# Patient Record
Sex: FEMALE | Race: WHITE | NOT HISPANIC OR LATINO | URBAN - METROPOLITAN AREA
[De-identification: names, ages, dates, MRNs, and addresses within clinical notes are randomized per-mention and may not be internally consistent; named-entity substitution may affect disease eponyms.]

---

## 2018-02-25 ENCOUNTER — INPATIENT (INPATIENT)
Facility: HOSPITAL | Age: 68
LOS: 2 days | Discharge: HOME | End: 2018-02-28
Attending: HOSPITALIST

## 2018-02-25 VITALS
HEART RATE: 115 BPM | OXYGEN SATURATION: 96 % | RESPIRATION RATE: 20 BRPM | DIASTOLIC BLOOD PRESSURE: 117 MMHG | SYSTOLIC BLOOD PRESSURE: 190 MMHG

## 2018-02-25 DIAGNOSIS — I21.3 ST ELEVATION (STEMI) MYOCARDIAL INFARCTION OF UNSPECIFIED SITE: ICD-10-CM

## 2018-02-25 LAB
ALBUMIN SERPL ELPH-MCNC: 4 G/DL — SIGNIFICANT CHANGE UP (ref 3–5.5)
ALP SERPL-CCNC: 89 U/L — SIGNIFICANT CHANGE UP (ref 30–115)
ALT FLD-CCNC: 28 U/L — SIGNIFICANT CHANGE UP (ref 0–41)
ANION GAP SERPL CALC-SCNC: 10 MMOL/L — SIGNIFICANT CHANGE UP (ref 7–14)
APTT BLD: 24.9 SEC — LOW (ref 27–39.2)
AST SERPL-CCNC: 25 U/L — SIGNIFICANT CHANGE UP (ref 0–41)
B-TYPE NATRIURETIC PEPTIDE BNP RESULT: 123 PG/ML — HIGH (ref 0–99)
BASE EXCESS BLDV CALC-SCNC: 2.1 MMOL/L — HIGH (ref -2–2)
BASOPHILS # BLD AUTO: 0.05 K/UL — SIGNIFICANT CHANGE UP (ref 0–0.2)
BASOPHILS NFR BLD AUTO: 0.3 % — SIGNIFICANT CHANGE UP (ref 0–1)
BILIRUB SERPL-MCNC: 0.5 MG/DL — SIGNIFICANT CHANGE UP (ref 0.2–1.2)
BUN SERPL-MCNC: 11 MG/DL — SIGNIFICANT CHANGE UP (ref 10–20)
CA-I SERPL-SCNC: 1.25 MMOL/L — SIGNIFICANT CHANGE UP (ref 1.12–1.3)
CALCIUM SERPL-MCNC: 9.8 MG/DL — SIGNIFICANT CHANGE UP (ref 8.5–10.1)
CHLORIDE SERPL-SCNC: 103 MMOL/L — SIGNIFICANT CHANGE UP (ref 98–110)
CK MB CFR SERPL CALC: 7.4 NG/ML — HIGH (ref 0.6–6.3)
CO2 SERPL-SCNC: 28 MMOL/L — SIGNIFICANT CHANGE UP (ref 17–32)
CREAT SERPL-MCNC: 0.8 MG/DL — SIGNIFICANT CHANGE UP (ref 0.7–1.5)
EOSINOPHIL # BLD AUTO: 0.08 K/UL — SIGNIFICANT CHANGE UP (ref 0–0.7)
EOSINOPHIL NFR BLD AUTO: 0.5 % — SIGNIFICANT CHANGE UP (ref 0–8)
GAS PNL BLDV: 139 MMOL/L — SIGNIFICANT CHANGE UP (ref 136–145)
GAS PNL BLDV: SIGNIFICANT CHANGE UP
GLUCOSE SERPL-MCNC: 156 MG/DL — HIGH (ref 70–110)
HCO3 BLDV-SCNC: 29 MMOL/L — SIGNIFICANT CHANGE UP (ref 22–29)
HCT VFR BLD CALC: 39.3 % — SIGNIFICANT CHANGE UP (ref 37–47)
HGB BLD CALC-MCNC: 14.8 G/DL — SIGNIFICANT CHANGE UP (ref 14–18)
HGB BLD-MCNC: 12.8 G/DL — LOW (ref 14–18)
IMM GRANULOCYTES NFR BLD AUTO: 0.6 % — HIGH (ref 0.1–0.3)
INR BLD: 0.96 RATIO — SIGNIFICANT CHANGE UP (ref 0.65–1.3)
LACTATE BLDV-MCNC: 2.7 MMOL/L — HIGH (ref 0.5–1.6)
LYMPHOCYTES # BLD AUTO: 1.42 K/UL — SIGNIFICANT CHANGE UP (ref 1.2–3.4)
LYMPHOCYTES # BLD AUTO: 8.3 % — LOW (ref 20.5–51.1)
MAGNESIUM SERPL-MCNC: 1.8 MG/DL — SIGNIFICANT CHANGE UP (ref 1.8–2.4)
MCHC RBC-ENTMCNC: 29 PG — SIGNIFICANT CHANGE UP (ref 27–31)
MCHC RBC-ENTMCNC: 32.6 G/DL — SIGNIFICANT CHANGE UP (ref 32–37)
MCV RBC AUTO: 88.9 FL — SIGNIFICANT CHANGE UP (ref 81–91)
MONOCYTES # BLD AUTO: 1.09 K/UL — HIGH (ref 0.1–0.6)
MONOCYTES NFR BLD AUTO: 6.3 % — SIGNIFICANT CHANGE UP (ref 1.7–9.3)
NEUTROPHILS # BLD AUTO: 14.43 K/UL — HIGH (ref 1.4–6.5)
NEUTROPHILS NFR BLD AUTO: 84 % — HIGH (ref 42.2–75.2)
NRBC # BLD: 0 /100 WBCS — SIGNIFICANT CHANGE UP (ref 0–0)
PCO2 BLDV: 53 MMHG — HIGH (ref 41–51)
PH BLDV: 7.35 — SIGNIFICANT CHANGE UP (ref 7.26–7.43)
PLATELET # BLD AUTO: 303 K/UL — SIGNIFICANT CHANGE UP (ref 130–400)
PO2 BLDV: 28 MMHG — SIGNIFICANT CHANGE UP (ref 20–40)
POTASSIUM BLDV-SCNC: 3.6 MMOL/L — SIGNIFICANT CHANGE UP (ref 3.3–5.6)
POTASSIUM SERPL-MCNC: 3.9 MMOL/L — SIGNIFICANT CHANGE UP (ref 3.5–5)
POTASSIUM SERPL-SCNC: 3.9 MMOL/L — SIGNIFICANT CHANGE UP (ref 3.5–5)
PROT SERPL-MCNC: 6.9 G/DL — SIGNIFICANT CHANGE UP (ref 6–8)
PROTHROM AB SERPL-ACNC: 10.4 SEC — SIGNIFICANT CHANGE UP (ref 9.95–12.87)
RBC # BLD: 4.42 M/UL — SIGNIFICANT CHANGE UP (ref 4.2–5.4)
RBC # FLD: 13.2 % — SIGNIFICANT CHANGE UP (ref 11.5–14.5)
SAO2 % BLDV: 48 % — SIGNIFICANT CHANGE UP
SODIUM SERPL-SCNC: 141 MMOL/L — SIGNIFICANT CHANGE UP (ref 135–146)
TROPONIN I SERPL-MCNC: 0.28 NG/ML — HIGH (ref 0–0.05)
WBC # BLD: 17.17 K/UL — HIGH (ref 4.8–10.8)
WBC # FLD AUTO: 17.17 K/UL — HIGH (ref 4.8–10.8)

## 2018-02-25 RX ORDER — ASPIRIN/CALCIUM CARB/MAGNESIUM 324 MG
81 TABLET ORAL DAILY
Qty: 0 | Refills: 0 | Status: DISCONTINUED | OUTPATIENT
Start: 2018-02-25 | End: 2018-02-28

## 2018-02-25 RX ORDER — ATORVASTATIN CALCIUM 80 MG/1
80 TABLET, FILM COATED ORAL AT BEDTIME
Qty: 0 | Refills: 0 | Status: DISCONTINUED | OUTPATIENT
Start: 2018-02-25 | End: 2018-02-28

## 2018-02-25 RX ORDER — TICAGRELOR 90 MG/1
180 TABLET ORAL ONCE
Qty: 0 | Refills: 0 | Status: DISCONTINUED | OUTPATIENT
Start: 2018-02-25 | End: 2018-02-26

## 2018-02-25 RX ORDER — INFLUENZA VIRUS VACCINE 15; 15; 15; 15 UG/.5ML; UG/.5ML; UG/.5ML; UG/.5ML
0.5 SUSPENSION INTRAMUSCULAR ONCE
Qty: 0 | Refills: 0 | Status: COMPLETED | OUTPATIENT
Start: 2018-02-25 | End: 2018-02-25

## 2018-02-25 RX ORDER — TICAGRELOR 90 MG/1
90 TABLET ORAL
Qty: 0 | Refills: 0 | Status: DISCONTINUED | OUTPATIENT
Start: 2018-02-25 | End: 2018-02-26

## 2018-02-25 RX ORDER — ENOXAPARIN SODIUM 100 MG/ML
40 INJECTION SUBCUTANEOUS DAILY
Qty: 0 | Refills: 0 | Status: DISCONTINUED | OUTPATIENT
Start: 2018-02-26 | End: 2018-02-28

## 2018-02-25 RX ORDER — SODIUM CHLORIDE 9 MG/ML
1000 INJECTION INTRAMUSCULAR; INTRAVENOUS; SUBCUTANEOUS
Qty: 0 | Refills: 0 | Status: DISCONTINUED | OUTPATIENT
Start: 2018-02-25 | End: 2018-02-28

## 2018-02-25 RX ADMIN — SODIUM CHLORIDE 100 MILLILITER(S): 9 INJECTION INTRAMUSCULAR; INTRAVENOUS; SUBCUTANEOUS at 21:19

## 2018-02-25 NOTE — CONSULT NOTE ADULT - ASSESSMENT
1-STEMI,       Plan:  Brillinta 180 mg x1  continue ASA,   transfer for urgent cath   CCU monitoring  Echo

## 2018-02-25 NOTE — H&P ADULT - PROBLEM SELECTOR PLAN 1
- cath lab - EKG showed ST elevation in inferior leads with reciprocal ST depression  - cardiac cath showed proximal RCA complete stenosis s/p balloon angioplasty and successful revascularisation  - will c/w aspirin, brilinta, lipitor . will hold on B blocker for now , patient at risk for bradycardia with inferior MI  -follow up with cardio in am.  - TTE , HBA1c, lipid profile

## 2018-02-25 NOTE — CONSULT NOTE ADULT - SUBJECTIVE AND OBJECTIVE BOX
CHIEF COMPLAINT: chest pain      HISTORY OF PRESENT ILLNESS:     This is a 67 years old female patient previously healthy presented for chest pain started yesterday was intermittent constant since 2:30 pm epigastric and substernal associated with diaphoresis. EKG showed ST elevation in inferior leads with  reciprocal ST depression.  In ED patient was feeling better post morphine given  mg by EMS. BP elevated HR ~ 100. normal baseline activity lying flat , no bleeding hx in the past  Patient had viral illness few weeks ago, denies fever chills.   denies known hx of HTN DM DL or CVA   PAST MEDICAL & SURGICAL HISTORY:  No pertinent past medical history  No significant past surgical history    FAMILY HISTORY:  mother had stroke    Allergies  No Known Allergies        	  Home Medications:    MEDICATIONS  (STANDING):  ticagrelor 180 milliGRAM(s) Oral once    MEDICATIONS  (PRN):        SOCIAL HISTORY:    [x ] Non-smoker    smokerd for one year in the past     REVIEW OF SYSTEMS:    PHYSICAL EXAM:  T(C): 36.6 (02-25-18 @ 17:29), Max: 36.6 (02-25-18 @ 17:29)  HR: 106 (02-25-18 @ 17:29) (106 - 115)  BP: 155/91 (02-25-18 @ 17:29) (155/91 - 190/117)  RR: 18 (02-25-18 @ 17:16) (18 - 20)  SpO2: 95% (02-25-18 @ 17:16) (95% - 96%)  Wt(kg): --  I&O's Summary    Daily Height in cm: 165.1 (25 Feb 2018 17:32)    Daily     General Appearance: Normal	  Cardiovascular: Normal S1 S2, No JVD, No murmurs, No edema  Respiratory: Lungs clear to auscultation	  Psychiatry: A & O x 3, Mood & affect appropriate  Gastrointestinal:  Soft, Non-tender  Skin: No rashes, No ecchymoses, No cyanosis	  Neurologic: Non-focal  Extremities: Normal range of motion, No clubbing, cyanosis or edema  Vascular: Peripheral pulses palpable 2+ bilaterally        LABS:	 	                        12.8   17.17 )-----------( 303      ( 25 Feb 2018 17:18 )             39.3     02-25    141  |  103  |  11  ----------------------------<  156<H>  3.9   |  28  |  0.8    Ca    9.8      25 Feb 2018 17:18  Mg     1.8     02-25    TPro  6.9  /  Alb  4.0  /  TBili  0.5  /  DBili  x   /  AST  25  /  ALT  28  /  AlkPhos  89  02-25      CARDIAC MARKERS:  Troponin I, Serum: 0.28 ng/mL (02-25 @ 17:18)            TELEMETRY EVENTS: 	    ECG:  	normal sinus rhythm ST elevation in inferior leads q wave with reciprocal ST depression

## 2018-02-25 NOTE — H&P ADULT - NSHPLABSRESULTS_GEN_ALL_CORE
Blood Gas Profile - Venous (02.25.18 @ 17:20)    pH, Venous: 7.35    pCO2, Venous: 53 mmHg    pO2, Venous: 28 mmHg    HCO3, Venous: 29 mmoL/L    Base Excess, Venous: 2.1 mmoL/L    Oxygen Saturation, Venous: 48 %    Blood Gas Venous - Hemoglobin/Hematocrit (02.25.18 @ 17:20)    Total Hemoglobin, Calculated: 14.8 g/dL  Blood Gas Calcium, Ionized - Venous (02.25.18 @ 17:20)    Blood Gas Calcium, Ionized - Venous: 1.25 mmoL/L  Blood Gas Venous - Potassium (02.25.18 @ 17:20)    Blood Gas Venous - Potassium: 3.6 mmoL/L  Blood Gas Venous - Lactate (02.25.18 @ 17:20)    Blood Gas Venous - Lactate: 2.7 mmoL/L  Blood Gas Venous - Sodium (02.25.18 @ 17:20)    Blood Gas Venous - Sodium: 139 mmoL/L Blood Gas Profile - Venous (02.25.18 @ 17:20)    pH, Venous: 7.35    pCO2, Venous: 53 mmHg    pO2, Venous: 28 mmHg    HCO3, Venous: 29 mmoL/L    Base Excess, Venous: 2.1 mmoL/L    Oxygen Saturation, Venous: 48 %    Blood Gas Venous - Hemoglobin/Hematocrit (02.25.18 @ 17:20)    Total Hemoglobin, Calculated: 14.8 g/dL  Blood Gas Calcium, Ionized - Venous (02.25.18 @ 17:20)    Blood Gas Calcium, Ionized - Venous: 1.25 mmoL/L  Blood Gas Venous - Potassium (02.25.18 @ 17:20)    Blood Gas Venous - Potassium: 3.6 mmoL/L  Blood Gas Venous - Lactate (02.25.18 @ 17:20)    Blood Gas Venous - Lactate: 2.7 mmoL/L  Blood Gas Venous - Sodium (02.25.18 @ 17:20)    Blood Gas Venous - Sodium: 139 mmoL/L                          12.8   17.17 )-----------( 303      ( 25 Feb 2018 17:18 )             39.3   02-25    141  |  103  |  11  ----------------------------<  156<H>  3.9   |  28  |  0.8    Ca    9.8      25 Feb 2018 17:18  Mg     1.8     02-25    TPro  6.9  /  Alb  4.0  /  TBili  0.5  /  DBili  x   /  AST  25  /  ALT  28  /  AlkPhos  89  02-25

## 2018-02-25 NOTE — PROGRESS NOTE ADULT - SUBJECTIVE AND OBJECTIVE BOX
POST OPERATIVE PROCEDURAL DOCUMENTATION  PRE-OP DIAGNOSIS: STEMI      PROCEDURE:   LEFT HEART CATHERIZATION    Physician:ELISE Bauman  Assistant:  Dr Garibay    ANESTHESIA TYPE:  [ ] Sedation  [ X] Local/Regional  [   ]General Anesthesia    ESTIMATED BLOOD LOSS:      less than 10 mL    CONDITION  [X ] Good  [   ] Fair  [   ] Serious  [   ] Critical      SPECIMENS REMOVED (IF APPLICABLE):   None        FINDINGS:  LEFT HEART CATHERIZATION                                    LVEF%: 50 %  LVEDP: 30    Left main: Patent     Prox  % lesion with ALEXANDER flow 0  Lcx moderate to severe disease with multiple significant lesions  LAD patent, non obstructive lesion at diagonal origin  Follow up official cath report for details      PERCUTANEOUS CORONARY INTERVENTIONS:  balloon angioplasty and YOLANDA stent insertion in proximal RCA    COMPLICATIONS:  None      POST-OP DIAGNOSIS: STEMI , prox RCA reperfuses          PLAN OF CARE  Admit to ccu  Continue ASA 81 mg daily  Brillinta 90 mg bid  Start statin  Echocardio  Will continue to follow

## 2018-02-25 NOTE — H&P ADULT - ASSESSMENT
67 yrs old female patient previsouly healthy is here for chest pain found to have STEMI in inferior leads    - DVT PPX:  - GI PPX: 67 yrs old female patient previsouly healthy is here for chest pain found to have STEMI in inferior leads    - DVT PPX  - GI PPX: not indicated

## 2018-02-25 NOTE — H&P ADULT - NSHPPHYSICALEXAM_GEN_ALL_CORE
T(C): 36.6 (02-25-18 @ 17:29), Max: 36.6 (02-25-18 @ 17:29)  HR: 106 (02-25-18 @ 17:29) (106 - 115)  BP: 155/91 (02-25-18 @ 17:29) (155/91 - 190/117)  RR: 18 (02-25-18 @ 17:16) (18 - 20)  SpO2: 95% (02-25-18 @ 17:16) (95% - 96%)    PHYSICAL EXAM:  GENERAL: NAD, well-developed  HEAD:  Atraumatic, Normocephalic  EYES: EOMI, PERRLA, conjunctiva and sclera clear  NECK: Supple, No JVD  CHEST/LUNG: Clear to auscultation bilaterally; No wheeze  HEART: tachycardic; No murmurs, rubs, or gallops  ABDOMEN: Soft, Nontender, Nondistended; Bowel sounds present  EXTREMITIES:  2+ Peripheral Pulses, No clubbing, cyanosis, or edema  PSYCH: AAOx3  NEUROLOGY: non-focal  SKIN: No rashes or lesions

## 2018-02-25 NOTE — ED ADULT NURSE NOTE - OBJECTIVE STATEMENT
Patient c/o cp since last night. Pain describ Patient c/o cp since last night. Pain described as sharp intermittent since l;ast night and became constent this morning. Sent in from Urgent care facilty to r/o STEMI

## 2018-02-25 NOTE — ED PROVIDER NOTE - OBJECTIVE STATEMENT
67 y f no pmh pw cp. CP since yesterday. Midsternal, burning cp. Intermittent until 230 today when it became constant. Went to urgent care who noticed ST elevations and sent her to ED. Denies n/v, abdominal pain, vision changes, sob, back pain.

## 2018-02-25 NOTE — H&P ADULT - ATTENDING COMMENTS
I fully agree with the resident's exam/assessment/plan as outlined post my independent exam/assessment/plan.  Pt. feels well this morning without any chest pain, palpitations, shortness of breath, or headache.    labs today reveal a sodium 131, hgb a1c of 6.4, and an ldl of 166    stemi s/p pci: continue metoprolol, statin, asa, brilinta, check echo, continue in ccu  pre-diabetes: close monitoring, weight loss/dietary modifications to assist  hyponatremia-may be lab error, on admission was 141??; repeat in am    Note: ECHO-ef 60-65%, grade 1 diastolic dysfunction

## 2018-02-25 NOTE — ED PROVIDER NOTE - NS ED ROS FT
Eyes:  No visual changes, eye pain or discharge.  ENMT:  No hearing changes, pain, discharge or infections. No neck pain or stiffness.  Cardiac: CP. No SOB or edema.   Respiratory:  No cough or respiratory distress.   GI:  No nausea, vomiting, diarrhea or abdominal pain.  MS:  No myalgia, muscle weakness, joint pain or back pain.  Neuro:  No headache or weakness.  No LOC.  Skin:  No skin rash.

## 2018-02-25 NOTE — ED PROVIDER NOTE - ATTENDING CONTRIBUTION TO CARE
67 year old female,  no pmh x, is bibems with pre arrival notification for stemi code, + inferior wall MI, CP since yesterday. Midsternal, burning cp. Intermittent until 230 today when it became constant. Went to urgent care who noticed ST elevations and sent her to ED. Denies n/v, abdominal pain, vision changes, sob, back pain.    Exam: Well appearing, no acute distress, AAO x 3, sitting up and providing history, EOMI, PERRL 3mm bilateral, no nystagmus, HEENT Unremarkable, + moist mucous membranes, no pooling of secretions, no jvd, + full passive rom in neck, negative Kernig, negative Brudzinski, s1s2, no mrg, rrr, + symmetric bilateral pulses, ctabl, no wrr, good air movement overall, no pulsatile abdominal mass, abd soft, nt nd, no rebound, no guarding, no signs of peritonitis, no cva tenderness, no rash, no leg edema, dp and pt pulses intact. No calf pain, swelling or erythema, Ambulatory. Strength intact symmetrically. CN 2-12 grossly intact, GCS 15. P: labs, imaging, tx, reassess     Cardiology fellow bedside, patient going up to the Cath lab. STEMI code activated prior to patient arrival

## 2018-02-25 NOTE — ED PROVIDER NOTE - PHYSICAL EXAMINATION
CONSTITUTIONAL: Well-developed; well-nourished; in no acute distress.   SKIN: warm, dry  HEAD: Normocephalic; atraumatic.  EYES: normal sclera and conjunctiva   ENT: No nasal discharge; airway clear.  NECK: Supple; non tender.  CARD: S1, S2 normal; no murmurs, gallops, or rubs. Regular rate and rhythm.   RESP: No wheezes, rales or rhonchi.  ABD: soft ntnd  EXT: Normal ROM.  No clubbing, cyanosis or edema.   LYMPH: No acute cervical adenopathy.  NEURO: Alert, oriented, grossly unremarkable  PSYCH: Cooperative, appropriate.

## 2018-02-25 NOTE — PATIENT PROFILE ADULT. - VISION (WITH CORRECTIVE LENSES IF THE PATIENT USUALLY WEARS THEM):
Normal vision: sees adequately in most situations; can see medication labels, newsprint/uses contacts also as well as reading glasses

## 2018-02-26 DIAGNOSIS — I21.11 ST ELEVATION (STEMI) MYOCARDIAL INFARCTION INVOLVING RIGHT CORONARY ARTERY: ICD-10-CM

## 2018-02-26 LAB
ALBUMIN SERPL ELPH-MCNC: 3.3 G/DL — SIGNIFICANT CHANGE UP (ref 3–5.5)
ALP SERPL-CCNC: 86 U/L — SIGNIFICANT CHANGE UP (ref 30–115)
ALT FLD-CCNC: 51 U/L — HIGH (ref 0–41)
ANION GAP SERPL CALC-SCNC: 8 MMOL/L — SIGNIFICANT CHANGE UP (ref 7–14)
AST SERPL-CCNC: 166 U/L — HIGH (ref 0–41)
B-TYPE NATRIURETIC PEPTIDE BNP RESULT: 191 PG/ML — HIGH (ref 0–99)
BILIRUB SERPL-MCNC: 0.8 MG/DL — SIGNIFICANT CHANGE UP (ref 0.2–1.2)
BUN SERPL-MCNC: 7 MG/DL — LOW (ref 10–20)
CALCIUM SERPL-MCNC: 8.3 MG/DL — LOW (ref 8.5–10.1)
CHLORIDE SERPL-SCNC: 99 MMOL/L — SIGNIFICANT CHANGE UP (ref 98–110)
CHOLEST SERPL-MCNC: 250 MG/DL — HIGH (ref 100–200)
CK MB BLD-MCNC: 31 % — HIGH (ref 0–4)
CK MB CFR SERPL CALC: 249.7 NG/ML — HIGH (ref 0.6–6.3)
CK SERPL-CCNC: 800 U/L — HIGH (ref 0–225)
CO2 SERPL-SCNC: 24 MMOL/L — SIGNIFICANT CHANGE UP (ref 17–32)
CREAT SERPL-MCNC: 0.7 MG/DL — SIGNIFICANT CHANGE UP (ref 0.7–1.5)
GLUCOSE SERPL-MCNC: 109 MG/DL — SIGNIFICANT CHANGE UP (ref 70–110)
HCT VFR BLD CALC: 33 % — LOW (ref 37–47)
HDLC SERPL-MCNC: 51 MG/DL — SIGNIFICANT CHANGE UP (ref 40–60)
HGB BLD-MCNC: 10.9 G/DL — LOW (ref 14–18)
LIPID PNL WITH DIRECT LDL SERPL: 166 MG/DL — HIGH (ref 50–100)
MAGNESIUM SERPL-MCNC: 1.8 MG/DL — SIGNIFICANT CHANGE UP (ref 1.8–2.4)
MCHC RBC-ENTMCNC: 29.1 PG — SIGNIFICANT CHANGE UP (ref 27–31)
MCHC RBC-ENTMCNC: 33 G/DL — SIGNIFICANT CHANGE UP (ref 32–37)
MCV RBC AUTO: 88 FL — SIGNIFICANT CHANGE UP (ref 81–91)
NRBC # BLD: 0 /100 WBCS — SIGNIFICANT CHANGE UP (ref 0–0)
PLATELET # BLD AUTO: 239 K/UL — SIGNIFICANT CHANGE UP (ref 130–400)
POTASSIUM SERPL-MCNC: 3.7 MMOL/L — SIGNIFICANT CHANGE UP (ref 3.5–5)
POTASSIUM SERPL-SCNC: 3.7 MMOL/L — SIGNIFICANT CHANGE UP (ref 3.5–5)
PROT SERPL-MCNC: 5.9 G/DL — LOW (ref 6–8)
RBC # BLD: 3.75 M/UL — LOW (ref 4.2–5.4)
RBC # FLD: 13.2 % — SIGNIFICANT CHANGE UP (ref 11.5–14.5)
SODIUM SERPL-SCNC: 131 MMOL/L — LOW (ref 135–146)
TOTAL CHOLESTEROL/HDL RATIO MEASUREMENT: 4.9 RATIO — SIGNIFICANT CHANGE UP (ref 4–5.5)
TRIGL SERPL-MCNC: 127 MG/DL — SIGNIFICANT CHANGE UP (ref 40–150)
TROPONIN I SERPL-MCNC: 41.48 NG/ML — CRITICAL HIGH (ref 0–0.05)
WBC # BLD: 14.11 K/UL — HIGH (ref 4.8–10.8)
WBC # FLD AUTO: 14.11 K/UL — HIGH (ref 4.8–10.8)

## 2018-02-26 RX ORDER — PRASUGREL 5 MG/1
10 TABLET, FILM COATED ORAL DAILY
Qty: 0 | Refills: 0 | Status: DISCONTINUED | OUTPATIENT
Start: 2018-02-27 | End: 2018-02-28

## 2018-02-26 RX ORDER — METOPROLOL TARTRATE 50 MG
25 TABLET ORAL DAILY
Qty: 0 | Refills: 0 | Status: DISCONTINUED | OUTPATIENT
Start: 2018-02-26 | End: 2018-02-26

## 2018-02-26 RX ORDER — TICAGRELOR 90 MG/1
1 TABLET ORAL
Qty: 60 | Refills: 0 | OUTPATIENT
Start: 2018-02-26 | End: 2018-03-27

## 2018-02-26 RX ORDER — POTASSIUM CHLORIDE 20 MEQ
40 PACKET (EA) ORAL ONCE
Qty: 0 | Refills: 0 | Status: COMPLETED | OUTPATIENT
Start: 2018-02-26 | End: 2018-02-26

## 2018-02-26 RX ORDER — METOPROLOL TARTRATE 50 MG
50 TABLET ORAL DAILY
Qty: 0 | Refills: 0 | Status: DISCONTINUED | OUTPATIENT
Start: 2018-02-26 | End: 2018-02-27

## 2018-02-26 RX ORDER — MAGNESIUM SULFATE 500 MG/ML
2 VIAL (ML) INJECTION ONCE
Qty: 0 | Refills: 0 | Status: COMPLETED | OUTPATIENT
Start: 2018-02-26 | End: 2018-02-26

## 2018-02-26 RX ORDER — PRASUGREL 5 MG/1
30 TABLET, FILM COATED ORAL ONCE
Qty: 0 | Refills: 0 | Status: COMPLETED | OUTPATIENT
Start: 2018-02-26 | End: 2018-02-27

## 2018-02-26 RX ORDER — METOPROLOL TARTRATE 50 MG
25 TABLET ORAL ONCE
Qty: 0 | Refills: 0 | Status: COMPLETED | OUTPATIENT
Start: 2018-02-26 | End: 2018-02-26

## 2018-02-26 RX ADMIN — Medication 40 MILLIEQUIVALENT(S): at 22:32

## 2018-02-26 RX ADMIN — Medication 25 MILLIGRAM(S): at 14:16

## 2018-02-26 RX ADMIN — ATORVASTATIN CALCIUM 80 MILLIGRAM(S): 80 TABLET, FILM COATED ORAL at 06:32

## 2018-02-26 RX ADMIN — ATORVASTATIN CALCIUM 80 MILLIGRAM(S): 80 TABLET, FILM COATED ORAL at 22:12

## 2018-02-26 RX ADMIN — Medication 81 MILLIGRAM(S): at 11:41

## 2018-02-26 RX ADMIN — Medication 25 MILLIGRAM(S): at 06:42

## 2018-02-26 RX ADMIN — TICAGRELOR 90 MILLIGRAM(S): 90 TABLET ORAL at 06:42

## 2018-02-26 RX ADMIN — Medication 50 GRAM(S): at 22:12

## 2018-02-26 RX ADMIN — ENOXAPARIN SODIUM 40 MILLIGRAM(S): 100 INJECTION SUBCUTANEOUS at 11:41

## 2018-02-26 NOTE — PROGRESS NOTE ADULT - SUBJECTIVE AND OBJECTIVE BOX
PGY I NOTE    LENGTH OF HOSPITAL STAY:  1d    CHIEF COMPLAINT:Patient is a 67y old  Female who presents with a chief complaint of C/O chest pain  STEMI CODE (25 Feb 2018 22:48)    HPI:HPI:  67 years old female patient previously healthy is here for the above chief complaint. Patient started experiencing midsternal/epigastric pain since yesterday , intermittent, burning in type. It became constant at 2: 30 pm today associated with diaphoresis. She went to urgent care , and was found to have ST elevations in inferior leads and was sent  to ED. Patient had a viral illness 2 weeks ago , and was prescribed cefuroxime for persistent cough today.In ED, Code STEMi called and  EKG showed ST elevation in inferior leads with  reciprocal ST depression. Patient felt better post morphine and  mg by EMS. Cath performed and showed proximal % stenosis s/p successful balloon angioplasty. (25 Feb 2018 17:42)    OVERNIGHT EVENTS/INTERVAL UPDATES:  no acute events overnight, awake, alert, following commands.     PMH & PSH  PAST MEDICAL & SURGICAL HISTORY:  No pertinent past medical history  No significant past surgical history    SOCIAL HISTORY: Negative    ALLERGIES: No Known Allergies    HOME MEDICATIONS  Home Medications:    PHYSICAL EXAM:  T(F): 98.1 (02-26-18 @ 08:00), Max: 98.3 (02-26-18 @ 04:00)  HR: 93 (02-26-18 @ 10:00)  BP: 118/68 (02-26-18 @ 10:00)  RR: 20 (02-26-18 @ 10:00)  SpO2: 98% (02-26-18 @ 10:00)  CAPILLARY BLOOD GLUCOSE          02-25-18 @ 07:01  -  02-26-18 @ 07:00  --------------------------------------------------------  IN:    sodium chloride 0.9%.: 400 mL  Total IN: 400 mL    OUT:    Voided: 1050 mL  Total OUT: 1050 mL    Total NET: -650 mL      02-26-18 @ 07:01  -  02-26-18 @ 13:28  --------------------------------------------------------  IN:    Oral Fluid: 350 mL  Total IN: 350 mL    OUT:    Voided: 350 mL  Total OUT: 350 mL    Total NET: 0 mL            General: NAD  HEENT: NCAT  CV: RRR  RESP: CTAB  Abdominal: Soft, NTTP  Extremity: no c/c/e  Neuro: A&O x3    MEDICATIONS  STANDING MEDICATIONS  aspirin  chewable 81 milliGRAM(s) Oral daily  atorvastatin 80 milliGRAM(s) Oral at bedtime  enoxaparin Injectable 40 milliGRAM(s) SubCutaneous daily  influenza   Vaccine 0.5 milliLiter(s) IntraMuscular once  metoprolol succinate ER 50 milliGRAM(s) Oral daily  metoprolol succinate ER 25 milliGRAM(s) Oral once  sodium chloride 0.9%. 1000 milliLiter(s) IV Continuous <Continuous>  ticagrelor 90 milliGRAM(s) Oral two times a day  ticagrelor 180 milliGRAM(s) Oral once    PRN MEDICATIONS    LABS:                        10.9   14.11 )-----------( 239      ( 26 Feb 2018 05:40 )             33.0              02-26    131<L>  |  99  |  7<L>  ----------------------------<  109  3.7   |  24  |  0.7    Ca    8.3<L>      26 Feb 2018 05:40  Mg     1.8     02-26    TPro  5.9<L>  /  Alb  3.3  /  TBili  0.8  /  DBili  x   /  AST  166<H>  /  ALT  51<H>  /  AlkPhos  86  02-26    LIVER FUNCTIONS - ( 26 Feb 2018 05:40 )  Alb: 3.3 g/dL / Pro: 5.9 g/dL / ALK PHOS: 86 U/L / ALT: 51 U/L / AST: 166 U/L / GGT: x                      PT/INR - ( 25 Feb 2018 17:18 )   PT: 10.40 sec;   INR: 0.96 ratio         PTT - ( 25 Feb 2018 17:18 )  PTT:24.9 sec  CARDIAC MARKERS ( 26 Feb 2018 05:40 )  41.48 ng/mL / x     / 800 U/L / x     / 249.7 ng/mL  CARDIAC MARKERS ( 25 Feb 2018 17:18 )  0.28 ng/mL / x     / x     / x     / 7.4 ng/mL          ASSESSMENT & PLANS: 67 years old female s/p YOLANDA placed in RCA. Awake, alert, following commands,     CNS: a/o x 3     HEENT:    PULMONARY:     CARIOVASCULAR:  YOLANDA in RAC  c/w metoprolol, ticagrelor, aspirin    GI: heart healthy diet     RENAL: monitor I/O    INFECTIOUS DISEASE:    HEMATOLOGICAL: DVT prophylaxis     ENDOCRINE:    MUSCULOSKELETAL:    Plan to downgrade to tomorrow PGY I NOTE    LENGTH OF HOSPITAL STAY:  1d    CHIEF COMPLAINT:Patient is a 67y old  Female who presents with a chief complaint of C/O chest pain  STEMI CODE (25 Feb 2018 22:48)    HPI:HPI:  67 years old female patient previously healthy is here for the above chief complaint. Patient started experiencing midsternal/epigastric pain since yesterday , intermittent, burning in type. It became constant at 2: 30 pm today associated with diaphoresis. She went to urgent care , and was found to have ST elevations in inferior leads and was sent  to ED. Patient had a viral illness 2 weeks ago , and was prescribed cefuroxime for persistent cough today.In ED, Code STEMi called and  EKG showed ST elevation in inferior leads with  reciprocal ST depression. Patient felt better post morphine and  mg by EMS. Cath performed and showed proximal % stenosis s/p successful balloon angioplasty. (25 Feb 2018 17:42)    OVERNIGHT EVENTS/INTERVAL UPDATES:  no acute events overnight, awake, alert, following commands.     PMH & PSH  PAST MEDICAL & SURGICAL HISTORY:  No pertinent past medical history  No significant past surgical history    SOCIAL HISTORY: Negative    ALLERGIES: No Known Allergies    HOME MEDICATIONS  Home Medications:    PHYSICAL EXAM:  T(F): 98.1 (02-26-18 @ 08:00), Max: 98.3 (02-26-18 @ 04:00)  HR: 93 (02-26-18 @ 10:00)  BP: 118/68 (02-26-18 @ 10:00)  RR: 20 (02-26-18 @ 10:00)  SpO2: 98% (02-26-18 @ 10:00)  CAPILLARY BLOOD GLUCOSE          02-25-18 @ 07:01  -  02-26-18 @ 07:00  --------------------------------------------------------  IN:    sodium chloride 0.9%.: 400 mL  Total IN: 400 mL    OUT:    Voided: 1050 mL  Total OUT: 1050 mL    Total NET: -650 mL      02-26-18 @ 07:01  -  02-26-18 @ 13:28  --------------------------------------------------------  IN:    Oral Fluid: 350 mL  Total IN: 350 mL    OUT:    Voided: 350 mL  Total OUT: 350 mL    Total NET: 0 mL            General: NAD  HEENT: NCAT  CV: RRR  RESP: CTAB  Abdominal: Soft, NTTP  Extremity: no c/c/e  Neuro: A&O x3    MEDICATIONS  STANDING MEDICATIONS  aspirin  chewable 81 milliGRAM(s) Oral daily  atorvastatin 80 milliGRAM(s) Oral at bedtime  enoxaparin Injectable 40 milliGRAM(s) SubCutaneous daily  influenza   Vaccine 0.5 milliLiter(s) IntraMuscular once  metoprolol succinate ER 50 milliGRAM(s) Oral daily  metoprolol succinate ER 25 milliGRAM(s) Oral once  sodium chloride 0.9%. 1000 milliLiter(s) IV Continuous <Continuous>  ticagrelor 90 milliGRAM(s) Oral two times a day  ticagrelor 180 milliGRAM(s) Oral once    PRN MEDICATIONS    LABS:                        10.9   14.11 )-----------( 239      ( 26 Feb 2018 05:40 )             33.0              02-26    131<L>  |  99  |  7<L>  ----------------------------<  109  3.7   |  24  |  0.7    Ca    8.3<L>      26 Feb 2018 05:40  Mg     1.8     02-26    TPro  5.9<L>  /  Alb  3.3  /  TBili  0.8  /  DBili  x   /  AST  166<H>  /  ALT  51<H>  /  AlkPhos  86  02-26    LIVER FUNCTIONS - ( 26 Feb 2018 05:40 )  Alb: 3.3 g/dL / Pro: 5.9 g/dL / ALK PHOS: 86 U/L / ALT: 51 U/L / AST: 166 U/L / GGT: x                      PT/INR - ( 25 Feb 2018 17:18 )   PT: 10.40 sec;   INR: 0.96 ratio         PTT - ( 25 Feb 2018 17:18 )  PTT:24.9 sec  CARDIAC MARKERS ( 26 Feb 2018 05:40 )  41.48 ng/mL / x     / 800 U/L / x     / 249.7 ng/mL  CARDIAC MARKERS ( 25 Feb 2018 17:18 )  0.28 ng/mL / x     / x     / x     / 7.4 ng/mL          ASSESSMENT & PLANS: 67 years old female s/p YOLANDA placed in RCA. Awake, alert, following commands,     CNS: a/o x 3     HEENT:    PULMONARY:     CARIOVASCULAR:  YOLANDA in RAC  c/w metoprolol, ticagrelor, aspirin  Cardiology NP will like patient to be switched form ticagrelor to effient 30 mg loading dose, 10 mg daily, will page Dr. Crowell  and discuss   GI: heart healthy diet     RENAL: monitor I/O    INFECTIOUS DISEASE:    HEMATOLOGICAL: DVT prophylaxis     ENDOCRINE:    MUSCULOSKELETAL:    Plan to downgrade to tomorrow PGY I NOTE    LENGTH OF HOSPITAL STAY:  1d    CHIEF COMPLAINT:Patient is a 67y old  Female who presents with a chief complaint of C/O chest pain  STEMI CODE (25 Feb 2018 22:48)    HPI:HPI:  67 years old female patient previously healthy is here for the above chief complaint. Patient started experiencing midsternal/epigastric pain since yesterday , intermittent, burning in type. It became constant at 2: 30 pm today associated with diaphoresis. She went to urgent care , and was found to have ST elevations in inferior leads and was sent  to ED. Patient had a viral illness 2 weeks ago , and was prescribed cefuroxime for persistent cough today.In ED, Code STEMi called and  EKG showed ST elevation in inferior leads with  reciprocal ST depression. Patient felt better post morphine and  mg by EMS. Cath performed and showed proximal % stenosis s/p successful balloon angioplasty. (25 Feb 2018 17:42)    OVERNIGHT EVENTS/INTERVAL UPDATES:  no acute events overnight, awake, alert, following commands.     PMH & PSH  PAST MEDICAL & SURGICAL HISTORY:  No pertinent past medical history  No significant past surgical history    SOCIAL HISTORY: Negative    ALLERGIES: No Known Allergies    HOME MEDICATIONS  Home Medications:    PHYSICAL EXAM:  T(F): 98.1 (02-26-18 @ 08:00), Max: 98.3 (02-26-18 @ 04:00)  HR: 93 (02-26-18 @ 10:00)  BP: 118/68 (02-26-18 @ 10:00)  RR: 20 (02-26-18 @ 10:00)  SpO2: 98% (02-26-18 @ 10:00)  CAPILLARY BLOOD GLUCOSE          02-25-18 @ 07:01  -  02-26-18 @ 07:00  --------------------------------------------------------  IN:    sodium chloride 0.9%.: 400 mL  Total IN: 400 mL    OUT:    Voided: 1050 mL  Total OUT: 1050 mL    Total NET: -650 mL      02-26-18 @ 07:01  -  02-26-18 @ 13:28  --------------------------------------------------------  IN:    Oral Fluid: 350 mL  Total IN: 350 mL    OUT:    Voided: 350 mL  Total OUT: 350 mL    Total NET: 0 mL            General: NAD  HEENT: NCAT  CV: RRR  RESP: CTAB  Abdominal: Soft, NTTP  Extremity: no c/c/e  Neuro: A&O x3    MEDICATIONS  STANDING MEDICATIONS  aspirin  chewable 81 milliGRAM(s) Oral daily  atorvastatin 80 milliGRAM(s) Oral at bedtime  enoxaparin Injectable 40 milliGRAM(s) SubCutaneous daily  influenza   Vaccine 0.5 milliLiter(s) IntraMuscular once  metoprolol succinate ER 50 milliGRAM(s) Oral daily  metoprolol succinate ER 25 milliGRAM(s) Oral once  sodium chloride 0.9%. 1000 milliLiter(s) IV Continuous <Continuous>  ticagrelor 90 milliGRAM(s) Oral two times a day  ticagrelor 180 milliGRAM(s) Oral once    PRN MEDICATIONS    LABS:                        10.9   14.11 )-----------( 239      ( 26 Feb 2018 05:40 )             33.0              02-26    131<L>  |  99  |  7<L>  ----------------------------<  109  3.7   |  24  |  0.7    Ca    8.3<L>      26 Feb 2018 05:40  Mg     1.8     02-26    TPro  5.9<L>  /  Alb  3.3  /  TBili  0.8  /  DBili  x   /  AST  166<H>  /  ALT  51<H>  /  AlkPhos  86  02-26    LIVER FUNCTIONS - ( 26 Feb 2018 05:40 )  Alb: 3.3 g/dL / Pro: 5.9 g/dL / ALK PHOS: 86 U/L / ALT: 51 U/L / AST: 166 U/L / GGT: x                      PT/INR - ( 25 Feb 2018 17:18 )   PT: 10.40 sec;   INR: 0.96 ratio         PTT - ( 25 Feb 2018 17:18 )  PTT:24.9 sec  CARDIAC MARKERS ( 26 Feb 2018 05:40 )  41.48 ng/mL / x     / 800 U/L / x     / 249.7 ng/mL  CARDIAC MARKERS ( 25 Feb 2018 17:18 )  0.28 ng/mL / x     / x     / x     / 7.4 ng/mL          ASSESSMENT & PLANS: 67 years old female s/p YOLANDA placed in RCA. Awake, alert, following commands,     CNS: a/o x 3     HEENT:    PULMONARY:     CARIOVASCULAR:  YOLANDA in RAC  c/w metoprolol, ticagrelor, aspirin  Cardiology NP will like patient to be switched form ticagrelor to effient 30 mg loading dose, 10 mg daily, discussed with lizz Nye to change     GI: heart healthy diet     RENAL: monitor I/O    INFECTIOUS DISEASE:    HEMATOLOGICAL: DVT prophylaxis     ENDOCRINE:    MUSCULOSKELETAL:    Plan to downgrade to tomorrow

## 2018-02-26 NOTE — PROGRESS NOTE ADULT - SUBJECTIVE AND OBJECTIVE BOX
Cardiology Follow up    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   67yFemale  PAST MEDICAL & SURGICAL HISTORY:  No pertinent past medical history  No significant past surgical history    Allergies    No Known Allergies    Intolerances    Patient without complaints. + cough  Denies CP, SOB, palpitations, or dizziness  No events on telemetry overnight    Vital Signs Last 24 Hrs  T(C): 36.7 (26 Feb 2018 08:00), Max: 36.8 (26 Feb 2018 04:00)  T(F): 98.1 (26 Feb 2018 08:00), Max: 98.3 (26 Feb 2018 04:00)  HR: 93 (26 Feb 2018 10:00) (74 - 115)  BP: 118/68 (26 Feb 2018 10:00) (115/61 - 190/117)  BP(mean): 88 (26 Feb 2018 10:00) (80 - 108)  RR: 20 (26 Feb 2018 10:00) (10 - 20)  SpO2: 98% (26 Feb 2018 10:00) (95% - 100%)Allergies    No Known Allergies    Intolerances        NAD, appears well  S1S2, no murmurs, no JVD  CTA B/L, no wheeze, no rales  SNT +BS  Ext:    Right   Radial : NO  bleeding, hematoma,   C/D/I   , + pulses,refill   Pulses:  +Rad/ +PTs /+DPs/ same as baseline  A&Ox 3    EKG    SR, no new ischemic changes                                                                                                           2D ECHO EF 60-65%    LABS                        10.9   14.11 )-----------( 239      ( 26 Feb 2018 05:40 )             33.0     02-26    131<L>  |  99  |  7<L>  ----------------------------<  109  3.7   |  24  |  0.7    Ca    8.3<L>      26 Feb 2018 05:40  Mg     1.8     02-26    TPro  5.9<L>  /  Alb  3.3  /  TBili  0.8  /  DBili  x   /  AST  166<H>  /  ALT  51<H>  /  AlkPhos  86  02-26    CARDIAC MARKERS ( 26 Feb 2018 05:40 )  41.48 ng/mL / x     / 800 U/L / x     / 249.7 ng/mL  CARDIAC MARKERS ( 25 Feb 2018 17:18 )  0.28 ng/mL / x     / x     / x     / 7.4 ng/mL      Magnesium, Serum: 1.8 mg/dL [1.8 - 2.4] (02-26-18 @ 05:40)  Magnesium, Serum: 1.8 mg/dL [1.8 - 2.4] (02-25-18 @ 17:18)  LIVER FUNCTIONS - ( 26 Feb 2018 05:40 )  Alb: 3.3 g/dL / Pro: 5.9 g/dL / ALK PHOS: 86 U/L / ALT: 51 U/L / AST: 166 U/L / GGT: x             A/P:  I discussed the case with Interventional Cardiologist Dr. Bauman  & recommends the following:    S/P PCI of pRCA with YOLANDA/STEMI  	         Continue DAPT,  B-Blocker, Statin Therapy                                    Pt given instructions on importance of taking antiplatelet medication or risk acute stent thrombosis/death                   Post cath instructions, access site care and activity restrictions reviewed with patient                     Discussed with patient to return to hospital if experience chest pain, shortness breath, dizziness and site bleeding                   Aggressive risk factor modication, diet counseling, smoking cessation discussed with patient                                           Follow up with Cardiology Dr. Bauman   in one week after discharge.  Instructed to call and make an appointment

## 2018-02-27 LAB
ALBUMIN SERPL ELPH-MCNC: 3.5 G/DL — SIGNIFICANT CHANGE UP (ref 3–5.5)
ALP SERPL-CCNC: 86 U/L — SIGNIFICANT CHANGE UP (ref 30–115)
ALT FLD-CCNC: 42 U/L — HIGH (ref 0–41)
ANION GAP SERPL CALC-SCNC: 7 MMOL/L — SIGNIFICANT CHANGE UP (ref 7–14)
AST SERPL-CCNC: 83 U/L — HIGH (ref 0–41)
BILIRUB SERPL-MCNC: 0.7 MG/DL — SIGNIFICANT CHANGE UP (ref 0.2–1.2)
BUN SERPL-MCNC: 7 MG/DL — LOW (ref 10–20)
CALCIUM SERPL-MCNC: 8.5 MG/DL — SIGNIFICANT CHANGE UP (ref 8.5–10.1)
CHLORIDE SERPL-SCNC: 104 MMOL/L — SIGNIFICANT CHANGE UP (ref 98–110)
CO2 SERPL-SCNC: 27 MMOL/L — SIGNIFICANT CHANGE UP (ref 17–32)
CREAT SERPL-MCNC: 0.8 MG/DL — SIGNIFICANT CHANGE UP (ref 0.7–1.5)
GLUCOSE SERPL-MCNC: 110 MG/DL — SIGNIFICANT CHANGE UP (ref 70–110)
HCT VFR BLD CALC: 34.1 % — LOW (ref 37–47)
HGB BLD-MCNC: 11 G/DL — LOW (ref 14–18)
MCHC RBC-ENTMCNC: 28.7 PG — SIGNIFICANT CHANGE UP (ref 27–31)
MCHC RBC-ENTMCNC: 32.3 G/DL — SIGNIFICANT CHANGE UP (ref 32–37)
MCV RBC AUTO: 89 FL — SIGNIFICANT CHANGE UP (ref 81–91)
NRBC # BLD: 0 /100 WBCS — SIGNIFICANT CHANGE UP (ref 0–0)
PLATELET # BLD AUTO: 237 K/UL — SIGNIFICANT CHANGE UP (ref 130–400)
POTASSIUM SERPL-MCNC: 4.3 MMOL/L — SIGNIFICANT CHANGE UP (ref 3.5–5)
POTASSIUM SERPL-SCNC: 4.3 MMOL/L — SIGNIFICANT CHANGE UP (ref 3.5–5)
PROT SERPL-MCNC: 6.1 G/DL — SIGNIFICANT CHANGE UP (ref 6–8)
RBC # BLD: 3.83 M/UL — LOW (ref 4.2–5.4)
RBC # FLD: 13.6 % — SIGNIFICANT CHANGE UP (ref 11.5–14.5)
SODIUM SERPL-SCNC: 138 MMOL/L — SIGNIFICANT CHANGE UP (ref 135–146)
TROPONIN I SERPL-MCNC: 11.89 NG/ML — CRITICAL HIGH (ref 0–0.05)
WBC # BLD: 11.45 K/UL — HIGH (ref 4.8–10.8)
WBC # FLD AUTO: 11.45 K/UL — HIGH (ref 4.8–10.8)

## 2018-02-27 RX ORDER — METOPROLOL TARTRATE 50 MG
TABLET ORAL
Qty: 0 | Refills: 0 | Status: DISCONTINUED | OUTPATIENT
Start: 2018-02-27 | End: 2018-02-27

## 2018-02-27 RX ORDER — MAGNESIUM SULFATE 500 MG/ML
2 VIAL (ML) INJECTION ONCE
Qty: 0 | Refills: 0 | Status: COMPLETED | OUTPATIENT
Start: 2018-02-27 | End: 2018-02-27

## 2018-02-27 RX ORDER — METOPROLOL TARTRATE 50 MG
25 TABLET ORAL DAILY
Qty: 0 | Refills: 0 | Status: DISCONTINUED | OUTPATIENT
Start: 2018-02-27 | End: 2018-02-27

## 2018-02-27 RX ORDER — METOPROLOL TARTRATE 50 MG
25 TABLET ORAL ONCE
Qty: 0 | Refills: 0 | Status: COMPLETED | OUTPATIENT
Start: 2018-02-27 | End: 2018-02-27

## 2018-02-27 RX ORDER — METOPROLOL TARTRATE 50 MG
12.5 TABLET ORAL ONCE
Qty: 0 | Refills: 0 | Status: DISCONTINUED | OUTPATIENT
Start: 2018-02-27 | End: 2018-02-27

## 2018-02-27 RX ORDER — METOPROLOL TARTRATE 50 MG
100 TABLET ORAL DAILY
Qty: 0 | Refills: 0 | Status: DISCONTINUED | OUTPATIENT
Start: 2018-02-27 | End: 2018-02-28

## 2018-02-27 RX ADMIN — Medication 50 MILLIGRAM(S): at 05:03

## 2018-02-27 RX ADMIN — Medication 600 MILLIGRAM(S): at 05:03

## 2018-02-27 RX ADMIN — Medication 25 MILLIGRAM(S): at 07:51

## 2018-02-27 RX ADMIN — ATORVASTATIN CALCIUM 80 MILLIGRAM(S): 80 TABLET, FILM COATED ORAL at 22:00

## 2018-02-27 RX ADMIN — PRASUGREL 30 MILLIGRAM(S): 5 TABLET, FILM COATED ORAL at 12:56

## 2018-02-27 RX ADMIN — Medication 81 MILLIGRAM(S): at 12:01

## 2018-02-27 RX ADMIN — Medication 50 GRAM(S): at 07:22

## 2018-02-27 RX ADMIN — ENOXAPARIN SODIUM 40 MILLIGRAM(S): 100 INJECTION SUBCUTANEOUS at 12:01

## 2018-02-27 NOTE — PROGRESS NOTE ADULT - ASSESSMENT
Impression: 67 years old female s/p YOLANDA placed in RCA.    Plan:   CNS: a/o x 3     HEENT:    PULMONARY: cough started on guanifen     CARIOVASCULAR:  YOLANDA in RAC  c/w metoprolol, ticagrelor, aspirin  Cardiology NP will like patient to be switched form ticagrelor to effient 30 mg loading dose, 10 mg daily, discussed with lizz Nye to change     GI: heart healthy diet     RENAL: monitor I/O    INFECTIOUS DISEASE:    HEMATOLOGICAL: DVT prophylaxis     ENDOCRINE:    MUSCULOSKELETAL:    Plan to downgrade to tomorrow Impression: 67 years old female s/p YOLANDA placed in RCA.    Plan:   CNS: a/o x 3     HEENT:    PULMONARY: cough, started on guanifen     CARIOVASCULAR:  YOLANDA in RAC  c/w metoprolol, effient, aspirin     GI: heart healthy diet     RENAL: monitor I/O    INFECTIOUS DISEASE:    HEMATOLOGICAL: DVT prophylaxis     ENDOCRINE:    MUSCULOSKELETAL:    Plan to downgrade to tomorrow Impression: 67 years old female s/p YOLANDA placed in RCA.    Plan:   CNS: a/o x 3     HEENT:    PULMONARY: cough, cannot give mucinex due to post STEMI    CARIOVASCULAR:  YOLANDA in RAC  c/w metoprolol, effient, aspirin   Metoprolol increased to 100 mg per Dr. Crowell   EP consult order, follow up      GI: heart healthy diet     RENAL: monitor I/O    INFECTIOUS DISEASE:    HEMATOLOGICAL: DVT prophylaxis     ENDOCRINE:    MUSCULOSKELETAL:    Plan to downgrade to tomorrow

## 2018-02-27 NOTE — PROGRESS NOTE ADULT - SUBJECTIVE AND OBJECTIVE BOX
PGY I NOTE    LENGTH OF HOSPITAL STAY:  2d    CHIEF COMPLAINT:Patient is a 67y old  Female who presents with a chief complaint of C/O chest pain  STEMI CODE (25 Feb 2018 22:48)    HPI:HPI:  67 years old female patient previously healthy is here for the above chief complaint. Patient started experiencing midsternal/epigastric pain since yesterday , intermittent, burning in type. It became constant at 2: 30 pm today associated with diaphoresis. She went to urgent care , and was found to have ST elevations in inferior leads and was sent  to ED. Patient had a viral illness 2 weeks ago , and was prescribed cefuroxime for persistent cough today.In ED, Code STEMi called and  EKG showed ST elevation in inferior leads with  reciprocal ST depression. Patient felt better post morphine and  mg by EMS. Cath performed and showed proximal % stenosis s/p successful balloon angioplasty. (25 Feb 2018 17:42)    OVERNIGHT EVENTS/INTERVAL UPDATES:  Patient had some PVCs, metoprolol was increased to 75mg, Mg was IV given 2x, and PO once. Otherwise patient did not feel lightheaded or chest pain during the PVCs. Patient continues to have a cough.      PMH & PSH  PAST MEDICAL & SURGICAL HISTORY:  No pertinent past medical history  No significant past surgical history    SOCIAL HISTORY: Negative    ALLERGIES: No Known Allergies    HOME MEDICATIONS  Home Medications:    PHYSICAL EXAM:  T(F): 97.8 (02-27-18 @ 08:00), Max: 98.6 (02-26-18 @ 20:40)  HR: 84 (02-27-18 @ 10:00)  BP: 132/56 (02-27-18 @ 10:00)  RR: 18 (02-27-18 @ 10:00)  SpO2: 95% (02-26-18 @ 20:40)  CAPILLARY BLOOD GLUCOSE          02-26-18 @ 07:01  -  02-27-18 @ 07:00  --------------------------------------------------------  IN:    Oral Fluid: 970 mL  Total IN: 970 mL    OUT:    Voided: 650 mL  Total OUT: 650 mL    Total NET: 320 mL      02-27-18 @ 07:01 - 02-27-18 @ 11:59  --------------------------------------------------------  IN:    Oral Fluid: 325 mL  Total IN: 325 mL    OUT:  Total OUT: 0 mL    Total NET: 325 mL            General: NAD  HEENT: NCAT  CV: RRR  RESP: CTAB  Abdominal: Soft, NTTP  Extremity: no c/c/e  Neuro: A&O x3    MEDICATIONS  STANDING MEDICATIONS  aspirin  chewable 81 milliGRAM(s) Oral daily  atorvastatin 80 milliGRAM(s) Oral at bedtime  enoxaparin Injectable 40 milliGRAM(s) SubCutaneous daily  guaiFENesin  milliGRAM(s) Oral every 12 hours  influenza   Vaccine 0.5 milliLiter(s) IntraMuscular once  metoprolol succinate ER      prasugrel 30 milliGRAM(s) Oral once  prasugrel 10 milliGRAM(s) Oral daily  sodium chloride 0.9%. 1000 milliLiter(s) IV Continuous <Continuous>    PRN MEDICATIONS    LABS:                        10.9   14.11 )-----------( 239      ( 26 Feb 2018 05:40 )             33.0              02-27    138  |  104  |  7<L>  ----------------------------<  110  4.3   |  27  |  0.8    Ca    8.5      27 Feb 2018 05:20  Mg     1.8     02-26    TPro  6.1  /  Alb  3.5  /  TBili  0.7  /  DBili  x   /  AST  83<H>  /  ALT  42<H>  /  AlkPhos  86  02-27    LIVER FUNCTIONS - ( 27 Feb 2018 05:20 )  Alb: 3.5 g/dL / Pro: 6.1 g/dL / ALK PHOS: 86 U/L / ALT: 42 U/L / AST: 83 U/L / GGT: x                      PT/INR - ( 25 Feb 2018 17:18 )   PT: 10.40 sec;   INR: 0.96 ratio         PTT - ( 25 Feb 2018 17:18 )  PTT:24.9 sec  CARDIAC MARKERS ( 26 Feb 2018 05:40 )  41.48 ng/mL / x     / 800 U/L / x     / 249.7 ng/mL  CARDIAC MARKERS ( 25 Feb 2018 17:18 )  0.28 ng/mL / x     / x     / x     / 7.4 ng/mL

## 2018-02-27 NOTE — PROGRESS NOTE ADULT - SUBJECTIVE AND OBJECTIVE BOX
Cardiology Follow up    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   67yFemale  PAST MEDICAL & SURGICAL HISTORY:  No pertinent past medical history  No significant past surgical history    Allergies    No Known Allergies    Intolerances        Patient without complaints. Pt ambulated without issues/symptoms  Denies CP, SOB, palpitations, or dizziness, = cough and congestion  PVC's on tele    Vital Signs Last 24 Hrs  T(C): 36.2 (2018 12:00), Max: 37 (2018 20:40)  T(F): 97.2 (2018 12:00), Max: 98.6 (2018 20:40)  HR: 82 (2018 12:00) (80 - 95)  BP: 115/55 (2018 12:00) (99/54 - 141/60)  BP(mean): 71 (2018 12:00) (71 - 102)  RR: 20 (2018 12:00) (13 - 22)  SpO2: 95% (2018 20:40) (95% - 98%)Allergies    REVIEW OF SYSTEMS:    CONSTITUTIONAL: No weakness, fevers or chills  EYES/ENT: No visual changes;  No vertigo or throat pain   NECK: No pain or stiffness  RESPIRATORY: No , wheezing, hemoptysis; No shortness of breath, + cough  CARDIOVASCULAR: No chest pain or palpitations  GASTROINTESTINAL: No abdominal or epigastric pain. No nausea, vomiting, or hematemesis; No diarrhea or constipation. No melena or hematochezia.  GENITOURINARY: No dysuria, frequency or hematuria  NEUROLOGICAL: No numbness or weakness  SKIN: No itching, rashes            NAD, appears well  S1S2, no murmurs, no JVD  CTA B/L, no wheeze, no rales  SNT +BS  Ext:    Right  Radial : NO   hematoma,     bleeding, dressing; C/D/I  , = pulses, mvmt, sensation and warmth    Pulses:  +Rad/ +PTs /+DPs/ same as baseline  A&Ox 3    EKG         SR,                                                                                                       2D ECHO Summary:   1. Left ventricular ejection fraction, by visual estimation, is 60 to   65%.   2. Basal and mid inferolateral wall and basal inferior segment are   abnormal as described above.   3. Spectral Doppler shows impaired relaxation pattern of left   ventricular myocardial filling (Grade I diastolic dysfunction).   4. Mild mitral annular calcification.    PHYSICIAN INTERPRETATION:  Left Ventricle: The left ventricular internal cavity size is normal. Left   ventricular wall thickness is normal. Left ventricular ejection fraction,   by visual estimation, is 60 to 65%. Spectral Doppler shows impaired   relaxation pattern of left ventricular myocardial filling (Grade I   diastolic dysfunction).       LV Wall Scoring:  The basal and mid inferolateral wall and basal inferior segment are  hypokinetic. All remaining scored segments are normal.    Right Ventricle: Normal right ventricular size and function.  Left Atrium: Normal left atrial size.  Right Atrium: Normal right atrial size.  Pericardium: There is no evidence of pericardial effusion.  Mitral Valve: The mitral valve is normal in structure. There is mild   mitral annular calcification. Trace mitral valve regurgitation is seen.  Tricuspid Valve: The tricuspid valve is normal in structure. No tricuspid   regurgitation is visualized.  Aortic Valve: The aortic valve is trileaflet. No evidence of aortic   stenosis.  Pulmonic Valve: The pulmonic valve was not well visualized.  Aorta: The aortic root is normal in size and structure.  Venous: The inferior vena cava was dilated, with respiratory size   variation greater than 50%.       2D AND M-MODE MEASUREMENTS (normal ranges within parentheses):  Left                  Normal   Aorta/Left             Normal  Ventricle:                     Atrium:  IVSd (2D):  0.90 cm  (0.7-1.1) AoV Cusp       1.98  (1.5-2.6)  LVPWd (2D): 0.70 cm  (0.7-1.1) Separation:     cm  LVIDd (2D): 4.28 cm  (3.4-5.7) Left Atrium    4.43  (1.9-4.0)  LVIDs (2D): 3.29 cm(Mmode):        cm  LV FS (2D):  23.2 %   (>25%)   LA Volume      24.8  Relative      0.33    (<0.42)  Index         ml/m²  Wall                           Right  Thickness                      Ventricle:                                 RVd (2D):3.45 cm    SPECTRAL DOPPLER ANALYSIS:  LV DIASTOLIC FUNCTION:  MV Peak E: 1.07 m/s Decel Time: 196 msec  MV Peak A: 1.43 m/s  E/A Ratio: 0.75    Aortic Valve:  AoV VMax:    1.81 m/s  AoV Area, Vmax: 3.04 cm² Vmax Indx: 1.38 cm²/m²  AoV Pk Grad: 13.2 mmHg    LVOT Vmax: 1.46 m/s  LVOT VTI:  0.26 m  LVOT Diam: 2.19 cm    Mitral Valve:  MV P1/2 Time: 56.78 msec  MV Area, PHT: 3.87 cm²    Pulmonic Valve:  PV Max Velocity: 1.47 m/s PV Max P.7 mmHg PV Mean PG:       [id number] Idania Cotter M.D. Electronically signed on 2018 at   10:03:30 AM              *** Final ***                    IDANIA COTTER M.D., ATTENDING CARDIOLOGIST  This document has been electronically signed. 2018  7:09AM                  LABS                        11.0   11.45 )-----------( 237      ( 2018 11:14 )             34.1         138  |  104  |  7<L>  ----------------------------<  110  4.3   |  27  |  0.8    Ca    8.5      2018 05:20  Mg     1.8         TPro  6.1  /  Alb  3.5  /  TBili  0.7  /  DBili  x   /  AST  83<H>  /  ALT  42<H>  /  AlkPhos  86      CARDIAC MARKERS ( 2018 05:40 )  41.48 ng/mL / x     / 800 U/L / x     / 249.7 ng/mL  CARDIAC MARKERS ( 2018 17:18 )  0.28 ng/mL / x     / x     / x     / 7.4 ng/mL      LIVER FUNCTIONS - ( 2018 05:20 )  Alb: 3.5 g/dL / Pro: 6.1 g/dL / ALK PHOS: 86 U/L / ALT: 42 U/L / AST: 83 U/L / GGT: x             A/P:  I discussed the case with Interventional Cardiologist Dr. Bauman  & recommends the following:    S/P PCI pRCA/STEMI  	         Continue DAPT,  B-Blocker, Statin Therapy                   Pt given instructions on importance of taking antiplatelet medication or risk acute stent thrombosis/death                   Post cath instructions, access site care and activity restrictions reviewed with patient                     Discussed with patient to return to hospital if experience chest pain, shortness breath, dizziness and site bleeding                   Aggressive risk factor modication, diet counseling, smoking cessation discussed with patient                       Follow up with Cardiology Dr. Bauman  in one week.  Instructed to call and make an appointment Cardiology Follow up    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   67yFemale  PAST MEDICAL & SURGICAL HISTORY:  No pertinent past medical history  No significant past surgical history    Allergies    No Known Allergies    Intolerances        Patient without complaints. Pt ambulated without issues/symptoms  Denies CP, SOB, palpitations, or dizziness, + cough and congestion  PVC's on tele    Vital Signs Last 24 Hrs  T(C): 36.2 (2018 12:00), Max: 37 (2018 20:40)  T(F): 97.2 (2018 12:00), Max: 98.6 (2018 20:40)  HR: 82 (2018 12:00) (80 - 95)  BP: 115/55 (2018 12:00) (99/54 - 141/60)  BP(mean): 71 (2018 12:00) (71 - 102)  RR: 20 (2018 12:00) (13 - 22)  SpO2: 95% (2018 20:40) (95% - 98%)Allergies    REVIEW OF SYSTEMS:    CONSTITUTIONAL: No weakness, fevers or chills  EYES/ENT: No visual changes;  No vertigo or throat pain   NECK: No pain or stiffness  RESPIRATORY: No , wheezing, hemoptysis; No shortness of breath, + cough  CARDIOVASCULAR: No chest pain or palpitations  GASTROINTESTINAL: No abdominal or epigastric pain. No nausea, vomiting, or hematemesis; No diarrhea or constipation. No melena or hematochezia.  GENITOURINARY: No dysuria, frequency or hematuria  NEUROLOGICAL: No numbness or weakness  SKIN: No itching, rashes            NAD, appears well  S1S2, no murmurs, no JVD  CTA B/L, no wheeze, no rales  SNT +BS  Ext:    Right  Radial : NO   hematoma,     bleeding, dressing; C/D/I  , + pulses, mvmt, sensation and warmth    Pulses:  +Rad/ +PTs /+DPs/ same as baseline  A&Ox 3    EKG         SR,                                                                                                       2D ECHO Summary:   1. Left ventricular ejection fraction, by visual estimation, is 60 to   65%.   2. Basal and mid inferolateral wall and basal inferior segment are   abnormal as described above.   3. Spectral Doppler shows impaired relaxation pattern of left   ventricular myocardial filling (Grade I diastolic dysfunction).   4. Mild mitral annular calcification.    PHYSICIAN INTERPRETATION:  Left Ventricle: The left ventricular internal cavity size is normal. Left   ventricular wall thickness is normal. Left ventricular ejection fraction,   by visual estimation, is 60 to 65%. Spectral Doppler shows impaired   relaxation pattern of left ventricular myocardial filling (Grade I   diastolic dysfunction).       LV Wall Scoring:  The basal and mid inferolateral wall and basal inferior segment are  hypokinetic. All remaining scored segments are normal.    Right Ventricle: Normal right ventricular size and function.  Left Atrium: Normal left atrial size.  Right Atrium: Normal right atrial size.  Pericardium: There is no evidence of pericardial effusion.  Mitral Valve: The mitral valve is normal in structure. There is mild   mitral annular calcification. Trace mitral valve regurgitation is seen.  Tricuspid Valve: The tricuspid valve is normal in structure. No tricuspid   regurgitation is visualized.  Aortic Valve: The aortic valve is trileaflet. No evidence of aortic   stenosis.  Pulmonic Valve: The pulmonic valve was not well visualized.  Aorta: The aortic root is normal in size and structure.  Venous: The inferior vena cava was dilated, with respiratory size   variation greater than 50%.       2D AND M-MODE MEASUREMENTS (normal ranges within parentheses):  Left                  Normal   Aorta/Left             Normal  Ventricle:                     Atrium:  IVSd (2D):  0.90 cm  (0.7-1.1) AoV Cusp       1.98  (1.5-2.6)  LVPWd (2D): 0.70 cm  (0.7-1.1) Separation:     cm  LVIDd (2D): 4.28 cm  (3.4-5.7) Left Atrium    4.43  (1.9-4.0)  LVIDs (2D): 3.29 cm(Mmode):        cm  LV FS (2D):  23.2 %   (>25%)   LA Volume      24.8  Relative      0.33    (<0.42)  Index         ml/m²  Wall                           Right  Thickness                      Ventricle:                                 RVd (2D):3.45 cm    SPECTRAL DOPPLER ANALYSIS:  LV DIASTOLIC FUNCTION:  MV Peak E: 1.07 m/s Decel Time: 196 msec  MV Peak A: 1.43 m/s  E/A Ratio: 0.75    Aortic Valve:  AoV VMax:    1.81 m/s  AoV Area, Vmax: 3.04 cm² Vmax Indx: 1.38 cm²/m²  AoV Pk Grad: 13.2 mmHg    LVOT Vmax: 1.46 m/s  LVOT VTI:  0.26 m  LVOT Diam: 2.19 cm    Mitral Valve:  MV P1/2 Time: 56.78 msec  MV Area, PHT: 3.87 cm²    Pulmonic Valve:  PV Max Velocity: 1.47 m/s PV Max P.7 mmHg PV Mean PG:       [id number] Idania Cotter M.D. Electronically signed on 2018 at   10:03:30 AM              *** Final ***                    IDANIA COTTER M.D., ATTENDING CARDIOLOGIST  This document has been electronically signed. 2018  7:09AM                  LABS                        11.0   11.45 )-----------( 237      ( 2018 11:14 )             34.1         138  |  104  |  7<L>  ----------------------------<  110  4.3   |  27  |  0.8    Ca    8.5      2018 05:20  Mg     1.8         TPro  6.1  /  Alb  3.5  /  TBili  0.7  /  DBili  x   /  AST  83<H>  /  ALT  42<H>  /  AlkPhos  86      CARDIAC MARKERS ( 2018 05:40 )  41.48 ng/mL / x     / 800 U/L / x     / 249.7 ng/mL  CARDIAC MARKERS ( 2018 17:18 )  0.28 ng/mL / x     / x     / x     / 7.4 ng/mL      LIVER FUNCTIONS - ( 2018 05:20 )  Alb: 3.5 g/dL / Pro: 6.1 g/dL / ALK PHOS: 86 U/L / ALT: 42 U/L / AST: 83 U/L / GGT: x             A/P:  I discussed the case with Interventional Cardiologist Dr. Bauman  & recommends the following:    S/P PCI pRCA/STEMI  	         Continue DAPT,  B-Blocker, Statin Therapy                   Pt given instructions on importance of taking antiplatelet medication or risk acute stent thrombosis/death                   Post cath instructions, access site care and activity restrictions reviewed with patient                     Discussed with patient to return to hospital if experience chest pain, shortness breath, dizziness and site bleeding                   Aggressive risk factor modication, diet counseling, smoking cessation discussed with patient                       Follow up with Cardiology Dr. Bauman  in one week after discharge.  Instructed to call and make an    appointment

## 2018-02-28 VITALS
HEART RATE: 84 BPM | SYSTOLIC BLOOD PRESSURE: 114 MMHG | OXYGEN SATURATION: 97 % | DIASTOLIC BLOOD PRESSURE: 61 MMHG | RESPIRATION RATE: 12 BRPM

## 2018-02-28 LAB
ALBUMIN SERPL ELPH-MCNC: 3.2 G/DL — SIGNIFICANT CHANGE UP (ref 3–5.5)
ALP SERPL-CCNC: 79 U/L — SIGNIFICANT CHANGE UP (ref 30–115)
ALT FLD-CCNC: 28 U/L — SIGNIFICANT CHANGE UP (ref 0–41)
ANION GAP SERPL CALC-SCNC: -4 MMOL/L — LOW (ref 7–14)
AST SERPL-CCNC: 42 U/L — HIGH (ref 0–41)
BILIRUB SERPL-MCNC: 0.6 MG/DL — SIGNIFICANT CHANGE UP (ref 0.2–1.2)
BUN SERPL-MCNC: 10 MG/DL — SIGNIFICANT CHANGE UP (ref 10–20)
CALCIUM SERPL-MCNC: 8.1 MG/DL — LOW (ref 8.5–10.1)
CHLORIDE SERPL-SCNC: 106 MMOL/L — SIGNIFICANT CHANGE UP (ref 98–110)
CO2 SERPL-SCNC: 35 MMOL/L — HIGH (ref 17–32)
CREAT SERPL-MCNC: 0.6 MG/DL — LOW (ref 0.7–1.5)
GLUCOSE SERPL-MCNC: 93 MG/DL — SIGNIFICANT CHANGE UP (ref 70–110)
HCT VFR BLD CALC: 32.4 % — LOW (ref 37–47)
HGB BLD-MCNC: 10.6 G/DL — LOW (ref 14–18)
MCHC RBC-ENTMCNC: 29 PG — SIGNIFICANT CHANGE UP (ref 27–31)
MCHC RBC-ENTMCNC: 32.7 G/DL — SIGNIFICANT CHANGE UP (ref 32–37)
MCV RBC AUTO: 88.8 FL — SIGNIFICANT CHANGE UP (ref 81–91)
NRBC # BLD: 0 /100 WBCS — SIGNIFICANT CHANGE UP (ref 0–0)
PLATELET # BLD AUTO: 220 K/UL — SIGNIFICANT CHANGE UP (ref 130–400)
POTASSIUM SERPL-MCNC: 4.2 MMOL/L — SIGNIFICANT CHANGE UP (ref 3.5–5)
POTASSIUM SERPL-SCNC: 4.2 MMOL/L — SIGNIFICANT CHANGE UP (ref 3.5–5)
PROT SERPL-MCNC: 5.4 G/DL — LOW (ref 6–8)
RBC # BLD: 3.65 M/UL — LOW (ref 4.2–5.4)
RBC # FLD: 13.7 % — SIGNIFICANT CHANGE UP (ref 11.5–14.5)
SODIUM SERPL-SCNC: 137 MMOL/L — SIGNIFICANT CHANGE UP (ref 135–146)
TROPONIN I SERPL-MCNC: 6.53 NG/ML — CRITICAL HIGH (ref 0–0.05)
WBC # BLD: 11.46 K/UL — HIGH (ref 4.8–10.8)
WBC # FLD AUTO: 11.46 K/UL — HIGH (ref 4.8–10.8)

## 2018-02-28 RX ORDER — PRASUGREL 5 MG/1
1 TABLET, FILM COATED ORAL
Qty: 0 | Refills: 0 | COMMUNITY
Start: 2018-02-28

## 2018-02-28 RX ORDER — SODIUM CHLORIDE 0.65 %
1 AEROSOL, SPRAY (ML) NASAL
Qty: 0 | Refills: 0 | Status: DISCONTINUED | OUTPATIENT
Start: 2018-02-28 | End: 2018-02-28

## 2018-02-28 RX ORDER — ATORVASTATIN CALCIUM 80 MG/1
1 TABLET, FILM COATED ORAL
Qty: 30 | Refills: 0
Start: 2018-02-28

## 2018-02-28 RX ORDER — ASPIRIN/CALCIUM CARB/MAGNESIUM 324 MG
1 TABLET ORAL
Qty: 30 | Refills: 0
Start: 2018-02-28

## 2018-02-28 RX ORDER — METOPROLOL TARTRATE 50 MG
1 TABLET ORAL
Qty: 30 | Refills: 0 | OUTPATIENT
Start: 2018-02-28

## 2018-02-28 RX ORDER — ASPIRIN/CALCIUM CARB/MAGNESIUM 324 MG
1 TABLET ORAL
Qty: 0 | Refills: 0 | COMMUNITY
Start: 2018-02-28

## 2018-02-28 RX ORDER — BENZOCAINE AND MENTHOL 5; 1 G/100ML; G/100ML
1 LIQUID ORAL
Qty: 0 | Refills: 0 | Status: DISCONTINUED | OUTPATIENT
Start: 2018-02-28 | End: 2018-02-28

## 2018-02-28 RX ADMIN — Medication 1 SPRAY(S): at 06:13

## 2018-02-28 RX ADMIN — Medication 81 MILLIGRAM(S): at 11:50

## 2018-02-28 RX ADMIN — PRASUGREL 10 MILLIGRAM(S): 5 TABLET, FILM COATED ORAL at 11:49

## 2018-02-28 RX ADMIN — ENOXAPARIN SODIUM 40 MILLIGRAM(S): 100 INJECTION SUBCUTANEOUS at 11:50

## 2018-02-28 RX ADMIN — Medication 100 MILLIGRAM(S): at 06:12

## 2018-02-28 NOTE — PROGRESS NOTE ADULT - ATTENDING COMMENTS
Patient is seen and examined independently, I agree with resident note above and plan of care -edited and corrected where applicable- with addition:    PAST MEDICAL & SURGICAL HISTORY:  No pertinent past medical history  No significant past surgical history    VITALS:  T(F): 97.2 (02-27-18 @ 12:00)  HR: 82 (02-27-18 @ 12:00)  BP: 115/55 (02-27-18 @ 12:00)  RR: 20 (02-27-18 @ 12:00)  SpO2: --    TESTS & MEASUREMENTS:                        11.0   11.45 )-----------( 237      ( 27 Feb 2018 11:14 )             34.1     PT/INR - ( 25 Feb 2018 17:18 )   PT: 10.40 sec;   INR: 0.96 ratio         PTT - ( 25 Feb 2018 17:18 )  PTT:24.9 sec  02-27    138  |  104  |  7<L>  ----------------------------<  110  4.3   |  27  |  0.8    Ca    8.5      27 Feb 2018 05:20  Mg     1.8     02-26    TPro  6.1  /  Alb  3.5  /  TBili  0.7  /  DBili  x   /  AST  83<H>  /  ALT  42<H>  /  AlkPhos  86  02-27    LIVER FUNCTIONS - ( 27 Feb 2018 05:20 )  Alb: 3.5 g/dL / Pro: 6.1 g/dL / ALK PHOS: 86 U/L / ALT: 42 U/L / AST: 83 U/L / GGT: x           CARDIAC MARKERS ( 27 Feb 2018 11:14 )  11.89 ng/mL / x     / x     / x     / x      CARDIAC MARKERS ( 26 Feb 2018 05:40 )  41.48 ng/mL / x     / 800 U/L / x     / 249.7 ng/mL  CARDIAC MARKERS ( 25 Feb 2018 17:18 )  0.28 ng/mL / x     / x     / x     / 7.4 ng/mL    MEDICATIONS:  MEDICATIONS  (STANDING):  aspirin  chewable 81 milliGRAM(s) Oral daily  atorvastatin 80 milliGRAM(s) Oral at bedtime  enoxaparin Injectable 40 milliGRAM(s) SubCutaneous daily  influenza   Vaccine 0.5 milliLiter(s) IntraMuscular once  metoprolol     tartrate 100 milliGRAM(s) Oral daily  prasugrel 10 milliGRAM(s) Oral daily  sodium chloride 0.9%. 1000 milliLiter(s) (100 mL/Hr) IV Continuous     In summary, (see detailed management above)  HEALTH ISSUES - PROBLEM Dx:  ST elevation myocardial infarction involving right coronary artery: s/p RCA stenting
Patient is seen and examined independently, I agree with resident note above and plan of care -edited and corrected where applicable- with addition:    CAD s/p RCA stenting, to be d/c today.  D/W patient and spouse at bedside regarding activity restrictions until cardio f/u.    Total time for d/c, care, coordination, resident supervision: 35 minutes.
agree
agree

## 2018-02-28 NOTE — PROGRESS NOTE ADULT - SUBJECTIVE AND OBJECTIVE BOX
PGY I NOTE    LENGTH OF HOSPITAL STAY:  1d    CHIEF COMPLAINT:Patient is a 67y old  Female who presents with a chief complaint of C/O chest pain  STEMI CODE (25 Feb 2018 22:48)    HPI:HPI:  67 years old female patient previously healthy is here for the above chief complaint. Patient started experiencing midsternal/epigastric pain since yesterday , intermittent, burning in type. It became constant at 2: 30 pm today associated with diaphoresis. She went to urgent care , and was found to have ST elevations in inferior leads and was sent  to ED. Patient had a viral illness 2 weeks ago , and was prescribed cefuroxime for persistent cough today.In ED, Code STEMi called and  EKG showed ST elevation in inferior leads with  reciprocal ST depression. Patient felt better post morphine and  mg by EMS. Cath performed and showed proximal % stenosis s/p successful balloon angioplasty. (25 Feb 2018 17:42)    OVERNIGHT EVENTS/INTERVAL UPDATES:  Patient had some PVCs, metoprolol was increased to 75mg, Mg was IV given 2x, and PO once. Otherwise patient did not feel lightheaded or chest pain during the PVCs. Patient continues to have a cough.      PMH & PSH  PAST MEDICAL & SURGICAL HISTORY:  No pertinent past medical history  No significant past surgical history    SOCIAL HISTORY: Negative    ALLERGIES: No Known Allergies    HOME MEDICATIONS  Home Medications:      PHYSICAL EXAM:  ICU Vital Signs Last 24 Hrs  T(C): 36.9 (28 Feb 2018 04:00), Max: 37.1 (27 Feb 2018 16:00)  T(F): 98.5 (28 Feb 2018 04:00), Max: 98.7 (27 Feb 2018 16:00)  HR: 84 (28 Feb 2018 06:00) (82 - 95)  BP: 132/63 (28 Feb 2018 06:00) (99/54 - 161/66)  BP(mean): 101 (27 Feb 2018 18:00) (71 - 101)  ABP: --  ABP(mean): --  RR: 20 (28 Feb 2018 06:00) (14 - 26)  SpO2: --      I&O's Summary    26 Feb 2018 07:01  -  27 Feb 2018 07:00  --------------------------------------------------------  IN: 970 mL / OUT: 650 mL / NET: 320 mL    27 Feb 2018 07:01  -  28 Feb 2018 06:24  --------------------------------------------------------  IN: 1685 mL / OUT: 2225 mL / NET: -540 mL            General: NAD  HEENT: NCAT  CV: RRR  RESP: CTAB  Abdominal: Soft, NTTP  Extremity: no c/c/e  Neuro: A&O x3    MEDICATIONS  MEDICATIONS  (STANDING):  aspirin  chewable 81 milliGRAM(s) Oral daily  atorvastatin 80 milliGRAM(s) Oral at bedtime  enoxaparin Injectable 40 milliGRAM(s) SubCutaneous daily  influenza   Vaccine 0.5 milliLiter(s) IntraMuscular once  metoprolol     tartrate 100 milliGRAM(s) Oral daily  prasugrel 10 milliGRAM(s) Oral daily  sodium chloride 0.9%. 1000 milliLiter(s) (100 mL/Hr) IV Continuous <Continuous>    MEDICATIONS  (PRN):  benzocaine 15 mG/menthol 3.6 mG Lozenge 1 Lozenge Oral every 2 hours PRN Sore Throat  sodium chloride 0.65% Nasal 1 Spray(s) Both Nostrils every 3 hours PRN Nasal Congestion    LABS:                                          11.0   11.45 )-----------( 237      ( 27 Feb 2018 11:14 )             34.1   02-28    137  |  106  |  10  ----------------------------<  93  4.2   |  35<H>  |  0.6<L>    Ca    8.1<L>      28 Feb 2018 04:14    TPro  5.4<L>  /  Alb  3.2  /  TBili  0.6  /  DBili  x   /  AST  42<H>  /  ALT  28  /  AlkPhos  79  02-28  LIVER FUNCTIONS - ( 28 Feb 2018 04:14 )  Alb: 3.2 g/dL / Pro: 5.4 g/dL / ALK PHOS: 79 U/L / ALT: 28 U/L / AST: 42 U/L / GGT: x           CARDIAC MARKERS ( 26 Feb 2018 05:40 )  41.48 ng/mL / x     / 800 U/L / x     / 249.7 ng/mL  CARDIAC MARKERS ( 25 Feb 2018 17:18 )  0.28 ng/mL / x     / x     / x     / 7.4 ng/mL  CARDIAC MARKERS ( 27 Feb 2018 11:14 )  11.89 ng/mL / x     / x     / x     / x                     < from: 12 Lead ECG (02.26.18 @ 07:30) >    Diagnosis Line Normal sinus rhythm  Inferior infarct , age undetermined  Poor R wave progression  Abnormal ECG    < end of copied text > PGY I NOTE    LENGTH OF HOSPITAL STAY:  2 d    CHIEF COMPLAINT:Patient is a 67y old  Female who presents with a chief complaint of C/O chest pain  STEMI CODE (25 Feb 2018 22:48)    HPI:HPI:  67 years old female patient previously healthy is here for the above chief complaint. Patient started experiencing midsternal/epigastric pain since yesterday , intermittent, burning in type. It became constant at 2: 30 pm today associated with diaphoresis. She went to urgent care , and was found to have ST elevations in inferior leads and was sent  to ED. Patient had a viral illness 2 weeks ago , and was prescribed cefuroxime for persistent cough today.In ED, Code STEMi called and  EKG showed ST elevation in inferior leads with  reciprocal ST depression. Patient felt better post morphine and  mg by EMS. Cath performed and showed proximal % stenosis s/p successful balloon angioplasty. (25 Feb 2018 17:42)    OVERNIGHT EVENTS/INTERVAL UPDATES:  no events over night. wants to go home      PMH & PSH  PAST MEDICAL & SURGICAL HISTORY:  No pertinent past medical history  No significant past surgical history    SOCIAL HISTORY: Negative    ALLERGIES: No Known Allergies    HOME MEDICATIONS  Home Medications:      PHYSICAL EXAM:  ICU Vital Signs Last 24 Hrs  T(C): 36.9 (28 Feb 2018 04:00), Max: 37.1 (27 Feb 2018 16:00)  T(F): 98.5 (28 Feb 2018 04:00), Max: 98.7 (27 Feb 2018 16:00)  HR: 84 (28 Feb 2018 06:00) (82 - 95)  BP: 132/63 (28 Feb 2018 06:00) (99/54 - 161/66)  BP(mean): 101 (27 Feb 2018 18:00) (71 - 101)  ABP: --  ABP(mean): --  RR: 20 (28 Feb 2018 06:00) (14 - 26)  SpO2: --      I&O's Summary    26 Feb 2018 07:01  -  27 Feb 2018 07:00  --------------------------------------------------------  IN: 970 mL / OUT: 650 mL / NET: 320 mL    27 Feb 2018 07:01  -  28 Feb 2018 06:24  --------------------------------------------------------  IN: 1685 mL / OUT: 2225 mL / NET: -540 mL            General: NAD  HEENT: NCAT  CV: RRR  RESP: CTAB  Abdominal: Soft, NTTP  Extremity: no c/c/e  Neuro: A&O x3    MEDICATIONS  MEDICATIONS  (STANDING):  aspirin  chewable 81 milliGRAM(s) Oral daily  atorvastatin 80 milliGRAM(s) Oral at bedtime  enoxaparin Injectable 40 milliGRAM(s) SubCutaneous daily  influenza   Vaccine 0.5 milliLiter(s) IntraMuscular once  metoprolol     tartrate 100 milliGRAM(s) Oral daily  prasugrel 10 milliGRAM(s) Oral daily    MEDICATIONS  (PRN):  benzocaine 15 mG/menthol 3.6 mG Lozenge 1 Lozenge Oral every 2 hours PRN Sore Throat  sodium chloride 0.65% Nasal 1 Spray(s) Both Nostrils every 3 hours PRN Nasal Congestion    LABS:                                         10.6   11.46 )-----------( 220      ( 28 Feb 2018 04:14 )             32.4     137  |  106  |  10  ----------------------------<  93  4.2   |  35<H>  |  0.6<L>    Ca    8.1<L>      28 Feb 2018 04:14    TPro  5.4<L>  /  Alb  3.2  /  TBili  0.6  /  DBili  x   /  AST  42<H>  /  ALT  28  /  AlkPhos  79  02-28  LIVER FUNCTIONS - ( 28 Feb 2018 04:14 )  Alb: 3.2 g/dL / Pro: 5.4 g/dL / ALK PHOS: 79 U/L / ALT: 28 U/L / AST: 42 U/L / GGT: x           CARDIAC MARKERS ( 26 Feb 2018 05:40 )  41.48 ng/mL / x     / 800 U/L / x     / 249.7 ng/mL  CARDIAC MARKERS ( 25 Feb 2018 17:18 )  0.28 ng/mL / x     / x     / x     / 7.4 ng/mL  CARDIAC MARKERS ( 27 Feb 2018 11:14 )  11.89 ng/mL / x     / x     / x     / x                     < from: 12 Lead ECG (02.26.18 @ 07:30) >    Diagnosis Line Normal sinus rhythm  Inferior infarct , age undetermined  Poor R wave progression  Abnormal ECG    < end of copied text >

## 2018-02-28 NOTE — PROGRESS NOTE ADULT - SUBJECTIVE AND OBJECTIVE BOX
Cardiology Follow up    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   67yFemale  PAST MEDICAL & SURGICAL HISTORY:  No pertinent past medical history  No significant past surgical history    Allergies    No Known Allergies    Intolerances        Patient without complaints. Pt ambulated without issues/symptoms  Denies CP, SOB, palpitations, or dizziness  No events on telemetry overnight    Vital Signs Last 24 Hrs  T(C): 36.8 (28 Feb 2018 08:00), Max: 37.1 (27 Feb 2018 16:00)  T(F): 98.2 (28 Feb 2018 08:00), Max: 98.7 (27 Feb 2018 16:00)  HR: 70 (28 Feb 2018 08:00) (70 - 95)  BP: 117/58 (28 Feb 2018 08:00) (115/55 - 161/66)  BP(mean): 101 (27 Feb 2018 18:00) (71 - 101)  RR: 12 (28 Feb 2018 08:00) (12 - 26)  SpO2: 97% (28 Feb 2018 08:00) (97% - 97%)Allergies      REVIEW OF SYSTEMS:    CONSTITUTIONAL: No weakness, fevers or chills  EYES/ENT: No visual changes;  No vertigo or throat pain   NECK: No pain or stiffness  RESPIRATORY: No cough, wheezing, hemoptysis; No shortness of breath  CARDIOVASCULAR: No chest pain or palpitations  GASTROINTESTINAL: No abdominal or epigastric pain. No nausea, vomiting, or hematemesis; No diarrhea or constipation. No melena or hematochezia.  GENITOURINARY: No dysuria, frequency or hematuria  NEUROLOGICAL: No numbness or weakness  SKIN: No itching, rashes      NAD, appears well  S1S2, no murmurs, no JVD  CTA B/L, no wheeze, no rales  SNT +BS  Ext:   R  Radial : NO   hematoma,     bleeding, dressing; C/D/I      Pulses:  +Rad/ +PTs /+DPs/ same as baseline  A&Ox 3    EKG           NSR , no ectopy                                                                                                   2D ECHO EF60-65%    LABS                        10.6   11.46 )-----------( 220      ( 28 Feb 2018 04:14 )             32.4     02-28    137  |  106  |  10  ----------------------------<  93  4.2   |  35<H>  |  0.6<L>    Ca    8.1<L>      28 Feb 2018 04:14    TPro  5.4<L>  /  Alb  3.2  /  TBili  0.6  /  DBili  x   /  AST  42<H>  /  ALT  28  /  AlkPhos  79  02-28    CARDIAC MARKERS ( 28 Feb 2018 04:14 )  6.53 ng/mL / x     / x     / x     / x      CARDIAC MARKERS ( 27 Feb 2018 11:14 )  11.89 ng/mL / x     / x     / x     / x          LIVER FUNCTIONS - ( 28 Feb 2018 04:14 )  Alb: 3.2 g/dL / Pro: 5.4 g/dL / ALK PHOS: 79 U/L / ALT: 28 U/L / AST: 42 U/L / GGT: x             A/P:  I discussed the case with Interventional Cardiologist Dr. Bauman   & recommends the following:    S/P PCI p RCA/STEMI  	         Continue DAPT,  B-Blocker, Statin Therapy                                    Pt given instructions on importance of taking antiplatelet medication or risk acute stent thrombosis/death                   Post cath instructions, access site care and activity restrictions reviewed with patient                     Discussed with patient to return to hospital if experience chest pain, shortness breath, dizziness and site bleeding                   Aggressive risk factor modication, diet counseling, smoking cessation discussed with patient                       Can discharge patient from cardiac standpoint                    Follow up with Cardiology Dr. Bauman  in one week.  Instructed to call and make an appointment

## 2018-02-28 NOTE — DISCHARGE NOTE ADULT - HOSPITAL COURSE
67 years old female patient previously healthy is here for the above chief complaint. Patient started experiencing midsternal/epigastric pain since yesterday , intermittent, burning in type. It became constant at 2: 30 pm today associated with diaphoresis. She went to urgent care , and was found to have ST elevations in inferior leads and was sent  to ED. Patient had a viral illness 2 weeks ago , and was prescribed cefuroxime for persistent cough today.In ED, Code STEMi called and  EKG showed ST elevation in inferior leads with  reciprocal ST depression. Patient felt better post morphine and  mg by EMS. Cath performed and showed proximal % stenosis s/p successful balloon angioplasty. (25 Feb 2018 17:42)  clinically stable to be discharged today as per cardiologist  out pt follow up in a week recommended with DR Blaine mahmood DAPT,statin nad beta blocker

## 2018-02-28 NOTE — PROGRESS NOTE ADULT - PROBLEM SELECTOR PLAN 1
continue DAPT, BB, statin( brilinta copay $377/month, will discuss with dr. Bauman)  medication, diet and lifestyle counseling provided  oob-ch, ambulate  trend CE  EF 60-65%  keep K > 4, Mg >2, pt with NSVT/PVC's pt asymptomatic  f/u with Dr. Bauman regarding plan
continue asa/effient bb,statin, no ace due to borderline BP, will address in follow-up  oob-ch, ambulate  no ectopy on monitor  f/u with EP as out pt  d/c home as per , f/u in 1 week
s/p PCI of RCA   continue DAPT, BB , statin, increase toprol to 100 mg daily  change brilinta to effient 10mg daily(brilinta copay $377)  monitor electrolytesK and Mg  trend CE  oob-ch, ambulate  EPS consult for frequent PVC's/ectopy as per Dr. Bauman

## 2018-02-28 NOTE — DISCHARGE NOTE ADULT - CARE PROVIDER_API CALL
Aniceto German), Medicine  [address] [place] [address]  Phone: [phone]  Fax: [phone]    Scooter Bauman), Cardiovascular Disease; Interventional Cardiology  [address] [place] [address]  Phone: [phone]  Fax: [phone]

## 2018-02-28 NOTE — PROGRESS NOTE ADULT - PROBLEM SELECTOR PROBLEM 1
ST elevation myocardial infarction involving right coronary artery

## 2018-02-28 NOTE — DISCHARGE NOTE ADULT - NS AS DC AMI ACE CONTRA
will be decided by cardiologist later/other reasons documented by Physician / Nurse Practitioner / Physician Assistant  or Pharmacist for not prescribing an ACEI AND not prescibing an ARB at discharge

## 2018-02-28 NOTE — DISCHARGE NOTE ADULT - CARE PLAN
Principal Discharge DX:	ST elevation myocardial infarction involving right coronary artery  Goal:	optimal cardiac function  Assessment and plan of treatment:	take medication and follow up with cardiologist in 1 week

## 2018-02-28 NOTE — DISCHARGE NOTE ADULT - MEDICATION SUMMARY - MEDICATIONS TO TAKE
I will START or STAY ON the medications listed below when I get home from the hospital:    aspirin 81 mg oral tablet, chewable  -- 1 tab(s) by mouth once a day  -- Indication: For STEMI    atorvastatin 80 mg oral tablet  -- 1 tab(s) by mouth once a day (at bedtime)  -- Indication: For STEMI    prasugrel 10 mg oral tablet  -- 1 tab(s) by mouth once a day  -- Indication: For STEMI    metoprolol tartrate 100 mg oral tablet  -- 1 tab(s) by mouth once a day  -- Indication: For STEMI

## 2018-02-28 NOTE — DISCHARGE NOTE ADULT - PATIENT PORTAL LINK FT
You can access the "[employer]"[facility] Patient Portal, offered by [facility], by registering with the following website: [email or web address]

## 2018-03-02 DIAGNOSIS — I21.3 ST ELEVATION (STEMI) MYOCARDIAL INFARCTION OF UNSPECIFIED SITE: ICD-10-CM

## 2018-03-02 DIAGNOSIS — I25.10 ATHEROSCLEROTIC HEART DISEASE OF NATIVE CORONARY ARTERY WITHOUT ANGINA PECTORIS: ICD-10-CM

## 2018-03-02 DIAGNOSIS — I49.3 VENTRICULAR PREMATURE DEPOLARIZATION: ICD-10-CM

## 2018-03-09 DIAGNOSIS — I21.11 ST ELEVATION (STEMI) MYOCARDIAL INFARCTION INVOLVING RIGHT CORONARY ARTERY: ICD-10-CM

## 2018-03-09 DIAGNOSIS — Z87.891 PERSONAL HISTORY OF NICOTINE DEPENDENCE: ICD-10-CM

## 2018-04-04 ENCOUNTER — OUTPATIENT (OUTPATIENT)
Dept: OUTPATIENT SERVICES | Facility: HOSPITAL | Age: 68
LOS: 1 days | Discharge: HOME | End: 2018-04-04

## 2018-04-04 LAB
ANION GAP SERPL CALC-SCNC: 12 MMOL/L — SIGNIFICANT CHANGE UP (ref 7–14)
BASOPHILS # BLD AUTO: 0.03 K/UL — SIGNIFICANT CHANGE UP (ref 0–0.2)
BASOPHILS NFR BLD AUTO: 0.3 % — SIGNIFICANT CHANGE UP (ref 0–1)
BUN SERPL-MCNC: 12 MG/DL — SIGNIFICANT CHANGE UP (ref 10–20)
CALCIUM SERPL-MCNC: 8.9 MG/DL — SIGNIFICANT CHANGE UP (ref 8.5–10.1)
CHLORIDE SERPL-SCNC: 100 MMOL/L — SIGNIFICANT CHANGE UP (ref 98–110)
CO2 SERPL-SCNC: 26 MMOL/L — SIGNIFICANT CHANGE UP (ref 17–32)
CREAT SERPL-MCNC: 0.8 MG/DL — SIGNIFICANT CHANGE UP (ref 0.7–1.5)
EOSINOPHIL # BLD AUTO: 0.1 K/UL — SIGNIFICANT CHANGE UP (ref 0–0.7)
EOSINOPHIL NFR BLD AUTO: 0.9 % — SIGNIFICANT CHANGE UP (ref 0–8)
GLUCOSE SERPL-MCNC: 104 MG/DL — HIGH (ref 70–99)
HCT VFR BLD CALC: 33.8 % — LOW (ref 37–47)
HCT VFR BLD CALC: 37.2 % — SIGNIFICANT CHANGE UP (ref 37–47)
HGB BLD-MCNC: 11.1 G/DL — LOW (ref 12–16)
HGB BLD-MCNC: 12.1 G/DL — SIGNIFICANT CHANGE UP (ref 12–16)
IMM GRANULOCYTES NFR BLD AUTO: 0.5 % — HIGH (ref 0.1–0.3)
INR BLD: 1 RATIO — SIGNIFICANT CHANGE UP (ref 0.65–1.3)
LYMPHOCYTES # BLD AUTO: 0.99 K/UL — LOW (ref 1.2–3.4)
LYMPHOCYTES # BLD AUTO: 9.3 % — LOW (ref 20.5–51.1)
MCHC RBC-ENTMCNC: 28.9 PG — SIGNIFICANT CHANGE UP (ref 27–31)
MCHC RBC-ENTMCNC: 29.3 PG — SIGNIFICANT CHANGE UP (ref 27–31)
MCHC RBC-ENTMCNC: 32.5 G/DL — SIGNIFICANT CHANGE UP (ref 32–37)
MCHC RBC-ENTMCNC: 32.8 G/DL — SIGNIFICANT CHANGE UP (ref 32–37)
MCV RBC AUTO: 88.8 FL — SIGNIFICANT CHANGE UP (ref 81–99)
MCV RBC AUTO: 89.2 FL — SIGNIFICANT CHANGE UP (ref 81–99)
MONOCYTES # BLD AUTO: 0.58 K/UL — SIGNIFICANT CHANGE UP (ref 0.1–0.6)
MONOCYTES NFR BLD AUTO: 5.4 % — SIGNIFICANT CHANGE UP (ref 1.7–9.3)
NEUTROPHILS # BLD AUTO: 8.91 K/UL — HIGH (ref 1.4–6.5)
NEUTROPHILS NFR BLD AUTO: 83.6 % — HIGH (ref 42.2–75.2)
NRBC # BLD: 0 /100 WBCS — SIGNIFICANT CHANGE UP (ref 0–0)
NRBC # BLD: 0 /100 WBCS — SIGNIFICANT CHANGE UP (ref 0–0)
PLATELET # BLD AUTO: 210 K/UL — SIGNIFICANT CHANGE UP (ref 130–400)
PLATELET # BLD AUTO: 240 K/UL — SIGNIFICANT CHANGE UP (ref 130–400)
POTASSIUM SERPL-MCNC: 4.3 MMOL/L — SIGNIFICANT CHANGE UP (ref 3.5–5)
POTASSIUM SERPL-SCNC: 4.3 MMOL/L — SIGNIFICANT CHANGE UP (ref 3.5–5)
PROTHROM AB SERPL-ACNC: 10.8 SEC — SIGNIFICANT CHANGE UP (ref 9.95–12.87)
RBC # BLD: 3.79 M/UL — LOW (ref 4.2–5.4)
RBC # BLD: 4.19 M/UL — LOW (ref 4.2–5.4)
RBC # FLD: 13.8 % — SIGNIFICANT CHANGE UP (ref 11.5–14.5)
RBC # FLD: 13.9 % — SIGNIFICANT CHANGE UP (ref 11.5–14.5)
SODIUM SERPL-SCNC: 138 MMOL/L — SIGNIFICANT CHANGE UP (ref 135–146)
WBC # BLD: 10.37 K/UL — SIGNIFICANT CHANGE UP (ref 4.8–10.8)
WBC # BLD: 10.66 K/UL — SIGNIFICANT CHANGE UP (ref 4.8–10.8)
WBC # FLD AUTO: 10.37 K/UL — SIGNIFICANT CHANGE UP (ref 4.8–10.8)
WBC # FLD AUTO: 10.66 K/UL — SIGNIFICANT CHANGE UP (ref 4.8–10.8)

## 2018-04-04 RX ORDER — METOPROLOL TARTRATE 50 MG
1 TABLET ORAL
Qty: 60 | Refills: 1
Start: 2018-04-04 | End: 2018-06-02

## 2018-04-04 RX ORDER — SODIUM CHLORIDE 9 MG/ML
1000 INJECTION INTRAMUSCULAR; INTRAVENOUS; SUBCUTANEOUS
Qty: 0 | Refills: 0 | Status: DISCONTINUED | OUTPATIENT
Start: 2018-04-04 | End: 2018-04-19

## 2018-04-04 RX ORDER — METOPROLOL TARTRATE 50 MG
1 TABLET ORAL
Qty: 0 | Refills: 0 | COMMUNITY

## 2018-04-04 RX ORDER — METOPROLOL TARTRATE 50 MG
1 TABLET ORAL
Qty: 60 | Refills: 1 | OUTPATIENT
Start: 2018-04-04 | End: 2018-06-02

## 2018-04-04 RX ORDER — PRASUGREL 5 MG/1
1 TABLET, FILM COATED ORAL
Qty: 30 | Refills: 1
Start: 2018-04-04

## 2018-04-04 NOTE — H&P CARDIOLOGY - HISTORY OF PRESENT ILLNESS
Patient is 67y Female presents planned staged PCI of OM1 and FFR of LAD       PMH   CAD, STEMI 2/2018  HTN. DLD     SOCIAL HISTORY   Former Smoker     SURGICAL HISTORY   PCI of RCA 2/18     FAMILY HISTORY   Mother + CVA     REVIEW OF SYSTEMS     CONSTITUTIONAL: No weakness, fevers or chills  EYES/ENT: No visual changes;  No vertigo or throat pain   NECK: No pain or stiffness  RESPIRATORY: No cough, wheezing, hemoptysis; SEE HPI  CARDIOVASCULAR: SEE HPI  GASTROINTESTINAL: No abdominal or epigastric pain. No nausea, vomiting, or hematemesis; No diarrhea or constipation. No melena or hematochezia.  GENITOURINARY: No dysuria, frequency or hematuria  NEUROLOGICAL: No numbness or weakness  SKIN: No itching, rashes       PHYSICAL EXAM     T(C): --98  HR: --82  BP: --140/74  RR: --16  SpO2: --99    GENERAL: NAD, overweight  HEAD:  Atraumatic, Normocephalic  EYES: conjunctiva and sclera clear  NECK: No JVD  CHEST/LUNG: Clear to auscultation bilaterally; No wheeze  HEART: Regular rate and rhythm; No murmurs  ABDOMEN: Soft, Nontender, Nondistended; Bowel sounds present  EXTREMITIES:  PTs/DPs 2+ ,  Rads/Ulnars 2+,  No clubbing, cyanosis, or edema  NEUROLOGY:  A&Ox3, appropriate  SKIN: No rashes or lesions     MELITON TEST   Negative

## 2018-04-04 NOTE — PROGRESS NOTE ADULT - SUBJECTIVE AND OBJECTIVE BOX
PRE-OPERATIVE DAY OF PROCEDURE EVALUATION DOCOMENTATION  I have personally seen and examined the patient.  I agree with the history and physical which I have reviewed and noted any changes below.  SIGNED ELECTRONICALLY BY SCOOTER BAUMAN MD 04-04-18 @ 11:21                                         POST OPERATIVE PROCEDURAL DOCUMENTATION  PRE-OP DIAGNOSIS:       PROCEDURE:   LEFT HEART CATHERIZATION    Physician:  Scooter Bauman MD  Assistant: yadi OCAMPO    ANESTHESIA TYPE:  [ X] Sedation  [ X] Local/Regional  [   ]General Anesthesia    ESTIMATED BLOOD LOSS:      less than 10 mL    CONDITION  [X ] Good  [   ] Fair  [   ] Serious  [   ] Critical      SPECIMENS REMOVED (IF APPLICABLE):   wire        IMPLANTS 3.5 x 238 lois to LCX  FINDINGS:  LEFT HEART CATHERIZATION                                    LVEF%:  LVEDP:    Left main P    LAD:  Mid 40%    Left Circumflex: OM1 large sequential 80%      Right Cornary Artery:  Patent stetn  distal 40%        RIGHT HEART CATHERIZATION ( Not Performed)  PA:  PCW:  CO/CI:    PERCUTANEOUS CORONARY INTERVENTIONS:      COMPLICATIONS:  None      POST-OP DIAGNOSIS CAD    [ ] Normal Coronary Arteries  [ ] Luminal Irregularities  [ ] Non-obstructive CAD        PLAN OF CARE    [ x] D/C Home today   [ ]  D/C in AM  [ ] Return to In-patient bed  [ ] Admit for observation  [ ] Return for staged procedure:  [ ] CT Surgery consult called  [x ]  Continue DAPT, B-blocker & Statin therapy  [x ]  Medical Therapy  [ X] Aggressive risk factor modification. The patient should follow a low fat and low calorie diet.

## 2018-04-11 DIAGNOSIS — Z82.49 FAMILY HISTORY OF ISCHEMIC HEART DISEASE AND OTHER DISEASES OF THE CIRCULATORY SYSTEM: ICD-10-CM

## 2018-04-11 DIAGNOSIS — Z87.891 PERSONAL HISTORY OF NICOTINE DEPENDENCE: ICD-10-CM

## 2018-04-11 DIAGNOSIS — I25.119 ATHEROSCLEROTIC HEART DISEASE OF NATIVE CORONARY ARTERY WITH UNSPECIFIED ANGINA PECTORIS: ICD-10-CM

## 2018-04-11 DIAGNOSIS — I20.9 ANGINA PECTORIS, UNSPECIFIED: ICD-10-CM

## 2018-04-11 DIAGNOSIS — Z98.61 CORONARY ANGIOPLASTY STATUS: ICD-10-CM

## 2018-04-11 DIAGNOSIS — I10 ESSENTIAL (PRIMARY) HYPERTENSION: ICD-10-CM

## 2018-04-11 DIAGNOSIS — E78.4 OTHER HYPERLIPIDEMIA: ICD-10-CM

## 2019-01-01 ENCOUNTER — OUTPATIENT (OUTPATIENT)
Dept: OUTPATIENT SERVICES | Facility: HOSPITAL | Age: 69
LOS: 1 days | Discharge: HOME | End: 2019-01-01
Payer: MEDICARE

## 2019-01-01 ENCOUNTER — OUTPATIENT (OUTPATIENT)
Dept: OUTPATIENT SERVICES | Facility: HOSPITAL | Age: 69
LOS: 1 days | Discharge: HOME | End: 2019-01-01
Payer: COMMERCIAL

## 2019-01-01 ENCOUNTER — INPATIENT (INPATIENT)
Facility: HOSPITAL | Age: 69
LOS: 36 days | End: 2019-07-21
Attending: INTERNAL MEDICINE | Admitting: INTERNAL MEDICINE
Payer: MEDICARE

## 2019-01-01 VITALS
SYSTOLIC BLOOD PRESSURE: 136 MMHG | HEART RATE: 79 BPM | DIASTOLIC BLOOD PRESSURE: 68 MMHG | RESPIRATION RATE: 18 BRPM | OXYGEN SATURATION: 97 % | TEMPERATURE: 98 F

## 2019-01-01 DIAGNOSIS — R94.5 ABNORMAL RESULTS OF LIVER FUNCTION STUDIES: ICD-10-CM

## 2019-01-01 DIAGNOSIS — R10.9 UNSPECIFIED ABDOMINAL PAIN: ICD-10-CM

## 2019-01-01 LAB
-  AMIKACIN: SIGNIFICANT CHANGE UP
-  AMPICILLIN/SULBACTAM: SIGNIFICANT CHANGE UP
-  AMPICILLIN: SIGNIFICANT CHANGE UP
-  AMPICILLIN: SIGNIFICANT CHANGE UP
-  AZTREONAM: SIGNIFICANT CHANGE UP
-  CEFAZOLIN: SIGNIFICANT CHANGE UP
-  CEFEPIME: SIGNIFICANT CHANGE UP
-  CEFOXITIN: SIGNIFICANT CHANGE UP
-  CEFTRIAXONE: SIGNIFICANT CHANGE UP
-  CIPROFLOXACIN: SIGNIFICANT CHANGE UP
-  ERTAPENEM: SIGNIFICANT CHANGE UP
-  GENTAMICIN SYNERGY: SIGNIFICANT CHANGE UP
-  GENTAMICIN: SIGNIFICANT CHANGE UP
-  IMIPENEM: SIGNIFICANT CHANGE UP
-  LEVOFLOXACIN: SIGNIFICANT CHANGE UP
-  MEROPENEM: SIGNIFICANT CHANGE UP
-  NITROFURANTOIN: SIGNIFICANT CHANGE UP
-  PIPERACILLIN/TAZOBACTAM: SIGNIFICANT CHANGE UP
-  TIGECYCLINE: SIGNIFICANT CHANGE UP
-  TOBRAMYCIN: SIGNIFICANT CHANGE UP
-  TRIMETHOPRIM/SULFAMETHOXAZOLE: SIGNIFICANT CHANGE UP
-  VANCOMYCIN: SIGNIFICANT CHANGE UP
A1AT SERPL-MCNC: 123 MG/DL — SIGNIFICANT CHANGE UP (ref 90–200)
A1AT SERPL-MCNC: 134 MG/DL — SIGNIFICANT CHANGE UP (ref 90–200)
AFP-TM SERPL-MCNC: <1.8 NG/ML — SIGNIFICANT CHANGE UP
ALBUMIN FLD-MCNC: 0.3 G/DL — SIGNIFICANT CHANGE UP
ALBUMIN SERPL ELPH-MCNC: 1.3 G/DL — LOW (ref 3.5–5.2)
ALBUMIN SERPL ELPH-MCNC: 1.5 G/DL — LOW (ref 3.5–5.2)
ALBUMIN SERPL ELPH-MCNC: 1.5 G/DL — LOW (ref 3.5–5.2)
ALBUMIN SERPL ELPH-MCNC: 1.6 G/DL — LOW (ref 3.5–5.2)
ALBUMIN SERPL ELPH-MCNC: 1.7 G/DL — LOW (ref 3.5–5.2)
ALBUMIN SERPL ELPH-MCNC: 1.9 G/DL — LOW (ref 3.5–5.2)
ALBUMIN SERPL ELPH-MCNC: 2 G/DL — LOW (ref 3.5–5.2)
ALBUMIN SERPL ELPH-MCNC: 2.1 G/DL — LOW (ref 3.5–5.2)
ALBUMIN SERPL ELPH-MCNC: 2.2 G/DL — LOW (ref 3.5–5.2)
ALBUMIN SERPL ELPH-MCNC: 2.3 G/DL — LOW (ref 3.5–5.2)
ALBUMIN SERPL ELPH-MCNC: 2.5 G/DL — LOW (ref 3.5–5.2)
ALBUMIN SERPL ELPH-MCNC: 2.6 G/DL — LOW (ref 3.5–5.2)
ALBUMIN SERPL ELPH-MCNC: 2.7 G/DL — LOW (ref 3.5–5.2)
ALBUMIN SERPL ELPH-MCNC: 2.7 G/DL — LOW (ref 3.5–5.2)
ALBUMIN SERPL ELPH-MCNC: 2.8 G/DL — LOW (ref 3.5–5.2)
ALBUMIN SERPL ELPH-MCNC: 2.8 G/DL — LOW (ref 3.5–5.2)
ALBUMIN SERPL ELPH-MCNC: 2.9 G/DL — LOW (ref 3.5–5.2)
ALBUMIN SERPL ELPH-MCNC: 2.9 G/DL — LOW (ref 3.5–5.2)
ALBUMIN SERPL ELPH-MCNC: 3 G/DL — LOW (ref 3.5–5.2)
ALBUMIN SERPL ELPH-MCNC: 3.2 G/DL — LOW (ref 3.5–5.2)
ALDOLASE SERPL-CCNC: 128 U/L — HIGH (ref 3.3–10.3)
ALDOLASE SERPL-CCNC: 51 U/L — HIGH (ref 3.3–10.3)
ALDOST SERPL-MCNC: 7.2 NG/DL — SIGNIFICANT CHANGE UP
ALP SERPL-CCNC: 1105 U/L — HIGH (ref 30–115)
ALP SERPL-CCNC: 1151 U/L — HIGH (ref 30–115)
ALP SERPL-CCNC: 1189 U/L — HIGH (ref 30–115)
ALP SERPL-CCNC: 1375 U/L — HIGH (ref 30–115)
ALP SERPL-CCNC: 1510 U/L — HIGH (ref 30–115)
ALP SERPL-CCNC: 1523 U/L — HIGH (ref 30–115)
ALP SERPL-CCNC: 1620 U/L — HIGH (ref 30–115)
ALP SERPL-CCNC: 1647 U/L — HIGH (ref 30–115)
ALP SERPL-CCNC: 1660 U/L — HIGH (ref 30–115)
ALP SERPL-CCNC: 1688 U/L — HIGH (ref 30–115)
ALP SERPL-CCNC: 1713 U/L — HIGH (ref 30–115)
ALP SERPL-CCNC: 1730 U/L — HIGH (ref 30–115)
ALP SERPL-CCNC: 1759 U/L — HIGH (ref 30–115)
ALP SERPL-CCNC: 1800 U/L — HIGH (ref 30–115)
ALP SERPL-CCNC: 1848 U/L — HIGH (ref 30–115)
ALP SERPL-CCNC: 316 U/L — HIGH (ref 30–115)
ALP SERPL-CCNC: 325 U/L — HIGH (ref 30–115)
ALP SERPL-CCNC: 365 U/L — HIGH (ref 30–115)
ALP SERPL-CCNC: 390 U/L — HIGH (ref 30–115)
ALP SERPL-CCNC: 406 U/L — HIGH (ref 30–115)
ALP SERPL-CCNC: 418 U/L — HIGH (ref 30–115)
ALP SERPL-CCNC: 429 U/L — HIGH (ref 30–115)
ALP SERPL-CCNC: 430 U/L — HIGH (ref 30–115)
ALP SERPL-CCNC: 435 U/L — HIGH (ref 30–115)
ALP SERPL-CCNC: 446 U/L — HIGH (ref 30–115)
ALP SERPL-CCNC: 454 U/L — HIGH (ref 30–115)
ALP SERPL-CCNC: 465 U/L — HIGH (ref 30–115)
ALP SERPL-CCNC: 491 U/L — HIGH (ref 30–115)
ALP SERPL-CCNC: 513 U/L — HIGH (ref 30–115)
ALP SERPL-CCNC: 553 U/L — HIGH (ref 30–115)
ALP SERPL-CCNC: 555 U/L — HIGH (ref 30–115)
ALP SERPL-CCNC: 561 U/L — HIGH (ref 30–115)
ALP SERPL-CCNC: 594 U/L — HIGH (ref 30–115)
ALP SERPL-CCNC: 648 U/L — HIGH (ref 30–115)
ALP SERPL-CCNC: >1200 U/L — HIGH (ref 30–115)
ALT FLD-CCNC: 112 U/L — HIGH (ref 0–41)
ALT FLD-CCNC: 118 U/L — HIGH (ref 0–41)
ALT FLD-CCNC: 143 U/L — HIGH (ref 0–41)
ALT FLD-CCNC: 165 U/L — HIGH (ref 0–41)
ALT FLD-CCNC: 28 U/L — SIGNIFICANT CHANGE UP (ref 0–41)
ALT FLD-CCNC: 29 U/L — SIGNIFICANT CHANGE UP (ref 0–41)
ALT FLD-CCNC: 32 U/L — SIGNIFICANT CHANGE UP (ref 0–41)
ALT FLD-CCNC: 32 U/L — SIGNIFICANT CHANGE UP (ref 0–41)
ALT FLD-CCNC: 337 U/L — HIGH (ref 0–41)
ALT FLD-CCNC: 35 U/L — SIGNIFICANT CHANGE UP (ref 0–41)
ALT FLD-CCNC: 379 U/L — HIGH (ref 0–41)
ALT FLD-CCNC: 39 U/L — SIGNIFICANT CHANGE UP (ref 0–41)
ALT FLD-CCNC: 415 U/L — HIGH (ref 0–41)
ALT FLD-CCNC: 435 U/L — HIGH (ref 0–41)
ALT FLD-CCNC: 453 U/L — HIGH (ref 0–41)
ALT FLD-CCNC: 453 U/L — HIGH (ref 0–41)
ALT FLD-CCNC: 477 U/L — HIGH (ref 0–41)
ALT FLD-CCNC: 485 U/L — HIGH (ref 0–41)
ALT FLD-CCNC: 489 U/L — HIGH (ref 0–41)
ALT FLD-CCNC: 49 U/L — HIGH (ref 0–41)
ALT FLD-CCNC: 495 U/L — HIGH (ref 0–41)
ALT FLD-CCNC: 501 U/L — HIGH (ref 0–41)
ALT FLD-CCNC: 508 U/L — HIGH (ref 0–41)
ALT FLD-CCNC: 513 U/L — HIGH (ref 0–41)
ALT FLD-CCNC: 515 U/L — HIGH (ref 0–41)
ALT FLD-CCNC: 534 U/L — HIGH (ref 0–41)
ALT FLD-CCNC: 54 U/L — HIGH (ref 0–41)
ALT FLD-CCNC: 561 U/L — HIGH (ref 0–41)
ALT FLD-CCNC: 58 U/L — HIGH (ref 0–41)
ALT FLD-CCNC: 64 U/L — HIGH (ref 0–41)
ALT FLD-CCNC: 82 U/L — HIGH (ref 0–41)
ALT FLD-CCNC: 84 U/L — HIGH (ref 0–41)
ALT FLD-CCNC: 86 U/L — HIGH (ref 0–41)
ALT FLD-CCNC: 89 U/L — HIGH (ref 0–41)
ALT FLD-CCNC: 92 U/L — HIGH (ref 0–41)
AMYLASE P1 CFR SERPL: 107 U/L — SIGNIFICANT CHANGE UP (ref 25–115)
ANA PAT FLD IF-IMP: ABNORMAL
ANA PAT FLD IF-IMP: SIGNIFICANT CHANGE UP
ANA TITR SER: ABNORMAL
ANION GAP SERPL CALC-SCNC: 10 MMOL/L — SIGNIFICANT CHANGE UP (ref 7–14)
ANION GAP SERPL CALC-SCNC: 10 MMOL/L — SIGNIFICANT CHANGE UP (ref 7–14)
ANION GAP SERPL CALC-SCNC: 11 MMOL/L — SIGNIFICANT CHANGE UP (ref 7–14)
ANION GAP SERPL CALC-SCNC: 12 MMOL/L — SIGNIFICANT CHANGE UP (ref 7–14)
ANION GAP SERPL CALC-SCNC: 13 MMOL/L — SIGNIFICANT CHANGE UP (ref 7–14)
ANION GAP SERPL CALC-SCNC: 14 MMOL/L — SIGNIFICANT CHANGE UP (ref 7–14)
ANION GAP SERPL CALC-SCNC: 14 MMOL/L — SIGNIFICANT CHANGE UP (ref 7–14)
ANION GAP SERPL CALC-SCNC: 15 MMOL/L — HIGH (ref 7–14)
ANION GAP SERPL CALC-SCNC: 16 MMOL/L — HIGH (ref 7–14)
ANION GAP SERPL CALC-SCNC: 16 MMOL/L — HIGH (ref 7–14)
ANION GAP SERPL CALC-SCNC: 17 MMOL/L — HIGH (ref 7–14)
ANION GAP SERPL CALC-SCNC: 17 MMOL/L — HIGH (ref 7–14)
ANION GAP SERPL CALC-SCNC: 18 MMOL/L — HIGH (ref 7–14)
ANION GAP SERPL CALC-SCNC: 18 MMOL/L — HIGH (ref 7–14)
ANION GAP SERPL CALC-SCNC: 19 MMOL/L — HIGH (ref 7–14)
ANION GAP SERPL CALC-SCNC: 20 MMOL/L — HIGH (ref 7–14)
ANION GAP SERPL CALC-SCNC: 20 MMOL/L — HIGH (ref 7–14)
ANION GAP SERPL CALC-SCNC: 21 MMOL/L — HIGH (ref 7–14)
ANION GAP SERPL CALC-SCNC: 21 MMOL/L — HIGH (ref 7–14)
ANION GAP SERPL CALC-SCNC: 22 MMOL/L — HIGH (ref 7–14)
ANION GAP SERPL CALC-SCNC: 23 MMOL/L — HIGH (ref 7–14)
ANION GAP SERPL CALC-SCNC: 26 MMOL/L — HIGH (ref 7–14)
ANION GAP SERPL CALC-SCNC: 9 MMOL/L — SIGNIFICANT CHANGE UP (ref 7–14)
ANISOCYTOSIS BLD QL: SIGNIFICANT CHANGE UP
ANISOCYTOSIS BLD QL: SIGNIFICANT CHANGE UP
APAP SERPL-MCNC: <5 UG/ML — LOW (ref 10–30)
APPEARANCE UR: ABNORMAL
APTT BLD: 142.5 SEC — CRITICAL HIGH (ref 27–39.2)
APTT BLD: 27.6 SEC — SIGNIFICANT CHANGE UP (ref 27–39.2)
APTT BLD: 32.9 SEC — SIGNIFICANT CHANGE UP (ref 27–39.2)
APTT BLD: 33.3 SEC — SIGNIFICANT CHANGE UP (ref 27–39.2)
APTT BLD: 35.3 SEC — SIGNIFICANT CHANGE UP (ref 27–39.2)
APTT BLD: 37.1 SEC — SIGNIFICANT CHANGE UP (ref 27–39.2)
APTT BLD: 37.1 SEC — SIGNIFICANT CHANGE UP (ref 27–39.2)
APTT BLD: 37.2 SEC — SIGNIFICANT CHANGE UP (ref 27–39.2)
APTT BLD: 37.6 SEC — SIGNIFICANT CHANGE UP (ref 27–39.2)
APTT BLD: 39 SEC — SIGNIFICANT CHANGE UP (ref 27–39.2)
APTT BLD: 39.3 SEC — HIGH (ref 27–39.2)
APTT BLD: 41.1 SEC — HIGH (ref 27–39.2)
APTT BLD: 41.3 SEC — HIGH (ref 27–39.2)
APTT BLD: 45.6 SEC — HIGH (ref 27–39.2)
APTT BLD: 47.2 SEC — HIGH (ref 27–39.2)
APTT BLD: 52.2 SEC — HIGH (ref 27–39.2)
APTT BLD: 57.4 SEC — HIGH (ref 27–39.2)
APTT BLD: 61.4 SEC — HIGH (ref 27–39.2)
APTT BLD: 65 SEC — HIGH (ref 27–39.2)
AST SERPL-CCNC: 1003 U/L — HIGH (ref 0–41)
AST SERPL-CCNC: 1032 U/L — HIGH (ref 0–41)
AST SERPL-CCNC: 1033 U/L — HIGH (ref 0–41)
AST SERPL-CCNC: 1042 U/L — HIGH (ref 0–41)
AST SERPL-CCNC: 1079 U/L — HIGH (ref 0–41)
AST SERPL-CCNC: 1083 U/L — HIGH (ref 0–41)
AST SERPL-CCNC: 1159 U/L — HIGH (ref 0–41)
AST SERPL-CCNC: 1180 U/L — HIGH (ref 0–41)
AST SERPL-CCNC: 1207 U/L — HIGH (ref 0–41)
AST SERPL-CCNC: 121 U/L — HIGH (ref 0–41)
AST SERPL-CCNC: 1211 U/L — HIGH (ref 0–41)
AST SERPL-CCNC: 123 U/L — HIGH (ref 0–41)
AST SERPL-CCNC: 138 U/L — HIGH (ref 0–41)
AST SERPL-CCNC: 155 U/L — HIGH (ref 0–41)
AST SERPL-CCNC: 178 U/L — HIGH (ref 0–41)
AST SERPL-CCNC: 363 U/L — HIGH (ref 0–41)
AST SERPL-CCNC: 372 U/L — HIGH (ref 0–41)
AST SERPL-CCNC: 39 U/L — SIGNIFICANT CHANGE UP (ref 0–41)
AST SERPL-CCNC: 432 U/L — HIGH (ref 0–41)
AST SERPL-CCNC: 47 U/L — HIGH (ref 0–41)
AST SERPL-CCNC: 50 U/L — HIGH (ref 0–41)
AST SERPL-CCNC: 51 U/L — HIGH (ref 0–41)
AST SERPL-CCNC: 53 U/L — HIGH (ref 0–41)
AST SERPL-CCNC: 54 U/L — HIGH (ref 0–41)
AST SERPL-CCNC: 546 U/L — HIGH (ref 0–41)
AST SERPL-CCNC: 63 U/L — HIGH (ref 0–41)
AST SERPL-CCNC: 673 U/L — HIGH (ref 0–41)
AST SERPL-CCNC: 69 U/L — HIGH (ref 0–41)
AST SERPL-CCNC: 76 U/L — HIGH (ref 0–41)
AST SERPL-CCNC: 77 U/L — HIGH (ref 0–41)
AST SERPL-CCNC: 86 U/L — HIGH (ref 0–41)
AST SERPL-CCNC: 86 U/L — HIGH (ref 0–41)
AST SERPL-CCNC: 90 U/L — HIGH (ref 0–41)
AST SERPL-CCNC: 957 U/L — HIGH (ref 0–41)
AST SERPL-CCNC: >700 U/L — HIGH (ref 0–41)
AUTO DIFF PNL BLD: NEGATIVE — SIGNIFICANT CHANGE UP
AUTO DIFF PNL BLD: NEGATIVE — SIGNIFICANT CHANGE UP
B PERT IGG+IGM PNL SER: ABNORMAL
BACTERIA # UR AUTO: ABNORMAL
BACTERIA # UR AUTO: ABNORMAL
BACTERIA # UR AUTO: ABNORMAL /HPF
BACTERIA # UR AUTO: ABNORMAL /HPF
BASE EXCESS BLDA CALC-SCNC: -10.1 MMOL/L — LOW (ref -2–2)
BASE EXCESS BLDA CALC-SCNC: -11.8 MMOL/L — LOW (ref -2–2)
BASE EXCESS BLDA CALC-SCNC: -16.4 MMOL/L — LOW (ref -2–2)
BASE EXCESS BLDA CALC-SCNC: 7.4 MMOL/L — HIGH (ref -2–2)
BASOPHILS # BLD AUTO: 0 K/UL — SIGNIFICANT CHANGE UP (ref 0–0.2)
BASOPHILS # BLD AUTO: 0 K/UL — SIGNIFICANT CHANGE UP (ref 0–0.2)
BASOPHILS # BLD AUTO: 0.01 K/UL — SIGNIFICANT CHANGE UP (ref 0–0.2)
BASOPHILS # BLD AUTO: 0.02 K/UL — SIGNIFICANT CHANGE UP (ref 0–0.2)
BASOPHILS # BLD AUTO: 0.02 K/UL — SIGNIFICANT CHANGE UP (ref 0–0.2)
BASOPHILS # BLD AUTO: 0.04 K/UL — SIGNIFICANT CHANGE UP (ref 0–0.2)
BASOPHILS # BLD AUTO: 0.05 K/UL — SIGNIFICANT CHANGE UP (ref 0–0.2)
BASOPHILS # BLD AUTO: 0.06 K/UL — SIGNIFICANT CHANGE UP (ref 0–0.2)
BASOPHILS # BLD AUTO: 0.07 K/UL — SIGNIFICANT CHANGE UP (ref 0–0.2)
BASOPHILS # BLD AUTO: 0.08 K/UL — SIGNIFICANT CHANGE UP (ref 0–0.2)
BASOPHILS # BLD AUTO: 0.09 K/UL — SIGNIFICANT CHANGE UP (ref 0–0.2)
BASOPHILS # BLD AUTO: 0.09 K/UL — SIGNIFICANT CHANGE UP (ref 0–0.2)
BASOPHILS NFR BLD AUTO: 0 % — SIGNIFICANT CHANGE UP (ref 0–1)
BASOPHILS NFR BLD AUTO: 0 % — SIGNIFICANT CHANGE UP (ref 0–1)
BASOPHILS NFR BLD AUTO: 0.1 % — SIGNIFICANT CHANGE UP (ref 0–1)
BASOPHILS NFR BLD AUTO: 0.2 % — SIGNIFICANT CHANGE UP (ref 0–1)
BASOPHILS NFR BLD AUTO: 0.2 % — SIGNIFICANT CHANGE UP (ref 0–1)
BASOPHILS NFR BLD AUTO: 0.3 % — SIGNIFICANT CHANGE UP (ref 0–1)
BASOPHILS NFR BLD AUTO: 0.4 % — SIGNIFICANT CHANGE UP (ref 0–1)
BASOPHILS NFR BLD AUTO: 0.4 % — SIGNIFICANT CHANGE UP (ref 0–1)
BASOPHILS NFR BLD AUTO: 0.5 % — SIGNIFICANT CHANGE UP (ref 0–1)
BASOPHILS NFR BLD AUTO: 0.6 % — SIGNIFICANT CHANGE UP (ref 0–1)
BASOPHILS NFR BLD AUTO: 0.8 % — SIGNIFICANT CHANGE UP (ref 0–1)
BASOPHILS NFR BLD AUTO: 0.8 % — SIGNIFICANT CHANGE UP (ref 0–1)
BILIRUB DIRECT SERPL-MCNC: 1 MG/DL — HIGH (ref 0–0.2)
BILIRUB DIRECT SERPL-MCNC: 1.1 MG/DL — HIGH (ref 0–0.2)
BILIRUB DIRECT SERPL-MCNC: 1.2 MG/DL — HIGH (ref 0–0.2)
BILIRUB DIRECT SERPL-MCNC: 1.2 MG/DL — HIGH (ref 0–0.2)
BILIRUB DIRECT SERPL-MCNC: 1.3 MG/DL — HIGH (ref 0–0.2)
BILIRUB DIRECT SERPL-MCNC: 1.3 MG/DL — HIGH (ref 0–0.2)
BILIRUB DIRECT SERPL-MCNC: 1.4 MG/DL — HIGH (ref 0–0.2)
BILIRUB DIRECT SERPL-MCNC: 1.4 MG/DL — HIGH (ref 0–0.2)
BILIRUB DIRECT SERPL-MCNC: 1.5 MG/DL — HIGH (ref 0–0.2)
BILIRUB DIRECT SERPL-MCNC: 1.5 MG/DL — HIGH (ref 0–0.2)
BILIRUB DIRECT SERPL-MCNC: 1.9 MG/DL — HIGH (ref 0–0.2)
BILIRUB DIRECT SERPL-MCNC: 2 MG/DL — HIGH (ref 0–0.2)
BILIRUB DIRECT SERPL-MCNC: 2.3 MG/DL — HIGH (ref 0–0.2)
BILIRUB DIRECT SERPL-MCNC: 2.4 MG/DL — HIGH (ref 0–0.2)
BILIRUB DIRECT SERPL-MCNC: 2.6 MG/DL — HIGH (ref 0–0.2)
BILIRUB DIRECT SERPL-MCNC: 2.7 MG/DL — HIGH (ref 0–0.2)
BILIRUB DIRECT SERPL-MCNC: 3 MG/DL — HIGH (ref 0–0.2)
BILIRUB DIRECT SERPL-MCNC: 3.3 MG/DL — HIGH (ref 0–0.2)
BILIRUB DIRECT SERPL-MCNC: 3.5 MG/DL — HIGH (ref 0–0.2)
BILIRUB DIRECT SERPL-MCNC: 3.6 MG/DL — HIGH (ref 0–0.2)
BILIRUB DIRECT SERPL-MCNC: 3.6 MG/DL — HIGH (ref 0–0.2)
BILIRUB DIRECT SERPL-MCNC: 3.9 MG/DL — HIGH (ref 0–0.2)
BILIRUB DIRECT SERPL-MCNC: 3.9 MG/DL — HIGH (ref 0–0.2)
BILIRUB DIRECT SERPL-MCNC: 4.1 MG/DL — HIGH (ref 0–0.2)
BILIRUB DIRECT SERPL-MCNC: 4.1 MG/DL — HIGH (ref 0–0.2)
BILIRUB DIRECT SERPL-MCNC: 4.5 MG/DL — HIGH (ref 0–0.2)
BILIRUB INDIRECT FLD-MCNC: 0.8 MG/DL — SIGNIFICANT CHANGE UP (ref 0.2–1.2)
BILIRUB INDIRECT FLD-MCNC: 0.8 MG/DL — SIGNIFICANT CHANGE UP (ref 0.2–1.2)
BILIRUB INDIRECT FLD-MCNC: 0.9 MG/DL — SIGNIFICANT CHANGE UP (ref 0.2–1.2)
BILIRUB INDIRECT FLD-MCNC: 1 MG/DL — SIGNIFICANT CHANGE UP (ref 0.2–1.2)
BILIRUB INDIRECT FLD-MCNC: 1 MG/DL — SIGNIFICANT CHANGE UP (ref 0.2–1.2)
BILIRUB INDIRECT FLD-MCNC: 1.1 MG/DL — SIGNIFICANT CHANGE UP (ref 0.2–1.2)
BILIRUB INDIRECT FLD-MCNC: 1.2 MG/DL — SIGNIFICANT CHANGE UP (ref 0.2–1.2)
BILIRUB INDIRECT FLD-MCNC: 1.2 MG/DL — SIGNIFICANT CHANGE UP (ref 0.2–1.2)
BILIRUB INDIRECT FLD-MCNC: 1.3 MG/DL — HIGH (ref 0.2–1.2)
BILIRUB INDIRECT FLD-MCNC: 1.4 MG/DL — HIGH (ref 0.2–1.2)
BILIRUB INDIRECT FLD-MCNC: 1.5 MG/DL — HIGH (ref 0.2–1.2)
BILIRUB INDIRECT FLD-MCNC: 1.6 MG/DL — HIGH (ref 0.2–1.2)
BILIRUB INDIRECT FLD-MCNC: 1.6 MG/DL — HIGH (ref 0.2–1.2)
BILIRUB INDIRECT FLD-MCNC: 1.7 MG/DL — HIGH (ref 0.2–1.2)
BILIRUB INDIRECT FLD-MCNC: 1.9 MG/DL — HIGH (ref 0.2–1.2)
BILIRUB SERPL-MCNC: 2 MG/DL — HIGH (ref 0.2–1.2)
BILIRUB SERPL-MCNC: 2.2 MG/DL — HIGH (ref 0.2–1.2)
BILIRUB SERPL-MCNC: 2.3 MG/DL — HIGH (ref 0.2–1.2)
BILIRUB SERPL-MCNC: 2.5 MG/DL — HIGH (ref 0.2–1.2)
BILIRUB SERPL-MCNC: 2.6 MG/DL — HIGH (ref 0.2–1.2)
BILIRUB SERPL-MCNC: 2.7 MG/DL — HIGH (ref 0.2–1.2)
BILIRUB SERPL-MCNC: 2.7 MG/DL — HIGH (ref 0.2–1.2)
BILIRUB SERPL-MCNC: 2.8 MG/DL — HIGH (ref 0.2–1.2)
BILIRUB SERPL-MCNC: 2.9 MG/DL — HIGH (ref 0.2–1.2)
BILIRUB SERPL-MCNC: 3.2 MG/DL — HIGH (ref 0.2–1.2)
BILIRUB SERPL-MCNC: 3.4 MG/DL — HIGH (ref 0.2–1.2)
BILIRUB SERPL-MCNC: 3.6 MG/DL — HIGH (ref 0.2–1.2)
BILIRUB SERPL-MCNC: 3.7 MG/DL — HIGH (ref 0.2–1.2)
BILIRUB SERPL-MCNC: 3.8 MG/DL — HIGH (ref 0.2–1.2)
BILIRUB SERPL-MCNC: 3.9 MG/DL — HIGH (ref 0.2–1.2)
BILIRUB SERPL-MCNC: 4.3 MG/DL — HIGH (ref 0.2–1.2)
BILIRUB SERPL-MCNC: 4.3 MG/DL — HIGH (ref 0.2–1.2)
BILIRUB SERPL-MCNC: 4.9 MG/DL — HIGH (ref 0.2–1.2)
BILIRUB SERPL-MCNC: 4.9 MG/DL — HIGH (ref 0.2–1.2)
BILIRUB SERPL-MCNC: 5 MG/DL — HIGH (ref 0.2–1.2)
BILIRUB SERPL-MCNC: 5 MG/DL — HIGH (ref 0.2–1.2)
BILIRUB SERPL-MCNC: 5.3 MG/DL — HIGH (ref 0.2–1.2)
BILIRUB SERPL-MCNC: 5.4 MG/DL — HIGH (ref 0.2–1.2)
BILIRUB SERPL-MCNC: 5.6 MG/DL — HIGH (ref 0.2–1.2)
BILIRUB SERPL-MCNC: 5.7 MG/DL — HIGH (ref 0.2–1.2)
BILIRUB SERPL-MCNC: 6 MG/DL — HIGH (ref 0.2–1.2)
BILIRUB UR-MCNC: ABNORMAL
BILIRUB UR-MCNC: ABNORMAL
BILIRUB UR-MCNC: NEGATIVE — SIGNIFICANT CHANGE UP
BLD GP AB SCN SERPL QL: SIGNIFICANT CHANGE UP
BUN SERPL-MCNC: 100 MG/DL — CRITICAL HIGH (ref 10–20)
BUN SERPL-MCNC: 103 MG/DL — CRITICAL HIGH (ref 10–20)
BUN SERPL-MCNC: 104 MG/DL — CRITICAL HIGH (ref 10–20)
BUN SERPL-MCNC: 104 MG/DL — CRITICAL HIGH (ref 10–20)
BUN SERPL-MCNC: 105 MG/DL — CRITICAL HIGH (ref 10–20)
BUN SERPL-MCNC: 107 MG/DL — CRITICAL HIGH (ref 10–20)
BUN SERPL-MCNC: 107 MG/DL — CRITICAL HIGH (ref 10–20)
BUN SERPL-MCNC: 109 MG/DL — CRITICAL HIGH (ref 10–20)
BUN SERPL-MCNC: 110 MG/DL — CRITICAL HIGH (ref 10–20)
BUN SERPL-MCNC: 120 MG/DL — CRITICAL HIGH (ref 10–20)
BUN SERPL-MCNC: 122 MG/DL — CRITICAL HIGH (ref 10–20)
BUN SERPL-MCNC: 129 MG/DL — CRITICAL HIGH (ref 10–20)
BUN SERPL-MCNC: 131 MG/DL — CRITICAL HIGH (ref 10–20)
BUN SERPL-MCNC: 138 MG/DL — CRITICAL HIGH (ref 10–20)
BUN SERPL-MCNC: 139 MG/DL — CRITICAL HIGH (ref 10–20)
BUN SERPL-MCNC: 143 MG/DL — CRITICAL HIGH (ref 10–20)
BUN SERPL-MCNC: 151 MG/DL — CRITICAL HIGH (ref 10–20)
BUN SERPL-MCNC: 151 MG/DL — CRITICAL HIGH (ref 10–20)
BUN SERPL-MCNC: 25 MG/DL — HIGH (ref 10–20)
BUN SERPL-MCNC: 28 MG/DL — HIGH (ref 10–20)
BUN SERPL-MCNC: 30 MG/DL — HIGH (ref 10–20)
BUN SERPL-MCNC: 31 MG/DL — HIGH (ref 10–20)
BUN SERPL-MCNC: 33 MG/DL — HIGH (ref 10–20)
BUN SERPL-MCNC: 35 MG/DL — HIGH (ref 10–20)
BUN SERPL-MCNC: 35 MG/DL — HIGH (ref 10–20)
BUN SERPL-MCNC: 38 MG/DL — HIGH (ref 10–20)
BUN SERPL-MCNC: 39 MG/DL — HIGH (ref 10–20)
BUN SERPL-MCNC: 42 MG/DL — HIGH (ref 10–20)
BUN SERPL-MCNC: 44 MG/DL — HIGH (ref 10–20)
BUN SERPL-MCNC: 45 MG/DL — HIGH (ref 10–20)
BUN SERPL-MCNC: 51 MG/DL — HIGH (ref 10–20)
BUN SERPL-MCNC: 55 MG/DL — HIGH (ref 10–20)
BUN SERPL-MCNC: 56 MG/DL — HIGH (ref 10–20)
BUN SERPL-MCNC: 64 MG/DL — CRITICAL HIGH (ref 10–20)
BUN SERPL-MCNC: 65 MG/DL — CRITICAL HIGH (ref 10–20)
BUN SERPL-MCNC: 66 MG/DL — CRITICAL HIGH (ref 10–20)
BUN SERPL-MCNC: 75 MG/DL — CRITICAL HIGH (ref 10–20)
BUN SERPL-MCNC: 79 MG/DL — CRITICAL HIGH (ref 10–20)
BUN SERPL-MCNC: 87 MG/DL — CRITICAL HIGH (ref 10–20)
BUN SERPL-MCNC: 91 MG/DL — CRITICAL HIGH (ref 10–20)
BUN SERPL-MCNC: 95 MG/DL — CRITICAL HIGH (ref 10–20)
BUN SERPL-MCNC: 96 MG/DL — CRITICAL HIGH (ref 10–20)
C-ANCA SER-ACNC: NEGATIVE — SIGNIFICANT CHANGE UP
C-ANCA SER-ACNC: NEGATIVE — SIGNIFICANT CHANGE UP
C3 SERPL-MCNC: 91 MG/DL — SIGNIFICANT CHANGE UP (ref 81–157)
C4 SERPL-MCNC: 20 MG/DL — SIGNIFICANT CHANGE UP (ref 13–39)
CALCIUM SERPL-MCNC: 6.2 MG/DL — LOW (ref 8.5–10.1)
CALCIUM SERPL-MCNC: 6.5 MG/DL — LOW (ref 8.5–10.1)
CALCIUM SERPL-MCNC: 6.6 MG/DL — LOW (ref 8.5–10.1)
CALCIUM SERPL-MCNC: 6.8 MG/DL — LOW (ref 8.5–10.1)
CALCIUM SERPL-MCNC: 7 MG/DL — LOW (ref 8.5–10.1)
CALCIUM SERPL-MCNC: 7.2 MG/DL — LOW (ref 8.5–10.1)
CALCIUM SERPL-MCNC: 7.3 MG/DL — LOW (ref 8.5–10.1)
CALCIUM SERPL-MCNC: 7.3 MG/DL — LOW (ref 8.5–10.1)
CALCIUM SERPL-MCNC: 7.4 MG/DL — LOW (ref 8.5–10.1)
CALCIUM SERPL-MCNC: 7.4 MG/DL — LOW (ref 8.5–10.1)
CALCIUM SERPL-MCNC: 7.5 MG/DL — LOW (ref 8.5–10.1)
CALCIUM SERPL-MCNC: 7.6 MG/DL — LOW (ref 8.5–10.1)
CALCIUM SERPL-MCNC: 7.7 MG/DL — LOW (ref 8.5–10.1)
CALCIUM SERPL-MCNC: 7.7 MG/DL — LOW (ref 8.5–10.1)
CALCIUM SERPL-MCNC: 7.8 MG/DL — LOW (ref 8.5–10.1)
CALCIUM SERPL-MCNC: 7.9 MG/DL — LOW (ref 8.5–10.1)
CALCIUM SERPL-MCNC: 8 MG/DL — LOW (ref 8.5–10.1)
CALCIUM SERPL-MCNC: 8.1 MG/DL — LOW (ref 8.5–10.1)
CALCIUM SERPL-MCNC: 8.2 MG/DL — LOW (ref 8.5–10.1)
CALCIUM SERPL-MCNC: 8.2 MG/DL — LOW (ref 8.5–10.1)
CALCIUM SERPL-MCNC: 8.4 MG/DL — LOW (ref 8.5–10.1)
CALCIUM SERPL-MCNC: 8.4 MG/DL — LOW (ref 8.5–10.1)
CALCIUM SERPL-MCNC: 8.5 MG/DL — SIGNIFICANT CHANGE UP (ref 8.5–10.1)
CALCIUM SERPL-MCNC: 8.6 MG/DL — SIGNIFICANT CHANGE UP (ref 8.5–10.1)
CALCIUM SERPL-MCNC: 8.6 MG/DL — SIGNIFICANT CHANGE UP (ref 8.5–10.1)
CALCIUM SERPL-MCNC: 8.7 MG/DL — SIGNIFICANT CHANGE UP (ref 8.5–10.1)
CALCIUM SERPL-MCNC: 8.8 MG/DL — SIGNIFICANT CHANGE UP (ref 8.5–10.1)
CALCIUM SERPL-MCNC: 9 MG/DL — SIGNIFICANT CHANGE UP (ref 8.5–10.1)
CALCIUM SERPL-MCNC: 9 MG/DL — SIGNIFICANT CHANGE UP (ref 8.5–10.1)
CALCIUM SERPL-MCNC: 9.1 MG/DL — SIGNIFICANT CHANGE UP (ref 8.5–10.1)
CERULOPLASMIN SERPL-MCNC: 31 MG/DL — SIGNIFICANT CHANGE UP (ref 16–45)
CERULOPLASMIN SERPL-MCNC: 33 MG/DL — SIGNIFICANT CHANGE UP (ref 16–45)
CHLORIDE SERPL-SCNC: 100 MMOL/L — SIGNIFICANT CHANGE UP (ref 98–110)
CHLORIDE SERPL-SCNC: 101 MMOL/L — SIGNIFICANT CHANGE UP (ref 98–110)
CHLORIDE SERPL-SCNC: 101 MMOL/L — SIGNIFICANT CHANGE UP (ref 98–110)
CHLORIDE SERPL-SCNC: 102 MMOL/L — SIGNIFICANT CHANGE UP (ref 98–110)
CHLORIDE SERPL-SCNC: 103 MMOL/L — SIGNIFICANT CHANGE UP (ref 98–110)
CHLORIDE SERPL-SCNC: 103 MMOL/L — SIGNIFICANT CHANGE UP (ref 98–110)
CHLORIDE SERPL-SCNC: 104 MMOL/L — SIGNIFICANT CHANGE UP (ref 98–110)
CHLORIDE SERPL-SCNC: 105 MMOL/L — SIGNIFICANT CHANGE UP (ref 98–110)
CHLORIDE SERPL-SCNC: 91 MMOL/L — LOW (ref 98–110)
CHLORIDE SERPL-SCNC: 92 MMOL/L — LOW (ref 98–110)
CHLORIDE SERPL-SCNC: 93 MMOL/L — LOW (ref 98–110)
CHLORIDE SERPL-SCNC: 94 MMOL/L — LOW (ref 98–110)
CHLORIDE SERPL-SCNC: 95 MMOL/L — LOW (ref 98–110)
CHLORIDE SERPL-SCNC: 96 MMOL/L — LOW (ref 98–110)
CHLORIDE SERPL-SCNC: 97 MMOL/L — LOW (ref 98–110)
CHLORIDE SERPL-SCNC: 98 MMOL/L — SIGNIFICANT CHANGE UP (ref 98–110)
CHLORIDE SERPL-SCNC: 98 MMOL/L — SIGNIFICANT CHANGE UP (ref 98–110)
CHLORIDE SERPL-SCNC: 99 MMOL/L — SIGNIFICANT CHANGE UP (ref 98–110)
CHOLEST SERPL-MCNC: 240 MG/DL — HIGH (ref 100–200)
CK MB CFR SERPL CALC: 6.3 NG/ML — SIGNIFICANT CHANGE UP (ref 0.6–6.3)
CK SERPL-CCNC: 1019 U/L — HIGH (ref 0–225)
CK SERPL-CCNC: 163 U/L — SIGNIFICANT CHANGE UP (ref 0–225)
CK SERPL-CCNC: 1672 U/L — HIGH (ref 0–225)
CK SERPL-CCNC: 1794 U/L — HIGH (ref 0–225)
CK SERPL-CCNC: 183 U/L — SIGNIFICANT CHANGE UP (ref 0–225)
CK SERPL-CCNC: 1909 U/L — HIGH (ref 0–225)
CK SERPL-CCNC: 1960 U/L — HIGH (ref 0–225)
CK SERPL-CCNC: 237 U/L — HIGH (ref 0–225)
CK SERPL-CCNC: 289 U/L — HIGH (ref 0–225)
CK SERPL-CCNC: 35 U/L — SIGNIFICANT CHANGE UP (ref 0–225)
CK SERPL-CCNC: 48 U/L — SIGNIFICANT CHANGE UP (ref 0–225)
CK SERPL-CCNC: 5318 U/L — HIGH (ref 0–225)
CK SERPL-CCNC: 93 U/L — SIGNIFICANT CHANGE UP (ref 0–225)
CK SERPL-CCNC: >2000 U/L — HIGH (ref 0–225)
CK SERPL-CCNC: HIGH U/L (ref 0–225)
CK SERPL-CCNC: HIGH U/L (ref 0–225)
CMV DNA CSF QL NAA+PROBE: SIGNIFICANT CHANGE UP
CMV DNA SPEC NAA+PROBE-LOG#: SIGNIFICANT CHANGE UP LOGIU/ML
CMV IGG FLD QL: <0.2 U/ML — SIGNIFICANT CHANGE UP
CMV IGG SERPL-IMP: NEGATIVE — SIGNIFICANT CHANGE UP
CMV IGM FLD-ACNC: <8 AU/ML — SIGNIFICANT CHANGE UP
CMV IGM SERPL QL: NEGATIVE — SIGNIFICANT CHANGE UP
CO2 SERPL-SCNC: 13 MMOL/L — LOW (ref 17–32)
CO2 SERPL-SCNC: 14 MMOL/L — LOW (ref 17–32)
CO2 SERPL-SCNC: 15 MMOL/L — LOW (ref 17–32)
CO2 SERPL-SCNC: 17 MMOL/L — SIGNIFICANT CHANGE UP (ref 17–32)
CO2 SERPL-SCNC: 17 MMOL/L — SIGNIFICANT CHANGE UP (ref 17–32)
CO2 SERPL-SCNC: 18 MMOL/L — SIGNIFICANT CHANGE UP (ref 17–32)
CO2 SERPL-SCNC: 18 MMOL/L — SIGNIFICANT CHANGE UP (ref 17–32)
CO2 SERPL-SCNC: 19 MMOL/L — SIGNIFICANT CHANGE UP (ref 17–32)
CO2 SERPL-SCNC: 20 MMOL/L — SIGNIFICANT CHANGE UP (ref 17–32)
CO2 SERPL-SCNC: 21 MMOL/L — SIGNIFICANT CHANGE UP (ref 17–32)
CO2 SERPL-SCNC: 22 MMOL/L — SIGNIFICANT CHANGE UP (ref 17–32)
CO2 SERPL-SCNC: 23 MMOL/L — SIGNIFICANT CHANGE UP (ref 17–32)
CO2 SERPL-SCNC: 23 MMOL/L — SIGNIFICANT CHANGE UP (ref 17–32)
CO2 SERPL-SCNC: 26 MMOL/L — SIGNIFICANT CHANGE UP (ref 17–32)
CO2 SERPL-SCNC: 27 MMOL/L — SIGNIFICANT CHANGE UP (ref 17–32)
CO2 SERPL-SCNC: 28 MMOL/L — SIGNIFICANT CHANGE UP (ref 17–32)
CO2 SERPL-SCNC: 29 MMOL/L — SIGNIFICANT CHANGE UP (ref 17–32)
CO2 SERPL-SCNC: 31 MMOL/L — SIGNIFICANT CHANGE UP (ref 17–32)
CO2 SERPL-SCNC: 32 MMOL/L — SIGNIFICANT CHANGE UP (ref 17–32)
CO2 SERPL-SCNC: 33 MMOL/L — HIGH (ref 17–32)
COLOR FLD: YELLOW — SIGNIFICANT CHANGE UP
COLOR SPEC: ABNORMAL
COLOR SPEC: ABNORMAL
COLOR SPEC: SIGNIFICANT CHANGE UP
COLOR SPEC: YELLOW — SIGNIFICANT CHANGE UP
COLOR SPEC: YELLOW — SIGNIFICANT CHANGE UP
COMMENT - URINE: SIGNIFICANT CHANGE UP
CORTIS AM PEAK SERPL-MCNC: 14.3 UG/DL — SIGNIFICANT CHANGE UP (ref 6–18.4)
CORTIS AM PEAK SERPL-MCNC: 19.4 UG/DL — HIGH (ref 6–18.4)
CREAT ?TM UR-MCNC: 119 MG/DL — SIGNIFICANT CHANGE UP
CREAT ?TM UR-MCNC: 28 MG/DL — SIGNIFICANT CHANGE UP
CREAT SERPL-MCNC: 0.8 MG/DL — SIGNIFICANT CHANGE UP (ref 0.7–1.5)
CREAT SERPL-MCNC: 0.8 MG/DL — SIGNIFICANT CHANGE UP (ref 0.7–1.5)
CREAT SERPL-MCNC: 0.9 MG/DL — SIGNIFICANT CHANGE UP (ref 0.7–1.5)
CREAT SERPL-MCNC: 1 MG/DL — SIGNIFICANT CHANGE UP (ref 0.7–1.5)
CREAT SERPL-MCNC: 1.1 MG/DL — SIGNIFICANT CHANGE UP (ref 0.7–1.5)
CREAT SERPL-MCNC: 1.2 MG/DL — SIGNIFICANT CHANGE UP (ref 0.7–1.5)
CREAT SERPL-MCNC: 1.3 MG/DL — SIGNIFICANT CHANGE UP (ref 0.7–1.5)
CREAT SERPL-MCNC: 1.4 MG/DL — SIGNIFICANT CHANGE UP (ref 0.7–1.5)
CREAT SERPL-MCNC: 1.5 MG/DL — SIGNIFICANT CHANGE UP (ref 0.7–1.5)
CREAT SERPL-MCNC: 1.6 MG/DL — HIGH (ref 0.7–1.5)
CREAT SERPL-MCNC: 1.6 MG/DL — HIGH (ref 0.7–1.5)
CREAT SERPL-MCNC: 1.7 MG/DL — HIGH (ref 0.7–1.5)
CREAT SERPL-MCNC: 1.9 MG/DL — HIGH (ref 0.7–1.5)
CREAT SERPL-MCNC: 1.9 MG/DL — HIGH (ref 0.7–1.5)
CREAT SERPL-MCNC: 2 MG/DL — HIGH (ref 0.7–1.5)
CREAT SERPL-MCNC: 2.1 MG/DL — HIGH (ref 0.7–1.5)
CREAT SERPL-MCNC: 2.1 MG/DL — HIGH (ref 0.7–1.5)
CREAT SERPL-MCNC: 2.2 MG/DL — HIGH (ref 0.7–1.5)
CREAT SERPL-MCNC: 2.3 MG/DL — HIGH (ref 0.7–1.5)
CREAT SERPL-MCNC: 2.8 MG/DL — HIGH (ref 0.7–1.5)
CREAT SERPL-MCNC: 3 MG/DL — HIGH (ref 0.7–1.5)
CREAT SERPL-MCNC: 3.1 MG/DL — HIGH (ref 0.7–1.5)
CREAT SERPL-MCNC: 3.3 MG/DL — HIGH (ref 0.7–1.5)
CREAT SERPL-MCNC: 3.3 MG/DL — HIGH (ref 0.7–1.5)
CREAT SERPL-MCNC: 3.5 MG/DL — HIGH (ref 0.7–1.5)
CREAT SERPL-MCNC: 3.7 MG/DL — HIGH (ref 0.7–1.5)
CREAT SERPL-MCNC: 3.7 MG/DL — HIGH (ref 0.7–1.5)
CRP SERPL-MCNC: 2.3 MG/DL — HIGH (ref 0–0.4)
CRP SERPL-MCNC: 3.58 MG/DL — HIGH (ref 0–0.4)
CRP SERPL-MCNC: 3.64 MG/DL — HIGH (ref 0–0.4)
CRYOFIB BLD-MCNC: NEGATIVE — SIGNIFICANT CHANGE UP
CRYOFIB BLD-MCNC: NEGATIVE — SIGNIFICANT CHANGE UP
CRYOGLOB SERPL-MCNC: NEGATIVE — SIGNIFICANT CHANGE UP
CRYOGLOB SERPL-MCNC: NEGATIVE — SIGNIFICANT CHANGE UP
CULTURE RESULTS: NO GROWTH — SIGNIFICANT CHANGE UP
CULTURE RESULTS: NO GROWTH — SIGNIFICANT CHANGE UP
CULTURE RESULTS: SIGNIFICANT CHANGE UP
DIFF PNL FLD: ABNORMAL
DRUG SCREEN, SERUM: SIGNIFICANT CHANGE UP
DSDNA AB FLD-ACNC: <0.2 AI — SIGNIFICANT CHANGE UP
DSDNA AB SER-ACNC: 111 IU/ML — HIGH
DSDNA AB SER-ACNC: 12 IU/ML — SIGNIFICANT CHANGE UP
EBV DNA SERPL NAA+PROBE-ACNC: SIGNIFICANT CHANGE UP IU/ML
EBV EA AB SER IA-ACNC: <5 U/ML — SIGNIFICANT CHANGE UP
EBV EA AB TITR SER IF: POSITIVE
EBV EA IGG SER-ACNC: NEGATIVE — SIGNIFICANT CHANGE UP
EBV NA IGG SER IA-ACNC: 40.7 U/ML — HIGH
EBV PATRN SPEC IB-IMP: SIGNIFICANT CHANGE UP
EBV VCA IGG AVIDITY SER QL IA: NEGATIVE — SIGNIFICANT CHANGE UP
EBV VCA IGM SER IA-ACNC: <10 U/ML — SIGNIFICANT CHANGE UP
EBV VCA IGM SER IA-ACNC: <10 U/ML — SIGNIFICANT CHANGE UP
EBV VCA IGM TITR FLD: NEGATIVE — SIGNIFICANT CHANGE UP
EBVPCR LOG: SIGNIFICANT CHANGE UP LOGIU/ML
ENA SCL70 AB SER-ACNC: <0.2 AI — SIGNIFICANT CHANGE UP
ENA SS-A AB FLD IA-ACNC: 0.2 AI — SIGNIFICANT CHANGE UP
ENTEROCOC DNA BLD POS QL NAA+NON-PROBE: SIGNIFICANT CHANGE UP
EOSINOPHIL # BLD AUTO: 0 K/UL — SIGNIFICANT CHANGE UP (ref 0–0.7)
EOSINOPHIL # BLD AUTO: 0.01 K/UL — SIGNIFICANT CHANGE UP (ref 0–0.7)
EOSINOPHIL # BLD AUTO: 0.02 K/UL — SIGNIFICANT CHANGE UP (ref 0–0.7)
EOSINOPHIL # BLD AUTO: 0.04 K/UL — SIGNIFICANT CHANGE UP (ref 0–0.7)
EOSINOPHIL # BLD AUTO: 0.06 K/UL — SIGNIFICANT CHANGE UP (ref 0–0.7)
EOSINOPHIL # BLD AUTO: 0.11 K/UL — SIGNIFICANT CHANGE UP (ref 0–0.7)
EOSINOPHIL # BLD AUTO: 0.12 K/UL — SIGNIFICANT CHANGE UP (ref 0–0.7)
EOSINOPHIL # BLD AUTO: 0.13 K/UL — SIGNIFICANT CHANGE UP (ref 0–0.7)
EOSINOPHIL # BLD AUTO: 0.16 K/UL — SIGNIFICANT CHANGE UP (ref 0–0.7)
EOSINOPHIL # BLD AUTO: 0.17 K/UL — SIGNIFICANT CHANGE UP (ref 0–0.7)
EOSINOPHIL # BLD AUTO: 0.18 K/UL — SIGNIFICANT CHANGE UP (ref 0–0.7)
EOSINOPHIL # BLD AUTO: 0.18 K/UL — SIGNIFICANT CHANGE UP (ref 0–0.7)
EOSINOPHIL # BLD AUTO: 0.21 K/UL — SIGNIFICANT CHANGE UP (ref 0–0.7)
EOSINOPHIL # BLD AUTO: 0.22 K/UL — SIGNIFICANT CHANGE UP (ref 0–0.7)
EOSINOPHIL # BLD AUTO: 0.47 K/UL — SIGNIFICANT CHANGE UP (ref 0–0.7)
EOSINOPHIL NFR BLD AUTO: 0 % — SIGNIFICANT CHANGE UP (ref 0–8)
EOSINOPHIL NFR BLD AUTO: 0.1 % — SIGNIFICANT CHANGE UP (ref 0–8)
EOSINOPHIL NFR BLD AUTO: 0.2 % — SIGNIFICANT CHANGE UP (ref 0–8)
EOSINOPHIL NFR BLD AUTO: 0.3 % — SIGNIFICANT CHANGE UP (ref 0–8)
EOSINOPHIL NFR BLD AUTO: 0.4 % — SIGNIFICANT CHANGE UP (ref 0–8)
EOSINOPHIL NFR BLD AUTO: 0.6 % — SIGNIFICANT CHANGE UP (ref 0–8)
EOSINOPHIL NFR BLD AUTO: 0.8 % — SIGNIFICANT CHANGE UP (ref 0–8)
EOSINOPHIL NFR BLD AUTO: 0.8 % — SIGNIFICANT CHANGE UP (ref 0–8)
EOSINOPHIL NFR BLD AUTO: 0.9 % — SIGNIFICANT CHANGE UP (ref 0–8)
EOSINOPHIL NFR BLD AUTO: 1 % — SIGNIFICANT CHANGE UP (ref 0–8)
EOSINOPHIL NFR BLD AUTO: 1.4 % — SIGNIFICANT CHANGE UP (ref 0–8)
EOSINOPHIL NFR BLD AUTO: 1.4 % — SIGNIFICANT CHANGE UP (ref 0–8)
EOSINOPHIL NFR BLD AUTO: 1.9 % — SIGNIFICANT CHANGE UP (ref 0–8)
EOSINOPHIL NFR BLD AUTO: 2.1 % — SIGNIFICANT CHANGE UP (ref 0–8)
EOSINOPHIL NFR BLD AUTO: 3.5 % — SIGNIFICANT CHANGE UP (ref 0–8)
EPI CELLS # UR: ABNORMAL /HPF
EPI CELLS # UR: SIGNIFICANT CHANGE UP
EPI CELLS # UR: SIGNIFICANT CHANGE UP /HPF (ref 0–5)
ERYTHROCYTE [SEDIMENTATION RATE] IN BLOOD: 21 MM/HR — HIGH (ref 0–20)
ERYTHROCYTE [SEDIMENTATION RATE] IN BLOOD: 46 MM/HR — HIGH (ref 0–20)
ERYTHROCYTE [SEDIMENTATION RATE] IN BLOOD: 53 MM/HR — HIGH (ref 0–20)
FERRITIN SERPL-MCNC: 1743 NG/ML — HIGH (ref 15–150)
FERRITIN SERPL-MCNC: 226 NG/ML — HIGH (ref 15–150)
FIBRINOGEN PPP-MCNC: 173 MG/DL — LOW (ref 204.4–570.6)
FIBRINOGEN PPP-MCNC: 175 MG/DL — LOW (ref 204.4–570.6)
FIBRINOGEN PPP-MCNC: 180 MG/DL — LOW (ref 204.4–570.6)
FIBRINOGEN PPP-MCNC: 213 MG/DL — SIGNIFICANT CHANGE UP (ref 204.4–570.6)
FIBRINOGEN PPP-MCNC: 230 MG/DL — SIGNIFICANT CHANGE UP (ref 204.4–570.6)
FIBRINOGEN PPP-MCNC: 267 MG/DL — SIGNIFICANT CHANGE UP (ref 204.4–570.6)
FLUID INTAKE SUBSTANCE CLASS: SIGNIFICANT CHANGE UP
FLUID SEGMENTED GRANULOCYTES: SIGNIFICANT CHANGE UP %
FOLATE SERPL-MCNC: 8.1 NG/ML — SIGNIFICANT CHANGE UP
GAMMA INTERFERON BACKGROUND BLD IA-ACNC: 0.02 IU/ML — SIGNIFICANT CHANGE UP
GBM IGG SER-ACNC: <1 AI — SIGNIFICANT CHANGE UP
GGT SERPL-CCNC: 522 U/L — HIGH (ref 1–40)
GIANT PLATELETS BLD QL SMEAR: PRESENT — SIGNIFICANT CHANGE UP
GIANT PLATELETS BLD QL SMEAR: PRESENT — SIGNIFICANT CHANGE UP
GLUCOSE BLDC GLUCOMTR-MCNC: 102 MG/DL — HIGH (ref 70–99)
GLUCOSE BLDC GLUCOMTR-MCNC: 103 MG/DL — HIGH (ref 70–99)
GLUCOSE BLDC GLUCOMTR-MCNC: 105 MG/DL — HIGH (ref 70–99)
GLUCOSE BLDC GLUCOMTR-MCNC: 106 MG/DL — HIGH (ref 70–99)
GLUCOSE BLDC GLUCOMTR-MCNC: 107 MG/DL — HIGH (ref 70–99)
GLUCOSE BLDC GLUCOMTR-MCNC: 109 MG/DL — HIGH (ref 70–99)
GLUCOSE BLDC GLUCOMTR-MCNC: 109 MG/DL — HIGH (ref 70–99)
GLUCOSE BLDC GLUCOMTR-MCNC: 111 MG/DL — HIGH (ref 70–99)
GLUCOSE BLDC GLUCOMTR-MCNC: 112 MG/DL — HIGH (ref 70–99)
GLUCOSE BLDC GLUCOMTR-MCNC: 113 MG/DL — HIGH (ref 70–99)
GLUCOSE BLDC GLUCOMTR-MCNC: 115 MG/DL — HIGH (ref 70–99)
GLUCOSE BLDC GLUCOMTR-MCNC: 119 MG/DL — HIGH (ref 70–99)
GLUCOSE BLDC GLUCOMTR-MCNC: 124 MG/DL — HIGH (ref 70–99)
GLUCOSE BLDC GLUCOMTR-MCNC: 124 MG/DL — HIGH (ref 70–99)
GLUCOSE BLDC GLUCOMTR-MCNC: 126 MG/DL — HIGH (ref 70–99)
GLUCOSE BLDC GLUCOMTR-MCNC: 127 MG/DL — HIGH (ref 70–99)
GLUCOSE BLDC GLUCOMTR-MCNC: 129 MG/DL — HIGH (ref 70–99)
GLUCOSE BLDC GLUCOMTR-MCNC: 130 MG/DL — HIGH (ref 70–99)
GLUCOSE BLDC GLUCOMTR-MCNC: 131 MG/DL — HIGH (ref 70–99)
GLUCOSE BLDC GLUCOMTR-MCNC: 131 MG/DL — HIGH (ref 70–99)
GLUCOSE BLDC GLUCOMTR-MCNC: 132 MG/DL — HIGH (ref 70–99)
GLUCOSE BLDC GLUCOMTR-MCNC: 133 MG/DL — HIGH (ref 70–99)
GLUCOSE BLDC GLUCOMTR-MCNC: 133 MG/DL — HIGH (ref 70–99)
GLUCOSE BLDC GLUCOMTR-MCNC: 134 MG/DL — HIGH (ref 70–99)
GLUCOSE BLDC GLUCOMTR-MCNC: 135 MG/DL — HIGH (ref 70–99)
GLUCOSE BLDC GLUCOMTR-MCNC: 136 MG/DL — HIGH (ref 70–99)
GLUCOSE BLDC GLUCOMTR-MCNC: 137 MG/DL — HIGH (ref 70–99)
GLUCOSE BLDC GLUCOMTR-MCNC: 142 MG/DL — HIGH (ref 70–99)
GLUCOSE BLDC GLUCOMTR-MCNC: 145 MG/DL — HIGH (ref 70–99)
GLUCOSE BLDC GLUCOMTR-MCNC: 146 MG/DL — HIGH (ref 70–99)
GLUCOSE BLDC GLUCOMTR-MCNC: 146 MG/DL — HIGH (ref 70–99)
GLUCOSE BLDC GLUCOMTR-MCNC: 147 MG/DL — HIGH (ref 70–99)
GLUCOSE BLDC GLUCOMTR-MCNC: 149 MG/DL — HIGH (ref 70–99)
GLUCOSE BLDC GLUCOMTR-MCNC: 151 MG/DL — HIGH (ref 70–99)
GLUCOSE BLDC GLUCOMTR-MCNC: 151 MG/DL — HIGH (ref 70–99)
GLUCOSE BLDC GLUCOMTR-MCNC: 152 MG/DL — HIGH (ref 70–99)
GLUCOSE BLDC GLUCOMTR-MCNC: 154 MG/DL — HIGH (ref 70–99)
GLUCOSE BLDC GLUCOMTR-MCNC: 155 MG/DL — HIGH (ref 70–99)
GLUCOSE BLDC GLUCOMTR-MCNC: 155 MG/DL — HIGH (ref 70–99)
GLUCOSE BLDC GLUCOMTR-MCNC: 163 MG/DL — HIGH (ref 70–99)
GLUCOSE BLDC GLUCOMTR-MCNC: 167 MG/DL — HIGH (ref 70–99)
GLUCOSE BLDC GLUCOMTR-MCNC: 169 MG/DL — HIGH (ref 70–99)
GLUCOSE BLDC GLUCOMTR-MCNC: 175 MG/DL — HIGH (ref 70–99)
GLUCOSE BLDC GLUCOMTR-MCNC: 177 MG/DL — HIGH (ref 70–99)
GLUCOSE BLDC GLUCOMTR-MCNC: 180 MG/DL — HIGH (ref 70–99)
GLUCOSE BLDC GLUCOMTR-MCNC: 185 MG/DL — HIGH (ref 70–99)
GLUCOSE BLDC GLUCOMTR-MCNC: 194 MG/DL — HIGH (ref 70–99)
GLUCOSE BLDC GLUCOMTR-MCNC: 200 MG/DL — HIGH (ref 70–99)
GLUCOSE BLDC GLUCOMTR-MCNC: 211 MG/DL — HIGH (ref 70–99)
GLUCOSE BLDC GLUCOMTR-MCNC: 238 MG/DL — HIGH (ref 70–99)
GLUCOSE BLDC GLUCOMTR-MCNC: 86 MG/DL — SIGNIFICANT CHANGE UP (ref 70–99)
GLUCOSE BLDC GLUCOMTR-MCNC: 89 MG/DL — SIGNIFICANT CHANGE UP (ref 70–99)
GLUCOSE BLDC GLUCOMTR-MCNC: 89 MG/DL — SIGNIFICANT CHANGE UP (ref 70–99)
GLUCOSE BLDC GLUCOMTR-MCNC: 93 MG/DL — SIGNIFICANT CHANGE UP (ref 70–99)
GLUCOSE BLDC GLUCOMTR-MCNC: 94 MG/DL — SIGNIFICANT CHANGE UP (ref 70–99)
GLUCOSE BLDC GLUCOMTR-MCNC: 98 MG/DL — SIGNIFICANT CHANGE UP (ref 70–99)
GLUCOSE BLDC GLUCOMTR-MCNC: 98 MG/DL — SIGNIFICANT CHANGE UP (ref 70–99)
GLUCOSE FLD-MCNC: 97 MG/DL — SIGNIFICANT CHANGE UP
GLUCOSE SERPL-MCNC: 101 MG/DL — HIGH (ref 70–99)
GLUCOSE SERPL-MCNC: 102 MG/DL — HIGH (ref 70–99)
GLUCOSE SERPL-MCNC: 104 MG/DL — HIGH (ref 70–99)
GLUCOSE SERPL-MCNC: 105 MG/DL — HIGH (ref 70–99)
GLUCOSE SERPL-MCNC: 106 MG/DL — HIGH (ref 70–99)
GLUCOSE SERPL-MCNC: 106 MG/DL — HIGH (ref 70–99)
GLUCOSE SERPL-MCNC: 107 MG/DL — HIGH (ref 70–99)
GLUCOSE SERPL-MCNC: 108 MG/DL — HIGH (ref 70–99)
GLUCOSE SERPL-MCNC: 109 MG/DL — HIGH (ref 70–99)
GLUCOSE SERPL-MCNC: 109 MG/DL — HIGH (ref 70–99)
GLUCOSE SERPL-MCNC: 111 MG/DL — HIGH (ref 70–99)
GLUCOSE SERPL-MCNC: 112 MG/DL — HIGH (ref 70–99)
GLUCOSE SERPL-MCNC: 112 MG/DL — HIGH (ref 70–99)
GLUCOSE SERPL-MCNC: 121 MG/DL — HIGH (ref 70–99)
GLUCOSE SERPL-MCNC: 123 MG/DL — HIGH (ref 70–99)
GLUCOSE SERPL-MCNC: 125 MG/DL — HIGH (ref 70–99)
GLUCOSE SERPL-MCNC: 125 MG/DL — HIGH (ref 70–99)
GLUCOSE SERPL-MCNC: 128 MG/DL — HIGH (ref 70–99)
GLUCOSE SERPL-MCNC: 129 MG/DL — HIGH (ref 70–99)
GLUCOSE SERPL-MCNC: 129 MG/DL — HIGH (ref 70–99)
GLUCOSE SERPL-MCNC: 130 MG/DL — HIGH (ref 70–99)
GLUCOSE SERPL-MCNC: 132 MG/DL — HIGH (ref 70–99)
GLUCOSE SERPL-MCNC: 134 MG/DL — HIGH (ref 70–99)
GLUCOSE SERPL-MCNC: 149 MG/DL — HIGH (ref 70–99)
GLUCOSE SERPL-MCNC: 153 MG/DL — HIGH (ref 70–99)
GLUCOSE SERPL-MCNC: 155 MG/DL — HIGH (ref 70–99)
GLUCOSE SERPL-MCNC: 156 MG/DL — HIGH (ref 70–99)
GLUCOSE SERPL-MCNC: 162 MG/DL — HIGH (ref 70–99)
GLUCOSE SERPL-MCNC: 178 MG/DL — HIGH (ref 70–99)
GLUCOSE SERPL-MCNC: 179 MG/DL — HIGH (ref 70–99)
GLUCOSE SERPL-MCNC: 248 MG/DL — HIGH (ref 70–99)
GLUCOSE SERPL-MCNC: 72 MG/DL — SIGNIFICANT CHANGE UP (ref 70–99)
GLUCOSE SERPL-MCNC: 80 MG/DL — SIGNIFICANT CHANGE UP (ref 70–99)
GLUCOSE SERPL-MCNC: 82 MG/DL — SIGNIFICANT CHANGE UP (ref 70–99)
GLUCOSE SERPL-MCNC: 84 MG/DL — SIGNIFICANT CHANGE UP (ref 70–99)
GLUCOSE SERPL-MCNC: 89 MG/DL — SIGNIFICANT CHANGE UP (ref 70–99)
GLUCOSE SERPL-MCNC: 90 MG/DL — SIGNIFICANT CHANGE UP (ref 70–99)
GLUCOSE SERPL-MCNC: 92 MG/DL — SIGNIFICANT CHANGE UP (ref 70–99)
GLUCOSE SERPL-MCNC: 93 MG/DL — SIGNIFICANT CHANGE UP (ref 70–99)
GLUCOSE SERPL-MCNC: 94 MG/DL — SIGNIFICANT CHANGE UP (ref 70–99)
GLUCOSE SERPL-MCNC: 95 MG/DL — SIGNIFICANT CHANGE UP (ref 70–99)
GLUCOSE SERPL-MCNC: 97 MG/DL — SIGNIFICANT CHANGE UP (ref 70–99)
GLUCOSE UR QL: 100 MG/DL
GLUCOSE UR QL: NEGATIVE MG/DL — SIGNIFICANT CHANGE UP
GLUCOSE UR QL: NEGATIVE MG/DL — SIGNIFICANT CHANGE UP
GLUCOSE UR QL: NEGATIVE — SIGNIFICANT CHANGE UP
GLUCOSE UR QL: NEGATIVE — SIGNIFICANT CHANGE UP
GRAM STN FLD: SIGNIFICANT CHANGE UP
GRAN CASTS # UR COMP ASSIST: ABNORMAL /LPF
GRAN CASTS # UR COMP ASSIST: ABNORMAL /LPF
HAPTOGLOB SERPL-MCNC: 32 MG/DL — LOW (ref 34–200)
HAPTOGLOB SERPL-MCNC: 40 MG/DL — SIGNIFICANT CHANGE UP (ref 34–200)
HAPTOGLOB SERPL-MCNC: <20 MG/DL — LOW (ref 34–200)
HAPTOGLOB SERPL-MCNC: <20 MG/DL — LOW (ref 34–200)
HAV IGM SER-ACNC: SIGNIFICANT CHANGE UP
HBV CORE IGM SER-ACNC: SIGNIFICANT CHANGE UP
HBV SURFACE AG SER-ACNC: SIGNIFICANT CHANGE UP
HCO3 BLDA-SCNC: 11 MMOL/L — LOW (ref 23–27)
HCO3 BLDA-SCNC: 15 MMOL/L — LOW (ref 23–27)
HCO3 BLDA-SCNC: 15 MMOL/L — LOW (ref 23–27)
HCO3 BLDA-SCNC: 30 MMOL/L — HIGH (ref 23–27)
HCT VFR BLD CALC: 18.7 % — LOW (ref 37–47)
HCT VFR BLD CALC: 19.1 % — LOW (ref 37–47)
HCT VFR BLD CALC: 20.3 % — LOW (ref 37–47)
HCT VFR BLD CALC: 21.4 % — LOW (ref 37–47)
HCT VFR BLD CALC: 21.9 % — LOW (ref 37–47)
HCT VFR BLD CALC: 21.9 % — LOW (ref 37–47)
HCT VFR BLD CALC: 22.2 % — LOW (ref 37–47)
HCT VFR BLD CALC: 22.2 % — LOW (ref 37–47)
HCT VFR BLD CALC: 22.4 % — LOW (ref 37–47)
HCT VFR BLD CALC: 22.4 % — LOW (ref 37–47)
HCT VFR BLD CALC: 22.5 % — LOW (ref 37–47)
HCT VFR BLD CALC: 22.8 % — LOW (ref 37–47)
HCT VFR BLD CALC: 24 % — LOW (ref 37–47)
HCT VFR BLD CALC: 24.4 % — LOW (ref 37–47)
HCT VFR BLD CALC: 24.8 % — LOW (ref 37–47)
HCT VFR BLD CALC: 24.9 % — LOW (ref 37–47)
HCT VFR BLD CALC: 25 % — LOW (ref 37–47)
HCT VFR BLD CALC: 25.4 % — LOW (ref 37–47)
HCT VFR BLD CALC: 25.5 % — LOW (ref 37–47)
HCT VFR BLD CALC: 25.7 % — LOW (ref 37–47)
HCT VFR BLD CALC: 25.9 % — LOW (ref 37–47)
HCT VFR BLD CALC: 25.9 % — LOW (ref 37–47)
HCT VFR BLD CALC: 26.2 % — LOW (ref 37–47)
HCT VFR BLD CALC: 26.2 % — LOW (ref 37–47)
HCT VFR BLD CALC: 26.3 % — LOW (ref 37–47)
HCT VFR BLD CALC: 26.4 % — LOW (ref 37–47)
HCT VFR BLD CALC: 27.2 % — LOW (ref 37–47)
HCT VFR BLD CALC: 27.2 % — LOW (ref 37–47)
HCT VFR BLD CALC: 27.5 % — LOW (ref 37–47)
HCT VFR BLD CALC: 28.4 % — LOW (ref 37–47)
HCT VFR BLD CALC: 28.7 % — LOW (ref 37–47)
HCT VFR BLD CALC: 29.5 % — LOW (ref 37–47)
HCT VFR BLD CALC: 29.8 % — LOW (ref 37–47)
HCT VFR BLD CALC: 30.5 % — LOW (ref 37–47)
HCT VFR BLD CALC: 30.8 % — LOW (ref 37–47)
HCT VFR BLD CALC: 31.9 % — LOW (ref 37–47)
HCT VFR BLD CALC: 32 % — LOW (ref 37–47)
HCT VFR BLD CALC: 32.7 % — LOW (ref 37–47)
HCT VFR BLD CALC: 32.9 % — LOW (ref 37–47)
HCT VFR BLD CALC: 33.4 % — LOW (ref 37–47)
HCT VFR BLD CALC: 33.5 % — LOW (ref 37–47)
HCT VFR BLD CALC: 34 % — LOW (ref 37–47)
HCT VFR BLD CALC: 34.8 % — LOW (ref 37–47)
HCT VFR BLD CALC: 35.3 % — LOW (ref 37–47)
HCT VFR BLD CALC: 36 % — LOW (ref 37–47)
HCV AB S/CO SERPL IA: 0.21 S/CO — SIGNIFICANT CHANGE UP (ref 0–0.99)
HCV AB S/CO SERPL IA: 0.22 S/CO — SIGNIFICANT CHANGE UP (ref 0–0.99)
HCV AB SERPL-IMP: SIGNIFICANT CHANGE UP
HCV AB SERPL-IMP: SIGNIFICANT CHANGE UP
HDLC SERPL-MCNC: 13 MG/DL — LOW
HGB BLD-MCNC: 10.6 G/DL — LOW (ref 12–16)
HGB BLD-MCNC: 10.6 G/DL — LOW (ref 12–16)
HGB BLD-MCNC: 10.7 G/DL — LOW (ref 12–16)
HGB BLD-MCNC: 10.9 G/DL — LOW (ref 12–16)
HGB BLD-MCNC: 10.9 G/DL — LOW (ref 12–16)
HGB BLD-MCNC: 11.1 G/DL — LOW (ref 12–16)
HGB BLD-MCNC: 11.4 G/DL — LOW (ref 12–16)
HGB BLD-MCNC: 11.5 G/DL — LOW (ref 12–16)
HGB BLD-MCNC: 11.6 G/DL — LOW (ref 12–16)
HGB BLD-MCNC: 11.6 G/DL — LOW (ref 12–16)
HGB BLD-MCNC: 11.9 G/DL — LOW (ref 12–16)
HGB BLD-MCNC: 12.1 G/DL — SIGNIFICANT CHANGE UP (ref 12–16)
HGB BLD-MCNC: 12.6 G/DL — SIGNIFICANT CHANGE UP (ref 12–16)
HGB BLD-MCNC: 12.8 G/DL — SIGNIFICANT CHANGE UP (ref 12–16)
HGB BLD-MCNC: 6 G/DL — CRITICAL LOW (ref 12–16)
HGB BLD-MCNC: 6.3 G/DL — CRITICAL LOW (ref 12–16)
HGB BLD-MCNC: 6.7 G/DL — CRITICAL LOW (ref 12–16)
HGB BLD-MCNC: 7.3 G/DL — LOW (ref 12–16)
HGB BLD-MCNC: 7.3 G/DL — LOW (ref 12–16)
HGB BLD-MCNC: 7.4 G/DL — LOW (ref 12–16)
HGB BLD-MCNC: 7.5 G/DL — LOW (ref 12–16)
HGB BLD-MCNC: 7.8 G/DL — LOW (ref 12–16)
HGB BLD-MCNC: 8 G/DL — LOW (ref 12–16)
HGB BLD-MCNC: 8.2 G/DL — LOW (ref 12–16)
HGB BLD-MCNC: 8.2 G/DL — LOW (ref 12–16)
HGB BLD-MCNC: 8.4 G/DL — LOW (ref 12–16)
HGB BLD-MCNC: 8.5 G/DL — LOW (ref 12–16)
HGB BLD-MCNC: 8.7 G/DL — LOW (ref 12–16)
HGB BLD-MCNC: 8.8 G/DL — LOW (ref 12–16)
HGB BLD-MCNC: 8.9 G/DL — LOW (ref 12–16)
HGB BLD-MCNC: 8.9 G/DL — LOW (ref 12–16)
HGB BLD-MCNC: 9.2 G/DL — LOW (ref 12–16)
HGB BLD-MCNC: 9.3 G/DL — LOW (ref 12–16)
HGB BLD-MCNC: 9.9 G/DL — LOW (ref 12–16)
HISTONE AB SER-ACNC: 2.3 UNITS — HIGH (ref 0–0.9)
HIV 1+2 AB+HIV1 P24 AG SERPL QL IA: SIGNIFICANT CHANGE UP
HSV1 AB FLD QL: SIGNIFICANT CHANGE UP TITER
HSV1 IGG SER-ACNC: 0.22 INDEX — SIGNIFICANT CHANGE UP
HSV1 IGG SERPL QL IA: NEGATIVE — SIGNIFICANT CHANGE UP
HSV2 AB FLD-ACNC: SIGNIFICANT CHANGE UP TITER
HSV2 IGG FLD-ACNC: 0.1 INDEX — SIGNIFICANT CHANGE UP
HSV2 IGG SERPL QL IA: NEGATIVE — SIGNIFICANT CHANGE UP
IGG SERPL-MCNC: 1760 MG/DL — HIGH (ref 700–1600)
IGG1 SER-MCNC: 1076 MG/DL — HIGH (ref 248–810)
IGG2 SER-MCNC: 652 MG/DL — HIGH (ref 130–555)
IGG3 SER-MCNC: 166 MG/DL — HIGH (ref 15–102)
IGG4 SER-MCNC: 27 MG/DL — SIGNIFICANT CHANGE UP (ref 2–96)
IMM GRANULOCYTES NFR BLD AUTO: 0.4 % — HIGH (ref 0.1–0.3)
IMM GRANULOCYTES NFR BLD AUTO: 0.6 % — HIGH (ref 0.1–0.3)
IMM GRANULOCYTES NFR BLD AUTO: 0.7 % — HIGH (ref 0.1–0.3)
IMM GRANULOCYTES NFR BLD AUTO: 0.8 % — HIGH (ref 0.1–0.3)
IMM GRANULOCYTES NFR BLD AUTO: 1.5 % — HIGH (ref 0.1–0.3)
IMM GRANULOCYTES NFR BLD AUTO: 1.5 % — HIGH (ref 0.1–0.3)
IMM GRANULOCYTES NFR BLD AUTO: 1.6 % — HIGH (ref 0.1–0.3)
IMM GRANULOCYTES NFR BLD AUTO: 1.7 % — HIGH (ref 0.1–0.3)
IMM GRANULOCYTES NFR BLD AUTO: 2.5 % — HIGH (ref 0.1–0.3)
IMM GRANULOCYTES NFR BLD AUTO: 2.7 % — HIGH (ref 0.1–0.3)
IMM GRANULOCYTES NFR BLD AUTO: 6.7 % — HIGH (ref 0.1–0.3)
INR BLD: 1.09 RATIO — SIGNIFICANT CHANGE UP (ref 0.65–1.3)
INR BLD: 1.11 RATIO — SIGNIFICANT CHANGE UP (ref 0.65–1.3)
INR BLD: 1.12 RATIO — SIGNIFICANT CHANGE UP (ref 0.65–1.3)
INR BLD: 1.2 RATIO — SIGNIFICANT CHANGE UP (ref 0.65–1.3)
INR BLD: 1.23 RATIO — SIGNIFICANT CHANGE UP (ref 0.65–1.3)
INR BLD: 1.26 RATIO — SIGNIFICANT CHANGE UP (ref 0.65–1.3)
INR BLD: 1.3 RATIO — SIGNIFICANT CHANGE UP (ref 0.65–1.3)
INR BLD: 1.33 RATIO — HIGH (ref 0.65–1.3)
INR BLD: 1.34 RATIO — HIGH (ref 0.65–1.3)
INR BLD: 1.35 RATIO — HIGH (ref 0.65–1.3)
INR BLD: 1.4 RATIO — HIGH (ref 0.65–1.3)
INR BLD: 1.4 RATIO — HIGH (ref 0.65–1.3)
INR BLD: 1.45 RATIO — HIGH (ref 0.65–1.3)
INR BLD: 1.5 RATIO — HIGH (ref 0.65–1.3)
INR BLD: 1.53 RATIO — HIGH (ref 0.65–1.3)
INR BLD: 1.63 RATIO — HIGH (ref 0.65–1.3)
INR BLD: 1.78 RATIO — HIGH (ref 0.65–1.3)
INR BLD: 1.96 RATIO — HIGH (ref 0.65–1.3)
INR BLD: 2.01 RATIO — HIGH (ref 0.65–1.3)
INR BLD: 3.1 RATIO — HIGH (ref 0.65–1.3)
IRON SATN MFR SERPL: 22 UG/DL — LOW (ref 35–150)
IRON SATN MFR SERPL: 23 % — SIGNIFICANT CHANGE UP (ref 15–50)
IRON SATN MFR SERPL: 55 % — HIGH (ref 15–50)
IRON SATN MFR SERPL: 85 UG/DL — SIGNIFICANT CHANGE UP (ref 35–150)
KETONES UR-MCNC: ABNORMAL
KETONES UR-MCNC: ABNORMAL
KETONES UR-MCNC: NEGATIVE — SIGNIFICANT CHANGE UP
LACTATE SERPL-SCNC: 11.6 MMOL/L — CRITICAL HIGH (ref 0.5–2.2)
LACTATE SERPL-SCNC: 2.1 MMOL/L — SIGNIFICANT CHANGE UP (ref 0.5–2.2)
LACTATE SERPL-SCNC: 2.4 MMOL/L — HIGH (ref 0.5–2.2)
LACTATE SERPL-SCNC: 2.7 MMOL/L — HIGH (ref 0.5–2.2)
LACTATE SERPL-SCNC: 2.7 MMOL/L — HIGH (ref 0.5–2.2)
LACTATE SERPL-SCNC: 2.8 MMOL/L — HIGH (ref 0.5–2.2)
LACTATE SERPL-SCNC: 2.9 MMOL/L — HIGH (ref 0.5–2.2)
LDH SERPL L TO P-CCNC: 290 — HIGH (ref 50–242)
LDH SERPL L TO P-CCNC: 327 — HIGH (ref 50–242)
LDH SERPL L TO P-CCNC: 330 — HIGH (ref 50–242)
LDH SERPL L TO P-CCNC: 350 — HIGH (ref 50–242)
LDH SERPL L TO P-CCNC: 409 U/L — SIGNIFICANT CHANGE UP
LEUKOCYTE ESTERASE UR-ACNC: ABNORMAL
LEUKOCYTE ESTERASE UR-ACNC: NEGATIVE — SIGNIFICANT CHANGE UP
LIDOCAIN IGE QN: 276 U/L — HIGH (ref 7–60)
LIPID PNL WITH DIRECT LDL SERPL: 18 MG/DL — SIGNIFICANT CHANGE UP (ref 4–129)
LKM AB SER-ACNC: <20.1 UNITS — SIGNIFICANT CHANGE UP (ref 0–20)
LYMPHOCYTES # BLD AUTO: 0.29 K/UL — LOW (ref 1.2–3.4)
LYMPHOCYTES # BLD AUTO: 0.35 K/UL — LOW (ref 1.2–3.4)
LYMPHOCYTES # BLD AUTO: 0.42 K/UL — LOW (ref 1.2–3.4)
LYMPHOCYTES # BLD AUTO: 0.54 K/UL — LOW (ref 1.2–3.4)
LYMPHOCYTES # BLD AUTO: 0.56 K/UL — LOW (ref 1.2–3.4)
LYMPHOCYTES # BLD AUTO: 0.72 K/UL — LOW (ref 1.2–3.4)
LYMPHOCYTES # BLD AUTO: 0.74 K/UL — LOW (ref 1.2–3.4)
LYMPHOCYTES # BLD AUTO: 0.85 K/UL — LOW (ref 1.2–3.4)
LYMPHOCYTES # BLD AUTO: 0.88 K/UL — LOW (ref 1.2–3.4)
LYMPHOCYTES # BLD AUTO: 0.88 K/UL — LOW (ref 1.2–3.4)
LYMPHOCYTES # BLD AUTO: 1.02 K/UL — LOW (ref 1.2–3.4)
LYMPHOCYTES # BLD AUTO: 1.04 K/UL — LOW (ref 1.2–3.4)
LYMPHOCYTES # BLD AUTO: 1.07 K/UL — LOW (ref 1.2–3.4)
LYMPHOCYTES # BLD AUTO: 1.11 K/UL — LOW (ref 1.2–3.4)
LYMPHOCYTES # BLD AUTO: 1.11 K/UL — LOW (ref 1.2–3.4)
LYMPHOCYTES # BLD AUTO: 1.16 K/UL — LOW (ref 1.2–3.4)
LYMPHOCYTES # BLD AUTO: 1.19 K/UL — LOW (ref 1.2–3.4)
LYMPHOCYTES # BLD AUTO: 1.21 K/UL — SIGNIFICANT CHANGE UP (ref 1.2–3.4)
LYMPHOCYTES # BLD AUTO: 1.31 K/UL — SIGNIFICANT CHANGE UP (ref 1.2–3.4)
LYMPHOCYTES # BLD AUTO: 1.4 % — LOW (ref 20.5–51.1)
LYMPHOCYTES # BLD AUTO: 1.48 K/UL — SIGNIFICANT CHANGE UP (ref 1.2–3.4)
LYMPHOCYTES # BLD AUTO: 1.5 K/UL — SIGNIFICANT CHANGE UP (ref 1.2–3.4)
LYMPHOCYTES # BLD AUTO: 2.3 % — LOW (ref 20.5–51.1)
LYMPHOCYTES # BLD AUTO: 3.5 % — LOW (ref 20.5–51.1)
LYMPHOCYTES # BLD AUTO: 3.6 % — LOW (ref 20.5–51.1)
LYMPHOCYTES # BLD AUTO: 3.8 % — LOW (ref 20.5–51.1)
LYMPHOCYTES # BLD AUTO: 4.2 % — LOW (ref 20.5–51.1)
LYMPHOCYTES # BLD AUTO: 5.1 % — LOW (ref 20.5–51.1)
LYMPHOCYTES # BLD AUTO: 5.2 % — LOW (ref 20.5–51.1)
LYMPHOCYTES # BLD AUTO: 5.5 % — LOW (ref 20.5–51.1)
LYMPHOCYTES # BLD AUTO: 6 % — LOW (ref 20.5–51.1)
LYMPHOCYTES # BLD AUTO: 6 % — LOW (ref 20.5–51.1)
LYMPHOCYTES # BLD AUTO: 6.3 % — LOW (ref 20.5–51.1)
LYMPHOCYTES # BLD AUTO: 6.7 % — LOW (ref 20.5–51.1)
LYMPHOCYTES # BLD AUTO: 6.9 % — LOW (ref 20.5–51.1)
LYMPHOCYTES # BLD AUTO: 7.7 % — LOW (ref 20.5–51.1)
LYMPHOCYTES # BLD AUTO: 8 % — LOW (ref 20.5–51.1)
LYMPHOCYTES # BLD AUTO: 8.4 % — LOW (ref 20.5–51.1)
LYMPHOCYTES # BLD AUTO: 8.6 % — LOW (ref 20.5–51.1)
LYMPHOCYTES # BLD AUTO: 9.4 % — LOW (ref 20.5–51.1)
LYMPHOCYTES # BLD AUTO: 9.6 % — LOW (ref 20.5–51.1)
LYMPHOCYTES # BLD AUTO: 9.9 % — LOW (ref 20.5–51.1)
LYMPHOCYTES # FLD: SIGNIFICANT CHANGE UP
LYMPHOCYTES # SPEC AUTO: 0.9 % — HIGH (ref 0–0)
M TB IFN-G BLD-IMP: ABNORMAL
M TB IFN-G CD4+ BCKGRND COR BLD-ACNC: 0.02 IU/ML — SIGNIFICANT CHANGE UP
M TB IFN-G CD4+CD8+ BCKGRND COR BLD-ACNC: 0 IU/ML — SIGNIFICANT CHANGE UP
MACROCYTES BLD QL: SIGNIFICANT CHANGE UP
MACROCYTES BLD QL: SIGNIFICANT CHANGE UP
MAGNESIUM SERPL-MCNC: 1.7 MG/DL — LOW (ref 1.8–2.4)
MAGNESIUM SERPL-MCNC: 1.9 MG/DL — SIGNIFICANT CHANGE UP (ref 1.8–2.4)
MAGNESIUM SERPL-MCNC: 1.9 MG/DL — SIGNIFICANT CHANGE UP (ref 1.8–2.4)
MAGNESIUM SERPL-MCNC: 2 MG/DL — SIGNIFICANT CHANGE UP (ref 1.8–2.4)
MAGNESIUM SERPL-MCNC: 2 MG/DL — SIGNIFICANT CHANGE UP (ref 1.8–2.4)
MAGNESIUM SERPL-MCNC: 2.1 MG/DL — SIGNIFICANT CHANGE UP (ref 1.8–2.4)
MAGNESIUM SERPL-MCNC: 2.2 MG/DL — SIGNIFICANT CHANGE UP (ref 1.8–2.4)
MAGNESIUM SERPL-MCNC: 2.3 MG/DL — SIGNIFICANT CHANGE UP (ref 1.8–2.4)
MAGNESIUM SERPL-MCNC: 2.3 MG/DL — SIGNIFICANT CHANGE UP (ref 1.8–2.4)
MAGNESIUM SERPL-MCNC: 2.4 MG/DL — SIGNIFICANT CHANGE UP (ref 1.8–2.4)
MAGNESIUM SERPL-MCNC: 2.5 MG/DL — HIGH (ref 1.8–2.4)
MAGNESIUM SERPL-MCNC: 2.6 MG/DL — HIGH (ref 1.8–2.4)
MANUAL SMEAR VERIFICATION: SIGNIFICANT CHANGE UP
MANUAL SMEAR VERIFICATION: SIGNIFICANT CHANGE UP
MCHC RBC-ENTMCNC: 29.1 PG — SIGNIFICANT CHANGE UP (ref 27–31)
MCHC RBC-ENTMCNC: 29.1 PG — SIGNIFICANT CHANGE UP (ref 27–31)
MCHC RBC-ENTMCNC: 29.3 PG — SIGNIFICANT CHANGE UP (ref 27–31)
MCHC RBC-ENTMCNC: 29.4 PG — SIGNIFICANT CHANGE UP (ref 27–31)
MCHC RBC-ENTMCNC: 29.7 PG — SIGNIFICANT CHANGE UP (ref 27–31)
MCHC RBC-ENTMCNC: 29.7 PG — SIGNIFICANT CHANGE UP (ref 27–31)
MCHC RBC-ENTMCNC: 29.8 PG — SIGNIFICANT CHANGE UP (ref 27–31)
MCHC RBC-ENTMCNC: 30.4 PG — SIGNIFICANT CHANGE UP (ref 27–31)
MCHC RBC-ENTMCNC: 30.4 PG — SIGNIFICANT CHANGE UP (ref 27–31)
MCHC RBC-ENTMCNC: 31.1 PG — HIGH (ref 27–31)
MCHC RBC-ENTMCNC: 31.2 PG — HIGH (ref 27–31)
MCHC RBC-ENTMCNC: 31.3 PG — HIGH (ref 27–31)
MCHC RBC-ENTMCNC: 31.3 PG — HIGH (ref 27–31)
MCHC RBC-ENTMCNC: 31.4 PG — HIGH (ref 27–31)
MCHC RBC-ENTMCNC: 31.4 PG — HIGH (ref 27–31)
MCHC RBC-ENTMCNC: 31.6 PG — HIGH (ref 27–31)
MCHC RBC-ENTMCNC: 31.7 PG — HIGH (ref 27–31)
MCHC RBC-ENTMCNC: 31.7 PG — HIGH (ref 27–31)
MCHC RBC-ENTMCNC: 31.8 PG — HIGH (ref 27–31)
MCHC RBC-ENTMCNC: 31.8 PG — HIGH (ref 27–31)
MCHC RBC-ENTMCNC: 31.9 PG — HIGH (ref 27–31)
MCHC RBC-ENTMCNC: 32 PG — HIGH (ref 27–31)
MCHC RBC-ENTMCNC: 32.1 G/DL — SIGNIFICANT CHANGE UP (ref 32–37)
MCHC RBC-ENTMCNC: 32.1 PG — HIGH (ref 27–31)
MCHC RBC-ENTMCNC: 32.1 PG — HIGH (ref 27–31)
MCHC RBC-ENTMCNC: 32.2 G/DL — SIGNIFICANT CHANGE UP (ref 32–37)
MCHC RBC-ENTMCNC: 32.2 G/DL — SIGNIFICANT CHANGE UP (ref 32–37)
MCHC RBC-ENTMCNC: 32.2 PG — HIGH (ref 27–31)
MCHC RBC-ENTMCNC: 32.3 PG — HIGH (ref 27–31)
MCHC RBC-ENTMCNC: 32.4 G/DL — SIGNIFICANT CHANGE UP (ref 32–37)
MCHC RBC-ENTMCNC: 32.4 G/DL — SIGNIFICANT CHANGE UP (ref 32–37)
MCHC RBC-ENTMCNC: 32.5 PG — HIGH (ref 27–31)
MCHC RBC-ENTMCNC: 32.6 G/DL — SIGNIFICANT CHANGE UP (ref 32–37)
MCHC RBC-ENTMCNC: 32.6 PG — HIGH (ref 27–31)
MCHC RBC-ENTMCNC: 32.6 PG — HIGH (ref 27–31)
MCHC RBC-ENTMCNC: 32.7 G/DL — SIGNIFICANT CHANGE UP (ref 32–37)
MCHC RBC-ENTMCNC: 32.8 G/DL — SIGNIFICANT CHANGE UP (ref 32–37)
MCHC RBC-ENTMCNC: 33 G/DL — SIGNIFICANT CHANGE UP (ref 32–37)
MCHC RBC-ENTMCNC: 33.1 G/DL — SIGNIFICANT CHANGE UP (ref 32–37)
MCHC RBC-ENTMCNC: 33.2 G/DL — SIGNIFICANT CHANGE UP (ref 32–37)
MCHC RBC-ENTMCNC: 33.3 G/DL — SIGNIFICANT CHANGE UP (ref 32–37)
MCHC RBC-ENTMCNC: 33.3 G/DL — SIGNIFICANT CHANGE UP (ref 32–37)
MCHC RBC-ENTMCNC: 33.5 G/DL — SIGNIFICANT CHANGE UP (ref 32–37)
MCHC RBC-ENTMCNC: 33.6 G/DL — SIGNIFICANT CHANGE UP (ref 32–37)
MCHC RBC-ENTMCNC: 33.6 G/DL — SIGNIFICANT CHANGE UP (ref 32–37)
MCHC RBC-ENTMCNC: 33.7 G/DL — SIGNIFICANT CHANGE UP (ref 32–37)
MCHC RBC-ENTMCNC: 33.8 G/DL — SIGNIFICANT CHANGE UP (ref 32–37)
MCHC RBC-ENTMCNC: 33.9 G/DL — SIGNIFICANT CHANGE UP (ref 32–37)
MCHC RBC-ENTMCNC: 34 G/DL — SIGNIFICANT CHANGE UP (ref 32–37)
MCHC RBC-ENTMCNC: 34.1 G/DL — SIGNIFICANT CHANGE UP (ref 32–37)
MCHC RBC-ENTMCNC: 34.1 G/DL — SIGNIFICANT CHANGE UP (ref 32–37)
MCHC RBC-ENTMCNC: 34.2 G/DL — SIGNIFICANT CHANGE UP (ref 32–37)
MCHC RBC-ENTMCNC: 34.2 G/DL — SIGNIFICANT CHANGE UP (ref 32–37)
MCHC RBC-ENTMCNC: 34.5 G/DL — SIGNIFICANT CHANGE UP (ref 32–37)
MCHC RBC-ENTMCNC: 34.6 G/DL — SIGNIFICANT CHANGE UP (ref 32–37)
MCHC RBC-ENTMCNC: 34.6 G/DL — SIGNIFICANT CHANGE UP (ref 32–37)
MCHC RBC-ENTMCNC: 34.7 G/DL — SIGNIFICANT CHANGE UP (ref 32–37)
MCHC RBC-ENTMCNC: 34.7 G/DL — SIGNIFICANT CHANGE UP (ref 32–37)
MCHC RBC-ENTMCNC: 34.8 G/DL — SIGNIFICANT CHANGE UP (ref 32–37)
MCHC RBC-ENTMCNC: 34.8 G/DL — SIGNIFICANT CHANGE UP (ref 32–37)
MCHC RBC-ENTMCNC: 34.9 G/DL — SIGNIFICANT CHANGE UP (ref 32–37)
MCHC RBC-ENTMCNC: 35 G/DL — SIGNIFICANT CHANGE UP (ref 32–37)
MCHC RBC-ENTMCNC: 35.6 G/DL — SIGNIFICANT CHANGE UP (ref 32–37)
MCHC RBC-ENTMCNC: 35.6 G/DL — SIGNIFICANT CHANGE UP (ref 32–37)
MCHC RBC-ENTMCNC: 35.7 G/DL — SIGNIFICANT CHANGE UP (ref 32–37)
MCHC RBC-ENTMCNC: 35.7 G/DL — SIGNIFICANT CHANGE UP (ref 32–37)
MCHC RBC-ENTMCNC: 35.9 G/DL — SIGNIFICANT CHANGE UP (ref 32–37)
MCV RBC AUTO: 100 FL — HIGH (ref 81–99)
MCV RBC AUTO: 82.8 FL — SIGNIFICANT CHANGE UP (ref 81–99)
MCV RBC AUTO: 83.5 FL — SIGNIFICANT CHANGE UP (ref 81–99)
MCV RBC AUTO: 83.7 FL — SIGNIFICANT CHANGE UP (ref 81–99)
MCV RBC AUTO: 83.7 FL — SIGNIFICANT CHANGE UP (ref 81–99)
MCV RBC AUTO: 83.9 FL — SIGNIFICANT CHANGE UP (ref 81–99)
MCV RBC AUTO: 85 FL — SIGNIFICANT CHANGE UP (ref 81–99)
MCV RBC AUTO: 85.6 FL — SIGNIFICANT CHANGE UP (ref 81–99)
MCV RBC AUTO: 85.8 FL — SIGNIFICANT CHANGE UP (ref 81–99)
MCV RBC AUTO: 87.1 FL — SIGNIFICANT CHANGE UP (ref 81–99)
MCV RBC AUTO: 87.2 FL — SIGNIFICANT CHANGE UP (ref 81–99)
MCV RBC AUTO: 87.4 FL — SIGNIFICANT CHANGE UP (ref 81–99)
MCV RBC AUTO: 87.5 FL — SIGNIFICANT CHANGE UP (ref 81–99)
MCV RBC AUTO: 87.7 FL — SIGNIFICANT CHANGE UP (ref 81–99)
MCV RBC AUTO: 88.1 FL — SIGNIFICANT CHANGE UP (ref 81–99)
MCV RBC AUTO: 89.8 FL — SIGNIFICANT CHANGE UP (ref 81–99)
MCV RBC AUTO: 89.9 FL — SIGNIFICANT CHANGE UP (ref 81–99)
MCV RBC AUTO: 91 FL — SIGNIFICANT CHANGE UP (ref 81–99)
MCV RBC AUTO: 91.1 FL — SIGNIFICANT CHANGE UP (ref 81–99)
MCV RBC AUTO: 92.9 FL — SIGNIFICANT CHANGE UP (ref 81–99)
MCV RBC AUTO: 93.4 FL — SIGNIFICANT CHANGE UP (ref 81–99)
MCV RBC AUTO: 93.5 FL — SIGNIFICANT CHANGE UP (ref 81–99)
MCV RBC AUTO: 93.9 FL — SIGNIFICANT CHANGE UP (ref 81–99)
MCV RBC AUTO: 94 FL — SIGNIFICANT CHANGE UP (ref 81–99)
MCV RBC AUTO: 94.3 FL — SIGNIFICANT CHANGE UP (ref 81–99)
MCV RBC AUTO: 94.3 FL — SIGNIFICANT CHANGE UP (ref 81–99)
MCV RBC AUTO: 94.8 FL — SIGNIFICANT CHANGE UP (ref 81–99)
MCV RBC AUTO: 94.9 FL — SIGNIFICANT CHANGE UP (ref 81–99)
MCV RBC AUTO: 94.9 FL — SIGNIFICANT CHANGE UP (ref 81–99)
MCV RBC AUTO: 95.2 FL — SIGNIFICANT CHANGE UP (ref 81–99)
MCV RBC AUTO: 95.8 FL — SIGNIFICANT CHANGE UP (ref 81–99)
MCV RBC AUTO: 96 FL — SIGNIFICANT CHANGE UP (ref 81–99)
MCV RBC AUTO: 96 FL — SIGNIFICANT CHANGE UP (ref 81–99)
MCV RBC AUTO: 96.5 FL — SIGNIFICANT CHANGE UP (ref 81–99)
MCV RBC AUTO: 97.3 FL — SIGNIFICANT CHANGE UP (ref 81–99)
MCV RBC AUTO: 97.8 FL — SIGNIFICANT CHANGE UP (ref 81–99)
MCV RBC AUTO: 97.8 FL — SIGNIFICANT CHANGE UP (ref 81–99)
MCV RBC AUTO: 97.9 FL — SIGNIFICANT CHANGE UP (ref 81–99)
MCV RBC AUTO: 98.1 FL — SIGNIFICANT CHANGE UP (ref 81–99)
MCV RBC AUTO: 98.3 FL — SIGNIFICANT CHANGE UP (ref 81–99)
MCV RBC AUTO: 98.5 FL — SIGNIFICANT CHANGE UP (ref 81–99)
MCV RBC AUTO: 98.7 FL — SIGNIFICANT CHANGE UP (ref 81–99)
MCV RBC AUTO: 99.5 FL — HIGH (ref 81–99)
METHOD TYPE: SIGNIFICANT CHANGE UP
MISCELLANEOUS TEST NAME: SIGNIFICANT CHANGE UP
MITOCHONDRIA AB SER-ACNC: SIGNIFICANT CHANGE UP
MONOCYTES # BLD AUTO: 0.11 K/UL — SIGNIFICANT CHANGE UP (ref 0.1–0.6)
MONOCYTES # BLD AUTO: 0.29 K/UL — SIGNIFICANT CHANGE UP (ref 0.1–0.6)
MONOCYTES # BLD AUTO: 0.36 K/UL — SIGNIFICANT CHANGE UP (ref 0.1–0.6)
MONOCYTES # BLD AUTO: 0.5 K/UL — SIGNIFICANT CHANGE UP (ref 0.1–0.6)
MONOCYTES # BLD AUTO: 1.06 K/UL — HIGH (ref 0.1–0.6)
MONOCYTES # BLD AUTO: 1.16 K/UL — HIGH (ref 0.1–0.6)
MONOCYTES # BLD AUTO: 1.21 K/UL — HIGH (ref 0.1–0.6)
MONOCYTES # BLD AUTO: 1.23 K/UL — HIGH (ref 0.1–0.6)
MONOCYTES # BLD AUTO: 1.28 K/UL — HIGH (ref 0.1–0.6)
MONOCYTES # BLD AUTO: 1.33 K/UL — HIGH (ref 0.1–0.6)
MONOCYTES # BLD AUTO: 1.37 K/UL — HIGH (ref 0.1–0.6)
MONOCYTES # BLD AUTO: 1.39 K/UL — HIGH (ref 0.1–0.6)
MONOCYTES # BLD AUTO: 1.4 K/UL — HIGH (ref 0.1–0.6)
MONOCYTES # BLD AUTO: 1.62 K/UL — HIGH (ref 0.1–0.6)
MONOCYTES # BLD AUTO: 1.67 K/UL — HIGH (ref 0.1–0.6)
MONOCYTES # BLD AUTO: 1.75 K/UL — HIGH (ref 0.1–0.6)
MONOCYTES # BLD AUTO: 1.82 K/UL — HIGH (ref 0.1–0.6)
MONOCYTES # BLD AUTO: 1.85 K/UL — HIGH (ref 0.1–0.6)
MONOCYTES # BLD AUTO: 1.86 K/UL — HIGH (ref 0.1–0.6)
MONOCYTES # BLD AUTO: 1.97 K/UL — HIGH (ref 0.1–0.6)
MONOCYTES # BLD AUTO: 2.03 K/UL — HIGH (ref 0.1–0.6)
MONOCYTES NFR BLD AUTO: 0.9 % — LOW (ref 1.7–9.3)
MONOCYTES NFR BLD AUTO: 1.5 % — LOW (ref 1.7–9.3)
MONOCYTES NFR BLD AUTO: 1.7 % — SIGNIFICANT CHANGE UP (ref 1.7–9.3)
MONOCYTES NFR BLD AUTO: 10 % — HIGH (ref 1.7–9.3)
MONOCYTES NFR BLD AUTO: 10.1 % — HIGH (ref 1.7–9.3)
MONOCYTES NFR BLD AUTO: 10.4 % — HIGH (ref 1.7–9.3)
MONOCYTES NFR BLD AUTO: 11 % — HIGH (ref 1.7–9.3)
MONOCYTES NFR BLD AUTO: 11 % — HIGH (ref 1.7–9.3)
MONOCYTES NFR BLD AUTO: 11.9 % — HIGH (ref 1.7–9.3)
MONOCYTES NFR BLD AUTO: 12 % — HIGH (ref 1.7–9.3)
MONOCYTES NFR BLD AUTO: 12.6 % — HIGH (ref 1.7–9.3)
MONOCYTES NFR BLD AUTO: 12.9 % — HIGH (ref 1.7–9.3)
MONOCYTES NFR BLD AUTO: 12.9 % — HIGH (ref 1.7–9.3)
MONOCYTES NFR BLD AUTO: 13.6 % — HIGH (ref 1.7–9.3)
MONOCYTES NFR BLD AUTO: 16.3 % — HIGH (ref 1.7–9.3)
MONOCYTES NFR BLD AUTO: 2 % — SIGNIFICANT CHANGE UP (ref 1.7–9.3)
MONOCYTES NFR BLD AUTO: 5.3 % — SIGNIFICANT CHANGE UP (ref 1.7–9.3)
MONOCYTES NFR BLD AUTO: 6.2 % — SIGNIFICANT CHANGE UP (ref 1.7–9.3)
MONOCYTES NFR BLD AUTO: 7.8 % — SIGNIFICANT CHANGE UP (ref 1.7–9.3)
MONOCYTES NFR BLD AUTO: 7.9 % — SIGNIFICANT CHANGE UP (ref 1.7–9.3)
MONOCYTES NFR BLD AUTO: 9.4 % — HIGH (ref 1.7–9.3)
MONOS+MACROS # FLD: SIGNIFICANT CHANGE UP %
MYELOPEROXIDASE AB SER-ACNC: <5 UNITS — SIGNIFICANT CHANGE UP
MYELOPEROXIDASE CELLS FLD QL: NEGATIVE — SIGNIFICANT CHANGE UP
NEUTROPHILS # BLD AUTO: 10.76 K/UL — HIGH (ref 1.4–6.5)
NEUTROPHILS # BLD AUTO: 11.32 K/UL — HIGH (ref 1.4–6.5)
NEUTROPHILS # BLD AUTO: 11.72 K/UL — HIGH (ref 1.4–6.5)
NEUTROPHILS # BLD AUTO: 11.87 K/UL — HIGH (ref 1.4–6.5)
NEUTROPHILS # BLD AUTO: 12.1 K/UL — HIGH (ref 1.4–6.5)
NEUTROPHILS # BLD AUTO: 12.29 K/UL — HIGH (ref 1.4–6.5)
NEUTROPHILS # BLD AUTO: 13.1 K/UL — HIGH (ref 1.4–6.5)
NEUTROPHILS # BLD AUTO: 13.31 K/UL — HIGH (ref 1.4–6.5)
NEUTROPHILS # BLD AUTO: 14 K/UL — HIGH (ref 1.4–6.5)
NEUTROPHILS # BLD AUTO: 14.71 K/UL — HIGH (ref 1.4–6.5)
NEUTROPHILS # BLD AUTO: 15.16 K/UL — HIGH (ref 1.4–6.5)
NEUTROPHILS # BLD AUTO: 17.23 K/UL — HIGH (ref 1.4–6.5)
NEUTROPHILS # BLD AUTO: 17.55 K/UL — HIGH (ref 1.4–6.5)
NEUTROPHILS # BLD AUTO: 18.72 K/UL — HIGH (ref 1.4–6.5)
NEUTROPHILS # BLD AUTO: 20.15 K/UL — HIGH (ref 1.4–6.5)
NEUTROPHILS # BLD AUTO: 24.17 K/UL — HIGH (ref 1.4–6.5)
NEUTROPHILS # BLD AUTO: 6.53 K/UL — HIGH (ref 1.4–6.5)
NEUTROPHILS # BLD AUTO: 7.49 K/UL — HIGH (ref 1.4–6.5)
NEUTROPHILS # BLD AUTO: 8.74 K/UL — HIGH (ref 1.4–6.5)
NEUTROPHILS # BLD AUTO: 9.23 K/UL — HIGH (ref 1.4–6.5)
NEUTROPHILS # BLD AUTO: 9.42 K/UL — HIGH (ref 1.4–6.5)
NEUTROPHILS NFR BLD AUTO: 73.4 % — SIGNIFICANT CHANGE UP (ref 42.2–75.2)
NEUTROPHILS NFR BLD AUTO: 74.2 % — SIGNIFICANT CHANGE UP (ref 42.2–75.2)
NEUTROPHILS NFR BLD AUTO: 75.5 % — HIGH (ref 42.2–75.2)
NEUTROPHILS NFR BLD AUTO: 75.8 % — HIGH (ref 42.2–75.2)
NEUTROPHILS NFR BLD AUTO: 76.6 % — HIGH (ref 42.2–75.2)
NEUTROPHILS NFR BLD AUTO: 78.3 % — HIGH (ref 42.2–75.2)
NEUTROPHILS NFR BLD AUTO: 78.5 % — HIGH (ref 42.2–75.2)
NEUTROPHILS NFR BLD AUTO: 78.6 % — HIGH (ref 42.2–75.2)
NEUTROPHILS NFR BLD AUTO: 79.1 % — HIGH (ref 42.2–75.2)
NEUTROPHILS NFR BLD AUTO: 81.3 % — HIGH (ref 42.2–75.2)
NEUTROPHILS NFR BLD AUTO: 81.9 % — HIGH (ref 42.2–75.2)
NEUTROPHILS NFR BLD AUTO: 82.5 % — HIGH (ref 42.2–75.2)
NEUTROPHILS NFR BLD AUTO: 82.8 % — HIGH (ref 42.2–75.2)
NEUTROPHILS NFR BLD AUTO: 84.4 % — HIGH (ref 42.2–75.2)
NEUTROPHILS NFR BLD AUTO: 85 % — HIGH (ref 42.2–75.2)
NEUTROPHILS NFR BLD AUTO: 85.1 % — HIGH (ref 42.2–75.2)
NEUTROPHILS NFR BLD AUTO: 86.5 % — HIGH (ref 42.2–75.2)
NEUTROPHILS NFR BLD AUTO: 87.8 % — HIGH (ref 42.2–75.2)
NEUTROPHILS NFR BLD AUTO: 93.1 % — HIGH (ref 42.2–75.2)
NEUTROPHILS NFR BLD AUTO: 95.7 % — HIGH (ref 42.2–75.2)
NEUTROPHILS NFR BLD AUTO: 96.1 % — HIGH (ref 42.2–75.2)
NEUTS BAND # BLD: 2.6 % — SIGNIFICANT CHANGE UP (ref 0–6)
NEUTS BAND # BLD: 9.6 % — HIGH (ref 0–6)
NITRITE UR-MCNC: NEGATIVE — SIGNIFICANT CHANGE UP
NITRITE UR-MCNC: POSITIVE
NRBC # BLD: 0 /100 WBCS — SIGNIFICANT CHANGE UP (ref 0–0)
NRBC # BLD: 1 /100 — HIGH (ref 0–0)
NRBC # BLD: SIGNIFICANT CHANGE UP /100 WBCS (ref 0–0)
O2 CT VFR BLD CALC: ABNORMAL
O2 CT VFR BLD CALC: ABNORMAL
ORGANISM # SPEC MICROSCOPIC CNT: SIGNIFICANT CHANGE UP
OSMOLALITY SERPL: 279 MOS/KG — LOW (ref 289–308)
OSMOLALITY UR: 449 MOS/KG — SIGNIFICANT CHANGE UP (ref 50–1400)
P-ANCA SER-ACNC: POSITIVE
P-ANCA SER-ACNC: POSITIVE
PCO2 BLDA: 29 MMHG — LOW (ref 38–42)
PCO2 BLDA: 32 MMHG — LOW (ref 38–42)
PCO2 BLDA: 34 MMHG — LOW (ref 38–42)
PCO2 BLDA: 38 MMHG — SIGNIFICANT CHANGE UP (ref 38–42)
PH BLDA: 7.15 — CRITICAL LOW (ref 7.38–7.42)
PH BLDA: 7.21 — LOW (ref 7.38–7.42)
PH BLDA: 7.32 — LOW (ref 7.38–7.42)
PH BLDA: 7.56 — HIGH (ref 7.38–7.42)
PH UR: 5.5 — SIGNIFICANT CHANGE UP (ref 5–8)
PH UR: 6 — SIGNIFICANT CHANGE UP (ref 5–8)
PH UR: 6 — SIGNIFICANT CHANGE UP (ref 5–8)
PHOSPHATE SERPL-MCNC: 2.1 MG/DL — SIGNIFICANT CHANGE UP (ref 2.1–4.9)
PHOSPHATE SERPL-MCNC: 2.7 MG/DL — SIGNIFICANT CHANGE UP (ref 2.1–4.9)
PHOSPHATE SERPL-MCNC: 2.8 MG/DL — SIGNIFICANT CHANGE UP (ref 2.1–4.9)
PHOSPHATE SERPL-MCNC: 2.9 MG/DL — SIGNIFICANT CHANGE UP (ref 2.1–4.9)
PHOSPHATE SERPL-MCNC: 4.4 MG/DL — SIGNIFICANT CHANGE UP (ref 2.1–4.9)
PHOSPHATE SERPL-MCNC: 5.1 MG/DL — HIGH (ref 2.1–4.9)
PHOSPHATE SERPL-MCNC: 5.3 MG/DL — HIGH (ref 2.1–4.9)
PHOSPHATE SERPL-MCNC: 5.5 MG/DL — HIGH (ref 2.1–4.9)
PHOSPHATE SERPL-MCNC: 5.6 MG/DL — HIGH (ref 2.1–4.9)
PHOSPHATE SERPL-MCNC: 5.8 MG/DL — HIGH (ref 2.1–4.9)
PHOSPHATE SERPL-MCNC: 6.9 MG/DL — HIGH (ref 2.1–4.9)
PHOSPHATE SERPL-MCNC: 7.2 MG/DL — HIGH (ref 2.1–4.9)
PHOSPHATE SERPL-MCNC: 8.1 MG/DL — HIGH (ref 2.1–4.9)
PLAT MORPH BLD: NORMAL — SIGNIFICANT CHANGE UP
PLAT MORPH BLD: NORMAL — SIGNIFICANT CHANGE UP
PLATELET # BLD AUTO: 100 K/UL — LOW (ref 130–400)
PLATELET # BLD AUTO: 102 K/UL — LOW (ref 130–400)
PLATELET # BLD AUTO: 104 K/UL — LOW (ref 130–400)
PLATELET # BLD AUTO: 131 K/UL — SIGNIFICANT CHANGE UP (ref 130–400)
PLATELET # BLD AUTO: 135 K/UL — SIGNIFICANT CHANGE UP (ref 130–400)
PLATELET # BLD AUTO: 136 K/UL — SIGNIFICANT CHANGE UP (ref 130–400)
PLATELET # BLD AUTO: 151 K/UL — SIGNIFICANT CHANGE UP (ref 130–400)
PLATELET # BLD AUTO: 155 K/UL — SIGNIFICANT CHANGE UP (ref 130–400)
PLATELET # BLD AUTO: 159 K/UL — SIGNIFICANT CHANGE UP (ref 130–400)
PLATELET # BLD AUTO: 163 K/UL — SIGNIFICANT CHANGE UP (ref 130–400)
PLATELET # BLD AUTO: 171 K/UL — SIGNIFICANT CHANGE UP (ref 130–400)
PLATELET # BLD AUTO: 173 K/UL — SIGNIFICANT CHANGE UP (ref 130–400)
PLATELET # BLD AUTO: 173 K/UL — SIGNIFICANT CHANGE UP (ref 130–400)
PLATELET # BLD AUTO: 175 K/UL — SIGNIFICANT CHANGE UP (ref 130–400)
PLATELET # BLD AUTO: 175 K/UL — SIGNIFICANT CHANGE UP (ref 130–400)
PLATELET # BLD AUTO: 177 K/UL — SIGNIFICANT CHANGE UP (ref 130–400)
PLATELET # BLD AUTO: 178 K/UL — SIGNIFICANT CHANGE UP (ref 130–400)
PLATELET # BLD AUTO: 179 K/UL — SIGNIFICANT CHANGE UP (ref 130–400)
PLATELET # BLD AUTO: 180 K/UL — SIGNIFICANT CHANGE UP (ref 130–400)
PLATELET # BLD AUTO: 183 K/UL — SIGNIFICANT CHANGE UP (ref 130–400)
PLATELET # BLD AUTO: 190 K/UL — SIGNIFICANT CHANGE UP (ref 130–400)
PLATELET # BLD AUTO: 197 K/UL — SIGNIFICANT CHANGE UP (ref 130–400)
PLATELET # BLD AUTO: 203 K/UL — SIGNIFICANT CHANGE UP (ref 130–400)
PLATELET # BLD AUTO: 204 K/UL — SIGNIFICANT CHANGE UP (ref 130–400)
PLATELET # BLD AUTO: 238 K/UL — SIGNIFICANT CHANGE UP (ref 130–400)
PLATELET # BLD AUTO: 241 K/UL — SIGNIFICANT CHANGE UP (ref 130–400)
PLATELET # BLD AUTO: 248 K/UL — SIGNIFICANT CHANGE UP (ref 130–400)
PLATELET # BLD AUTO: 253 K/UL — SIGNIFICANT CHANGE UP (ref 130–400)
PLATELET # BLD AUTO: 271 K/UL — SIGNIFICANT CHANGE UP (ref 130–400)
PLATELET # BLD AUTO: 275 K/UL — SIGNIFICANT CHANGE UP (ref 130–400)
PLATELET # BLD AUTO: 43 K/UL — LOW (ref 130–400)
PLATELET # BLD AUTO: 48 K/UL — LOW (ref 130–400)
PLATELET # BLD AUTO: 48 K/UL — LOW (ref 130–400)
PLATELET # BLD AUTO: 51 K/UL — LOW (ref 130–400)
PLATELET # BLD AUTO: 64 K/UL — LOW (ref 130–400)
PLATELET # BLD AUTO: 73 K/UL — LOW (ref 130–400)
PLATELET # BLD AUTO: 79 K/UL — LOW (ref 130–400)
PLATELET # BLD AUTO: 83 K/UL — LOW (ref 130–400)
PLATELET # BLD AUTO: 85 K/UL — LOW (ref 130–400)
PLATELET # BLD AUTO: 93 K/UL — LOW (ref 130–400)
PLATELET # BLD AUTO: 95 K/UL — LOW (ref 130–400)
PLATELET # BLD AUTO: 98 K/UL — LOW (ref 130–400)
PLATELET # BLD AUTO: 98 K/UL — LOW (ref 130–400)
PO2 BLDA: 136 MMHG — HIGH (ref 78–95)
PO2 BLDA: 198 MMHG — HIGH (ref 78–95)
PO2 BLDA: 210 MMHG — HIGH (ref 78–95)
PO2 BLDA: 63 MMHG — LOW (ref 78–95)
POIKILOCYTOSIS BLD QL AUTO: SIGNIFICANT CHANGE UP
POIKILOCYTOSIS BLD QL AUTO: SLIGHT — SIGNIFICANT CHANGE UP
POLYCHROMASIA BLD QL SMEAR: SLIGHT — SIGNIFICANT CHANGE UP
POTASSIUM SERPL-MCNC: 3.1 MMOL/L — LOW (ref 3.5–5)
POTASSIUM SERPL-MCNC: 3.3 MMOL/L — LOW (ref 3.5–5)
POTASSIUM SERPL-MCNC: 3.3 MMOL/L — LOW (ref 3.5–5)
POTASSIUM SERPL-MCNC: 3.4 MMOL/L — LOW (ref 3.5–5)
POTASSIUM SERPL-MCNC: 3.4 MMOL/L — LOW (ref 3.5–5)
POTASSIUM SERPL-MCNC: 3.5 MMOL/L — SIGNIFICANT CHANGE UP (ref 3.5–5)
POTASSIUM SERPL-MCNC: 3.6 MMOL/L — SIGNIFICANT CHANGE UP (ref 3.5–5)
POTASSIUM SERPL-MCNC: 3.7 MMOL/L — SIGNIFICANT CHANGE UP (ref 3.5–5)
POTASSIUM SERPL-MCNC: 3.7 MMOL/L — SIGNIFICANT CHANGE UP (ref 3.5–5)
POTASSIUM SERPL-MCNC: 3.8 MMOL/L — SIGNIFICANT CHANGE UP (ref 3.5–5)
POTASSIUM SERPL-MCNC: 3.9 MMOL/L — SIGNIFICANT CHANGE UP (ref 3.5–5)
POTASSIUM SERPL-MCNC: 4 MMOL/L — SIGNIFICANT CHANGE UP (ref 3.5–5)
POTASSIUM SERPL-MCNC: 4.1 MMOL/L — SIGNIFICANT CHANGE UP (ref 3.5–5)
POTASSIUM SERPL-MCNC: 4.3 MMOL/L — SIGNIFICANT CHANGE UP (ref 3.5–5)
POTASSIUM SERPL-MCNC: 4.4 MMOL/L — SIGNIFICANT CHANGE UP (ref 3.5–5)
POTASSIUM SERPL-MCNC: 4.5 MMOL/L — SIGNIFICANT CHANGE UP (ref 3.5–5)
POTASSIUM SERPL-MCNC: 4.9 MMOL/L — SIGNIFICANT CHANGE UP (ref 3.5–5)
POTASSIUM SERPL-MCNC: 5.3 MMOL/L — HIGH (ref 3.5–5)
POTASSIUM SERPL-MCNC: 5.4 MMOL/L — HIGH (ref 3.5–5)
POTASSIUM SERPL-MCNC: 5.4 MMOL/L — HIGH (ref 3.5–5)
POTASSIUM SERPL-MCNC: 5.8 MMOL/L — HIGH (ref 3.5–5)
POTASSIUM SERPL-MCNC: 5.8 MMOL/L — HIGH (ref 3.5–5)
POTASSIUM SERPL-MCNC: 6.1 MMOL/L — CRITICAL HIGH (ref 3.5–5)
POTASSIUM SERPL-MCNC: 6.1 MMOL/L — CRITICAL HIGH (ref 3.5–5)
POTASSIUM SERPL-SCNC: 3.1 MMOL/L — LOW (ref 3.5–5)
POTASSIUM SERPL-SCNC: 3.3 MMOL/L — LOW (ref 3.5–5)
POTASSIUM SERPL-SCNC: 3.3 MMOL/L — LOW (ref 3.5–5)
POTASSIUM SERPL-SCNC: 3.4 MMOL/L — LOW (ref 3.5–5)
POTASSIUM SERPL-SCNC: 3.4 MMOL/L — LOW (ref 3.5–5)
POTASSIUM SERPL-SCNC: 3.5 MMOL/L — SIGNIFICANT CHANGE UP (ref 3.5–5)
POTASSIUM SERPL-SCNC: 3.6 MMOL/L — SIGNIFICANT CHANGE UP (ref 3.5–5)
POTASSIUM SERPL-SCNC: 3.7 MMOL/L — SIGNIFICANT CHANGE UP (ref 3.5–5)
POTASSIUM SERPL-SCNC: 3.7 MMOL/L — SIGNIFICANT CHANGE UP (ref 3.5–5)
POTASSIUM SERPL-SCNC: 3.8 MMOL/L — SIGNIFICANT CHANGE UP (ref 3.5–5)
POTASSIUM SERPL-SCNC: 3.9 MMOL/L — SIGNIFICANT CHANGE UP (ref 3.5–5)
POTASSIUM SERPL-SCNC: 4 MMOL/L — SIGNIFICANT CHANGE UP (ref 3.5–5)
POTASSIUM SERPL-SCNC: 4.1 MMOL/L — SIGNIFICANT CHANGE UP (ref 3.5–5)
POTASSIUM SERPL-SCNC: 4.3 MMOL/L — SIGNIFICANT CHANGE UP (ref 3.5–5)
POTASSIUM SERPL-SCNC: 4.4 MMOL/L — SIGNIFICANT CHANGE UP (ref 3.5–5)
POTASSIUM SERPL-SCNC: 4.5 MMOL/L — SIGNIFICANT CHANGE UP (ref 3.5–5)
POTASSIUM SERPL-SCNC: 4.9 MMOL/L — SIGNIFICANT CHANGE UP (ref 3.5–5)
POTASSIUM SERPL-SCNC: 5.3 MMOL/L — HIGH (ref 3.5–5)
POTASSIUM SERPL-SCNC: 5.4 MMOL/L — HIGH (ref 3.5–5)
POTASSIUM SERPL-SCNC: 5.4 MMOL/L — HIGH (ref 3.5–5)
POTASSIUM SERPL-SCNC: 5.8 MMOL/L — HIGH (ref 3.5–5)
POTASSIUM SERPL-SCNC: 5.8 MMOL/L — HIGH (ref 3.5–5)
POTASSIUM SERPL-SCNC: 6.1 MMOL/L — CRITICAL HIGH (ref 3.5–5)
POTASSIUM SERPL-SCNC: 6.1 MMOL/L — CRITICAL HIGH (ref 3.5–5)
PROCALCITONIN SERPL-MCNC: 2.64 NG/ML — HIGH (ref 0.02–0.1)
PROT ?TM UR-MCNC: 22 MG/DLG/24H — SIGNIFICANT CHANGE UP
PROT ?TM UR-MCNC: 27 MG/DLG/24H — SIGNIFICANT CHANGE UP
PROT FLD-MCNC: 1 G/DL — SIGNIFICANT CHANGE UP
PROT SERPL-MCNC: 3.1 G/DL — LOW (ref 6–8)
PROT SERPL-MCNC: 4.6 G/DL — LOW (ref 6–8)
PROT SERPL-MCNC: 4.8 G/DL — LOW (ref 6–8)
PROT SERPL-MCNC: 4.8 G/DL — LOW (ref 6–8)
PROT SERPL-MCNC: 4.9 G/DL — LOW (ref 6–8)
PROT SERPL-MCNC: 4.9 G/DL — LOW (ref 6–8)
PROT SERPL-MCNC: 5.1 G/DL — LOW (ref 6–8)
PROT SERPL-MCNC: 5.2 G/DL — LOW (ref 6–8)
PROT SERPL-MCNC: 5.3 G/DL — LOW (ref 6–8)
PROT SERPL-MCNC: 5.4 G/DL — LOW (ref 6–8)
PROT SERPL-MCNC: 5.4 G/DL — LOW (ref 6–8)
PROT SERPL-MCNC: 5.5 G/DL — LOW (ref 6–8)
PROT SERPL-MCNC: 5.6 G/DL — LOW (ref 6–8)
PROT SERPL-MCNC: 5.7 G/DL — LOW (ref 6–8)
PROT SERPL-MCNC: 5.8 G/DL — LOW (ref 6–8)
PROT SERPL-MCNC: 6 G/DL — SIGNIFICANT CHANGE UP (ref 6–8)
PROT SERPL-MCNC: 6.1 G/DL — SIGNIFICANT CHANGE UP (ref 6–8)
PROT SERPL-MCNC: 6.2 G/DL — SIGNIFICANT CHANGE UP (ref 6–8)
PROT SERPL-MCNC: 6.2 G/DL — SIGNIFICANT CHANGE UP (ref 6–8)
PROT SERPL-MCNC: 6.3 G/DL — SIGNIFICANT CHANGE UP (ref 6–8)
PROT SERPL-MCNC: 6.4 G/DL — SIGNIFICANT CHANGE UP (ref 6–8)
PROT UR-MCNC: 100
PROT UR-MCNC: 100 MG/DL
PROT UR-MCNC: 30
PROT UR-MCNC: ABNORMAL MG/DL
PROT UR-MCNC: ABNORMAL MG/DL
PROT/CREAT UR-RTO: 0.2 RATIO — SIGNIFICANT CHANGE UP (ref 0–0.2)
PROT/CREAT UR-RTO: 0.8 RATIO — HIGH (ref 0–0.2)
PROTEINASE3 AB FLD-ACNC: 6.8 UNITS — SIGNIFICANT CHANGE UP
PROTEINASE3 AB SER-ACNC: NEGATIVE — SIGNIFICANT CHANGE UP
PROTHROM AB SERPL-ACNC: 12.5 SEC — SIGNIFICANT CHANGE UP (ref 9.95–12.87)
PROTHROM AB SERPL-ACNC: 12.8 SEC — SIGNIFICANT CHANGE UP (ref 9.95–12.87)
PROTHROM AB SERPL-ACNC: 12.9 SEC — HIGH (ref 9.95–12.87)
PROTHROM AB SERPL-ACNC: 13.8 SEC — HIGH (ref 9.95–12.87)
PROTHROM AB SERPL-ACNC: 14.1 SEC — HIGH (ref 9.95–12.87)
PROTHROM AB SERPL-ACNC: 14.5 SEC — HIGH (ref 9.95–12.87)
PROTHROM AB SERPL-ACNC: 14.9 SEC — HIGH (ref 9.95–12.87)
PROTHROM AB SERPL-ACNC: 15.2 SEC — HIGH (ref 9.95–12.87)
PROTHROM AB SERPL-ACNC: 15.4 SEC — HIGH (ref 9.95–12.87)
PROTHROM AB SERPL-ACNC: 15.5 SEC — HIGH (ref 9.95–12.87)
PROTHROM AB SERPL-ACNC: 16 SEC — HIGH (ref 9.95–12.87)
PROTHROM AB SERPL-ACNC: 16.1 SEC — HIGH (ref 9.95–12.87)
PROTHROM AB SERPL-ACNC: 16.6 SEC — HIGH (ref 9.95–12.87)
PROTHROM AB SERPL-ACNC: 17.2 SEC — HIGH (ref 9.95–12.87)
PROTHROM AB SERPL-ACNC: 17.5 SEC — HIGH (ref 9.95–12.87)
PROTHROM AB SERPL-ACNC: 18.7 SEC — HIGH (ref 9.95–12.87)
PROTHROM AB SERPL-ACNC: 20.4 SEC — HIGH (ref 9.95–12.87)
PROTHROM AB SERPL-ACNC: 22.4 SEC — HIGH (ref 9.95–12.87)
PROTHROM AB SERPL-ACNC: 22.9 SEC — HIGH (ref 9.95–12.87)
PROTHROM AB SERPL-ACNC: 35.2 SEC — HIGH (ref 9.95–12.87)
QUANT TB PLUS MITOGEN MINUS NIL: 0.07 IU/ML — SIGNIFICANT CHANGE UP
RBC # BLD: 1.88 M/UL — LOW (ref 4.2–5.4)
RBC # BLD: 1.95 M/UL — LOW (ref 4.2–5.4)
RBC # BLD: 2.06 M/UL — LOW (ref 4.2–5.4)
RBC # BLD: 2.27 M/UL — LOW (ref 4.2–5.4)
RBC # BLD: 2.28 M/UL — LOW (ref 4.2–5.4)
RBC # BLD: 2.29 M/UL — LOW (ref 4.2–5.4)
RBC # BLD: 2.3 M/UL — LOW (ref 4.2–5.4)
RBC # BLD: 2.31 M/UL — LOW (ref 4.2–5.4)
RBC # BLD: 2.34 M/UL — LOW (ref 4.2–5.4)
RBC # BLD: 2.44 M/UL — LOW (ref 4.2–5.4)
RBC # BLD: 2.5 M/UL — LOW (ref 4.2–5.4)
RBC # BLD: 2.55 M/UL — LOW (ref 4.2–5.4)
RBC # BLD: 2.57 M/UL — LOW (ref 4.2–5.4)
RBC # BLD: 2.61 M/UL — LOW (ref 4.2–5.4)
RBC # BLD: 2.63 M/UL — LOW (ref 4.2–5.4)
RBC # BLD: 2.63 M/UL — LOW (ref 4.2–5.4)
RBC # BLD: 2.64 M/UL — LOW (ref 4.2–5.4)
RBC # BLD: 2.65 M/UL — LOW (ref 4.2–5.4)
RBC # BLD: 2.65 M/UL — LOW (ref 4.2–5.4)
RBC # BLD: 2.68 M/UL — LOW (ref 4.2–5.4)
RBC # BLD: 2.73 M/UL — LOW (ref 4.2–5.4)
RBC # BLD: 2.82 M/UL — LOW (ref 4.2–5.4)
RBC # BLD: 2.84 M/UL — LOW (ref 4.2–5.4)
RBC # BLD: 2.86 M/UL — LOW (ref 4.2–5.4)
RBC # BLD: 2.89 M/UL — LOW (ref 4.2–5.4)
RBC # BLD: 2.93 M/UL — LOW (ref 4.2–5.4)
RBC # BLD: 2.94 M/UL — LOW (ref 4.2–5.4)
RBC # BLD: 2.99 M/UL — LOW (ref 4.2–5.4)
RBC # BLD: 3.15 M/UL — LOW (ref 4.2–5.4)
RBC # BLD: 3.35 M/UL — LOW (ref 4.2–5.4)
RBC # BLD: 3.41 M/UL — LOW (ref 4.2–5.4)
RBC # BLD: 3.5 M/UL — LOW (ref 4.2–5.4)
RBC # BLD: 3.52 M/UL — LOW (ref 4.2–5.4)
RBC # BLD: 3.67 M/UL — LOW (ref 4.2–5.4)
RBC # BLD: 3.72 M/UL — LOW (ref 4.2–5.4)
RBC # BLD: 3.82 M/UL — LOW (ref 4.2–5.4)
RBC # BLD: 3.82 M/UL — LOW (ref 4.2–5.4)
RBC # BLD: 3.87 M/UL — LOW (ref 4.2–5.4)
RBC # BLD: 3.98 M/UL — LOW (ref 4.2–5.4)
RBC # BLD: 4.06 M/UL — LOW (ref 4.2–5.4)
RBC # BLD: 4.16 M/UL — LOW (ref 4.2–5.4)
RBC # BLD: 4.23 M/UL — SIGNIFICANT CHANGE UP (ref 4.2–5.4)
RBC # BLD: 4.35 M/UL — SIGNIFICANT CHANGE UP (ref 4.2–5.4)
RBC # FLD: 16.7 % — HIGH (ref 11.5–14.5)
RBC # FLD: 16.8 % — HIGH (ref 11.5–14.5)
RBC # FLD: 16.8 % — HIGH (ref 11.5–14.5)
RBC # FLD: 17 % — HIGH (ref 11.5–14.5)
RBC # FLD: 17 % — HIGH (ref 11.5–14.5)
RBC # FLD: 17.8 % — HIGH (ref 11.5–14.5)
RBC # FLD: 18.4 % — HIGH (ref 11.5–14.5)
RBC # FLD: 18.7 % — HIGH (ref 11.5–14.5)
RBC # FLD: 19.5 % — HIGH (ref 11.5–14.5)
RBC # FLD: 19.9 % — HIGH (ref 11.5–14.5)
RBC # FLD: 20.1 % — HIGH (ref 11.5–14.5)
RBC # FLD: 21.2 % — HIGH (ref 11.5–14.5)
RBC # FLD: 22 % — HIGH (ref 11.5–14.5)
RBC # FLD: 22.2 % — HIGH (ref 11.5–14.5)
RBC # FLD: 22.2 % — HIGH (ref 11.5–14.5)
RBC # FLD: 22.3 % — HIGH (ref 11.5–14.5)
RBC # FLD: 22.3 % — HIGH (ref 11.5–14.5)
RBC # FLD: 22.4 % — HIGH (ref 11.5–14.5)
RBC # FLD: 22.4 % — HIGH (ref 11.5–14.5)
RBC # FLD: 22.5 % — HIGH (ref 11.5–14.5)
RBC # FLD: 22.5 % — HIGH (ref 11.5–14.5)
RBC # FLD: 22.7 % — HIGH (ref 11.5–14.5)
RBC # FLD: 22.9 % — HIGH (ref 11.5–14.5)
RBC # FLD: 23.1 % — HIGH (ref 11.5–14.5)
RBC # FLD: 23.2 % — HIGH (ref 11.5–14.5)
RBC # FLD: 23.3 % — HIGH (ref 11.5–14.5)
RBC # FLD: 23.8 % — HIGH (ref 11.5–14.5)
RBC # FLD: 23.8 % — HIGH (ref 11.5–14.5)
RBC # FLD: 23.9 % — HIGH (ref 11.5–14.5)
RBC # FLD: 24 % — HIGH (ref 11.5–14.5)
RBC # FLD: 24.1 % — HIGH (ref 11.5–14.5)
RBC # FLD: 24.2 % — HIGH (ref 11.5–14.5)
RBC # FLD: 24.4 % — HIGH (ref 11.5–14.5)
RBC # FLD: 24.7 % — HIGH (ref 11.5–14.5)
RBC # FLD: 24.8 % — HIGH (ref 11.5–14.5)
RBC # FLD: 24.9 % — HIGH (ref 11.5–14.5)
RBC # FLD: 25 % — HIGH (ref 11.5–14.5)
RBC # FLD: 25.2 % — HIGH (ref 11.5–14.5)
RBC # FLD: 25.3 % — HIGH (ref 11.5–14.5)
RBC # FLD: SIGNIFICANT CHANGE UP % (ref 11.5–14.5)
RBC BLD AUTO: NORMAL — SIGNIFICANT CHANGE UP
RBC BLD AUTO: NORMAL — SIGNIFICANT CHANGE UP
RBC CASTS # UR COMP ASSIST: 25 /HPF — HIGH (ref 0–4)
RBC CASTS # UR COMP ASSIST: >50 /HPF
RBC CASTS # UR COMP ASSIST: >50 /HPF — HIGH (ref 0–4)
RBC CASTS # UR COMP ASSIST: ABNORMAL /HPF
RCV VOL RI: 1000 /UL — HIGH (ref 0–5)
RENIN PLAS-CCNC: 6.74 NG/ML/HR — HIGH (ref 0.17–5.38)
RHEUMATOID FACT SERPL-ACNC: <10 IU/ML — SIGNIFICANT CHANGE UP (ref 0–13)
RHEUMATOID FACT SERPL-ACNC: <10 IU/ML — SIGNIFICANT CHANGE UP (ref 0–13)
SAO2 % BLDA: 100 % — HIGH (ref 94–98)
SAO2 % BLDA: 100 % — HIGH (ref 94–98)
SAO2 % BLDA: 95 % — SIGNIFICANT CHANGE UP (ref 94–98)
SAO2 % BLDA: 99 % — HIGH (ref 94–98)
SMOOTH MUSCLE AB SER-ACNC: SIGNIFICANT CHANGE UP
SMOOTH MUSCLE AB SER-ACNC: SIGNIFICANT CHANGE UP
SMUDGE CELLS # BLD: PRESENT — SIGNIFICANT CHANGE UP
SODIUM SERPL-SCNC: 122 MMOL/L — LOW (ref 135–146)
SODIUM SERPL-SCNC: 123 MMOL/L — LOW (ref 135–146)
SODIUM SERPL-SCNC: 124 MMOL/L — LOW (ref 135–146)
SODIUM SERPL-SCNC: 125 MMOL/L — LOW (ref 135–146)
SODIUM SERPL-SCNC: 126 MMOL/L — LOW (ref 135–146)
SODIUM SERPL-SCNC: 127 MMOL/L — LOW (ref 135–146)
SODIUM SERPL-SCNC: 127 MMOL/L — LOW (ref 135–146)
SODIUM SERPL-SCNC: 129 MMOL/L — LOW (ref 135–146)
SODIUM SERPL-SCNC: 129 MMOL/L — LOW (ref 135–146)
SODIUM SERPL-SCNC: 130 MMOL/L — LOW (ref 135–146)
SODIUM SERPL-SCNC: 132 MMOL/L — LOW (ref 135–146)
SODIUM SERPL-SCNC: 133 MMOL/L — LOW (ref 135–146)
SODIUM SERPL-SCNC: 134 MMOL/L — LOW (ref 135–146)
SODIUM SERPL-SCNC: 135 MMOL/L — SIGNIFICANT CHANGE UP (ref 135–146)
SODIUM SERPL-SCNC: 136 MMOL/L — SIGNIFICANT CHANGE UP (ref 135–146)
SODIUM SERPL-SCNC: 137 MMOL/L — SIGNIFICANT CHANGE UP (ref 135–146)
SODIUM SERPL-SCNC: 138 MMOL/L — SIGNIFICANT CHANGE UP (ref 135–146)
SODIUM SERPL-SCNC: 139 MMOL/L — SIGNIFICANT CHANGE UP (ref 135–146)
SODIUM SERPL-SCNC: 140 MMOL/L — SIGNIFICANT CHANGE UP (ref 135–146)
SODIUM SERPL-SCNC: 140 MMOL/L — SIGNIFICANT CHANGE UP (ref 135–146)
SODIUM SERPL-SCNC: 142 MMOL/L — SIGNIFICANT CHANGE UP (ref 135–146)
SODIUM SERPL-SCNC: 142 MMOL/L — SIGNIFICANT CHANGE UP (ref 135–146)
SODIUM SERPL-SCNC: 143 MMOL/L — SIGNIFICANT CHANGE UP (ref 135–146)
SODIUM SERPL-SCNC: 144 MMOL/L — SIGNIFICANT CHANGE UP (ref 135–146)
SODIUM SERPL-SCNC: 145 MMOL/L — SIGNIFICANT CHANGE UP (ref 135–146)
SODIUM SERPL-SCNC: 146 MMOL/L — SIGNIFICANT CHANGE UP (ref 135–146)
SODIUM SERPL-SCNC: 146 MMOL/L — SIGNIFICANT CHANGE UP (ref 135–146)
SODIUM SERPL-SCNC: 147 MMOL/L — HIGH (ref 135–146)
SODIUM UR-SCNC: 20 MMOL/L — SIGNIFICANT CHANGE UP
SODIUM UR-SCNC: 20 MMOL/L — SIGNIFICANT CHANGE UP
SOLUBLE LIVER IGG SER IA-ACNC: < 20.1 UNITS — SIGNIFICANT CHANGE UP
SP GR SPEC: 1.01 — SIGNIFICANT CHANGE UP (ref 1.01–1.03)
SP GR SPEC: 1.02 — SIGNIFICANT CHANGE UP (ref 1.01–1.03)
SPECIMEN SOURCE: SIGNIFICANT CHANGE UP
SURGICAL PATHOLOGY STUDY: SIGNIFICANT CHANGE UP
T PALLIDUM AB TITR SER: NEGATIVE — SIGNIFICANT CHANGE UP
T3 SERPL-MCNC: 35 NG/DL — LOW (ref 80–200)
T4 AB SER-ACNC: 7.1 UG/DL — SIGNIFICANT CHANGE UP (ref 4.6–12)
T4 FREE SERPL-MCNC: 0.7 NG/DL — LOW (ref 0.9–1.8)
TIBC SERPL-MCNC: 155 UG/DL — LOW (ref 220–430)
TIBC SERPL-MCNC: 97 UG/DL — LOW (ref 220–430)
TOTAL CHOLESTEROL/HDL RATIO MEASUREMENT: 18.5 RATIO — HIGH (ref 4–5.5)
TOTAL NUCLEATED CELL COUNT, BODY FLUID: 7135 /UL — HIGH (ref 0–5)
TRANSFERRIN SERPL-MCNC: 138 MG/DL — LOW (ref 200–360)
TRIGL SERPL-MCNC: 144 MG/DL — SIGNIFICANT CHANGE UP (ref 10–149)
TROPONIN T SERPL-MCNC: 0.56 NG/ML — CRITICAL HIGH
TSH SERPL-MCNC: 4.97 UIU/ML — HIGH (ref 0.27–4.2)
TSH SERPL-MCNC: 6.8 UIU/ML — HIGH (ref 0.27–4.2)
TUBE TYPE: SIGNIFICANT CHANGE UP
UIBC SERPL-MCNC: 70 UG/DL — LOW (ref 110–370)
UIBC SERPL-MCNC: 75 UG/DL — LOW (ref 110–370)
UROBILINOGEN FLD QL: 0.2 MG/DL — SIGNIFICANT CHANGE UP (ref 0.2–0.2)
UROBILINOGEN FLD QL: 0.2 — SIGNIFICANT CHANGE UP (ref 0.2–0.2)
UROBILINOGEN FLD QL: 0.2 — SIGNIFICANT CHANGE UP (ref 0.2–0.2)
UROBILINOGEN FLD QL: 1 MG/DL (ref 0.2–0.2)
UROBILINOGEN FLD QL: 1 MG/DL (ref 0.2–0.2)
VANCOMYCIN TROUGH SERPL-MCNC: 30.3 UG/ML — HIGH (ref 5–10)
VIT B12 SERPL-MCNC: 1468 PG/ML — HIGH (ref 232–1245)
VIT D25+D1,25 OH+D1,25 PNL SERPL-MCNC: 16.6 PG/ML — LOW (ref 19.9–79.3)
VZV IGG FLD QL IA: 3560 INDEX — SIGNIFICANT CHANGE UP
VZV IGG FLD QL IA: POSITIVE — SIGNIFICANT CHANGE UP
VZV IGM SER-ACNC: <0.91 INDEX — SIGNIFICANT CHANGE UP (ref 0–0.9)
WBC # BLD: 10.52 K/UL — SIGNIFICANT CHANGE UP (ref 4.8–10.8)
WBC # BLD: 11.16 K/UL — HIGH (ref 4.8–10.8)
WBC # BLD: 11.44 K/UL — HIGH (ref 4.8–10.8)
WBC # BLD: 11.54 K/UL — HIGH (ref 4.8–10.8)
WBC # BLD: 12.05 K/UL — HIGH (ref 4.8–10.8)
WBC # BLD: 12.58 K/UL — HIGH (ref 4.8–10.8)
WBC # BLD: 13.25 K/UL — HIGH (ref 4.8–10.8)
WBC # BLD: 14.18 K/UL — HIGH (ref 4.8–10.8)
WBC # BLD: 14.41 K/UL — HIGH (ref 4.8–10.8)
WBC # BLD: 14.91 K/UL — HIGH (ref 4.8–10.8)
WBC # BLD: 14.95 K/UL — HIGH (ref 4.8–10.8)
WBC # BLD: 15.12 K/UL — HIGH (ref 4.8–10.8)
WBC # BLD: 15.53 K/UL — HIGH (ref 4.8–10.8)
WBC # BLD: 15.78 K/UL — HIGH (ref 4.8–10.8)
WBC # BLD: 15.99 K/UL — HIGH (ref 4.8–10.8)
WBC # BLD: 16.06 K/UL — HIGH (ref 4.8–10.8)
WBC # BLD: 16.89 K/UL — HIGH (ref 4.8–10.8)
WBC # BLD: 17.1 K/UL — HIGH (ref 4.8–10.8)
WBC # BLD: 17.13 K/UL — HIGH (ref 4.8–10.8)
WBC # BLD: 17.57 K/UL — HIGH (ref 4.8–10.8)
WBC # BLD: 17.82 K/UL — HIGH (ref 4.8–10.8)
WBC # BLD: 17.88 K/UL — HIGH (ref 4.8–10.8)
WBC # BLD: 18 K/UL — HIGH (ref 4.8–10.8)
WBC # BLD: 18.24 K/UL — HIGH (ref 4.8–10.8)
WBC # BLD: 18.52 K/UL — HIGH (ref 4.8–10.8)
WBC # BLD: 18.98 K/UL — HIGH (ref 4.8–10.8)
WBC # BLD: 19.15 K/UL — HIGH (ref 4.8–10.8)
WBC # BLD: 19.76 K/UL — HIGH (ref 4.8–10.8)
WBC # BLD: 19.91 K/UL — HIGH (ref 4.8–10.8)
WBC # BLD: 20.01 K/UL — HIGH (ref 4.8–10.8)
WBC # BLD: 21.25 K/UL — HIGH (ref 4.8–10.8)
WBC # BLD: 22 K/UL — HIGH (ref 4.8–10.8)
WBC # BLD: 22.26 K/UL — HIGH (ref 4.8–10.8)
WBC # BLD: 22.78 K/UL — HIGH (ref 4.8–10.8)
WBC # BLD: 25.24 K/UL — HIGH (ref 4.8–10.8)
WBC # BLD: 25.78 K/UL — HIGH (ref 4.8–10.8)
WBC # BLD: 25.95 K/UL — HIGH (ref 4.8–10.8)
WBC # BLD: 26.02 K/UL — HIGH (ref 4.8–10.8)
WBC # BLD: 26.05 K/UL — HIGH (ref 4.8–10.8)
WBC # BLD: 26.34 K/UL — HIGH (ref 4.8–10.8)
WBC # BLD: 27.29 K/UL — HIGH (ref 4.8–10.8)
WBC # BLD: 28.84 K/UL — HIGH (ref 4.8–10.8)
WBC # BLD: 30.1 K/UL — HIGH (ref 4.8–10.8)
WBC # BLD: 8.9 K/UL — SIGNIFICANT CHANGE UP (ref 4.8–10.8)
WBC # BLD: 9.92 K/UL — SIGNIFICANT CHANGE UP (ref 4.8–10.8)
WBC # FLD AUTO: 10.52 K/UL — SIGNIFICANT CHANGE UP (ref 4.8–10.8)
WBC # FLD AUTO: 11.16 K/UL — HIGH (ref 4.8–10.8)
WBC # FLD AUTO: 11.44 K/UL — HIGH (ref 4.8–10.8)
WBC # FLD AUTO: 11.54 K/UL — HIGH (ref 4.8–10.8)
WBC # FLD AUTO: 12.05 K/UL — HIGH (ref 4.8–10.8)
WBC # FLD AUTO: 12.58 K/UL — HIGH (ref 4.8–10.8)
WBC # FLD AUTO: 13.25 K/UL — HIGH (ref 4.8–10.8)
WBC # FLD AUTO: 14.18 K/UL — HIGH (ref 4.8–10.8)
WBC # FLD AUTO: 14.41 K/UL — HIGH (ref 4.8–10.8)
WBC # FLD AUTO: 14.91 K/UL — HIGH (ref 4.8–10.8)
WBC # FLD AUTO: 14.95 K/UL — HIGH (ref 4.8–10.8)
WBC # FLD AUTO: 15.12 K/UL — HIGH (ref 4.8–10.8)
WBC # FLD AUTO: 15.53 K/UL — HIGH (ref 4.8–10.8)
WBC # FLD AUTO: 15.78 K/UL — HIGH (ref 4.8–10.8)
WBC # FLD AUTO: 15.99 K/UL — HIGH (ref 4.8–10.8)
WBC # FLD AUTO: 16.06 K/UL — HIGH (ref 4.8–10.8)
WBC # FLD AUTO: 16.89 K/UL — HIGH (ref 4.8–10.8)
WBC # FLD AUTO: 17.1 K/UL — HIGH (ref 4.8–10.8)
WBC # FLD AUTO: 17.13 K/UL — HIGH (ref 4.8–10.8)
WBC # FLD AUTO: 17.57 K/UL — HIGH (ref 4.8–10.8)
WBC # FLD AUTO: 17.82 K/UL — HIGH (ref 4.8–10.8)
WBC # FLD AUTO: 17.88 K/UL — HIGH (ref 4.8–10.8)
WBC # FLD AUTO: 18 K/UL — HIGH (ref 4.8–10.8)
WBC # FLD AUTO: 18.24 K/UL — HIGH (ref 4.8–10.8)
WBC # FLD AUTO: 18.52 K/UL — HIGH (ref 4.8–10.8)
WBC # FLD AUTO: 18.98 K/UL — HIGH (ref 4.8–10.8)
WBC # FLD AUTO: 19.15 K/UL — HIGH (ref 4.8–10.8)
WBC # FLD AUTO: 19.76 K/UL — HIGH (ref 4.8–10.8)
WBC # FLD AUTO: 19.91 K/UL — HIGH (ref 4.8–10.8)
WBC # FLD AUTO: 20.01 K/UL — HIGH (ref 4.8–10.8)
WBC # FLD AUTO: 21.25 K/UL — HIGH (ref 4.8–10.8)
WBC # FLD AUTO: 22 K/UL — HIGH (ref 4.8–10.8)
WBC # FLD AUTO: 22.26 K/UL — HIGH (ref 4.8–10.8)
WBC # FLD AUTO: 22.78 K/UL — HIGH (ref 4.8–10.8)
WBC # FLD AUTO: 25.24 K/UL — HIGH (ref 4.8–10.8)
WBC # FLD AUTO: 25.78 K/UL — HIGH (ref 4.8–10.8)
WBC # FLD AUTO: 25.95 K/UL — HIGH (ref 4.8–10.8)
WBC # FLD AUTO: 26.02 K/UL — HIGH (ref 4.8–10.8)
WBC # FLD AUTO: 26.05 K/UL — HIGH (ref 4.8–10.8)
WBC # FLD AUTO: 26.34 K/UL — HIGH (ref 4.8–10.8)
WBC # FLD AUTO: 27.29 K/UL — HIGH (ref 4.8–10.8)
WBC # FLD AUTO: 28.84 K/UL — HIGH (ref 4.8–10.8)
WBC # FLD AUTO: 30.1 K/UL — HIGH (ref 4.8–10.8)
WBC # FLD AUTO: 8.9 K/UL — SIGNIFICANT CHANGE UP (ref 4.8–10.8)
WBC # FLD AUTO: 9.92 K/UL — SIGNIFICANT CHANGE UP (ref 4.8–10.8)
WBC UR QL: >50 /HPF — HIGH (ref 0–5)
WBC UR QL: >50 /HPF — HIGH (ref 0–5)
WBC UR QL: ABNORMAL /HPF
WBC UR QL: ABNORMAL /HPF

## 2019-01-01 PROCEDURE — 99221 1ST HOSP IP/OBS SF/LOW 40: CPT

## 2019-01-01 PROCEDURE — 75970 VASCULAR BIOPSY: CPT | Mod: 26

## 2019-01-01 PROCEDURE — 88312 SPECIAL STAINS GROUP 1: CPT | Mod: 26

## 2019-01-01 PROCEDURE — 99233 SBSQ HOSP IP/OBS HIGH 50: CPT

## 2019-01-01 PROCEDURE — 88346 IMFLUOR 1ST 1ANTB STAIN PX: CPT | Mod: 26

## 2019-01-01 PROCEDURE — 93306 TTE W/DOPPLER COMPLETE: CPT | Mod: 26

## 2019-01-01 PROCEDURE — 74181 MRI ABDOMEN W/O CONTRAST: CPT | Mod: 26

## 2019-01-01 PROCEDURE — 76937 US GUIDE VASCULAR ACCESS: CPT | Mod: 26

## 2019-01-01 PROCEDURE — 76705 ECHO EXAM OF ABDOMEN: CPT | Mod: 26

## 2019-01-01 PROCEDURE — 99232 SBSQ HOSP IP/OBS MODERATE 35: CPT

## 2019-01-01 PROCEDURE — 93930 UPPER EXTREMITY STUDY: CPT | Mod: 26

## 2019-01-01 PROCEDURE — 76700 US EXAM ABDOM COMPLETE: CPT | Mod: 26

## 2019-01-01 PROCEDURE — 70450 CT HEAD/BRAIN W/O DYE: CPT | Mod: 26

## 2019-01-01 PROCEDURE — 74220 X-RAY XM ESOPHAGUS 1CNTRST: CPT | Mod: 26

## 2019-01-01 PROCEDURE — 71045 X-RAY EXAM CHEST 1 VIEW: CPT | Mod: 26

## 2019-01-01 PROCEDURE — 76770 US EXAM ABDO BACK WALL COMP: CPT | Mod: 26

## 2019-01-01 PROCEDURE — 99024 POSTOP FOLLOW-UP VISIT: CPT

## 2019-01-01 PROCEDURE — 77012 CT SCAN FOR NEEDLE BIOPSY: CPT | Mod: 26

## 2019-01-01 PROCEDURE — 93010 ELECTROCARDIOGRAM REPORT: CPT

## 2019-01-01 PROCEDURE — 71045 X-RAY EXAM CHEST 1 VIEW: CPT | Mod: 26,77

## 2019-01-01 PROCEDURE — 50200 RENAL BIOPSY PERQ: CPT | Mod: RT

## 2019-01-01 PROCEDURE — 99152 MOD SED SAME PHYS/QHP 5/>YRS: CPT

## 2019-01-01 PROCEDURE — 49083 ABD PARACENTESIS W/IMAGING: CPT

## 2019-01-01 PROCEDURE — 99356: CPT

## 2019-01-01 PROCEDURE — 37200 TRANSCATHETER BIOPSY: CPT

## 2019-01-01 PROCEDURE — 93010 ELECTROCARDIOGRAM REPORT: CPT | Mod: 76

## 2019-01-01 PROCEDURE — 93970 EXTREMITY STUDY: CPT | Mod: 26

## 2019-01-01 PROCEDURE — ZZZZZ: CPT

## 2019-01-01 PROCEDURE — 99231 SBSQ HOSP IP/OBS SF/LOW 25: CPT

## 2019-01-01 PROCEDURE — 93925 LOWER EXTREMITY STUDY: CPT | Mod: 26

## 2019-01-01 PROCEDURE — 99223 1ST HOSP IP/OBS HIGH 75: CPT

## 2019-01-01 PROCEDURE — 36011 PLACE CATHETER IN VEIN: CPT | Mod: RT

## 2019-01-01 PROCEDURE — 99222 1ST HOSP IP/OBS MODERATE 55: CPT

## 2019-01-01 PROCEDURE — 76770 US EXAM ABDO BACK WALL COMP: CPT | Mod: 26,77

## 2019-01-01 PROCEDURE — 88350 IMFLUOR EA ADDL 1ANTB STN PX: CPT | Mod: 26

## 2019-01-01 PROCEDURE — 88348 ELECTRON MICROSCOPY DX: CPT | Mod: 26

## 2019-01-01 PROCEDURE — 99285 EMERGENCY DEPT VISIT HI MDM: CPT

## 2019-01-01 PROCEDURE — 64795 BIOPSY OF NERVE: CPT

## 2019-01-01 PROCEDURE — 88307 TISSUE EXAM BY PATHOLOGIST: CPT | Mod: 26

## 2019-01-01 PROCEDURE — 88313 SPECIAL STAINS GROUP 2: CPT | Mod: 26

## 2019-01-01 PROCEDURE — 88305 TISSUE EXAM BY PATHOLOGIST: CPT | Mod: 26

## 2019-01-01 RX ORDER — ALBUMIN HUMAN 25 %
50 VIAL (ML) INTRAVENOUS ONCE
Refills: 0 | Status: COMPLETED | OUTPATIENT
Start: 2019-01-01 | End: 2019-01-01

## 2019-01-01 RX ORDER — LACTULOSE 10 G/15ML
200 SOLUTION ORAL ONCE
Refills: 0 | Status: COMPLETED | OUTPATIENT
Start: 2019-01-01 | End: 2019-01-01

## 2019-01-01 RX ORDER — PIPERACILLIN AND TAZOBACTAM 4; .5 G/20ML; G/20ML
2.25 INJECTION, POWDER, LYOPHILIZED, FOR SOLUTION INTRAVENOUS EVERY 6 HOURS
Refills: 0 | Status: DISCONTINUED | OUTPATIENT
Start: 2019-01-01 | End: 2019-01-01

## 2019-01-01 RX ORDER — LEVOTHYROXINE SODIUM 125 MCG
1 TABLET ORAL
Qty: 0 | Refills: 0 | DISCHARGE

## 2019-01-01 RX ORDER — ELECTROLYTE SOLUTION,INJ
1 VIAL (ML) INTRAVENOUS
Refills: 0 | Status: DISCONTINUED | OUTPATIENT
Start: 2019-01-01 | End: 2019-01-01

## 2019-01-01 RX ORDER — INSULIN HUMAN 100 [IU]/ML
10 INJECTION, SOLUTION SUBCUTANEOUS ONCE
Refills: 0 | Status: COMPLETED | OUTPATIENT
Start: 2019-01-01 | End: 2019-01-01

## 2019-01-01 RX ORDER — HEPARIN SODIUM 5000 [USP'U]/ML
5000 INJECTION INTRAVENOUS; SUBCUTANEOUS EVERY 8 HOURS
Refills: 0 | Status: DISCONTINUED | OUTPATIENT
Start: 2019-01-01 | End: 2019-01-01

## 2019-01-01 RX ORDER — FUROSEMIDE 40 MG
80 TABLET ORAL ONCE
Refills: 0 | Status: DISCONTINUED | OUTPATIENT
Start: 2019-01-01 | End: 2019-01-01

## 2019-01-01 RX ORDER — NOREPINEPHRINE BITARTRATE/D5W 8 MG/250ML
1 PLASTIC BAG, INJECTION (ML) INTRAVENOUS
Qty: 32 | Refills: 0 | Status: DISCONTINUED | OUTPATIENT
Start: 2019-01-01 | End: 2019-01-01

## 2019-01-01 RX ORDER — VANCOMYCIN HCL 1 G
1500 VIAL (EA) INTRAVENOUS EVERY 12 HOURS
Refills: 0 | Status: DISCONTINUED | OUTPATIENT
Start: 2019-01-01 | End: 2019-01-01

## 2019-01-01 RX ORDER — FUROSEMIDE 40 MG
80 TABLET ORAL ONCE
Refills: 0 | Status: COMPLETED | OUTPATIENT
Start: 2019-01-01 | End: 2019-01-01

## 2019-01-01 RX ORDER — FUROSEMIDE 40 MG
40 TABLET ORAL ONCE
Refills: 0 | Status: COMPLETED | OUTPATIENT
Start: 2019-01-01 | End: 2019-01-01

## 2019-01-01 RX ORDER — CEFTRIAXONE 500 MG/1
1 INJECTION, POWDER, FOR SOLUTION INTRAMUSCULAR; INTRAVENOUS ONCE
Refills: 0 | Status: COMPLETED | OUTPATIENT
Start: 2019-01-01 | End: 2019-01-01

## 2019-01-01 RX ORDER — FUROSEMIDE 40 MG
60 TABLET ORAL ONCE
Refills: 0 | Status: COMPLETED | OUTPATIENT
Start: 2019-01-01 | End: 2019-01-01

## 2019-01-01 RX ORDER — POTASSIUM CHLORIDE 20 MEQ
40 PACKET (EA) ORAL ONCE
Refills: 0 | Status: COMPLETED | OUTPATIENT
Start: 2019-01-01 | End: 2019-01-01

## 2019-01-01 RX ORDER — I.V. FAT EMULSION 20 G/100ML
0.56 EMULSION INTRAVENOUS
Qty: 70.2 | Refills: 0 | Status: DISCONTINUED | OUTPATIENT
Start: 2019-01-01 | End: 2019-01-01

## 2019-01-01 RX ORDER — FUROSEMIDE 40 MG
40 TABLET ORAL
Refills: 0 | Status: DISCONTINUED | OUTPATIENT
Start: 2019-01-01 | End: 2019-01-01

## 2019-01-01 RX ORDER — CHOLECALCIFEROL (VITAMIN D3) 125 MCG
2000 CAPSULE ORAL DAILY
Refills: 0 | Status: DISCONTINUED | OUTPATIENT
Start: 2019-01-01 | End: 2019-01-01

## 2019-01-01 RX ORDER — PANTOPRAZOLE SODIUM 20 MG/1
40 TABLET, DELAYED RELEASE ORAL
Refills: 0 | Status: DISCONTINUED | OUTPATIENT
Start: 2019-01-01 | End: 2019-01-01

## 2019-01-01 RX ORDER — DEXTROSE 50 % IN WATER 50 %
50 SYRINGE (ML) INTRAVENOUS ONCE
Refills: 0 | Status: COMPLETED | OUTPATIENT
Start: 2019-01-01 | End: 2019-01-01

## 2019-01-01 RX ORDER — ASPIRIN/CALCIUM CARB/MAGNESIUM 324 MG
81 TABLET ORAL DAILY
Refills: 0 | Status: DISCONTINUED | OUTPATIENT
Start: 2019-01-01 | End: 2019-01-01

## 2019-01-01 RX ORDER — I.V. FAT EMULSION 20 G/100ML
22 EMULSION INTRAVENOUS DAILY
Refills: 0 | Status: DISCONTINUED | OUTPATIENT
Start: 2019-01-01 | End: 2019-01-01

## 2019-01-01 RX ORDER — LANOLIN ALCOHOL/MO/W.PET/CERES
5 CREAM (GRAM) TOPICAL ONCE
Refills: 0 | Status: COMPLETED | OUTPATIENT
Start: 2019-01-01 | End: 2019-01-01

## 2019-01-01 RX ORDER — DEXTROSE 50 % IN WATER 50 %
50 SYRINGE (ML) INTRAVENOUS ONCE
Refills: 0 | Status: DISCONTINUED | OUTPATIENT
Start: 2019-01-01 | End: 2019-01-01

## 2019-01-01 RX ORDER — NYSTATIN CREAM 100000 [USP'U]/G
1 CREAM TOPICAL THREE TIMES A DAY
Refills: 0 | Status: DISCONTINUED | OUTPATIENT
Start: 2019-01-01 | End: 2019-01-01

## 2019-01-01 RX ORDER — MORPHINE SULFATE 50 MG/1
4 CAPSULE, EXTENDED RELEASE ORAL ONCE
Refills: 0 | Status: DISCONTINUED | OUTPATIENT
Start: 2019-01-01 | End: 2019-01-01

## 2019-01-01 RX ORDER — LEVOTHYROXINE SODIUM 125 MCG
50 TABLET ORAL DAILY
Refills: 0 | Status: DISCONTINUED | OUTPATIENT
Start: 2019-01-01 | End: 2019-01-01

## 2019-01-01 RX ORDER — PRASUGREL 5 MG/1
10 TABLET, FILM COATED ORAL DAILY
Refills: 0 | Status: DISCONTINUED | OUTPATIENT
Start: 2019-01-01 | End: 2019-01-01

## 2019-01-01 RX ORDER — AMPICILLIN TRIHYDRATE 250 MG
2 CAPSULE ORAL EVERY 6 HOURS
Refills: 0 | Status: DISCONTINUED | OUTPATIENT
Start: 2019-01-01 | End: 2019-01-01

## 2019-01-01 RX ORDER — OCTREOTIDE ACETATE 200 UG/ML
50 INJECTION, SOLUTION INTRAVENOUS; SUBCUTANEOUS
Qty: 500 | Refills: 0 | Status: DISCONTINUED | OUTPATIENT
Start: 2019-01-01 | End: 2019-01-01

## 2019-01-01 RX ORDER — MEROPENEM 1 G/30ML
1000 INJECTION INTRAVENOUS EVERY 12 HOURS
Refills: 0 | Status: DISCONTINUED | OUTPATIENT
Start: 2019-01-01 | End: 2019-01-01

## 2019-01-01 RX ORDER — DOCUSATE SODIUM 100 MG
100 CAPSULE ORAL THREE TIMES A DAY
Refills: 0 | Status: DISCONTINUED | OUTPATIENT
Start: 2019-01-01 | End: 2019-01-01

## 2019-01-01 RX ORDER — VANCOMYCIN HCL 1 G
1500 VIAL (EA) INTRAVENOUS DAILY
Refills: 0 | Status: DISCONTINUED | OUTPATIENT
Start: 2019-01-01 | End: 2019-01-01

## 2019-01-01 RX ORDER — SODIUM CHLORIDE 9 MG/ML
1000 INJECTION, SOLUTION INTRAVENOUS
Refills: 0 | Status: DISCONTINUED | OUTPATIENT
Start: 2019-01-01 | End: 2019-01-01

## 2019-01-01 RX ORDER — POTASSIUM CHLORIDE 20 MEQ
40 PACKET (EA) ORAL EVERY 4 HOURS
Refills: 0 | Status: COMPLETED | OUTPATIENT
Start: 2019-01-01 | End: 2019-01-01

## 2019-01-01 RX ORDER — METOPROLOL TARTRATE 50 MG
50 TABLET ORAL
Refills: 0 | Status: DISCONTINUED | OUTPATIENT
Start: 2019-01-01 | End: 2019-01-01

## 2019-01-01 RX ORDER — CEFTRIAXONE 500 MG/1
1000 INJECTION, POWDER, FOR SOLUTION INTRAMUSCULAR; INTRAVENOUS EVERY 24 HOURS
Refills: 0 | Status: DISCONTINUED | OUTPATIENT
Start: 2019-01-01 | End: 2019-01-01

## 2019-01-01 RX ORDER — CEFTRIAXONE 500 MG/1
1000 INJECTION, POWDER, FOR SOLUTION INTRAMUSCULAR; INTRAVENOUS ONCE
Refills: 0 | Status: COMPLETED | OUTPATIENT
Start: 2019-01-01 | End: 2019-01-01

## 2019-01-01 RX ORDER — VANCOMYCIN HCL 1 G
1000 VIAL (EA) INTRAVENOUS ONCE
Refills: 0 | Status: COMPLETED | OUTPATIENT
Start: 2019-01-01 | End: 2019-01-01

## 2019-01-01 RX ORDER — METOPROLOL TARTRATE 50 MG
25 TABLET ORAL
Refills: 0 | Status: DISCONTINUED | OUTPATIENT
Start: 2019-01-01 | End: 2019-01-01

## 2019-01-01 RX ORDER — SENNA PLUS 8.6 MG/1
2 TABLET ORAL AT BEDTIME
Refills: 0 | Status: DISCONTINUED | OUTPATIENT
Start: 2019-01-01 | End: 2019-01-01

## 2019-01-01 RX ORDER — SODIUM CHLORIDE 9 MG/ML
1000 INJECTION INTRAMUSCULAR; INTRAVENOUS; SUBCUTANEOUS
Refills: 0 | Status: DISCONTINUED | OUTPATIENT
Start: 2019-01-01 | End: 2019-01-01

## 2019-01-01 RX ORDER — POTASSIUM CHLORIDE 20 MEQ
40 PACKET (EA) ORAL ONCE
Refills: 0 | Status: DISCONTINUED | OUTPATIENT
Start: 2019-01-01 | End: 2019-01-01

## 2019-01-01 RX ORDER — CALCIUM CARBONATE 500(1250)
1 TABLET ORAL EVERY 12 HOURS
Refills: 0 | Status: DISCONTINUED | OUTPATIENT
Start: 2019-01-01 | End: 2019-01-01

## 2019-01-01 RX ORDER — ALBUMIN HUMAN 25 %
400 VIAL (ML) INTRAVENOUS
Refills: 0 | Status: COMPLETED | OUTPATIENT
Start: 2019-01-01 | End: 2019-01-01

## 2019-01-01 RX ORDER — ONDANSETRON 8 MG/1
4 TABLET, FILM COATED ORAL ONCE
Refills: 0 | Status: COMPLETED | OUTPATIENT
Start: 2019-01-01 | End: 2019-01-01

## 2019-01-01 RX ORDER — ALBUTEROL 90 UG/1
1 AEROSOL, METERED ORAL EVERY 4 HOURS
Refills: 0 | Status: DISCONTINUED | OUTPATIENT
Start: 2019-01-01 | End: 2019-01-01

## 2019-01-01 RX ORDER — MEROPENEM 1 G/30ML
1000 INJECTION INTRAVENOUS EVERY 8 HOURS
Refills: 0 | Status: DISCONTINUED | OUTPATIENT
Start: 2019-01-01 | End: 2019-01-01

## 2019-01-01 RX ORDER — MEROPENEM 1 G/30ML
1000 INJECTION INTRAVENOUS EVERY 12 HOURS
Refills: 0 | Status: COMPLETED | OUTPATIENT
Start: 2019-01-01 | End: 2019-01-01

## 2019-01-01 RX ORDER — HYDROCORTISONE 20 MG
50 TABLET ORAL ONCE
Refills: 0 | Status: COMPLETED | OUTPATIENT
Start: 2019-01-01 | End: 2019-01-01

## 2019-01-01 RX ORDER — POTASSIUM CHLORIDE 20 MEQ
20 PACKET (EA) ORAL ONCE
Refills: 0 | Status: COMPLETED | OUTPATIENT
Start: 2019-01-01 | End: 2019-01-01

## 2019-01-01 RX ORDER — INSULIN HUMAN 100 [IU]/ML
10 INJECTION, SOLUTION SUBCUTANEOUS ONCE
Refills: 0 | Status: DISCONTINUED | OUTPATIENT
Start: 2019-01-01 | End: 2019-01-01

## 2019-01-01 RX ORDER — LINEZOLID 600 MG/300ML
600 INJECTION, SOLUTION INTRAVENOUS EVERY 12 HOURS
Refills: 0 | Status: DISCONTINUED | OUTPATIENT
Start: 2019-01-01 | End: 2019-01-01

## 2019-01-01 RX ORDER — MEROPENEM 1 G/30ML
500 INJECTION INTRAVENOUS EVERY 8 HOURS
Refills: 0 | Status: DISCONTINUED | OUTPATIENT
Start: 2019-01-01 | End: 2019-01-01

## 2019-01-01 RX ORDER — HYDROCORTISONE 20 MG
100 TABLET ORAL EVERY 8 HOURS
Refills: 0 | Status: DISCONTINUED | OUTPATIENT
Start: 2019-01-01 | End: 2019-01-01

## 2019-01-01 RX ORDER — AMPICILLIN TRIHYDRATE 250 MG
CAPSULE ORAL
Refills: 0 | Status: DISCONTINUED | OUTPATIENT
Start: 2019-01-01 | End: 2019-01-01

## 2019-01-01 RX ORDER — FOLIC ACID 0.8 MG
1 TABLET ORAL DAILY
Refills: 0 | Status: DISCONTINUED | OUTPATIENT
Start: 2019-01-01 | End: 2019-01-01

## 2019-01-01 RX ORDER — SODIUM POLYSTYRENE SULFONATE 4.1 MEQ/G
15 POWDER, FOR SUSPENSION ORAL ONCE
Refills: 0 | Status: COMPLETED | OUTPATIENT
Start: 2019-01-01 | End: 2019-01-01

## 2019-01-01 RX ORDER — VASOPRESSIN 20 [USP'U]/ML
0.04 INJECTION INTRAVENOUS
Qty: 50 | Refills: 0 | Status: DISCONTINUED | OUTPATIENT
Start: 2019-01-01 | End: 2019-01-01

## 2019-01-01 RX ORDER — CHLORHEXIDINE GLUCONATE 213 G/1000ML
15 SOLUTION TOPICAL EVERY 12 HOURS
Refills: 0 | Status: DISCONTINUED | OUTPATIENT
Start: 2019-01-01 | End: 2019-01-01

## 2019-01-01 RX ORDER — POLYETHYLENE GLYCOL 3350 17 G/17G
17 POWDER, FOR SOLUTION ORAL EVERY 12 HOURS
Refills: 0 | Status: DISCONTINUED | OUTPATIENT
Start: 2019-01-01 | End: 2019-01-01

## 2019-01-01 RX ORDER — LACTULOSE 10 G/15ML
200 SOLUTION ORAL DAILY
Refills: 0 | Status: DISCONTINUED | OUTPATIENT
Start: 2019-01-01 | End: 2019-01-01

## 2019-01-01 RX ORDER — LEVOTHYROXINE SODIUM 125 MCG
75 TABLET ORAL DAILY
Refills: 0 | Status: DISCONTINUED | OUTPATIENT
Start: 2019-01-01 | End: 2019-01-01

## 2019-01-01 RX ORDER — NIFEDIPINE 30 MG
10 TABLET, EXTENDED RELEASE 24 HR ORAL EVERY 8 HOURS
Refills: 0 | Status: DISCONTINUED | OUTPATIENT
Start: 2019-01-01 | End: 2019-01-01

## 2019-01-01 RX ORDER — ALBUTEROL 90 UG/1
2.5 AEROSOL, METERED ORAL EVERY 6 HOURS
Refills: 0 | Status: DISCONTINUED | OUTPATIENT
Start: 2019-01-01 | End: 2019-01-01

## 2019-01-01 RX ORDER — ALBUMIN HUMAN 25 %
50 VIAL (ML) INTRAVENOUS ONCE
Refills: 0 | Status: DISCONTINUED | OUTPATIENT
Start: 2019-01-01 | End: 2019-01-01

## 2019-01-01 RX ORDER — ACETAMINOPHEN WITH CODEINE 300MG-30MG
1 TABLET ORAL EVERY 8 HOURS
Refills: 0 | Status: DISCONTINUED | OUTPATIENT
Start: 2019-01-01 | End: 2019-01-01

## 2019-01-01 RX ORDER — CHLORHEXIDINE GLUCONATE 213 G/1000ML
1 SOLUTION TOPICAL
Refills: 0 | Status: DISCONTINUED | OUTPATIENT
Start: 2019-01-01 | End: 2019-01-01

## 2019-01-01 RX ORDER — ALBUMIN HUMAN 25 %
100 VIAL (ML) INTRAVENOUS EVERY 4 HOURS
Refills: 0 | Status: COMPLETED | OUTPATIENT
Start: 2019-01-01 | End: 2019-01-01

## 2019-01-01 RX ORDER — MIDODRINE HYDROCHLORIDE 2.5 MG/1
10 TABLET ORAL EVERY 8 HOURS
Refills: 0 | Status: DISCONTINUED | OUTPATIENT
Start: 2019-01-01 | End: 2019-01-01

## 2019-01-01 RX ORDER — POTASSIUM CHLORIDE 20 MEQ
40 PACKET (EA) ORAL DAILY
Refills: 0 | Status: DISCONTINUED | OUTPATIENT
Start: 2019-01-01 | End: 2019-01-01

## 2019-01-01 RX ORDER — CEFTRIAXONE 500 MG/1
2000 INJECTION, POWDER, FOR SOLUTION INTRAMUSCULAR; INTRAVENOUS EVERY 12 HOURS
Refills: 0 | Status: DISCONTINUED | OUTPATIENT
Start: 2019-01-01 | End: 2019-01-01

## 2019-01-01 RX ORDER — VANCOMYCIN HCL 1 G
VIAL (EA) INTRAVENOUS
Refills: 0 | Status: DISCONTINUED | OUTPATIENT
Start: 2019-01-01 | End: 2019-01-01

## 2019-01-01 RX ORDER — PHENYLEPHRINE HYDROCHLORIDE 10 MG/ML
0.1 INJECTION INTRAVENOUS
Qty: 160 | Refills: 0 | Status: DISCONTINUED | OUTPATIENT
Start: 2019-01-01 | End: 2019-01-01

## 2019-01-01 RX ORDER — NOREPINEPHRINE BITARTRATE/D5W 8 MG/250ML
0.1 PLASTIC BAG, INJECTION (ML) INTRAVENOUS
Qty: 8 | Refills: 0 | Status: DISCONTINUED | OUTPATIENT
Start: 2019-01-01 | End: 2019-01-01

## 2019-01-01 RX ORDER — SODIUM POLYSTYRENE SULFONATE 4.1 MEQ/G
30 POWDER, FOR SUSPENSION ORAL ONCE
Refills: 0 | Status: COMPLETED | OUTPATIENT
Start: 2019-01-01 | End: 2019-01-01

## 2019-01-01 RX ORDER — CEFTRIAXONE 500 MG/1
1 INJECTION, POWDER, FOR SOLUTION INTRAMUSCULAR; INTRAVENOUS EVERY 24 HOURS
Refills: 0 | Status: DISCONTINUED | OUTPATIENT
Start: 2019-01-01 | End: 2019-01-01

## 2019-01-01 RX ORDER — VANCOMYCIN HCL 1 G
1250 VIAL (EA) INTRAVENOUS ONCE
Refills: 0 | Status: COMPLETED | OUTPATIENT
Start: 2019-01-01 | End: 2019-01-01

## 2019-01-01 RX ORDER — AMPICILLIN SODIUM AND SULBACTAM SODIUM 250; 125 MG/ML; MG/ML
2.25 INJECTION, POWDER, FOR SUSPENSION INTRAMUSCULAR; INTRAVENOUS EVERY 6 HOURS
Refills: 0 | Status: DISCONTINUED | OUTPATIENT
Start: 2019-01-01 | End: 2019-01-01

## 2019-01-01 RX ORDER — ACETAMINOPHEN 500 MG
325 TABLET ORAL EVERY 4 HOURS
Refills: 0 | Status: DISCONTINUED | OUTPATIENT
Start: 2019-01-01 | End: 2019-01-01

## 2019-01-01 RX ORDER — PHENYLEPHRINE HYDROCHLORIDE 10 MG/ML
0.1 INJECTION INTRAVENOUS
Qty: 40 | Refills: 0 | Status: DISCONTINUED | OUTPATIENT
Start: 2019-01-01 | End: 2019-01-01

## 2019-01-01 RX ORDER — IBUPROFEN 200 MG
200 TABLET ORAL EVERY 12 HOURS
Refills: 0 | Status: DISCONTINUED | OUTPATIENT
Start: 2019-01-01 | End: 2019-01-01

## 2019-01-01 RX ORDER — CEFTRIAXONE 500 MG/1
INJECTION, POWDER, FOR SOLUTION INTRAMUSCULAR; INTRAVENOUS
Refills: 0 | Status: DISCONTINUED | OUTPATIENT
Start: 2019-01-01 | End: 2019-01-01

## 2019-01-01 RX ORDER — ALBUMIN HUMAN 25 %
100 VIAL (ML) INTRAVENOUS ONCE
Refills: 0 | Status: COMPLETED | OUTPATIENT
Start: 2019-01-01 | End: 2019-01-01

## 2019-01-01 RX ORDER — MIDODRINE HYDROCHLORIDE 2.5 MG/1
5 TABLET ORAL EVERY 8 HOURS
Refills: 0 | Status: DISCONTINUED | OUTPATIENT
Start: 2019-01-01 | End: 2019-01-01

## 2019-01-01 RX ORDER — SODIUM CHLORIDE 9 MG/ML
1000 INJECTION INTRAMUSCULAR; INTRAVENOUS; SUBCUTANEOUS ONCE
Refills: 0 | Status: COMPLETED | OUTPATIENT
Start: 2019-01-01 | End: 2019-01-01

## 2019-01-01 RX ORDER — FENTANYL CITRATE 50 UG/ML
0.5 INJECTION INTRAVENOUS
Qty: 2500 | Refills: 0 | Status: DISCONTINUED | OUTPATIENT
Start: 2019-01-01 | End: 2019-01-01

## 2019-01-01 RX ORDER — AMPICILLIN TRIHYDRATE 250 MG
2 CAPSULE ORAL ONCE
Refills: 0 | Status: COMPLETED | OUTPATIENT
Start: 2019-01-01 | End: 2019-01-01

## 2019-01-01 RX ORDER — TUBERCULIN PURIFIED PROTEIN DERIVATIVE 5 [IU]/.1ML
5 INJECTION, SOLUTION INTRADERMAL ONCE
Refills: 0 | Status: DISCONTINUED | OUTPATIENT
Start: 2019-01-01 | End: 2019-01-01

## 2019-01-01 RX ORDER — FUROSEMIDE 40 MG
20 TABLET ORAL ONCE
Refills: 0 | Status: COMPLETED | OUTPATIENT
Start: 2019-01-01 | End: 2019-01-01

## 2019-01-01 RX ORDER — MAGNESIUM SULFATE 500 MG/ML
2 VIAL (ML) INJECTION ONCE
Refills: 0 | Status: COMPLETED | OUTPATIENT
Start: 2019-01-01 | End: 2019-01-01

## 2019-01-01 RX ADMIN — Medication 30 MILLILITER(S): at 12:21

## 2019-01-01 RX ADMIN — NYSTATIN CREAM 1 APPLICATION(S): 100000 CREAM TOPICAL at 14:43

## 2019-01-01 RX ADMIN — Medication 50 MICROGRAM(S): at 05:29

## 2019-01-01 RX ADMIN — MIDODRINE HYDROCHLORIDE 10 MILLIGRAM(S): 2.5 TABLET ORAL at 21:03

## 2019-01-01 RX ADMIN — NYSTATIN CREAM 1 APPLICATION(S): 100000 CREAM TOPICAL at 05:35

## 2019-01-01 RX ADMIN — Medication 1 TABLET(S): at 11:46

## 2019-01-01 RX ADMIN — SENNA PLUS 2 TABLET(S): 8.6 TABLET ORAL at 21:28

## 2019-01-01 RX ADMIN — Medication 25 MILLIGRAM(S): at 05:54

## 2019-01-01 RX ADMIN — Medication 81 MILLIGRAM(S): at 11:29

## 2019-01-01 RX ADMIN — Medication 1 TABLET(S): at 17:25

## 2019-01-01 RX ADMIN — NYSTATIN CREAM 1 APPLICATION(S): 100000 CREAM TOPICAL at 17:20

## 2019-01-01 RX ADMIN — SODIUM POLYSTYRENE SULFONATE 15 GRAM(S): 4.1 POWDER, FOR SUSPENSION ORAL at 18:35

## 2019-01-01 RX ADMIN — MIDODRINE HYDROCHLORIDE 10 MILLIGRAM(S): 2.5 TABLET ORAL at 21:32

## 2019-01-01 RX ADMIN — CHLORHEXIDINE GLUCONATE 1 APPLICATION(S): 213 SOLUTION TOPICAL at 05:23

## 2019-01-01 RX ADMIN — MIDODRINE HYDROCHLORIDE 10 MILLIGRAM(S): 2.5 TABLET ORAL at 21:34

## 2019-01-01 RX ADMIN — Medication 50 MILLILITER(S): at 15:13

## 2019-01-01 RX ADMIN — Medication 50 MICROGRAM(S): at 05:45

## 2019-01-01 RX ADMIN — CHLORHEXIDINE GLUCONATE 15 MILLILITER(S): 213 SOLUTION TOPICAL at 21:44

## 2019-01-01 RX ADMIN — MIDODRINE HYDROCHLORIDE 10 MILLIGRAM(S): 2.5 TABLET ORAL at 05:56

## 2019-01-01 RX ADMIN — MIDODRINE HYDROCHLORIDE 10 MILLIGRAM(S): 2.5 TABLET ORAL at 05:33

## 2019-01-01 RX ADMIN — MIDODRINE HYDROCHLORIDE 10 MILLIGRAM(S): 2.5 TABLET ORAL at 06:00

## 2019-01-01 RX ADMIN — Medication 100 MILLIGRAM(S): at 05:21

## 2019-01-01 RX ADMIN — Medication 325 MILLIGRAM(S): at 13:20

## 2019-01-01 RX ADMIN — Medication 25 MILLIGRAM(S): at 17:37

## 2019-01-01 RX ADMIN — NYSTATIN CREAM 1 APPLICATION(S): 100000 CREAM TOPICAL at 05:46

## 2019-01-01 RX ADMIN — HEPARIN SODIUM 5000 UNIT(S): 5000 INJECTION INTRAVENOUS; SUBCUTANEOUS at 13:18

## 2019-01-01 RX ADMIN — PANTOPRAZOLE SODIUM 40 MILLIGRAM(S): 20 TABLET, DELAYED RELEASE ORAL at 06:18

## 2019-01-01 RX ADMIN — Medication 50 MILLILITER(S): at 12:27

## 2019-01-01 RX ADMIN — Medication 50 MICROGRAM(S): at 05:28

## 2019-01-01 RX ADMIN — Medication 50 MICROGRAM(S): at 05:35

## 2019-01-01 RX ADMIN — CHLORHEXIDINE GLUCONATE 1 APPLICATION(S): 213 SOLUTION TOPICAL at 05:18

## 2019-01-01 RX ADMIN — HEPARIN SODIUM 5000 UNIT(S): 5000 INJECTION INTRAVENOUS; SUBCUTANEOUS at 21:09

## 2019-01-01 RX ADMIN — HEPARIN SODIUM 5000 UNIT(S): 5000 INJECTION INTRAVENOUS; SUBCUTANEOUS at 21:47

## 2019-01-01 RX ADMIN — CHLORHEXIDINE GLUCONATE 1 APPLICATION(S): 213 SOLUTION TOPICAL at 05:03

## 2019-01-01 RX ADMIN — HEPARIN SODIUM 5000 UNIT(S): 5000 INJECTION INTRAVENOUS; SUBCUTANEOUS at 23:38

## 2019-01-01 RX ADMIN — MIDODRINE HYDROCHLORIDE 10 MILLIGRAM(S): 2.5 TABLET ORAL at 05:03

## 2019-01-01 RX ADMIN — Medication 100 MILLIGRAM(S): at 05:42

## 2019-01-01 RX ADMIN — Medication 50 MICROGRAM(S): at 05:57

## 2019-01-01 RX ADMIN — PANTOPRAZOLE SODIUM 40 MILLIGRAM(S): 20 TABLET, DELAYED RELEASE ORAL at 05:54

## 2019-01-01 RX ADMIN — Medication 216 GRAM(S): at 18:12

## 2019-01-01 RX ADMIN — SODIUM CHLORIDE 100 MILLILITER(S): 9 INJECTION INTRAMUSCULAR; INTRAVENOUS; SUBCUTANEOUS at 12:22

## 2019-01-01 RX ADMIN — Medication 25 MILLIGRAM(S): at 05:57

## 2019-01-01 RX ADMIN — CHLORHEXIDINE GLUCONATE 1 APPLICATION(S): 213 SOLUTION TOPICAL at 05:40

## 2019-01-01 RX ADMIN — MIDODRINE HYDROCHLORIDE 10 MILLIGRAM(S): 2.5 TABLET ORAL at 05:36

## 2019-01-01 RX ADMIN — Medication 40 MILLIEQUIVALENT(S): at 11:54

## 2019-01-01 RX ADMIN — NYSTATIN CREAM 1 APPLICATION(S): 100000 CREAM TOPICAL at 14:10

## 2019-01-01 RX ADMIN — PANTOPRAZOLE SODIUM 40 MILLIGRAM(S): 20 TABLET, DELAYED RELEASE ORAL at 05:36

## 2019-01-01 RX ADMIN — Medication 325 MILLIGRAM(S): at 11:49

## 2019-01-01 RX ADMIN — MIDODRINE HYDROCHLORIDE 5 MILLIGRAM(S): 2.5 TABLET ORAL at 05:56

## 2019-01-01 RX ADMIN — MIDODRINE HYDROCHLORIDE 10 MILLIGRAM(S): 2.5 TABLET ORAL at 05:39

## 2019-01-01 RX ADMIN — Medication 325 MILLIGRAM(S): at 12:19

## 2019-01-01 RX ADMIN — Medication 100 MILLIGRAM(S): at 22:19

## 2019-01-01 RX ADMIN — Medication 50 MILLILITER(S): at 11:18

## 2019-01-01 RX ADMIN — NYSTATIN CREAM 1 APPLICATION(S): 100000 CREAM TOPICAL at 23:08

## 2019-01-01 RX ADMIN — Medication 25 MILLIGRAM(S): at 17:03

## 2019-01-01 RX ADMIN — POLYETHYLENE GLYCOL 3350 17 GRAM(S): 17 POWDER, FOR SOLUTION ORAL at 18:12

## 2019-01-01 RX ADMIN — Medication 30 MILLILITER(S): at 12:45

## 2019-01-01 RX ADMIN — Medication 100 MILLIGRAM(S): at 13:20

## 2019-01-01 RX ADMIN — Medication 50 MILLIGRAM(S): at 05:18

## 2019-01-01 RX ADMIN — MEROPENEM 100 MILLIGRAM(S): 1 INJECTION INTRAVENOUS at 21:34

## 2019-01-01 RX ADMIN — INSULIN HUMAN 10 UNIT(S): 100 INJECTION, SOLUTION SUBCUTANEOUS at 10:37

## 2019-01-01 RX ADMIN — NYSTATIN CREAM 1 APPLICATION(S): 100000 CREAM TOPICAL at 21:45

## 2019-01-01 RX ADMIN — MIDODRINE HYDROCHLORIDE 10 MILLIGRAM(S): 2.5 TABLET ORAL at 13:10

## 2019-01-01 RX ADMIN — Medication 80 MILLIGRAM(S): at 12:47

## 2019-01-01 RX ADMIN — SODIUM CHLORIDE 100 MILLILITER(S): 9 INJECTION INTRAMUSCULAR; INTRAVENOUS; SUBCUTANEOUS at 12:09

## 2019-01-01 RX ADMIN — Medication 50 MILLILITER(S): at 11:07

## 2019-01-01 RX ADMIN — MIDODRINE HYDROCHLORIDE 10 MILLIGRAM(S): 2.5 TABLET ORAL at 22:17

## 2019-01-01 RX ADMIN — NYSTATIN CREAM 1 APPLICATION(S): 100000 CREAM TOPICAL at 21:05

## 2019-01-01 RX ADMIN — PIPERACILLIN AND TAZOBACTAM 200 GRAM(S): 4; .5 INJECTION, POWDER, LYOPHILIZED, FOR SOLUTION INTRAVENOUS at 17:10

## 2019-01-01 RX ADMIN — Medication 1 TABLET(S): at 06:05

## 2019-01-01 RX ADMIN — HEPARIN SODIUM 5000 UNIT(S): 5000 INJECTION INTRAVENOUS; SUBCUTANEOUS at 05:03

## 2019-01-01 RX ADMIN — SODIUM CHLORIDE 100 MILLILITER(S): 9 INJECTION INTRAMUSCULAR; INTRAVENOUS; SUBCUTANEOUS at 18:19

## 2019-01-01 RX ADMIN — Medication 1 TABLET(S): at 05:42

## 2019-01-01 RX ADMIN — PIPERACILLIN AND TAZOBACTAM 200 GRAM(S): 4; .5 INJECTION, POWDER, LYOPHILIZED, FOR SOLUTION INTRAVENOUS at 11:23

## 2019-01-01 RX ADMIN — Medication 30 MILLILITER(S): at 13:28

## 2019-01-01 RX ADMIN — Medication 50 MICROGRAM(S): at 06:01

## 2019-01-01 RX ADMIN — Medication 81 MILLIGRAM(S): at 12:30

## 2019-01-01 RX ADMIN — MIDODRINE HYDROCHLORIDE 10 MILLIGRAM(S): 2.5 TABLET ORAL at 13:18

## 2019-01-01 RX ADMIN — Medication 80 MILLIGRAM(S): at 20:05

## 2019-01-01 RX ADMIN — SODIUM CHLORIDE 100 MILLILITER(S): 9 INJECTION, SOLUTION INTRAVENOUS at 11:17

## 2019-01-01 RX ADMIN — Medication 10 MILLIGRAM(S): at 06:14

## 2019-01-01 RX ADMIN — SODIUM CHLORIDE 100 MILLILITER(S): 9 INJECTION, SOLUTION INTRAVENOUS at 13:49

## 2019-01-01 RX ADMIN — Medication 80 MILLIGRAM(S): at 11:24

## 2019-01-01 RX ADMIN — Medication 10 MILLIGRAM(S): at 05:03

## 2019-01-01 RX ADMIN — Medication 100 MILLIGRAM(S): at 21:47

## 2019-01-01 RX ADMIN — SODIUM CHLORIDE 250 MILLILITER(S): 9 INJECTION INTRAMUSCULAR; INTRAVENOUS; SUBCUTANEOUS at 16:00

## 2019-01-01 RX ADMIN — MEROPENEM 100 MILLIGRAM(S): 1 INJECTION INTRAVENOUS at 06:17

## 2019-01-01 RX ADMIN — Medication 1 TABLET(S): at 05:21

## 2019-01-01 RX ADMIN — Medication 50 MILLILITER(S): at 17:18

## 2019-01-01 RX ADMIN — Medication 100 MILLIGRAM(S): at 06:00

## 2019-01-01 RX ADMIN — Medication 216 GRAM(S): at 12:39

## 2019-01-01 RX ADMIN — ALBUTEROL 2.5 MILLIGRAM(S): 90 AEROSOL, METERED ORAL at 07:29

## 2019-01-01 RX ADMIN — CEFTRIAXONE 100 GRAM(S): 500 INJECTION, POWDER, FOR SOLUTION INTRAMUSCULAR; INTRAVENOUS at 14:35

## 2019-01-01 RX ADMIN — INSULIN HUMAN 10 UNIT(S): 100 INJECTION, SOLUTION SUBCUTANEOUS at 18:29

## 2019-01-01 RX ADMIN — Medication 124.88 MICROGRAM(S)/KG/MIN: at 07:00

## 2019-01-01 RX ADMIN — NYSTATIN CREAM 1 APPLICATION(S): 100000 CREAM TOPICAL at 05:57

## 2019-01-01 RX ADMIN — HEPARIN SODIUM 5000 UNIT(S): 5000 INJECTION INTRAVENOUS; SUBCUTANEOUS at 05:35

## 2019-01-01 RX ADMIN — Medication 100 MILLIGRAM(S): at 14:43

## 2019-01-01 RX ADMIN — Medication 81 MILLIGRAM(S): at 11:52

## 2019-01-01 RX ADMIN — Medication 200 MILLILITER(S): at 06:59

## 2019-01-01 RX ADMIN — Medication 1 EACH: at 20:30

## 2019-01-01 RX ADMIN — Medication 25 MILLIGRAM(S): at 18:22

## 2019-01-01 RX ADMIN — Medication 50 MICROGRAM(S): at 06:43

## 2019-01-01 RX ADMIN — NYSTATIN CREAM 1 APPLICATION(S): 100000 CREAM TOPICAL at 13:13

## 2019-01-01 RX ADMIN — Medication 100 MILLIGRAM(S): at 06:18

## 2019-01-01 RX ADMIN — NYSTATIN CREAM 1 APPLICATION(S): 100000 CREAM TOPICAL at 05:23

## 2019-01-01 RX ADMIN — HEPARIN SODIUM 5000 UNIT(S): 5000 INJECTION INTRAVENOUS; SUBCUTANEOUS at 21:05

## 2019-01-01 RX ADMIN — Medication 50 MILLIGRAM(S): at 05:42

## 2019-01-01 RX ADMIN — NYSTATIN CREAM 1 APPLICATION(S): 100000 CREAM TOPICAL at 15:29

## 2019-01-01 RX ADMIN — CHLORHEXIDINE GLUCONATE 1 APPLICATION(S): 213 SOLUTION TOPICAL at 05:28

## 2019-01-01 RX ADMIN — Medication 50 MILLIGRAM(S): at 17:23

## 2019-01-01 RX ADMIN — SODIUM CHLORIDE 100 MILLILITER(S): 9 INJECTION INTRAMUSCULAR; INTRAVENOUS; SUBCUTANEOUS at 05:18

## 2019-01-01 RX ADMIN — Medication 80 MILLIGRAM(S): at 18:42

## 2019-01-01 RX ADMIN — Medication 40 MILLIGRAM(S): at 05:21

## 2019-01-01 RX ADMIN — NYSTATIN CREAM 1 APPLICATION(S): 100000 CREAM TOPICAL at 21:18

## 2019-01-01 RX ADMIN — Medication 40 MILLIGRAM(S): at 05:57

## 2019-01-01 RX ADMIN — Medication 50 MILLIGRAM(S): at 18:33

## 2019-01-01 RX ADMIN — MIDODRINE HYDROCHLORIDE 10 MILLIGRAM(S): 2.5 TABLET ORAL at 23:37

## 2019-01-01 RX ADMIN — PANTOPRAZOLE SODIUM 40 MILLIGRAM(S): 20 TABLET, DELAYED RELEASE ORAL at 06:21

## 2019-01-01 RX ADMIN — Medication 50 MILLIGRAM(S): at 17:18

## 2019-01-01 RX ADMIN — Medication 2000 UNIT(S): at 14:43

## 2019-01-01 RX ADMIN — Medication 81 MILLIGRAM(S): at 12:08

## 2019-01-01 RX ADMIN — HEPARIN SODIUM 5000 UNIT(S): 5000 INJECTION INTRAVENOUS; SUBCUTANEOUS at 21:33

## 2019-01-01 RX ADMIN — HEPARIN SODIUM 5000 UNIT(S): 5000 INJECTION INTRAVENOUS; SUBCUTANEOUS at 05:42

## 2019-01-01 RX ADMIN — SODIUM CHLORIDE 100 MILLILITER(S): 9 INJECTION, SOLUTION INTRAVENOUS at 18:47

## 2019-01-01 RX ADMIN — Medication 100 MILLIGRAM(S): at 21:04

## 2019-01-01 RX ADMIN — Medication 100 MILLIGRAM(S): at 14:45

## 2019-01-01 RX ADMIN — CHLORHEXIDINE GLUCONATE 1 APPLICATION(S): 213 SOLUTION TOPICAL at 06:09

## 2019-01-01 RX ADMIN — Medication 50 MICROGRAM(S): at 06:18

## 2019-01-01 RX ADMIN — LACTULOSE 200 GRAM(S): 10 SOLUTION ORAL at 18:35

## 2019-01-01 RX ADMIN — Medication 80 MILLIGRAM(S): at 12:14

## 2019-01-01 RX ADMIN — NYSTATIN CREAM 1 APPLICATION(S): 100000 CREAM TOPICAL at 13:18

## 2019-01-01 RX ADMIN — Medication 166.67 MILLIGRAM(S): at 14:30

## 2019-01-01 RX ADMIN — Medication 1 TABLET(S): at 11:17

## 2019-01-01 RX ADMIN — PIPERACILLIN AND TAZOBACTAM 200 GRAM(S): 4; .5 INJECTION, POWDER, LYOPHILIZED, FOR SOLUTION INTRAVENOUS at 00:06

## 2019-01-01 RX ADMIN — Medication 216 GRAM(S): at 23:03

## 2019-01-01 RX ADMIN — CHLORHEXIDINE GLUCONATE 1 APPLICATION(S): 213 SOLUTION TOPICAL at 06:06

## 2019-01-01 RX ADMIN — NYSTATIN CREAM 1 APPLICATION(S): 100000 CREAM TOPICAL at 21:29

## 2019-01-01 RX ADMIN — MIDODRINE HYDROCHLORIDE 10 MILLIGRAM(S): 2.5 TABLET ORAL at 14:43

## 2019-01-01 RX ADMIN — Medication 80 MILLIGRAM(S): at 18:38

## 2019-01-01 RX ADMIN — NYSTATIN CREAM 1 APPLICATION(S): 100000 CREAM TOPICAL at 21:56

## 2019-01-01 RX ADMIN — Medication 50 MILLIGRAM(S): at 05:54

## 2019-01-01 RX ADMIN — Medication 216 GRAM(S): at 06:19

## 2019-01-01 RX ADMIN — Medication 50 MICROGRAM(S): at 05:42

## 2019-01-01 RX ADMIN — CEFTRIAXONE 100 GRAM(S): 500 INJECTION, POWDER, FOR SOLUTION INTRAMUSCULAR; INTRAVENOUS at 13:32

## 2019-01-01 RX ADMIN — Medication 1 EACH: at 21:07

## 2019-01-01 RX ADMIN — Medication 1 EACH: at 20:06

## 2019-01-01 RX ADMIN — Medication 40 MILLIEQUIVALENT(S): at 17:20

## 2019-01-01 RX ADMIN — MIDODRINE HYDROCHLORIDE 10 MILLIGRAM(S): 2.5 TABLET ORAL at 06:43

## 2019-01-01 RX ADMIN — SODIUM CHLORIDE 100 MILLILITER(S): 9 INJECTION INTRAMUSCULAR; INTRAVENOUS; SUBCUTANEOUS at 10:06

## 2019-01-01 RX ADMIN — CHLORHEXIDINE GLUCONATE 1 APPLICATION(S): 213 SOLUTION TOPICAL at 05:33

## 2019-01-01 RX ADMIN — HEPARIN SODIUM 5000 UNIT(S): 5000 INJECTION INTRAVENOUS; SUBCUTANEOUS at 05:31

## 2019-01-01 RX ADMIN — Medication 30 MILLILITER(S): at 13:46

## 2019-01-01 RX ADMIN — Medication 30 MILLILITER(S): at 22:55

## 2019-01-01 RX ADMIN — MIDODRINE HYDROCHLORIDE 10 MILLIGRAM(S): 2.5 TABLET ORAL at 13:15

## 2019-01-01 RX ADMIN — HEPARIN SODIUM 5000 UNIT(S): 5000 INJECTION INTRAVENOUS; SUBCUTANEOUS at 06:47

## 2019-01-01 RX ADMIN — Medication 30 MILLILITER(S): at 23:03

## 2019-01-01 RX ADMIN — Medication 50 MILLILITER(S): at 20:42

## 2019-01-01 RX ADMIN — Medication 1 TABLET(S): at 17:26

## 2019-01-01 RX ADMIN — Medication 1 EACH: at 11:39

## 2019-01-01 RX ADMIN — Medication 10 MILLIGRAM(S): at 22:28

## 2019-01-01 RX ADMIN — POLYETHYLENE GLYCOL 3350 17 GRAM(S): 17 POWDER, FOR SOLUTION ORAL at 06:15

## 2019-01-01 RX ADMIN — Medication 25 MILLIGRAM(S): at 18:41

## 2019-01-01 RX ADMIN — Medication 1 TABLET(S): at 12:39

## 2019-01-01 RX ADMIN — HEPARIN SODIUM 5000 UNIT(S): 5000 INJECTION INTRAVENOUS; SUBCUTANEOUS at 13:02

## 2019-01-01 RX ADMIN — CHLORHEXIDINE GLUCONATE 1 APPLICATION(S): 213 SOLUTION TOPICAL at 05:36

## 2019-01-01 RX ADMIN — Medication 40 MILLIEQUIVALENT(S): at 11:46

## 2019-01-01 RX ADMIN — Medication 20 MILLIGRAM(S): at 05:40

## 2019-01-01 RX ADMIN — HEPARIN SODIUM 5000 UNIT(S): 5000 INJECTION INTRAVENOUS; SUBCUTANEOUS at 06:06

## 2019-01-01 RX ADMIN — HEPARIN SODIUM 5000 UNIT(S): 5000 INJECTION INTRAVENOUS; SUBCUTANEOUS at 21:15

## 2019-01-01 RX ADMIN — Medication 50 MICROGRAM(S): at 05:56

## 2019-01-01 RX ADMIN — Medication 50 MICROGRAM(S): at 05:52

## 2019-01-01 RX ADMIN — Medication 50 MILLILITER(S): at 18:29

## 2019-01-01 RX ADMIN — PANTOPRAZOLE SODIUM 40 MILLIGRAM(S): 20 TABLET, DELAYED RELEASE ORAL at 05:29

## 2019-01-01 RX ADMIN — CEFTRIAXONE 100 MILLIGRAM(S): 500 INJECTION, POWDER, FOR SOLUTION INTRAMUSCULAR; INTRAVENOUS at 05:33

## 2019-01-01 RX ADMIN — Medication 30 MILLILITER(S): at 12:29

## 2019-01-01 RX ADMIN — Medication 1 EACH: at 11:19

## 2019-01-01 RX ADMIN — NYSTATIN CREAM 1 APPLICATION(S): 100000 CREAM TOPICAL at 22:00

## 2019-01-01 RX ADMIN — MIDODRINE HYDROCHLORIDE 10 MILLIGRAM(S): 2.5 TABLET ORAL at 06:14

## 2019-01-01 RX ADMIN — MIDODRINE HYDROCHLORIDE 10 MILLIGRAM(S): 2.5 TABLET ORAL at 11:52

## 2019-01-01 RX ADMIN — Medication 216 GRAM(S): at 17:50

## 2019-01-01 RX ADMIN — Medication 100 MILLIGRAM(S): at 21:07

## 2019-01-01 RX ADMIN — Medication 50 MILLILITER(S): at 12:37

## 2019-01-01 RX ADMIN — MIDODRINE HYDROCHLORIDE 10 MILLIGRAM(S): 2.5 TABLET ORAL at 21:38

## 2019-01-01 RX ADMIN — SODIUM CHLORIDE 75 MILLILITER(S): 9 INJECTION INTRAMUSCULAR; INTRAVENOUS; SUBCUTANEOUS at 00:05

## 2019-01-01 RX ADMIN — I.V. FAT EMULSION 21.94 GM/KG/DAY: 20 EMULSION INTRAVENOUS at 20:04

## 2019-01-01 RX ADMIN — NYSTATIN CREAM 1 APPLICATION(S): 100000 CREAM TOPICAL at 14:17

## 2019-01-01 RX ADMIN — SODIUM CHLORIDE 100 MILLILITER(S): 9 INJECTION INTRAMUSCULAR; INTRAVENOUS; SUBCUTANEOUS at 14:33

## 2019-01-01 RX ADMIN — Medication 5 MILLIGRAM(S): at 02:10

## 2019-01-01 RX ADMIN — HEPARIN SODIUM 5000 UNIT(S): 5000 INJECTION INTRAVENOUS; SUBCUTANEOUS at 05:18

## 2019-01-01 RX ADMIN — CHLORHEXIDINE GLUCONATE 1 APPLICATION(S): 213 SOLUTION TOPICAL at 05:56

## 2019-01-01 RX ADMIN — Medication 100 MILLIGRAM(S): at 10:33

## 2019-01-01 RX ADMIN — PANTOPRAZOLE SODIUM 40 MILLIGRAM(S): 20 TABLET, DELAYED RELEASE ORAL at 05:35

## 2019-01-01 RX ADMIN — Medication 25 MILLIGRAM(S): at 05:42

## 2019-01-01 RX ADMIN — Medication 1 TABLET(S): at 17:44

## 2019-01-01 RX ADMIN — Medication 100 MILLIGRAM(S): at 13:18

## 2019-01-01 RX ADMIN — MIDODRINE HYDROCHLORIDE 10 MILLIGRAM(S): 2.5 TABLET ORAL at 05:42

## 2019-01-01 RX ADMIN — HEPARIN SODIUM 5000 UNIT(S): 5000 INJECTION INTRAVENOUS; SUBCUTANEOUS at 05:56

## 2019-01-01 RX ADMIN — Medication 100 MILLIGRAM(S): at 15:21

## 2019-01-01 RX ADMIN — Medication 200 MILLILITER(S): at 06:42

## 2019-01-01 RX ADMIN — Medication 100 MILLIGRAM(S): at 21:32

## 2019-01-01 RX ADMIN — Medication 50 MILLILITER(S): at 13:00

## 2019-01-01 RX ADMIN — NYSTATIN CREAM 1 APPLICATION(S): 100000 CREAM TOPICAL at 05:30

## 2019-01-01 RX ADMIN — MIDODRINE HYDROCHLORIDE 10 MILLIGRAM(S): 2.5 TABLET ORAL at 14:16

## 2019-01-01 RX ADMIN — CEFTRIAXONE 100 MILLIGRAM(S): 500 INJECTION, POWDER, FOR SOLUTION INTRAMUSCULAR; INTRAVENOUS at 17:12

## 2019-01-01 RX ADMIN — Medication 50 MICROGRAM(S): at 05:03

## 2019-01-01 RX ADMIN — Medication 1 TABLET(S): at 11:52

## 2019-01-01 RX ADMIN — MIDODRINE HYDROCHLORIDE 10 MILLIGRAM(S): 2.5 TABLET ORAL at 13:03

## 2019-01-01 RX ADMIN — Medication 50 MILLILITER(S): at 17:01

## 2019-01-01 RX ADMIN — Medication 50 MILLILITER(S): at 20:18

## 2019-01-01 RX ADMIN — MIDODRINE HYDROCHLORIDE 10 MILLIGRAM(S): 2.5 TABLET ORAL at 21:58

## 2019-01-01 RX ADMIN — Medication 250 MILLIGRAM(S): at 12:05

## 2019-01-01 RX ADMIN — Medication 100 MILLIGRAM(S): at 13:02

## 2019-01-01 RX ADMIN — HEPARIN SODIUM 5000 UNIT(S): 5000 INJECTION INTRAVENOUS; SUBCUTANEOUS at 21:34

## 2019-01-01 RX ADMIN — HEPARIN SODIUM 5000 UNIT(S): 5000 INJECTION INTRAVENOUS; SUBCUTANEOUS at 13:42

## 2019-01-01 RX ADMIN — Medication 216 GRAM(S): at 17:12

## 2019-01-01 RX ADMIN — MORPHINE SULFATE 4 MILLIGRAM(S): 50 CAPSULE, EXTENDED RELEASE ORAL at 07:31

## 2019-01-01 RX ADMIN — MIDODRINE HYDROCHLORIDE 10 MILLIGRAM(S): 2.5 TABLET ORAL at 05:34

## 2019-01-01 RX ADMIN — MIDODRINE HYDROCHLORIDE 10 MILLIGRAM(S): 2.5 TABLET ORAL at 21:28

## 2019-01-01 RX ADMIN — HEPARIN SODIUM 5000 UNIT(S): 5000 INJECTION INTRAVENOUS; SUBCUTANEOUS at 05:43

## 2019-01-01 RX ADMIN — PANTOPRAZOLE SODIUM 40 MILLIGRAM(S): 20 TABLET, DELAYED RELEASE ORAL at 06:14

## 2019-01-01 RX ADMIN — MIDODRINE HYDROCHLORIDE 10 MILLIGRAM(S): 2.5 TABLET ORAL at 06:21

## 2019-01-01 RX ADMIN — I.V. FAT EMULSION 21.94 GM/KG/DAY: 20 EMULSION INTRAVENOUS at 22:00

## 2019-01-01 RX ADMIN — Medication 80 MILLIGRAM(S): at 13:19

## 2019-01-01 RX ADMIN — Medication 50 MILLILITER(S): at 17:59

## 2019-01-01 RX ADMIN — PIPERACILLIN AND TAZOBACTAM 200 GRAM(S): 4; .5 INJECTION, POWDER, LYOPHILIZED, FOR SOLUTION INTRAVENOUS at 06:23

## 2019-01-01 RX ADMIN — NYSTATIN CREAM 1 APPLICATION(S): 100000 CREAM TOPICAL at 22:18

## 2019-01-01 RX ADMIN — Medication 50 MILLILITER(S): at 17:29

## 2019-01-01 RX ADMIN — Medication 40 MILLIEQUIVALENT(S): at 12:37

## 2019-01-01 RX ADMIN — Medication 81 MILLIGRAM(S): at 11:17

## 2019-01-01 RX ADMIN — Medication 50 MICROGRAM(S): at 05:37

## 2019-01-01 RX ADMIN — VASOPRESSIN 2.4 UNIT(S)/MIN: 20 INJECTION INTRAVENOUS at 21:07

## 2019-01-01 RX ADMIN — HEPARIN SODIUM 5000 UNIT(S): 5000 INJECTION INTRAVENOUS; SUBCUTANEOUS at 21:30

## 2019-01-01 RX ADMIN — Medication 100 MILLIGRAM(S): at 13:14

## 2019-01-01 RX ADMIN — MIDODRINE HYDROCHLORIDE 10 MILLIGRAM(S): 2.5 TABLET ORAL at 21:47

## 2019-01-01 RX ADMIN — Medication 100 MILLIGRAM(S): at 13:57

## 2019-01-01 RX ADMIN — MIDODRINE HYDROCHLORIDE 10 MILLIGRAM(S): 2.5 TABLET ORAL at 13:12

## 2019-01-01 RX ADMIN — NYSTATIN CREAM 1 APPLICATION(S): 100000 CREAM TOPICAL at 21:08

## 2019-01-01 RX ADMIN — NYSTATIN CREAM 1 APPLICATION(S): 100000 CREAM TOPICAL at 21:47

## 2019-01-01 RX ADMIN — SENNA PLUS 2 TABLET(S): 8.6 TABLET ORAL at 21:29

## 2019-01-01 RX ADMIN — HEPARIN SODIUM 5000 UNIT(S): 5000 INJECTION INTRAVENOUS; SUBCUTANEOUS at 06:20

## 2019-01-01 RX ADMIN — SODIUM CHLORIDE 100 MILLILITER(S): 9 INJECTION INTRAMUSCULAR; INTRAVENOUS; SUBCUTANEOUS at 01:12

## 2019-01-01 RX ADMIN — Medication 40 MILLIEQUIVALENT(S): at 11:13

## 2019-01-01 RX ADMIN — NYSTATIN CREAM 1 APPLICATION(S): 100000 CREAM TOPICAL at 06:06

## 2019-01-01 RX ADMIN — Medication 1 EACH: at 20:04

## 2019-01-01 RX ADMIN — CEFTRIAXONE 100 MILLIGRAM(S): 500 INJECTION, POWDER, FOR SOLUTION INTRAMUSCULAR; INTRAVENOUS at 05:43

## 2019-01-01 RX ADMIN — Medication 216 GRAM(S): at 23:51

## 2019-01-01 RX ADMIN — Medication 50 MILLIGRAM(S): at 05:45

## 2019-01-01 RX ADMIN — Medication 80 MILLIGRAM(S): at 19:02

## 2019-01-01 RX ADMIN — ALBUTEROL 2.5 MILLIGRAM(S): 90 AEROSOL, METERED ORAL at 01:11

## 2019-01-01 RX ADMIN — MEROPENEM 100 MILLIGRAM(S): 1 INJECTION INTRAVENOUS at 05:33

## 2019-01-01 RX ADMIN — CHLORHEXIDINE GLUCONATE 1 APPLICATION(S): 213 SOLUTION TOPICAL at 05:42

## 2019-01-01 RX ADMIN — ONDANSETRON 4 MILLIGRAM(S): 8 TABLET, FILM COATED ORAL at 11:20

## 2019-01-01 RX ADMIN — HEPARIN SODIUM 5000 UNIT(S): 5000 INJECTION INTRAVENOUS; SUBCUTANEOUS at 13:19

## 2019-01-01 RX ADMIN — Medication 50 MILLILITER(S): at 10:39

## 2019-01-01 RX ADMIN — HEPARIN SODIUM 5000 UNIT(S): 5000 INJECTION INTRAVENOUS; SUBCUTANEOUS at 21:13

## 2019-01-01 RX ADMIN — MIDODRINE HYDROCHLORIDE 10 MILLIGRAM(S): 2.5 TABLET ORAL at 14:45

## 2019-01-01 RX ADMIN — Medication 25 MILLIGRAM(S): at 17:13

## 2019-01-01 RX ADMIN — MIDODRINE HYDROCHLORIDE 10 MILLIGRAM(S): 2.5 TABLET ORAL at 05:21

## 2019-01-01 RX ADMIN — Medication 1 EACH: at 20:25

## 2019-01-01 RX ADMIN — NYSTATIN CREAM 1 APPLICATION(S): 100000 CREAM TOPICAL at 13:20

## 2019-01-01 RX ADMIN — Medication 40 MILLIGRAM(S): at 14:44

## 2019-01-01 RX ADMIN — Medication 50 MILLIGRAM(S): at 05:52

## 2019-01-01 RX ADMIN — NYSTATIN CREAM 1 APPLICATION(S): 100000 CREAM TOPICAL at 13:03

## 2019-01-01 RX ADMIN — NYSTATIN CREAM 1 APPLICATION(S): 100000 CREAM TOPICAL at 15:21

## 2019-01-01 RX ADMIN — HEPARIN SODIUM 5000 UNIT(S): 5000 INJECTION INTRAVENOUS; SUBCUTANEOUS at 21:10

## 2019-01-01 RX ADMIN — NYSTATIN CREAM 1 APPLICATION(S): 100000 CREAM TOPICAL at 21:06

## 2019-01-01 RX ADMIN — HEPARIN SODIUM 5000 UNIT(S): 5000 INJECTION INTRAVENOUS; SUBCUTANEOUS at 07:30

## 2019-01-01 RX ADMIN — Medication 100 MILLIGRAM(S): at 06:05

## 2019-01-01 RX ADMIN — NYSTATIN CREAM 1 APPLICATION(S): 100000 CREAM TOPICAL at 06:43

## 2019-01-01 RX ADMIN — SODIUM CHLORIDE 100 MILLILITER(S): 9 INJECTION INTRAMUSCULAR; INTRAVENOUS; SUBCUTANEOUS at 21:10

## 2019-01-01 RX ADMIN — Medication 50 MILLILITER(S): at 17:13

## 2019-01-01 RX ADMIN — HEPARIN SODIUM 5000 UNIT(S): 5000 INJECTION INTRAVENOUS; SUBCUTANEOUS at 13:28

## 2019-01-01 RX ADMIN — Medication 1 TABLET(S): at 17:19

## 2019-01-01 RX ADMIN — Medication 40 MILLIGRAM(S): at 17:17

## 2019-01-01 RX ADMIN — Medication 40 MILLIEQUIVALENT(S): at 12:38

## 2019-01-01 RX ADMIN — Medication 10 MILLIGRAM(S): at 05:34

## 2019-01-01 RX ADMIN — Medication 30 MILLILITER(S): at 11:13

## 2019-01-01 RX ADMIN — Medication 30 MILLILITER(S): at 01:34

## 2019-01-01 RX ADMIN — MEROPENEM 100 MILLIGRAM(S): 1 INJECTION INTRAVENOUS at 21:44

## 2019-01-01 RX ADMIN — Medication 100 MILLIGRAM(S): at 21:06

## 2019-01-01 RX ADMIN — Medication 100 MILLIGRAM(S): at 05:34

## 2019-01-01 RX ADMIN — Medication 50 MICROGRAM(S): at 05:34

## 2019-01-01 RX ADMIN — Medication 80 MILLIGRAM(S): at 13:12

## 2019-01-01 RX ADMIN — Medication 81 MILLIGRAM(S): at 11:08

## 2019-01-01 RX ADMIN — Medication 2000 UNIT(S): at 11:40

## 2019-01-01 RX ADMIN — CHLORHEXIDINE GLUCONATE 1 APPLICATION(S): 213 SOLUTION TOPICAL at 05:29

## 2019-01-01 RX ADMIN — PANTOPRAZOLE SODIUM 40 MILLIGRAM(S): 20 TABLET, DELAYED RELEASE ORAL at 05:30

## 2019-01-01 RX ADMIN — NYSTATIN CREAM 1 APPLICATION(S): 100000 CREAM TOPICAL at 15:07

## 2019-01-01 RX ADMIN — Medication 50 MILLILITER(S): at 22:18

## 2019-01-01 RX ADMIN — Medication 100 MILLIGRAM(S): at 23:38

## 2019-01-01 RX ADMIN — MIDODRINE HYDROCHLORIDE 10 MILLIGRAM(S): 2.5 TABLET ORAL at 18:42

## 2019-01-01 RX ADMIN — Medication 81 MILLIGRAM(S): at 12:37

## 2019-01-01 RX ADMIN — HEPARIN SODIUM 5000 UNIT(S): 5000 INJECTION INTRAVENOUS; SUBCUTANEOUS at 05:45

## 2019-01-01 RX ADMIN — PANTOPRAZOLE SODIUM 40 MILLIGRAM(S): 20 TABLET, DELAYED RELEASE ORAL at 05:40

## 2019-01-01 RX ADMIN — MEROPENEM 100 MILLIGRAM(S): 1 INJECTION INTRAVENOUS at 14:10

## 2019-01-01 RX ADMIN — Medication 40 MILLIEQUIVALENT(S): at 15:07

## 2019-01-01 RX ADMIN — Medication 80 MILLIGRAM(S): at 19:30

## 2019-01-01 RX ADMIN — PANTOPRAZOLE SODIUM 40 MILLIGRAM(S): 20 TABLET, DELAYED RELEASE ORAL at 05:37

## 2019-01-01 RX ADMIN — Medication 50 MICROGRAM(S): at 06:20

## 2019-01-01 RX ADMIN — Medication 50 MICROGRAM(S): at 05:54

## 2019-01-01 RX ADMIN — HEPARIN SODIUM 5000 UNIT(S): 5000 INJECTION INTRAVENOUS; SUBCUTANEOUS at 14:45

## 2019-01-01 RX ADMIN — PANTOPRAZOLE SODIUM 40 MILLIGRAM(S): 20 TABLET, DELAYED RELEASE ORAL at 06:43

## 2019-01-01 RX ADMIN — HEPARIN SODIUM 5000 UNIT(S): 5000 INJECTION INTRAVENOUS; SUBCUTANEOUS at 21:04

## 2019-01-01 RX ADMIN — Medication 25 MILLIGRAM(S): at 06:43

## 2019-01-01 RX ADMIN — Medication 100 MILLIGRAM(S): at 21:20

## 2019-01-01 RX ADMIN — Medication 81 MILLIGRAM(S): at 11:50

## 2019-01-01 RX ADMIN — Medication 40 MILLIEQUIVALENT(S): at 12:15

## 2019-01-01 RX ADMIN — Medication 81 MILLIGRAM(S): at 11:18

## 2019-01-01 RX ADMIN — MIDODRINE HYDROCHLORIDE 10 MILLIGRAM(S): 2.5 TABLET ORAL at 14:10

## 2019-01-01 RX ADMIN — HEPARIN SODIUM 5000 UNIT(S): 5000 INJECTION INTRAVENOUS; SUBCUTANEOUS at 14:00

## 2019-01-01 RX ADMIN — Medication 50 MILLILITER(S): at 21:35

## 2019-01-01 RX ADMIN — SODIUM POLYSTYRENE SULFONATE 15 GRAM(S): 4.1 POWDER, FOR SUSPENSION ORAL at 11:18

## 2019-01-01 RX ADMIN — CHLORHEXIDINE GLUCONATE 1 APPLICATION(S): 213 SOLUTION TOPICAL at 05:30

## 2019-01-01 RX ADMIN — HEPARIN SODIUM 5000 UNIT(S): 5000 INJECTION INTRAVENOUS; SUBCUTANEOUS at 14:43

## 2019-01-01 RX ADMIN — HEPARIN SODIUM 5000 UNIT(S): 5000 INJECTION INTRAVENOUS; SUBCUTANEOUS at 14:15

## 2019-01-01 RX ADMIN — Medication 81 MILLIGRAM(S): at 11:21

## 2019-01-01 RX ADMIN — Medication 100 MILLIGRAM(S): at 05:36

## 2019-01-01 RX ADMIN — Medication 80 MILLIGRAM(S): at 12:25

## 2019-01-01 RX ADMIN — NYSTATIN CREAM 1 APPLICATION(S): 100000 CREAM TOPICAL at 21:03

## 2019-01-01 RX ADMIN — NYSTATIN CREAM 1 APPLICATION(S): 100000 CREAM TOPICAL at 13:19

## 2019-01-01 RX ADMIN — Medication 100 MILLIGRAM(S): at 21:28

## 2019-01-01 RX ADMIN — Medication 81 MILLIGRAM(S): at 11:20

## 2019-01-01 RX ADMIN — MIDODRINE HYDROCHLORIDE 10 MILLIGRAM(S): 2.5 TABLET ORAL at 21:17

## 2019-01-01 RX ADMIN — HEPARIN SODIUM 5000 UNIT(S): 5000 INJECTION INTRAVENOUS; SUBCUTANEOUS at 13:33

## 2019-01-01 RX ADMIN — Medication 1 TABLET(S): at 05:29

## 2019-01-01 RX ADMIN — ALBUTEROL 2.5 MILLIGRAM(S): 90 AEROSOL, METERED ORAL at 13:16

## 2019-01-01 RX ADMIN — MIDODRINE HYDROCHLORIDE 10 MILLIGRAM(S): 2.5 TABLET ORAL at 21:06

## 2019-01-01 RX ADMIN — Medication 100 MILLIGRAM(S): at 21:17

## 2019-01-01 RX ADMIN — HEPARIN SODIUM 5000 UNIT(S): 5000 INJECTION INTRAVENOUS; SUBCUTANEOUS at 17:21

## 2019-01-01 RX ADMIN — Medication 10 MILLIGRAM(S): at 05:54

## 2019-01-01 RX ADMIN — Medication 80 MILLIGRAM(S): at 13:18

## 2019-01-01 RX ADMIN — Medication 1 TABLET(S): at 17:04

## 2019-01-01 RX ADMIN — Medication 10 MILLIGRAM(S): at 21:59

## 2019-01-01 RX ADMIN — NYSTATIN CREAM 1 APPLICATION(S): 100000 CREAM TOPICAL at 13:08

## 2019-01-01 RX ADMIN — Medication 1 MILLIGRAM(S): at 12:39

## 2019-01-01 RX ADMIN — Medication 50 MICROGRAM(S): at 05:18

## 2019-01-01 RX ADMIN — HEPARIN SODIUM 5000 UNIT(S): 5000 INJECTION INTRAVENOUS; SUBCUTANEOUS at 06:42

## 2019-01-01 RX ADMIN — Medication 100 MILLIGRAM(S): at 21:35

## 2019-01-01 RX ADMIN — Medication 80 MILLIGRAM(S): at 23:55

## 2019-01-01 RX ADMIN — MIDODRINE HYDROCHLORIDE 10 MILLIGRAM(S): 2.5 TABLET ORAL at 06:18

## 2019-01-01 RX ADMIN — Medication 50 MILLILITER(S): at 02:20

## 2019-01-01 RX ADMIN — HEPARIN SODIUM 5000 UNIT(S): 5000 INJECTION INTRAVENOUS; SUBCUTANEOUS at 22:24

## 2019-01-01 RX ADMIN — Medication 50 MILLIGRAM(S): at 17:52

## 2019-01-01 RX ADMIN — SODIUM CHLORIDE 125 MILLILITER(S): 9 INJECTION, SOLUTION INTRAVENOUS at 07:00

## 2019-01-01 RX ADMIN — HEPARIN SODIUM 5000 UNIT(S): 5000 INJECTION INTRAVENOUS; SUBCUTANEOUS at 21:38

## 2019-01-01 RX ADMIN — Medication 80 MILLIGRAM(S): at 13:20

## 2019-01-01 RX ADMIN — HEPARIN SODIUM 5000 UNIT(S): 5000 INJECTION INTRAVENOUS; SUBCUTANEOUS at 06:09

## 2019-01-01 RX ADMIN — CEFTRIAXONE 100 MILLIGRAM(S): 500 INJECTION, POWDER, FOR SOLUTION INTRAMUSCULAR; INTRAVENOUS at 05:53

## 2019-01-01 RX ADMIN — SENNA PLUS 2 TABLET(S): 8.6 TABLET ORAL at 21:34

## 2019-01-01 RX ADMIN — Medication 216 GRAM(S): at 05:53

## 2019-01-01 RX ADMIN — MIDODRINE HYDROCHLORIDE 5 MILLIGRAM(S): 2.5 TABLET ORAL at 21:03

## 2019-01-01 RX ADMIN — MIDODRINE HYDROCHLORIDE 10 MILLIGRAM(S): 2.5 TABLET ORAL at 21:33

## 2019-01-01 RX ADMIN — NYSTATIN CREAM 1 APPLICATION(S): 100000 CREAM TOPICAL at 21:57

## 2019-01-01 RX ADMIN — Medication 2000 UNIT(S): at 11:52

## 2019-01-01 RX ADMIN — Medication 30 MILLILITER(S): at 12:38

## 2019-01-01 RX ADMIN — PANTOPRAZOLE SODIUM 40 MILLIGRAM(S): 20 TABLET, DELAYED RELEASE ORAL at 07:57

## 2019-01-01 RX ADMIN — Medication 50 MILLIGRAM(S): at 17:05

## 2019-01-01 RX ADMIN — Medication 2000 UNIT(S): at 12:37

## 2019-01-01 RX ADMIN — Medication 50 MICROGRAM(S): at 06:47

## 2019-01-01 RX ADMIN — Medication 75 MICROGRAM(S): at 05:54

## 2019-01-01 RX ADMIN — Medication 80 MILLIGRAM(S): at 11:54

## 2019-01-01 RX ADMIN — SODIUM CHLORIDE 100 MILLILITER(S): 9 INJECTION INTRAMUSCULAR; INTRAVENOUS; SUBCUTANEOUS at 22:24

## 2019-01-01 RX ADMIN — SODIUM CHLORIDE 50 MILLILITER(S): 9 INJECTION, SOLUTION INTRAVENOUS at 09:30

## 2019-01-01 RX ADMIN — Medication 81 MILLIGRAM(S): at 11:33

## 2019-01-01 RX ADMIN — Medication 50 MICROGRAM(S): at 05:53

## 2019-01-01 RX ADMIN — NYSTATIN CREAM 1 APPLICATION(S): 100000 CREAM TOPICAL at 13:10

## 2019-01-01 RX ADMIN — Medication 81 MILLIGRAM(S): at 13:12

## 2019-01-01 RX ADMIN — I.V. FAT EMULSION 21.94 GM/KG/DAY: 20 EMULSION INTRAVENOUS at 21:01

## 2019-01-01 RX ADMIN — MIDODRINE HYDROCHLORIDE 5 MILLIGRAM(S): 2.5 TABLET ORAL at 13:19

## 2019-01-01 RX ADMIN — Medication 80 MILLIGRAM(S): at 13:31

## 2019-01-01 RX ADMIN — HEPARIN SODIUM 5000 UNIT(S): 5000 INJECTION INTRAVENOUS; SUBCUTANEOUS at 21:59

## 2019-01-01 RX ADMIN — Medication 300 MILLIGRAM(S): at 11:19

## 2019-01-01 RX ADMIN — CHLORHEXIDINE GLUCONATE 1 APPLICATION(S): 213 SOLUTION TOPICAL at 06:19

## 2019-01-01 RX ADMIN — Medication 1 TABLET(S): at 05:53

## 2019-01-01 RX ADMIN — HEPARIN SODIUM 5000 UNIT(S): 5000 INJECTION INTRAVENOUS; SUBCUTANEOUS at 21:17

## 2019-01-01 RX ADMIN — Medication 1 TABLET(S): at 17:33

## 2019-01-01 RX ADMIN — Medication 100 MILLIGRAM(S): at 05:29

## 2019-01-01 RX ADMIN — Medication 30 MILLILITER(S): at 21:03

## 2019-01-01 RX ADMIN — Medication 1 TABLET(S): at 11:39

## 2019-01-01 RX ADMIN — Medication 50 MILLILITER(S): at 18:36

## 2019-01-01 RX ADMIN — MIDODRINE HYDROCHLORIDE 10 MILLIGRAM(S): 2.5 TABLET ORAL at 05:29

## 2019-01-01 RX ADMIN — NYSTATIN CREAM 1 APPLICATION(S): 100000 CREAM TOPICAL at 05:03

## 2019-01-01 RX ADMIN — CEFTRIAXONE 100 GRAM(S): 500 INJECTION, POWDER, FOR SOLUTION INTRAMUSCULAR; INTRAVENOUS at 13:42

## 2019-01-01 RX ADMIN — NYSTATIN CREAM 1 APPLICATION(S): 100000 CREAM TOPICAL at 21:44

## 2019-01-01 RX ADMIN — HEPARIN SODIUM 5000 UNIT(S): 5000 INJECTION INTRAVENOUS; SUBCUTANEOUS at 13:12

## 2019-01-01 RX ADMIN — NYSTATIN CREAM 1 APPLICATION(S): 100000 CREAM TOPICAL at 15:18

## 2019-01-01 RX ADMIN — Medication 50 MILLIEQUIVALENT(S): at 16:44

## 2019-01-01 RX ADMIN — Medication 100 MILLIGRAM(S): at 13:12

## 2019-01-01 RX ADMIN — Medication 1 EACH: at 21:02

## 2019-01-01 RX ADMIN — Medication 80 MILLIGRAM(S): at 13:07

## 2019-01-01 RX ADMIN — Medication 50 MILLILITER(S): at 20:27

## 2019-01-01 RX ADMIN — HEPARIN SODIUM 5000 UNIT(S): 5000 INJECTION INTRAVENOUS; SUBCUTANEOUS at 21:28

## 2019-01-01 RX ADMIN — MIDODRINE HYDROCHLORIDE 10 MILLIGRAM(S): 2.5 TABLET ORAL at 13:57

## 2019-01-01 RX ADMIN — Medication 40 MILLIGRAM(S): at 05:29

## 2019-01-01 RX ADMIN — HEPARIN SODIUM 5000 UNIT(S): 5000 INJECTION INTRAVENOUS; SUBCUTANEOUS at 21:20

## 2019-01-01 RX ADMIN — Medication 50 MILLIGRAM(S): at 17:03

## 2019-01-01 RX ADMIN — HEPARIN SODIUM 5000 UNIT(S): 5000 INJECTION INTRAVENOUS; SUBCUTANEOUS at 21:44

## 2019-01-01 RX ADMIN — PANTOPRAZOLE SODIUM 40 MILLIGRAM(S): 20 TABLET, DELAYED RELEASE ORAL at 05:18

## 2019-01-01 RX ADMIN — Medication 25 MILLIGRAM(S): at 17:26

## 2019-01-01 RX ADMIN — Medication 50 MICROGRAM(S): at 06:09

## 2019-01-01 RX ADMIN — Medication 30 MILLILITER(S): at 09:43

## 2019-01-01 RX ADMIN — MIDODRINE HYDROCHLORIDE 10 MILLIGRAM(S): 2.5 TABLET ORAL at 21:05

## 2019-01-01 RX ADMIN — NYSTATIN CREAM 1 APPLICATION(S): 100000 CREAM TOPICAL at 22:28

## 2019-01-01 RX ADMIN — Medication 1 TABLET(S): at 14:43

## 2019-01-01 RX ADMIN — Medication 50 MILLIGRAM(S): at 05:37

## 2019-01-01 RX ADMIN — HEPARIN SODIUM 5000 UNIT(S): 5000 INJECTION INTRAVENOUS; SUBCUTANEOUS at 13:11

## 2019-01-01 RX ADMIN — CEFTRIAXONE 100 MILLIGRAM(S): 500 INJECTION, POWDER, FOR SOLUTION INTRAMUSCULAR; INTRAVENOUS at 05:04

## 2019-01-01 RX ADMIN — Medication 40 MILLIGRAM(S): at 05:42

## 2019-01-01 RX ADMIN — NYSTATIN CREAM 1 APPLICATION(S): 100000 CREAM TOPICAL at 05:56

## 2019-01-01 RX ADMIN — SENNA PLUS 2 TABLET(S): 8.6 TABLET ORAL at 21:38

## 2019-01-01 RX ADMIN — Medication 40 MILLIEQUIVALENT(S): at 17:44

## 2019-01-01 RX ADMIN — Medication 50 MILLILITER(S): at 21:02

## 2019-01-01 RX ADMIN — NYSTATIN CREAM 1 APPLICATION(S): 100000 CREAM TOPICAL at 05:29

## 2019-01-01 RX ADMIN — HEPARIN SODIUM 5000 UNIT(S): 5000 INJECTION INTRAVENOUS; SUBCUTANEOUS at 13:22

## 2019-01-01 RX ADMIN — Medication 50 MILLILITER(S): at 11:56

## 2019-01-01 RX ADMIN — Medication 81 MILLIGRAM(S): at 11:46

## 2019-01-01 RX ADMIN — Medication 100 MILLIGRAM(S): at 21:33

## 2019-01-01 RX ADMIN — MIDODRINE HYDROCHLORIDE 10 MILLIGRAM(S): 2.5 TABLET ORAL at 14:15

## 2019-01-01 RX ADMIN — I.V. FAT EMULSION 21.94 GM/KG/DAY: 20 EMULSION INTRAVENOUS at 20:06

## 2019-01-01 RX ADMIN — Medication 50 MICROGRAM(S): at 06:05

## 2019-01-01 RX ADMIN — CHLORHEXIDINE GLUCONATE 1 APPLICATION(S): 213 SOLUTION TOPICAL at 05:34

## 2019-01-01 RX ADMIN — Medication 50 MICROGRAM(S): at 05:21

## 2019-01-01 RX ADMIN — Medication 50 MICROGRAM(S): at 05:31

## 2019-01-01 RX ADMIN — I.V. FAT EMULSION 21.94 GM/KG/DAY: 20 EMULSION INTRAVENOUS at 21:02

## 2019-01-01 RX ADMIN — MIDODRINE HYDROCHLORIDE 10 MILLIGRAM(S): 2.5 TABLET ORAL at 14:44

## 2019-01-01 RX ADMIN — HEPARIN SODIUM 5000 UNIT(S): 5000 INJECTION INTRAVENOUS; SUBCUTANEOUS at 13:57

## 2019-01-01 RX ADMIN — NYSTATIN CREAM 1 APPLICATION(S): 100000 CREAM TOPICAL at 06:20

## 2019-01-01 RX ADMIN — HEPARIN SODIUM 5000 UNIT(S): 5000 INJECTION INTRAVENOUS; SUBCUTANEOUS at 14:32

## 2019-01-01 RX ADMIN — SODIUM CHLORIDE 100 MILLILITER(S): 9 INJECTION, SOLUTION INTRAVENOUS at 06:12

## 2019-01-01 RX ADMIN — SODIUM POLYSTYRENE SULFONATE 30 GRAM(S): 4.1 POWDER, FOR SUSPENSION ORAL at 11:11

## 2019-01-01 RX ADMIN — SODIUM CHLORIDE 75 MILLILITER(S): 9 INJECTION INTRAMUSCULAR; INTRAVENOUS; SUBCUTANEOUS at 05:53

## 2019-01-01 RX ADMIN — PANTOPRAZOLE SODIUM 40 MILLIGRAM(S): 20 TABLET, DELAYED RELEASE ORAL at 05:42

## 2019-01-01 RX ADMIN — Medication 100 MILLIGRAM(S): at 14:10

## 2019-01-01 RX ADMIN — CEFTRIAXONE 100 MILLIGRAM(S): 500 INJECTION, POWDER, FOR SOLUTION INTRAMUSCULAR; INTRAVENOUS at 06:59

## 2019-01-01 RX ADMIN — Medication 2000 UNIT(S): at 11:46

## 2019-01-01 RX ADMIN — NYSTATIN CREAM 1 APPLICATION(S): 100000 CREAM TOPICAL at 05:40

## 2019-01-01 RX ADMIN — Medication 10 MILLIGRAM(S): at 05:45

## 2019-01-01 RX ADMIN — Medication 25 MILLIGRAM(S): at 05:29

## 2019-01-01 RX ADMIN — HEPARIN SODIUM 5000 UNIT(S): 5000 INJECTION INTRAVENOUS; SUBCUTANEOUS at 21:06

## 2019-01-01 RX ADMIN — NYSTATIN CREAM 1 APPLICATION(S): 100000 CREAM TOPICAL at 05:43

## 2019-01-01 RX ADMIN — PRASUGREL 10 MILLIGRAM(S): 5 TABLET, FILM COATED ORAL at 11:33

## 2019-01-01 RX ADMIN — NYSTATIN CREAM 1 APPLICATION(S): 100000 CREAM TOPICAL at 21:30

## 2019-01-01 RX ADMIN — Medication 50 MICROGRAM(S): at 05:19

## 2019-01-01 RX ADMIN — Medication 50 MILLIGRAM(S): at 13:01

## 2019-01-01 RX ADMIN — Medication 10 MILLIGRAM(S): at 06:18

## 2019-01-01 RX ADMIN — CHLORHEXIDINE GLUCONATE 1 APPLICATION(S): 213 SOLUTION TOPICAL at 06:01

## 2019-01-01 RX ADMIN — NYSTATIN CREAM 1 APPLICATION(S): 100000 CREAM TOPICAL at 06:49

## 2019-01-01 RX ADMIN — Medication 81 MILLIGRAM(S): at 11:48

## 2019-01-01 RX ADMIN — PANTOPRAZOLE SODIUM 40 MILLIGRAM(S): 20 TABLET, DELAYED RELEASE ORAL at 05:55

## 2019-01-01 RX ADMIN — MIDODRINE HYDROCHLORIDE 10 MILLIGRAM(S): 2.5 TABLET ORAL at 21:30

## 2019-01-01 RX ADMIN — Medication 100 MILLIGRAM(S): at 13:10

## 2019-01-01 RX ADMIN — Medication 1 MILLIGRAM(S): at 14:43

## 2019-01-01 RX ADMIN — Medication 124.88 MICROGRAM(S)/KG/MIN: at 10:28

## 2019-01-01 RX ADMIN — NYSTATIN CREAM 1 APPLICATION(S): 100000 CREAM TOPICAL at 13:04

## 2019-01-01 RX ADMIN — Medication 1 MILLIGRAM(S): at 11:48

## 2019-01-01 RX ADMIN — MIDODRINE HYDROCHLORIDE 10 MILLIGRAM(S): 2.5 TABLET ORAL at 06:05

## 2019-01-01 RX ADMIN — Medication 2000 UNIT(S): at 12:39

## 2019-01-01 RX ADMIN — VASOPRESSIN 2.4 UNIT(S)/MIN: 20 INJECTION INTRAVENOUS at 07:00

## 2019-01-01 RX ADMIN — Medication 25 MILLIGRAM(S): at 17:59

## 2019-01-01 RX ADMIN — Medication 50 MILLILITER(S): at 22:05

## 2019-01-01 RX ADMIN — SENNA PLUS 2 TABLET(S): 8.6 TABLET ORAL at 21:47

## 2019-01-01 RX ADMIN — HEPARIN SODIUM 5000 UNIT(S): 5000 INJECTION INTRAVENOUS; SUBCUTANEOUS at 13:58

## 2019-01-01 RX ADMIN — Medication 100 MILLIGRAM(S): at 13:08

## 2019-01-01 RX ADMIN — Medication 1 TABLET(S): at 05:57

## 2019-01-01 RX ADMIN — Medication 25 MILLIGRAM(S): at 06:05

## 2019-01-01 RX ADMIN — Medication 50 MILLILITER(S): at 11:35

## 2019-01-01 RX ADMIN — CHLORHEXIDINE GLUCONATE 1 APPLICATION(S): 213 SOLUTION TOPICAL at 05:37

## 2019-01-01 RX ADMIN — HEPARIN SODIUM 5000 UNIT(S): 5000 INJECTION INTRAVENOUS; SUBCUTANEOUS at 05:53

## 2019-01-01 RX ADMIN — HEPARIN SODIUM 5000 UNIT(S): 5000 INJECTION INTRAVENOUS; SUBCUTANEOUS at 22:00

## 2019-01-01 RX ADMIN — HEPARIN SODIUM 5000 UNIT(S): 5000 INJECTION INTRAVENOUS; SUBCUTANEOUS at 21:02

## 2019-01-01 RX ADMIN — MIDODRINE HYDROCHLORIDE 10 MILLIGRAM(S): 2.5 TABLET ORAL at 21:44

## 2019-01-01 RX ADMIN — MIDODRINE HYDROCHLORIDE 10 MILLIGRAM(S): 2.5 TABLET ORAL at 21:35

## 2019-01-01 RX ADMIN — MIDODRINE HYDROCHLORIDE 10 MILLIGRAM(S): 2.5 TABLET ORAL at 21:15

## 2019-01-01 RX ADMIN — Medication 1 TABLET(S): at 06:21

## 2019-01-01 RX ADMIN — Medication 50 MILLIGRAM(S): at 17:08

## 2019-01-01 RX ADMIN — HEPARIN SODIUM 5000 UNIT(S): 5000 INJECTION INTRAVENOUS; SUBCUTANEOUS at 05:30

## 2019-01-01 RX ADMIN — Medication 300 MILLIGRAM(S): at 11:48

## 2019-01-01 RX ADMIN — SODIUM CHLORIDE 100 MILLILITER(S): 9 INJECTION INTRAMUSCULAR; INTRAVENOUS; SUBCUTANEOUS at 10:42

## 2019-01-01 RX ADMIN — HEPARIN SODIUM 5000 UNIT(S): 5000 INJECTION INTRAVENOUS; SUBCUTANEOUS at 05:39

## 2019-01-01 RX ADMIN — SODIUM CHLORIDE 100 MILLILITER(S): 9 INJECTION INTRAMUSCULAR; INTRAVENOUS; SUBCUTANEOUS at 08:49

## 2019-01-01 RX ADMIN — NYSTATIN CREAM 1 APPLICATION(S): 100000 CREAM TOPICAL at 14:45

## 2019-01-01 RX ADMIN — CHLORHEXIDINE GLUCONATE 1 APPLICATION(S): 213 SOLUTION TOPICAL at 05:35

## 2019-01-01 RX ADMIN — SODIUM CHLORIDE 100 MILLILITER(S): 9 INJECTION, SOLUTION INTRAVENOUS at 05:34

## 2019-01-01 RX ADMIN — CEFTRIAXONE 100 MILLIGRAM(S): 500 INJECTION, POWDER, FOR SOLUTION INTRAMUSCULAR; INTRAVENOUS at 06:19

## 2019-01-01 RX ADMIN — Medication 100 MILLIGRAM(S): at 05:53

## 2019-01-01 RX ADMIN — MIDODRINE HYDROCHLORIDE 10 MILLIGRAM(S): 2.5 TABLET ORAL at 13:28

## 2019-01-01 RX ADMIN — NYSTATIN CREAM 1 APPLICATION(S): 100000 CREAM TOPICAL at 05:36

## 2019-01-01 RX ADMIN — Medication 60 MILLIGRAM(S): at 09:54

## 2019-01-01 RX ADMIN — SODIUM CHLORIDE 75 MILLILITER(S): 9 INJECTION INTRAMUSCULAR; INTRAVENOUS; SUBCUTANEOUS at 14:14

## 2019-01-01 RX ADMIN — NYSTATIN CREAM 1 APPLICATION(S): 100000 CREAM TOPICAL at 13:57

## 2019-01-01 RX ADMIN — Medication 10 MILLIGRAM(S): at 17:41

## 2019-01-01 RX ADMIN — Medication 80 MILLIGRAM(S): at 16:47

## 2019-01-01 RX ADMIN — PRASUGREL 10 MILLIGRAM(S): 5 TABLET, FILM COATED ORAL at 11:29

## 2019-01-01 RX ADMIN — Medication 25 MILLIGRAM(S): at 06:39

## 2019-01-01 RX ADMIN — MIDODRINE HYDROCHLORIDE 10 MILLIGRAM(S): 2.5 TABLET ORAL at 15:22

## 2019-01-01 RX ADMIN — PANTOPRAZOLE SODIUM 40 MILLIGRAM(S): 20 TABLET, DELAYED RELEASE ORAL at 05:33

## 2019-01-01 RX ADMIN — Medication 10 MILLIGRAM(S): at 17:52

## 2019-01-01 RX ADMIN — SENNA PLUS 2 TABLET(S): 8.6 TABLET ORAL at 21:20

## 2019-01-01 RX ADMIN — MIDODRINE HYDROCHLORIDE 10 MILLIGRAM(S): 2.5 TABLET ORAL at 13:08

## 2019-01-01 RX ADMIN — Medication 50 MICROGRAM(S): at 05:48

## 2019-01-01 RX ADMIN — Medication 50 MILLILITER(S): at 16:10

## 2019-01-01 RX ADMIN — Medication 80 MILLIGRAM(S): at 00:16

## 2019-01-01 RX ADMIN — CHLORHEXIDINE GLUCONATE 1 APPLICATION(S): 213 SOLUTION TOPICAL at 05:57

## 2019-01-01 RX ADMIN — Medication 100 MILLIGRAM(S): at 13:33

## 2019-01-01 RX ADMIN — Medication 100 MILLIGRAM(S): at 05:28

## 2019-01-01 RX ADMIN — CHLORHEXIDINE GLUCONATE 1 APPLICATION(S): 213 SOLUTION TOPICAL at 06:43

## 2019-01-01 RX ADMIN — CHLORHEXIDINE GLUCONATE 1 APPLICATION(S): 213 SOLUTION TOPICAL at 05:45

## 2019-01-01 RX ADMIN — SODIUM CHLORIDE 100 MILLILITER(S): 9 INJECTION INTRAMUSCULAR; INTRAVENOUS; SUBCUTANEOUS at 05:30

## 2019-01-01 RX ADMIN — Medication 40 MILLIGRAM(S): at 06:43

## 2019-01-01 RX ADMIN — PANTOPRAZOLE SODIUM 40 MILLIGRAM(S): 20 TABLET, DELAYED RELEASE ORAL at 05:22

## 2019-01-01 RX ADMIN — Medication 81 MILLIGRAM(S): at 12:21

## 2019-01-01 RX ADMIN — PRASUGREL 10 MILLIGRAM(S): 5 TABLET, FILM COATED ORAL at 11:08

## 2019-01-01 RX ADMIN — PANTOPRAZOLE SODIUM 40 MILLIGRAM(S): 20 TABLET, DELAYED RELEASE ORAL at 05:57

## 2019-01-01 RX ADMIN — NYSTATIN CREAM 1 APPLICATION(S): 100000 CREAM TOPICAL at 06:10

## 2019-01-01 RX ADMIN — MIDODRINE HYDROCHLORIDE 10 MILLIGRAM(S): 2.5 TABLET ORAL at 13:20

## 2019-01-01 RX ADMIN — Medication 2000 UNIT(S): at 11:48

## 2019-01-01 RX ADMIN — Medication 50 MILLILITER(S): at 11:39

## 2019-01-01 RX ADMIN — SENNA PLUS 2 TABLET(S): 8.6 TABLET ORAL at 21:46

## 2019-01-01 RX ADMIN — NYSTATIN CREAM 1 APPLICATION(S): 100000 CREAM TOPICAL at 23:38

## 2019-01-01 RX ADMIN — Medication 100 MILLIGRAM(S): at 05:57

## 2019-01-01 RX ADMIN — HEPARIN SODIUM 5000 UNIT(S): 5000 INJECTION INTRAVENOUS; SUBCUTANEOUS at 13:29

## 2019-01-01 RX ADMIN — PANTOPRAZOLE SODIUM 40 MILLIGRAM(S): 20 TABLET, DELAYED RELEASE ORAL at 05:02

## 2019-01-01 RX ADMIN — Medication 100 MILLIGRAM(S): at 06:43

## 2019-01-01 RX ADMIN — NYSTATIN CREAM 1 APPLICATION(S): 100000 CREAM TOPICAL at 14:31

## 2019-01-01 RX ADMIN — Medication 30 MILLILITER(S): at 15:12

## 2019-01-01 RX ADMIN — SENNA PLUS 2 TABLET(S): 8.6 TABLET ORAL at 21:15

## 2019-01-01 RX ADMIN — Medication 25 MILLIGRAM(S): at 06:20

## 2019-01-01 RX ADMIN — Medication 50 MILLIGRAM(S): at 18:03

## 2019-01-01 RX ADMIN — CEFTRIAXONE 100 MILLIGRAM(S): 500 INJECTION, POWDER, FOR SOLUTION INTRAMUSCULAR; INTRAVENOUS at 17:50

## 2019-01-01 RX ADMIN — NYSTATIN CREAM 1 APPLICATION(S): 100000 CREAM TOPICAL at 05:38

## 2019-01-01 RX ADMIN — MIDODRINE HYDROCHLORIDE 10 MILLIGRAM(S): 2.5 TABLET ORAL at 21:57

## 2019-01-01 RX ADMIN — SENNA PLUS 2 TABLET(S): 8.6 TABLET ORAL at 21:07

## 2019-01-01 RX ADMIN — HEPARIN SODIUM 5000 UNIT(S): 5000 INJECTION INTRAVENOUS; SUBCUTANEOUS at 15:57

## 2019-01-01 RX ADMIN — OCTREOTIDE ACETATE 10 MICROGRAM(S)/HR: 200 INJECTION, SOLUTION INTRAVENOUS; SUBCUTANEOUS at 21:37

## 2019-01-01 RX ADMIN — NYSTATIN CREAM 1 APPLICATION(S): 100000 CREAM TOPICAL at 06:19

## 2019-01-01 RX ADMIN — Medication 50 MILLILITER(S): at 16:49

## 2019-01-01 RX ADMIN — MIDODRINE HYDROCHLORIDE 10 MILLIGRAM(S): 2.5 TABLET ORAL at 21:20

## 2019-01-01 RX ADMIN — ONDANSETRON 4 MILLIGRAM(S): 8 TABLET, FILM COATED ORAL at 08:48

## 2019-01-01 RX ADMIN — NYSTATIN CREAM 1 APPLICATION(S): 100000 CREAM TOPICAL at 05:54

## 2019-01-01 RX ADMIN — Medication 50 MICROGRAM(S): at 05:36

## 2019-01-01 RX ADMIN — Medication 100 MILLIGRAM(S): at 05:39

## 2019-01-01 RX ADMIN — HEPARIN SODIUM 5000 UNIT(S): 5000 INJECTION INTRAVENOUS; SUBCUTANEOUS at 21:03

## 2019-01-01 RX ADMIN — CEFTRIAXONE 100 MILLIGRAM(S): 500 INJECTION, POWDER, FOR SOLUTION INTRAMUSCULAR; INTRAVENOUS at 18:08

## 2019-01-01 RX ADMIN — Medication 1 MILLIGRAM(S): at 11:39

## 2019-01-01 RX ADMIN — Medication 1 TABLET(S): at 17:13

## 2019-01-01 RX ADMIN — Medication 80 MILLIGRAM(S): at 21:28

## 2019-01-01 RX ADMIN — Medication 75 MICROGRAM(S): at 06:14

## 2019-01-01 RX ADMIN — NYSTATIN CREAM 1 APPLICATION(S): 100000 CREAM TOPICAL at 21:27

## 2019-01-01 RX ADMIN — Medication 1 TABLET(S): at 17:37

## 2019-01-01 RX ADMIN — MEROPENEM 100 MILLIGRAM(S): 1 INJECTION INTRAVENOUS at 21:31

## 2019-01-01 RX ADMIN — CHLORHEXIDINE GLUCONATE 1 APPLICATION(S): 213 SOLUTION TOPICAL at 05:54

## 2019-01-01 RX ADMIN — MIDODRINE HYDROCHLORIDE 10 MILLIGRAM(S): 2.5 TABLET ORAL at 13:33

## 2019-01-01 RX ADMIN — NYSTATIN CREAM 1 APPLICATION(S): 100000 CREAM TOPICAL at 13:28

## 2019-01-01 RX ADMIN — NYSTATIN CREAM 1 APPLICATION(S): 100000 CREAM TOPICAL at 22:06

## 2019-01-01 RX ADMIN — Medication 50 MILLIGRAM(S): at 05:19

## 2019-01-01 RX ADMIN — Medication 100 MILLIGRAM(S): at 21:15

## 2019-01-01 RX ADMIN — NYSTATIN CREAM 1 APPLICATION(S): 100000 CREAM TOPICAL at 06:18

## 2019-01-01 RX ADMIN — Medication 100 MILLIGRAM(S): at 06:14

## 2019-01-01 RX ADMIN — CHLORHEXIDINE GLUCONATE 1 APPLICATION(S): 213 SOLUTION TOPICAL at 05:58

## 2019-01-01 RX ADMIN — Medication 216 GRAM(S): at 23:57

## 2019-01-01 RX ADMIN — Medication 250 MILLIGRAM(S): at 21:08

## 2019-01-01 RX ADMIN — Medication 20 MILLIGRAM(S): at 10:24

## 2019-01-01 RX ADMIN — Medication 100 MILLIGRAM(S): at 06:19

## 2019-01-01 RX ADMIN — NYSTATIN CREAM 1 APPLICATION(S): 100000 CREAM TOPICAL at 21:34

## 2019-01-01 RX ADMIN — Medication 80 MILLIGRAM(S): at 13:49

## 2019-01-01 RX ADMIN — Medication 50 MILLIGRAM(S): at 05:48

## 2019-01-01 RX ADMIN — Medication 100 MILLIGRAM(S): at 13:03

## 2019-01-01 RX ADMIN — SODIUM CHLORIDE 100 MILLILITER(S): 9 INJECTION, SOLUTION INTRAVENOUS at 21:57

## 2019-01-01 RX ADMIN — CHLORHEXIDINE GLUCONATE 1 APPLICATION(S): 213 SOLUTION TOPICAL at 06:44

## 2019-01-01 RX ADMIN — Medication 50 MILLILITER(S): at 11:46

## 2019-01-01 RX ADMIN — Medication 216 GRAM(S): at 06:29

## 2019-01-01 RX ADMIN — Medication 100 MILLIGRAM(S): at 14:15

## 2019-01-01 RX ADMIN — Medication 25 MILLIGRAM(S): at 17:33

## 2019-01-01 RX ADMIN — Medication 50 MILLILITER(S): at 17:41

## 2019-01-01 RX ADMIN — Medication 75 MICROGRAM(S): at 05:33

## 2019-01-01 RX ADMIN — Medication 80 MILLIGRAM(S): at 19:48

## 2019-01-01 RX ADMIN — Medication 1 EACH: at 21:01

## 2019-01-01 RX ADMIN — PANTOPRAZOLE SODIUM 40 MILLIGRAM(S): 20 TABLET, DELAYED RELEASE ORAL at 05:56

## 2019-01-01 RX ADMIN — Medication 50 MILLIGRAM(S): at 17:20

## 2019-01-01 RX ADMIN — Medication 50 GRAM(S): at 14:44

## 2019-01-01 RX ADMIN — SODIUM CHLORIDE 100 MILLILITER(S): 9 INJECTION INTRAMUSCULAR; INTRAVENOUS; SUBCUTANEOUS at 07:23

## 2019-01-01 RX ADMIN — Medication 1 TABLET(S): at 17:59

## 2019-01-01 RX ADMIN — MIDODRINE HYDROCHLORIDE 10 MILLIGRAM(S): 2.5 TABLET ORAL at 15:18

## 2019-01-01 RX ADMIN — HEPARIN SODIUM 5000 UNIT(S): 5000 INJECTION INTRAVENOUS; SUBCUTANEOUS at 15:22

## 2019-01-01 RX ADMIN — I.V. FAT EMULSION 21.94 GM/KG/DAY: 20 EMULSION INTRAVENOUS at 21:12

## 2019-01-01 RX ADMIN — Medication 50 MILLIGRAM(S): at 05:31

## 2019-01-01 RX ADMIN — MIDODRINE HYDROCHLORIDE 10 MILLIGRAM(S): 2.5 TABLET ORAL at 05:28

## 2019-01-01 RX ADMIN — MIDODRINE HYDROCHLORIDE 10 MILLIGRAM(S): 2.5 TABLET ORAL at 05:53

## 2019-01-01 RX ADMIN — I.V. FAT EMULSION 21.94 GM/KG/DAY: 20 EMULSION INTRAVENOUS at 20:25

## 2019-01-01 RX ADMIN — Medication 10 MILLIGRAM(S): at 23:54

## 2019-01-01 RX ADMIN — NYSTATIN CREAM 1 APPLICATION(S): 100000 CREAM TOPICAL at 13:58

## 2019-01-01 RX ADMIN — Medication 1 TABLET(S): at 11:48

## 2019-01-01 RX ADMIN — Medication 40 MILLIEQUIVALENT(S): at 11:42

## 2019-01-01 RX ADMIN — CEFTRIAXONE 100 GRAM(S): 500 INJECTION, POWDER, FOR SOLUTION INTRAMUSCULAR; INTRAVENOUS at 13:22

## 2019-01-01 RX ADMIN — Medication 10 MILLIGRAM(S): at 13:18

## 2019-01-01 RX ADMIN — HEPARIN SODIUM 5000 UNIT(S): 5000 INJECTION INTRAVENOUS; SUBCUTANEOUS at 13:15

## 2019-01-01 RX ADMIN — NYSTATIN CREAM 1 APPLICATION(S): 100000 CREAM TOPICAL at 14:46

## 2019-01-01 RX ADMIN — HEPARIN SODIUM 5000 UNIT(S): 5000 INJECTION INTRAVENOUS; SUBCUTANEOUS at 05:37

## 2019-01-01 RX ADMIN — Medication 2000 UNIT(S): at 11:17

## 2019-01-01 RX ADMIN — SODIUM CHLORIDE 100 MILLILITER(S): 9 INJECTION INTRAMUSCULAR; INTRAVENOUS; SUBCUTANEOUS at 15:15

## 2019-01-01 RX ADMIN — Medication 216 GRAM(S): at 11:40

## 2019-01-01 RX ADMIN — PANTOPRAZOLE SODIUM 40 MILLIGRAM(S): 20 TABLET, DELAYED RELEASE ORAL at 06:48

## 2019-01-01 RX ADMIN — Medication 50 MICROGRAM(S): at 05:39

## 2019-01-01 RX ADMIN — Medication 325 MILLIGRAM(S): at 12:48

## 2019-01-01 RX ADMIN — NYSTATIN CREAM 1 APPLICATION(S): 100000 CREAM TOPICAL at 21:02

## 2019-01-01 RX ADMIN — Medication 100 MILLIGRAM(S): at 21:44

## 2019-01-01 RX ADMIN — NYSTATIN CREAM 1 APPLICATION(S): 100000 CREAM TOPICAL at 05:37

## 2019-01-01 RX ADMIN — NYSTATIN CREAM 1 APPLICATION(S): 100000 CREAM TOPICAL at 21:32

## 2019-01-01 RX ADMIN — Medication 50 MILLILITER(S): at 18:31

## 2019-01-01 RX ADMIN — HEPARIN SODIUM 5000 UNIT(S): 5000 INJECTION INTRAVENOUS; SUBCUTANEOUS at 05:22

## 2019-01-01 RX ADMIN — NYSTATIN CREAM 1 APPLICATION(S): 100000 CREAM TOPICAL at 14:16

## 2019-01-01 RX ADMIN — Medication 81 MILLIGRAM(S): at 11:11

## 2019-01-01 RX ADMIN — HEPARIN SODIUM 5000 UNIT(S): 5000 INJECTION INTRAVENOUS; SUBCUTANEOUS at 13:21

## 2019-01-01 RX ADMIN — Medication 1 MILLIGRAM(S): at 11:17

## 2019-01-01 RX ADMIN — Medication 50 MILLILITER(S): at 10:56

## 2019-01-01 RX ADMIN — MIDODRINE HYDROCHLORIDE 10 MILLIGRAM(S): 2.5 TABLET ORAL at 15:03

## 2019-01-01 RX ADMIN — HEPARIN SODIUM 5000 UNIT(S): 5000 INJECTION INTRAVENOUS; SUBCUTANEOUS at 13:08

## 2019-01-01 RX ADMIN — MIDODRINE HYDROCHLORIDE 10 MILLIGRAM(S): 2.5 TABLET ORAL at 05:54

## 2019-01-01 RX ADMIN — Medication 40 MILLIGRAM(S): at 06:05

## 2019-01-01 RX ADMIN — MIDODRINE HYDROCHLORIDE 10 MILLIGRAM(S): 2.5 TABLET ORAL at 05:57

## 2019-01-01 RX ADMIN — Medication 100 MILLIGRAM(S): at 21:38

## 2019-01-01 RX ADMIN — MEROPENEM 100 MILLIGRAM(S): 1 INJECTION INTRAVENOUS at 14:47

## 2019-01-01 RX ADMIN — Medication 100 MILLIGRAM(S): at 05:03

## 2019-01-01 RX ADMIN — Medication 81 MILLIGRAM(S): at 14:43

## 2019-01-01 RX ADMIN — Medication 80 MILLIGRAM(S): at 23:01

## 2019-01-01 RX ADMIN — Medication 20 MILLIGRAM(S): at 05:28

## 2019-01-01 RX ADMIN — Medication 1 TABLET(S): at 06:43

## 2019-01-01 RX ADMIN — Medication 40 MILLIGRAM(S): at 18:41

## 2019-06-14 NOTE — ED ADULT NURSE NOTE - OBJECTIVE STATEMENT
Patient sent in by PMD for further evaluation due to IS and MRCP results showing enlarged gallbladder and gallstones. Patient denies any pain or discomfort. B/L lower extremities +3 pitting edema noted. Patient notes generalized weakness

## 2019-06-14 NOTE — CONSULT NOTE ADULT - ASSESSMENT
68 F sent in for elevated LFT's, MRI & U/S showing hydrops of gallbladder    Plan:  pt denies any pain, N or V, non tender on exam, images show distended gallbladder with no inflammatory signs.  cirrhotic liver on MRI  GI consult  No need for surgical intervention at this time  will continue to follow  D/W Dr. Smith

## 2019-06-14 NOTE — CONSULT NOTE ADULT - SUBJECTIVE AND OBJECTIVE BOX
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 [id number]  68y Female    HPI:  68 y f pmh cad s/p stents (Feb & Apr 2018), was seen by PMD in April and referred to GI doctor for elevated LFTs. Seen by GI physician who requested U/S and MRCP which showed worsning hydrops of GB.   Yesterday had MRI to evaluate gallbladder and bile duct. MRI showed worsening hydrops and multiple gallstones with patent bile duct. Pt has no pain, no complaints at this time. Denies fever, chills, n/v, abd pain, back pain, urinary sx, cp, sob. Denies any hx of weight loss or family hx of cancer. pt states loss of appetite for 1 month    PAST MEDICAL & SURGICAL HISTORY:  CAD  MI          MEDICATIONS  (STANDING):  ASA  Prasugrel  Atorvastatin    Metoprolol    Allergies    No Known Allergies    Intolerances        REVIEW OF SYSTEMS    [x] A ten-point review of systems was otherwise negative except as noted.  [ ] Due to altered mental status/intubation, subjective information were not able to be obtained from the patient. History was obtained, to the extent possible, from review of the chart and collateral sources of information.      Vital Signs Last 24 Hrs  T(C): 36.8 (14 Jun 2019 18:19), Max: 36.8 (14 Jun 2019 18:19)  T(F): 98.2 (14 Jun 2019 18:19), Max: 98.2 (14 Jun 2019 18:19)  HR: 79 (14 Jun 2019 18:19) (79 - 79)  BP: 136/68 (14 Jun 2019 18:19) (136/68 - 136/68)  RR: 18 (14 Jun 2019 18:19) (18 - 18)  SpO2: 97% (14 Jun 2019 18:19) (97% - 97%)    PHYSICAL EXAM:  GENERAL: NAD, well-appearing, jaundiced sclera  CHEST/LUNG: Clear to auscultation bilaterally  HEART: Regular rate and rhythm  ABDOMEN: Soft, Nontender, Nondistended;         LABS:                              12.8   12.05 )-----------( 177      ( 14 Jun 2019 19:35 )             36.0       Auto Neutrophil %: 76.6 % (06-14-19 @ 19:35)  Auto Immature Granulocyte %: 0.4 % (06-14-19 @ 19:35)    06-14    126<L>  |  93<L>  |  30<H>  ----------------------------<  109<H>  4.4   |  22  |  2.0<H>      Calcium, Total Serum: 7.4 mg/dL (06-14-19 @ 19:35)      LFTs:             6.3  | 5.0  | 1211     ------------------[1759    ( 14 Jun 2019 19:35 )  2.1  | x    | 513         Lipase:x      Amylase:x             Coags:     16.00  ----< 1.40    ( 14 Jun 2019 19:35 )     37.2                        RADIOLOGY & ADDITIONAL STUDIES: 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 [id number]  68y Female    HPI:  68 y f pmh cad s/p stents (Feb & Apr 2018), was seen by PMD in April and referred to GI doctor for elevated LFTs. Seen by GI physician who requested U/S and MRCP which showed worsning hydrops of GB.   Yesterday had MRI to evaluate gallbladder and bile duct. MRI showed worsening hydrops and multiple gallstones with patent bile duct. Pt has no pain, no complaints at this time. Denies fever, chills, n/v, abd pain, back pain, urinary sx, cp, sob. Denies any hx of weight loss or family hx of cancer. pt states loss of appetite for 1 month    PAST MEDICAL & SURGICAL HISTORY:  CAD  MI          MEDICATIONS  (STANDING):  ASA  Prasugrel  Atorvastatin    Metoprolol    Allergies    No Known Allergies    Intolerances        REVIEW OF SYSTEMS    [x] A ten-point review of systems was otherwise negative except as noted.  [ ] Due to altered mental status/intubation, subjective information were not able to be obtained from the patient. History was obtained, to the extent possible, from review of the chart and collateral sources of information.      Vital Signs Last 24 Hrs  T(C): 36.8 (14 Jun 2019 18:19), Max: 36.8 (14 Jun 2019 18:19)  T(F): 98.2 (14 Jun 2019 18:19), Max: 98.2 (14 Jun 2019 18:19)  HR: 79 (14 Jun 2019 18:19) (79 - 79)  BP: 136/68 (14 Jun 2019 18:19) (136/68 - 136/68)  RR: 18 (14 Jun 2019 18:19) (18 - 18)  SpO2: 97% (14 Jun 2019 18:19) (97% - 97%)    PHYSICAL EXAM:  GENERAL: NAD, well-appearing, jaundiced sclera  CHEST/LUNG: Clear to auscultation bilaterally  HEART: Regular rate and rhythm  ABDOMEN: Soft, Nontender, Nondistended;         LABS:                              12.8   12.05 )-----------( 177      ( 14 Jun 2019 19:35 )             36.0       Auto Neutrophil %: 76.6 % (06-14-19 @ 19:35)  Auto Immature Granulocyte %: 0.4 % (06-14-19 @ 19:35)    06-14    126<L>  |  93<L>  |  30<H>  ----------------------------<  109<H>  4.4   |  22  |  2.0<H>      Calcium, Total Serum: 7.4 mg/dL (06-14-19 @ 19:35)      LFTs:             6.3  | 5.0  | 1211     ------------------[1759    ( 14 Jun 2019 19:35 )  2.1  | x    | 513         Lipase:x      Amylase:x             Coags:     16.00  ----< 1.40    ( 14 Jun 2019 19:35 )     37.2          RADIOLOGY & ADDITIONAL STUDIES:    < from: US Abdomen Limited (06.14.19 @ 20:25) >  IMPRESSION:    Distended gallbladder containing stones and sludge. Even though the   sonographic Garcia sign is negative, findings are equivocal for acute   cholecystitis. Recommend clinical correlation, and if indicated  nuclear   medicine HIDA scan can be obtained for further evaluation.    Hepatic cirrhosis, unchanged.    < end of copied text >    < from: MR Abdomen No Cont (06.13.19 @ 16:15) >  Impression:  1. Hydropic gallbladder, up to 4.7 cm in transverse diameter with   gallstones, including probably impacted 1.6 cm stone at neck.  2. Liver cirrhosis with mild abdominal ascites.  3. No biliary distention, filling defect, or stricture.    < end of copied text >

## 2019-06-14 NOTE — ED PROVIDER NOTE - CLINICAL SUMMARY MEDICAL DECISION MAKING FREE TEXT BOX
69 yo woman with worsening GB hydrops with rapidly worsening LFT's showing an obstructive picture.  No evidence of acute cholangitis.  Surgery also does not think this is acute cholecystitis.  Will need admission for GI consultation and further workup.

## 2019-06-14 NOTE — ED PROVIDER NOTE - PHYSICAL EXAMINATION
CONSTITUTIONAL: Well-developed; well-nourished; in no acute distress.   SKIN: warm, dry  HEAD: Normocephalic; atraumatic.  EYES: normal sclera and conjunctiva   ENT: No nasal discharge; airway clear.  NECK: Supple; non tender.  CARD: S1, S2 normal; no murmurs, gallops, or rubs. Regular rate and rhythm.   RESP: No wheezes, rales or rhonchi.  ABD: soft ntnd; negative beltrán's sign.   EXT: Normal ROM.  No clubbing, cyanosis or edema.   LYMPH: No acute cervical adenopathy.  NEURO: Alert, oriented, grossly unremarkable  PSYCH: Cooperative, appropriate.

## 2019-06-14 NOTE — ED ADULT TRIAGE NOTE - CHIEF COMPLAINT QUOTE
Pt sent by JACKIE Alonso for abnormal liver function tests. MRCP/MRI abdomen showed hydropic gallbladder with gallstones. Denies pain or other symptoms

## 2019-06-14 NOTE — ED PROVIDER NOTE - OBJECTIVE STATEMENT
68 y f pmh cad s/p stents, hypothyroidism pw abnormal lab and imaging. Seen by GI physicians for elevated liver enzymes a few months ago. Yesterday had MRI to evaluate gallbladder and bile duct. MRI showed worsening hydrops and multiple gallstones with patent bile duct. Pt has no pain, no complaints at this time. Denies fever, chills, n/v, abd pain, back pain, urinary sx, cp, sob.

## 2019-06-14 NOTE — ED PROVIDER NOTE - ATTENDING CONTRIBUTION TO CARE
I personally evaluated the patient. I reviewed the Resident’s or Physician Assistant’s note (as assigned above), and agree with the findings and plan except as documented in my note.  67 yo woman was sent in by GI due to worsening hydrops of GB and concern for rising LFT's.  No abdominal pain or fever or vomiting.  Essentially, patient is asymptomatic.  We called surgery, who does not believe this is cholecystitis and is concerned for intraparenchymal lung disease vs. cholangiocarcinoma.   Will need admission for proper GI workup.

## 2019-06-14 NOTE — ED PROVIDER NOTE - PROGRESS NOTE DETAILS
Discussed with Dr. White. Said he was concerned about worsening hydrops and worsening liver function tests from the MRI. Bile duct is patent, but wanted surgery to evaluate to see if she could have cholecystectomy done. Surgery consult placed, will come see patient. Discussed with surgery resident Dr. Trae Montague. Says no need for surgical intervention at this time. Recommending admission to medical service and will follow.

## 2019-06-14 NOTE — ED PROVIDER NOTE - NS ED ROS FT
Eyes:  No visual changes, eye pain or discharge.  ENMT:  No hearing changes, pain, discharge or infections. No neck pain or stiffness.  Cardiac:  No chest pain, SOB or edema.   Respiratory:  No cough or respiratory distress.   GI:  No nausea, vomiting, diarrhea or abdominal pain.  :  No dysuria, frequency or burning.  MS:  No myalgia, muscle weakness, joint pain or back pain.  Neuro:  No headache or weakness.  No LOC.  Skin:  No skin rash.   Endocrine: No history of diabetes. +hypothyroid.

## 2019-06-14 NOTE — ED ADULT NURSE NOTE - NSIMPLEMENTINTERV_GEN_ALL_ED
Implemented All Fall with Harm Risk Interventions:  [place] to call system. Call bell, personal items and telephone within reach. Instruct patient to call for assistance. Room bathroom lighting operational. Non-slip footwear when patient is off stretcher. Physically safe environment: no spills, clutter or unnecessary equipment. Stretcher in lowest position, wheels locked, appropriate side rails in place. Provide visual cue, wrist band, yellow gown, etc. Monitor gait and stability. Monitor for mental status changes and reorient to person, place, and time. Review medications for side effects contributing to fall risk. Reinforce activity limits and safety measures with patient and family. Provide visual clues: red socks.

## 2019-06-15 NOTE — H&P ADULT - NSHPPHYSICALEXAM_GEN_ALL_CORE
Vital Signs Last 24 Hrs  T(C): 36.2 (15 Shashank 2019 00:09), Max: 36.8 (14 Jun 2019 18:19)  T(F): 97.2 (15 Shashank 2019 00:09), Max: 98.2 (14 Jun 2019 18:19)  HR: 76 (15 Shashank 2019 00:09) (76 - 79)  BP: 95/53 (15 Shashank 2019 00:09) (95/53 - 136/68)  BP(mean): --  RR: 18 (15 Shashank 2019 00:09) (18 - 18)  SpO2: 97% (14 Jun 2019 18:19) (97% - 97%)    GEN: NAD  HEENT: NCAT  CV: RRR  RESP: CTAB  ABD: NTND, soft  EXT: b/l LE edema  NEURO: no focal deficits

## 2019-06-15 NOTE — CONSULT NOTE ADULT - SUBJECTIVE AND OBJECTIVE BOX
GI HPI:  Patient is a 68y old  Female who presents with a chief complaint of gallbladder hydrops (15 Shashank 2019 01:53)  68 y f pmh cad s/p stents (Feb & Apr 2018), was seen by PMD in April and referred to GI doctor for elevated LFTs. Seen by GI physician who requested U/S and MRCP which showed worsning hydrops of GB.   Yesterday had MRI to evaluate gallbladder and bile duct. MRI showed worsening hydrops and multiple gallstones with patent bile duct. Pt has no pain, no complaints at this time. Denies fever, chills, n/v, abd pain, back pain, urinary sx, cp, sob. Denies any hx of weight loss or family hx of cancer. pt states loss of appetite for 1 month    69 yo F w/ hx of CAD s/p PCI and hypothyroidism sent by gastroenterologist for gallbladder hydrops. Pt found to have elevated liver enzymes by PMD in Apr 2019, referred to GI for w/u. MRCP 2 days ago significant for gallbladder hydrops 4.7cm with probable impacted stone at neck, also liver cirrhosis w/ mild ascites. Admits to social etoh, denies binge drinking, denies abdominal pain, N/V. Admits to decreased appetite in past 2 weeks, pt tried to maintain low salt diet. Reports BLE edema developed in past 2 days as well as weakness. Denies fever, chills, abdominal pain, SOB, dysuria.    In ED, evaluated by surgery -> no interventions at this time (15 Shashank 2019 01:53)      PAST MEDICAL & SURGICAL HISTORY  Hypothyroidism  CAD S/P percutaneous coronary angioplasty  No significant past surgical history      FAMILY HISTORY:  FAMILY HISTORY:  No pertinent family history in first degree relatives      SOCIAL HISTORY:  smoker:   Alcohol:  Drug:    ALLERGIES:  No Known Allergies      MEDICATIONS:  MEDICATIONS  (STANDING):  aspirin  chewable 81 milliGRAM(s) Oral daily  cefTRIAXone   IVPB      chlorhexidine 4% Liquid 1 Application(s) Topical <User Schedule>  heparin  Injectable 5000 Unit(s) SubCutaneous every 8 hours  levothyroxine 50 MICROGram(s) Oral daily  metoprolol tartrate 50 milliGRAM(s) Oral two times a day  prasugrel 10 milliGRAM(s) Oral daily  sodium chloride 0.9%. 1000 milliLiter(s) (100 mL/Hr) IV Continuous <Continuous>    MEDICATIONS  (PRN):      HOME MEDICATIONS:  Home Medications:  levothyroxine 50 mcg (0.05 mg) oral tablet: 1 tab(s) orally once a day (15 Shashank 2019 02:02)      ROS:     REVIEW OF SYSTEMS  General:  No fevers  Eyes:  No reported pain   ENT:  No sore throat   NECK: No stiffness   CV:  No chest pain   Resp:  No shortness of breath  GI:  See HPI  :  No dysuria  Muscle:  No weakness  Neuro:  No tingling  Endocrine:  No polyuria  Heme:  No ecchymosis          VITALS:   T(F): 96.3 (06-15 @ 13:36), Max: 98.2 (06-14 @ 18:19)  HR: 71 (06-15 @ 13:36) (71 - 79)  BP: 109/59 (06-15 @ 13:36) (95/53 - 136/68)  BP(mean): --  RR: 18 (06-15 @ 13:36) (18 - 18)  SpO2: 98% (06-15 @ 07:35) (97% - 98%)    I&O's Summary      PHYSICAL EXAM:  EYES: No scleral icterus   LUNG: Clear to auscultation bilaterally; No rales, rhonchi, wheezing, or rubs  HEART: RRR; No murmurs  ABDOMEN: Soft, +BS, Abdominal Tenderness, No guarding, No Garcia Sign   Rectal Exam:     LABS:                        12.1   14.41 )-----------( 180      ( 15 Shashank 2019 06:09 )             34.8     PT/INR - ( 15 Shashank 2019 06:09 )  INR: 1.30          PTT - ( 15 Shashank 2019 06:09 )  PTT:37.1   LIVER FUNCTIONS - ( 15 Shashank 2019 06:09 )  Alb: 2.1 g/dL / Pro: 6.2 g/dL / ALK PHOS: 1647 U/L / ALT: 495 U/L / AST: 1180 U/L / GGT: x           06-15    127<L>  |  92<L>  |  35<H>  ----------------------------<  80  4.4   |  20  |  2.3<H>    Ca    7.3<L>      15 Shashank 2019 06:09  Mg     2.5     06-14      CARDIAC MARKERS ( 15 Shashank 2019 06:09 )  x     / x     / 1019 U/L / x     / x          06-15 Chol 240<H> LDL 18 HDL 13<L> Trig 144    Previous EGD:    Previous colonoscopy:       RADIOLOGY: GI HPI:  Patient is a 68y old  Female who presents with a chief complaint of gallbladder hydrops (15 Shashank 2019 01:53)  68 y f pmh cad s/p stents (Feb & Apr 2018 currently on DAPT - ASA / Effient ) , was seen by PMD in April 2019 and referred to GI doctor for elevated LFTs. Seen by GI physician who requested U/S which showed hydrops of GB with gallstones .   Yesterday had MRCP to evaluate gallbladder and bile duct. MRI showed worsening hydrops and multiple gallstones with patent bile ducts. Pt has no pain, no complaints at this time. Denies fever, chills, n/v, abd pain, back pain, urinary sx, cp, sob. Denies any hx of weight loss or family hx of cancer.  Pt denies prior h/o liver disease , alcohol abuse . Pt was recently started on levothyroxine 1 month prior and started on high dose statin 1 year prior   In ED, evaluated by surgery -> no interventions at this time (15 Shashank 2019 01:53)      PAST MEDICAL & SURGICAL HISTORY  Hypothyroidism  CAD S/P percutaneous coronary angioplasty  No significant past surgical history      FAMILY HISTORY:  FAMILY HISTORY:  No pertinent family history in first degree relatives      SOCIAL HISTORY:  smoker:  denies   Alcohol: social use   Drug: denies     ALLERGIES:  No Known Allergies      MEDICATIONS:  MEDICATIONS  (STANDING):  aspirin  chewable 81 milliGRAM(s) Oral daily  cefTRIAXone   IVPB      chlorhexidine 4% Liquid 1 Application(s) Topical <User Schedule>  heparin  Injectable 5000 Unit(s) SubCutaneous every 8 hours  levothyroxine 50 MICROGram(s) Oral daily  metoprolol tartrate 50 milliGRAM(s) Oral two times a day  prasugrel 10 milliGRAM(s) Oral daily  sodium chloride 0.9%. 1000 milliLiter(s) (100 mL/Hr) IV Continuous <Continuous>    MEDICATIONS  (PRN):      HOME MEDICATIONS:  Home Medications:  levothyroxine 50 mcg (0.05 mg) oral tablet: 1 tab(s) orally once a day (15 Shashank 2019 02:02)      ROS:     REVIEW OF SYSTEMS  General:  No fevers  Eyes:  No reported pain   ENT:  No sore throat   NECK: No stiffness   CV:  No chest pain   Resp:  No shortness of breath  GI:  See HPI  :  No dysuria  Muscle:  No weakness  Neuro:  No tingling  Endocrine:  No polyuria  Heme:  No ecchymosis          VITALS:   T(F): 96.3 (06-15 @ 13:36), Max: 98.2 (06-14 @ 18:19)  HR: 71 (06-15 @ 13:36) (71 - 79)  BP: 109/59 (06-15 @ 13:36) (95/53 - 136/68)  BP(mean): --  RR: 18 (06-15 @ 13:36) (18 - 18)  SpO2: 98% (06-15 @ 07:35) (97% - 98%)    I&O's Summary      PHYSICAL EXAM:  Gen: Comfortable   EYES: No scleral icterus   LUNG: Clear to auscultation bilaterally; No rales, rhonchi, wheezing, or rubs  HEART: RRR; s1 and s2 heard   ABDOMEN: Soft, +BS, no abd distension , no Abdominal Tenderness, No guarding, No Garcia Sign   Neuro: AAO x 3, no asterix   Ext: b/l edema     LABS:                        12.1   14.41 )-----------( 180      ( 15 Shashank 2019 06:09 )             34.8     PT/INR - ( 15 Shashank 2019 06:09 )  INR: 1.30          PTT - ( 15 Shashank 2019 06:09 )  PTT:37.1   LIVER FUNCTIONS - ( 15 Shashank 2019 06:09 )  Alb: 2.1 g/dL / Pro: 6.2 g/dL / ALK PHOS: 1647 U/L / ALT: 495 U/L / AST: 1180 U/L / GGT: x           06-15    127<L>  |  92<L>  |  35<H>  ----------------------------<  80  4.4   |  20  |  2.3<H>    Ca    7.3<L>      15 Shashank 2019 06:09  Mg     2.5     06-14      CARDIAC MARKERS ( 15 Shashank 2019 06:09 )  x     / x     / 1019 U/L / x     / x          06-15 Chol 240<H> LDL 18 HDL 13<L> Trig 144      RADIOLOGY:  < from: US Abdomen Limited (06.15.19 @ 09:50) >  FINDINGS/  impression:    Limited exam of the portal vein, partially imaged, secondary to patient's   medical condition and body habitus.    Themain portal vein appears patent. The right portal vein appears   patent. The left portal vein is limited in visualization.    The portal splenic confluence appears patent.    < end of copied text >      < from: US Abdomen Limited (06.14.19 @ 20:25) >  LIVER: Nodular in contour, and heterogeneous in echotexture with   cirrhosis. Normal in size measuring 18 cm in length. No focal mass.    GALLBLADDER/BILIARY TREE:  Distended gallbladder with cholelithiasis and   sludge. No wall thickening or pericholecystic fluid.  Negative   sonographic Garcia's sign. No intrahepatic biliary ductal dilatation. The   common bile duct measures 6.3 mm, which is upper normal limits.     < end of copied text >  < from: MR Abdomen No Cont (06.13.19 @ 16:15) >  Impression:  1. Hydropic gallbladder, up to 4.7 cm in transverse diameter with   gallstones, including probably impacted 1.6 cm stone at neck.  2. Liver cirrhosis with mild abdominal ascites.  3. No biliary distention, filling defect, or stricture.    < end of copied text >

## 2019-06-15 NOTE — H&P ADULT - HISTORY OF PRESENT ILLNESS
67 yo F w/ hx of CAD s/p PCI and hypothyroidism sent by gastroenterologist for gallbladder hydrops. Pt found to have elevated liver enzymes by PMD in Apr 2019, referred to GI for w/u. MRCP 2 days ago significant for gallbladder hydrops 4.7cm with probable impacted stone at neck, also liver cirrhosis w/ mild ascites. Admits to social etoh, denies binge drinking, denies abdominal pain, N/V. Admits to decreased appetite in past 2 weeks, pt tried to maintain low salt diet. Reports BLE edema developed in past 2 days as well as weakness. Denies fever, chills, abdominal pain, SOB, dysuria.    In ED, evaluated by surgery -> no interventions at this time

## 2019-06-15 NOTE — CONSULT NOTE ADULT - ASSESSMENT
68 y f pmh cad s/p stents (Feb & Apr 2018 currently on DAPT - ASA / Effient ) , was seen by PMD in April 2019 and referred to GI doctor for elevated LFTs. Seen by GI physician who requested U/S which showed hydrops of GB with gallstones .   Yesterday had MRCP to evaluate gallbladder and bile duct. MRI showed worsening hydrops and multiple gallstones with patent bile ducts. Pt has no pain, no complaints at this time. Denies fever, chills, n/v, abd pain, back pain, urinary sx, cp, sob. Denies any hx of weight loss or family hx of cancer.  Pt denies prior h/o liver disease , alcohol abuse . Pt was recently started on levothyroxine 1 month prior and started on high dose statin 1 year prior   In ED, evaluated by surgery -> no interventions at this time (15 Shashank 2019 01:53)    #) Hydropic gallbladder with gallstones - Asymptomatic   no evidence of biliary obstruction  + leukocytosis , no fever   Rec further management as per surgery -   check pan cultures     #) Elevated LFT (mixed , predominantly cholestatic pattern )   T.titus 5 ( predom direct) , ALP -1700  AST (1200) > ALT (500)   INR - 1.3 , Plt -180  MRCP - no billiary obstruction . Liver - nodular , mild ascites   Possible Acute liver injury on chronic liver disease , cirrhosis - no synthetic dysfunction - INR WNL, no encephalopathy   mild ascites, no thrombocytopenia     Rec   Regular diet as tolerated   Chronic liver disease work up - Viral Hepatitis panel - Hep A, B, C , Hep B PCR  , Hep C PCR , EBV, CMV, HSV , VZV serologies , HIV status   Auto immune work up - ANKITA, AMA, ASMA  CHeck cerruloplasmin, iron studies , A1 AT levels   Check AFP , need [facility] liver q6 months for HCC screening  Outpatient EGD for variceal screening   Discontinue lipitor     #) LUIS ALBERTO   Check Urine analysis, urine elctrolytes , US[facility] kidneys   R/o Hepatorenal syndrome 68 y f pmh cad s/p stents (Feb & Apr 2018 currently on DAPT - ASA / Effient ) , was seen by PMD in April 2019 and referred to GI doctor for elevated LFTs. Seen by GI physician who requested U/S which showed hydrops of GB with gallstones .   Yesterday had MRCP to evaluate gallbladder and bile duct. MRI showed worsening hydrops and multiple gallstones with patent bile ducts. Pt has no pain, no complaints at this time. Denies fever, chills, n/v, abd pain, back pain, urinary sx, cp, sob. Denies any hx of weight loss or family hx of cancer.  Pt denies prior h/o liver disease , alcohol abuse . Pt was recently started on levothyroxine 1 month prior and started on high dose statin 1 year prior   In ED, evaluated by surgery -> no interventions at this time (15 Shashank 2019 01:53)    #) Hydropic gallbladder with gallstones - Asymptomatic   no evidence of biliary obstruction  + leukocytosis , no fever   Rec further management as per surgery -   check pan cultures   Plan is to re review imaging at radiology to re evaluate for any possibility of Mirrizzi's or external compression of CBD causing cholestasis.    #) Elevated LFT (mixed , predominantly cholestatic pattern )   T.titus 5 ( predom direct) , ALP -1700  AST (1200) > ALT (500)   INR - 1.3 , Plt -180  MRCP - no billiary obstruction . Liver - nodular , mild ascites   Possible Acute liver injury (infiltrative disease, PBC, PSC ...) on chronic liver disease , cirrhosis - no synthetic dysfunction - INR WNL, no encephalopathy   mild ascites, no thrombocytopenia     Rec   Regular diet as tolerated   Chronic liver disease work up - Viral Hepatitis panel - Hep A, B, C , Hep B PCR  , Hep C PCR , EBV, CMV, HSV , VZV serologies , HIV status   Auto immune work up - ANKITA, AMA, ASMA  CHeck cerruloplasmin, iron studies , A1 AT levels   Pt will need liver biopsy for definitive diagnosis if CLD workup unrevealing  Check AFP , need [facility] liver q6 months for HCC screening if this proves to be cirrhosis  Outpt EGD for variceal screening   Discontinue lipitor for now    #) LUIS ALBERTO   Check Urine analysis, urine elctrolytes , USG kidneys   unlikely Hepatorenal syndrome

## 2019-06-15 NOTE — H&P ADULT - ASSESSMENT
67 yo F w/ hx of CAD s/p PCI and hypothyroidism sent by gastroenterologist for gallbladder hydrops    #Gallbladder hydrops  -imaging study shows distended gallbladder w/ stones and sludge, probably impacted stone at neck  -no surgical intervention for now  -GI c/s    #Elevated liver enzymes, liver cirrhosis w/ ascites  -liver sono w/ doppler eval for PVT  -hepatitis panel  -chronic liver dz w/u  -trend liver enzymes  -GI c/s    #Hyponatremia, asymptomatic  -possibly d/t LUIS ALBERTO vs decreased PO sodium intake  -check lipid panel, serum osmo  -check urine sodium, potassium, creatinine, osmo  -fluid restriction for now    #LUIS ALBERTO  -?hepatorenal syndrome  -check renal sono  -check urine sodium, potassium, creatinine, osmo  -nephro c/s    #LE edema  -check 2D echo  -check TSH    #Hypothyroidism  -c/w levothyroxine    #CAD s/p PCI  -c/w BB, ASA, Effient  -hold statin d/t elevated liver enzymes    #Misc  -heparin subQ  -DASH/TLC diet  -ambulates as tolerated, PT  -from home  -full code

## 2019-06-15 NOTE — H&P ADULT - ATTENDING COMMENTS
Patient was evaluated and examined by bedside, independently, no c/o abdominal pain, tolerating diet well. Patient reported change in urine color to bright orange prior to admission, also has yellow discoloration of sclera and had an episode of gray colored stool at home. No fever prior to admission  All labs, radiology studies, VS was reviewed  GENERAL: NAD, AAOX3, patient is laying comfortably in bed  HEENT: AT, NC, PERRLA, SUPPLE, NO JVD, NO CB, icterus present  LUNG: CTA B/L  CVS: normal S1, S2, RRR, NO M/G/R  ABDOMEN: soft, bowel sounds present, normoactive in all 4 quadrants, non-tender, non-distended  EXT: trace lower ext. edema, no C/C, positive PP b/l extremities  NEURO: no acute focal neurological deficits  SKIN: no rash, no ecchymosis    I agree with medical plan outlined by Medical resident as stated above.  #)  Acute Persistent transaminitis with hydrops gallbladder with stones   LIVER FUNCTIONS - ( 15 Shashank 2019 06:09 )  Alb: 2.1 g/dL / Pro: 6.2 g/dL / ALK PHOS: 1647 U/L / ALT: 495 U/L / AST: 1180 U/L / GGT: x         -patient will need Cholecystectomy procedure  -monitor LFT's  - IVF, clear liquid diet  - f/up GI for further recommendations   < from: MR Abdomen No Cont (06.13.19 @ 16:15) >  Impression:  1. Hydropic gallbladder, up to 4.7 cm in transverse diameter with   gallstones, including probably impacted 1.6 cm stone at neck.  2. Liver cirrhosis with mild abdominal ascites.  3. No biliary distention, filling defect, or stricture.    < end of copied text >    #) LUIS ALBERTO due to severe transaminitis and rhabdomyolysis - IVF NS@ 100ml/hr  -strict I and O monitoring  - f/up BMP in am    #) New Hyponatremia- obtain urine/serum osm, urine sodium level  -close electrolytes monitoring    #) Acute rhabdomyolysis due to transaminitis- IVF, f/up CK in am    #) h/o CAD- resumed on home regimen tx.    #) Leukocytosis - inflammatory process in gallbladder- IV Ceftriaxone, f/up blood cxs.  -f/up CBC in am  daily GI and DVT proph.

## 2019-06-15 NOTE — H&P ADULT - NSHPLABSRESULTS_GEN_ALL_CORE
LABS                        12.8   12.05<H> )-----------( 177      ( 06-14 @ 19:35 )             36.0<L>    06-14    126<L>  |  93<L>  |  30<H>  ----------------------------<  109<H>  4.4   |  22  |  2.0<H>    Ca    7.4<L>      14 Jun 2019 19:35  Mg     2.5     06-14    TPro  x   /  Alb  x   /  TBili  x   /  DBili  3.6<H>  /  AST  x   /  ALT  x   /  AlkPhos  x   06-14    PT/INR - ( 14 Jun 2019 19:35 )   PT: 16.00 sec;   INR: 1.40 ratio      PTT - ( 14 Jun 2019 19:35 )  PTT:37.2 sec    IMAGING  < from: US Abdomen Limited (06.14.19 @ 20:25) >    IMPRESSION:    Distended gallbladder containing stones and sludge. Even though the   sonographic Garcia sign is negative, findings are equivocal for acute   cholecystitis. Recommend clinical correlation, and if indicated  nuclear   medicine HIDA scan can be obtained for further evaluation.    Hepatic cirrhosis, unchanged.    < end of copied text >    < from: MR Abdomen No Cont (06.13.19 @ 16:15) >    Impression:  1. Hydropic gallbladder, up to 4.7 cm in transverse diameter with   gallstones, including probably impacted 1.6 cm stone at neck.  2. Liver cirrhosis with mild abdominal ascites.  3. No biliary distention, filling defect, or stricture.    < end of copied text >

## 2019-06-16 NOTE — CONSULT NOTE ADULT - SUBJECTIVE AND OBJECTIVE BOX
NEPHROLOGY CONSULTATION NOTE    69 yo F w/ hx of CAD s/p PCI and hypothyroidism sent by gastroenterologist for gallbladder hydrops. Pt found to have elevated liver enzymes by PMD in Apr 2019, referred to GI for w/u. MRCP 2 days ago significant for gallbladder hydrops 4.7cm with probable impacted stone at neck, also liver cirrhosis w/ mild ascites. Admits to social etoh, denies binge drinking, denies abdominal pain, N/V. Admits to decreased appetite in past 2 weeks, pt tried to maintain low salt diet. Reports BLE edema developed in past 2 days as well as weakness. Denies fever, chills, abdominal pain, SOB, dysuria.    Presented w/ Cr 2.0 from 0.8 in April  Na 126 dropped to 122 today  Elevated LFt's noted    Renal US " Normal renal ultrasound. Small/moderate abdominal pelvic ascites"     PAST MEDICAL & SURGICAL HISTORY:  Hypothyroidism  CAD S/P percutaneous coronary angioplasty  No significant past surgical history    Allergies:  No Known Allergies    Home Medications Reviewed  Hospital Medications:   MEDICATIONS  (STANDING):  aspirin  chewable 81 milliGRAM(s) Oral daily  cefTRIAXone   IVPB      cefTRIAXone   IVPB 1 Gram(s) IV Intermittent every 24 hours  chlorhexidine 4% Liquid 1 Application(s) Topical <User Schedule>  heparin  Injectable 5000 Unit(s) SubCutaneous every 8 hours  levothyroxine 50 MICROGram(s) Oral daily  metoprolol tartrate 50 milliGRAM(s) Oral two times a day  prasugrel 10 milliGRAM(s) Oral daily  sodium chloride 0.9%. 1000 milliLiter(s) (100 mL/Hr) IV Continuous <Continuous>      SOCIAL HISTORY:  Denies ETOH,Smoking,   FAMILY HISTORY:  No pertinent family history in first degree relatives        REVIEW OF SYSTEMS:  CONSTITUTIONAL: +weakness, no fevers or chills  EYES/ENT: No visual changes;  No vertigo or throat pain   NECK: No pain or stiffness  RESPIRATORY: No cough, wheezing, hemoptysis; No shortness of breath  CARDIOVASCULAR: No chest pain or palpitations.  GASTROINTESTINAL: No abdominal or epigastric pain. No nausea, vomiting, or hematemesis; No diarrhea or constipation. No melena or hematochezia.  GENITOURINARY: No dysuria, frequency, foamy urine, urinary urgency, incontinence or hematuria  NEUROLOGICAL: No numbness or weakness  SKIN: No itching, burning, rashes, or lesions   VASCULAR: + bilateral lower extremity edema.   All other review of systems is negative unless indicated above.    VITALS:  T(F): 96.5 (06-16-19 @ 04:51), Max: 97.1 (06-15-19 @ 20:30)  HR: 66 (06-16-19 @ 04:51)  BP: 123/57 (06-16-19 @ 04:51)  RR: 18 (06-16-19 @ 04:51)  SpO2: 95% (06-16-19 @ 07:25)        I&O's Detail    Creatine Kinase, Serum: 1672 U/L (06-16-19 @ 07:38)      PHYSICAL EXAM:  Constitutional: NAD  HEENT: anicteric sclera, oropharynx clear, MMM  Neck: No JVD  Respiratory: CTAB, no wheezes, rales or rhonchi  Cardiovascular: S1, S2, RRR  Gastrointestinal: BS+, soft,/ND  Extremities: No cyanosis or clubbing. +peripheral edema  Neurological: A/O x 3, no focal deficits  Psychiatric: Normal mood, normal affect  : No CVA tenderness. No aragon.   Skin: No rashes  Vascular Access:    LABS:  06-16    122<L>  |  91<L>  |  42<H>  ----------------------------<  84  4.4   |  18  |  2.3<H>    Ca    7.4<L>      16 Jun 2019 07:38  Mg     2.5     06-14    TPro  6.4  /  Alb  2.0<L>  /  TBili  5.0<H>  /  DBili  3.6<H>  /  AST  1159<H>  /  ALT  485<H>  /  AlkPhos  1800<H>  06-16    Creatinine Trend: 2.3 <--, 2.3 <--, 2.0 <--                        12.6   11.44 )-----------( 203      ( 16 Jun 2019 07:38 )             35.3     Urine Studies:              RADIOLOGY & ADDITIONAL STUDIES:

## 2019-06-16 NOTE — PROGRESS NOTE ADULT - SUBJECTIVE AND OBJECTIVE BOX
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  [facility] T1-3A 010 B ([facility] T[facility])            Patient was evaluated and examined  by bedside, c/o generalized body weakness, mild legs edema, no N/V/D          REVIEW OF SYSTEMS:  please see pertinent positives mentioned above, all other 12 ROS negative      T(C): , Max: 36.2 (06-15-19 @ 20:30)  HR: 69 (06-16-19 @ 12:45)  BP: 140/63 (06-16-19 @ 12:45)  RR: 18 (06-16-19 @ 12:45)  SpO2: 95% (06-16-19 @ 07:25)  CAPILLARY BLOOD GLUCOSE          PHYSICAL EXAM:  General: NAD, AAOX3, patient is laying comfortably in bed  HEENT: AT, NC, Supple, NO JVD, NO CB. mild icterus present  Lungs: CTA B/L, no wheezing, no rhonchi  CVS: normal S1, S2, RRR, NO M/G/R  Abdomen: soft, bowel sounds present, non-tender, non-distended  Extremities: trace b/l lower ext. edema, no clubbing, no cyanosis, positive peripheral pulses b/l  Neuro: no acute focal neurological deficits  Skin: no rush, no ecchymosis      LAB  CBC  Date: 06-16-19 @ 07:38  Mean cell Jxlkaaftyn10.8  Mean cell Hemoglobin Conc35.7  Mean cell Volum 83.5  Platelet count-Automate 203  RBC Count 4.23  Red Cell Distrib Width17.0  WBC Count11.44  % Albumin, Urine--  Hematocrit 35.3  Hemoglobin 12.6  CBC  Date: 06-15-19 @ 06:09  Mean cell Ifwkoapxme72.1  Mean cell Hemoglobin Conc34.8  Mean cell Volum 83.7  Platelet count-Automate 180  RBC Count 4.16  Red Cell Distrib Width16.8  WBC Count14.41  % Albumin, Urine--  Hematocrit 34.8  Hemoglobin 12.1  CBC  Date: 06-14-19 @ 19:35  Mean cell Amuqcrnbbi41.4  Mean cell Hemoglobin Conc35.6  Mean cell Volum 82.8  Platelet count-Automate 177  RBC Count 4.35  Red Cell Distrib Width16.7  WBC Count12.05  % Albumin, Urine--  Hematocrit 36.0  Hemoglobin 12.8    BMP  06-16-19 @ 07:38  Blood Gas Arterial-Calcium,Ionized--  Blood Urea Nitrogen, Serum 42 mg/dL<H> [10 - 20]  Carbon Dioxide, Serum18 mmol/L [17 - 32]  Chloride, Serum91 mmol/L<L> [98 - 110]  Creatinie, Serum2.3 mg/dL<H> [0.7 - 1.5] [Icteric. Interpret with caution]  Glucose, Serum84 mg/dL [70 - 99]  Potassium, Serum4.4 mmol/L [3.5 - 5.0]  Sodium, Serum 122 mmol/L<L> [135 - 146]  [facility]  06-15-19 @ 06:09  Blood Gas Arterial-Calcium,Ionized--  Blood Urea Nitrogen, Serum 35 mg/dL<H> [10 - 20]  Carbon Dioxide, Serum20 mmol/L [17 - 32]  Chloride, Serum92 mmol/L<L> [98 - 110]  Creatinie, Serum2.3 mg/dL<H> [0.7 - 1.5] [Icteric. Interpret with caution]  Glucose, Serum80 mg/dL [70 - 99]  Potassium, Serum4.4 mmol/L [3.5 - 5.0]  Sodium, Serum 127 mmol/L<L> [135 - 146]  [facility]  06-14-19 @ 19:35  Blood Gas Arterial-Calcium,Ionized--  Blood Urea Nitrogen, Serum 30 mg/dL<H> [10 - 20]  Carbon Dioxide, Serum22 mmol/L [17 - 32]  Chloride, Serum93 mmol/L<L> [98 - 110]  Creatinie, Serum2.0 mg/dL<H> [0.7 - 1.5] [Icteric. Interpret with caution]  Glucose, Qbgww372 mg/dL<H> [70 - 99]  Potassium, Serum4.4 mmol/L [3.5 - 5.0]  Sodium, Serum 126 mmol/L<L> [135 - 146]        PT/INR - ( 15 Shashank 2019 06:09 )   PT: 14.90 sec;   INR: 1.30 ratio         PTT - ( 15 Shashank 2019 06:09 )  PTT:37.1 sec      Medications:  aspirin  chewable 81 milliGRAM(s) Oral daily  cefTRIAXone   IVPB      cefTRIAXone   IVPB 1 Gram(s) IV Intermittent every 24 hours  chlorhexidine 4% Liquid 1 Application(s) Topical <User Schedule>  heparin  Injectable 5000 Unit(s) SubCutaneous every 8 hours  levothyroxine 50 MICROGram(s) Oral daily  metoprolol tartrate 50 milliGRAM(s) Oral two times a day  prasugrel 10 milliGRAM(s) Oral daily  sodium chloride 0.9%. 1000 milliLiter(s) IV Continuous <Continuous>        Assessment and Plan:  #)  Acute Severe Persistent transaminitis with hydrops gallbladder with stones   LIVER FUNCTIONS - ( 15 Shashank 2019 06:09 )  Alb: 2.1 g/dL / Pro: 6.2 g/dL / ALK PHOS: 1647 U/L / ALT: 495 U/L / AST: 1180 U/L / GGT: x         -patient will need Cholecystectomy procedure  -monitor LFT's  - IVF, clear liquid diet  - f/up GI for further recommendations   < from: MR Abdomen No Cont (06.13.19 @ 16:15) >  Impression:  1. Hydropic gallbladder, up to 4.7 cm in transverse diameter with   gallstones, including probably impacted 1.6 cm stone at neck.  2. Liver cirrhosis with mild abdominal ascites.  3. No biliary distention, filling defect, or stricture.    < end of copied text >    #) LUIS ALBERTO due to severe transaminitis and rhabdomyolysis - IVF NS@ 100ml/hr  -strict I and O monitoring  -Renal US - no acute abnormalities  - f/up BMP in am    #) New Hyponatremia- obtain urine/serum osm, urine sodium level  -close electrolytes monitoring- next BMP at 8 pm    #) Acute rhabdomyolysis due to transaminitis- IVF, f/up CK in am    #) h/o CAD- resumed on home regimen tx.    #) Leukocytosis - inflammatory process in gallbladder- IV Ceftriaxone, f/up blood cxs.  -f/up CBC in am  daily GI and DVT proph.      #Progress Note Handoff: Pending Consults_GI ________,Tests________,Test Results__BMP at 8pm, urine/serum osm/ urine sodium, CK, LFT, CBC , BMP in am_____,Other;  Family discussion: pt. by bedside Disposition: Home__once medically  stable

## 2019-06-16 NOTE — CONSULT NOTE ADULT - ASSESSMENT
67 yo F w/ hx of CAD s/p PCI and hypothyroidism sent by gastroenterologist for gallbladder hydrops    found w/ LUIS ALBERTO and hypoNA    LUIS ALBERTO  - ? due to decreased PO intake, reports hasnt been eatign though doesnt seem particualry dry, no improvement thus far w/ IVF. ? HRS? though ot hypotensive seems unlikely   - Cont IVf  - Check UA, urine lytes including NA    HypoNa  - due to low intake?   - check serum and urine osm   restrict free water  - trend Na avoid overcorrection    will follow

## 2019-06-17 NOTE — PROGRESS NOTE ADULT - ASSESSMENT
69 yo F w/ hx of CAD s/p PCI and hypothyroidism sent by gastroenterologist for gallbladder hydrops    # Persistent elevated LFTs with hydrops gallbladder on MR with stones   # Leukocytosis - improved  - MRCP showed hydropic GB with GS with impacted stone. Cirrhosis with mild ascites. No biliary distention  - Hepatitis panel negative, Tylenol level negative  - GI following: might warrant IR guided liver biopsy once off DAPT.   - Possible CCY vs ERCP  - Autoimmune panel, EBV, CMV sent  - Continue Ceftriaxone given inflammatory evidence on imaging with leukocytosis  - CLD for now    # LUIS ALBERTO   DDx: Hepatorenal syndrome vs prerenal   - Continue IV hydration given rhabdo  - Renal US with no hydro    # Persistent Hyponatremia - ?SIADH  - Urine osmolality = 449, serum osmolality = 279 , urine sodium level-20  - Restrict free water intake  - BMP q12 - Avoid overcorrection    # Acute rhabdomyolysis ?due to transaminitis  - CK rising  - Continue IVF    # Hypothyroidism - Continue Levothyroxine    # LE edema  - Echo 6/16: LVEF 57, G1DD, moderate thickened MV  - TSH 4.97    # H/O CAD s/p PCI  - Continue Lopressor, DAPT  - Hold statin given elevated liver enzymes    GI ppx - not indicated  DVT ppx - Heparin SC    Dispo: from home. Pending workup

## 2019-06-17 NOTE — PROGRESS NOTE ADULT - SUBJECTIVE AND OBJECTIVE BOX
Hepatology/GI follow up note: Pt seen and examined at bedside.     For questions and inquiries please page [phone].  For urgent matters or after 5pm and on weekends please page the fellow on call through the GI paging system.    68y Female seen for: abnormal liver enzymes    Subjective/Interval events: SP MRCP      Review of system  General:  (-) weight loss, (-) fevers  Eyes:  (-) visual changes  CV:  (-) chest pain  Resp: (-) SOB, (-) wheezing  GI: (-) abdominal pain,  (-) nausea, (-) vomiting, (-) dysphagia, (-) diarrhea, (-) constipation, (-) rectal bleeding, (-) melena, (-) hematemesis.  Neuro: (-) confusion, (-) weakness  Psych:  (-) Hallucinations  Heme:  (-) easy bruisability    Past medical/surgical Hx:  PAST MEDICAL & SURGICAL HISTORY:  Hypothyroidism  CAD S/P percutaneous coronary angioplasty  No significant past surgical history    Home Medications:  Last Order Reconciliation Date: 06-15-19 @ 02:05 (Admission Reconciliation)  aspirin 81 mg oral tablet, chewable: 1 tab(s) orally once a day  atorvastatin 80 mg oral tablet: 1 tab(s) orally once a day (at bedtime)  levothyroxine 50 mcg (0.05 mg) oral tablet: 1 tab(s) orally once a day  Metoprolol Tartrate 50 mg oral tablet: 1 tab(s) orally 2 times a day  prasugrel 10 mg oral tablet: 1 tab(s) orally once a day      Allergies:  No Known Allergies      Current Medications:   aspirin  chewable 81 milliGRAM(s) Oral daily  cefTRIAXone   IVPB      cefTRIAXone   IVPB 1 Gram(s) IV Intermittent every 24 hours  chlorhexidine 4% Liquid 1 Application(s) Topical <User Schedule>  heparin  Injectable 5000 Unit(s) SubCutaneous every 8 hours  levothyroxine 50 MICROGram(s) Oral daily  metoprolol tartrate 50 milliGRAM(s) Oral two times a day  prasugrel 10 milliGRAM(s) Oral daily  sodium chloride 0.9%. 1000 milliLiter(s) IV Continuous <Continuous>        Physical exam:  T(C): 35.8 (06-17-19 @ 06:13), Max: 36 (06-16-19 @ 12:45)  HR: 67 (06-17-19 @ 06:13) (63 - 69)  BP: 136/69 (06-17-19 @ 06:13) (124/56 - 140/63)  RR: 18 (06-17-19 @ 06:13) (18 - 18)  SpO2: 95% (06-16-19 @ 20:00) (95% - 95%)    GENERAL: NAD  HEAD:  Atraumatic, Normocephalic  EYES: Sclera:NL  NECK: Supple, no JVD or thyromegaly  CHEST/LUNG: Good bilateral air entry  HEART: normal S1, S2. Regular  ABDOMEN: (-) distended, (-) tender, (-) rebound, (+) BS, (-)HSM  EXTREMITIES: (-) edema  NEUROLOGY: (-) asterixis  SKIN: (-) jaundice      Data:                        11.9   10.52 )-----------( 175      ( 17 Jun 2019 06:03 )             34.0     MCV 83.7 (06-17-19)    RDW 16.8 (06-17-19)    HGB trend:  11.9  06-17-19 @ 06:03  12.6  06-16-19 @ 07:38  12.1  06-15-19 @ 06:09  12.8  06-14-19 @ 19:35      WBC trend:  10.52  06-17-19 @ 06:03  11.44  06-16-19 @ 07:38  14.41  06-15-19 @ 06:09  12.05  06-14-19 @ 19:35    06-17    123<L>  |  91<L>  |  39<H>  ----------------------------<  101<H>  4.3   |  20  |  2.1<H>    Ca    7.2<L>      17 Jun 2019 01:22    TPro  6.4  /  Alb  2.0<L>  /  TBili  5.0<H>  /  DBili  3.6<H>  /  AST  1159<H>  /  ALT  485<H>  /  AlkPhos  1800<H>  06-16    Liver panel trend:  TBili 5.0   /   AST 1159   /      /   AlkP 1800   /   Tptn 6.4   /   Alb 2.0    /   DBili 3.6      06-16  TBili 4.3   /   AST 1032   /      /   AlkP 1510   /   Tptn 5.8   /   Alb 1.9    /   DBili 3.3      06-16  TBili 4.9   /   AST 1180   /      /   AlkP 1647   /   Tptn 6.2   /   Alb 2.1    /   DBili 3.9      06-15  TBili --   /   AST --   /   ALT --   /   AlkP --   /   Tptn --   /   Alb --    /   DBili 3.6      06-14  TBili 5.0   /   AST 1211   /      /   AlkP 1759   /   Tptn 6.3   /   Alb 2.1    /   DBili --      06-14            Culture - Blood (collected 15 Shashank 2019 18:01)  Source: .Blood None  Preliminary Report (17 Jun 2019 06:01):    No growth to date.

## 2019-06-17 NOTE — PROGRESS NOTE ADULT - SUBJECTIVE AND OBJECTIVE BOX
SUBJECTIVE:    Patient is a 68y old Female who presents with a chief complaint of gallbladder hydrops (17 Jun 2019 13:16)    Currently admitted to medicine with the primary diagnosis of Gallbladder hydrops     Today is hospital day 3d. This morning she is resting comfortably in bed and reports no new issues or overnight events.     INTERVAL EVENTS: Denies pain, still hyponatremic, elevated LFTs, rhabdo, LUIS ALBERTO    PAST MEDICAL & SURGICAL HISTORY  Hypothyroidism  CAD S/P percutaneous coronary angioplasty  No significant past surgical history      ALLERGIES:  No Known Allergies    MEDICATIONS:  STANDING MEDICATIONS  aspirin  chewable 81 milliGRAM(s) Oral daily  cefTRIAXone   IVPB      cefTRIAXone   IVPB 1 Gram(s) IV Intermittent every 24 hours  chlorhexidine 4% Liquid 1 Application(s) Topical <User Schedule>  heparin  Injectable 5000 Unit(s) SubCutaneous every 8 hours  levothyroxine 50 MICROGram(s) Oral daily  metoprolol tartrate 50 milliGRAM(s) Oral two times a day  prasugrel 10 milliGRAM(s) Oral daily  sodium chloride 0.9%. 1000 milliLiter(s) IV Continuous <Continuous>    PRN MEDICATIONS    VITALS:   T(F): 96.7  HR: 77  BP: 137/66  RR: 18  SpO2: 95%    LABS:                        11.9   10.52 )-----------( 175      ( 17 Jun 2019 06:03 )             34.0     06-17    124<L>  |  92<L>  |  38<H>  ----------------------------<  89  4.5   |  20  |  1.9<H>    Ca    7.5<L>      17 Jun 2019 06:03    TPro  6.0  /  Alb  1.9<L>  /  TBili  4.9<H>  /  DBili  3.5<H>  /  AST  1033<H>  /  ALT  477<H>  /  AlkPhos  1688<H>  06-17          Creatine Kinase, Serum: 1794 U/L <H> (06-17-19 @ 06:03)      Culture - Blood (collected 15 Shashank 2019 18:01)  Source: .Blood None  Preliminary Report (17 Jun 2019 06:01):    No growth to date.      CARDIAC MARKERS ( 17 Jun 2019 06:03 )  x     / x     / 1794 U/L / x     / x      CARDIAC MARKERS ( 16 Jun 2019 07:38 )  x     / x     / 1672 U/L / x     / x        ECHO:  < from: Transthoracic Echocardiogram (06.16.19 @ 08:18) >  Summary:   1. LV Ejection Fraction by Castle's Method with a biplane EF of 57 %.   2. Spectral Doppler shows impaired relaxation pattern of left   ventricular myocardial filling (Grade I diastolic dysfunction).   3. Degenerative mitral valve.   4. Mild mitral valve regurgitation.   5. Moderate thickening and calcification of the anterior and posterior   mitral valve leaflets.   6. Moderately decreased posterior mitral leaflet mobility.   7. Sclerotic aortic valve with normal opening.    < end of copied text >    RADIOLOGY:    < from: US Retroperitoneal Complete (06.15.19 @ 21:22) >  IMPRESSION:    1. No hydronephrosis bilaterally.  2. Urinary bladder postvoid residual of 123 cc.  3. Nonspecific nonvisualization bilateral ureteral jets.  4. Bladder wall thickening measuring 0.6 cm, nonspecific.  5. Small volume abdominal ascites.    < end of copied text >    < from: US Kidney and Bladder (06.15.19 @ 09:57) >  Impression:    Normal renal ultrasound.    Small/moderate abdominal pelvic ascites.    Endometrial canal fluid in the uterus measuring up to 0.6 cm in   thickness, nonspecific. Consider pelvic sonogram for further evaluation.    < end of copied text >    < from: US Abdomen Limited (06.15.19 @ 09:50) >  impression:    Limited exam of the portal vein, partially imaged, secondary to patient's   medical condition and body habitus.    Themain portal vein appears patent. The right portal vein appears   patent. The left portal vein is limited in visualization.    The portal splenic confluence appears patent.    < end of copied text >      < from: US Abdomen Limited (06.14.19 @ 20:25) >  IMPRESSION:    Distended gallbladder containing stones and sludge. Even though the   sonographic Garcia sign is negative, findings are equivocal for acute   cholecystitis. Recommend clinical correlation, and if indicated  nuclear   medicine HIDA scan can be obtained for further evaluation.    Hepatic cirrhosis, unchanged.    < end of copied text >    < from: MR Abdomen No Cont (06.13.19 @ 16:15) >  Impression:  1. Hydropic gallbladder, up to 4.7 cm in transverse diameter with   gallstones, including probably impacted 1.6 cm stone at neck.  2. Liver cirrhosis with mild abdominal ascites.  3. No biliary distention, filling defect, or stricture.    < end of copied text >    PHYSICAL EXAM:  GEN: No acute distress  HEENT: mild scleral icterus  PULM/CHEST: Clear to auscultation bilaterally, no rales, rhonchi or wheezes   CVS: Regular rate and rhythm, S1-S2, no murmurs  ABD: Soft, obese, non-tender, non-distended, +BS  EXT: B/L 2+ LE edema  NEURO: AAOx3

## 2019-06-17 NOTE — PHYSICAL THERAPY INITIAL EVALUATION ADULT - PERTINENT HX OF CURRENT PROBLEM, REHAB EVAL
67 yo F w/ hx of CAD s/p PCI and hypothyroidism sent by gastroenterologist for gallbladder hydrops. Pt found to have elevated liver enzymes by PMD in Apr 2019, referred to GI for w/u.

## 2019-06-17 NOTE — PROGRESS NOTE ADULT - ASSESSMENT
69 yo F w/ hx of CAD s/p PCI and hypothyroidism sent by gastroenterologist for gallbladder hydrops    found w/ LUIS ALBERTO and hypoNA    LUIS ALBERTO  - ? due to decreased PO intake, reports hasnt been eating though doesnt seem particualry dry, no improvement thus far w/ IVF. ? HRS? though not hypotensive seems unlikely   rhabdo not all that severe  - Cont IVf given rising CPK  - Check UA, urine lytes including NA    HypoNa  - not merley due to low solute as  urien osm 449 indicates presence of ADH, cont IVF runs risk of worsening if SIADH, but if intravasc down  would help, given rising CPK would contiue NS for now trend NA and CPK   restrict free water  - trend Na avoid overcorrection    will follow

## 2019-06-17 NOTE — PROGRESS NOTE ADULT - SUBJECTIVE AND OBJECTIVE BOX
Nephrology progress note    Patient was seen and examined, events over the last 24 h noted .  Cr and HypoNa marignally improved     Allergies:  No Known Allergies    Hospital Medications:   MEDICATIONS  (STANDING):  aspirin  chewable 81 milliGRAM(s) Oral daily  cefTRIAXone   IVPB      cefTRIAXone   IVPB 1 Gram(s) IV Intermittent every 24 hours  chlorhexidine 4% Liquid 1 Application(s) Topical <User Schedule>  heparin  Injectable 5000 Unit(s) SubCutaneous every 8 hours  levothyroxine 50 MICROGram(s) Oral daily  metoprolol tartrate 50 milliGRAM(s) Oral two times a day  prasugrel 10 milliGRAM(s) Oral daily  sodium chloride 0.9%. 1000 milliLiter(s) (100 mL/Hr) IV Continuous <Continuous>        VITALS:  T(F): 96.7 (06-17-19 @ 13:15), Max: 96.7 (06-17-19 @ 13:15)  HR: 77 (06-17-19 @ 13:15)  BP: 137/66 (06-17-19 @ 13:15)  RR: 18 (06-17-19 @ 13:15)  SpO2: 95% (06-16-19 @ 20:00)  Wt(kg): --        PHYSICAL EXAM:  Constitutional: NAD  HEENT: anicteric sclera, oropharynx clear, MMM  Neck: No JVD  Respiratory: CTAB, no wheezes, rales or rhonchi  Cardiovascular: S1, S2, RRR  Gastrointestinal: BS+, soft, NT/ND  Extremities: No cyanosis or clubbing. + peripheral edema  :  No aragon.   Skin: No rashes    LABS:  06-17    124<L>  |  92<L>  |  38<H>  ----------------------------<  89  4.5   |  20  |  1.9<H>    Ca    7.5<L>      17 Jun 2019 06:03    TPro  6.0  /  Alb  1.9<L>  /  TBili  4.9<H>  /  DBili  3.5<H>  /  AST  1033<H>  /  ALT  477<H>  /  AlkPhos  1688<H>  06-17                          11.9   10.52 )-----------( 175      ( 17 Jun 2019 06:03 )             34.0       Urine Studies:    Osmolality, Random Urine: 449 mos/kg (06-16 @ 19:55)  Sodium, Random Urine: 20.0 mmoL/L (06-16 @ 19:55)    RADIOLOGY & ADDITIONAL STUDIES:

## 2019-06-17 NOTE — PROGRESS NOTE ADULT - SUBJECTIVE AND OBJECTIVE BOX
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  [facility])            Patient was evaluated and examined  by bedside, c/o generalized body weakness, no abdominal pain, no nausea, no dysuria          REVIEW OF SYSTEMS:  please see pertinent positives mentioned above, all other 12 ROS negative      T(C): , Max: 35.8 (06-16-19 @ 20:30)  HR: 67 (06-17-19 @ 06:13)  BP: 136/69 (06-17-19 @ 06:13)  RR: 18 (06-17-19 @ 06:13)  SpO2: 95% (06-16-19 @ 20:00)  CAPILLARY BLOOD GLUCOSE          PHYSICAL EXAM:  General: NAD, AAOX3, patient is sitting comfortably in a chair  HEENT: AT, NC, Supple, NO JVD, NO CB. mild icterus present  Lungs: CTA B/L, no wheezing, no rhonchi  CVS: normal S1, S2, RRR, NO M/G/R  Abdomen: soft, bowel sounds present, non-tender, non-distended  Extremities: trace b/l lower ext. edema, no clubbing, no cyanosis, positive peripheral pulses b/l  Neuro: no acute focal neurological deficits, generalized body weakness  Skin: no rush, no ecchymosis        LAB  CBC  Date: 06-17-19 @ 06:03  Mean cell Lbjtvfcfdc39.3  Mean cell Hemoglobin Conc35.0  Mean cell Volum 83.7  Platelet count-Automate 175  RBC Count 4.06  Red Cell Distrib Width16.8  WBC Count10.52  % Albumin, Urine--  Hematocrit 34.0  Hemoglobin 11.9  CBC  Date: 06-16-19 @ 07:38  Mean cell Yqeuzemkxl61.8  Mean cell Hemoglobin Conc35.7  Mean cell Volum 83.5  Platelet count-Automate 203  RBC Count 4.23  Red Cell Distrib Width17.0  WBC Count11.44  % Albumin, Urine--  Hematocrit 35.3  Hemoglobin 12.6  CBC  Date: 06-15-19 @ 06:09  Mean cell Mbpvsuwixd04.1  Mean cell Hemoglobin Conc34.8  Mean cell Volum 83.7  Platelet count-Automate 180  RBC Count 4.16  Red Cell Distrib Width16.8  WBC Count14.41  % Albumin, Urine--  Hematocrit 34.8  Hemoglobin 12.1  CBC  Date: 06-14-19 @ 19:35  Mean cell Fealmrcnhu62.4  Mean cell Hemoglobin Conc35.6  Mean cell Volum 82.8  Platelet count-Automate 177  RBC Count 4.35  Red Cell Distrib Width16.7  WBC Count12.05  % Albumin, Urine--  Hematocrit 36.0  Hemoglobin 12.8    [facility]  06-17-19 @ 06:03  Blood Gas Arterial-Calcium,Ionized--  Blood Urea Nitrogen, Serum 38 mg/dL<H> [10 - 20]  Carbon Dioxide, Serum20 mmol/L [17 - 32]  Chloride, Serum92 mmol/L<L> [98 - 110]  Creatinie, Serum1.9 mg/dL<H> [0.7 - 1.5] [Icteric. Interpret with caution]  Glucose, Serum89 mg/dL [70 - 99]  Potassium, Serum4.5 mmol/L [3.5 - 5.0]  Sodium, Serum 124 mmol/L<L> [135 - 146]  [facility]  06-17-19 @ 01:22  Blood Gas Arterial-Calcium,Ionized--  Blood Urea Nitrogen, Serum 39 mg/dL<H> [10 - 20]  Carbon Dioxide, Serum20 mmol/L [17 - 32]  Chloride, Serum91 mmol/L<L> [98 - 110]  Creatinie, Serum2.1 mg/dL<H> [0.7 - 1.5] [Icteric. Interpret with caution]  Glucose, Mdnss192 mg/dL<H> [70 - 99]  Potassium, Serum4.3 mmol/L [3.5 - 5.0]  Sodium, Serum 123 mmol/L<L> [135 - 146]  [facility]  06-16-19 @ 07:38  Blood Gas Arterial-Calcium,Ionized--  Blood Urea Nitrogen, Serum 42 mg/dL<H> [10 - 20]  Carbon Dioxide, Serum18 mmol/L [17 - 32]  Chloride, Serum91 mmol/L<L> [98 - 110]  Creatinie, Serum2.3 mg/dL<H> [0.7 - 1.5] [Icteric. Interpret with caution]  Glucose, Serum84 mg/dL [70 - 99]  Potassium, Serum4.4 mmol/L [3.5 - 5.0]  Sodium, Serum 122 mmol/L<L> [135 - 146]  [facility]  06-15-19 @ 06:09  Blood Gas Arterial-Calcium,Ionized--  Blood Urea Nitrogen, Serum 35 mg/dL<H> [10 - 20]  Carbon Dioxide, Serum20 mmol/L [17 - 32]  Chloride, Serum92 mmol/L<L> [98 - 110]  Creatinie, Serum2.3 mg/dL<H> [0.7 - 1.5] [Icteric. Interpret with caution]  Glucose, Serum80 mg/dL [70 - 99]  Potassium, Serum4.4 mmol/L [3.5 - 5.0]  Sodium, Serum 127 mmol/L<L> [135 - 146]  BMP  06-14-19 @ 19:35  Blood Gas Arterial-Calcium,Ionized--  Blood Urea Nitrogen, Serum 30 mg/dL<H> [10 - 20]  Carbon Dioxide, Serum22 mmol/L [17 - 32]  Chloride, Serum93 mmol/L<L> [98 - 110]  Creatinie, Serum2.0 mg/dL<H> [0.7 - 1.5] [Icteric. Interpret with caution]  Glucose, Blxpf681 mg/dL<H> [70 - 99]  Potassium, Serum4.4 mmol/L [3.5 - 5.0]  Sodium, Serum 126 mmol/L<L> [135 - 146]              Microbiology:    Culture - Blood (collected 06-15-19 @ 18:01)  Source: .Blood None  Preliminary Report (06-17-19 @ 06:01):    No growth to date.      Medications:  aspirin  chewable 81 milliGRAM(s) Oral daily  cefTRIAXone   IVPB      cefTRIAXone   IVPB 1 Gram(s) IV Intermittent every 24 hours  chlorhexidine 4% Liquid 1 Application(s) Topical <User Schedule>  heparin  Injectable 5000 Unit(s) SubCutaneous every 8 hours  levothyroxine 50 MICROGram(s) Oral daily  metoprolol tartrate 50 milliGRAM(s) Oral two times a day  prasugrel 10 milliGRAM(s) Oral daily  sodium chloride 0.9%. 1000 milliLiter(s) IV Continuous <Continuous>        Assessment and Plan:  #)  Acute Severe Persistent transaminitis with hydrops gallbladder with stones   LIVER FUNCTIONS - ( 15 Shashank 2019 06:09 )  Alb: 2.1 g/dL / Pro: 6.2 g/dL / ALK PHOS: 1647 U/L / ALT: 495 U/L / AST: 1180 U/L / GGT: x     LIVER FUNCTIONS - ( 17 Jun 2019 06:03 )  Alb: 1.9 g/dL / Pro: 6.0 g/dL / ALK PHOS: 1688 U/L / ALT: 477 U/L / AST: 1033 U/L / GGT: x               -patient will need Cholecystectomy procedure  -Hepatitis profile negative  -monitor LFT's  - IVF, clear liquid diet  - f/up GI for further recommendations - f/up autoimmune work up   < from: MR Abdomen No Cont (06.13.19 @ 16:15) >  Impression:  1. Hydropic gallbladder, up to 4.7 cm in transverse diameter with   gallstones, including probably impacted 1.6 cm stone at neck.  2. Liver cirrhosis with mild abdominal ascites.  3. No biliary distention, filling defect, or stricture.    < end of copied text >    #) LUIS ALBERTO due to severe transaminitis and rhabdomyolysis - renal function remains abnormally elevated  06-17    124<L>  |  92<L>  |  38<H>  ----------------------------<  89  4.5   |  20  |  1.9<H>    Ca    7.5<L>      17 Jun 2019 06:03      - IVF NS@ 100ml/hr  -strict I and O monitoring  -Renal US - no acute abnormalities  - f/up BMP in am    #) New Hyponatremia-  urine osm- 449/serum osm- 279 , urine sodium level-20  -continue close electrolytes monitoring    #) Acute rhabdomyolysis due to transaminitis- persistently elevated CK level   IVF, f/up CK in am    #) h/o CAD- resumed on home regimen tx., no statins    #) Leukocytosis - inflammatory process in gallbladder-repeated WBC- improved                        11.9   10.52 )-----------( 175      ( 17 Jun 2019 06:03 )             34.0     patient continued on  IV Ceftriaxone,   - blood cxs- no growth  daily GI and DVT proph.      #Progress Note Handoff: Pending Consults_GI _f/up_______,Tests________,Test Results__autoimmune work up, BMP CK, LFT, CBC  in am_____,Other;  Family discussion: pt. by bedside Disposition: Home__once medically  stable

## 2019-06-17 NOTE — PHYSICAL THERAPY INITIAL EVALUATION ADULT - RANGE OF MOTION EXAMINATION, REHAB EVAL
B UE's grossly WFL. B LE's grossly WFL B hip flex AAROM, knee flex/ext grossly WFL , ankle pumps 10 reps each.

## 2019-06-17 NOTE — PHYSICAL THERAPY INITIAL EVALUATION ADULT - GENERAL OBSERVATIONS, REHAB EVAL
15:40-16:19. Pt encountered semifowler in bed in NAD, + IV L UE, spouse at b/s, +ACE wraps B LE's. Pt stated her legs are weak.  Pt agreeable to PT.

## 2019-06-17 NOTE — PROGRESS NOTE ADULT - ASSESSMENT
68 Y F Hx of CAD on DAPT, for a stent >1 year ago, w abnormal LFTs  Imaging suggestive of cirrhosis (nodular contour) with no evident signs of portal HTN.  There is evidence of cholelithiasis with a possible cystic duct stone impacting the CBD proximaly despte no evident giovanni dil.  The derangement in the liver enzymes is unclear wether is solely due to an active hepatitis with evidence of fibrosis or combined with obstructive disease.  The clinical picture (asymptomatic) favors the former however the imaging and blood work favors the later.    1)Cholelithiasis- High likelihood of choledocholithiasis  2)Abnormal liver imaging- ?cirrhosis with intact biosynthetic function except for aow albumin possible secondary to kidney disease.  3)Kidney disease? CKD?  4)CAD on DAPT    Rec:  - CLD work up currently (-) so far- please obtain AIH markers (ANKITA, ASMA, liver soluble Ab, Anti LKM), PBC (AMA), PSC (ANCA). Might warrant Liver biopsy once of DAPT (contact IR). DILI is also on the differential. Other viral etiologies could also account for hepatitis (check CMV, HSV and EBV PCR)  - Might benefit from an ERCP if pathology unsuggestive  - Trend LFTs  - Will need Lap CCY  - Check Urine protein  - Will warrant OP follow up for screening (EGD and US Q6mo) and immunization for hep B and A

## 2019-06-18 NOTE — DIETITIAN INITIAL EVALUATION ADULT. - OTHER INFO
Reason for RD assessment: clear liquids x 5 days. Pt adm for gallbladder hydrops. Persistent elevated LFTs with hydrops gallbladder on MR with stones. Hyperbilirubinemia - likely due to obstruction. GI following: might warrant IR guided liver biopsy once off DAPT. Possible CCY vs ERCP. LUIS ALBERTO - improving. Persistent Hyponatremia - ?SIADH - improving. LE edema - improving

## 2019-06-18 NOTE — PROGRESS NOTE ADULT - SUBJECTIVE AND OBJECTIVE BOX
Patient is a 68y old  Female who presents with a chief complaint of gallbladder hydrops (2019 13:54)    HPI:  67 yo F w/ hx of CAD s/p PCI and hypothyroidism sent by gastroenterologist for gallbladder hydrops. Pt found to have elevated liver enzymes by PMD in 2019, referred to GI for w/u. MRCP 2 days ago significant for gallbladder hydrops 4.7cm with probable impacted stone at neck, also liver cirrhosis w/ mild ascites. Admits to social etoh, denies binge drinking, denies abdominal pain, N/V. Admits to decreased appetite in past 2 weeks, pt tried to maintain low salt diet. Reports BLE edema developed in past 2 days as well as weakness. Denies fever, chills, abdominal pain, SOB, dysuria.    In ED, evaluated by surgery -> no interventions at this time (15 Shashank 2019 01:53)    PAST MEDICAL & SURGICAL HISTORY:  Hypothyroidism  CAD S/P percutaneous coronary angioplasty  No significant past surgical history    patient seen and examined independently on morning rounds for the first time today, chart reviewed and discussed with the medicine resident.    no overnight events--tolerating clear liquids--no abdominal pain- feels weak with minimal ambulation-     Vital Signs Last 24 Hrs  T(C): 35.7 (2019 12:50), Max: 36 (2019 21:30)  T(F): 96.3 (2019 12:50), Max: 96.8 (2019 21:30)  HR: 68 (2019 12:50) (66 - 71)  BP: 142/73 (2019 12:50) (122/69 - 144/67)  BP(mean): --  RR: 18 (2019 12:50) (18 - 18)  SpO2: --             PE:  GEN-NAD, AAOx3, oob in chair  HEENT- NCAT, PERRLA, EOMI, +icterus  PULM- Clear to auscultation bilaterally, fair air entry  CVS- +s1/s2 RRR no murmurs  GI- soft NT obese ND +bs, no rebound, no guarding  EXT- trace edema--bilateral leg ace bandage dressings               11.6   9.92  )-----------( 163      ( 2019 06:43 )             33.4     06-18    127<L>  |  96<L>  |  31<H>  ----------------------------<  93  4.3   |  22  |  1.4    Ca    7.7<L>      2019 06:43  Mg     2.2         TPro  5.8<L>  /  Alb  1.9<L>  /  TBili  5.4<H>  /  DBili  3.9<H>  /  AST  1042<H>  /  ALT  489<H>  /  AlkPhos  1730<H>      CARDIAC MARKERS ( 2019 06:43 )  x     / x     / 1909 U/L / x     / x      CARDIAC MARKERS ( 2019 06:03 )  x     / x     / 1794 U/L / x     / x          Urinalysis Basic - ( 2019 14:07 )    Color: Yellow / Appearance: Cloudy / S.010 / pH: x  Gluc: x / Ketone: Negative  / Bili: Negative / Urobili: 0.2 mg/dL   Blood: x / Protein: Trace mg/dL / Nitrite: Negative   Leuk Esterase: Negative / RBC: 3-5 /HPF / WBC x   Sq Epi: x / Non Sq Epi: Many /HPF / Bacteria: x        Culture - Blood (collected 15 Shashank 2019 18:01)  Source: .Blood None  Preliminary Report (2019 06:01):    No growth to date.        MEDICATIONS  (STANDING):  aspirin  chewable 81 milliGRAM(s) Oral daily  cefTRIAXone   IVPB      cefTRIAXone   IVPB 1 Gram(s) IV Intermittent every 24 hours  chlorhexidine 4% Liquid 1 Application(s) Topical <User Schedule>  heparin  Injectable 5000 Unit(s) SubCutaneous every 8 hours  levothyroxine 50 MICROGram(s) Oral daily  metoprolol tartrate 50 milliGRAM(s) Oral two times a day  prasugrel 10 milliGRAM(s) Oral daily  sodium chloride 0.9%. 1000 milliLiter(s) (100 mL/Hr) IV Continuous <Continuous>

## 2019-06-18 NOTE — PROGRESS NOTE ADULT - SUBJECTIVE AND OBJECTIVE BOX
SUBJECTIVE:    Patient is a 68y old Female who presents with a chief complaint of gallbladder hydrops (2019 07:26)    Currently admitted to medicine with the primary diagnosis of Gallbladder hydrops     Today is hospital day 4d. This morning she is resting comfortably in bed and reports no new issues or overnight events.     INTERVAL EVENTS: Feels weak. HypoNa and Cr improving.    PAST MEDICAL & SURGICAL HISTORY  Hypothyroidism  CAD S/P percutaneous coronary angioplasty  No significant past surgical history      ALLERGIES:  No Known Allergies    MEDICATIONS:  STANDING MEDICATIONS  aspirin  chewable 81 milliGRAM(s) Oral daily  cefTRIAXone   IVPB      cefTRIAXone   IVPB 1 Gram(s) IV Intermittent every 24 hours  chlorhexidine 4% Liquid 1 Application(s) Topical <User Schedule>  heparin  Injectable 5000 Unit(s) SubCutaneous every 8 hours  levothyroxine 50 MICROGram(s) Oral daily  metoprolol tartrate 50 milliGRAM(s) Oral two times a day  prasugrel 10 milliGRAM(s) Oral daily  sodium chloride 0.9%. 1000 milliLiter(s) IV Continuous <Continuous>    PRN MEDICATIONS    VITALS:   T(F): 96.4  HR: 68  BP: 133/67  RR: 18  SpO2: --    LABS:                        11.6   9.92  )-----------( 163      ( 2019 06:43 )             33.4     06-18    127<L>  |  96<L>  |  31<H>  ----------------------------<  93  4.3   |  22  |  1.4    Ca    7.7<L>      2019 06:43  Mg     2.2     18    TPro  5.8<L>  /  Alb  1.9<L>  /  TBili  5.4<H>  /  DBili  3.9<H>  /  AST  1042<H>  /  ALT  489<H>  /  AlkPhos  1730<H>  18      Urinalysis Basic - ( 2019 14:07 )    Color: Yellow / Appearance: Cloudy / S.010 / pH: x  Gluc: x / Ketone: Negative  / Bili: Negative / Urobili: 0.2 mg/dL   Blood: x / Protein: Trace mg/dL / Nitrite: Negative   Leuk Esterase: Negative / RBC: 3-5 /HPF / WBC x   Sq Epi: x / Non Sq Epi: Many /HPF / Bacteria: x        Creatine Kinase, Serum: 1909 U/L <H> (19 @ 06:43)  Sedimentation Rate, Erythrocyte: 46 mm/Hr <H> (19 @ 06:43)      Culture - Blood (collected 15 Shashank 2019 18:01)  Source: .Blood None  Preliminary Report (2019 06:01):    No growth to date.      CARDIAC MARKERS ( 2019 06:43 )  x     / x     / 1909 U/L / x     / x      CARDIAC MARKERS ( 2019 06:03 )  x     / x     / 1794 U/L / x     / x        RADIOLOGY:    PHYSICAL EXAM:  GEN: No acute distress  HEENT: mild scleral icterus  PULM/CHEST: Clear to auscultation bilaterally, no rales, rhonchi or wheezes   CVS: Regular rate and rhythm, S1-S2, no murmurs  ABD: Soft, obese, non-tender, non-distended, +BS  EXT: B/L 2+ LE edema  NEURO: AAOx3 SUBJECTIVE:    Patient is a 68y old Female who presents with a chief complaint of gallbladder hydrops (2019 07:26)    Currently admitted to medicine with the primary diagnosis of Gallbladder hydrops     Today is hospital day 4d. This morning she is resting comfortably in bed and reports no new issues or overnight events.     INTERVAL EVENTS: Feels weak. HypoNa and Cr improving.    PAST MEDICAL & SURGICAL HISTORY  Hypothyroidism  CAD S/P percutaneous coronary angioplasty  No significant past surgical history      ALLERGIES:  No Known Allergies    MEDICATIONS:  STANDING MEDICATIONS  aspirin  chewable 81 milliGRAM(s) Oral daily  cefTRIAXone   IVPB      cefTRIAXone   IVPB 1 Gram(s) IV Intermittent every 24 hours  chlorhexidine 4% Liquid 1 Application(s) Topical <User Schedule>  heparin  Injectable 5000 Unit(s) SubCutaneous every 8 hours  levothyroxine 50 MICROGram(s) Oral daily  metoprolol tartrate 50 milliGRAM(s) Oral two times a day  prasugrel 10 milliGRAM(s) Oral daily  sodium chloride 0.9%. 1000 milliLiter(s) IV Continuous <Continuous>    PRN MEDICATIONS    VITALS:   T(F): 96.4  HR: 68  BP: 133/67  RR: 18  SpO2: --    LABS:                        11.6   9.92  )-----------( 163      ( 2019 06:43 )             33.4     06-18    127<L>  |  96<L>  |  31<H>  ----------------------------<  93  4.3   |  22  |  1.4    Ca    7.7<L>      2019 06:43  Mg     2.2     18    TPro  5.8<L>  /  Alb  1.9<L>  /  TBili  5.4<H>  /  DBili  3.9<H>  /  AST  1042<H>  /  ALT  489<H>  /  AlkPhos  1730<H>  18      Urinalysis Basic - ( 2019 14:07 )    Color: Yellow / Appearance: Cloudy / S.010 / pH: x  Gluc: x / Ketone: Negative  / Bili: Negative / Urobili: 0.2 mg/dL   Blood: x / Protein: Trace mg/dL / Nitrite: Negative   Leuk Esterase: Negative / RBC: 3-5 /HPF / WBC x   Sq Epi: x / Non Sq Epi: Many /HPF / Bacteria: x        Creatine Kinase, Serum: 1909 U/L <H> (19 @ 06:43)  Sedimentation Rate, Erythrocyte: 46 mm/Hr <H> (19 @ 06:43)      Culture - Blood (collected 15 Shashank 2019 18:01)  Source: .Blood None  Preliminary Report (2019 06:01):    No growth to date.      CARDIAC MARKERS ( 2019 06:43 )  x     / x     / 1909 U/L / x     / x      CARDIAC MARKERS ( 2019 06:03 )  x     / x     / 1794 U/L / x     / x        RADIOLOGY:    PHYSICAL EXAM:  GEN: No acute distress  HEENT: Scleral icterus  PULM/CHEST: Clear to auscultation bilaterally, no rales, rhonchi or wheezes   CVS: Regular rate and rhythm, S1-S2, no murmurs  ABD: Soft, obese, non-tender, non-distended, +BS  EXT: B/L 2+ LE edema  NEURO: AAOx3

## 2019-06-18 NOTE — PROGRESS NOTE ADULT - ASSESSMENT
68 Y F Hx of CAD on DAPT, for a stent >1 year ago, w abnormal LFTs  Imaging suggestive of cirrhosis (nodular contour) with no evident signs of portal HTN.  There is evidence of cholelithiasis with a possible cystic duct stone impacting the CBD proximaly despte no evident giovanni dil.  The derangement in the liver enzymes is unclear wether is solely due to an active hepatitis with evidence of fibrosis or combined with obstructive disease.  The clinical picture (asymptomatic) favors the former however the imaging and blood work favors the later.    Currently the soluble IgG is mildly elevated the ASMA and Anti LKM  are negative.  The AMA is negative    1)Cholelithiasis- High likelihood of choledocholithiasis  2)Abnormal liver imaging- ?cirrhosis with intact biosynthetic function except for low albumin possible secondary to kidney disease.  3)Kidney disease? CKD?  4)CAD on DAPT    Rec:  - CLD work up currently (-) so far- please obtain AIH markers (ANKITA), PSC (ANCA). Might warrant Liver biopsy once of DAPT (contact IR). DILI is also on the differential. Other viral etiologies could also account for hepatitis (check CMV, HSV and EBV PCR)  - Might benefit from an ERCP if pathology unsuggestive  - Trend LFTs and INR DAILY  - Will need Lap CCY (OP)  - Check Urine protein  - Will warrant OP follow up for screening (EGD and US Q6mo) and immunization for hep B and A  - Consult cardiology for input of holding DAPt in preparation for biopsy

## 2019-06-18 NOTE — CONSULT NOTE ADULT - SUBJECTIVE AND OBJECTIVE BOX
INTERVENTIONAL RADIOLOGY CONSULT:     Procedure Requested: Nontargeted Liver Biopsy    HPI:  67 yo F w/ hx of CAD s/p PCI and hypothyroidism sent by gastroenterologist for gallbladder hydrops. Pt found to have elevated liver enzymes by PMD in Apr 2019, referred to GI for w/u. MRCP 2 days ago significant for gallbladder hydrops 4.7cm with probable impacted stone at neck, also liver cirrhosis w/ mild ascites. Admits to social etoh, denies binge drinking, denies abdominal pain, N/V. Admits to decreased appetite in past 2 weeks, pt tried to maintain low salt diet. Reports BLE edema developed in past 2 days as well as weakness. Denies fever, chills, abdominal pain, SOB, dysuria.    In ED, evaluated by surgery -> no interventions at this time (15 Shashank 2019 01:53)    PAST MEDICAL & SURGICAL HISTORY:  Hypothyroidism  CAD S/P percutaneous coronary angioplasty  No significant past surgical history    MEDICATIONS  (STANDING):  aspirin  chewable 81 milliGRAM(s) Oral daily  cefTRIAXone   IVPB      cefTRIAXone   IVPB 1 Gram(s) IV Intermittent every 24 hours  chlorhexidine 4% Liquid 1 Application(s) Topical <User Schedule>  heparin  Injectable 5000 Unit(s) SubCutaneous every 8 hours  levothyroxine 50 MICROGram(s) Oral daily  metoprolol tartrate 50 milliGRAM(s) Oral two times a day  prasugrel 10 milliGRAM(s) Oral daily  sodium chloride 0.9%. 1000 milliLiter(s) (100 mL/Hr) IV Continuous <Continuous>    Allergies    No Known Allergies    Intolerances    FAMILY HISTORY:  No pertinent family history in first degree relatives    Physical Exam:   Vital Signs Last 24 Hrs  T(C): 35.8 (18 Jun 2019 05:30), Max: 36 (17 Jun 2019 21:30)  T(F): 96.4 (18 Jun 2019 05:30), Max: 96.8 (17 Jun 2019 21:30)  HR: 68 (18 Jun 2019 05:30) (66 - 71)  BP: 133/67 (18 Jun 2019 05:30) (122/69 - 144/67)  BP(mean): --  RR: 18 (18 Jun 2019 05:30) (18 - 18)  SpO2: --    Labs:                         11.6   9.92  )-----------( 163      ( 18 Jun 2019 06:43 )             33.4     06-18    127<L>  |  96<L>  |  31<H>  ----------------------------<  93  4.3   |  22  |  1.4    Ca    7.7<L>      18 Jun 2019 06:43  Mg     2.2     06-18    TPro  5.8<L>  /  Alb  1.9<L>  /  TBili  5.4<H>  /  DBili  3.9<H>  /  AST  1042<H>  /  ALT  489<H>  /  AlkPhos  1730<H>  06-18      Pertinent labs:                      11.6   9.92  )-----------( 163      ( 18 Jun 2019 06:43 )             33.4       06-18    127<L>  |  96<L>  |  31<H>  ----------------------------<  93  4.3   |  22  |  1.4    Ca    7.7<L>      18 Jun 2019 06:43  Mg     2.2     06-18    TPro  5.8<L>  /  Alb  1.9<L>  /  TBili  5.4<H>  /  DBili  3.9<H>  /  AST  1042<H>  /  ALT  489<H>  /  AlkPhos  1730<H>  06-18    ASSESSMENT/ PLAN:     67 yo F w/ hx of CAD s/p PCI and hypothyroidism sent by gastroenterologist for gallbladder hydrops. Pt found to have elevated liver enzymes by PMD in Apr 2019, referred to GI for w/u. MRCP 2 days ago significant for gallbladder hydrops 4.7cm with probable impacted stone at neck, also liver cirrhosis w/ mild ascites.  - IR was consulted for a nontargeted liver biopsy  - Elevated liver enzymes, Alk phos 1730, AST 1042,   - GI following, suggested acute hepatitis vs obstructive disease, etiology is still unclear  - Patient is currently on DAPT with ASA and Effient (Prasugrel). Prasugrel will need to be held at least 5 days prior to procedure.  - Please evaluate patient if she can be off Effient.  - No IR procedure planned at this time. Next availability will be Monday, 6/24/2019.      Thank you for the courtesy of this consult, please call [phone] with any further questions. INTERVENTIONAL RADIOLOGY CONSULT:     Procedure Requested: Nontargeted Liver Biopsy    HPI:  67 yo F w/ hx of CAD s/p PCI and hypothyroidism sent by gastroenterologist for gallbladder hydrops. Pt found to have elevated liver enzymes by PMD in Apr 2019, referred to GI for w/u. MRCP 2 days ago significant for gallbladder hydrops 4.7cm with probable impacted stone at neck, also liver cirrhosis w/ mild ascites. Admits to social etoh, denies binge drinking, denies abdominal pain, N/V. Admits to decreased appetite in past 2 weeks, pt tried to maintain low salt diet. Reports BLE edema developed in past 2 days as well as weakness. Denies fever, chills, abdominal pain, SOB, dysuria.    In ED, evaluated by surgery -> no interventions at this time (15 Shashank 2019 01:53)    PAST MEDICAL & SURGICAL HISTORY:  Hypothyroidism  CAD S/P percutaneous coronary angioplasty  No significant past surgical history    MEDICATIONS  (STANDING):  aspirin  chewable 81 milliGRAM(s) Oral daily  cefTRIAXone   IVPB      cefTRIAXone   IVPB 1 Gram(s) IV Intermittent every 24 hours  chlorhexidine 4% Liquid 1 Application(s) Topical <User Schedule>  heparin  Injectable 5000 Unit(s) SubCutaneous every 8 hours  levothyroxine 50 MICROGram(s) Oral daily  metoprolol tartrate 50 milliGRAM(s) Oral two times a day  prasugrel 10 milliGRAM(s) Oral daily  sodium chloride 0.9%. 1000 milliLiter(s) (100 mL/Hr) IV Continuous <Continuous>    Allergies    No Known Allergies    Intolerances    FAMILY HISTORY:  No pertinent family history in first degree relatives    Physical Exam:   Vital Signs Last 24 Hrs  T(C): 35.8 (18 Jun 2019 05:30), Max: 36 (17 Jun 2019 21:30)  T(F): 96.4 (18 Jun 2019 05:30), Max: 96.8 (17 Jun 2019 21:30)  HR: 68 (18 Jun 2019 05:30) (66 - 71)  BP: 133/67 (18 Jun 2019 05:30) (122/69 - 144/67)  BP(mean): --  RR: 18 (18 Jun 2019 05:30) (18 - 18)  SpO2: --    Labs:                         11.6   9.92  )-----------( 163      ( 18 Jun 2019 06:43 )             33.4     06-18    127<L>  |  96<L>  |  31<H>  ----------------------------<  93  4.3   |  22  |  1.4    Ca    7.7<L>      18 Jun 2019 06:43  Mg     2.2     06-18    TPro  5.8<L>  /  Alb  1.9<L>  /  TBili  5.4<H>  /  DBili  3.9<H>  /  AST  1042<H>  /  ALT  489<H>  /  AlkPhos  1730<H>  06-18      Pertinent labs:                      11.6   9.92  )-----------( 163      ( 18 Jun 2019 06:43 )             33.4       06-18    127<L>  |  96<L>  |  31<H>  ----------------------------<  93  4.3   |  22  |  1.4    Ca    7.7<L>      18 Jun 2019 06:43  Mg     2.2     06-18    TPro  5.8<L>  /  Alb  1.9<L>  /  TBili  5.4<H>  /  DBili  3.9<H>  /  AST  1042<H>  /  ALT  489<H>  /  AlkPhos  1730<H>  06-18    ASSESSMENT/ PLAN:     67 yo F w/ hx of CAD s/p PCI and hypothyroidism sent by gastroenterologist for gallbladder hydrops. Pt found to have elevated liver enzymes by PMD in Apr 2019, referred to GI for w/u. MRCP 2 days ago significant for gallbladder hydrops 4.7cm with probable impacted stone at neck, also liver cirrhosis w/ mild ascites.  - IR was consulted for a nontargeted liver biopsy  - Elevated liver enzymes, Alk phos 1730, AST 1042,   - GI following, suggested acute hepatitis vs obstructive disease, etiology is still unclear  - Patient is currently on DAPT with ASA and Effient (Prasugrel). Prasugrel will need to be held at least 5 days prior to procedure.  - Please evaluate patient if she can be off Prasugrel.  - No IR procedure planned at this time. Next availability will be Monday, 6/24/2019.      Thank you for the courtesy of this consult, please call [phone] with any further questions. INTERVENTIONAL RADIOLOGY CONSULT:     Procedure Requested: Nontargeted Liver Biopsy    HPI:  67 yo F w/ hx of CAD s/p PCI and hypothyroidism sent by gastroenterologist for gallbladder hydrops. Pt found to have elevated liver enzymes by PMD in Apr 2019, referred to GI for w/u. MRCP 2 days ago significant for gallbladder hydrops 4.7cm with probable impacted stone at neck, also liver cirrhosis w/ mild ascites. Admits to social etoh, denies binge drinking, denies abdominal pain, N/V. Admits to decreased appetite in past 2 weeks, pt tried to maintain low salt diet. Reports BLE edema developed in past 2 days as well as weakness. Denies fever, chills, abdominal pain, SOB, dysuria.    In ED, evaluated by surgery -> no interventions at this time (15 Shashank 2019 01:53)    PAST MEDICAL & SURGICAL HISTORY:  Hypothyroidism  CAD S/P percutaneous coronary angioplasty  No significant past surgical history    MEDICATIONS  (STANDING):  aspirin  chewable 81 milliGRAM(s) Oral daily  cefTRIAXone   IVPB      cefTRIAXone   IVPB 1 Gram(s) IV Intermittent every 24 hours  chlorhexidine 4% Liquid 1 Application(s) Topical <User Schedule>  heparin  Injectable 5000 Unit(s) SubCutaneous every 8 hours  levothyroxine 50 MICROGram(s) Oral daily  metoprolol tartrate 50 milliGRAM(s) Oral two times a day  prasugrel 10 milliGRAM(s) Oral daily  sodium chloride 0.9%. 1000 milliLiter(s) (100 mL/Hr) IV Continuous <Continuous>    Allergies    No Known Allergies    Intolerances    FAMILY HISTORY:  No pertinent family history in first degree relatives    Physical Exam:   Vital Signs Last 24 Hrs  T(C): 35.8 (18 Jun 2019 05:30), Max: 36 (17 Jun 2019 21:30)  T(F): 96.4 (18 Jun 2019 05:30), Max: 96.8 (17 Jun 2019 21:30)  HR: 68 (18 Jun 2019 05:30) (66 - 71)  BP: 133/67 (18 Jun 2019 05:30) (122/69 - 144/67)  BP(mean): --  RR: 18 (18 Jun 2019 05:30) (18 - 18)  SpO2: --    Labs:                         11.6   9.92  )-----------( 163      ( 18 Jun 2019 06:43 )             33.4     06-18    127<L>  |  96<L>  |  31<H>  ----------------------------<  93  4.3   |  22  |  1.4    Ca    7.7<L>      18 Jun 2019 06:43  Mg     2.2     06-18    TPro  5.8<L>  /  Alb  1.9<L>  /  TBili  5.4<H>  /  DBili  3.9<H>  /  AST  1042<H>  /  ALT  489<H>  /  AlkPhos  1730<H>  06-18      Pertinent labs:                      11.6   9.92  )-----------( 163      ( 18 Jun 2019 06:43 )             33.4       06-18    127<L>  |  96<L>  |  31<H>  ----------------------------<  93  4.3   |  22  |  1.4    Ca    7.7<L>      18 Jun 2019 06:43  Mg     2.2     06-18    TPro  5.8<L>  /  Alb  1.9<L>  /  TBili  5.4<H>  /  DBili  3.9<H>  /  AST  1042<H>  /  ALT  489<H>  /  AlkPhos  1730<H>  06-18    ASSESSMENT/ PLAN:     67 yo F w/ hx of CAD s/p PCI and hypothyroidism sent by gastroenterologist for gallbladder hydrops. Pt found to have elevated liver enzymes by PMD in Apr 2019, referred to GI for w/u. MRCP 2 days ago significant for gallbladder hydrops 4.7cm with probable impacted stone at neck, also liver cirrhosis w/ mild ascites.  - IR was consulted for a nontargeted liver biopsy  - Elevated liver enzymes, Alk phos 1730, AST 1042,   - GI following, suggested acute hepatitis vs obstructive disease, etiology is still unclear  - Patient is currently on DAPT with ASA and Effient (Prasugrel). Prasugrel will need to be held at least 5 days prior to procedure.  - Please evaluate patient if she can be off Prasugrel.  - No IR procedure planned at this time. Next availability will be Monday, 6/24/2019.   - Procedure may preferrably be done as an outpatient if deemed appropriate by primary team.      Thank you for the courtesy of this consult, please call [phone] with any further questions. INTERVENTIONAL RADIOLOGY CONSULT:     Procedure Requested: Nontargeted Liver Biopsy    HPI:  67 yo F w/ hx of CAD s/p PCI and hypothyroidism sent by gastroenterologist for gallbladder hydrops. Pt found to have elevated liver enzymes by PMD in Apr 2019, referred to GI for w/u. MRCP 2 days ago significant for gallbladder hydrops 4.7cm with probable impacted stone at neck, also liver cirrhosis w/ mild ascites. Admits to social etoh, denies binge drinking, denies abdominal pain, N/V. Admits to decreased appetite in past 2 weeks, pt tried to maintain low salt diet. Reports BLE edema developed in past 2 days as well as weakness. Denies fever, chills, abdominal pain, SOB, dysuria.    In ED, evaluated by surgery -> no interventions at this time (15 Shashank 2019 01:53)    PAST MEDICAL & SURGICAL HISTORY:  Hypothyroidism  CAD S/P percutaneous coronary angioplasty  No significant past surgical history    MEDICATIONS  (STANDING):  aspirin  chewable 81 milliGRAM(s) Oral daily  cefTRIAXone   IVPB      cefTRIAXone   IVPB 1 Gram(s) IV Intermittent every 24 hours  chlorhexidine 4% Liquid 1 Application(s) Topical <User Schedule>  heparin  Injectable 5000 Unit(s) SubCutaneous every 8 hours  levothyroxine 50 MICROGram(s) Oral daily  metoprolol tartrate 50 milliGRAM(s) Oral two times a day  prasugrel 10 milliGRAM(s) Oral daily  sodium chloride 0.9%. 1000 milliLiter(s) (100 mL/Hr) IV Continuous <Continuous>    Allergies    No Known Allergies    Intolerances    FAMILY HISTORY:  No pertinent family history in first degree relatives    Physical Exam:   Vital Signs Last 24 Hrs  T(C): 35.8 (18 Jun 2019 05:30), Max: 36 (17 Jun 2019 21:30)  T(F): 96.4 (18 Jun 2019 05:30), Max: 96.8 (17 Jun 2019 21:30)  HR: 68 (18 Jun 2019 05:30) (66 - 71)  BP: 133/67 (18 Jun 2019 05:30) (122/69 - 144/67)  BP(mean): --  RR: 18 (18 Jun 2019 05:30) (18 - 18)  SpO2: --    Labs:                         11.6   9.92  )-----------( 163      ( 18 Jun 2019 06:43 )             33.4     06-18    127<L>  |  96<L>  |  31<H>  ----------------------------<  93  4.3   |  22  |  1.4    Ca    7.7<L>      18 Jun 2019 06:43  Mg     2.2     06-18    TPro  5.8<L>  /  Alb  1.9<L>  /  TBili  5.4<H>  /  DBili  3.9<H>  /  AST  1042<H>  /  ALT  489<H>  /  AlkPhos  1730<H>  06-18      Pertinent labs:                      11.6   9.92  )-----------( 163      ( 18 Jun 2019 06:43 )             33.4       06-18    127<L>  |  96<L>  |  31<H>  ----------------------------<  93  4.3   |  22  |  1.4    Ca    7.7<L>      18 Jun 2019 06:43  Mg     2.2     06-18    TPro  5.8<L>  /  Alb  1.9<L>  /  TBili  5.4<H>  /  DBili  3.9<H>  /  AST  1042<H>  /  ALT  489<H>  /  AlkPhos  1730<H>  06-18    ASSESSMENT/ PLAN:     67 yo F w/ hx of CAD s/p PCI and hypothyroidism sent by gastroenterologist for gallbladder hydrops. Pt found to have elevated liver enzymes by PMD in Apr 2019, referred to GI for w/u. MRCP 2 days ago significant for gallbladder hydrops 4.7cm with probable impacted stone at neck, also liver cirrhosis w/ mild ascites.  - IR was consulted for a nontargeted liver biopsy  - Elevated liver enzymes, Alk phos 1730, AST 1042,   - GI following, suggested acute hepatitis vs obstructive disease, etiology is still unclear  - Patient is currently on DAPT with ASA and Effient (Prasugrel). Prasugrel will need to be held at least 5 days prior to procedure.  - Please evaluate patient if she can be off Prasugrel.  - No IR procedure planned at this time. Next availability will be Monday, 6/24/2019, given the risk of bleeding from the Effient.   - Procedure may preferably be done as an outpatient if deemed appropriate by primary team.      Thank you for the courtesy of this consult, please call [phone] with any further questions.

## 2019-06-18 NOTE — PROGRESS NOTE ADULT - ASSESSMENT
a/p:  #hydrops gallbladder with gallstones and acute severe transaminiitis--Acute liver failure and LUIS ALBERTO/Rhabdomyolysis  -cont to trend lft---ast 1042/ / Alk phos--1730  -f/u with GI---consider ERCP  -autoimmune w/u  -ivf/clear liquid diet  -surgery f/u---will need eventual cholecystectomy  -IR for possible liver biopsy (continue asa and hold prasugrel   - hep serology negative  -EBV acute Ag +  -outpaitent GI is Dr. Lara at [facility]---[phone]  -ID recomm  -trend bun/cr and cpk--cont IVF  -low threshold to evaluate for more monitored setting if becomes hemodynamcially unstable  -suppl electrolytes prn  -started on iv ceftriaxone    #h/o CAD  -cont home regimen  -statin on hold  -if planning for liver biopsy/cholecytectomy will need to hold prasugrel     #hypothyroidism  -check tsh/t3/t4  -cont synthroid    #DVT//gI ppx    #Progress Note Handoff  Pending (specify):  GI/Surgery recomm//awaiting improvement in LFT    Family discussion: d/w patient and  bedside  Disposition: unknown at this time---will likely need PT/rehab

## 2019-06-18 NOTE — PROGRESS NOTE ADULT - SUBJECTIVE AND OBJECTIVE BOX
seen and examined  no distress  no new complaints       Standing Inpatient Medications  aspirin  chewable 81 milliGRAM(s) Oral daily  cefTRIAXone   IVPB      cefTRIAXone   IVPB 1 Gram(s) IV Intermittent every 24 hours  chlorhexidine 4% Liquid 1 Application(s) Topical <User Schedule>  heparin  Injectable 5000 Unit(s) SubCutaneous every 8 hours  levothyroxine 50 MICROGram(s) Oral daily  metoprolol tartrate 50 milliGRAM(s) Oral two times a day  prasugrel 10 milliGRAM(s) Oral daily  sodium chloride 0.9%. 1000 milliLiter(s) IV Continuous <Continuous>          VITALS/PHYSICAL EXAM  --------------------------------------------------------------------------------  T(C): 36 (06-17-19 @ 21:30), Max: 36 (06-17-19 @ 21:30)  HR: 66 (06-17-19 @ 21:30) (66 - 77)  BP: 122/69 (06-17-19 @ 21:30) (122/69 - 144/67)  RR: 18 (06-17-19 @ 21:30) (18 - 18)  SpO2: --  Wt(kg): --        06-17-19 @ 07:01  -  06-18-19 @ 07:00  --------------------------------------------------------  IN: 600 mL / OUT: 0 mL / NET: 600 mL      Physical Exam:  	Gen: NAD  	Pulm: decrease BS B/L  	CV:  S1S2; no rub  	Abd: +distended  	LE: dressings     LABS/STUDIES  --------------------------------------------------------------------------------              11.6   9.92  >-----------<  163      [06-18-19 @ 06:43]              33.4     125  |  93  |  33  ----------------------------<  102      [06-17-19 @ 22:11]  4.4   |  20  |  1.5        Ca     7.7     [06-17-19 @ 22:11]    TPro  6.0  /  Alb  1.9  /  TBili  4.9  /  DBili  3.5  /  AST  1033  /  ALT  477  /  AlkPhos  1688  [06-17-19 @ 06:03]        CK 1794      [06-17-19 @ 06:03]  Serum Osmolality 279      [06-16-19 @ 17:30]    Creatinine Trend:  SCr 1.5 [06-17 @ 22:11]  SCr 1.9 [06-17 @ 06:03]  SCr 2.1 [06-17 @ 01:22]  SCr 2.3 [06-16 @ 07:38]  SCr 2.3 [06-15 @ 06:09]      Urine Sodium 20.0      [06-16-19 @ 19:55]  Urine Osmolality 449      [06-16-19 @ 19:55]    Iron 85, TIBC 155, %sat 55      [06-16-19 @ 00:50]  Ferritin 1743      [06-16-19 @ 00:50]  HbA1c 6.4      [02-26-18 @ 05:40]  TSH 4.97      [06-15-19 @ 06:09]  Lipid: chol 240, , HDL 13, LDL 18      [06-15-19 @ 06:09]    HBsAg Nonreact      [06-15-19 @ 06:09]  HCV 0.21, Nonreact      [06-16-19 @ 07:38]  HIV Nonreact      [06-16-19 @ 00:50]

## 2019-06-18 NOTE — DIETITIAN INITIAL EVALUATION ADULT. - ENERGY NEEDS
estimated calorie needs: 1675 - 1824 kcals/day (MSJ x 1.0 - 1.1 AF for obesity)  estimated protein needs: 68 - 85 gm/day (1.2 - 1.5 gm/kg IBW for cirrhosis)  estimated fluid needs: per LIP

## 2019-06-18 NOTE — DIETITIAN INITIAL EVALUATION ADULT. - SOURCE
Pt tolerating clear liquid diet but eager to eat regular foods. PTA pt was following a normal diet says appetite has been slowly declining. Last BM 6/14 (been on clear liquid since). No issues chewing/swallowing. NKFA. Denies nutrition supplement use PTA. Pt aggreable to trial nutrition supplements

## 2019-06-18 NOTE — PROGRESS NOTE ADULT - ASSESSMENT
67 yo F w/ hx of CAD s/p PCI and hypothyroidism sent by gastroenterologist for gallbladder hydrops  found w/ LUIS ALBERTO and hypoNA  # hyponatremia and LUIS ALBERTO prerenal in nature  # creatinine improving  # sodium improving  # continue iv fluids  # will sign off recall as needed

## 2019-06-18 NOTE — PROGRESS NOTE ADULT - ASSESSMENT
67 yo F w/ hx of CAD s/p PCI and hypothyroidism sent by gastroenterologist for gallbladder hydrops    # Persistent elevated LFTs with hydrops gallbladder on MR with stones   # Leukocytosis - improved  - MRCP showed hydropic GB with GS with impacted stone. Cirrhosis with mild ascites. No biliary distention  - Hepatitis panel negative, Tylenol level negative  - GI following: might warrant IR guided liver biopsy once off DAPT.   - Possible CCY vs ERCP  - Autoimmune panel, EBV, CMV sent  - Continue Ceftriaxone given inflammatory evidence on imaging with leukocytosis  - CLD for now    # LUIS ALBERTO - improving  DDx: Prerenal vs hepatorenal  - Continue IV hydration given rhabdo  - Renal US with no hydro    # Persistent Hyponatremia - ?SIADH - improving  - Urine osmolality = 449, serum osmolality = 279 , urine sodium level-20  - Restrict free water intake  - BMP q12 - Avoid overcorrection    # Acute rhabdomyolysis ?due to transaminitis  - CK rising  - Continue IVF    # Hypothyroidism - Continue Levothyroxine    # LE edema - improving   - Echo 6/16: LVEF 57, G1DD, moderate thickened MV  - TSH 4.97    # H/O CAD s/p PCI  - Continue Lopressor, DAPT  - Hold statin given elevated liver enzymes    GI ppx - not indicated  DVT ppx - Heparin SC    Dispo: from home. Pending workup, possible biopsy, CCY vs ERCP 69 yo F w/ hx of CAD s/p PCI and hypothyroidism sent by gastroenterologist for gallbladder hydrops    # Persistent elevated LFTs with hydrops gallbladder on MR with stones   # Leukocytosis - improved  # Hyperbilirubinemia - likely due to obstruction  - LFTs more cholestatic pattern. AST>>ALT (AST can also come from muscle and pt is in rhabdo).  - MRCP showed hydropic GB with GS with impacted stone. Cirrhosis with mild ascites. No biliary distention  - Hepatitis panel negative, Tylenol level negative  - GI following: might warrant IR guided liver biopsy once off DAPT.   - Possible CCY vs ERCP  - Autoimmune panel, EBV, CMV sent  - Continue Ceftriaxone given inflammatory evidence on imaging with leukocytosis  - CLD for now    # LUIS ALBERTO - improving  DDx: Prerenal vs hepatorenal  - Continue IV hydration given rhabdo  - Renal US with no hydro  - Nephro signed off    # Persistent Hyponatremia - ?SIADH - improving  - Urine osmolality = 449, serum osmolality = 279 , urine sodium level-20  - Restrict free water intake  - BMP q12 - Avoid overcorrection    # Hypoalbuminemia - likely from active liver reaction  - UA with trace protein    # Acute rhabdomyolysis ?due to transaminitis  - CK rising  - Continue IVF    # Hypothyroidism - Continue Levothyroxine    # LE edema - improving   - Echo 6/16: LVEF 57, G1DD, moderate thickened MV  - TSH 4.97    # H/O CAD s/p PCI  - Continue Lopressor, DAPT  - Hold statin given elevated liver enzymes    GI ppx - not indicated  DVT ppx - Heparin SC    Dispo: from home. Pending workup, possible biopsy, CCY vs ERCP 67 yo F w/ hx of CAD s/p PCI and hypothyroidism sent by gastroenterologist for gallbladder hydrops    # Persistent elevated LFTs with hydrops gallbladder on MR with stones   # Leukocytosis - improved  # Hyperbilirubinemia - likely due to obstruction  - LFTs more cholestatic pattern. AST>>ALT (AST can also come from muscle and pt is in rhabdo).  - MRCP showed hydropic GB with GS with impacted stone. Cirrhosis with mild ascites. No biliary distention  - Hepatitis panel negative, Tylenol level negative  - GI following: might warrant IR guided liver biopsy once off DAPT.   - Possible CCY vs ERCP  - Autoimmune panel, EBV, CMV sent  - Continue Ceftriaxone given inflammatory evidence on imaging with leukocytosis  - CLD for now  - Surgery followup on possible CCY  - IR consult placed for possible liver biopsy  - ID consult placed for antibiotics indication and infectious causes of liver failure    # LUIS ALBERTO - improving  DDx: Prerenal vs hepatorenal  - Continue IV hydration given rhabdo  - Renal US with no hydro  - Nephro signed off    # Persistent Hyponatremia - ?SIADH - improving  - Urine osmolality = 449, serum osmolality = 279 , urine sodium level-20  - Restrict free water intake  - BMP q12 - Avoid overcorrection    # Hypoalbuminemia - likely from active liver reaction  - UA with trace protein    # Acute rhabdomyolysis ?due to transaminitis  - CK rising  - Continue IVF    # Hypothyroidism - Continue Levothyroxine    # LE edema - improving   - Echo 6/16: LVEF 57, G1DD, moderate thickened MV  - TSH 4.97    # H/O CAD s/p PCI  - Continue Lopressor, DAPT  - Hold statin given elevated liver enzymes    GI ppx - not indicated  DVT ppx - Heparin SC    Dispo: from home. Pending workup, possible biopsy, CCY vs ERCP

## 2019-06-18 NOTE — DIETITIAN INITIAL EVALUATION ADULT. - PHYSICAL APPEARANCE
obese/BS 18 ecchymosis, 3+ edema to B/L feet, ankle, and leg. Pt denies wt loss but says she had weight gain from UBW of 235# (likely r/t fluid) BMI 41.6  IBW: 56.8 kg

## 2019-06-18 NOTE — PROGRESS NOTE ADULT - SUBJECTIVE AND OBJECTIVE BOX
Hepatology/GI follow up note: Pt seen and examined at bedside.     For questions and inquiries please page [phone].  For urgent matters or after 5pm and on weekends please page the fellow on call through the GI paging system.    68y Female seen for:    Subjective/Interval events:    Review of system  General:  (-) weight loss, (-) fevers  Eyes:  (-) visual changes  CV:  (-) chest pain  Resp: (-) SOB, (-) wheezing  GI: (-) abdominal pain,  (-) nausea, (-) vomiting, (-) dysphagia, (-) diarrhea, (-) constipation, (-) rectal bleeding, (-) melena, (-) hematemesis.  Neuro: (-) confusion, (-) weakness  Psych:  (-) Hallucinations  Heme:  (-) easy bruisability    Past medical/surgical Hx:  PAST MEDICAL & SURGICAL HISTORY:  Hypothyroidism  CAD S/P percutaneous coronary angioplasty  No significant past surgical history    Home Medications:  Last Order Reconciliation Date: 06-15-19 @ 02:05 (Admission Reconciliation)  aspirin 81 mg oral tablet, chewable: 1 tab(s) orally once a day  atorvastatin 80 mg oral tablet: 1 tab(s) orally once a day (at bedtime)  levothyroxine 50 mcg (0.05 mg) oral tablet: 1 tab(s) orally once a day  Metoprolol Tartrate 50 mg oral tablet: 1 tab(s) orally 2 times a day  prasugrel 10 mg oral tablet: 1 tab(s) orally once a day      Allergies:  No Known Allergies      Current Medications:   aspirin  chewable 81 milliGRAM(s) Oral daily  cefTRIAXone   IVPB      cefTRIAXone   IVPB 1 Gram(s) IV Intermittent every 24 hours  chlorhexidine 4% Liquid 1 Application(s) Topical <User Schedule>  heparin  Injectable 5000 Unit(s) SubCutaneous every 8 hours  levothyroxine 50 MICROGram(s) Oral daily  metoprolol tartrate 50 milliGRAM(s) Oral two times a day  prasugrel 10 milliGRAM(s) Oral daily  sodium chloride 0.9%. 1000 milliLiter(s) IV Continuous <Continuous>        Physical exam:  T(C): 35.7 (19 @ 12:50), Max: 36 (19 @ 21:30)  HR: 68 (19 @ 12:50) (66 - 71)  BP: 142/73 (19 @ 12:50) (122/69 - 144/67)  RR: 18 (19 @ 12:50) (18 - 18)  SpO2: --    GENERAL: NAD  HEAD:  Atraumatic, Normocephalic  EYES: Sclera:NL  NECK: Supple, no JVD or thyromegaly  CHEST/LUNG: Good bilateral air entry  HEART: normal S1, S2. Regular  ABDOMEN: (-) distended, (-) tender, (-) rebound, (+) BS, (-)HSM  EXTREMITIES: (-) edema  NEUROLOGY: (-) asterixis  SKIN: (-) jaundice  MATTY: (-) melena (-) brbpr      Data:                        11.6   9.92  )-----------( 163      ( 2019 06:43 )             33.4     MCV 83.9 (19)    RDW 17.0 (19)    HGB trend:  11.6  19 @ 06:43  11.9  19 @ 06:03  12.6  19 @ 07:38        WBC trend:  9.92  19 @ 06:43  10.52  19 @ 06:03  11.44  19 @ 07:38    18    127<L>  |  96<L>  |  31<H>  ----------------------------<  93  4.3   |  22  |  1.4    Ca    7.7<L>      2019 06:43  Mg     2.2         TPro  5.8<L>  /  Alb  1.9<L>  /  TBili  5.4<H>  /  DBili  3.9<H>  /  AST  1042<H>  /  ALT  489<H>  /  AlkPhos  1730<H>      Liver panel trend:  TBili 5.4   /   AST 1042   /      /   AlkP 1730   /   Tptn 5.8   /   Alb 1.9    /   DBili 3.9        TBili 4.9   /   AST 1033   /      /   AlkP 1688   /   Tptn 6.0   /   Alb 1.9    /   DBili 3.5      06-17  TBili 5.0   /   AST 1159   /      /   AlkP 1800   /   Tptn 6.4   /   Alb 2.0    /   DBili 3.6      06-16  TBili 4.3   /   AST 1032   /      /   AlkP 1510   /   Tptn 5.8   /   Alb 1.9    /   DBili 3.3      06-16  TBili 4.9   /   AST 1180   /      /   AlkP 1647   /   Tptn 6.2   /   Alb 2.1    /   DBili 3.9      06-15  TBili --   /   AST --   /   ALT --   /   AlkP --   /   Tptn --   /   Alb --    /   DBili 3.6      06-14  TBili 5.0   /   AST 1211   /      /   AlkP 1759   /   Tptn 6.3   /   Alb 2.1    /   DBili --      06-14            Culture - Blood (collected 15 Shashank 2019 18:01)  Source: .Blood None  Preliminary Report (2019 06:01):    No growth to date.      Urinalysis Basic - ( 2019 14:07 )    Color: Yellow / Appearance: Cloudy / S.010 / pH: x  Gluc: x / Ketone: Negative  / Bili: Negative / Urobili: 0.2 mg/dL   Blood: x / Protein: Trace mg/dL / Nitrite: Negative   Leuk Esterase: Negative / RBC: 3-5 /HPF / WBC x   Sq Epi: x / Non Sq Epi: Many /HPF / Bacteria: x

## 2019-06-19 NOTE — PROGRESS NOTE ADULT - SUBJECTIVE AND OBJECTIVE BOX
Patient is a 68y old  Female who presents with a chief complaint of gallbladder hydrops (18 Jun 2019 13:54)    HPI:  67 yo F w/ hx of CAD s/p PCI and hypothyroidism sent by gastroenterologist for gallbladder hydrops. Pt found to have elevated liver enzymes by PMD in Apr 2019, referred to GI for w/u. MRCP 2 days ago significant for gallbladder hydrops 4.7cm with probable impacted stone at neck, also liver cirrhosis w/ mild ascites. Admits to social etoh, denies binge drinking, denies abdominal pain, N/V. Admits to decreased appetite in past 2 weeks, pt tried to maintain low salt diet. Reports BLE edema developed in past 2 days as well as weakness. Denies fever, chills, abdominal pain, SOB, dysuria.    In ED, evaluated by surgery -> no interventions at this time (15 Shashank 2019 01:53)    PAST MEDICAL & SURGICAL HISTORY:  Hypothyroidism  CAD S/P percutaneous coronary angioplasty  No significant past surgical history    patient seen and examined independently on morning rounds, chart reviewed and discussed with the medicine resident.    no overnight events--tolerating advancing diet--no abdominal pain- feels weak with minimal ambulation- slightly less leg edema-  bedside-               PE:  GEN-NAD, AAOx3  HEENT- NCAT, PERRLA, EOMI, +icterus  PULM- Clear to auscultation bilaterally, fair air entry  CVS- +s1/s2 RRR no murmurs  GI- soft NT obese ND +bs, no rebound, no guarding  EXT- 1+ edema--bilateral leg ace bandage dressings          Labs:                        11.4   8.90  )-----------( 151      ( 19 Jun 2019 11:22 )             32.7     CBC Full  -  ( 19 Jun 2019 11:22 )  WBC Count : 8.90 K/uL  RBC Count : 3.82 M/uL  Hemoglobin : 11.4 g/dL  Hematocrit : 32.7 %  Platelet Count - Automated : 151 K/uL  Mean Cell Volume : 85.6 fL  Mean Cell Hemoglobin : 29.8 pg  Mean Cell Hemoglobin Concentration : 34.9 g/dL  Auto Neutrophil # : 6.53 K/uL  Auto Lymphocyte # : 0.88 K/uL  Auto Monocyte # : 1.21 K/uL  Auto Eosinophil # : 0.17 K/uL  Auto Basophil # : 0.07 K/uL  Auto Neutrophil % : 73.4 %  Auto Lymphocyte % : 9.9 %  Auto Monocyte % : 13.6 %  Auto Eosinophil % : 1.9 %  Auto Basophil % : 0.8 %      06-19    132<L>  |  105  |  25<H>  ----------------------------<  132<H>  3.7   |  17  |  1.1    Ca    7.5<L>      19 Jun 2019 11:22  Mg     2.0     06-19    TPro  5.5<L>  /  Alb  1.7<L>  /  TBili  5.3<H>  /  DBili  4.1<H>  /  AST  1003<H>  /  ALT  453<H>  /  AlkPhos  1620<H>  06-19      Microbiology:      Vital Signs Last 24 Hrs  T(C): 37.3 (19 Jun 2019 12:50), Max: 37.3 (19 Jun 2019 12:50)  T(F): 99.1 (19 Jun 2019 12:50), Max: 99.1 (19 Jun 2019 12:50)  HR: 74 (19 Jun 2019 12:50) (67 - 74)  BP: 152/79 (19 Jun 2019 12:50) (108/55 - 152/79)  BP(mean): --  RR: 18 (19 Jun 2019 12:50) (18 - 18)  SpO2: --    I&O's Summary    18 Jun 2019 07:01  -  19 Jun 2019 07:00  --------------------------------------------------------  IN: 800 mL / OUT: 300 mL / NET: 500 mL          MEDICATIONS  (STANDING):  aspirin  chewable 81 milliGRAM(s) Oral daily  cefTRIAXone   IVPB      cefTRIAXone   IVPB 1 Gram(s) IV Intermittent every 24 hours  chlorhexidine 4% Liquid 1 Application(s) Topical <User Schedule>  heparin  Injectable 5000 Unit(s) SubCutaneous every 8 hours  levothyroxine 50 MICROGram(s) Oral daily  metoprolol tartrate 50 milliGRAM(s) Oral two times a day  prasugrel 10 milliGRAM(s) Oral daily  sodium chloride 0.9%. 1000 milliLiter(s) (100 mL/Hr) IV Continuous <Continuous>

## 2019-06-19 NOTE — PROGRESS NOTE ADULT - SUBJECTIVE AND OBJECTIVE BOX
SUBJECTIVE:    Patient is a 68y old Female who presents with a chief complaint of gallbladder hydrops (2019 13:12)    Currently admitted to medicine with the primary diagnosis of Gallbladder hydrops     Today is hospital day 5d. This morning she is resting comfortably in bed and reports no new issues or overnight events.     INTERVAL EVENTS: Sodium improving, cardiac clearance to hold Effient, IR liver biopsy pending, possible steroids after biopsy    PAST MEDICAL & SURGICAL HISTORY  Hypothyroidism  CAD S/P percutaneous coronary angioplasty  No significant past surgical history      ALLERGIES:  No Known Allergies    MEDICATIONS:  STANDING MEDICATIONS  aspirin  chewable 81 milliGRAM(s) Oral daily  chlorhexidine 4% Liquid 1 Application(s) Topical <User Schedule>  heparin  Injectable 5000 Unit(s) SubCutaneous every 8 hours  levothyroxine 50 MICROGram(s) Oral daily  metoprolol tartrate 50 milliGRAM(s) Oral two times a day  sodium chloride 0.9%. 1000 milliLiter(s) IV Continuous <Continuous>    PRN MEDICATIONS    VITALS:   T(F): 99.1  HR: 74  BP: 152/79  RR: 18  SpO2: --    LABS:                        11.4   8.90  )-----------( 151      ( 2019 11:22 )             32.7     06-    132<L>  |  105  |  25<H>  ----------------------------<  132<H>  3.7   |  17  |  1.1    Ca    7.5<L>      2019 11:22  Mg     2.0     -    TPro  5.5<L>  /  Alb  1.7<L>  /  TBili  5.3<H>  /  DBili  4.1<H>  /  AST  1003<H>  /  ALT  453<H>  /  AlkPhos  1620<H>      PT/INR - ( 2019 11:22 )   PT: 15.20 sec;   INR: 1.33 ratio         PTT - ( 2019 11:22 )  PTT:37.6 sec  Urinalysis Basic - ( 2019 14:07 )    Color: Yellow / Appearance: Cloudy / S.010 / pH: x  Gluc: x / Ketone: Negative  / Bili: Negative / Urobili: 0.2 mg/dL   Blood: x / Protein: Trace mg/dL / Nitrite: Negative   Leuk Esterase: Negative / RBC: 3-5 /HPF / WBC x   Sq Epi: x / Non Sq Epi: Many /HPF / Bacteria: x        Creatine Kinase, Serum: 1960 U/L <H> (19 @ 18:25)      CARDIAC MARKERS ( 2019 18:25 )  x     / x     / 1960 U/L / x     / x      CARDIAC MARKERS ( 2019 06:43 )  x     / x     / 1909 U/L / x     / x          RADIOLOGY:    PHYSICAL EXAM:  GEN: No acute distress  HEENT: Scleral icterus  PULM/CHEST: Clear to auscultation bilaterally, no rales, rhonchi or wheezes   CVS: Regular rate and rhythm, S1-S2, no murmurs  ABD: Soft, obese, non-tender, non-distended, +BS  EXT: B/L 2+ LE edema  NEURO: AAOx3

## 2019-06-19 NOTE — CONSULT NOTE ADULT - SUBJECTIVE AND OBJECTIVE BOX
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 Pt found to have elevated liver enzymes by PMD in 2019, referred to GI for w/u. MRCP 2 days ago significant for gallbladder hydrops 4.7cm with probable impacted stone at neck, also liver cirrhosis w/ mild ascites. Admits to social etoh, denies binge drinking, denies abdominal pain, N/V. Admits to decreased appetite in past 2 weeks, pt tried to maintain low salt diet. Reports BLE edema developed in past 2 days as well as weakness. Denies fever, chills, abdominal pain, SOB, dysuria.    In ED, evaluated by surgery -> no interventions at this time (15 Shashank 2019 01:53)    Pt denies any fever, chills, all cultures NG. Only new med was synthroid.  No travel outside of the [place].   ANKITA 1:2560    FAMILY HISTORY:  No pertinent family history in first degree relatives    PAST MEDICAL & SURGICAL HISTORY:  Hypothyroidism  CAD S/P percutaneous coronary angioplasty  No significant past surgical history      SOCIAL HISTORY    Substance Use ( x ) never used  (  ) IVDU (  ) Other:  Tobacco Usage:  (x   ) never smoked   (   ) former smoker   (   ) current smoker   Alcohol Usage: (   ) social  (   ) daily use ( x  ) denies  Sexual History: NA      ROS  General: Denies fevers, chills, nightsweats, weight loss  HEENT: Denies headache, rhinorrhea, sore throat, eye pain  CV: Denies CP, palpitations  PULM: Denies SOB, cough  GI: Denies abdominal pain, diarrhea  : Denies dysuria, hematuria  MSK: Denies arthralgias  SKIN: Denies rash  NEURO: Denies paresthesias, weakness  PSYCH: Denies depression    VITALS:  T(F): 99.1, Max: 99.1 (19 @ 12:50)  HR: 74  BP: 152/79  RR: 18Vital Signs Last 24 Hrs  T(C): 37.3 (2019 12:50), Max: 37.3 (2019 12:50)  T(F): 99.1 (2019 12:50), Max: 99.1 (2019 12:50)  HR: 74 (2019 12:50) (67 - 74)  BP: 152/79 (2019 12:50) (108/55 - 152/79)  BP(mean): --  RR: 18 (2019 12:50) (18 - 18)  SpO2: --    PHYSICAL EXAM:  Gen: NAD, resting in bed  HEENT: Normocephalic, atraumatic, icteric  Neck: supple, no lymphadenopathy  CV: Regular rate & regular rhythm  Lungs: decreased BS at bases, no fremitus  Abdomen: Soft, BS present, edema  Ext: Warm, well perfused, LE 2+edema  Neuro: non focal, awake  Skin: no rash, no erythema  Lines: no phlebitis    TESTS & MEASUREMENTS:                        11.4   8.90  )-----------( 151      ( 2019 11:22 )             32.7         132<L>  |  105  |  25<H>  ----------------------------<  132<H>  3.7   |  17  |  1.1    Ca    7.5<L>      2019 11:22  Mg     2.0         TPro  5.5<L>  /  Alb  1.7<L>  /  TBili  5.3<H>  /  DBili  4.1<H>  /  AST  x   /  ALT  453<H>  /  AlkPhos  x       eGFR if Non African American: 52 mL/min/1.73M2 (19 @ 11:22)  eGFR if African American: 60 mL/min/1.73M2 (19 @ 11:22)  eGFR if Non African American: 47 mL/min/1.73M2 (19 @ 18:25)  eGFR if : 54 mL/min/1.73M2 (19 @ 18:25)    LIVER FUNCTIONS - ( 2019 11:22 )  Alb: 1.7 g/dL / Pro: 5.5 g/dL / ALK PHOS: x     / ALT: 453 U/L / AST: x     / GGT: x           Urinalysis Basic - ( 2019 14:07 )    Color: Yellow / Appearance: Cloudy / S.010 / pH: x  Gluc: x / Ketone: Negative  / Bili: Negative / Urobili: 0.2 mg/dL   Blood: x / Protein: Trace mg/dL / Nitrite: Negative   Leuk Esterase: Negative / RBC: 3-5 /HPF / WBC x   Sq Epi: x / Non Sq Epi: Many /HPF / Bacteria: x        Culture - Blood (collected 06-15-19 @ 18:01)  Source: .Blood None  Preliminary Report (19 @ 06:01):    No growth to date.            INFECTIOUS DISEASES TESTING  HIV-1/2 Combo Result: Nonreact (19 @ 00:50)  Hepatitis B Surface Antigen: Nonreact (06-15-19 @ 06:09)      RADIOLOGY & ADDITIONAL TESTS:  I have personally reviewed the last Chest xray  CXR      CT      CARDIOLOGY TESTING  Transthoracic Echocardiogram:    EXAM:  2-D ECHO (TTE) COMPLETE        PROCEDURE DATE:  2019      INTERPRETATION:  REPORT:    TRANSTHORACIC ECHOCARDIOGRAM REPORT         Patient Name:   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 Accession #: [id number]  Medical Rec #:  [id number]         Height:      65.0 in 165.1 cm  YOB: 1950         Weight:      246.0 lb 111.58 kg  Patient Age:    68 years          BSA:         2.16 m²  Patient Gender: F                 BP:          123/57 mmHg       Date of Exam:        2019 8:18:39 AM  Referring Physician: [id number] VINCENT LANDEROS  Sonographer:         Shalini Farrar  Reading Physician:   Burt Hogan M.D.    Procedure:     2D Echo/Doppler/Color Doppler Complete.  Indications:   I25.9 - Chronic Ischemic Heart Disease, unspecified  Diagnosis:     Chronic ischemic heart disease, unspecified - I25.9  Study Details: Technically good study.         Summary:   1. LV Ejection Fraction by Castle's Method with a biplane EF of 57 %.   2. Spectral Doppler shows impaired relaxation pattern of left   ventricular myocardial filling (Grade I diastolic dysfunction).   3. Degenerative mitral valve.   4. Mild mitral valve regurgitation.   5. Moderate thickening and calcification of the anterior and posterior   mitral valve leaflets.   6. Moderately decreased posterior mitral leaflet mobility.   7. Sclerotic aortic valve with normal opening.    PHYSICIAN INTERPRETATION:  Left Ventricle: Normal left ventricular size and wall thicknesses, with   normal systolic and diastolic function. Spectral Doppler shows impaired   relaxation pattern of left ventricular myocardial filling (Grade I   diastolic dysfunction).  Right Ventricle: Normal right ventricular size and function.  Left Atrium: Mildly enlarged left atrium.  Right Atrium: Normal right atrial size.  Pericardium: There is no evidence of pericardial effusion.  Mitral Valve: The mitral valve is degenerative in appearance. Moderate   thickening and calcification of the anterior and posterior mitral valve   leaflets. Mobility is moderately decreased for the posterior leaflet.   Mild mitral valve regurgitation is seen. Calcified mobile echodensity   noted on the ventricular aspect of the anterior leaflet, most consistent   with calcified cordae. Vegetation could not be ruled out.  Tricuspid Valve: Structurally normal tricuspid valve, with normal leaflet   excursion. The tricuspid valve is normal in structure. Mild tricuspid   regurgitation is visualized.  Aortic Valve: The aortic valve is trileaflet. No evidence of aortic   stenosis. Sclerotic aortic valve with normal opening. No evidence of   aortic valve regurgitation is seen.  Pulmonic Valve: Structurally normal pulmonic valve, with normal leaflet   excursion. The pulmonic valve is normal. No indication of pulmonic valve   regurgitation.  Aorta: The aortic root and ascending aorta are structurally normal, with   no evidence of dilitation.  Pulmonary Artery: The main pulmonary artery is normal in size.       2D AND M-MODE MEASUREMENTS (normal ranges within parentheses):  Left                  Normal   Aorta/Left             Normal  Ventricle:                     Atrium:  IVSd (2D):  1.24 cm  (0.7-1.1) AoV Cusp       1.59  (1.5-2.6)  LVPWd (2D): 1.25 cm  (0.7-1.1) Separation:     cm  LVIDd (2D): 3.89 cm  (3.4-5.7) Left Atrium    4.22  (1.9-4.0)  LVIDs (2D): 2.81 cm            (Mmode):        cm  LV FS (2D):  27.8 %   (>25%)   LA Volume      24.5  Relative      0.64    (<0.42)  Index         ml/m²  Wall                           Right  Thickness   Ventricle:                                 RVd (2D):      2.48 cm    SPECTRAL DOPPLER ANALYSIS:  LV DIASTOLIC FUNCTION:  MV Peak E: 0.71 m/s Decel Time: 257 msec  MV Peak A: 1.15 m/s  E/A Ratio: 0.61    Aortic Valve:  AoV VMax:    1.68 m/s  AoV Area, Vmax: 3.18 cm² Vmax Indx: 1.47 cm²/m²  AoV Pk Grad: 11.3 mmHg    LVOT Vmax: 1.49 m/s  LVOT VTI:  0.40 m  LVOT Diam: 2.14 cm    Mitral Valve:  MV P1/2 Time: 74.55 msec  MV Area, PHT: 2.95 cm²       [id number] Burt Hogan M.D., Electronically signed on 2019 at 2:25:07   PM              *** Final ***                    BURT HOGAN MD  This document has been electronically signed. 2019  8:18AM             (19 @ 08:18)  12 Lead ECG:   Ventricular Rate 72 BPM    Atrial Rate 72 BPM    P-R Interval 160 ms    QRS Duration 116 ms    Q-T Interval 436 ms    QTC Calculation(Bezet) 477 ms    P Axis 17 degrees    R Axis -32 degrees    T Axis -22 degrees    Diagnosis Line Normal sinus rhythm  Left axis deviation  Low voltage QRS  Inferior infarct , age undetermined  Possible Anterolateral infarct , age undetermined  Abnormal ECG    Confirmed by Burt Hogan ([id number]) on 6/15/2019 6:00:55 AM (19 @ 20:12)      MEDICATIONS  aspirin  chewable 81  cefTRIAXone   IVPB   cefTRIAXone   IVPB 1  chlorhexidine 4% Liquid 1  heparin  Injectable 5000  levothyroxine 50  metoprolol tartrate 50  sodium chloride 0.9%. 1000      ANTIBIOTICS:  cefTRIAXone   IVPB      cefTRIAXone   IVPB 1 Gram(s) IV Intermittent every 24 hours      No Known Allergies

## 2019-06-19 NOTE — CONSULT NOTE ADULT - ASSESSMENT
69 yo F w/ hx of CAD s/p PCI and hypothyroidism sent by gastroenterologist for gallbladder hydrops.   IR requesting 5 days off effient for biopsy.    # Cardiac clearance to stop effient fo 5 days for IR biopsy of liver    Attending attestation to follow

## 2019-06-19 NOTE — CONSULT NOTE ADULT - ASSESSMENT
ASSESSMENT  67 yo F w/ hx of CAD s/p PCI and hypothyroidism sent by gastroenterologist for gallbladder hydrops and cirrhosis      PROBLEMS  #New dx of cirrhosis, GB hydrops, no e/o infection    +Anti Nuclear Factor Titer: >1:2560 (06.15.19 @ 06:09)    Sepsis ruled out on admission     Infectious workup thus far negative including HIV, Hep A, Hep B, HCV, EBV, HSV, CMV PCR    MELD-Na 22  #Hyponatremia   #Morbid Obesity BMI 41    RECOMMENDATIONS  - D/C Ceftriaxone, no e/o infection  - Pending liver biopsy  - To complete workup, please send CMV IgG/IgM, RPR, VZV IgM/IgG, quantiferon gold, anti-histone Ab    Spectra [phone]

## 2019-06-19 NOTE — PROGRESS NOTE ADULT - ASSESSMENT
67 yo F w/ hx of CAD s/p PCI and hypothyroidism sent by gastroenterologist for gallbladder hydrops    # Significant hydrops gallbladder on MR with stones with persistent elevated LFTs - possible autoimmune etiology  # Leukocytosis - improved  # Hyperbilirubinemia - likely due to obstruction  MELD-Na score 7-10% 90 days mortality   - LFTs more cholestatic pattern. AST>>ALT (AST can also come from muscle and pt is in rhabdo).  - MRCP showed hydropic GB with GS with impacted stone. Cirrhosis with mild ascites. No biliary distention  - Hepatitis panel negative, Tylenol level negative  - GI following: might warrant IR guided liver biopsy once off DAPT.   - IR eval for nontargeted liver bx - needs to hold Effient for 5 days  - Possible ERCP after liver biopsy  - Autoimmune panel: Elevated ANKITA and IgG.   - D/C abx per ID  - Surgery followup - biopsy and ERCP first then CCY  - ID eval    # LUIS ALBERTO - improving  DDx: Prerenal vs hepatorenal  - Continue IV hydration given rhabdo  - Renal US with no hydro  - Nephro signed off    # Hyponatremia - ?SIADH - improving  - Urine osmolality = 449, serum osmolality = 279 , urine sodium level-20  - Restrict free water intake  - BMP q12 - Avoid overcorrection    # Hypoalbuminemia - likely from active liver disease/cirrhosis  - UA only with trace protein    # Acute rhabdomyolysis ?due to transaminitis  - Monitor CPK  - Continue IVF    # Hypothyroidism - Continue Levothyroxine    # LE edema - likely due to hypoalbuminemia  - Echo 6/16: LVEF 57, G1DD, moderate thickened MV  - TSH 4.97    # H/O CAD s/p PCI  - Continue Lopressor, DAPT  - Hold statin given elevated liver enzymes    # Morbid Obesity - BMI 41  - Educated on diet/exercise after recuperation     GI ppx - not indicated  DVT ppx - Heparin SC    Dispo: from home. Pending workup, possible biopsy, CCY vs ERCP

## 2019-06-19 NOTE — PROGRESS NOTE ADULT - ASSESSMENT
a/p:  #hydrops gallbladder with gallstones and acute severe transaminiitis-cirrhosis and  and LUIS ALBERTO/Rhabdomyolysis  -cont to trend lft---stable but remains very high--ast 1000, alt 400, alk phos- 1620  -f/u with GI---consider ERCP vs liver biopsy (f/u cardiology if ok to stop prasurgrel---pci was >1 year ago)  -undergoing autoimmune w/u---ANKITA elevated---so far infectious etiology r/u (hiv, ebv, hep serology, cmv negative)  -dc abx (no source of infectious etiology)  -appreciate ID recomm   -cont to advance diet  -surgery f/u---will need cholecystectomy  -IR for possible liver biopsy (will need to hold prasurgrel x5 days prior to procedure)  -outpaitent GI is Dr. Lara at [facility]---[phone]  -trend bun/cr and cpk--cont IVF  -low threshold to evaluate for more monitored setting if becomes hemodynamcially unstable  -suppl electrolytes prn    #h/o CAD  -cont home regimen  -statin on hold 2/2 acute liver injury  -if planning for liver biopsy/cholecytectomy will need to hold prasugrel     #hypothyroidism  -tsh elevated---check t3/t4  -cont synthroid (50 mcg daily)--will need to adjust synthroid dose based on t3/t4    #DVT//gI ppx    #Progress Note Handoff  Pending (specify):  GI/Surgery recomm//awaiting improvement in LFT    Family discussion: d/w patient and  bedside  Disposition: unknown at this time

## 2019-06-19 NOTE — PROGRESS NOTE ADULT - ASSESSMENT
68 Y F Hx of CAD on DAPT, for a stent >1 year ago, w abnormal LFTs  Imaging suggestive of cirrhosis (nodular contour) with no evident signs of portal HTN.  There is evidence of cholelithiasis with a possible cystic duct stone impacting the CBD proximaly despte no evident giovanni dil.  The derangement in the liver enzymes is unclear wether is solely due to an active hepatitis with evidence of fibrosis or combined with obstructive disease.  The clinical picture (asymptomatic) favors the former however the imaging and blood work favors the later.    Currently the soluble IgG is mildly elevated the ASMA and Anti LKM  are negative.  The AMA is negative    Current serologic work highly suggestive of AIH  Awaiting confirmatory biopsy  Suggest checking aldolase, CPK and rheum eval for possible work up of a myositis perhaps autoimmune in etiology as well (if path is confirmatory).      1)Cholelithiasis- High likelihood of choledocholithiasis  2)Hepatitis  2)Abnormal liver imaging- ?cirrhosis with intact biosynthetic function except for low albumin possible secondary to kidney disease.  3)Kidney disease? CKD?  4)CAD on DAPT    Rec:  - CLD work up currently (-) so far- please obtain AIH markers, PSC (ANCA). Might warrant Liver biopsy once of DAPT (contact IR). DILI is also on the differential with statin induced autoimmune hepatitis/myositis. Other viral etiologies could also account for hepatitis (check CMV, HSV and EBV PCR)  -Send out test for HMG-CoA reductase inhibitor antibodies  - Might benefit from an ERCP if pathology unsuggestive  - Trend LFTs and INR DAILY  - Will need Lap CCY (OP)  - Check Urine protein  - Will warrant OP follow up for screening (EGD and US Q6mo) and immunization for hep B and A  - Consult cardiology for input of holding DAPt in preparation for biopsy

## 2019-06-19 NOTE — CONSULT NOTE ADULT - SUBJECTIVE AND OBJECTIVE BOX
Patient is a 68y old  Female who presents with a chief complaint of gallbladder hydrops (18 Jun 2019 16:35)      REASON FOR CONSULT     HPI:  69 yo F w/ hx of CAD s/p PCI and hypothyroidism sent by gastroenterologist for gallbladder hydrops. Pt found to have elevated liver enzymes by PMD in Apr 2019, referred to GI for w/u. MRCP 2 days ago significant for gallbladder hydrops 4.7cm with probable impacted stone at neck, also liver cirrhosis w/ mild ascites. Admits to social etoh, denies binge drinking, denies abdominal pain, N/V. Admits to decreased appetite in past 2 weeks, pt tried to maintain low salt diet. Reports BLE edema developed in past 2 days as well as weakness. Denies fever, chills, abdominal pain, SOB, dysuria.    In ED, evaluated by surgery -> no interventions at this time (15 Shashank 2019 01:53)      PAST MEDICAL & SURGICAL HISTORY:  Hypothyroidism  CAD S/P percutaneous coronary angioplasty  No significant past surgical history          SOCIAL HISTORY:     FAMILY HISTORY:  No pertinent family history in first degree relatives    No Known Allergies      MEDICATIONS  (STANDING):  aspirin  chewable 81 milliGRAM(s) Oral daily  cefTRIAXone   IVPB      cefTRIAXone   IVPB 1 Gram(s) IV Intermittent every 24 hours  chlorhexidine 4% Liquid 1 Application(s) Topical <User Schedule>  heparin  Injectable 5000 Unit(s) SubCutaneous every 8 hours  levothyroxine 50 MICROGram(s) Oral daily  metoprolol tartrate 50 milliGRAM(s) Oral two times a day  sodium chloride 0.9%. 1000 milliLiter(s) (100 mL/Hr) IV Continuous <Continuous>    MEDICATIONS  (PRN):      Vital Signs Last 24 Hrs  T(C): 35.6 (19 Jun 2019 05:30), Max: 35.7 (18 Jun 2019 12:50)  T(F): 96 (19 Jun 2019 05:30), Max: 96.3 (18 Jun 2019 12:50)  HR: 69 (19 Jun 2019 05:30) (67 - 69)  BP: 108/55 (19 Jun 2019 05:30) (108/55 - 142/73)  BP(mean): --  RR: 18 (19 Jun 2019 05:30) (18 - 18)  SpO2: -- I&O's Detail    18 Jun 2019 07:01  -  19 Jun 2019 07:00  --------------------------------------------------------  IN:    sodium chloride 0.9%.: 800 mL  Total IN: 800 mL    OUT:    Voided: 300 mL  Total OUT: 300 mL    Total NET: 500 mL          REVIEW OF SYSTEMS      CONSTITUTIONAL:  No night sweats.  No fatigue, malaise, lethargy.  No fever or chills.    RESPIRATORY:  No cough.  No wheeze.  No hemoptysis.  No shortness of breath.    CARDIOVASCULAR:  No chest pains.  No palpitations.     GASTROINTESTINAL:  No abdominal pain.  No nausea or vomiting.  No diarrhea or constipation.  No hematemesis.  No hematochezia.  No melena. decreased appetite     MUSCULOSKELETAL:  No musculoskeletal pain.  No joint swelling.  No arthritis.    NEUROLOGICAL:    No headache or neck pain.  No syncope or seizure. no weakness . No Vertigo.    HEMATOLOGIC:  No anemia.  No purpura.  No petechiae.  No prolonged or excessive bleeding.       ECHOCARDIOGRAM: EF of 57 percent, with mild valvular disease  RADIOLOGY & ADDITIONAL STUDIES:      LABS:                        11.6   9.92  )-----------( 163      ( 18 Jun 2019 06:43 )             33.4     06-18    129<L>  |  98  |  28<H>  ----------------------------<  156<H>  4.3   |  17  |  1.2    Ca    7.6<L>      18 Jun 2019 18:25  Mg     2.2     06-18    TPro  6.2  /  Alb  2.1<L>  /  TBili  5.7<H>  /  DBili  4.1<H>  /  AST  1083<H>  /  ALT  534<H>  /  AlkPhos  1848<H>  06-18    CARDIAC MARKERS ( 18 Jun 2019 18:25 )  x     / x     / 1960 U/L / x     / x      CARDIAC MARKERS ( 18 Jun 2019 06:43 )  x     / x     / 1909 U/L / x     / x      CARDIAC MARKERS ( 17 Jun 2019 06:03 )  x     / x     / 1794 U/L / x     / x            I&O's Summary    18 Jun 2019 07:01  -  19 Jun 2019 07:00  --------------------------------------------------------  IN: 800 mL / OUT: 300 mL / NET: 500 mL

## 2019-06-19 NOTE — CONSULT NOTE ADULT - SUBJECTIVE AND OBJECTIVE BOX
Patient is a 68y old  Female who presents with a chief complaint of gallbladder hydrops (19 Jun 2019 17:03)    HPI:  69 yo F w/ hx of CAD s/p PCI and hypothyroidism sent by gastroenterologist for gallbladder hydrops. Pt found to have elevated liver enzymes by PMD in Apr 2019, referred to GI for w/u. MRCP 2 days ago significant for gallbladder hydrops 4.7cm with probable impacted stone at neck, also liver cirrhosis w/ mild ascites. Admits to social etoh, denies binge drinking, denies abdominal pain, N/V. Admits to decreased appetite in past 2 weeks, pt tried to maintain low salt diet. Reports BLE edema developed in past 2 days as well as weakness. Denies fever, chills, abdominal pain, SOB, dysuria.    In ED, evaluated by surgery -> no interventions at this time (15 Shashank 2019 01:53)      PAST MEDICAL & SURGICAL HISTORY:  Hypothyroidism  CAD S/P percutaneous coronary angioplasty  No significant past surgical history      Hospital Course: hydrops gallbladder with gallstones and acute severe transaminiitis-cirrhosis and  and LUIS ALBERTO/Rhabdomyolysis  -cont to trend lft---stable but remains very high--ast 1000, alt 400, alk phos- 1620  -f/u with GI---consider ERCP vs liver biopsy (f/u cardiology if ok to stop prasurgrel---pci was >1 year ago)  -undergoing autoimmune w/u---ANKITA elevated---so far infectious etiology r/u (hiv, ebv, hep serology, cmv negative)  -dc abx (no source of infectious etiology)  -appreciate ID recomm   -cont to advance diet  -surgery f/u---will need cholecystectomy  -IR for possible liver biopsy (will need to hold prasurgrel x5 days prior to procedure)  -outpaitent GI is Dr. Lara at [facility]---[phone]  -trend bun/cr and cpk--cont IVF  -low threshold to evaluate for more monitored setting if becomes hemodynamcially unstable  -suppl electrolytes prn    #h/o CAD  -cont home regimen  -statin on hold 2/2 acute liver injury  -if planning for liver biopsy/cholecytectomy will need to hold prasugrel     #hypothyroidism  -tsh elevated---check t3/t4  -cont synthroid (50 mcg daily)--will need to adjust synthroid dose based on t3/t4    #DVT//gI ppx    #Progress Note Handoff  Pending (specify):  GI/Surgery recomm//awaiting improvement in LFT    TODAY'S SUBJECTIVE & REVIEW OF SYMPTOMS:     Constitutional Weakness hips   Cardio WNL   Resp WNL   GI WNL  Heme WNL  Endo WNL  Skin WNL  MSK WNL  Neuro WNL  Cognitive WNL  Psych WNL      MEDICATIONS  (STANDING):  aspirin  chewable 81 milliGRAM(s) Oral daily  chlorhexidine 4% Liquid 1 Application(s) Topical <User Schedule>  heparin  Injectable 5000 Unit(s) SubCutaneous every 8 hours  levothyroxine 50 MICROGram(s) Oral daily  metoprolol tartrate 50 milliGRAM(s) Oral two times a day  sodium chloride 0.9%. 1000 milliLiter(s) (100 mL/Hr) IV Continuous <Continuous>    MEDICATIONS  (PRN):      FAMILY HISTORY:  No pertinent family history in first degree relatives      Allergies    No Known Allergies    Intolerances        SOCIAL HISTORY:    [    ] Etoh  [    ] Smoking  [    ] Substance abuse     Home Environment:  [    ] Home Alone  [   x ] Lives with Family  [    ] Home Health Aid    Dwelling:  [    ] Apartment  [   x ] Private House  [    ] Adult Home  [    ] Skilled Nursing Facility      [    ] Short Term  [    ] Long Term  [   x ] Stairs    5 IN                       [    ] Elevator     FUNCTIONAL STATUS PTA: (Check all that apply)  Ambulation: [   x  ]Independent    [    ] Dependent     [    ] Non-Ambulatory  Assistive Device: [    ] SA Cane  [    ]  Q Cane  [    ] Walker  [    ]  Wheelchair  ADL : [  x  ] Independent  [    ]  Dependent       Vital Signs Last 24 Hrs  T(C): 37.3 (19 Jun 2019 12:50), Max: 37.3 (19 Jun 2019 12:50)  T(F): 99.1 (19 Jun 2019 12:50), Max: 99.1 (19 Jun 2019 12:50)  HR: 80 (19 Jun 2019 17:21) (67 - 80)  BP: 116/75 (19 Jun 2019 17:21) (108/55 - 152/79)  BP(mean): --  RR: 18 (19 Jun 2019 12:50) (18 - 18)  SpO2: --      PHYSICAL EXAM: Alert & Oriented X3  GENERAL: NAD, well-groomed, well-developed  HEAD:  Atraumatic, Normocephalic  EYES: EOMI, PERRLA, conjunctiva and sclera clear  NECK: Supple  CHEST/LUNG: Clear bilaterally  HEART: Regular rate and rhythm  ABDOMEN: Soft, Nontender, Nondistended; Bowel sounds present  EXTREMITIES:  no calf tenderness,2+edema BLES    NERVOUS SYSTEM:  Cranial Nerves 2-12 intact [ x   ] Abnormal  [    ]  ROM: WFL all extremities [ x   ]  Abnormal [     ]  Motor Strength: WFL all extremities  [    ]  Abnormal [  x  ]4/5 throughout except giovanni hip flexion 2-3/5  Sensation: intact to light touch [x    ] Abnormal [    ]    FUNCTIONAL STATUS:  Bed Mobility: [   ]  Independent [    ]  Supervision [  x  ]  Needs Assistance [  ]  N/A  Transfers: [    ]  Independent [    ]  Supervision [ x   ]  Needs Assistance [    ]  N/A    Ambulation:  [    ]  Independent [    ]  Supervision [ x   ]  Needs Assistance [    ]  N/A   ADL:  [    ]   Independent [ x   ] Requires Assistance [    ] N/A       LABS:                        11.4   8.90  )-----------( 151      ( 19 Jun 2019 11:22 )             32.7     06-19    132<L>  |  105  |  25<H>  ----------------------------<  132<H>  3.7   |  17  |  1.1    Ca    7.5<L>      19 Jun 2019 11:22  Mg     2.0     06-19    TPro  5.5<L>  /  Alb  1.7<L>  /  TBili  5.3<H>  /  DBili  4.1<H>  /  AST  1003<H>  /  ALT  453<H>  /  AlkPhos  1620<H>  06-19    PT/INR - ( 19 Jun 2019 11:22 )   PT: 15.20 sec;   INR: 1.33 ratio         PTT - ( 19 Jun 2019 11:22 )  PTT:37.6 sec      RADIOLOGY & ADDITIONAL STUDIES:

## 2019-06-19 NOTE — CONSULT NOTE ADULT - ASSESSMENT
IMPRESSION: Rehab of gait ab Rhabdomyolysis/ liver disease Hypoalbuminemia    PRECAUTIONS: [ x   ] Cardiac  [    ] Respiratory  [    ] Seizures [    ] Contact Isolation  [    ] Droplet Isolation  [    ] Other    Weight Bearing Status:     RECOMMENDATION: DIETARY- SUPPLEMENT PROTEIN    Out of Bed to Chair     DVT/Decubiti Prophylaxis    REHAB PLAN:     [    x ] Bedside P/T 3-5 times a week   [    x ] Bedside O/T  2-3 times a week   [     ] No Rehab Therapy Indicated   [     ]  Speech Therapy   Conditioning/ROM                                 ADL  Bed Mobility                                            Conditioning/ROM  Transfers                                                  Bed Mobility  Sitting /Standing Balance                      Transfers                                        Gait Training                                            Sitting/Standing Balance  Stair Training [  x ]Applicable                 Home equipment Eval                                                                     Splinting  [   ] Only      GOALS:   ADL   [   x ]   Independent         Transfers  [    x] Independent            Ambulation  [  x   ] Independent     [  x   ] With device                            [    ]  CG                                               [    ]  CG                                                    [     ] CG                            [    ] Min A                                          [    ] Min A                                                [     ] Min  A          DISCHARGE PLAN:   [     ]  Good candidate for Intensive Rehabilitation/Hospital based                                             Will tolerate 3hrs Intensive Rehab Daily                                       [ x     ]  Short Term Rehab in Skilled Nursing Facility                                       [      ]  Home with Outpatient or  services                               VS                                     [    x  ]  Possible Candidate for Intensive Hospital based Rehab- will tolerate 3hrs rehab daily

## 2019-06-19 NOTE — PROGRESS NOTE ADULT - SUBJECTIVE AND OBJECTIVE BOX
Hepatology/GI follow up note: Pt seen and examined at bedside.     For questions and inquiries please page [phone].  For urgent matters or after 5pm and on weekends please page the fellow on call through the GI paging system.    68y Female seen for:    Subjective/Interval events:    Review of system  General:  (-) weight loss, (-) fevers  Eyes:  (-) visual changes  CV:  (-) chest pain  Resp: (-) SOB, (-) wheezing  GI: (-) abdominal pain,  (-) nausea, (-) vomiting, (-) dysphagia, (-) diarrhea, (-) constipation, (-) rectal bleeding, (-) melena, (-) hematemesis.  Neuro: (-) confusion, (-) weakness  Psych:  (-) Hallucinations  Heme:  (-) easy bruisability    Past medical/surgical Hx:  PAST MEDICAL & SURGICAL HISTORY:  Hypothyroidism  CAD S/P percutaneous coronary angioplasty  No significant past surgical history    Home Medications:  Last Order Reconciliation Date: 06-15-19 @ 02:05 (Admission Reconciliation)  aspirin 81 mg oral tablet, chewable: 1 tab(s) orally once a day  atorvastatin 80 mg oral tablet: 1 tab(s) orally once a day (at bedtime)  levothyroxine 50 mcg (0.05 mg) oral tablet: 1 tab(s) orally once a day  Metoprolol Tartrate 50 mg oral tablet: 1 tab(s) orally 2 times a day  prasugrel 10 mg oral tablet: 1 tab(s) orally once a day      Allergies:  No Known Allergies      Current Medications:   aspirin  chewable 81 milliGRAM(s) Oral daily  chlorhexidine 4% Liquid 1 Application(s) Topical <User Schedule>  heparin  Injectable 5000 Unit(s) SubCutaneous every 8 hours  levothyroxine 50 MICROGram(s) Oral daily  metoprolol tartrate 50 milliGRAM(s) Oral two times a day  sodium chloride 0.9%. 1000 milliLiter(s) IV Continuous <Continuous>        Physical exam:  T(C): 35.7 (06-19-19 @ 21:13), Max: 37.3 (06-19-19 @ 12:50)  HR: 73 (06-19-19 @ 21:13) (69 - 80)  BP: 127/60 (06-19-19 @ 21:13) (108/55 - 152/79)  RR: 18 (06-19-19 @ 21:13) (18 - 18)  SpO2: 98% (06-19-19 @ 07:40) (98% - 98%)    GENERAL: NAD  HEAD:  Atraumatic, Normocephalic  EYES: Sclera:NL  NECK: Supple, no JVD or thyromegaly  CHEST/LUNG: Good bilateral air entry  HEART: normal S1, S2. Regular  ABDOMEN: (-) distended, (-) tender, (-) rebound, (+) BS, (-)HSM  EXTREMITIES: (-) edema  NEUROLOGY: (-) asterixis  SKIN: (-) jaundice  MATTY: (-) melena (-) brbpr      Data:                        11.4   8.90  )-----------( 151      ( 19 Jun 2019 11:22 )             32.7     MCV 85.6 (06-19-19)    RDW 17.8 (06-19-19)    HGB trend:  11.4  06-19-19 @ 11:22  11.6  06-18-19 @ 06:43  11.9  06-17-19 @ 06:03        WBC trend:  8.90  06-19-19 @ 11:22  9.92  06-18-19 @ 06:43  10.52  06-17-19 @ 06:03    06-19    132<L>  |  105  |  25<H>  ----------------------------<  132<H>  3.7   |  17  |  1.1    Ca    7.5<L>      19 Jun 2019 11:22  Mg     2.0     06-19    TPro  5.5<L>  /  Alb  1.7<L>  /  TBili  5.3<H>  /  DBili  4.1<H>  /  AST  1003<H>  /  ALT  453<H>  /  AlkPhos  1620<H>  06-19    Liver panel trend:  TBili 5.3   /   AST 1003   /      /   AlkP 1620   /   Tptn 5.5   /   Alb 1.7    /   DBili 4.1      06-19  TBili 5.7   /   AST 1083   /      /   AlkP 1848   /   Tptn 6.2   /   Alb 2.1    /   DBili 4.1      06-18  TBili 5.4   /   AST 1042   /      /   AlkP 1730   /   Tptn 5.8   /   Alb 1.9    /   DBili 3.9      06-18  TBili 4.9   /   AST 1033   /      /   AlkP 1688   /   Tptn 6.0   /   Alb 1.9    /   DBili 3.5      06-17  TBili 5.0   /   AST 1159   /      /   AlkP 1800   /   Tptn 6.4   /   Alb 2.0    /   DBili 3.6      06-16  TBili 4.3   /   AST 1032   /      /   AlkP 1510   /   Tptn 5.8   /   Alb 1.9    /   DBili 3.3      06-16  TBili 4.9   /   AST 1180   /      /   AlkP 1647   /   Tptn 6.2   /   Alb 2.1    /   DBili 3.9      06-15  TBili --   /   AST --   /   ALT --   /   AlkP --   /   Tptn --   /   Alb --    /   DBili 3.6      06-14  TBili 5.0   /   AST 1211   /      /   AlkP 1759   /   Tptn 6.3   /   Alb 2.1    /   DBili --      06-14        PT/INR - ( 19 Jun 2019 11:22 )   PT: 15.20 sec;   INR: 1.33 ratio         PTT - ( 19 Jun 2019 11:22 )  PTT:37.6 sec      Anti-Nuclear Antibody in AM (06.15.19 @ 06:09)    Anti Nuclear Factor Titer: >1:2560    ANKITA Pattern: Homogeneous    IgG Subset Total: 1760 mg/dL (06.15.19 @ 06:09)

## 2019-06-20 NOTE — PROGRESS NOTE ADULT - SUBJECTIVE AND OBJECTIVE BOX
Hepatology/GI follow up note: Pt seen and examined at bedside.     For questions and inquiries please page [phone].  For urgent matters or after 5pm and on weekends please page the fellow on call through the GI paging system.    68y Female seen for:    Subjective/Interval events:    Review of system  General:  (-) weight loss, (-) fevers  Eyes:  (-) visual changes  CV:  (-) chest pain  Resp: (-) SOB, (-) wheezing  GI: (-) abdominal pain,  (-) nausea, (-) vomiting, (-) dysphagia, (-) diarrhea, (-) constipation, (-) rectal bleeding, (-) melena, (-) hematemesis.  Neuro: (-) confusion, (-) weakness  Psych:  (-) Hallucinations  Heme:  (-) easy bruisability    Past medical/surgical Hx:  PAST MEDICAL & SURGICAL HISTORY:  Hypothyroidism  CAD S/P percutaneous coronary angioplasty  No significant past surgical history    Home Medications:  Last Order Reconciliation Date: 06-15-19 @ 02:05 (Admission Reconciliation)  aspirin 81 mg oral tablet, chewable: 1 tab(s) orally once a day  atorvastatin 80 mg oral tablet: 1 tab(s) orally once a day (at bedtime)  levothyroxine 50 mcg (0.05 mg) oral tablet: 1 tab(s) orally once a day  Metoprolol Tartrate 50 mg oral tablet: 1 tab(s) orally 2 times a day  prasugrel 10 mg oral tablet: 1 tab(s) orally once a day      Allergies:  No Known Allergies      Current Medications:   aspirin  chewable 81 milliGRAM(s) Oral daily  chlorhexidine 4% Liquid 1 Application(s) Topical <User Schedule>  heparin  Injectable 5000 Unit(s) SubCutaneous every 8 hours  levothyroxine 50 MICROGram(s) Oral daily  metoprolol tartrate 50 milliGRAM(s) Oral two times a day  sodium chloride 0.9%. 1000 milliLiter(s) IV Continuous <Continuous>        Physical exam:  T(C): 36.1 (06-20-19 @ 12:00), Max: 36.3 (06-20-19 @ 04:56)  HR: 81 (06-20-19 @ 17:16) (72 - 81)  BP: 105/73 (06-20-19 @ 17:16) (105/73 - 133/60)  RR: 18 (06-20-19 @ 12:00) (18 - 18)  SpO2: 98% (06-20-19 @ 07:35) (98% - 98%)    GENERAL: NAD  HEAD:  Atraumatic, Normocephalic  EYES: Sclera:NL  NECK: Supple, no JVD or thyromegaly  CHEST/LUNG: Good bilateral air entry  HEART: normal S1, S2. Regular  ABDOMEN: (-) distended, (-) tender, (-) rebound, (+) BS, (-)HSM  EXTREMITIES: (-) edema  NEUROLOGY: (-) asterixis  SKIN: (-) jaundice  MATTY: (-) melena (-) brbpr      Data:                        11.5   11.54 )-----------( 175      ( 20 Jun 2019 05:54 )             32.9     MCV 85.0 (06-20-19)    RDW 18.4 (06-20-19)    HGB trend:  11.5  06-20-19 @ 05:54  11.4  06-19-19 @ 11:22  11.6  06-18-19 @ 06:43        WBC trend:  11.54  06-20-19 @ 05:54  8.90  06-19-19 @ 11:22  9.92  06-18-19 @ 06:43    06-20    132<L>  |  103  |  35<H>  ----------------------------<  94  4.5   |  19  |  1.4    Ca    7.8<L>      20 Jun 2019 05:54  Mg     2.2     06-20    TPro  5.6<L>  /  Alb  1.7<L>  /  TBili  6.0<H>  /  DBili  4.5<H>  /  AST  1079<H>  /  ALT  501<H>  /  AlkPhos  1713<H>  06-20    Liver panel trend:  TBili 6.0   /   AST 1079   /      /   AlkP 1713   /   Tptn 5.6   /   Alb 1.7    /   DBili 4.5      06-20  TBili 5.3   /   AST 1003   /      /   AlkP 1620   /   Tptn 5.5   /   Alb 1.7    /   DBili 4.1      06-19  TBili 5.7   /   AST 1083   /      /   AlkP 1848   /   Tptn 6.2   /   Alb 2.1    /   DBili 4.1      06-18  TBili 5.4   /   AST 1042   /      /   AlkP 1730   /   Tptn 5.8   /   Alb 1.9    /   DBili 3.9      06-18  TBili 4.9   /   AST 1033   /      /   AlkP 1688   /   Tptn 6.0   /   Alb 1.9    /   DBili 3.5      06-17  TBili 5.0   /   AST 1159   /      /   AlkP 1800   /   Tptn 6.4   /   Alb 2.0    /   DBili 3.6      06-16  TBili 4.3   /   AST 1032   /      /   AlkP 1510   /   Tptn 5.8   /   Alb 1.9    /   DBili 3.3      06-16  TBili 4.9   /   AST 1180   /      /   AlkP 1647   /   Tptn 6.2   /   Alb 2.1    /   DBili 3.9      06-15  TBili --   /   AST --   /   ALT --   /   AlkP --   /   Tptn --   /   Alb --    /   DBili 3.6      06-14  TBili 5.0   /   AST 1211   /      /   AlkP 1759   /   Tptn 6.3   /   Alb 2.1    /   DBili --      06-14        PT/INR - ( 20 Jun 2019 05:54 )   PT: 15.50 sec;   INR: 1.35 ratio         PTT - ( 20 Jun 2019 05:54 )  PTT:47.2 sec

## 2019-06-20 NOTE — OCCUPATIONAL THERAPY INITIAL EVALUATION ADULT - PERTINENT HX OF CURRENT PROBLEM, REHAB EVAL
Pt is a 67 y/o right handed F who presents with a chief complaint of gallbladder hydrops, BLE weakness and swelling.

## 2019-06-20 NOTE — PROGRESS NOTE ADULT - ASSESSMENT
a/p:  #hydrops gallbladder with gallstones and acute severe transaminiitis/cirrhosis of unclear etiology and LUIS ALBERTO/Rhabdomyolysis  -cont to trend lft---stable but remains very high--today ASt 1079, , alk phos 1703  -f/u with GI---awaiting liver biopsy with IR monday---holding prasurgrel (pci was >1 year ago---appreciate cardio w/u)  -undergoing autoimmune w/u---ANKITA elevated---so far infectious etiology negative (hiv, ebv, hep serology, cmv negative)]  -rheum consult  -ordering additional w/u:  C3, C4, anti-ro, anti-ancelmo, Anti-La, dsDNA  -will also check aldolase, GGT and hmgCoenzeme level  -off abx (no source of infectious etiology)--appreciate ID recomm  -cont to advance diet  -surgery f/u---will need cholecystectomy  -outpaitent GI is Dr[facility] Lara at [facility]---[phone]  -trend bun/cr and cpk--today CPK up to 13k (from 1k yesterday)---concern for underlying myositis---may need muscle biopsy and awaiting additional autoimmune/Rheum w/u   -restart IVF  -low threshold to evaluate for more monitored setting if becomes hemodynamcially unstable  -suppl electrolytes prn    #h/o CAD  -cont home regimen  -statin on hold 2/2 acute liver injury  -holding prasurgrel now until post-liver biopsy    #hypothyroidism  -tsh elevated---check t3/t4  -cont synthroid (50 mcg daily)--will need to adjust synthroid dose based on t3/t4    #hypoalbuminemia 2/2 acute liver disease  -repeat UA--initial had only trace protein and urine function improving (LUIS ALBERTO stable)  -leg elevation---nutritional status  -if symptoms of edema worsen can try low dose lasix 20 mg iv x1 for symptomatic improvement (patient has HFpEF---GIDD)    #DVT//gI ppx  FULL CODE    #Progress Note Handoff  Pending (specify):  GI/Surgery recomm//IR liver biopsy monday 6/24    Family discussion: d/w patient and  bedside  Disposition: unknown at this time

## 2019-06-20 NOTE — PROGRESS NOTE ADULT - SUBJECTIVE AND OBJECTIVE BOX
SUBJECTIVE:    Patient is a 68y old Female who presents with a chief complaint of gallbladder hydrops (20 Jun 2019 16:03)    Currently admitted to medicine with the primary diagnosis of Gallbladder hydrops     Today is hospital day 6d. Patient denied fevers, chills, nausea, vomiting, and stomach pain. Patient reported decreased appetite and swelling of the extremities. No other acute events overnight.    INTERVAL EVENTS:     PAST MEDICAL & SURGICAL HISTORY  Hypothyroidism  CAD S/P percutaneous coronary angioplasty  No significant past surgical history      ALLERGIES:  No Known Allergies    MEDICATIONS:  STANDING MEDICATIONS  aspirin  chewable 81 milliGRAM(s) Oral daily  chlorhexidine 4% Liquid 1 Application(s) Topical <User Schedule>  heparin  Injectable 5000 Unit(s) SubCutaneous every 8 hours  levothyroxine 50 MICROGram(s) Oral daily  metoprolol tartrate 50 milliGRAM(s) Oral two times a day  sodium chloride 0.9%. 1000 milliLiter(s) IV Continuous <Continuous>    PRN MEDICATIONS    VITALS:   T(F): 97  HR: 72  BP: 114/59  RR: 18  SpO2: 98%    LABS:                        11.5   11.54 )-----------( 175      ( 20 Jun 2019 05:54 )             32.9     06-20    132<L>  |  103  |  35<H>  ----------------------------<  94  4.5   |  19  |  1.4    Ca    7.8<L>      20 Jun 2019 05:54  Mg     2.2     06-20    TPro  5.6<L>  /  Alb  1.7<L>  /  TBili  6.0<H>  /  DBili  4.5<H>  /  AST  1079<H>  /  ALT  501<H>  /  AlkPhos  1713<H>  06-20    PT/INR - ( 20 Jun 2019 05:54 )   PT: 15.50 sec;   INR: 1.35 ratio         PTT - ( 20 Jun 2019 05:54 )  PTT:47.2 sec    Creatine Kinase, Serum: 91524 U/L <H> (06-20-19 @ 11:26)    Hepatic Function Panel in AM (06.20.19 @ 05:54)    Indirect Reacting Bilirubin: 1.5 mg/dL    Protein Total, Serum: 5.6 g/dL    Albumin, Serum: 1.7 g/dL    Bilirubin Total, Serum: 6.0 mg/dL    Bilirubin Direct, Serum: 4.5 mg/dL    Alkaline Phosphatase, Serum: 1713 U/L    Aspartate Aminotransferase (AST/SGOT): 1079 U/L    Alanine Aminotransferase (ALT/SGPT): 501 U/L      PHYSICAL EXAM:  GEN: No acute distress.   PULM/CHEST: Clear to auscultation bilaterally, no rales, rhonchi or wheezes   CVS: Regular rate and rhythm, S1-S2, no murmurs  ABD: Soft, non-tender, obese, normoactive bowel sounds.   EXT: B/l leg edema with legs wrapped in ace bandage.  NEURO: AAOx3

## 2019-06-20 NOTE — OCCUPATIONAL THERAPY INITIAL EVALUATION ADULT - BED MOBILITY LIMITATIONS, REHAB EVAL
decreased ability to use legs for bridging/pushing/Pt c/o stomach gurgling making it uncomfortable to move in bed

## 2019-06-20 NOTE — PROGRESS NOTE ADULT - ASSESSMENT
67 yo F w/ hx of CAD s/p PCI and hypothyroidism sent by gastroenterologist for gallbladder hydrops    # Significant hydrops gallbladder on MR with stones with persistent elevated LFTs - possible autoimmune etiology  - F/u  C3, C4, anti-ro, anti-ancelmo, Anti-La, dsDNA  - Rheumatology consult placed    # Hyperbilirubinemia - likely due to obstruction  MELD-Na score 7-10% 90 days mortality   - LFTs continues to be elevated; more cholestatic pattern. AST>>ALT (AST can also come from muscle and pt is in rhabdo).  - MRCP showed hydropic GB with GS with impacted stone. Cirrhosis with mild ascites. No biliary distention  - Hepatitis panel negative, Tylenol level negative  - GI following: might warrant IR guided liver biopsy once off DAPT.   - IR eval for nontargeted liver bx - Effient held on 6/19 until now for biopsy on 6/24  - Possible ERCP after liver biopsy  - Autoimmune panel: Elevated ANKITA and IgG.   - D/C abx per ID  - Surgery followup - biopsy and ERCP first then CCY  - ID eval    # LUIS ALBERTO - improving  DDx: Prerenal vs hepatorenal  - Continue IV hydration given rhabdo  - Renal US with no hydro  - Nephro signed off    # Hyponatremia - ?SIADH - improving  - Urine osmolality = 449, serum osmolality = 279 , urine sodium level-20  - Restrict free water intake  - BMP q12 - Avoid overcorrection    # Hypoalbuminemia - likely from active liver disease/cirrhosis  - UA only with trace protein    # Acute rhabdomyolysis  - CPK alex to 13.8k - concern for myositis; awaiting autoimmune work up and rheum consult  - Continue IV hydration    # Hypothyroidism - Continue Levothyroxine    # LE edema - likely due to hypoalbuminemia  - Echo 6/16: LVEF 57, G1DD, moderate thickened MV  - TSH 4.97    # H/O CAD s/p PCI  - Continue Lopressor, DAPT  - Hold statin given elevated liver enzymes    # Morbid Obesity - BMI 41  - Educated on diet/exercise after recuperation     GI ppx - not indicated  DVT ppx - Heparin SC    Dispo: from home. Pending workup, possible biopsy, CCY vs ERCP

## 2019-06-20 NOTE — OCCUPATIONAL THERAPY INITIAL EVALUATION ADULT - GENERAL OBSERVATIONS, REHAB EVAL
Pt seen 10:15-10:45 Pt encountered supine lying in bed in NAD +IV drip. Pt agreeable to OT ernesto. Pt spouse present.

## 2019-06-20 NOTE — PROGRESS NOTE ADULT - SUBJECTIVE AND OBJECTIVE BOX
Patient is a 68y old  Female who presents with a chief complaint of gallbladder hydrops (18 Jun 2019 13:54)    HPI:  67 yo F w/ hx of CAD s/p PCI and hypothyroidism sent by gastroenterologist for gallbladder hydrops. Pt found to have elevated liver enzymes by PMD in Apr 2019, referred to GI for w/u. MRCP 2 days ago significant for gallbladder hydrops 4.7cm with probable impacted stone at neck, also liver cirrhosis w/ mild ascites. Admits to social etoh, denies binge drinking, denies abdominal pain, N/V. Admits to decreased appetite in past 2 weeks, pt tried to maintain low salt diet. Reports BLE edema developed in past 2 days as well as weakness. Denies fever, chills, abdominal pain, SOB, dysuria.    In ED, evaluated by surgery -> no interventions at this time (15 Shashank 2019 01:53)    PAST MEDICAL & SURGICAL HISTORY:  Hypothyroidism  CAD S/P percutaneous coronary angioplasty  No significant past surgical history    patient seen and examined independently on morning rounds, chart reviewed and discussed with the medicine resident.    no overnight events--tolerating advancing diet--no abdominal pain- feels weak- ambulated yesterday 40 ft with assistance--working with OT/PT again today-               PE:  GEN-NAD, AAOx3  HEENT- NCAT, PERRLA, EOMI, +icterus  PULM- Clear to auscultation bilaterally, fair air entry  CVS- +s1/s2 RRR no murmurs  GI- soft NT obese ND +bs, no rebound, no guarding  EXT- 1+ edema--bilateral leg ace bandage dressings          Labs:                        11.5   11.54 )-----------( 175      ( 20 Jun 2019 05:54 )             32.9     CBC Full  -  ( 20 Jun 2019 05:54 )  WBC Count : 11.54 K/uL  RBC Count : 3.87 M/uL  Hemoglobin : 11.5 g/dL  Hematocrit : 32.9 %  Platelet Count - Automated : 175 K/uL  Mean Cell Volume : 85.0 fL  Mean Cell Hemoglobin : 29.7 pg  Mean Cell Hemoglobin Concentration : 35.0 g/dL  Auto Neutrophil # : 8.74 K/uL  Auto Lymphocyte # : 1.11 K/uL  Auto Monocyte # : 1.39 K/uL  Auto Eosinophil # : 0.16 K/uL  Auto Basophil # : 0.09 K/uL  Auto Neutrophil % : 75.8 %  Auto Lymphocyte % : 9.6 %  Auto Monocyte % : 12.0 %  Auto Eosinophil % : 1.4 %  Auto Basophil % : 0.8 %      06-20    132<L>  |  103  |  35<H>  ----------------------------<  94  4.5   |  19  |  1.4    Ca    7.8<L>      20 Jun 2019 05:54  Mg     2.2     06-20    TPro  5.6<L>  /  Alb  1.7<L>  /  TBili  6.0<H>  /  DBili  4.5<H>  /  AST  1079<H>  /  ALT  501<H>  /  AlkPhos  1713<H>  06-20      Microbiology:      Vital Signs Last 24 Hrs  T(C): 36.1 (20 Jun 2019 12:00), Max: 36.3 (20 Jun 2019 04:56)  T(F): 97 (20 Jun 2019 12:00), Max: 97.4 (20 Jun 2019 04:56)  HR: 72 (20 Jun 2019 12:00) (72 - 80)  BP: 114/59 (20 Jun 2019 12:00) (114/59 - 133/60)  BP(mean): --  RR: 18 (20 Jun 2019 12:00) (18 - 18)  SpO2: 98% (20 Jun 2019 07:35) (98% - 98%)    I&O's Summary

## 2019-06-21 NOTE — PROGRESS NOTE ADULT - SUBJECTIVE AND OBJECTIVE BOX
Interventional Radiology Follow- Up Note    67 yo F w/ hx of CAD s/p PCI and hypothyroidism sent by gastroenterologist for gallbladder hydrops. Pt found to have elevated liver enzymes by PMD in Apr 2019, referred to GI for w/u. MRCP 2 days ago significant for gallbladder hydrops 4.7cm with probable impacted stone at neck, also liver cirrhosis w/ mild ascites.    Vitals: T(F): 96.9 (06-21-19 @ 05:05), Max: 96.9 (06-21-19 @ 05:05)  HR: 76 (06-21-19 @ 05:05) (76 - 81)  BP: 130/56 (06-21-19 @ 05:05) (105/73 - 130/56)  RR: 18 (06-21-19 @ 05:05) (18 - 18)  SpO2: 97% (06-21-19 @ 07:35) (97% - 97%)  Wt(kg): --    LABS:                        11.1   15.53 )-----------( 197      ( 21 Jun 2019 06:55 )             31.9     06-21    133<L>  |  104  |  56<H>  ----------------------------<  92  5.8<H>   |  18  |  1.9<H>    Ca    7.9<L>      21 Jun 2019 06:55  Mg     2.2     06-20    TPro  5.6<L>  /  Alb  1.7<L>  /  TBili  5.6<H>  /  DBili  x   /  AST  1207<H>  /  ALT  508<H>  /  AlkPhos  1660<H>  06-21    PT/INR - ( 20 Jun 2019 05:54 )   PT: 15.50 sec;   INR: 1.35 ratio      PTT - ( 21 Jun 2019 06:55 )  PTT:39.3 sec    Impression/plan:   67 yo F w/ hx of CAD s/p PCI and hypothyroidism sent by gastroenterologist for gallbladder hydrops. Pt found to have elevated liver enzymes by PMD in Apr 2019, referred to GI for w/u. MRCP 2 days ago significant for gallbladder hydrops 4.7cm with probable impacted stone at neck, also liver cirrhosis w/ mild ascites.  - IR was consulted for a nontargeted liver biopsy  - Elevated liver enzymes, Alk phos 1660, AST 1207,   - GI following, suggested acute hepatitis vs obstructive disease, etiology is still unclear  - Patient was on DAPT with ASA and Effient (Prasugrel). Prasugrel has been on hold since 6/19 for biopsy, which may be restarted the day after procedure.  - On schedule for IR guided nontargeted liver biopsy on Monday 6/24  - NPO past midnight on Sunday  - Please draw CBC/CMP/Coags prior     Please call Interventional Radiology [phone] with any questions, concerns, or issues regarding above.

## 2019-06-21 NOTE — PROGRESS NOTE ADULT - ASSESSMENT
a/p:  #hydrops gallbladder with obstructing gs -acute severe transaminiitis/cirrhosis of unclear etiology and LUIS ALBERTO/Rhabdomyolysis  -cont to trend lft---stable but remains very high--today ASt 1207 , alk phos 1660  -f/u with GI---awaiting liver biopsy with IR monday- npo after midnight sunday- holding prasurgrel (pci was >1 year ago)--restart 1 day post procedure  -undergoing autoimmune w/u---ANKITA elevated---so far infectious etiology negative (hiv, ebv, hep serology, cmv negative)]  -rheum consult  -ordering additional w/u:  C3, C4, anti-ro, anti-ancelmo, Anti-La, dsDNA  -GGT elevated (c/w liver disease)--f/u aldolase and hmgCoenzeme level  -off abx (no source of infectious etiology)--appreciate ID recomm--slight bump in wbc count--repeat cbc  -cont to advance diet  -surgery f/u---will need cholecystectomy  -outpaitent GI is Dr. Lara at [facility]---[phone]  -trend bun/cr and cpk--yesterday CPK up to 13k (from 1k yesterday)---concern for underlying myositis---may need muscle biopsy and awaiting additional  -monitor daily weights  - autoimmune/Rheum w/u   -cont IVF  -low threshold to evaluate for more monitored setting if becomes hemodynamcially unstable  -suppl electrolytes prn  -will check bladder scan 2/2 concern for urinary retention  --nephrology f/u--once LUIS ALBERTO improves will consider pan CT (abd/pelvis/chest) to evaluate for underlying malignancy---need to confirm patietn with h/o age appropriate ca screening (including mammo and pap)    #h/o CAD  -cont home regimen  -statin on hold 2/2 acute liver injury  -holding prasurgrel now until post-liver biopsy    #hypothyroidism  -tsh elevated---t3/t4 normal---will cont syntrhoid 50 mcg daily    #hyperkalemia--2/2 rhabdomyolysis  -k 5.8  -check ekg  -cocktail and repeat bmp in pm tonight-      #hypoalbuminemia 2/2 acute liver disease  -repeat UA--initial had only trace protein and urine function improving (LUIS ALBERTO stable)  -leg elevation---nutritional status      #DVT//gI ppx  FULL CODE    #Progress Note Handoff  Pending (specify):  GI/Surgery recomm//IR liver biopsy monday 6/24    Family discussion: d/w patient and  bedside  Disposition: unknown at this time

## 2019-06-21 NOTE — PROGRESS NOTE ADULT - SUBJECTIVE AND OBJECTIVE BOX
Patient is a 68y old  Female who presents with a chief complaint of gallbladder hydrops (18 Jun 2019 13:54)    HPI:  67 yo F w/ hx of CAD s/p PCI and hypothyroidism sent by gastroenterologist for gallbladder hydrops. Pt found to have elevated liver enzymes by PMD in Apr 2019, referred to GI for w/u. MRCP 2 days ago significant for gallbladder hydrops 4.7cm with probable impacted stone at neck, also liver cirrhosis w/ mild ascites. Admits to social etoh, denies binge drinking, denies abdominal pain, N/V. Admits to decreased appetite in past 2 weeks, pt tried to maintain low salt diet. Reports BLE edema developed in past 2 days as well as weakness. Denies fever, chills, abdominal pain, SOB, dysuria.    In ED, evaluated by surgery -> no interventions at this time (15 Shashank 2019 01:53)    PAST MEDICAL & SURGICAL HISTORY:  Hypothyroidism  CAD S/P percutaneous coronary angioplasty  No significant past surgical history    patient seen and examined independently on morning rounds, chart reviewed and discussed with the medicine resident.    no overnight events--tolerating advancing diet--no abdominal pain or chest pain- feels weak             PE:  GEN-NAD, AAOx3  HEENT- NCAT, PERRLA, EOMI, +icterus  PULM- Clear to auscultation bilaterally, fair air entry  CVS- +s1/s2 RRR no murmurs  GI- soft NT obese ND +bs, no rebound, no guarding  EXT- 1+ edema--bilateral leg ace bandage dressings          Labs:                        11.1   15.53 )-----------( 197      ( 21 Jun 2019 06:55 )             31.9     CBC Full  -  ( 21 Jun 2019 06:55 )  WBC Count : 15.53 K/uL  RBC Count : 3.72 M/uL  Hemoglobin : 11.1 g/dL  Hematocrit : 31.9 %  Platelet Count - Automated : 197 K/uL  Mean Cell Volume : 85.8 fL  Mean Cell Hemoglobin : 29.8 pg  Mean Cell Hemoglobin Concentration : 34.8 g/dL  Auto Neutrophil # : 12.29 K/uL  Auto Lymphocyte # : 1.07 K/uL  Auto Monocyte # : 1.85 K/uL  Auto Eosinophil # : 0.12 K/uL  Auto Basophil # : 0.08 K/uL  Auto Neutrophil % : 79.1 %  Auto Lymphocyte % : 6.9 %  Auto Monocyte % : 11.9 %  Auto Eosinophil % : 0.8 %  Auto Basophil % : 0.5 %      06-21    133<L>  |  104  |  56<H>  ----------------------------<  92  5.8<H>   |  18  |  1.9<H>    Ca    7.9<L>      21 Jun 2019 06:55  Mg     2.2     06-20    TPro  5.6<L>  /  Alb  1.7<L>  /  TBili  5.6<H>  /  DBili  x   /  AST  1207<H>  /  ALT  508<H>  /  AlkPhos  1660<H>  06-21      Microbiology:      Vital Signs Last 24 Hrs  T(C): 35.7 (21 Jun 2019 12:30), Max: 36.1 (21 Jun 2019 05:05)  T(F): 96.2 (21 Jun 2019 12:30), Max: 96.9 (21 Jun 2019 05:05)  HR: 60 (21 Jun 2019 12:30) (60 - 81)  BP: 103/44 (21 Jun 2019 12:30) (103/44 - 130/56)  BP(mean): --  RR: 18 (21 Jun 2019 12:30) (18 - 18)  SpO2: 97% (21 Jun 2019 07:35) (97% - 97%)    I&O's Summary    21 Jun 2019 07:01  -  21 Jun 2019 16:42  --------------------------------------------------------  IN: 510 mL / OUT: 250 mL / NET: 260 mL

## 2019-06-21 NOTE — CHART NOTE - NSCHARTNOTEFT_GEN_A_CORE
Registered Dietitian Follow-Up     Patient Profile Reviewed                           Yes [x]   No []     Nutrition History Previously Obtained        Yes [x]  No []       Pertinent Subjective Information: Pt reports poor appetite (since early May). Says she had no desire to eat, even her favorite foods. This am she ate 40% of Welsh toast and few pieces of fruit. Pt is afraid to eat "fat" foods and to drink fluids. She says it's hard for her to eat a full meal because the food "has no where to go". She had heartburn last night and tums relieved her discomfort. Receptive to have ensure clear on her trays (brought both flavors to pt at lunch time).      Pertinent Medical Interventions: Liver biopsy on 6/24. surgery f/u---will need cholecystectomy. concern for underlying myositis---may need muscle biopsy     Diet order: DASh/TLC, Low fat      Anthropometrics:  - Ht. 65 in   - Wt. 113.5 kg (6/18) vs. 125 kg(6/21) - ? fluid retention vs bed scale error   - %wt change  - BMI  - IBW     Pertinent Lab Data: (6/21) Na 133, K 5.8, BUN 56, Cr 1.9      Pertinent Meds: heparin, synthroid, lopressor, abx. NaCl     Physical Findings:  - Appearance: alert oriented  - GI function: 6/20  - Tubes:  - Oral/Mouth cavity: No issue chew/swallow  - Skin: BS 16, 3+edema to b/l foot, 1+ edema b/l hand     Nutrition Requirements  Weight Used: admission  113.5 kg, IBW 56.8kg, needs cont from RD assessment 6/18      Estimated Energy Needs    Continue [x]  Adjust [] 1675 - 1824 kcals/day (MSJ x 1.0 - 1.1 AF for obesity)  Adjusted Energy Recommendations:   kcal/day        Estimated Protein Needs    Continue [x]  Adjust [] 68 - 85 gm/day (1.2 - 1.5 gm/kg IBW for cirrhosis)  Adjusted Protein Recommendations:   gm/day        Estimated Fluid Needs        Continue [x]  Adjust [] per LIP  Adjusted Fluid Recommendations:   mL/day     Nutrient Intake: not meeting needs        [] Previous Nutrition Diagnosis: Inadequate protein-energy intake            [x] Ongoing          [] Resolved    [] No active nutrition diagnosis identified at this time       Nutrition Intervention meal/snack, med food supp     Goal/Expected Outcome: Pt to consume >75% of meal tray and advance diet in 4 days     Indicator/Monitoring: RD to monitor energy intake, diet order, body comp, NFPF, renal profile    Recommendation: Order ensure clear q 12hrs. Cont DASH/TLC Low fat

## 2019-06-21 NOTE — PROGRESS NOTE ADULT - ASSESSMENT
69 yo F w/ hx of CAD s/p PCI and hypothyroidism sent by gastroenterologist for gallbladder hydrops    # Significant hydrops gallbladder on MR with stones with persistent elevated LFTs - possible autoimmune etiology  - F/u  C3, C4, anti-ro, anti-ancelmo, Anti-La, dsDNA  - Rheumatology consult placed    # Hyperbilirubinemia - likely due to obstruction  MELD-Na score 7-10% 90 days mortality   - LFTs continues to be elevated; more cholestatic pattern. AST>>ALT (AST can also come from muscle and pt is in rhabdo).  - MRCP showed hydropic GB with GS with impacted stone. Cirrhosis with mild ascites. No biliary distention  - Hepatitis panel negative, Tylenol level negative  - GI following: might warrant IR guided liver biopsy once off DAPT.   - IR eval for nontargeted liver bx - Effient held on 6/19 until now for biopsy on 6/24  - Possible ERCP after liver biopsy  - Autoimmune panel: Elevated ANKITA and IgG.   - D/C abx per ID  - Surgery followup - biopsy and ERCP first then CCY  - ID eval    # LUIS ALBERTO  DDx: Prerenal vs hepatorenal  - Continue IV hydration given rhabdo  - Renal US with no hydro  - Creatinine 1.9 from 1.4 yesterday - F/u on afternoon BMP    # Hyponatremia - ?SIADH - improving  - Urine osmolality = 449, serum osmolality = 279 , urine sodium level-20  - Restrict free water intake  - BMP q12 - Avoid overcorrection    # Hypoalbuminemia - likely from active liver disease/cirrhosis  - UA only with trace protein    # Acute rhabdomyolysis  - CPK alex to 13.8k - F/u on afternoon CPK  - Concern for myositis, awaiting autoimmune work up and rheum consult 67 yo F w/ hx of CAD s/p PCI and hypothyroidism sent by gastroenterologist for gallbladder hydrops    # Significant hydrops gallbladder on MR with stones with persistent elevated LFTs - possible autoimmune etiology  - F/u  C3, C4, anti-ro, anti-ancelmo, Anti-La, dsDNA  - Rheumatology consult placed    # Hyperbilirubinemia - likely due to obstruction  MELD-Na score 7-10% 90 days mortality   - LFTs continues to be elevated; more cholestatic pattern. AST>>ALT (AST can also come from muscle and pt is in rhabdo).  - MRCP showed hydropic GB with GS with impacted stone. Cirrhosis with mild ascites. No biliary distention  - Hepatitis panel negative, Tylenol level negative  - GI following: might warrant IR guided liver biopsy once off DAPT.   - IR eval for nontargeted liver bx - Effient held on 6/19 until now for biopsy on 6/24  - Possible ERCP after liver biopsy  - Autoimmune panel: Elevated ANKITA and IgG.   - D/C abx per ID  - Surgery followup - biopsy and ERCP first then CCY  - ID eval    # LUIS ALBERTO  DDx: Prerenal vs hepatorenal  - Continue IV hydration given rhabdo  - Renal US with no hydro  - Creatinine 1.9 from 1.4 yesterday - F/u on afternoon BMP  - F/u bladder scan  - F/u EKG    # Hyponatremia - ?SIADH - improving  - Urine osmolality = 449, serum osmolality = 279 , urine sodium level-20  - Restrict free water intake    # Hypoalbuminemia - likely from active liver disease/cirrhosis  - UA only with trace protein    # Acute rhabdomyolysis  - CPK alex to 13.8k - F/u on afternoon CPK  - Concern for myositis, awaiting autoimmune work up and rheum consult 67 yo F w/ hx of CAD s/p PCI and hypothyroidism sent by gastroenterologist for gallbladder hydrops    # Significant hydrops gallbladder on MR with stones with persistent elevated LFTs - possible autoimmune etiology  - F/u  C3, C4, anti-ro, anti-ancelmo, Anti-La, dsDNA  - Rheumatology consult placed    # Hyperbilirubinemia - likely due to obstruction  MELD-Na score 7-10% 90 days mortality   - LFTs continues to be elevated; more cholestatic pattern. AST>>ALT (AST can also come from muscle and pt is in rhabdo).  - MRCP showed hydropic GB with GS with impacted stone. Cirrhosis with mild ascites. No biliary distention  - Hepatitis panel negative, Tylenol level negative  - GI following: might warrant IR guided liver biopsy once off DAPT.   - IR eval for nontargeted liver bx - Effient held on 6/19 until now for biopsy on 6/24  - Possible ERCP after liver biopsy  - Autoimmune panel: Elevated ANKITA and IgG.   - D/C abx per ID  - Surgery followup - biopsy and ERCP first then CCY  - ID eval    # LUIS ALBERTO  DDx: Prerenal vs hepatorenal  - Continue IV hydration given rhabdo  - Renal US with no hydro  - Creatinine 1.9 from 1.4 yesterday  - F/u bladder scan  - 6/21/19 afternoon EKG was low voltage; F/u on afternoon potassium and if increased, will repeat BMP and EKG    # Hyponatremia - ?SIADH - improving  - Urine osmolality = 449, serum osmolality = 279 , urine sodium level-20  - Restrict free water intake    # Hypoalbuminemia - likely from active liver disease/cirrhosis  - UA only with trace protein    # Acute rhabdomyolysis  - CPK alex to 13.8k - F/u on afternoon CPK  - Concern for myositis, awaiting autoimmune work up and rheum consult

## 2019-06-22 NOTE — PROGRESS NOTE ADULT - ASSESSMENT
a/p:  #hydrops gallbladder with obstructing gs -acute severe transaminiitis/cirrhosis of unclear etiology   -cont to trend lft---stable but remains very high--no lft done today- ordered for 4 pm (last ASt 1207 , alk phos 1660)  -f/u with GI---awaiting liver biopsy with IR monday- npo after midnight sunday- holding prasurgrel (pci was >1 year ago)--restart 1 day post procedure  -undergoing autoimmune w/u---ANIKTA elevated 1:2560--->then 1:680  -so far infectious etiology negative (hiv, ebv, hep serology, cmv negative)]  -rheum consult pending  -additional w/u negative including complement (c3/c4 normal, ds dna and anti sm and anti mitochondrial)  -GGT elevated (c/w liver disease)--f/u aldolase and hmgCoenzeme level  -surgery f/u---will need cholecystectomy  -outpaitent GI is Dr. Lara at [facility]---[phone]  - autoimmune/Rheum w/u     #distal digit blue discoloration---? Raynauds (primary vs secondary)  -transient as resolved within 1 hour  -start cachannel blocker (nifedipine 10 mg tid)---can use topical nitrites if persistant  -check venous and arterial duplex (lUE and bilateral leg)  -monitor clinically closely---if any signs of ischemia (loss of pulse/pain) vascular consutl stat  -consider CTA to evaluate for emboli (however unable to do 2/2 worsening renal function)  -could be primary raynauds---? CREST syndrome---will check esophagram  -rheum f/u and undergoing rheum w/u  -on hep sq ppx dosing--if concern for ischemia would need to start full AC---at which time would upgrade to more monitored setting    #UTI with urinary retention  -300 cc yesterday pvr--repeat UA +  -will insert aragon  -started on iv ceftriaxone  -monitor wbc/fever curve (wbc up to 15k)  -check ucx and bcx    #h/o CAD  -cont home regimen  -statin on hold 2/2 acute liver injury  -holding prasurgrel now until post-liver biopsy    #hypothyroidism  -tsh elevated---t3/t4 normal---will cont syntrhoid 50 mcg daily    #hyperkalemia--2/2 rhabdomyolysis/dehydration- worsening LUIS ALBERTO   -k 5.8--->repeat this am 5.4  -kayexalate 15 gm x 1 then rpeat bmp at 4 pm  -nephrology f/u  -consider pan CT (abd/pelvis/chest) to evaluate for underlying malignancy once luis alberto improves  -trend bun/cr and cpk--yesterday CPK 18k---concern for underlying myositis---may need muscle biopsy and awaiting additional rheum w/u  -cont IVF  -suppl electrolytes prn      #hypoalbuminemia 2/2 acute liver disease- severe protein/calorie malnutrition  -repeat UA--initial had only trace protein and urine function improving (LUIS ALBERTO stable)  -leg elevation---nutritional status  -calorie count  -monitor daily weights  -may need initiation of PPN/TPN if inadequate caloric intake  -cont to advance diet- poor oral intake  -nutrition eval  -f/u esophagram     #DVT//gI ppx  FULL CODE    #Progress Note Handoff  Pending (specify):  GI/Surgery recomm//IR liver biopsy monday 6/24    Family discussion: d/w patient and  bedside  Disposition: unknown at this time     low threshold to upgrade to more monitored setting should she become hemodynamically unstable or any change clinically a/p:  #hydrops gallbladder with obstructing gs -acute severe transaminiitis/cirrhosis of unclear etiology   -cont to trend lft---stable but remains very high--no lft done today- ordered for 4 pm (last ASt 1207 , alk phos 1660)  -f/u with GI---awaiting liver biopsy with IR monday- npo after midnight sunday- holding prasurgrel (pci was >1 year ago)--restart 1 day post procedure  -undergoing autoimmune w/u---ANKITA elevated 1:2560--->then 1:680  -so far infectious etiology negative (hiv, ebv, hep serology, cmv negative)]  -rheum consult pending  -additional w/u negative including complement (c3/c4 normal, ds dna and anti sm and anti mitochondrial)  -GGT elevated (c/w liver disease)--f/u aldolase and hmgCoenzeme level  -surgery f/u---will need cholecystectomy  -outpaitent GI is Dr. Lara at [facility]---[phone]  - autoimmune/Rheum w/u     #distal digit blue discoloration---raynauds phenomene- primary vs secondary  -r/o etiology---paraneoplastic, vasculitis, vs rheum   -transient=---resolved within 1 hour  -start CCB (nifedipine 10 mg tid)---can use topical nitrites if recurs  -check venous and arterial duplex (lUE and bilateral leg)  -monitor closely---if any signs of ischemia (loss of pulse/pain) vascular consult stat  -consider CTA to evaluate for emboli (however unable to do 2/2 worsening renal function)  -r/o CREST syndrome---however has no skin rashs/scleroderma skin changes  - check esophagram  -rheum f/u and undergoing rheum w/u  -on hep sq ppx dosing--if concern for ischemia would need to start full AC---at which time would upgrade to more monitored setting    #UTI with urinary retention  -300 cc yesterday pvr--repeat UA +  -will insert aragon  -started on iv ceftriaxone  -monitor wbc/fever curve (wbc up to 15k)  -check ucx and bcx    #h/o CAD  -cont home regimen  -statin on hold 2/2 acute liver injury  -holding prasurgrel now until post-liver biopsy    #hypothyroidism  -tsh elevated---t3/t4 normal---will cont syntrhoid 50 mcg daily    #hyperkalemia--2/2 rhabdomyolysis/dehydration- worsening LUIS ALBERTO   -k 5.8--->repeat this am 5.4  -kayexalate 15 gm x 1 then rpeat bmp at 4 pm  -nephrology f/u  -consider pan CT (abd/pelvis/chest) to evaluate for underlying malignancy once luis alberto improves  -trend bun/cr and cpk--yesterday CPK 18k---concern for underlying myositis---may need muscle biopsy and awaiting additional rheum w/u  -cont IVF  -suppl electrolytes prn      #hypoalbuminemia 2/2 acute liver disease- severe protein/calorie malnutrition  -repeat UA--initial had only trace protein and urine function improving (LUIS ALBERTO stable)  -leg elevation---nutritional status  -calorie count  -monitor daily weights  -may need initiation of PPN/TPN if inadequate caloric intake  -cont to advance diet- poor oral intake  -nutrition eval  -f/u esophagram     #DVT//gI ppx  FULL CODE    #Progress Note Handoff  Pending (specify):  GI/Surgery recomm//IR liver biopsy monday 6/24    Family discussion: d/w patient and  bedside  Disposition: unknown at this time     low threshold to upgrade to more monitored setting should she become hemodynamically unstable or any change clinically a/p:  #hydrops gallbladder with obstructing gs -acute severe transaminiitis/cirrhosis of unclear etiology   -cont to trend lft---stable but remains very high--no lft done today- ordered for 4 pm (last ASt 1207 , alk phos 1660)  -f/u with GI---awaiting liver biopsy with IR monday- npo after midnight sunday- holding prasurgrel (pci was >1 year ago)--restart 1 day post procedure  -undergoing autoimmune w/u---ANKITA elevated 1:2560--->then 1:680  -so far infectious etiology negative (hiv, ebv, hep serology, cmv negative)]  -rheum consult pending  -additional w/u negative including complement (c3/c4 normal, ds dna and anti sm and anti mitochondrial)  -GGT elevated (c/w liver disease)--f/u aldolase and hmgCoenzeme level  -surgery f/u---will need cholecystectomy  -outpaitent GI is Dr. Lara at [facility]---[phone]  - autoimmune/Rheum w/u     #distal digit blue discoloration---raynauds phenomenom- primary vs secondary  -r/o etiology---paraneoplastic, vasculitis, vs rheum   -transient=---resolved within 1 hour  -start CCB (nifedipine 10 mg tid)---can use topical nitrites if recurs  -check venous and arterial duplex (lUE and bilateral leg)  -monitor closely---if any signs of ischemia (loss of pulse/pain) vascular consult stat  -consider CTA to evaluate for emboli (however unable to do 2/2 worsening renal function)  -r/o CREST syndrome---however has no skin rashs/scleroderma skin changes  - check esophagram  -rheum f/u and undergoing rheum w/u- vasculitis w/u---check scl, c-anca, p-anca, cryo  -on hep sq ppx dosing--if concern for ischemia would need to start full AC---at which time would upgrade to more monitored setting    #UTI with urinary retention  -300 cc yesterday pvr--repeat UA +  -will insert aragon  -started on iv ceftriaxone  -monitor wbc/fever curve (wbc up to 15k)  -check ucx and bcx    #h/o CAD  -cont home regimen  -statin on hold 2/2 acute liver injury  -holding prasurgrel now until post-liver biopsy    #hypothyroidism  -tsh elevated---t3/t4 normal---will cont syntrhoid 50 mcg daily    #hyperkalemia--2/2 rhabdomyolysis/dehydration- worsening LUIS ALBERTO   -k 5.8--->repeat this am 5.4  -kayexalate 15 gm x 1 then rpeat bmp at 4 pm  -nephrology f/u  -consider pan CT (abd/pelvis/chest) to evaluate for underlying malignancy once luis alberto improves  -trend bun/cr and cpk--yesterday CPK 18k---concern for underlying myositis---may need muscle biopsy and awaiting additional rheum w/u  -cont IVF  -suppl electrolytes prn      #hypoalbuminemia 2/2 acute liver disease- severe protein/calorie malnutrition  -repeat UA--initial had only trace protein and urine function improving (LUIS ALBERTO stable)  -leg elevation---nutritional status  -calorie count  -monitor daily weights  -may need initiation of PPN/TPN if inadequate caloric intake  -cont to advance diet- poor oral intake  -nutrition eval  -f/u esophagram     #DVT//gI ppx  FULL CODE    #Progress Note Handoff  Pending (specify):  GI/Surgery recomm//IR liver biopsy monday 6/24    Family discussion: d/w patient and  bedside  Disposition: unknown at this time     low threshold to upgrade to more monitored setting should she become hemodynamically unstable or any change clinically

## 2019-06-22 NOTE — PROGRESS NOTE ADULT - SUBJECTIVE AND OBJECTIVE BOX
Patient is a 68y old  Female who presents with a chief complaint of gallbladder hydrops (18 Jun 2019 13:54)    HPI:  67 yo F w/ hx of CAD s/p PCI and hypothyroidism sent by gastroenterologist for gallbladder hydrops. Pt found to have elevated liver enzymes by PMD in Apr 2019, referred to GI for w/u. MRCP 2 days ago significant for gallbladder hydrops 4.7cm with probable impacted stone at neck, also liver cirrhosis w/ mild ascites. Admits to social etoh, denies binge drinking, denies abdominal pain, N/V. Admits to decreased appetite in past 2 weeks, pt tried to maintain low salt diet. Reports BLE edema developed in past 2 days as well as weakness. Denies fever, chills, abdominal pain, SOB, dysuria.    In ED, evaluated by surgery -> no interventions at this time (15 Shashank 2019 01:53)    PAST MEDICAL & SURGICAL HISTORY:  Hypothyroidism  CAD S/P percutaneous coronary angioplasty  No significant past surgical history    patient seen and examined independently on morning rounds, chart reviewed and discussed with the medicine resident.    overnight events noted---c/o nausea relieved with zofran- new onset distal blue discolaration (left fingers/bilateral toes)- still with difficulty eating (limited oral intake)             PE:  GEN-NAD, AAOx3,  HEENT- NCAT, PERRLA, EOMI, +icterus  PULM- Clear to auscultation bilaterally, fair air entry  CVS- +s1/s2 RRR no murmurs  GI- soft NT obese ND +bs, no rebound, no guarding, +echhymosis  EXT- 1+ edema--bilatearl distal toe blue discolation trace up to nail bed-also left hand blue finger tip discoloarion up to dip joint +cool to touch- +periph pulses 2+ dp/pt/radial pulse    (upon reexam after 1 hr left finger blue discoloration had resolved---residual trace bilateral toes--d;/w patient no pain associated and no similar prior episodes)            Labs:                        11.1   15.53 )-----------( 197      ( 21 Jun 2019 06:55 )             31.9     CBC Full  -  ( 21 Jun 2019 06:55 )  WBC Count : 15.53 K/uL  RBC Count : 3.72 M/uL  Hemoglobin : 11.1 g/dL  Hematocrit : 31.9 %  Platelet Count - Automated : 197 K/uL  Mean Cell Volume : 85.8 fL  Mean Cell Hemoglobin : 29.8 pg  Mean Cell Hemoglobin Concentration : 34.8 g/dL  Auto Neutrophil # : 12.29 K/uL  Auto Lymphocyte # : 1.07 K/uL  Auto Monocyte # : 1.85 K/uL  Auto Eosinophil # : 0.12 K/uL  Auto Basophil # : 0.08 K/uL  Auto Neutrophil % : 79.1 %  Auto Lymphocyte % : 6.9 %  Auto Monocyte % : 11.9 %  Auto Eosinophil % : 0.8 %  Auto Basophil % : 0.5 %      06-22    130<L>  |  104  |  66<HH>  ----------------------------<  90  5.4<H>   |  15<L>  |  2.3<H>    Ca    8.0<L>      22 Jun 2019 00:44    TPro  5.6<L>  /  Alb  1.7<L>  /  TBili  5.6<H>  /  DBili  x   /  AST  1207<H>  /  ALT  508<H>  /  AlkPhos  1660<H>  06-21      Microbiology:      Vital Signs Last 24 Hrs  T(C): 36 (22 Jun 2019 13:25), Max: 36.2 (22 Jun 2019 05:17)  T(F): 96.8 (22 Jun 2019 13:25), Max: 97.1 (22 Jun 2019 05:17)  HR: 63 (22 Jun 2019 13:25) (63 - 72)  BP: 111/53 (22 Jun 2019 13:25) (107/53 - 135/58)  BP(mean): --  RR: 18 (22 Jun 2019 13:25) (18 - 18)  SpO2: 97% (21 Jun 2019 19:31) (97% - 97%)    I&O's Summary    21 Jun 2019 07:01  -  22 Jun 2019 07:00  --------------------------------------------------------  IN: 510 mL / OUT: 550 mL / NET: -40 mL

## 2019-06-23 NOTE — PROGRESS NOTE ADULT - ATTENDING COMMENTS
Patient seen and examined at bedside. No complaints except for generalized weakness. No muscle pain or tenderness.   EKG done for hyperkalemia, no EKG changes. Start bicarb gtt, give kayexalate with IV insulin. Repeat BMP later tonight, if potassium continues to rise or renal function continues to worsen, will pursue critical care consult.   If CPK continues to remain elevated, would obtain neurology input for possibly myositis/myopathy. Patient was on high intensity statin, could have statin induced necrotizing autoimmune myopathy.     #Progress Note Handoff  Pending (specify):  Consults__Neuro, rheum_______, Tests________, Test Results_______, Other___AKI, hyperkalemia, biopsy tomorrow______  Family discussion: ezequiel pt and  at bedside   Disposition: Home___/SNF_x__/Other________/Unknown at this time________

## 2019-06-23 NOTE — PROGRESS NOTE ADULT - SUBJECTIVE AND OBJECTIVE BOX
SUBJECTIVE:    Patient is a 68y old Female who presents with a chief complaint of gallbladder hydrops (2019 14:31)    Currently admitted to medicine with the primary diagnosis of Gallbladder hydrops     Today is hospital day 9d. Patient is complaining of nausea and reflux symptoms this morning with no vomiting. Otherwise resting comfortably on the bed. No other acute overnight events.    PAST MEDICAL & SURGICAL HISTORY  Hypothyroidism  CAD S/P percutaneous coronary angioplasty  No significant past surgical history      ALLERGIES:  No Known Allergies    MEDICATIONS:  STANDING MEDICATIONS  aspirin  chewable 81 milliGRAM(s) Oral daily  chlorhexidine 4% Liquid 1 Application(s) Topical <User Schedule>  dextrose 5% 1000 milliLiter(s) IV Continuous <Continuous>  heparin  Injectable 5000 Unit(s) SubCutaneous every 8 hours  levothyroxine 50 MICROGram(s) Oral daily  metoprolol tartrate 50 milliGRAM(s) Oral two times a day  NIFEdipine IR 10 milliGRAM(s) Oral every 8 hours  nystatin Powder 1 Application(s) Topical three times a day    PRN MEDICATIONS    VITALS:   T(F): 97.1  HR: 69  BP: 125/58  RR: 18  SpO2: --    LABS:                        10.9   15.12 )-----------( 248      ( 2019 07:08 )             32.0     06-    129<L>  |  104  |  87<HH>  ----------------------------<  107<H>  6.1<HH>   |  14<L>  |  3.0<H>    Ca    7.3<L>      2019 07:08  Mg     2.2         TPro  5.3<L>  /  Alb  1.6<L>  /  TBili  3.7<H>  /  DBili  2.6<H>  /  AST  957<H>  /  ALT  515<H>  /  AlkPhos  1523<H>      PT/INR - ( 2019 05:58 )   PT: 12.90 sec;   INR: 1.12 ratio       Gamma Glutamyl Transferase, Serum (19 @ 16:14)    Gamma Glutamyl Transferase, Serum: 522 U/L      Urinalysis Basic - ( 2019 23:59 )    Color: Red / Appearance: Turbid / S.020 / pH: x  Gluc: x / Ketone: Trace  / Bili: Moderate / Urobili: 1.0 mg/dL   Blood: x / Protein: 100 mg/dL / Nitrite: Positive   Leuk Esterase: Small / RBC: >50 /HPF / WBC 10-25 /HPF   Sq Epi: x / Non Sq Epi: Moderate /HPF / Bacteria: TNTC /HPF      Creatine Kinase, Serum: 5318 U/L <H> (19 @ 07:08)  Creatine Kinase, Serum: >2000 U/L <H> (19 @ 17:46)      Culture - Urine (collected 2019 23:54)  Source: .Urine Clean Catch (Midstream)  Final Report (2019 08:53):    No growth        RADIOLOGY:  < from: [facility] Duplex Lower Extrem Arterial, Bilat (19 @ 14:51) >  No evidence of significant arterial occlusive disease in bilateral lower   extremities.    < end of copied text >        < from: VA Duplex Upper Ext Vein Scan, Bilat (19 @ 14:50) >  No evidence of deep or superficial venous thrombosis in the bilateral   upper extremity.    < end of copied text >      < from: VA Duplex Upper Extrem Arterial, Bilat (19 @ 14:41) >  No evidence of significant arterial occlusive disease in bilateral upper   extremity.    < end of copied text >      < from: VA Duplex Lower Ext Vein Scan, Bilat (19 @ 14:40) >  No evidence of deep venous thrombosis or superficial thrombophlebitis in   bilateral lower extremities.    < end of copied text >      PHYSICAL EXAM:  GEN: No acute distress. Patient lying comfortably on the bed.  PULM/CHEST: Clear to auscultation bilaterally, no rales, rhonchi or wheezes   CVS: Regular rate and rhythm, S1-S2, no murmurs  ABD: Soft, non-tender,obese, normoactive BS.  EXT: B/l LE edema +2 pitting. Fingertips and toes warm to touch, no cyanosis.  NEURO: AAOx3

## 2019-06-23 NOTE — PROGRESS NOTE ADULT - ASSESSMENT
67 yo F w/ hx of CAD s/p PCI and hypothyroidism sent by gastroenterologist for gallbladder hydrops    # Significant hydrops gallbladder on MR with stones with persistent elevated LFTs - possible autoimmune etiology  - Additional work up negative including complement (c3/c4 normal, ds dna and anti sm and anti mitochondrial)  - Rheumatology consult placed    # Hyperbilirubinemia - likely due to obstruction  MELD-Na score 7-10% 90 days mortality   - LFTs continues to be elevated; more cholestatic pattern. AST>>ALT (AST can also come from muscle and pt is in rhabdo).  - MRCP showed hydropic GB with GS with impacted stone. Cirrhosis with mild ascites. No biliary distention  - Hepatitis panel negative, Tylenol level negative  - Autoimmune panel: Elevated ANKITA and IgG.   - GGT elevated consistent w/ liver disease  - GI following: might warrant IR guided liver biopsy once off DAPT.   - IR eval for nontargeted liver bx - Effient held on 6/19 until now for biopsy on 6/24 (restart 1d post procedure)  - Surgery followup - biopsy and ERCP first then CCY  -outpaitent GI is Dr. Lara at [facility]---[phone]    # LUIS ALBERTO  DDx: Prerenal vs hepatorenal  - Renal US with no hydro  - Continue IV hydration given rhabdo  - 6/23/19 morning EKG was low voltage  - Metabolic alkalosis: started on bicarb drip to correct potassium (cocktail given this morning)  - F/u BMP results in afternoon  - F/u nephrology  - Restrict free water intake 1L    #UTI with urinary retention  -300 cc yesterday pvr--repeat UA +  - Smith placed yesterday  -started on iv ceftriaxone  -monitor wbc/fever curve (wbc up to 15k)  -check ucx and bcx    #distal digit blue discoloration---raynauds phenomenom- primary vs secondary  -r/o etiology---paraneoplastic, vasculitis, vs rheum   -transient=---resolved within 1 hour  -start CCB (nifedipine 10 mg tid)---can use topical nitrites if recurs  - Venous and arterial duplex negative  -monitor closely---if any signs of ischemia (loss of pulse/pain) vascular consult stat  -consider CTA to evaluate for emboli (however unable to do 2/2 worsening renal function)  -r/o CREST syndrome---however has no skin rashs/scleroderma skin changes  - check esophagram  -rheum f/u and undergoing rheum w/u- vasculitis w/u---check scl, c-anca, p-anca, cryo  -on hep sq ppx dosing--if concern for ischemia would need to start full AC---at which time would upgrade to more monitored setting    # Hypoalbuminemia - likely from active liver disease/cirrhosis  - UA only with trace protein  - Nutrition eval  - may need initiation of PPN/TPN if inadequate caloric intake  - patient currently has poor oral intake    # Acute rhabdomyolysis  - CPK 5.3k; continue to trend  - Concern for myositis, awaiting autoimmune work up and rheum consult  - continue IVF hydration    # DVT PPX  # GI PPX 67 yo F w/ hx of CAD s/p PCI and hypothyroidism sent by gastroenterologist for gallbladder hydrops    # Significant hydrops gallbladder on MR with stones with persistent elevated LFTs - possible autoimmune etiology  - Additional work up negative including complement (c3/c4 normal, ds dna and anti sm and anti mitochondrial)  - Rheumatology consult placed    # Hyperbilirubinemia - likely due to obstruction  MELD-Na score 7-10% 90 days mortality   - LFTs continues to be elevated; more cholestatic pattern. AST>>ALT (AST can also come from muscle and pt is in rhabdo).  - MRCP showed hydropic GB with GS with impacted stone. Cirrhosis with mild ascites. No biliary distention  - Hepatitis panel negative, Tylenol level negative  - Autoimmune panel: Elevated ANKITA and IgG.   - GGT elevated consistent w/ liver disease  - GI following: might warrant IR guided liver biopsy once off DAPT.   - IR eval for nontargeted liver bx - Effient held on 6/19 until now for biopsy on 6/24 (restart 1d post procedure)  - Surgery followup - biopsy and ERCP first then CCY  -outpaitent GI is Dr. Lara at [facility]---[phone]    # LUIS ALBERTO with metabolic acidosis likely due to rhabdo   - Renal US with no hydro  - Continue IV hydration given rhabdo  - 6/23/19 morning EKG was low voltage  - Metabolic acidosis: started on bicarb drip to correct potassium (cocktail given this morning)  - F/u BMP results in afternoon  - F/u nephrology  - Restrict free water intake 1L    #UTI with urinary retention  - 300 cc yesterday pvr--repeat UA +  - Smith placed yesterday  -started on iv ceftriaxone  -monitor wbc/fever curve (wbc up to 15k)  -check ucx and bcx    #distal digit blue discoloration---raynauds phenomenom- primary vs secondary  -r/o etiology---paraneoplastic, vasculitis, vs rheum   -transient=---resolved within 1 hour  -start CCB (nifedipine 10 mg tid)---can use topical nitrites if recurs  - Venous and arterial duplex negative  -monitor closely---if any signs of ischemia (loss of pulse/pain) vascular consult stat  -consider CTA to evaluate for emboli (however unable to do 2/2 worsening renal function)  -r/o CREST syndrome---however has no skin rashs/scleroderma skin changes  - check esophagram  -rheum f/u and undergoing rheum w/u- vasculitis w/u---check scl, c-anca, p-anca, cryo  -on hep sq ppx dosing--if concern for ischemia would need to start full AC---at which time would upgrade to more monitored setting    # Hypoalbuminemia - likely from active liver disease/cirrhosis  - UA only with trace protein  - Nutrition eval  - may need initiation of PPN/TPN if inadequate caloric intake  - patient currently has poor oral intake    # Acute rhabdomyolysis  - CPK 5.3k; continue to trend  - 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

## 2019-06-24 NOTE — PROGRESS NOTE ADULT - SUBJECTIVE AND OBJECTIVE BOX
SUBJECTIVE:    Patient is a 68y old Female who presents with a chief complaint of gallbladder hydrops (24 Jun 2019 16:05)    Currently admitted to medicine with the primary diagnosis of Gallbladder hydrops     Today is hospital day 10d. This morning patient is resting comfortably on the bed. Patient reports some nausea and also reports weakness. This morning patient was slightly hypotensive at 84/40, 68HR and thus metoprolol and nifedipine were held. Patient went for liver biopsy this afternoon.    PAST MEDICAL & SURGICAL HISTORY  Hypothyroidism  CAD S/P percutaneous coronary angioplasty  No significant past surgical history      ALLERGIES:  No Known Allergies    MEDICATIONS:  STANDING MEDICATIONS  aspirin  chewable 81 milliGRAM(s) Oral daily  chlorhexidine 4% Liquid 1 Application(s) Topical <User Schedule>  dextrose 5% 1000 milliLiter(s) IV Continuous <Continuous>  heparin  Injectable 5000 Unit(s) SubCutaneous every 8 hours  levothyroxine 50 MICROGram(s) Oral daily  metoprolol tartrate 50 milliGRAM(s) Oral two times a day  NIFEdipine IR 10 milliGRAM(s) Oral every 8 hours  nystatin Powder 1 Application(s) Topical three times a day    PRN MEDICATIONS    VITALS:   T(F): 97.8  HR: 83  BP: 100/55  RR: 18  SpO2: 97%    LABS:                        10.9   17.88 )-----------( 253      ( 24 Jun 2019 10:26 )             30.5     06-24    133<L>  |  104  |  103<HH>  ----------------------------<  123<H>  5.4<H>   |  15<L>  |  3.3<H>    Ca    6.6<L>      24 Jun 2019 10:26  Mg     2.1     06-24    TPro  5.3<L>  /  Alb  1.5<L>  /  TBili  2.9<H>  /  DBili  x   /  AST  673<H>  /  ALT  453<H>  /  AlkPhos  1375<H>  06-24    PT/INR - ( 24 Jun 2019 10:26 )   PT: 12.80 sec;   INR: 1.11 ratio       PTT - ( 24 Jun 2019 10:26 )  PTT:27.6 sec    Lactate, Blood: 2.8 mmol/L <H> (06-24-19 @ 10:26)    Culture - Urine (collected 22 Jun 2019 18:04)  Source: .Urine Clean Catch (Midstream)  Final Report (24 Jun 2019 06:28):    No growth    Culture - Urine (collected 21 Jun 2019 23:54)  Source: .Urine Clean Catch (Midstream)  Final Report (23 Jun 2019 08:53):    No growth      CARDIAC MARKERS ( 23 Jun 2019 07:08 )  x     / x     / 5318 U/L / x     / x      CARDIAC MARKERS ( 22 Jun 2019 17:46 )  x     / x     / >2000 U/L / x     / x          PHYSICAL EXAM:  GEN: No acute distress. Patient lying comfortably on the bed.  PULM/CHEST: Clear to auscultation bilaterally, no rales, rhonchi or wheezes   CVS: Regular rate and rhythm, S1-S2, no murmurs  ABD: Soft, non-tender, obese, normoactive BS  EXT: B/l LE edema, +2 pitting.  NEURO: AAOx3    Smith Catheter:   Indwelling Urethral Catheter:     Connect To:  Straight Drainage/[place]    Indication:  Urinary Retention / Obstruction (06-23-19 @ 13:16) (not performed) [active]

## 2019-06-24 NOTE — PROGRESS NOTE ADULT - ASSESSMENT
67 yo F w/ hx of CAD s/p PCI and hypothyroidism sent by gastroenterologist for gallbladder hydrops    # Significant hydrops gallbladder on MR with stones with persistent elevated LFTs - possible autoimmune etiology  - Additional work up negative including complement (c3/c4 normal, ds dna and anti sm and anti mitochondrial)  - F/u Rheumatology consult     # Hyperbilirubinemia - likely due to obstruction  MELD-Na score 7-10% 90 days mortality   - LFTs continues to be elevated; more cholestatic pattern. AST>>ALT (AST can also come from muscle and pt is in rhabdo).  - MRCP showed hydropic GB with GS with impacted stone. Cirrhosis with mild ascites. No biliary distention  - Hepatitis panel negative, Tylenol level negative  - Autoimmune panel: Elevated ANKITA and IgG.   - GGT elevated consistent w/ liver disease  - Nontargeted liver bx done today, Effient held (restart 1d post procedure)  - Surgery followup - biopsy and ERCP first then CCY  -outpaitent GI is Dr. Lara at [facility]---[phone]    # LUIS ALBERTO with metabolic acidosis likely due to rhabdo   - Renal US with no hydro  - Continue IV hydration given rhabdo  - Metabolic acidosis: started on bicarb drip to correct potassium (cocktail given this morning)  - F/u BMP results in afternoon  - F/u nephrology consult  - Restrict free water intake 1L    #UTI with urinary retention  - Smith placed yesterday  - started on iv ceftriaxone  - monitor wbc/fever curve (wbc up to 17k)  - check ucx and bcx    #distal digit blue discoloration---raynauds phenomenom- primary vs secondary  - r/o etiology---paraneoplastic, vasculitis, vs rheum   - transient=---resolved within 1 hour  - start CCB (nifedipine 10 mg tid)---can use topical nitrites if recurs; patient has been having hypotensive episodes, will hold if it occurs  - Venous and arterial duplex negative  - monitor closely---if any signs of ischemia (loss of pulse/pain) vascular consult stat  - consider CTA to evaluate for emboli (however unable to do 2/2 worsening renal function)  - r/o CREST syndrome---however has no skin rashs/scleroderma skin changes  - Esophagram for tomorrow to r/o CREST syndrome  - rheum f/u and undergoing rheum w/u- vasculitis w/u---check scl, c-anca, p-anca, cryo  - on hep sq ppx dosing--if concern for ischemia would need to start full AC---at which time would upgrade to more monitored setting    # Hypoalbuminemia - likely from active liver disease/cirrhosis  - UA only with trace protein  - Nutrition eval  - may need initiation of PPN/TPN if inadequate caloric intake  - patient currently has poor oral intake    # Acute rhabdomyolysis  - CPK 5.3k; continue to trend  - 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

## 2019-06-24 NOTE — PROGRESS NOTE ADULT - SUBJECTIVE AND OBJECTIVE BOX
INTERVENTIONAL RADIOLOGY BRIEF-OPERATIVE NOTE    Procedure: Transjugular liver biopsy under fluoroscopic guidance    Pre-Op Diagnosis: hyperbilirubinemia    Post-Op Diagnosis: same    Attending: Pranay  Resident: Beatris    Anesthesia (type):  [ ] General Anesthesia  [x] Sedation  [ ] Spinal Anesthesia  [ ] Local/Regional    Contrast: 20cc    Estimated Blood Loss: 3 cc    Condition:   [ ] Critical  [ ] Serious  [ ] Fair   [x] Good    Findings/Follow up Plan of Care: return to floor. follow up path    Specimens Removed:3 core needle biopsies    Implants: none    Complications: none    Disposition: to floor       Please call Interventional Radiology [phone] with any questions, concerns, or issues.

## 2019-06-24 NOTE — CHART NOTE - NSCHARTNOTEFT_GEN_A_CORE
patient in IR guided biopsy/ off floor on attempt to assess x 2, will need to reevaluate    #GB hydrops, LUIS ALBERTO/ hyperkalemia/ rhabdo/ hypotension/ ?raynauds phenomenon    liver biopsy, trend lfts/ cr, f/u K  hold parameters on antihypertensives  distal extremity checks  high risk

## 2019-06-24 NOTE — PROGRESS NOTE ADULT - SUBJECTIVE AND OBJECTIVE BOX
Vascular & Interventional Radiology Pre-Procedure Note    Procedure Name: non-target liver biopsy with conscious sedation    HPI: HPI:  67 yo F w/ hx of CAD s/p PCI and hypothyroidism sent by gastroenterologist for gallbladder hydrops. Pt found to have elevated liver enzymes by PMD in 2019, referred to GI for w/u. MRCP 2 days ago significant for gallbladder hydrops 4.7cm with probable impacted stone at neck, also liver cirrhosis w/ mild ascites. Admits to social etoh, denies binge drinking, denies abdominal pain, N/V. Admits to decreased appetite in past 2 weeks, pt tried to maintain low salt diet. Reports BLE edema developed in past 2 days as well as weakness. Denies fever, chills, abdominal pain, SOB, dysuria.    In ED, evaluated by surgery -> no interventions at this time (15 Shashank 2019 01:53)      Allergies:   Medications (Abx/Cardiac/Anticoagulation/Blood Products)  aspirin  chewable: 81 milliGRAM(s) Oral ( @ 11:18)  cefTRIAXone   IVPB: 100 mL/Hr IV Intermittent ( @ 05:43)  heparin  Injectable: 5000 Unit(s) SubCutaneous ( @ 21:59)  metoprolol tartrate: 50 milliGRAM(s) Oral ( @ 18:03)  NIFEdipine IR: 10 milliGRAM(s) Oral ( @ 21:59)    Data:    125.8  T(C): 36.6  HR: 83  BP: 100/55  RR: 18  SpO2: 97%    Exam  General: NAD, AAO x3, no distress  Chest: breathing comfortably on room air, CTAB  Abdomen: soft, non-tender, non- distended       Radiology & Additional Studies: Radiology imaging reviewed.     Labs:   -WBC 17.88 / HgB 10.9 / Hct 30.5 / Plt 253  -Na 133 / Cl 104 /  / Glucose 123  -K 5.4 / CO2 15 / Cr 3.3  - / Alk Phos 1375 / T.Bili 2.9  -INR1.11      EKG completed (date): 2019    Height: 165.1cm  Weight in k.3 (2019 07:02)    Consentable: [ x ] Yes   [ ] No     Plan:   -68y Female presents for non-target liver biopsy with conscious sedation   -Risks/Benefits/alternatives explained with the patient and/or healthcare proxy and witnessed informed consent obtained.

## 2019-06-25 NOTE — PROGRESS NOTE ADULT - ATTENDING COMMENTS
Pt seen and examined  above note reviwed  recurrent LUIS ALBERTO  workup as above  if Low urine Na, likely HRS start midodrine + octrotide

## 2019-06-25 NOTE — PROGRESS NOTE ADULT - SUBJECTIVE AND OBJECTIVE BOX
SUBJECTIVE:    Patient is a 68y old Female who presents with a chief complaint of gallbladder hydrops (25 Jun 2019 15:20)    Currently admitted to medicine with the primary diagnosis of Gallbladder hydrops     Today is hospital day 11d. This morning she is resting comfortably in bed and reports no new issues or overnight events.     INTERVAL EVENTS: The patient had a good nights sleep. Was taken for esophagogram in the morning. Got her Liver biopsy done yesterday. No acute complaints. Her BP remained around /40-60.    PAST MEDICAL & SURGICAL HISTORY  Hypothyroidism  CAD S/P percutaneous coronary angioplasty  No significant past surgical history    ALLERGIES:  No Known Allergies    MEDICATIONS:  STANDING MEDICATIONS  aspirin  chewable 81 milliGRAM(s) Oral daily  chlorhexidine 4% Liquid 1 Application(s) Topical <User Schedule>  dextrose 5% 1000 milliLiter(s) IV Continuous <Continuous>  fat emulsion (Plant Based) 20% Infusion 0.558 Gm/kG/Day IV Continuous <Continuous>  heparin  Injectable 5000 Unit(s) SubCutaneous every 8 hours  levothyroxine 50 MICROGram(s) Oral daily  metoprolol tartrate 50 milliGRAM(s) Oral two times a day  nystatin Powder 1 Application(s) Topical three times a day  pantoprazole   Suspension 40 milliGRAM(s) Oral before breakfast  Parenteral Nutrition - Adult 1 Each TPN Continuous <Continuous>    PRN MEDICATIONS    VITALS:   T(F): 96.6  HR: 80  BP: 90/56  RR: 18  SpO2: --    LABS:                        10.7   15.78 )-----------( 238      ( 25 Jun 2019 05:34 )             30.8     06-25    137  |  103  |  107<HH>  ----------------------------<  111<H>  4.9   |  19  |  3.5<H>    Ca    6.2<L>      25 Jun 2019 05:34  Mg     2.1     06-25    TPro  5.2<L>  /  Alb  1.5<L>  /  TBili  2.8<H>  /  DBili  x   /  AST  546<H>  /  ALT  415<H>  /  AlkPhos  1151<H>  06-25    PT/INR - ( 24 Jun 2019 10:26 )   PT: 12.80 sec;   INR: 1.11 ratio         PTT - ( 24 Jun 2019 10:26 )  PTT:27.6 sec      RADIOLOGY:  Xray Esophagram (06.25.19 @ 10:36)    Impression:    Moderate-sized hiatal hernia.    There is no evidence of obstruction, however there is retained contrast   within the mid to lower esophagus consistent with dysmotility as well as   esophageal reflux.      PHYSICAL EXAM:  GEN: No acute distress  PULM/CHEST: Clear B/L, equal air entry bilaterally  CVS: RRR, S1+S2 + mild systolic murmur  ABD: Soft, non-tender, non-distended, ecchymosis around the needle prick sites +BS,  EXT: Pedal edema  NEURO: AAOx3    Indwelling Urethral Catheter:     Connect To:  Straight Drainage/[place]    Indication:  Urinary Retention / Obstruction (06-23-19 @ 13:16) (not performed) [active]

## 2019-06-25 NOTE — PROGRESS NOTE ADULT - SUBJECTIVE AND OBJECTIVE BOX
Nephrology progress note    Patient is seen and examined, events over the last 24 h noted.  No new complaints.    Allergies:  No Known Allergies    Hospital Medications:   MEDICATIONS  (STANDING):  aspirin  chewable 81 milliGRAM(s) Oral daily  chlorhexidine 4% Liquid 1 Application(s) Topical <User Schedule>  dextrose 5% 1000 milliLiter(s) (100 mL/Hr) IV Continuous <Continuous>  fat emulsion (Plant Based) 20% Infusion 0.558 Gm/kG/Day (21.936 mL/Hr) IV Continuous <Continuous>  heparin  Injectable 5000 Unit(s) SubCutaneous every 8 hours  levothyroxine 50 MICROGram(s) Oral daily  metoprolol tartrate 50 milliGRAM(s) Oral two times a day  NIFEdipine IR 10 milliGRAM(s) Oral every 8 hours  nystatin Powder 1 Application(s) Topical three times a day  Parenteral Nutrition - Adult 1 Each (60 mL/Hr) TPN Continuous <Continuous>        VITALS:  T(F): 96.6 (19 @ 13:44), Max: 96.7 (19 @ 21:30)  HR: 87 (19 @ 13:44)  BP: 90/56 (19 @ 13:44)  RR: 18 (19 @ 13:44)       @ 07:  -   @ 07:00  --------------------------------------------------------  IN: 0 mL / OUT: 900 mL / NET: -900 mL     @ 07:  -   @ 07:00  --------------------------------------------------------  IN: 0 mL / OUT: 700 mL / NET: -700 mL     @ 07:01  -   @ 15:21  --------------------------------------------------------  IN: 0 mL / OUT: 300 mL / NET: -300 mL          PHYSICAL EXAM:  Constitutional: NAD  Respiratory: CTAB, no wheezes, rales or rhonchi  Cardiovascular: S1, S2, RRR  Gastrointestinal: BS+, soft, NT/ND  Extremities: 2+ peripheral edema  :  + aragon.       LABS:      137  |  103  |  107<HH>  ----------------------------<  111<H>  4.9   |  19  |  3.5<H>    Ca    6.2<L>      2019 05:34  Mg     2.1         TPro  5.2<L>  /  Alb  1.5<L>  /  TBili  2.8<H>  /  DBili      /  AST  546<H>  /  ALT  415<H>  /  AlkPhos  1151<H>                            10.7   15.78 )-----------( 238      ( 2019 05:34 )             30.8     Creatine Kinase, Serum: 5318 U/L (19 @ 07:08)    Urine Studies:  Urinalysis Basic - ( 2019 23:59 )    Color: Red / Appearance: Turbid / S.020 / pH:   Gluc:  / Ketone: Trace  / Bili: Moderate / Urobili: 1.0 mg/dL   Blood:  / Protein: 100 mg/dL / Nitrite: Positive   Leuk Esterase: Small / RBC: >50 /HPF / WBC 10-25 /HPF   Sq Epi:  / Non Sq Epi: Moderate /HPF / Bacteria: TNTC /HPF      RADIOLOGY & ADDITIONAL STUDIES:  < from: Xray Esophagram (19 @ 10:36) >  Impression:    Moderate-sized hiatal hernia.    There is no evidence of obstruction, however there is retained contrast   within the mid to lower esophagus consistent with dysmotility as well as   esophageal reflux.    < end of copied text >    < from: VA Duplex Lower Extrem Arterial, Bilat (19 @ 14:51) >  Impression:    No evidence of significant arterial occlusive disease in bilateral lower   extremities.    < end of copied text >    < from: VA Duplex Upper Ext Vein Scan, Bilat (19 @ 14:50) >  Impression:    No evidence of deep or superficial venous thrombosis in the bilateral   upper extremity.    < end of copied text >

## 2019-06-25 NOTE — PROGRESS NOTE ADULT - SUBJECTIVE AND OBJECTIVE BOX
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  Female      Patient is a 68y old  Female who presents with a chief complaint of gallbladder hydrops (25 Jun 2019 18:15)      INTERVAL HPI/OVERNIGHT EVENTS: feels generalized weakness. urinating only a bit/ yenni color. no sob despite fluids. no neck or RUQ pain following liver bx. denies recurrence of raynauds type phenomenon of the other day      REVIEW OF SYSTEMS:  intermittent dysphagia with solids?, muscle pain/ weakness, oliguria/ yenni urine  All other review of systems negative    T(C): 35.9 (06-25-19 @ 13:44), Max: 35.9 (06-24-19 @ 21:30)  HR: 80 (06-25-19 @ 18:07) (80 - 87)  BP: 90/56 (06-25-19 @ 18:07) (90/50 - 99/54)  RR: 18 (06-25-19 @ 13:44) (18 - 18)  SpO2: --  Wt(kg): --Vital Signs Last 24 Hrs  T(C): 35.9 (25 Jun 2019 13:44), Max: 35.9 (24 Jun 2019 21:30)  T(F): 96.6 (25 Jun 2019 13:44), Max: 96.7 (24 Jun 2019 21:30)  HR: 80 (25 Jun 2019 18:07) (80 - 87)  BP: 90/56 (25 Jun 2019 18:07) (90/50 - 99/54)  BP(mean): --  RR: 18 (25 Jun 2019 13:44) (18 - 18)  SpO2: --      06-24-19 @ 07:01  -  06-25-19 @ 07:00  --------------------------------------------------------  IN: 0 mL / OUT: 700 mL / NET: -700 mL    06-25-19 @ 07:01  -  06-25-19 @ 19:16  --------------------------------------------------------  IN: 0 mL / OUT: 300 mL / NET: -300 mL      PHYSICAL EXAM:  GENERAL: debilitated appearing with diffuse anasarca  PSYCH: no agitation, baseline mentation  HEENT : R neck cdi s/p liver bx entry site  NERVOUS SYSTEM:  Alert & Oriented X3, no new focal deficits  PULMONARY: Clear to percussion bilaterally; No rales, rhonchi, wheezing, or rubs  CARDIOVASCULAR: Regular rate and rhythm; 2/6 systolic murmur, no rubs, or gallops  GI: Soft, Nontender, Nondistended; Bowel sounds present rotund   aragon yenni urine  EXTREMITIES:  2+ Peripheral Pulses, No clubbing, cyanosis    Consultant(s) Notes Reviewed:  [x ] YES  [ ] NO    Discussed with Consultants/Other Providers [ x] YES     LABS                          10.7   15.78 )-----------( 238      ( 25 Jun 2019 05:34 )             30.8     06-25    137  |  103  |  107<HH>  ----------------------------<  111<H>  4.9   |  19  |  3.5<H>    Ca    6.2<L>      25 Jun 2019 05:34  Mg     2.1     06-25    TPro  5.2<L>  /  Alb  1.5<L>  /  TBili  2.8<H>  /  DBili  x   /  AST  546<H>  /  ALT  415<H>  /  AlkPhos  1151<H>  06-25        PT/INR - ( 24 Jun 2019 10:26 )   PT: 12.80 sec;   INR: 1.11 ratio         PTT - ( 24 Jun 2019 10:26 )  PTT:27.6 sec  Lactate Trend  06-24 @ 10:26 Lactate:2.8         CAPILLARY BLOOD GLUCOSE            RADIOLOGY & ADDITIONAL TESTS:    Imaging Personally Reviewed:  [ ] YES  [ ] NO    HEALTH ISSUES - PROBLEM Dx:    A/P 69 yo F w/ hx of CAD s/p PCI and hypothyroidism sent by gastroenterologist for gallbladder hydrops, course complicated by ALI/ transaminitis possibly of autoimmune etiology, LUIS ALBERTO with hyperkalemia multifactorial 2/2 rhabdo (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 with resultant proteinuria and low albumin state and diffuse anasarca, raynauds phenomenon (not currently active), esophageal dysmotility and reflux, cystitis with urinary retention s/p abx, and subsequent worsening functional debility and malnutrition    #hydrops gallbladder with obstructing gs -acute severe transaminitis of unclear etiology   trend LFT's, repeat CPK, liver serology most consistent with autoimmune process, statin held, liver biopsied- pending result, clinically too weak at this moment for cholecystecomy  if cpk climbing may be role for muscle biopsy deltoid  call lab about hmg coa reductase ab test  rheumatology evaluation    #worsening LUIS ALBERTO  with hyperkalemia (improved) and acidosis--2/2 rhabdomyolysis vs HRS  - appreciated nephro f/u, repeat ustudies/ lytes, upc, repeat renal us  - c/w bicarb ggt, trend bmp  - if u Na low- ocreatide/ midodrine empiric tx for possible HRS      #hypoalbuminemia 2/2 acute liver disease- severe protein/calorie malnutrition  starting PPN  check lytes and TG    #distal digit blue discoloration---raynauds phenomenon- primary vs secondary  -r/o etiology---paraneoplastic, vasculitis, vs rheum   -transient=---resolved within 1 hour  gloves/warmth to affected fingertips- ccb with hold parameters for hypotension  -r/o CREST syndrome---however has no skin rashs/scleroderma skin changes  esophagram consistent with dysmotility  -rheum f/u and undergoing rheum w/u- vasculitis w/u---check scl, c-anca, p-anca, cryo- pending result  -on hep sq ppx dosing--if concern for ischemia would need to start full AC---at which time would upgrade to more monitored setting- NO evidence of active raynauds on TODAY's clinical examination/ no ischemic or gangrenous changes appreciated on any distal fingertips    #UTI with urinary retention  has aragon, monitor u/o carefully  received ceftriaxone/ completed, repeat ucx    #h/o CAD  -cont home regimen  -statin on hold 2/2 acute liver injury  -holding prasurgrel now until post-liver biopsy  - if no bleeding resume tomorrow    #hypothyroidism  -tsh elevated---t3/t4 normal---will cont syntrhoid 50 mcg daily    #DVT//gI ppx  FULL CODE  high risk/ guarded prognosis    #Progress Note Handoff  Pending (specify):  rheum eval, trend cpk, luis alberto w/u as above and possible hrs tx, liver bx result    Family discussion: d/w patient and  bedside  Disposition: unknown at this time     low threshold to upgrade to more monitored setting should she become hemodynamically unstable or any change clinically

## 2019-06-25 NOTE — PROGRESS NOTE ADULT - ASSESSMENT
69 yo F w/ hx of CAD s/p PCI and hypothyroidism sent by gastroenterologist for gallbladder hydrops  Initially signed off for resolved LUIS ALBERTO, now recalled for worsening serum creatinine.    # LUIS ALBERTO ?etiology, can be HRS  # creatinine trending up  # strict I/O  # previous noted for hematuria and proteinuria, repeat UA, check urine creatinine, Na, Urea, urine Pr/Cr ratio  # check renal bladder sono  # hyperK, resolved on repeat labs. follow strict low K/renal diet  # on bicarb drip, repeat bicarb improving.  # Avoid nephrotoxins and hypotension  # seen by GI: s/p liver biopsy, result pending. notes appreciated.  # will follow

## 2019-06-25 NOTE — CHART NOTE - NSCHARTNOTEFT_GEN_A_CORE
NUTRITION SUPPORT CONSULTATION    HPI:  69 yo F w/ hx of CAD s/p PCI and hypothyroidism sent by gastroenterologist for gallbladder hydrops. Pt found to have elevated liver enzymes by PMD in Apr 2019, referred to GI for w/u. MRCP 2 days ago significant for gallbladder hydrops 4.7cm with probable impacted stone at neck, also liver cirrhosis w/ mild ascites. Admits to social etoh, denies binge drinking, denies abdominal pain, N/V. Admits to decreased appetite in past 2 weeks, pt tried to maintain low salt diet. Reports BLE edema developed in past 2 days as well as weakness. Denies fever, chills, abdominal pain, SOB, dysuria.    In ED, evaluated by surgery -> no interventions at this time (15 Shashank 2019 01:53)    PAST MEDICAL & SURGICAL HISTORY:  Hypothyroidism  CAD S/P percutaneous coronary angioplasty  No significant past surgical history    Allergies  No Known Allergies    MEDICATIONS  (STANDING):  aspirin  chewable 81 milliGRAM(s) Oral daily  chlorhexidine 4% Liquid 1 Application(s) Topical <User Schedule>  dextrose 5% 1000 milliLiter(s) (100 mL/Hr) IV Continuous <Continuous>  fat emulsion (Plant Based) 20% Infusion 0.558 Gm/kG/Day (21.936 mL/Hr) IV Continuous <Continuous>  heparin  Injectable 5000 Unit(s) SubCutaneous every 8 hours  levothyroxine 50 MICROGram(s) Oral daily  metoprolol tartrate 50 milliGRAM(s) Oral two times a day  NIFEdipine IR 10 milliGRAM(s) Oral every 8 hours  nystatin Powder 1 Application(s) Topical three times a day  Parenteral Nutrition - Adult 1 Each (60 mL/Hr) TPN Continuous <Continuous>    ICU Vital Signs Last 24 Hrs  T(C): 35.8 (25 Jun 2019 04:32), Max: 35.9 (24 Jun 2019 21:30)  T(F): 96.4 (25 Jun 2019 04:32), Max: 96.7 (24 Jun 2019 21:30)  HR: 82 (25 Jun 2019 04:32) (79 - 82)  BP: 99/54 (25 Jun 2019 04:32) (90/50 - 100/59)  RR: 18 (25 Jun 2019 04:32) (18 - 18)    Drug Dosing Weight  Height (cm): 165.1 (18 Jun 2019 14:11)  Weight (kg): 125.8 (24 Jun 2019 04:22)  BMI (kg/m2): 46.2 (24 Jun 2019 04:22)  BSA (m2): 2.27 (24 Jun 2019 04:22)    EXAM  A&O X 3  Abd:  soft, ND, obese  +anasarca  Enteral access: none  IV access: midline Right arm    LABS  06-25    137  |  103  |  107<HH>  ----------------------------<  111<H>  4.9   |  19  |  3.5<H>    Ca    6.2<L>      25 Jun 2019 05:34  Mg     2.1     06-25    TPro  5.2<L>  /  Alb  1.5<L>  /  TBili  2.8<H>  /  DBili  x   /  AST  546<H>  /  ALT  415<H>  /  AlkPhos  1151<H>  06-25               10.7   15.78 )-----------( 238      ( 25 Jun 2019 05:34 )             30.8     Radiology:  < from: Xray Esophagram (06.25.19 @ 10:36) >    EXAM:  XR ESOPHAGUS          PROCEDURE DATE:  06/25/2019      INTERPRETATION:  Clinical History / Reason for exam: Dysphagia.    Comparison: None.    Technique: Single contrast barium esophagram.  Following the oral ingestion of barium rapid sequence filming of the   esophagus is performed under fluoroscopic control.    Fluoroscopy time: 0.9 minutes.    Findings:  The swallowing mechanism is intact. There is no pharyngeal esophageal   dysfunction.     A moderate sliding type hiatal hernia is seen. Stomach and duodenum are   filled no evidence of obstruction. However there is also retained   contrast within the mid to lower esophagus with reflux.    No esophageal erosions or mass is evident.    Impression:    Moderate-sized hiatal hernia.    There is no evidence of obstruction, however there is retained contrast   within the mid to lower esophagus consistent with dysmotility as well as   esophageal reflux.    < end of copied text >    Diet, DASH/TLC:   Sodium & Cholesterol Restricted  Low Fat (LOWFAT)  Supplement Feeding Modality:  Oral  Ensure Clear Cans or Servings Per Day:  1       Frequency:  Two Times a day (06-21-19 @ 14:10)  Diet, NPO after Midnight:      NPO Start Date: 24-Jun-2019,   NPO Start Time: 23:59 (06-24-19 @ 18:02)    ASSESSMENT  - GB hydrops  - LUIS ALBERTO  - hyperkalemia  - hypothyroidism  - hiatal hernia  - esophageal reflux, dysmotility    PLAN  - will start PPN tonight via midline catheter while w/u in progress  - check baseline TG level  - monitor renal fx, fluid status  - check bmp, ip, mg in am 6/26

## 2019-06-25 NOTE — PROGRESS NOTE ADULT - ASSESSMENT
67 yo F w/ hx of CAD s/p PCI and hypothyroidism sent by gastroenterologist for gallbladder hydrops    # Significant hydrops gallbladder on MR with stones with persistent elevated LFTs - possible autoimmune etiology  - Additional work up negative including complement (c3/c4 normal, ds dna and anti sm and anti mitochondrial)  - F/u Rheumatology consult     # Hyperbilirubinemia - likely due to obstruction  MELD-Na score 7-10% 90 days mortality   - LFTs continues to be elevated; more cholestatic pattern. AST>>ALT (AST can also come from muscle and pt is in rhabdo).  - MRCP showed hydropic GB with GS with impacted stone. Cirrhosis with mild ascites. No biliary distention  - Hepatitis panel negative, Tylenol level negative  - Autoimmune panel: Elevated ANKITA and IgG.   - GGT elevated consistent w/ liver disease  - Liver biopsy done  - Surgery followup - biopsy and ERCP first then CCY  outpatient GI is Dr. Lara at [facility]---[phone]    # LUIS ALBERTO with metabolic acidosis likely due to rhabdo   - Renal US with no hydro  - Continue IV hydration given rhabdo  - Metabolic acidosis: started on bicarb drip to correct potassium  - Potassium levels are down to 4.9  - F/u nephrology consult  - Restrict free water intake 1L    #UTI with urinary retention  - Smith placed yesterday  - iv ceftriaxone discontinued  - monitor wbc 15.78 down from 17.88  - check Urine and Blood Cultures    #distal digit blue discoloration  - Resolved in 1h  - Continue CCB (nifedipine 10 mg tid)  - Venous and arterial duplex negative  - monitor closely--if any signs of ischemia (loss of pulse/pain) vascular consult stat  - r/o CREST syndrome---however has no skin rashs/scleroderma skin changes  - rheum f/u and undergoing rheum w/u- vasculitis w/u---check scl, c-anca, p-anca, cryo  - on hep sq ppx dosing--if concern for ischemia would need to start full AC---at which time would upgrade to more monitored setting    # Hypoalbuminemia  - UA only with trace protein  - Nutrition want TPN via central cathetar  - Patient currently has poor oral intake    # Acute rhabdomyolysis  - CPK 5.3k,  - CPK ordered for comparison  - 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

## 2019-06-25 NOTE — CHART NOTE - NSCHARTNOTEFT_GEN_A_CORE
RD Calorie Count Results Note:    Diet order: DASH/TLC, Low Fat, Ensure clear q12 hrs    Day 1: ate 25% fruit, 50% ensure clear, 1 bite of tuna fish, nothing for dinner = ~130 calories, 4 gms protein  Day 2: nothing documented  Day 3: NPO     Spoke w/ RN. Pt has had minimal po intake. NST consulted on 6/24. RD to sign off. Consult PRN.

## 2019-06-25 NOTE — PROGRESS NOTE ADULT - SUBJECTIVE AND OBJECTIVE BOX
Hepatology/GI follow up note: Pt seen and examined at bedside.     For questions and inquiries please page [phone].  For urgent matters or after 5pm and on weekends please page the fellow on call through the GI paging system.    68y Female seen for: cirhossis/ hepatitis/ myositis    Subjective/Interval events:  SP liver biopsy yesterday and esophagram today    Review of system  General:  (-) weight loss, (-) fevers  Eyes:  (-) visual changes  CV:  (-) chest pain  Resp: (-) SOB, (-) wheezing  GI: (-) abdominal pain,  (-) nausea, (-) vomiting, (-) dysphagia, (-) diarrhea, (-) constipation, (-) rectal bleeding, (-) melena, (-) hematemesis.  Neuro: (-) confusion, (-) weakness (+) decreased appetite  Psych:  (-) Hallucinations  Heme:  (-) easy bruisability    Past medical/surgical Hx:  PAST MEDICAL & SURGICAL HISTORY:  Hypothyroidism  CAD S/P percutaneous coronary angioplasty  No significant past surgical history    Home Medications:  Last Order Reconciliation Date: 06-15-19 @ 02:05 (Admission Reconciliation)  aspirin 81 mg oral tablet, chewable: 1 tab(s) orally once a day  atorvastatin 80 mg oral tablet: 1 tab(s) orally once a day (at bedtime)  levothyroxine 50 mcg (0.05 mg) oral tablet: 1 tab(s) orally once a day  Metoprolol Tartrate 50 mg oral tablet: 1 tab(s) orally 2 times a day  prasugrel 10 mg oral tablet: 1 tab(s) orally once a day      Allergies:  No Known Allergies      Current Medications:   aspirin  chewable 81 milliGRAM(s) Oral daily  chlorhexidine 4% Liquid 1 Application(s) Topical <User Schedule>  dextrose 5% 1000 milliLiter(s) IV Continuous <Continuous>  fat emulsion (Plant Based) 20% Infusion 0.558 Gm/kG/Day IV Continuous <Continuous>  heparin  Injectable 5000 Unit(s) SubCutaneous every 8 hours  levothyroxine 50 MICROGram(s) Oral daily  metoprolol tartrate 50 milliGRAM(s) Oral two times a day  NIFEdipine IR 10 milliGRAM(s) Oral every 8 hours  nystatin Powder 1 Application(s) Topical three times a day  Parenteral Nutrition - Adult 1 Each TPN Continuous <Continuous>        Physical exam:  T(C): 35.9 (06-25-19 @ 13:44), Max: 35.9 (06-24-19 @ 21:30)  HR: 87 (06-25-19 @ 13:44) (79 - 87)  BP: 90/56 (06-25-19 @ 13:44) (90/50 - 100/59)  RR: 18 (06-25-19 @ 13:44) (18 - 18)  SpO2: --    GENERAL: NAD  HEAD:  Atraumatic, Normocephalic  EYES: Sclera:NL  NECK: Supple, no JVD or thyromegaly  CHEST/LUNG: Good bilateral air entry  HEART: normal S1, S2. Regular  ABDOMEN: (-) distended, (-) tender, (-) rebound, (+) BS, (-)HSM  EXTREMITIES: (+++) edema  NEUROLOGY: (-) asterixis  SKIN: improved jaundice  MATTY: (-) melena (-) brbpr      Data:                        10.7   15.78 )-----------( 238      ( 25 Jun 2019 05:34 )             30.8     MCV 87.5 (06-25-19)    RDW 21.2 (06-25-19)    HGB trend:  10.7  06-25-19 @ 05:34  10.9  06-24-19 @ 10:26  10.9  06-23-19 @ 07:08  11.6  06-22-19 @ 17:46        WBC trend:  15.78  06-25-19 @ 05:34  17.88  06-24-19 @ 10:26  15.12  06-23-19 @ 07:08  14.18  06-22-19 @ 17:46    06-25    137  |  103  |  107<HH>  ----------------------------<  111<H>  4.9   |  19  |  3.5<H>    Ca    6.2<L>      25 Jun 2019 05:34  Mg     2.1     06-25    TPro  5.2<L>  /  Alb  1.5<L>  /  TBili  2.8<H>  /  DBili  x   /  AST  546<H>  /  ALT  415<H>  /  AlkPhos  1151<H>  06-25    Liver panel trend:  TBili 2.8   /      /      /   AlkP 1151   /   Tptn 5.2   /   Alb 1.5    /   DBili --      06-25  TBili 2.9   /      /      /   AlkP 1375   /   Tptn 5.3   /   Alb 1.5    /   DBili --      06-24  TBili 3.7   /      /      /   AlkP 1523   /   Tptn 5.3   /   Alb 1.6    /   DBili 2.6      06-23  TBili 4.3   /   AST >700   /      /   AlkP >1200   /   Tptn 5.6   /   Alb 1.7    /   DBili 3.0      06-22  TBili 5.6   /   AST 1207   /      /   AlkP 1660   /   Tptn 5.6   /   Alb 1.7    /   DBili --      06-21  TBili 6.0   /   AST 1079   /      /   AlkP 1713   /   Tptn 5.6   /   Alb 1.7    /   DBili 4.5      06-20  TBili 5.3   /   AST 1003   /      /   AlkP 1620   /   Tptn 5.5   /   Alb 1.7    /   DBili 4.1      06-19  TBili 5.7   /   AST 1083   /      /   AlkP 1848   /   Tptn 6.2   /   Alb 2.1    /   DBili 4.1      06-18  TBili 5.4   /   AST 1042   /      /   AlkP 1730   /   Tptn 5.8   /   Alb 1.9    /   DBili 3.9      06-18  TBili 4.9   /   AST 1033   /      /   AlkP 1688   /   Tptn 6.0   /   Alb 1.9    /   DBili 3.5      06-17  TBili 5.0   /   AST 1159   /      /   AlkP 1800   /   Tptn 6.4   /   Alb 2.0    /   DBili 3.6      06-16  TBili 4.3   /   AST 1032   /      /   AlkP 1510   /   Tptn 5.8   /   Alb 1.9    /   DBili 3.3      06-16        PT/INR - ( 24 Jun 2019 10:26 )   PT: 12.80 sec;   INR: 1.11 ratio         PTT - ( 24 Jun 2019 10:26 )  PTT:27.6 sec    Culture - Urine (collected 22 Jun 2019 18:04)  Source: .Urine Clean Catch (Midstream)  Final Report (24 Jun 2019 06:28):    No growth    Aldolase, Serum: 51.0: Results confirmed on  dilution.  Performed At: RN [facility] [address] [id number]  Reyes Araceli B MD Ph:[phone] U/L (06.19.19 @ 11:22)

## 2019-06-25 NOTE — PROGRESS NOTE ADULT - ASSESSMENT
68 Y F Hx of CAD on DAPT, for a stent >1 year ago, w abnormal LFTs  Imaging suggestive of cirrhosis (nodular contour) with no evident signs of portal HTN.  There is evidence of cholelithiasis with a possible cystic duct stone impacting the CBD proximaly despte no evident giovanni dil.  The derangement in the liver enzymes is unclear wether is solely due to an active hepatitis with evidence of fibrosis or combined with obstructive disease.  The clinical picture (asymptomatic) favors the former however the imaging and blood work favors the later.    Currently the soluble IgG is mildly elevated the ASMA and Anti LKM  are negative.  The AMA is negative    Current serologic work highly suggestive of AIH  Awaiting confirmatory biopsy  Suggest checking aldolase, CPK and rheum eval for possible work up of a myositis perhaps autoimmune in etiology as well (if path is confirmatory).      1)Hepatitis ?Myositis statin induced AIH? IMPROVING  2)Abnormal liver imaging- cirrhosis   3)Kidney disease? CKD?  4)CAD on DAPT off for biopsy  5)LUIS ALBERTO ?rhabdo? needs IV HYDRATION AND NOT FLUID RESTRICTION please consult nephrology    Rec:  - CLD work up currently (-) so far- please obtain AIH markers, PSC (ANCA). SP Bx awaiting path.   DILI is also on the differential with statin induced autoimmune hepatitis/myositis. Other viral etiologies could also account for hepatitis  -Send out test for HMG-CoA reductase inhibitor antibodies  - Might benefit from an ERCP if pathology unsuggestive  - Trend LFTs and INR DAILY  - Check Urine electrolytes  - Will warrant OP follow up for screening (EGD and US Q6mo) and immunization for hep B and A  - Consider resuming DAPT if warranted 68 Y F Hx of CAD on DAPT, for a stent >1 year ago, w abnormal LFTs  Imaging suggestive of cirrhosis (nodular contour) with no evident signs of portal HTN.  There is evidence of cholelithiasis with a possible cystic duct stone impacting the CBD proximaly despte no evident giovanni dil.  The etiology of the derangement in the liver enzymes is unclear . Possibilities include auto immune hepatitis, hepatitis , or  obstructive disease.      Currently the soluble IgG is mildly elevated the ASMA and Anti LKM  are negative.  The AMA is negative    Current serologic work highly suggestive of AIH  Awaiting confirmatory biopsy  Suggest checking aldolase, CPK and rheum eval for possible work up of a myositis perhaps autoimmune in etiology as well (if path is confirmatory).      1)Hepatitis ?Myositis statin induced AIH? IMPROVING  2)Abnormal liver imaging- cirrhosis   3)Kidney disease? CKD?  4)CAD on DAPT off for biopsy  5)LUIS ALBERTO ?rhabdo? needs IV HYDRATION AND NOT FLUID RESTRICTION please consult nephrology    Rec:  - CLD work up currently (-) so far- please obtain AIH markers, PSC (ANCA). SP Bx awaiting path.   DILI is also on the differential with statin induced autoimmune hepatitis/myositis. Other viral etiologies could also account for hepatitis  -Send out test for HMG-CoA reductase inhibitor antibodies  -   - Trend LFTs and INR DAILY  - Check Urine electrolytes  - Will warrant OP follow up for screening (EGD and US Q6mo) and immunization for hep B and A  - Consider resuming DAPT if warranted

## 2019-06-26 NOTE — PROGRESS NOTE ADULT - SUBJECTIVE AND OBJECTIVE BOX
SUBJECTIVE:    Patient is a 68y old Female who presents with a chief complaint of gallbladder hydrops (2019 14:27)    Currently admitted to medicine with the primary diagnosis of Gallbladder hydrops     Today is hospital day 12d. This morning she is resting comfortably in bed and reports no new issues or overnight events.     INTERVAL EVENTS: Central placed for TPN. Patient     PAST MEDICAL & SURGICAL HISTORY  Hypothyroidism  CAD S/P percutaneous coronary angioplasty  No significant past surgical history      ALLERGIES:  No Known Allergies    MEDICATIONS:  STANDING MEDICATIONS  chlorhexidine 4% Liquid 1 Application(s) Topical <User Schedule>  fat emulsion (Plant Based) 20% Infusion 0.558 Gm/kG/Day IV Continuous <Continuous>  fat emulsion (Plant Based) 20% Infusion 0.558 Gm/kG/Day IV Continuous <Continuous>  heparin  Injectable 5000 Unit(s) SubCutaneous every 8 hours  levothyroxine 50 MICROGram(s) Oral daily  metoprolol tartrate 50 milliGRAM(s) Oral two times a day  nystatin Powder 1 Application(s) Topical three times a day  pantoprazole   Suspension 40 milliGRAM(s) Oral before breakfast  Parenteral Nutrition - Adult 1 Each TPN Continuous <Continuous>  Parenteral Nutrition - Adult 1 Each TPN Continuous <Continuous>    PRN MEDICATIONS  aluminum hydroxide/magnesium hydroxide/simethicone Suspension 30 milliLiter(s) Oral every 4 hours PRN    VITALS:   T(F): 97.4  HR: 86  BP: 90/46  RR: 18  SpO2: 95%    LABS:                        10.7   15.78 )-----------( 238      ( 2019 05:34 )             30.8     06-    137  |  103  |  107<HH>  ----------------------------<  111<H>  4.9   |  19  |  3.5<H>    Ca    6.2<L>      2019 05:34  Mg     2.1         TPro  5.2<L>  /  Alb  1.5<L>  /  TBili  2.8<H>  /  DBili  x   /  AST  546<H>  /  ALT  415<H>  /  AlkPhos  1151<H>      PT/INR - ( 2019 05:35 )   PT: 14.10 sec;   INR: 1.23 ratio         PTT - ( 2019 05:35 )  PTT:33.3 sec  Urinalysis Basic - ( 2019 22:03 )    Color: Orange / Appearance: Turbid / S.015 / pH: x  Gluc: x / Ketone: Negative  / Bili: Small / Urobili: 1.0 mg/dL   Blood: x / Protein: Trace mg/dL / Nitrite: Negative   Leuk Esterase: Small / RBC: x / WBC 10-25 /HPF   Sq Epi: x / Non Sq Epi: Occasional /HPF / Bacteria: Many /HPF        RADIOLOGY:  Xray Esophagram (19)  Impression:    Moderate-sized hiatal hernia.    There is no evidence of obstruction, however there is retained contrast   within the mid to lower esophagus consistent with dysmotility as well as   esophageal reflux.        US Kidney and Bladder (19)  IMPRESSION:    Unremarkable sonographic appearance of the kidneys.    Incidental hepatic cirrhosis and abdominal pelvic ascites.      PHYSICAL EXAM:  GEN: No acute distress  PULM/CHEST: Clear B/L, equal air entry bilaterally  CVS: RRR, S1+S2 + mild systolic murmur  ABD: Soft, non-tender, non-distended, ecchymosis around the needle prick sites +BS,  EXT: Pedal edema  NEURO: AAOx3 SUBJECTIVE:    Patient is a 68y old Female who presents with a chief complaint of gallbladder hydrops (2019 14:27)    Currently admitted to medicine with the primary diagnosis of Gallbladder hydrops     Today is hospital day 12d. This morning she is resting comfortably in bed and reports no new issues or overnight events.     INTERVAL EVENTS: Central placed for TPN. Patient feels weaker and is unable to ambulate.    PAST MEDICAL & SURGICAL HISTORY  Hypothyroidism  CAD S/P percutaneous coronary angioplasty  No significant past surgical history      ALLERGIES:  No Known Allergies    MEDICATIONS:  STANDING MEDICATIONS  chlorhexidine 4% Liquid 1 Application(s) Topical <User Schedule>  fat emulsion (Plant Based) 20% Infusion 0.558 Gm/kG/Day IV Continuous <Continuous>  fat emulsion (Plant Based) 20% Infusion 0.558 Gm/kG/Day IV Continuous <Continuous>  heparin  Injectable 5000 Unit(s) SubCutaneous every 8 hours  levothyroxine 50 MICROGram(s) Oral daily  metoprolol tartrate 50 milliGRAM(s) Oral two times a day  nystatin Powder 1 Application(s) Topical three times a day  pantoprazole   Suspension 40 milliGRAM(s) Oral before breakfast  Parenteral Nutrition - Adult 1 Each TPN Continuous <Continuous>  Parenteral Nutrition - Adult 1 Each TPN Continuous <Continuous>    PRN MEDICATIONS  aluminum hydroxide/magnesium hydroxide/simethicone Suspension 30 milliLiter(s) Oral every 4 hours PRN    VITALS:   T(F): 97.4  HR: 86  BP: 90/46  RR: 18  SpO2: 95%    SURGICAL PATHOLOGY:  Surgical Pathology Report for Liver Biopsy  Comment  _Sections show findings are consistent with clinical impression  of  cirrhosis.  However, the etiology is not clear.  Clinical  correlation is recommended.    LABS:                        10.7   15.78 )-----------( 238      ( 2019 05:34 )             30.8     06-    137  |  103  |  107<HH>  ----------------------------<  111<H>  4.9   |  19  |  3.5<H>    Ca    6.2<L>      2019 05:34  Mg     2.1         TPro  5.2<L>  /  Alb  1.5<L>  /  TBili  2.8<H>  /  DBili  x   /  AST  546<H>  /  ALT  415<H>  /  AlkPhos  1151<H>      PT/INR - ( 2019 05:35 )   PT: 14.10 sec;   INR: 1.23 ratio         PTT - ( 2019 05:35 )  PTT:33.3 sec  Urinalysis Basic - ( 2019 22:03 )    Color: Orange / Appearance: Turbid / S.015 / pH: x  Gluc: x / Ketone: Negative  / Bili: Small / Urobili: 1.0 mg/dL   Blood: x / Protein: Trace mg/dL / Nitrite: Negative   Leuk Esterase: Small / RBC: x / WBC 10-25 /HPF   Sq Epi: x / Non Sq Epi: Occasional /HPF / Bacteria: Many /HPF        RADIOLOGY:  Xray Esophagram (19)  Impression:    Moderate-sized hiatal hernia.    There is no evidence of obstruction, however there is retained contrast   within the mid to lower esophagus consistent with dysmotility as well as   esophageal reflux.        US Kidney and Bladder (19)  IMPRESSION:    Unremarkable sonographic appearance of the kidneys.    Incidental hepatic cirrhosis and abdominal pelvic ascites.      PHYSICAL EXAM:  GEN: No acute distress  PULM/CHEST: Clear B/L, equal air entry bilaterally  CVS: RRR, S1+S2 + mild systolic murmur  ABD: Soft, non-tender, non-distended, ecchymosis around the needle prick sites +BS,  EXT: Pedal edema  NEURO: AAOx3

## 2019-06-26 NOTE — PROGRESS NOTE ADULT - ASSESSMENT
68 Y F Hx of CAD on DAPT, for a stent >1 year ago, w abnormal LFTs  Imaging suggestive of cirrhosis (nodular contour) with no evident signs of portal HTN.  There is evidence of cholelithiasis with a possible cystic duct stone impacting the CBD proximaly despte no evident giovanni dil.  The etiology of the derangement in the liver enzymes is unclear. Possibilities include auto immune hepatitis, hepatitis , or  obstructive disease.      Currently the soluble IgG is mildly elevated the ASMA and Anti LKM  are negative.  The AMA is negative ANKITA is positive.    Current serologic work highly suggestive of AIH  Awaiting confirmatory biopsy    Suggest checking aldolase, CPK and rheum eval for possible work up of a myositis perhaps autoimmune in etiology as well (if path is confirmatory).      1)Hepatitis ?Myositis statin induced AIH? IMPROVING  2)Abnormal liver imaging- cirrhosis  -HCC: none on imaging  -EV: needs screening  -PSE: Negative  -Ascites: non on imaging  3)PROFOUND HYPOALBIMINURIA  3)Kidney disease? CKD?  4)CAD on DAPT off for biopsy  5)LUIS ALBERTO. Needs proper work up. unclear whether it is pre renal or port renal or a combo of both. patient had retention and has a aragon catheter now.  The urine is not Spencer suggesting that if not post renal, etiologies other than HRS (such as ATN) are favored over HRS.  Patients CPK is elevated which could be an underlying etiology for her LUIS ALBERTO.    Rec:  - Please obtain urine electrolytes including urea, creatinine and Na+ TODAY WITH A CMP  - Suggest am albumin challenge with 1g/kg/day x2 days and continue to trend BMP.  - Suggest SPEP and UPEP  - Awaiting Bx results  - Obtain a rheumatology consult  -Send out test for HMG-CoA reductase inhibitor antibodies  - Trend LFTs and INR DAILY  - Will warrant OP follow up for screening (EGD and US Q6mo) and immunization for hep B and A  - Consider resuming DAPT if warranted

## 2019-06-26 NOTE — CHART NOTE - NSCHARTNOTEFT_GEN_A_CORE
c/o feeling full, hiccups intermittent  PO intake minimal at best  PPN started last night via Rt midline  Renal, GI noted and appreciated  + anasarca    T(F): 97.4 (19 @ 14:09), Max: 97.4 (19 @ 14:09)  HR: 88 (19 @ 14:09) (80 - 88)  BP: 112/49 (19 @ 14:09) (90/56 - 112/49)  RR: 18 (19 @ 14:09) (18 - 18)  SpO2: 95% (19 @ 08:42) (95% - 97%)    I&O's Detail    2019 07:01  -  2019 07:00  --------------------------------------------------------  IN:    fat emulsion (Plant Based) 20% Infusion: 154 mL    PPN (Peripheral Parenteral Nutrition): 540 mL  Total IN: 694 mL    OUT:    Indwelling Catheter - Urethral: 525 mL  Total OUT: 525 mL    Total NET: 169 mL        137  |  103  |  107<HH>  ----------------------------<  111<H>  4.9   |  19  |  3.5<H>    Ca    6.2<L>      2019 05:34  Mg     2.1         TPro  5.2<L>  /  Alb  1.5<L>  /  TBili  2.8<H>  /  DBili  x   /  AST  546<H>  /  ALT  415<H>  /  AlkPhos  1151<H>      Triglycerides, Serum: 144 mg/dL (06.15.19 @ 06:09)                     10.7   15.78 )-----------( 238      ( 2019 05:34 )             30.8       Daily Weight in k (2019 04:58)    Diet, DASH/TLC:   Sodium & Cholesterol Restricted  Low Fat (LOWFAT)  Supplement Feeding Modality:  Oral  Ensure Clear Cans or Servings Per Day:  1       Frequency:  Two Times a day (19 @ 14:10)    ASSESSMENT  - GB hydrops  - LUIS ALBERTO  - hyperkalemia  - hypothyroidism  - hiatal hernia  - esophageal reflux, dysmotility    PLAN  - will start PPN tonight via midline catheter while w/u in progress  - check baseline TG level  - monitor renal fx, fluid status  - check bmp, ip, mg in am .

## 2019-06-26 NOTE — PROGRESS NOTE ADULT - SUBJECTIVE AND OBJECTIVE BOX
Nephrology progress note    Patient is seen and examined, events over the last 24 h noted.      Allergies:  No Known Allergies    Hospital Medications:   MEDICATIONS  (STANDING):  aspirin  chewable 81 milliGRAM(s) Oral daily  chlorhexidine 4% Liquid 1 Application(s) Topical <User Schedule>  dextrose 5% 1000 milliLiter(s) (100 mL/Hr) IV Continuous <Continuous>  fat emulsion (Plant Based) 20% Infusion 0.558 Gm/kG/Day (21.936 mL/Hr) IV Continuous <Continuous>  fat emulsion (Plant Based) 20% Infusion 0.558 Gm/kG/Day (21.936 mL/Hr) IV Continuous <Continuous>  heparin  Injectable 5000 Unit(s) SubCutaneous every 8 hours  levothyroxine 50 MICROGram(s) Oral daily  metoprolol tartrate 50 milliGRAM(s) Oral two times a day  nystatin Powder 1 Application(s) Topical three times a day  pantoprazole   Suspension 40 milliGRAM(s) Oral before breakfast  Parenteral Nutrition - Adult 1 Each (60 mL/Hr) TPN Continuous <Continuous>  Parenteral Nutrition - Adult 1 Each (60 mL/Hr) TPN Continuous <Continuous>        VITALS:  T(F): 97.4 (19 @ 14:09), Max: 97.4 (19 @ 14:09)  HR: 88 (19 @ 14:09)  BP: 112/49 (19 @ 14:09)  RR: 18 (19 @ 14:09)  SpO2: 95% (19 @ 08:42)       @ 07:  -   @ 07:00  --------------------------------------------------------  IN: 0 mL / OUT: 700 mL / NET: -700 mL     @ 07:01  -   @ 07:00  --------------------------------------------------------  IN: 694 mL / OUT: 525 mL / NET: 169 mL          PHYSICAL EXAM:  Constitutional: NAD  Respiratory: CTAB, no wheezes, rales or rhonchi  Cardiovascular: S1, S2, RRR  Gastrointestinal: BS+, soft, NT/ND  Extremities: No peripheral edema  :  No aragon.       LABS:      137  |  103  |  107<HH>  ----------------------------<  111<H>  4.9   |  19  |  3.5<H>    Ca    6.2<L>      2019 05:34  Mg     2.1         Creatinine Trend: 3.5<--, 3.3<--, 3.1<--, 3.0<--, 2.3<--, 2.3<--    TPro  5.2<L>  /  Alb  1.5<L>  /  TBili  2.8<H>  /  DBili      /  AST  546<H>  /  ALT  415<H>  /  AlkPhos  1151<H>                            10.7   15.78 )-----------( 238      ( 2019 05:34 )             30.8       Urine Studies:  Urinalysis Basic - ( 2019 22:03 )    Color: Orange / Appearance: Turbid / S.015 / pH:   Gluc:  / Ketone: Negative  / Bili: Small / Urobili: 1.0 mg/dL   Blood:  / Protein: Trace mg/dL / Nitrite: Negative   Leuk Esterase: Small / RBC:  / WBC 10-25 /HPF   Sq Epi:  / Non Sq Epi: Occasional /HPF / Bacteria: Many /HPF      Sodium, Random Urine: 20.0 mmoL/L ( @ 22:03)  Protein/Creatinine Ratio Calculation: 0.2 Ratio ( @ 22:03)  Creatinine, Random Urine: 119 mg/dL ( @ 22:03)    RADIOLOGY & ADDITIONAL STUDIES: Nephrology progress note    Patient is seen and examined, events over the last 24 h noted.  No new complaints.      Allergies:  No Known Allergies    Hospital Medications:   MEDICATIONS  (STANDING):  aspirin  chewable 81 milliGRAM(s) Oral daily  chlorhexidine 4% Liquid 1 Application(s) Topical <User Schedule>  dextrose 5% 1000 milliLiter(s) (100 mL/Hr) IV Continuous <Continuous>  fat emulsion (Plant Based) 20% Infusion 0.558 Gm/kG/Day (21.936 mL/Hr) IV Continuous <Continuous>  fat emulsion (Plant Based) 20% Infusion 0.558 Gm/kG/Day (21.936 mL/Hr) IV Continuous <Continuous>  heparin  Injectable 5000 Unit(s) SubCutaneous every 8 hours  levothyroxine 50 MICROGram(s) Oral daily  metoprolol tartrate 50 milliGRAM(s) Oral two times a day  nystatin Powder 1 Application(s) Topical three times a day  pantoprazole   Suspension 40 milliGRAM(s) Oral before breakfast  Parenteral Nutrition - Adult 1 Each (60 mL/Hr) TPN Continuous <Continuous>  Parenteral Nutrition - Adult 1 Each (60 mL/Hr) TPN Continuous <Continuous>        VITALS:  T(F): 97.4 (19 @ 14:09), Max: 97.4 (19 @ 14:09)  HR: 88 (19 @ 14:09)  BP: 112/49 (19 @ 14:09)  RR: 18 (19 @ 14:09)  SpO2: 95% (19 @ 08:42)       @ 07:  -   @ 07:00  --------------------------------------------------------  IN: 0 mL / OUT: 700 mL / NET: -700 mL     @ 07:01  -   @ 07:00  --------------------------------------------------------  IN: 694 mL / OUT: 525 mL / NET: 169 mL          PHYSICAL EXAM:  Constitutional: NAD  Respiratory: CTAB, no wheezes, rales or rhonchi  Cardiovascular: S1, S2, RRR  Gastrointestinal: BS+, soft, NT/ND  Extremities: 2+ peripheral edema  :  +aragon.       LABS:      137  |  103  |  107<HH>  ----------------------------<  111<H>  4.9   |  19  |  3.5<H>    Ca    6.2<L>      2019 05:34  Mg     2.1         Creatinine Trend: 3.5<--, 3.3<--, 3.1<--, 3.0<--, 2.3<--, 2.3<--    TPro  5.2<L>  /  Alb  1.5<L>  /  TBili  2.8<H>  /  DBili      /  AST  546<H>  /  ALT  415<H>  /  AlkPhos  1151<H>                            10.7   15.78 )-----------( 238      ( 2019 05:34 )             30.8     Anti-Nuclear Antibody (19 @ 00:50)    Anti Nuclear Factor Titer: 1:640    Double Stranded DNA Antibody (19 @ 06:55)    Double Stranded DNA Antibody: 111   Positive IU/mL    Histone Antibody, Serum: 2.3 Units (19 @ 05:54)      Urine Studies:  Urinalysis Basic - ( 2019 22:03 )    Color: Orange / Appearance: Turbid / S.015 / pH:   Gluc:  / Ketone: Negative  / Bili: Small / Urobili: 1.0 mg/dL   Blood:  / Protein: Trace mg/dL / Nitrite: Negative   Leuk Esterase: Small / RBC:  / WBC 10-25 /HPF   Sq Epi:  / Non Sq Epi: Occasional /HPF / Bacteria: Many /HPF      Sodium, Random Urine: 20.0 mmoL/L ( @ 22:03)  Protein/Creatinine Ratio Calculation: 0.2 Ratio ( @ 22:03)  Creatinine, Random Urine: 119 mg/dL ( @ 22:03)    RADIOLOGY & ADDITIONAL STUDIES:

## 2019-06-26 NOTE — PROGRESS NOTE ADULT - SUBJECTIVE AND OBJECTIVE BOX
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            Patient was evaluated and examined  by bedside, c/o acid reflux and hiccups, no abdominal pain, c/o persistent legs and hands swelling, generalized body weakness, no fever, has poor po intake due to severe heartburn        REVIEW OF SYSTEMS:  please see pertinent positives mentioned above, all other 12 ROS negative      T(C): , Max: 36.1 (06-26-19 @ 04:58)  HR: 88 (06-26-19 @ 04:58)  BP: 102/50 (06-26-19 @ 04:58)  RR: 18 (06-26-19 @ 04:58)  SpO2: 95% (06-26-19 @ 08:42)  CAPILLARY BLOOD GLUCOSE          PHYSICAL EXAM:  General: NAD, AAOX3, patient is laying comfortably in bed, obese  HEENT: AT, NC, Supple, NO JVD, NO CB  Lungs: CTA B/L, no wheezing, no rhonchi  CVS: normal S1, S2, RRR, NO M/G/R  Abdomen: soft, bowel sounds present, non-tender, non-distended  Extremities: extensive plus 2 pitting  edema of all extremities, no clubbing, no cyanosis, positive peripheral pulses b/l  Neuro: no acute focal neurological deficits, generalized body weakness  Skin: no rush, no ecchymosis      LAB  CBC  Date: 06-25-19 @ 05:34  Mean cell Zblbswkjqu51.4  Mean cell Hemoglobin Conc34.7  Mean cell Volum 87.5  Platelet count-Automate 238  RBC Count 3.52  Red Cell Distrib Width21.2  WBC Count15.78  % Albumin, Urine--  Hematocrit 30.8  Hemoglobin 10.7  CBC  Date: 06-24-19 @ 10:26  Mean cell Wqhvevtblb35.1  Mean cell Hemoglobin Conc35.7  Mean cell Volum 87.1  Platelet count-Automate 253  RBC Count 3.50  Red Cell Distrib Width20.1  WBC Count17.88  % Albumin, Urine--  Hematocrit 30.5  Hemoglobin 10.9  CBC  Date: 06-23-19 @ 07:08  Mean cell Bfcmuvjdew83.7  Mean cell Hemoglobin Conc34.1  Mean cell Volum 87.2  Platelet count-Automate 248  RBC Count 3.67  Red Cell Distrib Width19.9  WBC Count15.12  % Albumin, Urine--  Hematocrit 32.0  Hemoglobin 10.9  CBC  Date: 06-22-19 @ 17:46  Mean cell Voodvptcba69.4  Mean cell Hemoglobin Conc34.6  Mean cell Volum 87.7  Platelet count-Automate 241  RBC Count 3.82  Red Cell Distrib Width19.5  WBC Count14.18  % Albumin, Urine--  Hematocrit 33.5  Hemoglobin 11.6  CBC  Date: 06-21-19 @ 06:55  Mean cell Tswcqmwyqi18.8  Mean cell Hemoglobin Conc34.8  Mean cell Volum 85.8  Platelet count-Automate 197  RBC Count 3.72  Red Cell Distrib Width18.7  WBC Count15.53  % Albumin, Urine--  Hematocrit 31.9  Hemoglobin 11.1  CBC  Date: 06-20-19 @ 05:54  Mean cell Rtznqphjja78.7  Mean cell Hemoglobin Conc35.0  Mean cell Volum 85.0  Platelet count-Automate 175  RBC Count 3.87  Red Cell Distrib Width18.4  WBC Count11.54  % Albumin, Urine--  Hematocrit 32.9  Hemoglobin 11.5    [facility]  06-25-19 @ 05:34  Blood Gas Arterial-Calcium,Ionized--  Blood Urea Nitrogen, Serum 107 mg/dL<HH> [10 - 20] [Critical value:]  Carbon Dioxide, Serum19 mmol/L [17 - 32]  Chloride, Wgkue701 mmol/L [98 - 110]  Creatinie, Serum3.5 mg/dL<H> [0.7 - 1.5]  Glucose, Caerj384 mg/dL<H> [70 - 99]  Potassium, Serum4.9 mmol/L [3.5 - 5.0]  Sodium, Serum 137 mmol/L [135 - 146]  [facility]  06-24-19 @ 10:26  Blood Gas Arterial-Calcium,Ionized--  Blood Urea Nitrogen, Serum 103 mg/dL<HH> [10 - 20] [Critical value:]  Carbon Dioxide, Serum15 mmol/L<L> [17 - 32]  Chloride, Gvzjo554 mmol/L [98 - 110]  Creatinie, Serum3.3 mg/dL<H> [0.7 - 1.5]  Glucose, Ucehr363 mg/dL<H> [70 - 99]  Potassium, Serum5.4 mmol/L<H> [3.5 - 5.0]  Sodium, Serum 133 mmol/L<L> [135 - 146]  [facility]  06-23-19 @ 17:42  Blood Gas Arterial-Calcium,Ionized--  Blood Urea Nitrogen, Serum 96 mg/dL<HH> [10 - 20] [Critical value:]  Carbon Dioxide, Serum13 mmol/L<L> [17 - 32]  Chloride, Kjipa013 mmol/L [98 - 110]  Creatinie, Serum3.1 mg/dL<H> [0.7 - 1.5]  Glucose, Jognp562 mg/dL<H> [70 - 99]  Potassium, Serum6.1 mmol/L<HH> [3.5 - 5.0] [Critical value:  TYPE:(C=Critical, N=Notification, A=Abnormal) C  TESTS: _K  DATE/TIME CALLED: _06/23/19 19:54  CALLED TO: RONNIE GILL  READ BACK (2 Patient Identifiers)(Y/N): _Y  READ BACK VALUES (Y/N): _Y  CALLED BY: _CPE]  Sodium, Serum 134 mmol/L<L> [135 - 146]  [facility]  06-23-19 @ 07:08  Blood Gas Arterial-Calcium,Ionized--  Blood Urea Nitrogen, Serum 87 mg/dL<HH> [10 - 20] [Critical value:]  Carbon Dioxide, Serum14 mmol/L<L> [17 - 32]  Chloride, Umoku929 mmol/L [98 - 110]  Creatinie, Serum3.0 mg/dL<H> [0.7 - 1.5]  Glucose, Ptlor528 mg/dL<H> [70 - 99]  Potassium, Serum6.1 mmol/L<HH> [3.5 - 5.0] [Critical value:  TYPE:(C=Critical, N=Notification, A=Abnormal) C  TESTS: _K  DATE/TIME CALLED: _06/23/19 08:59  CALLED TO: RONNIE GILL  READ BACK (2 Patient Identifiers)(Y/N): _Y  READ BACK VALUES (Y/N): _Y  CALLED BY: _CPE]  Sodium, Serum 129 mmol/L<L> [135 - 146]  [facility]  06-22-19 @ 00:44  Blood Gas Arterial-Calcium,Ionized--  Blood Urea Nitrogen, Serum 66 mg/dL<HH> [10 - 20] [Critical value:]  Carbon Dioxide, Serum15 mmol/L<L> [17 - 32]  Chloride, Fwulg869 mmol/L [98 - 110]  Creatinie, Serum2.3 mg/dL<H> [0.7 - 1.5] [Icteric. Interpret with caution]  Glucose, Serum90 mg/dL [70 - 99]  Potassium, Serum5.4 mmol/L<H> [3.5 - 5.0]  Sodium, Serum 130 mmol/L<L> [135 - 146]  BMP  06-21-19 @ 18:04  Blood Gas Arterial-Calcium,Ionized--  Blood Urea Nitrogen, Serum 65 mg/dL<HH> [10 - 20] [Critical value:]  Carbon Dioxide, Serum15 mmol/L<L> [17 - 32]  Chloride, Caznu926 mmol/L [98 - 110]  Creatinie, Serum2.3 mg/dL<H> [0.7 - 1.5] [Icteric. Interpret with caution]  Glucose, Oomih383 mg/dL<H> [70 - 99]  Potassium, Serum5.8 mmol/L<H> [3.5 - 5.0]  Sodium, Serum 130 mmol/L<L> [135 - 146]        PT/INR - ( 26 Jun 2019 05:35 )   PT: 14.10 sec;   INR: 1.23 ratio         PTT - ( 26 Jun 2019 05:35 )  PTT:33.3 sec      Microbiology:    Culture - Urine (collected 06-22-19 @ 18:04)  Source: .Urine Clean Catch (Midstream)  Final Report (06-24-19 @ 06:28):    No growth    Culture - Urine (collected 06-21-19 @ 23:54)  Source: .Urine Clean Catch (Midstream)  Final Report (06-23-19 @ 08:53):    No growth    Culture - Blood (collected 06-15-19 @ 18:01)  Source: .Blood None  Final Report (06-21-19 @ 06:00):    No growth at 5 days.        Medications:  aluminum hydroxide/magnesium hydroxide/simethicone Suspension 30 milliLiter(s) Oral every 4 hours PRN  aspirin  chewable 81 milliGRAM(s) Oral daily  chlorhexidine 4% Liquid 1 Application(s) Topical <User Schedule>  dextrose 5% 1000 milliLiter(s) IV Continuous <Continuous>  fat emulsion (Plant Based) 20% Infusion 0.558 Gm/kG/Day IV Continuous <Continuous>  fat emulsion (Plant Based) 20% Infusion 0.558 Gm/kG/Day IV Continuous <Continuous>  heparin  Injectable 5000 Unit(s) SubCutaneous every 8 hours  levothyroxine 50 MICROGram(s) Oral daily  metoprolol tartrate 50 milliGRAM(s) Oral two times a day  nystatin Powder 1 Application(s) Topical three times a day  pantoprazole   Suspension 40 milliGRAM(s) Oral before breakfast  Parenteral Nutrition - Adult 1 Each TPN Continuous <Continuous>  Parenteral Nutrition - Adult 1 Each TPN Continuous <Continuous>        Assessment and Plan:  A/P 69 yo F w/ hx of CAD s/p PCI and hypothyroidism sent by gastroenterologist for gallbladder hydrops, course complicated by ALI/ transaminitis possibly of autoimmune etiology, LUIS ALBERTO with hyperkalemia multifactorial 2/2 rhabdo (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 with resultant proteinuria and low albumin state and diffuse anasarca, raynauds phenomenon (not currently active), esophageal dysmotility and reflux, cystitis with urinary retention s/p abx, and subsequent worsening functional debility and malnutrition    #hydrops gallbladder with obstructing gas -acute persistent severe transaminitis of unclear etiology   trend LFT's, monitor CPK, liver serology most consistent with autoimmune process, statin was d/c on admission, liver was  biopsied- pending result, clinically too weak at this moment for cholecystecomy  if cpk climbing may be role for muscle biopsy deltoid- with steroids tx. trial  rheumatology specialist was consulted on 6/25- will f/up for recommendations     #worsening LUIS ALBERTO  with hyperkalemia (improved) and acidosis--2/2 rhabdomyolysis vs HRS  - as per renal team recommendations - patient most likely has hepatorenal syndrome  - c/w bicarb ggt, trend bmp  -urine sodium 20  - will start  on ocreatide/ midodrine empiric tx for possible HRS    # Abnormal DS DNA, Elevated ANKITA titers - ? lupus, f/up with Rheumatology specialist, for further recommendations      #hypoalbuminemia 2/2 acute liver disease- severe protein/calorie malnutrition  - patient was started on PPN      #distal digit blue discoloration---raynauds phenomenon- primary vs secondary  -r/o etiology---paraneoplastic, vasculitis, vs rheum   -transient=---resolved within 1 hour  gloves/warmth to affected fingertips- ccb with hold parameters for hypotension  esophagram consistent with dysmotility        #UTI with urinary retention  has aragon, monitor u/o   received ceftriaxone/ completed,    #h/o CAD  -cont home regimen  -statin on hold 2/2 acute liver injury  -will resume on plavix post-liver biopsy      #hypothyroidism  -tsh elevated---t3/t4 normal---will cont syntrhoid 50 mcg daily    #DVT//gI ppx  FULL CODE  high risk/ guarded prognosis    #Progress Note Handoff: Pending Consults__Rheumatology_______,Tests__none______,Test Results___liver biopsy results pending____,Other;  Family discussion: patient by bedside Disposition: Home__once medically stable

## 2019-06-26 NOTE — PROGRESS NOTE ADULT - ASSESSMENT
A/P 69 yo F w/ hx of CAD s/p PCI and hypothyroidism sent by gastroenterologist for gallbladder hydrops, course complicated by ALI/ transaminitis possibly of autoimmune etiology, LUIS ALBERTO with hyperkalemia multifactorial 2/2 rhabdo (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 with resultant proteinuria and low albumin state and diffuse anasarca, raynauds phenomenon (not currently active), esophageal dysmotility and reflux, cystitis with urinary retention s/p abx, and subsequent worsening functional debility and malnutrition    #hydrops gallbladder/acute severe transaminitis  -trend LFT's   -waiting for CPK results,   -liver biopsy done clinically too weak at this -moment for cholecystecomy  if cpk climbing may be role for muscle biopsy deltoid- with steroids tx. trial  rheumatology specialist was consulted on 6/25- will f/up for recommendations     #worsening LUIS ALBERTO  with hyperkalemia (improved) and acidosis--2/2 rhabdomyolysis vs HRS  - as per renal team recommendations - patient most likely has hepatorenal syndrome  - c/w bicarb ggt, trend bmp  -urine sodium 20  - will start  on ocreatide/ midodrine empiric tx for possible HRS    # Abnormal DS DNA, Elevated ANKITA titers - ? lupus, f/up with Rheumatology specialist, for further recommendations      #hypoalbuminemia 2/2 acute liver disease- severe protein/calorie malnutrition  - patient was started on PPN      #distal digit blue discoloration---raynauds phenomenon- primary vs secondary  -r/o etiology---paraneoplastic, vasculitis, vs rheum   -transient=---resolved within 1 hour  gloves/warmth to affected fingertips- ccb with hold parameters for hypotension  esophagram consistent with dysmotility        #UTI with urinary retention  has aragon, monitor u/o   received ceftriaxone/ completed,    #h/o CAD  -cont home regimen  -statin on hold 2/2 acute liver injury  -will resume on plavix post-liver biopsy      #hypothyroidism  -tsh elevated---t3/t4 normal---will cont syntrhoid 50 mcg daily    #DVT//gI ppx  FULL CODE  high risk/ guarded prognosis A/P 67 yo F w/ hx of CAD s/p PCI and hypothyroidism sent by gastroenterologist for gallbladder hydrops, course complicated by ALI/ transaminitis possibly of autoimmune etiology, LUIS ALBERTO with hyperkalemia multifactorial 2/2 rhabdo (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 with resultant proteinuria and low albumin state and diffuse anasarca, raynauds phenomenon (not currently active), esophageal dysmotility and reflux, cystitis with urinary retention s/p abx, and subsequent worsening functional debility and malnutrition    #hydrops gallbladder/acute severe transaminitis  -trend LFT's   -waiting for CPK results,   -liver biopsy done shows Cirrhosis but of no clear etiology   -clinically too weak at this -moment for cholecystecomy  -if cpk climbing may be role for muscle biopsy deltoid- with steroids tx. trial  -rheumatology specialist was consulted on 6/25- will f/up for recommendations     #LUIS ALBERTO  -Worsening LUIS ALBERTO Cr rising 3.0>3.1>3.3>3.5(now)  -As per renal team recommendations - patient most likely has hepatorenal syndrome  -Also consulting rheum for autoimmune process HRS vs Autoimmune  -will start on octreotide midodrine empiric tx for possible HRS    # Abnormal DS DNA, Elevated ANKITA titers   - Suspected lupus, f/up with Rheumatology specialist, for further recommendations    #hypoalbuminemia  -Malnutrition vs Liver Cirrhosis  - patient was started on PPN      #distal digit blue discoloration  -raynauds(primary vs secondary)  -r/o etiology---paraneoplastic, vasculitis, vs rheum   -transient=---resolved within 1 hour  -gloves/warmth to affected fingertips  -CCB on hold for hypotension  -esophagram consistent with dysmotility    #UTI with urinary retention  -has aragon, monitor u/o   -received ceftriaxone/ completed  -Urinalysis shows many bacteria, culture results awaited    #h/o CAD  -cont home regimen  -statin on hold 2/2 acute liver injury  -Plavix was held for liver biopsy    #hypothyroidism  -tsh elevated---t3/t4 normal---will cont synthroid 50 mcg daily    #DVT//gI ppx  FULL CODE  high risk/ guarded prognosis

## 2019-06-26 NOTE — PROGRESS NOTE ADULT - ATTENDING COMMENTS
Pt seen and examined  Cr rising though LFT's slightly improved  hematuria ANKITA, anti dsDNA anti histone noted  ? drug induced lupus?  rheum consult and liver bx pending  will plan on renal bx if cr continues to worsen and no explanation elucidated   Stop Asa  will follow, no need for HD as of yet

## 2019-06-27 NOTE — PROGRESS NOTE ADULT - SUBJECTIVE AND OBJECTIVE BOX
SUBJECTIVE:    Patient is a 68y old Female who presents with a chief complaint of gallbladder hydrops (2019 16:10)    Currently admitted to medicine with the primary diagnosis of Gallbladder hydrops     Today is hospital day 13d. This morning she is resting comfortably in bed and reports no new issues or overnight events.     INTERVAL EVENTS: patient is weaker and on TPN now. Her hands and feet are edematous    PAST MEDICAL & SURGICAL HISTORY  Hypothyroidism  CAD S/P percutaneous coronary angioplasty  No significant past surgical history      ALLERGIES:  No Known Allergies    MEDICATIONS:  STANDING MEDICATIONS  albumin human 25% IVPB 400 milliLiter(s) IV Intermittent <User Schedule>  chlorhexidine 4% Liquid 1 Application(s) Topical <User Schedule>  fat emulsion (Plant Based) 20% Infusion 0.558 Gm/kG/Day IV Continuous <Continuous>  fat emulsion (Plant Based) 20% Infusion 0.558 Gm/kG/Day IV Continuous <Continuous>  heparin  Injectable 5000 Unit(s) SubCutaneous every 8 hours  levothyroxine 50 MICROGram(s) Oral daily  metoprolol tartrate 50 milliGRAM(s) Oral two times a day  midodrine 5 milliGRAM(s) Oral every 8 hours  nystatin Powder 1 Application(s) Topical three times a day  pantoprazole   Suspension 40 milliGRAM(s) Oral before breakfast  Parenteral Nutrition - Adult 1 Each TPN Continuous <Continuous>  Parenteral Nutrition - Adult 1 Each TPN Continuous <Continuous>  PPD  5 Tuberculin Unit(s) Injectable 5 Unit(s) IntraDermal once    PRN MEDICATIONS  aluminum hydroxide/magnesium hydroxide/simethicone Suspension 30 milliLiter(s) Oral every 4 hours PRN    VITALS:   T(F): 96.4  HR: 88  BP: 92/50  RR: 18  SpO2: 95%    LABS:                        10.6   18.52 )-----------( 271      ( 2019 07:43 )             29.8     06-    132<L>  |  92<L>  |  131<HH>  ----------------------------<  105<H>  4.4   |  19  |  3.7<H>    Ca    6.2<L>      2019 07:43  Phos  6.9     06-  Mg     2.2     -    TPro  5.3<L>  /  Alb  1.6<L>  /  TBili  2.8<H>  /  DBili  2.0<H>  /  AST  432<H>  /  ALT  379<H>  /  AlkPhos  1189<H>      PT/INR - ( 2019 07:43 )   PT: 12.50 sec;   INR: 1.09 ratio         PTT - ( 2019 07:43 )  PTT:37.1 sec  Urinalysis Basic - ( 2019 22:03 )    Color: Orange / Appearance: Turbid / S.015 / pH: x  Gluc: x / Ketone: Negative  / Bili: Small / Urobili: 1.0 mg/dL   Blood: x / Protein: Trace mg/dL / Nitrite: Negative   Leuk Esterase: Small / RBC: x / WBC 10-25 /HPF   Sq Epi: x / Non Sq Epi: Occasional /HPF / Bacteria: Many /HPF                RADIOLOGY:  Xray Esophagram (19)  Impression:    Moderate-sized hiatal hernia.    There is no evidence of obstruction, however there is retained contrast   within the mid to lower esophagus consistent with dysmotility as well as   esophageal reflux.        US Kidney and Bladder (19)  IMPRESSION:    Unremarkable sonographic appearance of the kidneys.    Incidental hepatic cirrhosis and abdominal pelvic ascites.    Surgical Pathology Report (19 @ 16:10)    Surgical Pathology Report:   ACCESSION No:  [id number]    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 2        Surgical Final Report    >>LIVER BIOPSY  Final Diagnosis  Right hepatic lobe, needle biopsy:  - Liver parenchyma showing mild focal lobulitis and hepatocyte  reactive changes with few glycogenated nuclei.  - Focal area of vaguely formed Francisca-Denk body also present.  - Two minute focus of macro and micro-vesicular steatosis also  present, counting less than 5% of tissue volume of the biopsy.  - Portal areas showing marked chronic inflammation with focal  interface activity, infiltrated with mainly small lymphocytes and  rare neutrophils, and fibrous expansion of portal area with  fibrous bridge formation, probable cirrhosis.  - Stainable iron present, not increased.  - Trichrome stain reviewed.  - PAS-D stain failed to reveal alpha-1 antitrypsin globule.    Comment  _Sections show findings are consistent with clinical impression  of  cirrhosis.  However, the etiology is not clear.  Clinical  correlation is recommended.      PHYSICAL EXAM:  GEN: No acute distress  PULM/CHEST: Clear B/L, equal air entry bilaterally  CVS: RRR, S1+S2 + mild systolic murmur  ABD: Soft, non-tender, non-distended, ecchymosis around the needle prick sites +BS,  EXT: Pedal edema  NEURO: AAOx3

## 2019-06-27 NOTE — PROGRESS NOTE ADULT - SUBJECTIVE AND OBJECTIVE BOX
Nephrology progress note    Patient is seen and examined, events over the last 24 h noted .  NPO, on TPN, anasarka  Allergies:  No Known Allergies    Hospital Medications:   MEDICATIONS  (STANDING):  albumin human 25% IVPB 400 milliLiter(s) IV Intermittent <User Schedule>  chlorhexidine 4% Liquid 1 Application(s) Topical <User Schedule>  fat emulsion (Plant Based) 20% Infusion 0.558 Gm/kG/Day (21.936 mL/Hr) IV Continuous <Continuous>  fat emulsion (Plant Based) 20% Infusion 0.558 Gm/kG/Day (21.936 mL/Hr) IV Continuous <Continuous>  heparin  Injectable 5000 Unit(s) SubCutaneous every 8 hours  levothyroxine 50 MICROGram(s) Oral daily  metoprolol tartrate 50 milliGRAM(s) Oral two times a day  midodrine 5 milliGRAM(s) Oral every 8 hours  nystatin Powder 1 Application(s) Topical three times a day  pantoprazole   Suspension 40 milliGRAM(s) Oral before breakfast  Parenteral Nutrition - Adult 1 Each (60 mL/Hr) TPN Continuous <Continuous>  Parenteral Nutrition - Adult 1 Each (60 mL/Hr) TPN Continuous <Continuous>        VITALS:  T(F): 97 (19 @ 04:58), Max: 97.4 (19 @ 14:09)  HR: 86 (19 @ 04:58)  BP: 92/57 (19 @ 04:58)  RR: 18 (19 @ 04:58)  SpO2: 95% (19 @ 08:17)  Wt(kg): --     @ :  -   @ 07:00  --------------------------------------------------------  IN: 694 mL / OUT: 525 mL / NET: 169 mL     @ 07:01  -   @ 07:00  --------------------------------------------------------  IN: 0 mL / OUT: 700 mL / NET: -700 mL     @ 07:01  -   @ 13:08  --------------------------------------------------------  IN: 0 mL / OUT: 175 mL / NET: -175 mL          PHYSICAL EXAM:  Constitutional: NAD  HEENT: anicteric sclera, oropharynx clear, MMM  Neck: No JVD  Respiratory: CTAB, no wheezes, rales or rhonchi  Cardiovascular: S1, S2, RRR  Gastrointestinal: BS+, soft, NT/ND  Extremities:  peripheral edema+  Neurological: A/O x 3  : No CVA tenderness.  aragon. +  Skin: No rashes  Vascular Access:    LABS:      132<L>  |  92<L>  |  131<HH>  ----------------------------<  105<H>  4.4   |  19  |  3.7<H>    Ca    6.2<L>      2019 07:43  Phos  6.9       Mg     2.2         TPro  5.3<L>  /  Alb  1.6<L>  /  TBili  2.8<H>  /  DBili  2.0<H>  /  AST  432<H>  /  ALT  379<H>  /  AlkPhos  1189<H>                            10.6   18.52 )-----------( 271      ( 2019 07:43 )             29.8       Urine Studies:  Urinalysis Basic - ( 2019 22:03 )    Color: Orange / Appearance: Turbid / S.015 / pH:   Gluc:  / Ketone: Negative  / Bili: Small / Urobili: 1.0 mg/dL   Blood:  / Protein: Trace mg/dL / Nitrite: Negative   Leuk Esterase: Small / RBC:  / WBC 10-25 /HPF   Sq Epi:  / Non Sq Epi: Occasional /HPF / Bacteria: Many /HPF      Sodium, Random Urine: 20.0 mmoL/L ( @ 22:03)  Protein/Creatinine Ratio Calculation: 0.2 Ratio ( @ 22:03)  Creatinine, Random Urine: 119 mg/dL ( @ 22:03)    RADIOLOGY & ADDITIONAL STUDIES:

## 2019-06-27 NOTE — CONSULT NOTE ADULT - SUBJECTIVE AND OBJECTIVE BOX
69 y/o  [facility] sig for cad , mi s/p stent in 2018 , admitted for gallbladder hydrops and inc lfts , liver bx - chronic inflamm/cirrhosis. Pt admits to fatigue ,anorexia, leg weakness x 6 weeks PTA. Pt denies jt pain/ swelling , rashes , epistaxis, hearing loss, wt loss , raynauds , ou , ss, lymphadenopathy , etoh abuse , alopecia , wt loss, fevers/chills , sob ,cough           PE- VVS  Heent- no oral ullcers   heart - rr   lungs- clear   abd - obese ,anasarca  jts- no syn   skin- no rash   ext - strenght - ue - 3/5 b/l , le - 1/5 b/l , no foot / wrist drop         LABS-  rafi - 1:640 , ds dna - 111, hep b/c , cryo- neg , cpk - 5,000 from 18,000,  Cr- 3.7 , lft - inc , hgb-12 , c3/c4- wnl ,  rf- neg, esr- 46 ,  cxr - wnl ,  u/a - pro - trace , occ gran cells , wbc- 10-25 , panca- 1 : 1280, canca- neg , esophogram - dysmotility, wbc - 18 ,000,             quantiferon- indet

## 2019-06-27 NOTE — PROGRESS NOTE ADULT - ASSESSMENT
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 notes appreciated.  # will follow

## 2019-06-27 NOTE — PROGRESS NOTE ADULT - SUBJECTIVE AND OBJECTIVE BOX
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  [facility]            Patient was evaluated and examined  by bedside, remains very weak, worsening diffuse body edema, no abdominal pain, no fever                REVIEW OF SYSTEMS:  please see pertinent positives mentioned above, all other 12 ROS negative      T(C): , Max: 36.3 (06-26-19 @ 14:09)  HR: 86 (06-27-19 @ 04:58)  BP: 92/57 (06-27-19 @ 04:58)  RR: 18 (06-27-19 @ 04:58)  SpO2: 95% (06-27-19 @ 08:17)  CAPILLARY BLOOD GLUCOSE          PHYSICAL EXAM:  General: NAD, AAOX3, patient is laying comfortably in bed  HEENT: AT, NC, Supple, NO JVD, NO CB  Lungs: CTA B/L, no wheezing, no rhonchi  CVS: normal S1, S2, RRR, NO M/G/R  Abdomen: soft, bowel sounds present, non-tender, non-distended  Extremities: no edema, no clubbing, no cyanosis, positive peripheral pulses b/l  Neuro: no acute focal neurological deficits  Skin: no rush, no ecchymosis      LAB  CBC  Date: 06-27-19 @ 07:43  Mean cell Mlrojwrqzb18.1  Mean cell Hemoglobin Conc35.6  Mean cell Volum 87.4  Platelet count-Automate 271  RBC Count 3.41  Red Cell Distrib Width22.3  WBC Count18.52  % Albumin, Urine--  Hematocrit 29.8  Hemoglobin 10.6  CBC  Date: 06-25-19 @ 05:34  Mean cell Ywcnuylzjr93.4  Mean cell Hemoglobin Conc34.7  Mean cell Volum 87.5  Platelet count-Automate 238  RBC Count 3.52  Red Cell Distrib Width21.2  WBC Count15.78  % Albumin, Urine--  Hematocrit 30.8  Hemoglobin 10.7  CBC  Date: 06-24-19 @ 10:26  Mean cell Azmhnucmzx08.1  Mean cell Hemoglobin Conc35.7  Mean cell Volum 87.1  Platelet count-Automate 253  RBC Count 3.50  Red Cell Distrib Width20.1  WBC Count17.88  % Albumin, Urine--  Hematocrit 30.5  Hemoglobin 10.9  CBC  Date: 06-23-19 @ 07:08  Mean cell Xsygbzyjve86.7  Mean cell Hemoglobin Conc34.1  Mean cell Volum 87.2  Platelet count-Automate 248  RBC Count 3.67  Red Cell Distrib Width19.9  WBC Count15.12  % Albumin, Urine--  Hematocrit 32.0  Hemoglobin 10.9  CBC  Date: 06-22-19 @ 17:46  Mean cell Rmewmlprdc72.4  Mean cell Hemoglobin Conc34.6  Mean cell Volum 87.7  Platelet count-Automate 241  RBC Count 3.82  Red Cell Distrib Width19.5  WBC Count14.18  % Albumin, Urine--  Hematocrit 33.5  Hemoglobin 11.6  CBC  Date: 06-21-19 @ 06:55  Mean cell Dfsbyysssb18.8  Mean cell Hemoglobin Conc34.8  Mean cell Volum 85.8  Platelet count-Automate 197  RBC Count 3.72  Red Cell Distrib Width18.7  WBC Count15.53  % Albumin, Urine--  Hematocrit 31.9  Hemoglobin 11.1    [facility]  06-27-19 @ 07:43  Blood Gas Arterial-Calcium,Ionized--  Blood Urea Nitrogen, Serum 131 mg/dL<HH> [10 - 20] [Critical value:]  Carbon Dioxide, Serum19 mmol/L [17 - 32]  Chloride, Serum92 mmol/L<L> [98 - 110]  Creatinie, Serum3.7 mg/dL<H> [0.7 - 1.5]  Glucose, Qomdg292 mg/dL<H> [70 - 99]  Potassium, Serum4.4 mmol/L [3.5 - 5.0]  Sodium, Serum 132 mmol/L<L> [135 - 146]  [facility]  06-25-19 @ 05:34  Blood Gas Arterial-Calcium,Ionized--  Blood Urea Nitrogen, Serum 107 mg/dL<HH> [10 - 20] [Critical value:]  Carbon Dioxide, Serum19 mmol/L [17 - 32]  Chloride, Vxwkv395 mmol/L [98 - 110]  Creatinie, Serum3.5 mg/dL<H> [0.7 - 1.5]  Glucose, Fevpb702 mg/dL<H> [70 - 99]  Potassium, Serum4.9 mmol/L [3.5 - 5.0]  Sodium, Serum 137 mmol/L [135 - 146]  [facility]  06-24-19 @ 10:26  Blood Gas Arterial-Calcium,Ionized--  Blood Urea Nitrogen, Serum 103 mg/dL<HH> [10 - 20] [Critical value:]  Carbon Dioxide, Serum15 mmol/L<L> [17 - 32]  Chloride, Cncba828 mmol/L [98 - 110]  Creatinie, Serum3.3 mg/dL<H> [0.7 - 1.5]  Glucose, Aexxt218 mg/dL<H> [70 - 99]  Potassium, Serum5.4 mmol/L<H> [3.5 - 5.0]  Sodium, Serum 133 mmol/L<L> [135 - 146]  [facility]  06-23-19 @ 17:42  Blood Gas Arterial-Calcium,Ionized--  Blood Urea Nitrogen, Serum 96 mg/dL<HH> [10 - 20] [Critical value:]  Carbon Dioxide, Serum13 mmol/L<L> [17 - 32]  Chloride, Tqpkg026 mmol/L [98 - 110]  Creatinie, Serum3.1 mg/dL<H> [0.7 - 1.5]  Glucose, Qjdil369 mg/dL<H> [70 - 99]  Potassium, Serum6.1 mmol/L<HH> [3.5 - 5.0] [Critical value:  TYPE:(C=Critical, N=Notification, A=Abnormal) C  TESTS: _K  DATE/TIME CALLED: _06/23/19 19:54  CALLED TO: RONNIE GILL  READ BACK (2 Patient Identifiers)(Y/N): _Y  READ BACK VALUES (Y/N): _Y  CALLED BY: _CPE]  Sodium, Serum 134 mmol/L<L> [135 - 146]  [facility]  06-23-19 @ 07:08  Blood Gas Arterial-Calcium,Ionized--  Blood Urea Nitrogen, Serum 87 mg/dL<HH> [10 - 20] [Critical value:]  Carbon Dioxide, Serum14 mmol/L<L> [17 - 32]  Chloride, Hizwx013 mmol/L [98 - 110]  Creatinie, Serum3.0 mg/dL<H> [0.7 - 1.5]  Glucose, Uuzsj883 mg/dL<H> [70 - 99]  Potassium, Serum6.1 mmol/L<HH> [3.5 - 5.0] [Critical value:  TYPE:(C=Critical, N=Notification, A=Abnormal) C  TESTS: _K  DATE/TIME CALLED: _06/23/19 08:59  CALLED TO: RONNIE GILL  READ BACK (2 Patient Identifiers)(Y/N): _Y  READ BACK VALUES (Y/N): _Y  CALLED BY: _CPE]  Sodium, Serum 129 mmol/L<L> [135 - 146]        PT/INR - ( 27 Jun 2019 07:43 )   PT: 12.50 sec;   INR: 1.09 ratio         PTT - ( 27 Jun 2019 07:43 )  PTT:37.1 sec      Microbiology:    Culture - Urine (collected 06-22-19 @ 18:04)  Source: .Urine Clean Catch (Midstream)  Final Report (06-24-19 @ 06:28):    No growth    Culture - Urine (collected 06-21-19 @ 23:54)  Source: .Urine Clean Catch (Midstream)  Final Report (06-23-19 @ 08:53):    No growth    Culture - Blood (collected 06-15-19 @ 18:01)  Source: .Blood None  Final Report (06-21-19 @ 06:00):    No growth at 5 days.        Medications:  albumin human 25% IVPB 400 milliLiter(s) IV Intermittent <User Schedule>  aluminum hydroxide/magnesium hydroxide/simethicone Suspension 30 milliLiter(s) Oral every 4 hours PRN  chlorhexidine 4% Liquid 1 Application(s) Topical <User Schedule>  fat emulsion (Plant Based) 20% Infusion 0.558 Gm/kG/Day IV Continuous <Continuous>  heparin  Injectable 5000 Unit(s) SubCutaneous every 8 hours  levothyroxine 50 MICROGram(s) Oral daily  metoprolol tartrate 50 milliGRAM(s) Oral two times a day  nystatin Powder 1 Application(s) Topical three times a day  pantoprazole   Suspension 40 milliGRAM(s) Oral before breakfast  Parenteral Nutrition - Adult 1 Each TPN Continuous <Continuous>        Assessment and Plan: 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  [facility]            Patient was evaluated and examined  by bedside, remains very weak, worsening diffuse body edema, no abdominal pain, no fever, low urine output- 325 ml from 10 pm yesterday till today 1200 noon.      REVIEW OF SYSTEMS:  please see pertinent positives mentioned above, all other 12 ROS negative      T(C): , Max: 36.3 (06-26-19 @ 14:09)  HR: 86 (06-27-19 @ 04:58)  BP: 92/57 (06-27-19 @ 04:58)  RR: 18 (06-27-19 @ 04:58)  SpO2: 95% (06-27-19 @ 08:17)  CAPILLARY BLOOD GLUCOSE          PHYSICAL EXAM:  General: NAD, AAOX3, patient is laying comfortably in bed, obese  HEENT: AT, NC, Supple, NO JVD, NO CB  Lungs: CTA B/L, no wheezing, no rhonchi  CVS: normal S1, S2, RRR, NO M/G/R  Abdomen: soft, bowel sounds present, non-tender, non-distended  Extremities: extensive plus 2 pitting diffuse pitting edema of all extremities, no clubbing, no cyanosis, positive peripheral pulses b/l  Neuro: no acute focal neurological deficits, profound  generalized body weakness  Skin: no rush, no ecchymosis        LAB  CBC  Date: 06-27-19 @ 07:43  Mean cell Xxtrcdlned90.1  Mean cell Hemoglobin Conc35.6  Mean cell Volum 87.4  Platelet count-Automate 271  RBC Count 3.41  Red Cell Distrib Width22.3  WBC Count18.52  % Albumin, Urine--  Hematocrit 29.8  Hemoglobin 10.6  CBC  Date: 06-25-19 @ 05:34  Mean cell Ljwyczcnos03.4  Mean cell Hemoglobin Conc34.7  Mean cell Volum 87.5  Platelet count-Automate 238  RBC Count 3.52  Red Cell Distrib Width21.2  WBC Count15.78  % Albumin, Urine--  Hematocrit 30.8  Hemoglobin 10.7  CBC  Date: 06-24-19 @ 10:26  Mean cell Evxpyalliv10.1  Mean cell Hemoglobin Conc35.7  Mean cell Volum 87.1  Platelet count-Automate 253  RBC Count 3.50  Red Cell Distrib Width20.1  WBC Count17.88  % Albumin, Urine--  Hematocrit 30.5  Hemoglobin 10.9  CBC  Date: 06-23-19 @ 07:08  Mean cell Qstjvxpbad45.7  Mean cell Hemoglobin Conc34.1  Mean cell Volum 87.2  Platelet count-Automate 248  RBC Count 3.67  Red Cell Distrib Width19.9  WBC Count15.12  % Albumin, Urine--  Hematocrit 32.0  Hemoglobin 10.9  CBC  Date: 06-22-19 @ 17:46  Mean cell Jrrgdhvawe92.4  Mean cell Hemoglobin Conc34.6  Mean cell Volum 87.7  Platelet count-Automate 241  RBC Count 3.82  Red Cell Distrib Width19.5  WBC Count14.18  % Albumin, Urine--  Hematocrit 33.5  Hemoglobin 11.6  CBC  Date: 06-21-19 @ 06:55  Mean cell Xilwwipqbl34.8  Mean cell Hemoglobin Conc34.8  Mean cell Volum 85.8  Platelet count-Automate 197  RBC Count 3.72  Red Cell Distrib Width18.7  WBC Count15.53  % Albumin, Urine--  Hematocrit 31.9  Hemoglobin 11.1    [facility]  06-27-19 @ 07:43  Blood Gas Arterial-Calcium,Ionized--  Blood Urea Nitrogen, Serum 131 mg/dL<HH> [10 - 20] [Critical value:]  Carbon Dioxide, Serum19 mmol/L [17 - 32]  Chloride, Serum92 mmol/L<L> [98 - 110]  Creatinie, Serum3.7 mg/dL<H> [0.7 - 1.5]  Glucose, Zvbpr842 mg/dL<H> [70 - 99]  Potassium, Serum4.4 mmol/L [3.5 - 5.0]  Sodium, Serum 132 mmol/L<L> [135 - 146]  [facility]  06-25-19 @ 05:34  Blood Gas Arterial-Calcium,Ionized--  Blood Urea Nitrogen, Serum 107 mg/dL<HH> [10 - 20] [Critical value:]  Carbon Dioxide, Serum19 mmol/L [17 - 32]  Chloride, Yfkie467 mmol/L [98 - 110]  Creatinie, Serum3.5 mg/dL<H> [0.7 - 1.5]  Glucose, Tuscm826 mg/dL<H> [70 - 99]  Potassium, Serum4.9 mmol/L [3.5 - 5.0]  Sodium, Serum 137 mmol/L [135 - 146]  [facility]  06-24-19 @ 10:26  Blood Gas Arterial-Calcium,Ionized--  Blood Urea Nitrogen, Serum 103 mg/dL<HH> [10 - 20] [Critical value:]  Carbon Dioxide, Serum15 mmol/L<L> [17 - 32]  Chloride, Bqtwa051 mmol/L [98 - 110]  Creatinie, Serum3.3 mg/dL<H> [0.7 - 1.5]  Glucose, Mkhhh613 mg/dL<H> [70 - 99]  Potassium, Serum5.4 mmol/L<H> [3.5 - 5.0]  Sodium, Serum 133 mmol/L<L> [135 - 146]  [facility]  06-23-19 @ 17:42  Blood Gas Arterial-Calcium,Ionized--  Blood Urea Nitrogen, Serum 96 mg/dL<HH> [10 - 20] [Critical value:]  Carbon Dioxide, Serum13 mmol/L<L> [17 - 32]  Chloride, Bgsxj582 mmol/L [98 - 110]  Creatinie, Serum3.1 mg/dL<H> [0.7 - 1.5]  Glucose, Ygczz431 mg/dL<H> [70 - 99]  Potassium, Serum6.1 mmol/L<HH> [3.5 - 5.0] [Critical value:  TYPE:(C=Critical, N=Notification, A=Abnormal) C  TESTS: _K  DATE/TIME CALLED: _06/23/19 19:54  CALLED TO: RONNIE GILL  READ BACK (2 Patient Identifiers)(Y/N): _Y  READ BACK VALUES (Y/N): _Y  CALLED BY: _CPE]  Sodium, Serum 134 mmol/L<L> [135 - 146]  [facility]  06-23-19 @ 07:08  Blood Gas Arterial-Calcium,Ionized--  Blood Urea Nitrogen, Serum 87 mg/dL<HH> [10 - 20] [Critical value:]  Carbon Dioxide, Serum14 mmol/L<L> [17 - 32]  Chloride, Rzvpn568 mmol/L [98 - 110]  Creatinie, Serum3.0 mg/dL<H> [0.7 - 1.5]  Glucose, Iryjq308 mg/dL<H> [70 - 99]  Potassium, Serum6.1 mmol/L<HH> [3.5 - 5.0] [Critical value:  TYPE:(C=Critical, N=Notification, A=Abnormal) C  TESTS: _K  DATE/TIME CALLED: _06/23/19 08:59  CALLED TO: _DR GILL  READ BACK (2 Patient Identifiers)(Y/N): _Y  READ BACK VALUES (Y/N): _Y  CALLED BY: _CPE]  Sodium, Serum 129 mmol/L<L> [135 - 146]        PT/INR - ( 27 Jun 2019 07:43 )   PT: 12.50 sec;   INR: 1.09 ratio         PTT - ( 27 Jun 2019 07:43 )  PTT:37.1 sec      Microbiology:    Culture - Urine (collected 06-22-19 @ 18:04)  Source: .Urine Clean Catch (Midstream)  Final Report (06-24-19 @ 06:28):    No growth    Culture - Urine (collected 06-21-19 @ 23:54)  Source: .Urine Clean Catch (Midstream)  Final Report (06-23-19 @ 08:53):    No growth    Culture - Blood (collected 06-15-19 @ 18:01)  Source: .Blood None  Final Report (06-21-19 @ 06:00):    No growth at 5 days.        Medications:  albumin human 25% IVPB 400 milliLiter(s) IV Intermittent <User Schedule>  aluminum hydroxide/magnesium hydroxide/simethicone Suspension 30 milliLiter(s) Oral every 4 hours PRN  chlorhexidine 4% Liquid 1 Application(s) Topical <User Schedule>  fat emulsion (Plant Based) 20% Infusion 0.558 Gm/kG/Day IV Continuous <Continuous>  heparin  Injectable 5000 Unit(s) SubCutaneous every 8 hours  levothyroxine 50 MICROGram(s) Oral daily  metoprolol tartrate 50 milliGRAM(s) Oral two times a day  nystatin Powder 1 Application(s) Topical three times a day  pantoprazole   Suspension 40 milliGRAM(s) Oral before breakfast  Parenteral Nutrition - Adult 1 Each TPN Continuous <Continuous>        Assessment and Plan:  A/P 69 yo F w/ hx of CAD s/p PCI and hypothyroidism sent by gastroenterologist for gallbladder hydrops, course complicated by ALI/ transaminitis possibly of autoimmune etiology, LUIS ALBERTO with hyperkalemia multifactorial 2/2 rhabdo (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 with resultant proteinuria and low albumin state and diffuse anasarca, raynauds phenomenon (not currently active), esophageal dysmotility and reflux, cystitis with urinary retention s/p abx, and subsequent worsening functional debility and malnutrition    #hydrops gallbladder with obstructing gas -acute persistent severe transaminitis of unclear etiology   trend LFT's, monitor CPK,   liver serology most consistent with autoimmune process   statin was d/c on admission,   liver was  biopsied- Surgical Pathology Report (06.24.19 @ 16:10)  Surgical Final Report: Final Diagnosis  Right hepatic lobe, needle biopsy:  - Liver parenchyma showing mild focal lobulitis and hepatocyte reactive changes with few glycogenated nuclei.  - Focal area of vaguely formed Francisca-Denk body also present.  - Two minute focus of macro and micro-vesicular steatosis also present, counting less than 5% of tissue volume of the biopsy.  - Portal areas showing marked chronic inflammation with focal interface activity, infiltrated with mainly small lymphocytes and  rare neutrophils, and fibrous expansion of portal area with  fibrous bridge formation, probable cirrhosis.  - Stainable iron present, not increased.  - Trichrome stain reviewed.  - PAS-D stain failed to reveal alpha-1 antitrypsin globule.    Verified by: Manjo Lindsey M.D.  (Electronic Signature)  Reported on: 06/26/19 15:42 EDT, [facility] [place] [address]  _________________________________________________________________    Comment  _Sections show findings are consistent with clinical impression of  cirrhosis.  However, the etiology is not clear.  Clinical correlation is recommended.    06/25/19 08:55 ns      LIVER FUNCTIONS - ( 27 Jun 2019 07:43 )  Alb: 1.6 g/dL / Pro: 5.3 g/dL / ALK PHOS: 1189 U/L / ALT: 379 U/L / AST: 432 U/L / GGT: x            clinically too weak at this moment for cholecystecomy         #worsening LUIS ALBERTO  with hyperkalemia (improved) and acidosis--2/2 rhabdomyolysis vs HRS  - as per renal team recommendations - patient will need diagnostic renal biopsy- tentatively scheduled for July 1st in am  - c/w bicarb ggt, trend bmp  -urine sodium 20      # Abnormal elevated DS DNA- 111, Elevated ANKITA titers 1: 2560, Elevated P-Anca- titer 1: 1280 - ? lupus nephritis, vs Vasculitis,   - we have consulted  Rheumatology specialist on 06/25/19, still with no response, will f/up for further recommendations- ? trial of IV steroids    # hypotension- with hypoalbuminemia, tertiary fluid spacing, LUIS ALBERTO, started on Midodrine 5 mg po three times daily      #hypoalbuminemia 2/2 acute liver disease- severe protein/calorie malnutrition  - patient was started on PPN      #distal digit blue discoloration---raynauds phenomenon- primary vs secondary  -r/o etiology---paraneoplastic, vasculitis, vs rheum   -transient=---resolved within 1 hour  gloves/warmth to affected fingertips- ccb with hold parameters for hypotension  esophagram consistent with dysmotility        #UTI with urinary retention  has aragon, monitor u/o   received ceftriaxone/ completed,    #h/o CAD  -cont home regimen  -statin on hold 2/2 acute liver injury  -will resume  plavix post-renal  biopsy      #hypothyroidism  -tsh elevated---t3/t4 normal---will cont syntrhoid 50 mcg daily    #DVT//gI ppx  FULL CODE  high risk/ guarded prognosis    #Progress Note Handoff: Pending Consults__Rheumatology, renal team f/up, GI f/up_______,Tests__none______,Test Results__none_____,Other; renal biopsy on july 1st  Family discussion: patient by bedside Disposition: Home__once medically stable 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  [facility]            Patient was evaluated and examined  by bedside, remains very weak, worsening diffuse body edema, no abdominal pain, no fever, low urine output- 325 ml from 10 pm yesterday till today 1200 noon.      REVIEW OF SYSTEMS:  please see pertinent positives mentioned above, all other 12 ROS negative      T(C): , Max: 36.3 (06-26-19 @ 14:09)  HR: 86 (06-27-19 @ 04:58)  BP: 92/57 (06-27-19 @ 04:58)  RR: 18 (06-27-19 @ 04:58)  SpO2: 95% (06-27-19 @ 08:17)  CAPILLARY BLOOD GLUCOSE          PHYSICAL EXAM:  General: NAD, AAOX3, patient is laying comfortably in bed, obese  HEENT: AT, NC, Supple, NO JVD, NO CB  Lungs: CTA B/L, no wheezing, no rhonchi  CVS: normal S1, S2, RRR, NO M/G/R  Abdomen: soft, bowel sounds present, non-tender, non-distended  Extremities: extensive plus 2 pitting diffuse pitting edema of all extremities, no clubbing, no cyanosis, positive peripheral pulses b/l  Neuro: no acute focal neurological deficits, profound  generalized body weakness  Skin: no rush, no ecchymosis        LAB  CBC  Date: 06-27-19 @ 07:43  Mean cell Spdvasnajj96.1  Mean cell Hemoglobin Conc35.6  Mean cell Volum 87.4  Platelet count-Automate 271  RBC Count 3.41  Red Cell Distrib Width22.3  WBC Count18.52  % Albumin, Urine--  Hematocrit 29.8  Hemoglobin 10.6  CBC  Date: 06-25-19 @ 05:34  Mean cell Gzpznrwfku50.4  Mean cell Hemoglobin Conc34.7  Mean cell Volum 87.5  Platelet count-Automate 238  RBC Count 3.52  Red Cell Distrib Width21.2  WBC Count15.78  % Albumin, Urine--  Hematocrit 30.8  Hemoglobin 10.7  CBC  Date: 06-24-19 @ 10:26  Mean cell Ettssvtuvs51.1  Mean cell Hemoglobin Conc35.7  Mean cell Volum 87.1  Platelet count-Automate 253  RBC Count 3.50  Red Cell Distrib Width20.1  WBC Count17.88  % Albumin, Urine--  Hematocrit 30.5  Hemoglobin 10.9  CBC  Date: 06-23-19 @ 07:08  Mean cell Rhbtkzeafh74.7  Mean cell Hemoglobin Conc34.1  Mean cell Volum 87.2  Platelet count-Automate 248  RBC Count 3.67  Red Cell Distrib Width19.9  WBC Count15.12  % Albumin, Urine--  Hematocrit 32.0  Hemoglobin 10.9  CBC  Date: 06-22-19 @ 17:46  Mean cell Txpjuopcgw60.4  Mean cell Hemoglobin Conc34.6  Mean cell Volum 87.7  Platelet count-Automate 241  RBC Count 3.82  Red Cell Distrib Width19.5  WBC Count14.18  % Albumin, Urine--  Hematocrit 33.5  Hemoglobin 11.6  CBC  Date: 06-21-19 @ 06:55  Mean cell Bvopscvrmc77.8  Mean cell Hemoglobin Conc34.8  Mean cell Volum 85.8  Platelet count-Automate 197  RBC Count 3.72  Red Cell Distrib Width18.7  WBC Count15.53  % Albumin, Urine--  Hematocrit 31.9  Hemoglobin 11.1    [facility]  06-27-19 @ 07:43  Blood Gas Arterial-Calcium,Ionized--  Blood Urea Nitrogen, Serum 131 mg/dL<HH> [10 - 20] [Critical value:]  Carbon Dioxide, Serum19 mmol/L [17 - 32]  Chloride, Serum92 mmol/L<L> [98 - 110]  Creatinie, Serum3.7 mg/dL<H> [0.7 - 1.5]  Glucose, Eghbk158 mg/dL<H> [70 - 99]  Potassium, Serum4.4 mmol/L [3.5 - 5.0]  Sodium, Serum 132 mmol/L<L> [135 - 146]  [facility]  06-25-19 @ 05:34  Blood Gas Arterial-Calcium,Ionized--  Blood Urea Nitrogen, Serum 107 mg/dL<HH> [10 - 20] [Critical value:]  Carbon Dioxide, Serum19 mmol/L [17 - 32]  Chloride, Sqegw703 mmol/L [98 - 110]  Creatinie, Serum3.5 mg/dL<H> [0.7 - 1.5]  Glucose, Uhcrz125 mg/dL<H> [70 - 99]  Potassium, Serum4.9 mmol/L [3.5 - 5.0]  Sodium, Serum 137 mmol/L [135 - 146]  [facility]  06-24-19 @ 10:26  Blood Gas Arterial-Calcium,Ionized--  Blood Urea Nitrogen, Serum 103 mg/dL<HH> [10 - 20] [Critical value:]  Carbon Dioxide, Serum15 mmol/L<L> [17 - 32]  Chloride, Fgsjn949 mmol/L [98 - 110]  Creatinie, Serum3.3 mg/dL<H> [0.7 - 1.5]  Glucose, Ipxgw772 mg/dL<H> [70 - 99]  Potassium, Serum5.4 mmol/L<H> [3.5 - 5.0]  Sodium, Serum 133 mmol/L<L> [135 - 146]  [facility]  06-23-19 @ 17:42  Blood Gas Arterial-Calcium,Ionized--  Blood Urea Nitrogen, Serum 96 mg/dL<HH> [10 - 20] [Critical value:]  Carbon Dioxide, Serum13 mmol/L<L> [17 - 32]  Chloride, Auhud442 mmol/L [98 - 110]  Creatinie, Serum3.1 mg/dL<H> [0.7 - 1.5]  Glucose, Xlopy077 mg/dL<H> [70 - 99]  Potassium, Serum6.1 mmol/L<HH> [3.5 - 5.0] [Critical value:  TYPE:(C=Critical, N=Notification, A=Abnormal) C  TESTS: _K  DATE/TIME CALLED: _06/23/19 19:54  CALLED TO: RONNIE GILL  READ BACK (2 Patient Identifiers)(Y/N): _Y  READ BACK VALUES (Y/N): _Y  CALLED BY: _CPE]  Sodium, Serum 134 mmol/L<L> [135 - 146]  [facility]  06-23-19 @ 07:08  Blood Gas Arterial-Calcium,Ionized--  Blood Urea Nitrogen, Serum 87 mg/dL<HH> [10 - 20] [Critical value:]  Carbon Dioxide, Serum14 mmol/L<L> [17 - 32]  Chloride, Yawnp157 mmol/L [98 - 110]  Creatinie, Serum3.0 mg/dL<H> [0.7 - 1.5]  Glucose, Jwjah094 mg/dL<H> [70 - 99]  Potassium, Serum6.1 mmol/L<HH> [3.5 - 5.0] [Critical value:  TYPE:(C=Critical, N=Notification, A=Abnormal) C  TESTS: _K  DATE/TIME CALLED: _06/23/19 08:59  CALLED TO: _DR GILL  READ BACK (2 Patient Identifiers)(Y/N): _Y  READ BACK VALUES (Y/N): _Y  CALLED BY: _CPE]  Sodium, Serum 129 mmol/L<L> [135 - 146]        PT/INR - ( 27 Jun 2019 07:43 )   PT: 12.50 sec;   INR: 1.09 ratio         PTT - ( 27 Jun 2019 07:43 )  PTT:37.1 sec      Microbiology:    Culture - Urine (collected 06-22-19 @ 18:04)  Source: .Urine Clean Catch (Midstream)  Final Report (06-24-19 @ 06:28):    No growth    Culture - Urine (collected 06-21-19 @ 23:54)  Source: .Urine Clean Catch (Midstream)  Final Report (06-23-19 @ 08:53):    No growth    Culture - Blood (collected 06-15-19 @ 18:01)  Source: .Blood None  Final Report (06-21-19 @ 06:00):    No growth at 5 days.        Medications:  albumin human 25% IVPB 400 milliLiter(s) IV Intermittent <User Schedule>  aluminum hydroxide/magnesium hydroxide/simethicone Suspension 30 milliLiter(s) Oral every 4 hours PRN  chlorhexidine 4% Liquid 1 Application(s) Topical <User Schedule>  fat emulsion (Plant Based) 20% Infusion 0.558 Gm/kG/Day IV Continuous <Continuous>  heparin  Injectable 5000 Unit(s) SubCutaneous every 8 hours  levothyroxine 50 MICROGram(s) Oral daily  metoprolol tartrate 50 milliGRAM(s) Oral two times a day  nystatin Powder 1 Application(s) Topical three times a day  pantoprazole   Suspension 40 milliGRAM(s) Oral before breakfast  Parenteral Nutrition - Adult 1 Each TPN Continuous <Continuous>        Assessment and Plan:  A/P 67 yo F w/ hx of CAD s/p PCI and hypothyroidism sent by gastroenterologist for gallbladder hydrops, course complicated by ALI/ transaminitis possibly of autoimmune etiology, LUIS ALBERTO with hyperkalemia multifactorial 2/2 rhabdo (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 with resultant proteinuria and low albumin state and diffuse anasarca, raynauds phenomenon (not currently active), esophageal dysmotility and reflux, cystitis with urinary retention s/p abx, and subsequent worsening functional debility and malnutrition    #hydrops gallbladder with obstructing gas -acute persistent severe transaminitis of unclear etiology   trend LFT's, monitor CPK,   liver serology most consistent with autoimmune process   statin was d/c on admission,   liver was  biopsied- Surgical Pathology Report (06.24.19 @ 16:10)  Surgical Final Report: Final Diagnosis  Right hepatic lobe, needle biopsy:  - Liver parenchyma showing mild focal lobulitis and hepatocyte reactive changes with few glycogenated nuclei.  - Focal area of vaguely formed Francisca-Denk body also present.  - Two minute focus of macro and micro-vesicular steatosis also present, counting less than 5% of tissue volume of the biopsy.  - Portal areas showing marked chronic inflammation with focal interface activity, infiltrated with mainly small lymphocytes and  rare neutrophils, and fibrous expansion of portal area with  fibrous bridge formation, probable cirrhosis.  - Stainable iron present, not increased.  - Trichrome stain reviewed.  - PAS-D stain failed to reveal alpha-1 antitrypsin globule.    Verified by: Manoj Lindsey M.D.  (Electronic Signature)  Reported on: 06/26/19 15:42 EDT, [facility] [address]  _________________________________________________________________    Comment  _Sections show findings are consistent with clinical impression of  cirrhosis.  However, the etiology is not clear.  Clinical correlation is recommended.    06/25/19 08:55 ns      LIVER FUNCTIONS - ( 27 Jun 2019 07:43 )  Alb: 1.6 g/dL / Pro: 5.3 g/dL / ALK PHOS: 1189 U/L / ALT: 379 U/L / AST: 432 U/L / GGT: x            clinically too weak at this moment for cholecystecomy         #worsening LUIS ALBERTO  with hyperkalemia (improved) and acidosis--2/2 rhabdomyolysis vs HRS  - as per renal team recommendations - patient will need diagnostic renal biopsy- tentatively scheduled for July 1st in am  - patient has severe tertiary volume spacing, will try IV albumin with IV lasix tx. challenge.   - strict I and O monitoring      # Abnormal elevated DS DNA- 111, Elevated ANKITA titers 1: 2560, Elevated P-Anca- titer 1: 1280 - ? lupus nephritis, vs Vasculitis,   - we have consulted  Rheumatology specialist on 06/25/19, still with no response, will f/up for further recommendations- ? trial of IV steroids    #Persistent Leukocytosis- so far no acute infectious etiology   -continue to monitor CBC     # hypotension- with hypoalbuminemia, tertiary fluid spacing, LUIS ALBERTO, started on Midodrine 5 mg po three times daily      #hypoalbuminemia 2/2 acute liver disease- severe protein/calorie malnutrition  - patient was started on PPN      #distal digit blue discoloration---raynauds phenomenon- primary vs secondary  -r/o etiology---paraneoplastic, vasculitis, vs rheum   -transient=---resolved within 1 hour  gloves/warmth to affected fingertips- ccb with hold parameters for hypotension  esophagram consistent with dysmotility        #UTI with urinary retention  has aragon, monitor u/o   received ceftriaxone/ completed,    #h/o CAD  -cont home regimen  -statin on hold 2/2 acute liver injury  -will resume  plavix post-renal  biopsy      #hypothyroidism  -tsh elevated---t3/t4 normal---will cont syntrhoid 50 mcg daily    #DVT//gI ppx  FULL CODE  high risk/ guarded prognosis    #Progress Note Handoff: Pending Consults__Rheumatology, renal team f/up, GI f/up_______,Tests__none______,Test Results__none_____,Other; renal biopsy on july 1st  Family discussion: patient by bedside Disposition: Home__once medically stable

## 2019-06-27 NOTE — PROGRESS NOTE ADULT - ASSESSMENT
68 Y F Hx of CAD on DAPT, for a stent >1 year ago, w abnormal LFTs  Imaging suggestive of cirrhosis (nodular contour) with no evident signs of portal HTN.  There is evidence of cholelithiasis with a possible cystic duct stone impacting the CBD proximaly despte no evident giovanni dil.  The etiology of the derangement in the liver enzymes is unclear. Possibilities include auto immune hepatitis, hepatitis , or  obstructive disease.      Currently the soluble IgG is mildly elevated the ASMA and Anti LKM  are negative.  The AMA is negative ANKITA is positive.    Current serologic work highly suggestive of AIH  Awaiting confirmatory biopsy    Suggest checking aldolase, CPK and rheum eval for possible work up of a myositis perhaps autoimmune in etiology as well (if path is confirmatory).      1)Hepatitis ?Myositis statin induced AIH? IMPROVING PRELIM PATH SUGGESTIVE OF AIH  2)Abnormal liver imaging- cirrhosis  -HCC: none on imaging  -EV: needs screening  -PSE: Negative  -Ascites: non on imaging  3)PROFOUND HYPOALBIMINURIA  3)Kidney disease? CKD?  4)CAD on DAPT off for biopsy  5)LUIS ALBERTO. Awaiting kidney biopsy  6)GERD    Rec:  - As recommended by Rheum and in light of improvement in LFTs and no evidence of failure we would delay initiation of prednisone. Also recommend work up of leukocytosis though there is no apparent infection.   -Send out test for HMG-CoA reductase inhibitor antibodies  - Trend LFTs and INR DAILY  - Will warrant OP follow up for screening (EGD and US Q6mo) and immunization for hep B and A  - Continue PPI 68 Y F Hx of CAD on DAPT, for a stent >1 year ago, w abnormal LFTs  Imaging suggestive of cirrhosis (nodular contour) with no evident signs of portal HTN.  There is evidence of cholelithiasis with a possible cystic duct stone impacting the CBD proximaly despte no evident giovanni dil.  The etiology of the derangement in the liver enzymes is unclear. Possibilities include auto immune hepatitis, hepatitis , or  obstructive disease.      Currently the soluble IgG is mildly elevated the ASMA and Anti LKM  are negative.  The AMA is negative ANKITA is positive.    Current serologic work highly suggestive of AIH  Awaiting confirmatory biopsy    Suggest checking aldolase, CPK and rheum eval for possible work up of a myositis perhaps autoimmune in etiology as well (if path is confirmatory).      1)Hepatitis:  PRELIM PATH SUGGESTIVE OF AIH, with evidence of underlying cirrhosis. Lfts are improving overall.  Although steroid therapy would be indicated, rheumatology suggests delaying therapy until renal biopsy.    2)Abnormal liver imaging- cirrhosis  -HCC: none on imaging  -EV: needs screening  -PSE: Negative  -Ascites: non on imaging  3)PROFOUND HYPOALBIMINURIA  3)Kidney disease? CKD?  4)CAD on DAPT off for biopsy  5)LUIS ALBERTO. Awaiting kidney biopsy  6)GERD    Rec:  - As recommended by Rheum and in light of improvement in LFTs and no evidence of failure we would delay initiation of prednisone. Also recommend work up of leukocytosis though there is no apparent infection.     - Trend LFTs and INR DAILY  - Will warrant OP follow up for screening (EGD and US Q6mo) and immunization for hep B and A  - Continue PPI

## 2019-06-27 NOTE — PROGRESS NOTE ADULT - SUBJECTIVE AND OBJECTIVE BOX
Hepatology/GI follow up note: Pt seen and examined at bedside.     For questions and inquiries please page [phone].  For urgent matters or after 5pm and on weekends please page the fellow on call through the GI paging system.    68y Female seen for: hepatitis/ cirrhosis    Subjective/Interval events:  Liver Bx reviewed: Extensive fibrosis, lack of fat  Interface hepatitis suggestive of AIH  Biopsy will be sent out for confirmation  Patient remains severely edematous, weak.  Endorses epigastric pain and reflux    Review of system  General:  (-) weight loss, (-) fevers  Eyes:  (-) visual changes  CV:  (-) chest pain  Resp: (-) SOB, (-) wheezing  GI: (+) abdominal pain,  (-) nausea, (-) vomiting, (-) dysphagia, (-) diarrhea, (-) constipation, (-) rectal bleeding, (-) melena, (-) hematemesis, (+) GERD  Neuro: (-) confusion, (-) weakness  Psych:  (-) Hallucinations  Heme:  (-) easy bruisability    Past medical/surgical Hx:  PAST MEDICAL & SURGICAL HISTORY:  Hypothyroidism  CAD S/P percutaneous coronary angioplasty  No significant past surgical history    Home Medications:  Last Order Reconciliation Date: 06-15-19 @ 02:05 (Admission Reconciliation)  aspirin 81 mg oral tablet, chewable: 1 tab(s) orally once a day  atorvastatin 80 mg oral tablet: 1 tab(s) orally once a day (at bedtime)  levothyroxine 50 mcg (0.05 mg) oral tablet: 1 tab(s) orally once a day  Metoprolol Tartrate 50 mg oral tablet: 1 tab(s) orally 2 times a day  prasugrel 10 mg oral tablet: 1 tab(s) orally once a day      Allergies:  No Known Allergies      Current Medications:   albumin human 25% IVPB 400 milliLiter(s) IV Intermittent <User Schedule>  aluminum hydroxide/magnesium hydroxide/simethicone Suspension 30 milliLiter(s) Oral every 4 hours PRN  chlorhexidine 4% Liquid 1 Application(s) Topical <User Schedule>  fat emulsion (Plant Based) 20% Infusion 0.558 Gm/kG/Day IV Continuous <Continuous>  fat emulsion (Plant Based) 20% Infusion 0.558 Gm/kG/Day IV Continuous <Continuous>  heparin  Injectable 5000 Unit(s) SubCutaneous every 8 hours  levothyroxine 50 MICROGram(s) Oral daily  metoprolol tartrate 50 milliGRAM(s) Oral two times a day  midodrine 5 milliGRAM(s) Oral every 8 hours  nystatin Powder 1 Application(s) Topical three times a day  pantoprazole   Suspension 40 milliGRAM(s) Oral before breakfast  Parenteral Nutrition - Adult 1 Each TPN Continuous <Continuous>  Parenteral Nutrition - Adult 1 Each TPN Continuous <Continuous>  PPD  5 Tuberculin Unit(s) Injectable 5 Unit(s) IntraDermal once        Physical exam:  T(C): 35.8 (19 @ 13:37), Max: 36.3 (19 @ 21:00)  HR: 88 (19 @ 13:37) (86 - 88)  BP: 92/50 (19 @ 13:37) (90/46 - 94/53)  RR: 18 (19 @ 13:37) (18 - 18)  SpO2: 95% (19 @ 08:17) (95% - 95%)    GENERAL: NAD  HEAD:  Atraumatic, Normocephalic  EYES: Sclera:NL  NECK: Supple, no JVD or thyromegaly  CHEST/LUNG: Good bilateral air entry  HEART: normal S1, S2. Regular  ABDOMEN: (-) distended, (-) tender, (-) rebound, (+) BS, (-)HSM  EXTREMITIES: (-) edema  NEUROLOGY: (-) asterixis  SKIN: (-) jaundice  MATTY: (-) melena (-) brbpr      Data:                        10.6   18.52 )-----------( 271      ( 2019 07:43 )             29.8     MCV 87.4 (19)    RDW 22.3 (19)    HGB trend:  10.6  19 @ 07:43  10.7  19 @ 05:34        WBC trend:  18.52  19 @ 07:43  15.78  19 @ 05:34        132<L>  |  92<L>  |  131<HH>  ----------------------------<  105<H>  4.4   |  19  |  3.7<H>    Ca    6.2<L>      2019 07:43  Phos  6.9       Mg     2.2         TPro  5.3<L>  /  Alb  1.6<L>  /  TBili  2.8<H>  /  DBili  2.0<H>  /  AST  432<H>  /  ALT  379<H>  /  AlkPhos  1189<H>      Liver panel trend:  TBili 2.8   /      /      /   AlkP 1189   /   Tptn 5.3   /   Alb 1.6    /   DBili 2.0        TBili 2.8   /      /      /   AlkP 1151   /   Tptn 5.2   /   Alb 1.5    /   DBili --        TBili 2.9   /      /      /   AlkP 1375   /   Tptn 5.3   /   Alb 1.5    /   DBili --        TBili 3.7   /      /      /   AlkP 1523   /   Tptn 5.3   /   Alb 1.6    /   DBili 2.6        TBili 4.3   /   AST >700   /      /   AlkP >1200   /   Tptn 5.6   /   Alb 1.7    /   DBili 3.0        TBili 5.6   /   AST 1207   /      /   AlkP 1660   /   Tptn 5.6   /   Alb 1.7    /   DBili --        TBili 6.0   /   AST 1079   /      /   AlkP 1713   /   Tptn 5.6   /   Alb 1.7    /   DBili 4.5        TBili 5.3   /   AST 1003   /      /   AlkP 1620   /   Tptn 5.5   /   Alb 1.7    /   DBili 4.1        TBili 5.7   /   AST 1083   /      /   AlkP 1848   /   Tptn 6.2   /   Alb 2.1    /   DBili 4.1        TBili 5.4   /   AST 1042   /      /   AlkP 1730   /   Tptn 5.8   /   Alb 1.9    /   DBili 3.9              PT/INR - ( 2019 07:43 )   PT: 12.50 sec;   INR: 1.09 ratio    PTT - ( 2019 07:43 )  PTT:37.1 sec    Urinalysis Basic - ( 2019 22:03 )    Color: Orange / Appearance: Turbid / S.015 / pH: x  Gluc: x / Ketone: Negative  / Bili: Small / Urobili: 1.0 mg/dL   Blood: x / Protein: Trace mg/dL / Nitrite: Negative   Leuk Esterase: Small / RBC: x / WBC 10-25 /HPF   Sq Epi: x / Non Sq Epi: Occasional /HPF / Bacteria: Many /HPF

## 2019-06-27 NOTE — PROVIDER CONTACT NOTE (OTHER) - ACTION/TREATMENT ORDERED:
MD Joshi is unable to cut off rings as he is not trained. As per MD Joshi call surgery to see if they can cut off rings.

## 2019-06-27 NOTE — PROGRESS NOTE ADULT - ASSESSMENT
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 with resultant proteinuria and low albumin state and diffuse anasarca, raynauds phenomenon (not currently active), esophageal dysmotility and reflux, cystitis with urinary retention s/p abx, and subsequent worsening functional debility and malnutrition.    Patient is weaker, more edematous than before but reports no new active complaint.    #hydrops gallbladder/acute severe transaminitis  -trend LFT's   -waiting for CPK results,   -liver biopsy done shows Cirrhosis but of no clear etiology   -clinically too weak at this -moment for cholecystecomy  -if cpk climbing may be role for muscle biopsy deltoid- with steroids tx. trial  -Midodrine 5mg every 8h for portal HTN    #LUIS ALBERTO  -Worsening LUIS ALBERTO Cr rising 3.0>3.1>3.3>3.5>3.7  -As per renal team recommendations - patient most likely has hepatorenal syndrome  -Albumin challenge test with 100g albumin performed today       # Abnormal DS DNA, Elevated ANKITA titers   - Rheum recommended to hold prednisone use until w/u is completed, repeat esr/ crp , c/p anca w/ mpo/pr3, if renal bx is inconclusive emg and sural nerve biopsy recommended    #hypoalbuminemia  -Malnutrition vs Liver Cirrhosis  - patient was started on TPN      #distal digit blue discoloration  -raynauds(primary vs secondary)  -r/o etiology---paraneoplastic, vasculitis, vs rheum   -transient=---resolved within 1 hour  -gloves/warmth to affected fingertips  -CCB on hold for hypotension  -esophagram consistent with dysmotility    #UTI with urinary retention  -has aragon, monitor u/o   -received ceftriaxone/ completed  -Urinalysis shows many bacteria, culture results awaited  -WBCs still rising 15>17>15>18    #h/o CAD  -cont home regimen  -statin on hold 2/2 acute liver injury  -Plavix was held for liver biopsy    #hypothyroidism  -tsh elevated---t3/t4 normal---will cont synthroid 50 mcg daily    #DVT//gI ppx  FULL CODE  high risk/ guarded prognosis

## 2019-06-27 NOTE — CONSULT NOTE ADULT - SUBJECTIVE AND OBJECTIVE BOX
INTERVENTIONAL RADIOLOGY CONSULT:     Procedure Requested: renal biopsy     HPI:  67 yo F w/ hx of CAD s/p PCI and hypothyroidism sent by gastroenterologist for gallbladder hydrops. Pt found to have elevated liver enzymes by PMD in Apr 2019, referred to GI for w/u. MRCP 2 days ago significant for gallbladder hydrops 4.7cm with probable impacted stone at neck, also liver cirrhosis w/ mild ascites. Admits to social etoh, denies binge drinking, denies abdominal pain, N/V. Admits to decreased appetite in past 2 weeks, pt tried to maintain low salt diet. Reports BLE edema developed in past 2 days as well as weakness. Denies fever, chills, abdominal pain, SOB, dysuria.    In ED, evaluated by surgery -> no interventions at this time (15 Shashank 2019 01:53)      PAST MEDICAL & SURGICAL HISTORY:  Hypothyroidism  CAD S/P percutaneous coronary angioplasty  No significant past surgical history      MEDICATIONS  (STANDING):  albumin human 25% IVPB 400 milliLiter(s) IV Intermittent <User Schedule>  chlorhexidine 4% Liquid 1 Application(s) Topical <User Schedule>  fat emulsion (Plant Based) 20% Infusion 0.558 Gm/kG/Day (21.936 mL/Hr) IV Continuous <Continuous>  heparin  Injectable 5000 Unit(s) SubCutaneous every 8 hours  levothyroxine 50 MICROGram(s) Oral daily  metoprolol tartrate 50 milliGRAM(s) Oral two times a day  nystatin Powder 1 Application(s) Topical three times a day  pantoprazole   Suspension 40 milliGRAM(s) Oral before breakfast  Parenteral Nutrition - Adult 1 Each (60 mL/Hr) TPN Continuous <Continuous>    MEDICATIONS  (PRN):  aluminum hydroxide/magnesium hydroxide/simethicone Suspension 30 milliLiter(s) Oral every 4 hours PRN Dyspepsia      Allergies    No Known Allergies    Intolerances    FAMILY HISTORY:  No pertinent family history in first degree relatives      Physical Exam:   Vital Signs Last 24 Hrs  T(C): 36.1 (27 Jun 2019 04:58), Max: 36.3 (26 Jun 2019 14:09)  T(F): 97 (27 Jun 2019 04:58), Max: 97.4 (26 Jun 2019 14:09)  HR: 86 (27 Jun 2019 04:58) (86 - 88)  BP: 92/57 (27 Jun 2019 04:58) (90/46 - 112/49)  BP(mean): --  RR: 18 (27 Jun 2019 04:58) (18 - 18)  SpO2: 95% (27 Jun 2019 08:17) (95% - 95%)    Labs:     PT/INR - ( 26 Jun 2019 05:35 )   PT: 14.10 sec;   INR: 1.23 ratio         PTT - ( 26 Jun 2019 05:35 )  PTT:33.3 sec    Pertinent labs:    PT/INR - ( 26 Jun 2019 05:35 )   PT: 14.10 sec;   INR: 1.23 ratio         PTT - ( 26 Jun 2019 05:35 )  PTT:33.3 sec    Radiology & Additional Studies:     < from: US Kidney and Bladder (06.26.19 @ 12:42) >  IMPRESSION:    Unremarkable sonographic appearance of the kidneys.    Incidental hepatic cirrhosis and abdominal pelvic ascites.    < end of copied text >      Radiology imaging reviewed.     ASSESSMENT/ PLAN:   67 yo F w/ hx of CAD s/p PCI and hypothyroidism sent by gastroenterologist for gallbladder hydrops. Patient initially presented with LUIS ALBERTO which since resolved, now presents with worsening serum creatinine.  - consulted by nephrology for renal biopsy due to worsening Cr with no clear etiology  - plan for image guided renal biopsy Monday 7/1  - keep NPO after midnight Sunday  - hold anticoagulation until after procedure  - draw CBC/CMP/coags prior to procedure     Thank you for the courtesy of this consult, please call [phone] with any further questions.

## 2019-06-27 NOTE — CONSULT NOTE ADULT - ASSESSMENT
67 yo w/ acute onset of transaminase elevation , worsening renal function , P-anca-pos - must r/o Vasculitis ( ie. MPO vs PAN ) - however cant explain cpk elevation - may be related to atorvastatin use or esophageal dysmotility - no evidence for scleroderma or crest , agree w/ renal biopsy , would rec malignancy w/u , would hold prednisone use until w/u is completed , repeat esr/ crp , c/p anca w/ mpo/pr3 ,  if renal bx is inconclusive would rec  emg and sural nerve bx

## 2019-06-28 NOTE — CHART NOTE - NSCHARTNOTEFT_GEN_A_CORE
s/p albumin challenge yesterday and again today  ++anasarca  workup in progress. renal bx planned for   PPN infusing via midline catheter  PO intake minimal. Esophagram +hiatal hernia, dysmotility    T(F): 97.4 (19 @ 13:24), Max: 97.4 (19 @ 13:24)  HR: 95 (19 @ 13:24) (86 - 95)  BP: 105/55 (19 @ 13:24) (81/53 - 117/59)  RR: 18 (19 @ 13:24) (18 - 18)        130<L>  |  91<L>  |  143<HH>  ----------------------------<  108<H>  4.5   |  19  |  3.7<H>    Ca    6.2<L>      2019 05:22  Phos  7.2       Mg     2.4         TPro  5.3<L>  /  Alb  1.6<L>  /  TBili  2.7<H>  /  DBili  1.9<H>  /  AST  372<H>  /  ALT  337<H>  /  AlkPhos  1105<H>                      10.6   20.01 )-----------( 275      ( 2019 05:22 )             29.5     Daily Weight in k.3 (2019 11:42)    Diet, DASH/TLC:   Sodium & Cholesterol Restricted  Low Fat (LOWFAT)  Supplement Feeding Modality:  Oral  Ensure Clear Cans or Servings Per Day:  1       Frequency:  Two Times a day (19 @ 14:10)    ASSESSMENT  - GB hydrops  - LUIS ALBERTO  - hyperkalemia  - hypothyroidism  - hiatal hernia  - esophageal reflux, dysmotility    PLAN  - cont PPN via midline catheter while w/u in progress  - monitor bmp, ip, mg  - check calorie counts X 3 days  - rec place NGT for feeding and start with Osmolite 1.5 240ml pc & hs X 4 feeds/day. add Beneprotein 1 pack with each feed  - cont PO diet as tolerated but doubt intake will improve much especially with hiatal hernia and esophageal dysmotility

## 2019-06-28 NOTE — PROGRESS NOTE ADULT - SUBJECTIVE AND OBJECTIVE BOX
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  [facility] ([facility])            Patient was evaluated and examined  by bedside, c/o progressive peripheral edema, urine output for past 16 hours - 800ml, no fever, c/o persistent generalized body weakness        REVIEW OF SYSTEMS:  please see pertinent positives mentioned above, all other 12 ROS negative      T(C): , Max: 36.2 (06-28-19 @ 06:24)  HR: 90 (06-28-19 @ 09:40)  BP: 92/54 (06-28-19 @ 09:40)  RR: 18 (06-28-19 @ 06:24)  SpO2: --  CAPILLARY BLOOD GLUCOSE          PHYSICAL EXAM:  General: NAD, AAOX3, patient is laying comfortably in bed, obese  HEENT: AT, NC, Supple, NO JVD, NO CB  Lungs: CTA B/L, no wheezing, no rhonchi  CVS: normal S1, S2, RRR, NO M/G/R  Abdomen: soft, bowel sounds present, non-tender, non-distended  Extremities: extensive plus 3 pitting diffuse pitting edema of all extremities, no clubbing, no cyanosis, positive peripheral pulses b/l  Neuro: no acute focal neurological deficits, profound  generalized body weakness  Skin: no rush, no ecchymosis          LAB  CBC  Date: 06-28-19 @ 05:22  Mean cell Ttikjwlrod18.6  Mean cell Hemoglobin Conc35.9  Mean cell Volum 88.1  Platelet count-Automate 275  RBC Count 3.35  Red Cell Distrib Width22.4  WBC Count20.01  % Albumin, Urine--  Hematocrit 29.5  Hemoglobin 10.6  CBC  Date: 06-27-19 @ 07:43  Mean cell Qmkdcenusl16.1  Mean cell Hemoglobin Conc35.6  Mean cell Volum 87.4  Platelet count-Automate 271  RBC Count 3.41  Red Cell Distrib Width22.3  WBC Count18.52  % Albumin, Urine--  Hematocrit 29.8  Hemoglobin 10.6  CBC  Date: 06-25-19 @ 05:34  Mean cell Vtswvvxyiv09.4  Mean cell Hemoglobin Conc34.7  Mean cell Volum 87.5  Platelet count-Automate 238  RBC Count 3.52  Red Cell Distrib Width21.2  WBC Count15.78  % Albumin, Urine--  Hematocrit 30.8  Hemoglobin 10.7  CBC  Date: 06-24-19 @ 10:26  Mean cell Mdgkdqktgu31.1  Mean cell Hemoglobin Conc35.7  Mean cell Volum 87.1  Platelet count-Automate 253  RBC Count 3.50  Red Cell Distrib Width20.1  WBC Count17.88  % Albumin, Urine--  Hematocrit 30.5  Hemoglobin 10.9  CBC  Date: 06-23-19 @ 07:08  Mean cell Vrxmenumcy49.7  Mean cell Hemoglobin Conc34.1  Mean cell Volum 87.2  Platelet count-Automate 248  RBC Count 3.67  Red Cell Distrib Width19.9  WBC Count15.12  % Albumin, Urine--  Hematocrit 32.0  Hemoglobin 10.9  CBC  Date: 06-22-19 @ 17:46  Mean cell Asfipmfdgg59.4  Mean cell Hemoglobin Conc34.6  Mean cell Volum 87.7  Platelet count-Automate 241  RBC Count 3.82  Red Cell Distrib Width19.5  WBC Count14.18  % Albumin, Urine--  Hematocrit 33.5  Hemoglobin 11.6    [facility] [place]  06-28-19 @ 05:22  Blood Gas Arterial-Calcium,Ionized--  Blood Urea Nitrogen, Serum 143 mg/dL<HH> [10 - 20] [Critical value:]  Carbon Dioxide, Serum19 mmol/L [17 - 32]  Chloride, Serum91 mmol/L<L> [98 - 110]  Creatinie, Serum3.7 mg/dL<H> [0.7 - 1.5]  Glucose, Dxjbp941 mg/dL<H> [70 - 99]  Potassium, Serum4.5 mmol/L [3.5 - 5.0]  Sodium, Serum 130 mmol/L<L> [135 - 146]  [facility] [place] [facility]  06-27-19 @ 07:43  Blood Gas Arterial-Calcium,Ionized--  Blood Urea Nitrogen, Serum 131 mg/dL<HH> [10 - 20] [Critical value:]  Carbon Dioxide, Serum19 mmol/L [17 - 32]  Chloride, Serum92 mmol/L<L> [98 - 110]  Creatinie, Serum3.7 mg/dL<H> [0.7 - 1.5]  Glucose, Oncbk262 mg/dL<H> [70 - 99]  Potassium, Serum4.4 mmol/L [3.5 - 5.0]  Sodium, Serum 132 mmol/L<L> [135 - 146]  [facility]  06-25-19 @ 05:34  Blood Gas Arterial-Calcium,Ionized--  Blood Urea Nitrogen, Serum 107 mg/dL<HH> [10 - 20] [Critical value:]  Carbon Dioxide, Serum19 mmol/L [17 - 32]  Chloride, Iasfe112 mmol/L [98 - 110]  Creatinie, Serum3.5 mg/dL<H> [0.7 - 1.5]  Glucose, Rhauv567 mg/dL<H> [70 - 99]  Potassium, Serum4.9 mmol/L [3.5 - 5.0]  Sodium, Serum 137 mmol/L [135 - 146]  [facility] [place] [facility]  06-24-19 @ 10:26  Blood Gas Arterial-Calcium,Ionized--  Blood Urea Nitrogen, Serum 103 mg/dL<HH> [10 - 20] [Critical value:]  Carbon Dioxide, Serum15 mmol/L<L> [17 - 32]  Chloride, Dsxsi649 mmol/L [98 - 110]  Creatinie, Serum3.3 mg/dL<H> [0.7 - 1.5]  Glucose, Joedn750 mg/dL<H> [70 - 99]  Potassium, Serum5.4 mmol/L<H> [3.5 - 5.0]  Sodium, Serum 133 mmol/L<L> [135 - 146]  [facility]  06-23-19 @ 17:42  Blood Gas Arterial-Calcium,Ionized--  Blood Urea Nitrogen, Serum 96 mg/dL<HH> [10 - 20] [Critical value:]  Carbon Dioxide, Serum13 mmol/L<L> [17 - 32]  Chloride, Qxkic373 mmol/L [98 - 110]  Creatinie, Serum3.1 mg/dL<H> [0.7 - 1.5]  Glucose, Lgtoa724 mg/dL<H> [70 - 99]  Potassium, Serum6.1 mmol/L<HH> [3.5 - 5.0] [Critical value:  TYPE:(C=Critical, N=Notification, A=Abnormal) C  TESTS: _K  DATE/TIME CALLED: _06/23/19 19:54  CALLED TO: _DR GILL  READ BACK (2 Patient Identifiers)(Y/N): _Y  READ BACK VALUES (Y/N): _Y  CALLED BY: _CPE]  Sodium, Serum 134 mmol/L<L> [135 - 146]        PT/INR - ( 28 Jun 2019 05:22 )   PT: 13.80 sec;   INR: 1.20 ratio         PTT - ( 27 Jun 2019 07:43 )  PTT:37.1 sec      Microbiology:    Culture - Urine (collected 06-22-19 @ 18:04)  Source: .Urine Clean Catch (Midstream)  Final Report (06-24-19 @ 06:28):    No growth    Culture - Urine (collected 06-21-19 @ 23:54)  Source: .Urine Clean Catch (Midstream)  Final Report (06-23-19 @ 08:53):    No growth    Culture - Blood (collected 06-15-19 @ 18:01)  Source: .Blood None  Final Report (06-21-19 @ 06:00):    No growth at 5 days.          Medications:  albumin human 25% IVPB 50 milliLiter(s) IV Intermittent once  aluminum hydroxide/magnesium hydroxide/simethicone Suspension 30 milliLiter(s) Oral every 4 hours PRN  chlorhexidine 4% Liquid 1 Application(s) Topical <User Schedule>  fat emulsion (Plant Based) 20% Infusion 0.558 Gm/kG/Day IV Continuous <Continuous>  fat emulsion (Plant Based) 20% Infusion 0.558 Gm/kG/Day IV Continuous <Continuous>  furosemide   Injectable 80 milliGRAM(s) IV Push once  heparin  Injectable 5000 Unit(s) SubCutaneous every 8 hours  levothyroxine 50 MICROGram(s) Oral daily  metoprolol tartrate 25 milliGRAM(s) Oral two times a day  midodrine 10 milliGRAM(s) Oral every 8 hours  nystatin Powder 1 Application(s) Topical three times a day  pantoprazole   Suspension 40 milliGRAM(s) Oral before breakfast  Parenteral Nutrition - Adult 1 Each TPN Continuous <Continuous>  Parenteral Nutrition - Adult 1 Each TPN Continuous <Continuous>  PPD  5 Tuberculin Unit(s) Injectable 5 Unit(s) IntraDermal once        Assessment and Plan:  A/P 69 yo F w/ hx of CAD s/p PCI and hypothyroidism sent by gastroenterologist for gallbladder hydrops, course complicated by ALI/ transaminitis possibly of autoimmune etiology, LUIS ALBERTO with hyperkalemia multifactorial 2/2 rhabdo (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 with resultant proteinuria and low albumin state and diffuse anasarca, raynauds phenomenon (not currently active), esophageal dysmotility and reflux, cystitis with urinary retention s/p abx, and subsequent worsening functional debility and malnutrition    #hydrops gallbladder with obstructing gas -acute persistent severe transaminitis of unclear etiology   trend LFT's, monitor CPK,   liver serology most consistent with autoimmune process   statin was d/c on admission,   liver was  biopsied- Surgical Pathology Report (06.24.19 @ 16:10)  Surgical Final Report: Final Diagnosis  Right hepatic lobe, needle biopsy:  - Liver parenchyma showing mild focal lobulitis and hepatocyte reactive changes with few glycogenated nuclei.  - Focal area of vaguely formed Francisca-Denk body also present.  - Two minute focus of macro and micro-vesicular steatosis also present, counting less than 5% of tissue volume of the biopsy.  - Portal areas showing marked chronic inflammation with focal interface activity, infiltrated with mainly small lymphocytes and  rare neutrophils, and fibrous expansion of portal area with  fibrous bridge formation, probable cirrhosis.  - Stainable iron present, not increased.  - Trichrome stain reviewed.  - PAS-D stain failed to reveal alpha-1 antitrypsin globule.    Verified by: Manoj Lindsey M.D.  (Electronic Signature)  Reported on: 06/26/19 15:42 EDT, [address] [place] [id number]  _________________________________________________________________    Comment  _Sections show findings are consistent with clinical impression of  cirrhosis.  However, the etiology is not clear.  Clinical correlation is recommended.    06/25/19 08:55 ns    LIVER FUNCTIONS - ( 28 Jun 2019 05:22 )  Alb: 1.6 g/dL / Pro: 5.3 g/dL / ALK PHOS: 1105 U/L / ALT: 337 U/L / AST: 372 U/L / GGT: x                    clinically too weak at this moment for cholecystecomy         #worsening LUIS ALBERTO  - with worsening uremia and worsening tertiary fluid spacing with progressive edema  - as per renal team recommendations - patient will need diagnostic renal biopsy- tentatively scheduled for July 1st in am  - patient has severe tertiary volume spacing, will try IV albumin with IV lasix tx. challenge.- strict I and O chart  - f/up renal team if patient wound be a candidate for HD tx.    # Abnormal elevated DS DNA- 111, Elevated ANKITA titers 1: 2560, Elevated P-Anca- titer 1: 1280 - ? lupus nephritis, vs Vasculitis,   - we have consulted  Rheumatology specialist - who recommended- diagnostic renal biopsy, MPO/PR3 abs,     #Persistent Leukocytosis- so far no acute infectious etiology   -will obtain blood cxs, urine cxs  -continue to monitor CBC     # hypotension- with hypoalbuminemia, tertiary fluid spacing, LUIS ALBERTO, started on Midodrine 10 mg po three times daily      #hypoalbuminemia 2/2 acute liver disease- severe protein/calorie malnutrition  - patient was started on PPN      #distal digit blue discoloration---raynauds phenomenon- primary vs secondary  -r/o etiology---paraneoplastic, vasculitis, vs rheum   -transient=---resolved within 1 hour  gloves/warmth to affected fingertips- ccb with hold parameters for hypotension  esophagram consistent with dysmotility        #UTI with urinary retention  has aragon, monitor u/o   received ceftriaxone/ completed,    #h/o CAD  -cont home regimen  -statin on hold 2/2 acute liver injury  -will resume  plavix post-renal  biopsy      #hypothyroidism  -tsh elevated---t3/t4 normal---will cont syntrhoid 50 mcg daily    #DVT//gI ppx  FULL CODE  high risk/ guarded prognosis    #Progress Note Handoff: Pending Consults__Renal team f/up for possible initiation of HD_______,Tests__MPO/PR3 abs______,Test Results_______,Other; renal biopsy on july 1st  Family discussion: patient by bedside Disposition: Home__once medically stable

## 2019-06-28 NOTE — PROGRESS NOTE ADULT - SUBJECTIVE AND OBJECTIVE BOX
SUBJECTIVE:    Patient is a 68y old Female who presents with a chief complaint of gallbladder hydrops (27 Jun 2019 17:37)    Currently admitted to medicine with the primary diagnosis of Gallbladder hydrops     Today is hospital day 14d. This morning she is resting comfortably in bed and reports no new issues or overnight events.     INTERVAL EVENTS: The patient is weaker and edema is still present. The patient has two rings in one of her fingers and removal is requested from ED to make sure no vascular compromise takes place    PAST MEDICAL & SURGICAL HISTORY  Hypothyroidism  CAD S/P percutaneous coronary angioplasty  No significant past surgical history      ALLERGIES:  No Known Allergies    MEDICATIONS:  STANDING MEDICATIONS  chlorhexidine 4% Liquid 1 Application(s) Topical <User Schedule>  fat emulsion (Plant Based) 20% Infusion 0.558 Gm/kG/Day IV Continuous <Continuous>  fat emulsion (Plant Based) 20% Infusion 0.558 Gm/kG/Day IV Continuous <Continuous>  heparin  Injectable 5000 Unit(s) SubCutaneous every 8 hours  levothyroxine 50 MICROGram(s) Oral daily  metoprolol tartrate 25 milliGRAM(s) Oral two times a day  midodrine 10 milliGRAM(s) Oral every 8 hours  nystatin Powder 1 Application(s) Topical three times a day  pantoprazole   Suspension 40 milliGRAM(s) Oral before breakfast  Parenteral Nutrition - Adult 1 Each TPN Continuous <Continuous>  Parenteral Nutrition - Adult 1 Each TPN Continuous <Continuous>  PPD  5 Tuberculin Unit(s) Injectable 5 Unit(s) IntraDermal once    PRN MEDICATIONS  aluminum hydroxide/magnesium hydroxide/simethicone Suspension 30 milliLiter(s) Oral every 4 hours PRN    VITALS:   T(F): 97.1  HR: 90  BP: 92/54  RR: 18  SpO2: --    LABS:                        10.6   20.01 )-----------( 275      ( 28 Jun 2019 05:22 )             29.5     06-28    130<L>  |  91<L>  |  143<HH>  ----------------------------<  108<H>  4.5   |  19  |  3.7<H>    Ca    6.2<L>      28 Jun 2019 05:22  Phos  7.2     06-28  Mg     2.4     06-28    TPro  5.3<L>  /  Alb  1.6<L>  /  TBili  2.7<H>  /  DBili  1.9<H>  /  AST  372<H>  /  ALT  337<H>  /  AlkPhos  1105<H>  06-28    PT/INR - ( 28 Jun 2019 05:22 )   PT: 13.80 sec;   INR: 1.20 ratio         PTT - ( 27 Jun 2019 07:43 )  PTT:37.1 sec      Creatine Kinase, Serum: 237 U/L <H> (06-28-19 @ 05:22)  Sedimentation Rate, Erythrocyte: 53 mm/Hr <H> (06-28-19 @ 05:22)  Creatine Kinase, Serum: 289 U/L <H> (06-27-19 @ 17:25)      CARDIAC MARKERS ( 28 Jun 2019 05:22 )  x     / x     / 237 U/L / x     / x      CARDIAC MARKERS ( 27 Jun 2019 17:25 )  x     / x     / 289 U/L / x     / x          RADIOLOGY:  >>LIVER BIOPSY  Final Diagnosis  Right hepatic lobe, needle biopsy:  - Liver parenchyma showing mild focal lobulitis and hepatocyte  reactive changes with few glycogenated nuclei.  - Focal area of vaguely formed Francsica-Denk body also present.  - Two minute focus of macro and micro-vesicular steatosis also  present, counting less than 5% of tissue volume of the biopsy.  - Portal areas showing marked chronic inflammation with focal  interface activity, infiltrated with mainly small lymphocytes and  rare neutrophils, and fibrous expansion of portal area with  fibrous bridge formation, probable cirrhosis.  - Stainable iron present, not increased.  - Trichrome stain reviewed.  - PAS-D stain failed to reveal alpha-1 antitrypsin globule.    Comment  _Sections show findings are consistent with clinical impression  of  cirrhosis.  However, the etiology is not clear.  Clinical  correlation is recommended.    PHYSICAL EXAM:  GEN: No acute distress  PULM/CHEST: Clear B/L, equal air entry bilaterally  CVS: RRR, S1+S2 + mild systolic murmur  ABD: Soft, non-tender, non-distended, ecchymosis around the needle prick sites +BS,  EXT: Pedal edema  NEURO: AAOx3

## 2019-06-28 NOTE — PROGRESS NOTE ADULT - ASSESSMENT
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

## 2019-06-28 NOTE — PROGRESS NOTE ADULT - ASSESSMENT
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 with resultant proteinuria and low albumin state and diffuse anasarca, raynauds phenomenon (not currently active), esophageal dysmotility and reflux, cystitis with urinary retention s/p abx, and subsequent worsening functional debility and malnutrition.    Patient is weaker, edematous and has rings on her finger     #hydrops gallbladder/acute severe transaminitis  -trend LFT's   -waiting for CPK results,   -liver biopsy done shows Cirrhosis but of no clear etiology   -clinically too weak at this -moment for cholecystecomy  -if cpk climbing may be role for muscle biopsy deltoid- with steroids tx. trial  -Midodrine 5mg every 8h for portal HTN    #LUIS ALBERTO  -Worsening LUIS ALBERTO Cr rising 3.0>3.1>3.3>3.5>3.7>3.7  -As per renal team recommendations - patient most likely has hepatorenal syndrome  -Albumin challenge test with 100g albumin performed 06/27       # Abnormal DS DNA, Elevated ANKITA titers   - Rheum recommended to hold prednisone use until w/u is completed,  -if renal bx is inconclusive emg and sural nerve biopsy recommended  -ESR, CRP, ANCA repeated    #hypoalbuminemia  -Malnutrition vs Liver Cirrhosis  -Patient was started on TPN    #distal digit blue discoloration  -raynauds(primary vs secondary)  -r/o etiology---paraneoplastic, vasculitis, vs rheum   -transient=---resolved within 1 hour  -gloves/warmth to affected fingertips  -CCB on hold for hypotension  -esophagram consistent with dysmotility    #UTI with urinary retention  -has aragon, monitor u/o   -received ceftriaxone/ completed  -Urinalysis order on recommendation of Nephro  -WBCs still rising 15>17>15>18>20    #h/o CAD  -cont home regimen  -statin on hold 2/2 acute liver injury  -Continue to hold Plavix for Renal biopsy  -Hold ASA for renal biopsy    #hypothyroidism  -TSH elevated---t3/t4 normal---will cont synthroid 50 mcg daily    #DVT//gI ppx  FULL CODE  high risk/ guarded prognosis

## 2019-06-28 NOTE — PROGRESS NOTE ADULT - ASSESSMENT
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

## 2019-06-28 NOTE — PROGRESS NOTE ADULT - SUBJECTIVE AND OBJECTIVE BOX
Nephrology progress note    Patient is seen and examined, events over the last 24 h noted .  Denies SOB, vomiting, still on TPN  Allergies:  No Known Allergies    Hospital Medications:   MEDICATIONS  (STANDING):  albumin human 25% IVPB 50 milliLiter(s) IV Intermittent once  chlorhexidine 4% Liquid 1 Application(s) Topical <User Schedule>  fat emulsion (Plant Based) 20% Infusion 0.558 Gm/kG/Day (21.936 mL/Hr) IV Continuous <Continuous>  fat emulsion (Plant Based) 20% Infusion 0.558 Gm/kG/Day (21.936 mL/Hr) IV Continuous <Continuous>  furosemide   Injectable 80 milliGRAM(s) IV Push once  heparin  Injectable 5000 Unit(s) SubCutaneous every 8 hours  levothyroxine 50 MICROGram(s) Oral daily  metoprolol tartrate 25 milliGRAM(s) Oral two times a day  midodrine 10 milliGRAM(s) Oral every 8 hours  nystatin Powder 1 Application(s) Topical three times a day  pantoprazole   Suspension 40 milliGRAM(s) Oral before breakfast  Parenteral Nutrition - Adult 1 Each (60 mL/Hr) TPN Continuous <Continuous>  Parenteral Nutrition - Adult 1 Each (60 mL/Hr) TPN Continuous <Continuous>  PPD  5 Tuberculin Unit(s) Injectable 5 Unit(s) IntraDermal once        VITALS:  T(F): 97.1 (19 @ 06:24), Max: 97.1 (19 @ 06:24)  HR: 90 (19 @ 09:40)  BP: 92/54 (19 @ 09:40)  RR: 18 (19 @ 06:24)  SpO2: --  Wt(kg): --     @ 07:  -   @ 07:00  --------------------------------------------------------  IN: 0 mL / OUT: 700 mL / NET: -700 mL     @ 07:01  -   @ 07:00  --------------------------------------------------------  IN: 574 mL / OUT: 725 mL / NET: -151 mL          PHYSICAL EXAM:  Constitutional: NAD  HEENT: anicteric sclera, oropharynx clear, MMM  Neck: No JVD  Respiratory: CTAB, no wheezes, rales or rhonchi  Cardiovascular: S1, S2, RRR  Gastrointestinal: BS+, soft, NT/ND  Extremities: 3+ peripheral edema  Neurological: A/O x 3  : No CVA tenderness. No aragon.   Skin: No rashes  Vascular Access:    LABS:      130<L>  |  91<L>  |  143<HH>  ----------------------------<  108<H>  4.5   |  19  |  3.7<H>  Creatinine Trend: 3.7<--, 3.7<--, 3.5<--, 3.3<--, 3.1<--, 3.0<--    Ca    6.2<L>      2019 05:22  Phos  7.2       Mg     2.4         TPro  5.3<L>  /  Alb  1.6<L>  /  TBili  2.7<H>  /  DBili  1.9<H>  /  AST  372<H>  /  ALT  337<H>  /  AlkPhos  1105<H>                            10.6   20.01 )-----------( 275      ( 2019 05:22 )             29.5       Urine Studies:  Urinalysis Basic - ( 2019 22:03 )    Color: Orange / Appearance: Turbid / S.015 / pH:   Gluc:  / Ketone: Negative  / Bili: Small / Urobili: 1.0 mg/dL   Blood:  / Protein: Trace mg/dL / Nitrite: Negative   Leuk Esterase: Small / RBC:  / WBC 10-25 /HPF   Sq Epi:  / Non Sq Epi: Occasional /HPF / Bacteria: Many /HPF      Sodium, Random Urine: 20.0 mmoL/L ( @ 22:03)  Protein/Creatinine Ratio Calculation: 0.2 Ratio ( @ 22:03)  Creatinine, Random Urine: 119 mg/dL ( @ 22:03)    RADIOLOGY & ADDITIONAL STUDIES:

## 2019-06-28 NOTE — PROGRESS NOTE ADULT - SUBJECTIVE AND OBJECTIVE BOX
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4 (28 Jun 2019 16:30), Max: 36.4 (28 Jun 2019 16:30)  T(F): 97.5 (28 Jun 2019 16:30), Max: 97.5 (28 Jun 2019 16:30)  HR: 91 (28 Jun 2019 16:30) (86 - 95)  BP: 110/56 (28 Jun 2019 16:30) (81/53 - 117/59)  BP(mean): --  RR: 18 (28 Jun 2019 16:30) (18 - 18)  SpO2: 96% (28 Jun 2019 16:30) (96% - 96%)    PHYSICAL EXAM:  Gen: NAD, resting in bed  HEENT: Normocephalic, atraumatic  Neck: supple, no lymphadenopathy  CV: Regular rate & regular rhythm  Lungs: decreased BS at bases, no fremitus  Abdomen: Soft, BS present  Ext: Warm, well perfused, anasarca  Neuro: non focal, awake  Skin: no rash, no erythema  Lines: no phlebitis  QUINTERO      TESTS & MEASUREMENTS:                        10.6   20.01 )-----------( 275      ( 28 Jun 2019 05:22 )             29.5     06-28    130<L>  |  91<L>  |  143<HH>  ----------------------------<  108<H>  4.5   |  19  |  3.7<H>    Ca    6.2<L>      28 Jun 2019 05:22  Phos  7.2     06-28  Mg     2.4     06-28    TPro  5.3<L>  /  Alb  1.6<L>  /  TBili  2.7<H>  /  DBili  1.9<H>  /  AST  372<H>  /  ALT  337<H>  /  AlkPhos  1105<H>  06-28    eGFR if Non African American: 12 mL/min/1.73M2 (06-28-19 @ 05:22)  eGFR if African American: 14 mL/min/1.73M2 (06-28-19 @ 05:22)    LIVER FUNCTIONS - ( 28 Jun 2019 05:22 )  Alb: 1.6 g/dL / Pro: 5.3 g/dL / ALK PHOS: 1105 U/L / ALT: 337 U/L / AST: 372 U/L / GGT: x               Culture - Urine (collected 06-22-19 @ 18:04)  Source: .Urine Clean Catch (Midstream)  Final Report (06-24-19 @ 06:28):    No growth    Culture - Urine (collected 06-21-19 @ 23:54)  Source: .Urine Clean Catch (Midstream)  Final Report (06-23-19 @ 08:53):    No growth        Lactate, Blood: 2.8 mmol/L (06-24-19 @ 10:26)      INFECTIOUS DISEASES TESTING  HIV-1/2 Combo Result: Nonreact (06-16-19 @ 00:50)      RADIOLOGY & ADDITIONAL TESTS:  I have personally reviewed the last Chest xray  CXR      CT      CARDIOLOGY TESTING  12 Lead ECG:   Ventricular Rate 67 BPM    Atrial Rate 67 BPM    P-R Interval 198 ms    QRS Duration 98 ms    Q-T Interval 430 ms    QTC Calculation(Bezet) 454 ms    P Axis 21 degrees    R Axis -25 degrees    T Axis 10 degrees    Diagnosis Line Sinus rhythm with Fusion complexes and Premature atrial complexes with   Aberrant conduction  Low voltage QRS  Septal infarct , age undetermined    Abnormal ECG    Confirmed by FRANCO YORK MD ([id number]) on 6/24/2019 6:51:56 AM (06-23-19 @ 10:15)  12 Lead ECG:   Ventricular Rate 67 BPM    Atrial Rate 67 BPM    P-R Interval 210 ms    QRS Duration 94 ms    Q-T Interval 406 ms    QTC Calculation(Bezet) 429 ms    P Axis 65 degrees    R Axis -29 degrees    T Axis 2 degrees    Diagnosis Line Sinus rhythm with 1st degree A-V block  Low voltage QRS    Possible Anterolateral infarct , age undetermined  Abnormal ECG    Confirmed by FRANCO YORK MD ([id number]) on 6/22/2019 6:53:56 AM (06-21-19 @ 19:20)      MEDICATIONS  chlorhexidine 4% Liquid 1  fat emulsion (Plant Based) 20% Infusion 0.558  fat emulsion (Plant Based) 20% Infusion 0.558  furosemide   Injectable 80  heparin  Injectable 5000  levothyroxine 50  metoprolol tartrate 25  midodrine 10  nystatin Powder 1  pantoprazole   Suspension 40  Parenteral Nutrition - Adult 1  Parenteral Nutrition - Adult 1  PPD  5 Tuberculin Unit(s) Injectable 5      ANTIBIOTICS:

## 2019-06-29 NOTE — PROGRESS NOTE ADULT - SUBJECTIVE AND OBJECTIVE BOX
SUBJECTIVE:    Patient is a 68y old Female who presents with a chief complaint of gallbladder hydrops (29 Jun 2019 10:48)    Currently admitted to medicine with the primary diagnosis of Gallbladder hydrops     Today is hospital day 15d. This morning she is resting comfortably in bed and reports no overnight events. Patient was seen exercising her arms. She is jared    INTERVAL EVENTS:     PAST MEDICAL & SURGICAL HISTORY  Hypothyroidism  CAD S/P percutaneous coronary angioplasty  No significant past surgical history      ALLERGIES:  No Known Allergies    MEDICATIONS:  STANDING MEDICATIONS  albumin human 25% IVPB 50 milliLiter(s) IV Intermittent once  chlorhexidine 4% Liquid 1 Application(s) Topical <User Schedule>  docusate sodium 100 milliGRAM(s) Oral three times a day  fat emulsion (Plant Based) 20% Infusion 0.558 Gm/kG/Day IV Continuous <Continuous>  fat emulsion (Plant Based) 20% Infusion 0.558 Gm/kG/Day IV Continuous <Continuous>  furosemide   Injectable 80 milliGRAM(s) IV Push once  furosemide   Injectable 80 milliGRAM(s) IV Push once  heparin  Injectable 5000 Unit(s) SubCutaneous every 8 hours  levothyroxine 50 MICROGram(s) Oral daily  metoprolol tartrate 25 milliGRAM(s) Oral two times a day  midodrine 10 milliGRAM(s) Oral every 8 hours  nystatin Powder 1 Application(s) Topical three times a day  pantoprazole   Suspension 40 milliGRAM(s) Oral before breakfast  Parenteral Nutrition - Adult 1 Each TPN Continuous <Continuous>  Parenteral Nutrition - Adult 1 Each TPN Continuous <Continuous>  PPD  5 Tuberculin Unit(s) Injectable 5 Unit(s) IntraDermal once  senna 2 Tablet(s) Oral at bedtime    PRN MEDICATIONS  aluminum hydroxide/magnesium hydroxide/simethicone Suspension 30 milliLiter(s) Oral every 4 hours PRN    VITALS:   T(F): 96.4  HR: 69  BP: 98/50  RR: 18  SpO2: 96%    LABS:                        10.6   20.01 )-----------( 275      ( 28 Jun 2019 05:22 )             29.5     06-28    130<L>  |  91<L>  |  143<HH>  ----------------------------<  108<H>  4.5   |  19  |  3.7<H>    Ca    6.2<L>      28 Jun 2019 05:22  Phos  7.2     06-28  Mg     2.4     06-28    TPro  5.3<L>  /  Alb  1.6<L>  /  TBili  2.7<H>  /  DBili  1.9<H>  /  AST  372<H>  /  ALT  337<H>  /  AlkPhos  1105<H>  06-28    PT/INR - ( 28 Jun 2019 05:22 )   PT: 13.80 sec;   INR: 1.20 ratio                   CARDIAC MARKERS ( 28 Jun 2019 05:22 )  x     / x     / 237 U/L / x     / x      CARDIAC MARKERS ( 27 Jun 2019 17:25 )  x     / x     / 289 U/L / x     / x          RADIOLOGY:    PHYSICAL EXAM:  GEN: No acute distress  PULM/CHEST: Clear to auscultation bilaterally, no rales, rhonchi or wheezes   CVS: Regular rate and rhythm, S1-S2, no murmurs  ABD: Soft, non-tender, non-distended, +BS  EXT: No edema  NEURO: AAOx3    Smith Catheter:   Indwelling Urethral Catheter:     Connect To:  Straight Drainage/[place]    Indication:  Urinary Retention / Obstruction (06-26-19 @ 02:15) (not performed) [active]  Indwelling Urethral Catheter:     Connect To:  Straight Drainage/[place]    Indication:  Urinary Retention / Obstruction (06-26-19 @ 09:53) (not performed) [active]  Indwelling Urethral Catheter:     Connect To:  Straight Drainage/[place]    Indication:  Urinary Retention / Obstruction (06-27-19 @ 09:11) (not performed) [active]  Indwelling Urethral Catheter:     Connect To:  Straight Drainage/[place]    Indication:  Urinary Retention / Obstruction (06-28-19 @ 10:06) (not performed) [active] SUBJECTIVE:    Patient is a 68y old Female who presents with a chief complaint of gallbladder hydrops (29 Jun 2019 10:48)    Currently admitted to medicine with the primary diagnosis of Gallbladder hydrops     Today is hospital day 15d. This morning she is resting comfortably in bed and reports no overnight events. Patient was seen exercising her arms. She is edematous and looks weak.    INTERVAL EVENTS: Patient was given Albumin followed by Lasix yesterday. Consulted with Nephro regarding switching to Hd and patient is scheduled to go for Renal biopsy on Monday.    PAST MEDICAL & SURGICAL HISTORY  Hypothyroidism  CAD S/P percutaneous coronary angioplasty  No significant past surgical history      ALLERGIES:  No Known Allergies    MEDICATIONS:  STANDING MEDICATIONS  albumin human 25% IVPB 50 milliLiter(s) IV Intermittent once  chlorhexidine 4% Liquid 1 Application(s) Topical <User Schedule>  docusate sodium 100 milliGRAM(s) Oral three times a day  fat emulsion (Plant Based) 20% Infusion 0.558 Gm/kG/Day IV Continuous <Continuous>  fat emulsion (Plant Based) 20% Infusion 0.558 Gm/kG/Day IV Continuous <Continuous>  furosemide   Injectable 80 milliGRAM(s) IV Push once  furosemide   Injectable 80 milliGRAM(s) IV Push once  heparin  Injectable 5000 Unit(s) SubCutaneous every 8 hours  levothyroxine 50 MICROGram(s) Oral daily  metoprolol tartrate 25 milliGRAM(s) Oral two times a day  midodrine 10 milliGRAM(s) Oral every 8 hours  nystatin Powder 1 Application(s) Topical three times a day  pantoprazole   Suspension 40 milliGRAM(s) Oral before breakfast  Parenteral Nutrition - Adult 1 Each TPN Continuous <Continuous>  Parenteral Nutrition - Adult 1 Each TPN Continuous <Continuous>  PPD  5 Tuberculin Unit(s) Injectable 5 Unit(s) IntraDermal once  senna 2 Tablet(s) Oral at bedtime    PRN MEDICATIONS  aluminum hydroxide/magnesium hydroxide/simethicone Suspension 30 milliLiter(s) Oral every 4 hours PRN    VITALS:   T(F): 96.4  HR: 69  BP: 98/50  RR: 18  SpO2: 96%    LABS:                        10.6   20.01 )-----------( 275      ( 28 Jun 2019 05:22 )             29.5     06-28    130<L>  |  91<L>  |  143<HH>  ----------------------------<  108<H>  4.5   |  19  |  3.7<H>    Ca    6.2<L>      28 Jun 2019 05:22  Phos  7.2     06-28  Mg     2.4     06-28    TPro  5.3<L>  /  Alb  1.6<L>  /  TBili  2.7<H>  /  DBili  1.9<H>  /  AST  372<H>  /  ALT  337<H>  /  AlkPhos  1105<H>  06-28    PT/INR - ( 28 Jun 2019 05:22 )   PT: 13.80 sec;   INR: 1.20 ratio                   CARDIAC MARKERS ( 28 Jun 2019 05:22 )  x     / x     / 237 U/L / x     / x      CARDIAC MARKERS ( 27 Jun 2019 17:25 )  x     / x     / 289 U/L / x     / x          RADIOLOGY:    PHYSICAL EXAM:  GEN: No acute distress  PULM/CHEST: Clear B/L, equal air entry bilaterally  CVS: RRR, S1+S2 + mild systolic murmur  ABD: Soft, non-tender, non-distended, ecchymosis around the needle prick sites +BS,  EXT: Pedal edema  NEURO: AAOx3    Smith Catheter:   Connect To:  Straight Drainage/[place]  Indication:  Urinary Retention / Obstruction SUBJECTIVE:    Patient is a 68y old Female who presents with a chief complaint of gallbladder hydrops (29 Jun 2019 10:48)    Currently admitted to medicine with the primary diagnosis of Gallbladder hydrops     Today is hospital day 15d. This morning she is resting comfortably in bed and reports no overnight events. Patient was seen exercising her arms. She is edematous and looks weak.    INTERVAL EVENTS: Patient was given Albumin followed by Lasix yesterday. Consulted with Nephro regarding switching to Hd and patient is scheduled to go for Renal biopsy on Monday.    PAST MEDICAL & SURGICAL HISTORY  Hypothyroidism  CAD S/P percutaneous coronary angioplasty  No significant past surgical history      ALLERGIES:  No Known Allergies    MEDICATIONS:  STANDING MEDICATIONS  albumin human 25% IVPB 50 milliLiter(s) IV Intermittent once  chlorhexidine 4% Liquid 1 Application(s) Topical <User Schedule>  docusate sodium 100 milliGRAM(s) Oral three times a day  fat emulsion (Plant Based) 20% Infusion 0.558 Gm/kG/Day IV Continuous <Continuous>  fat emulsion (Plant Based) 20% Infusion 0.558 Gm/kG/Day IV Continuous <Continuous>  furosemide   Injectable 80 milliGRAM(s) IV Push once  furosemide   Injectable 80 milliGRAM(s) IV Push once  heparin  Injectable 5000 Unit(s) SubCutaneous every 8 hours  levothyroxine 50 MICROGram(s) Oral daily  metoprolol tartrate 25 milliGRAM(s) Oral two times a day  midodrine 10 milliGRAM(s) Oral every 8 hours  nystatin Powder 1 Application(s) Topical three times a day  pantoprazole   Suspension 40 milliGRAM(s) Oral before breakfast  Parenteral Nutrition - Adult 1 Each TPN Continuous <Continuous>  Parenteral Nutrition - Adult 1 Each TPN Continuous <Continuous>  PPD  5 Tuberculin Unit(s) Injectable 5 Unit(s) IntraDermal once  senna 2 Tablet(s) Oral at bedtime    PRN MEDICATIONS  aluminum hydroxide/magnesium hydroxide/simethicone Suspension 30 milliLiter(s) Oral every 4 hours PRN    VITALS:   T(F): 96.4  HR: 69  BP: 98/50  RR: 18  SpO2: 96%    LABS:                        10.6   20.01 )-----------( 275      ( 28 Jun 2019 05:22 )             29.5     06-28    130<L>  |  91<L>  |  143<HH>  ----------------------------<  108<H>  4.5   |  19  |  3.7<H>    Ca    6.2<L>      28 Jun 2019 05:22  Phos  7.2     06-28  Mg     2.4     06-28    TPro  5.3<L>  /  Alb  1.6<L>  /  TBili  2.7<H>  /  DBili  1.9<H>  /  AST  372<H>  /  ALT  337<H>  /  AlkPhos  1105<H>  06-28    PT/INR - ( 28 Jun 2019 05:22 )   PT: 13.80 sec;   INR: 1.20 ratio                   CARDIAC MARKERS ( 28 Jun 2019 05:22 )  x     / x     / 237 U/L / x     / x      CARDIAC MARKERS ( 27 Jun 2019 17:25 )  x     / x     / 289 U/L / x     / x          RADIOLOGY:  >>LIVER BIOPSY  Final Diagnosis  Right hepatic lobe, needle biopsy:  - Liver parenchyma showing mild focal lobulitis and hepatocyte  reactive changes with few glycogenated nuclei.  - Focal area of vaguely formed Francisca-Denk body also present.  - Two minute focus of macro and micro-vesicular steatosis also  present, counting less than 5% of tissue volume of the biopsy.  - Portal areas showing marked chronic inflammation with focal  interface activity, infiltrated with mainly small lymphocytes and  rare neutrophils, and fibrous expansion of portal area with  fibrous bridge formation, probable cirrhosis.  - Stainable iron present, not increased.  - Trichrome stain reviewed.  - PAS-D stain failed to reveal alpha-1 antitrypsin globule.    Comment  _Sections show findings are consistent with clinical impression  of  cirrhosis.  However, the etiology is not clear.  Clinical  correlation is recommended.  PHYSICAL EXAM:  GEN: No acute distress  PULM/CHEST: Clear B/L, equal air entry bilaterally  CVS: RRR, S1+S2 + mild systolic murmur  ABD: Soft, non-tender, non-distended, ecchymosis around the needle prick sites +BS,  EXT: Pedal edema  NEURO: AAOx3    Smith Catheter:   Connect To:  Straight Drainage/[place]  Indication:  Urinary Retention / Obstruction

## 2019-06-29 NOTE — PROGRESS NOTE ADULT - ASSESSMENT
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 with resultant proteinuria and low albumin state and diffuse anasarca, raynauds phenomenon (not currently active), esophageal dysmotility and reflux, cystitis with urinary retention s/p abx, and subsequent worsening functional debility and malnutrition.    Patient is weaker, edematous. She had rings in her fingers and were removed by the senior resident to avoid ischemia in finger. Labs not performed on 06/28 due to generalized edema.     #hydrops gallbladder/acute severe transaminitis  -LFT's downtrending   -CPK downtrending [id number]   -Liver biopsy done shows Cirrhosis but of no clear etiology   -Clinically too weak at this moment for cholecystectomy  -Midodrine 5mg every 8h for portal HTN    #LUIS ALBERTO  -Worsening LUIS ALBERTO Cr rising 3.0>3.1>3.3>3.5>3.7>3.7  -As per renal team recommendations - patient most likely has hepatorenal syndrome  -Albumin challenge test with 100g albumin performed 06/27  -Continue IV Albumin 25% 50ml with 80mg Lasix twice a day  -Strict Input and Output measures  -Renal biopsy on Monday     # Abnormal DS DNA, Elevated ANKITA titers   -Rheum recommended to hold prednisone use until w/u is completed,  -if renal bx is inconclusive emg and sural nerve biopsy recommended  -ESR, CRP, ANCA repeated    #hypoalbuminemia  -Malnutrition vs Liver Cirrhosis  -Patient was started on TPN    #distal digit blue discoloration  -Raynaud's (primary vs secondary)  -r/o etiology---paraneoplastic, vasculitis, vs rheum   -transient=---resolved within 1 hour  -gloves/warmth to affected fingertips  -CCB on hold for hypotension  -esophagram consistent with dysmotility    #UTI with urinary retention  -has aragon,monitor u/o   -received ceftriaxone/ completed  -Urinalysis order on recommendation of Nephro  -WBCs still rising 15>17>15>18>20    #h/o CAD  -cont home regimen  -statin on hold 2/2 acute liver injury  -Continue to hold Plavix for Renal biopsy  -Hold ASA for renal biopsy    #hypothyroidism  -TSH elevated---t3/t4 normal---will cont synthroid 50 mcg daily    #DVT + GI ppx  FULL CODE  high risk/ guarded prognosis

## 2019-06-29 NOTE — PROGRESS NOTE ADULT - SUBJECTIVE AND OBJECTIVE BOX
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  [facility] T2-3A 014 B ([facility] T2-3A)            Patient was evaluated and examined  by bedside, remains very edematous with generalized body weakness, patient had improved urine output post IV lasix tx. yesterday- night shift- [id number], and 7am -  11: 00 am - 600ml      REVIEW OF SYSTEMS:  please see pertinent positives mentioned above, all other 12 ROS negative      T(C): , Max: 36.4 (06-28-19 @ 16:30)  HR: 78 (06-29-19 @ 06:24)  BP: 100/51 (06-29-19 @ 06:24)  RR: 18 (06-29-19 @ 06:24)  SpO2: 96% (06-28-19 @ 16:30)  CAPILLARY BLOOD GLUCOSE          PHYSICAL EXAM:  General: NAD, AAOX3, patient is laying comfortably in bed, obese  HEENT: AT, NC, Supple, NO JVD, NO CB  Lungs: CTA B/L, no wheezing, no rhonchi  CVS: normal S1, S2, RRR, NO M/G/R  Abdomen: soft, bowel sounds present, non-tender, non-distended  Extremities: patient has anasarca, extensive plus 3 pitting diffuse pitting edema of all extremities/thighs/lower abd wall, no clubbing, no cyanosis, positive peripheral pulses b/l  Neuro: no acute focal neurological deficits, profound  generalized body weakness  Skin: no rush, no ecchymosis      LAB  CBC  Date: 06-28-19 @ 05:22  Mean cell Ebkaviyrgt44.6  Mean cell Hemoglobin Conc35.9  Mean cell Volum 88.1  Platelet count-Automate 275  RBC Count 3.35  Red Cell Distrib Width22.4  WBC Count20.01  % Albumin, Urine--  Hematocrit 29.5  Hemoglobin 10.6  CBC  Date: 06-27-19 @ 07:43  Mean cell Shprxayuxf48.1  Mean cell Hemoglobin Conc35.6  Mean cell Volum 87.4  Platelet count-Automate 271  RBC Count 3.41  Red Cell Distrib Width22.3  WBC Count18.52  % Albumin, Urine--  Hematocrit 29.8  Hemoglobin 10.6  CBC  Date: 06-25-19 @ 05:34  Mean cell Ixdeqestog42.4  Mean cell Hemoglobin Conc34.7  Mean cell Volum 87.5  Platelet count-Automate 238  RBC Count 3.52  Red Cell Distrib Width21.2  WBC Count15.78  % Albumin, Urine--  Hematocrit 30.8  Hemoglobin 10.7  CBC  Date: 06-24-19 @ 10:26  Mean cell Bkncfvowmy69.1  Mean cell Hemoglobin Conc35.7  Mean cell Volum 87.1  Platelet count-Automate 253  RBC Count 3.50  Red Cell Distrib Width20.1  WBC Count17.88  % Albumin, Urine--  Hematocrit 30.5  Hemoglobin 10.9  CBC  Date: 06-23-19 @ 07:08  Mean cell Pgatbseehd77.7  Mean cell Hemoglobin Conc34.1  Mean cell Volum 87.2  Platelet count-Automate 248  RBC Count 3.67  Red Cell Distrib Width19.9  WBC Count15.12  % Albumin, Urine--  Hematocrit 32.0  Hemoglobin 10.9  CBC  Date: 06-22-19 @ 17:46  Mean cell Vwmpkeczrd34.4  Mean cell Hemoglobin Conc34.6  Mean cell Volum 87.7  Platelet count-Automate 241  RBC Count 3.82  Red Cell Distrib Width19.5  WBC Count14.18  % Albumin, Urine--  Hematocrit 33.5  Hemoglobin 11.6    [facility]  06-28-19 @ 05:22  Blood Gas Arterial-Calcium,Ionized--  Blood Urea Nitrogen, Serum 143 mg/dL<HH> [10 - 20] [Critical value:]  Carbon Dioxide, Serum19 mmol/L [17 - 32]  Chloride, Serum91 mmol/L<L> [98 - 110]  Creatinie, Serum3.7 mg/dL<H> [0.7 - 1.5]  Glucose, Ftdxs599 mg/dL<H> [70 - 99]  Potassium, Serum4.5 mmol/L [3.5 - 5.0]  Sodium, Serum 130 mmol/L<L> [135 - 146]  [facility]  06-27-19 @ 07:43  Blood Gas Arterial-Calcium,Ionized--  Blood Urea Nitrogen, Serum 131 mg/dL<HH> [10 - 20] [Critical value:]  Carbon Dioxide, Serum19 mmol/L [17 - 32]  Chloride, Serum92 mmol/L<L> [98 - 110]  Creatinie, Serum3.7 mg/dL<H> [0.7 - 1.5]  Glucose, Pkltm692 mg/dL<H> [70 - 99]  Potassium, Serum4.4 mmol/L [3.5 - 5.0]  Sodium, Serum 132 mmol/L<L> [135 - 146]  BMP  06-25-19 @ 05:34  Blood Gas Arterial-Calcium,Ionized--  Blood Urea Nitrogen, Serum 107 mg/dL<HH> [10 - 20] [Critical value:]  Carbon Dioxide, Serum19 mmol/L [17 - 32]  Chloride, Ovlsw559 mmol/L [98 - 110]  Creatinie, Serum3.5 mg/dL<H> [0.7 - 1.5]  Glucose, Toxnd343 mg/dL<H> [70 - 99]  Potassium, Serum4.9 mmol/L [3.5 - 5.0]  Sodium, Serum 137 mmol/L [135 - 146]        PT/INR - ( 28 Jun 2019 05:22 )   PT: 13.80 sec;   INR: 1.20 ratio               Microbiology:    Culture - Urine (collected 06-22-19 @ 18:04)  Source: .Urine Clean Catch (Midstream)  Final Report (06-24-19 @ 06:28):    No growth    Culture - Urine (collected 06-21-19 @ 23:54)  Source: .Urine Clean Catch (Midstream)  Final Report (06-23-19 @ 08:53):    No growth    Culture - Blood (collected 06-15-19 @ 18:01)  Source: .Blood None  Final Report (06-21-19 @ 06:00):    No growth at 5 days.        Medications:  aluminum hydroxide/magnesium hydroxide/simethicone Suspension 30 milliLiter(s) Oral every 4 hours PRN  chlorhexidine 4% Liquid 1 Application(s) Topical <User Schedule>  docusate sodium 100 milliGRAM(s) Oral three times a day  fat emulsion (Plant Based) 20% Infusion 0.558 Gm/kG/Day IV Continuous <Continuous>  fat emulsion (Plant Based) 20% Infusion 0.558 Gm/kG/Day IV Continuous <Continuous>  heparin  Injectable 5000 Unit(s) SubCutaneous every 8 hours  levothyroxine 50 MICROGram(s) Oral daily  metoprolol tartrate 25 milliGRAM(s) Oral two times a day  midodrine 10 milliGRAM(s) Oral every 8 hours  nystatin Powder 1 Application(s) Topical three times a day  pantoprazole   Suspension 40 milliGRAM(s) Oral before breakfast  Parenteral Nutrition - Adult 1 Each TPN Continuous <Continuous>  Parenteral Nutrition - Adult 1 Each TPN Continuous <Continuous>  PPD  5 Tuberculin Unit(s) Injectable 5 Unit(s) IntraDermal once  senna 2 Tablet(s) Oral at bedtime        Assessment and Plan:  A/P 69 yo F w/ hx of CAD s/p PCI and hypothyroidism sent by gastroenterologist for gallbladder hydrops, course complicated by ALI/ transaminitis possibly of autoimmune etiology, LUIS ALBERTO with hyperkalemia multifactorial 2/2 rhabdo (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 with resultant proteinuria and low albumin state and diffuse anasarca, raynauds phenomenon (not currently active), esophageal dysmotility and reflux, cystitis with urinary retention s/p abx, and subsequent worsening functional debility and malnutrition    #hydrops gallbladder with persistent transaminitis   trend LFT's,  -hepatitis profile negative  concomitant acute rhabdomyolysis- CK level trending down   statin was d/c on admission,   liver was  biopsied- Surgical Pathology Report (06.24.19 @ 16:10)  Surgical Final Report: Final Diagnosis  Right hepatic lobe, needle biopsy:  - Liver parenchyma showing mild focal lobulitis and hepatocyte reactive changes with few glycogenated nuclei.  - Focal area of vaguely formed Francisca-Denk body also present.  - Two minute focus of macro and micro-vesicular steatosis also present, counting less than 5% of tissue volume of the biopsy.  - Portal areas showing marked chronic inflammation with focal interface activity, infiltrated with mainly small lymphocytes and  rare neutrophils, and fibrous expansion of portal area with  fibrous bridge formation, probable cirrhosis.  - Stainable iron present, not increased.  - Trichrome stain reviewed.  - PAS-D stain failed to reveal alpha-1 antitrypsin globule.    Verified by: Manoj Lindsey M.D.  (Electronic Signature)  Reported on: 06/26/19 15:42 EDT, [address] [place] [address]  _________________________________________________________________    Comment  _Sections show findings are consistent with clinical impression of  cirrhosis.  However, the etiology is not clear.  Clinical correlation is recommended.    06/25/19 08:55 ns    LIVER FUNCTIONS - ( 28 Jun 2019 05:22 )  Alb: 1.6 g/dL / Pro: 5.3 g/dL / ALK PHOS: 1105 U/L / ALT: 337 U/L / AST: 372 U/L / GGT: x                      clinically too weak at this moment for cholecystecomy         #worsening LUIS ALBERTO  - with worsening uremia and worsening tertiary fluid spacing with anasarca  - as per renal team recommendations - patient will need diagnostic renal biopsy- tentatively scheduled for July 1st in am  - patient has severe tertiary volume spacing, will continue  IV albumin with IV lasix tx. 80 mg IV twice daily- strict I and O chart  - f/up renal team if patient wound be a candidate for HD tx.  -daily BMP     # Abnormal elevated DS DNA- 111, Elevated ANKITA titers 1: 2560, Elevated P-Anca- titer 1: 1280 - ? lupus nephritis, vs Vasculitis,   - we have consulted  Rheumatology specialist - who recommended- diagnostic renal biopsy, MPO/PR3 abs,     #Persistent Leukocytosis- most likely as a result of acute inflammatory state  so far no acute infectious etiology   -f/up repeated obtain blood cxs, urine cxs  -continue to monitor CBC     # hypotension- with hypoalbuminemia, tertiary fluid spacing, LUIS ALBERTO, started on Midodrine 10 mg po three times daily      #hypoalbuminemia 2/2 acute liver disease- severe protein/calorie malnutrition  - patient was started on PPN- IV Albumin tx.      #UTI with urinary retention  has aragon, monitor u/o   received ceftriaxone/ completed,    #h/o CAD  -cont home regimen  -statin on hold 2/2 acute liver injury  -will resume  plavix post-renal  biopsy      #hypothyroidism  -tsh elevated---t3/t4 normal---will cont syntrhoid 50 mcg daily    #DVT//gI ppx  FULL CODE  high risk/ guarded prognosis    #Progress Note Handoff: Pending Consults__Renal team f/up for possible initiation of HD_______,Tests__MPO/PR3 abs______,Test Results___BMP____,Other;, strict I and O monitoring with IV lasix tx, renal biopsy on july 1st  Family discussion: patient by bedside Disposition: Home__once medically stable

## 2019-06-29 NOTE — PROGRESS NOTE ADULT - ASSESSMENT
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

## 2019-06-29 NOTE — PROGRESS NOTE ADULT - SUBJECTIVE AND OBJECTIVE BOX
Nephrology progress note    Patient is seen and examined, events over the last 24 h noted .  Denies pain, SOB, received LASix for severe anasarka. On TPN  Allergies:  No Known Allergies    Hospital Medications:   MEDICATIONS  (STANDING):  albumin human 25% IVPB 50 milliLiter(s) IV Intermittent once  albumin human 25% IVPB 50 milliLiter(s) IV Intermittent once  chlorhexidine 4% Liquid 1 Application(s) Topical <User Schedule>  docusate sodium 100 milliGRAM(s) Oral three times a day  fat emulsion (Plant Based) 20% Infusion 0.558 Gm/kG/Day (21.936 mL/Hr) IV Continuous <Continuous>  fat emulsion (Plant Based) 20% Infusion 0.558 Gm/kG/Day (21.936 mL/Hr) IV Continuous <Continuous>  furosemide   Injectable 80 milliGRAM(s) IV Push once  furosemide   Injectable 80 milliGRAM(s) IV Push once  heparin  Injectable 5000 Unit(s) SubCutaneous every 8 hours  levothyroxine 50 MICROGram(s) Oral daily  metoprolol tartrate 25 milliGRAM(s) Oral two times a day  midodrine 10 milliGRAM(s) Oral every 8 hours  nystatin Powder 1 Application(s) Topical three times a day  pantoprazole   Suspension 40 milliGRAM(s) Oral before breakfast  Parenteral Nutrition - Adult 1 Each (60 mL/Hr) TPN Continuous <Continuous>  Parenteral Nutrition - Adult 1 Each (60 mL/Hr) TPN Continuous <Continuous>  PPD  5 Tuberculin Unit(s) Injectable 5 Unit(s) IntraDermal once  senna 2 Tablet(s) Oral at bedtime        VITALS:  T(F): 96.9 (19 @ 13:00), Max: 97.2 (19 @ 20:00)  HR: 70 (19 @ 13:00)  BP: 111/54 (19 @ 13:00)  RR: 18 (19 @ 13:00)  SpO2: --  Wt(kg): --     @ 07:01  -   @ 07:00  --------------------------------------------------------  IN: 574 mL / OUT: 725 mL / NET: -151 mL     @ 07:01   @ 07:00  --------------------------------------------------------  IN: 410 mL / OUT: 1200 mL / NET: -790 mL     @ 07: @ 16:41  --------------------------------------------------------  IN: 0 mL / OUT: 950 mL / NET: -950 mL          PHYSICAL EXAM:  Constitutional: NAD  HEENT: anicteric sclera, oropharynx clear, MMM  Neck: No JVD  Respiratory: CTAB, no wheezes, rales or rhonchi  Cardiovascular: S1, S2, RRR  Gastrointestinal: BS+, soft, NT/ND  Extremities:  peripheral edema severe  Neurological: A/O x 3  : No CVA tenderness.  aragon+.   Skin: No rashes  Vascular Access:    LABS:      134<L>  |  94<L>  |  151<HH>  ----------------------------<  112<H>  3.8   |  19  |  3.3<H>  Creatinine Trend: 3.3<--, 3.7<--, 3.7<--, 3.5<--, 3.3<--, 3.1<--    Ca    6.5<L>      2019 14:50  Phos  7.2       Mg     2.4         TPro  5.2<L>  /  Alb  2.5<L>  /  TBili  2.9<H>  /  DBili  1.4<H>  /  AST  178<H>  /  ALT  165<H>  /  AlkPhos  648<H>                            10.6   20.01 )-----------( 275      ( 2019 05:22 )             29.5       Urine Studies:  Urinalysis Basic - ( 2019 22:03 )    Color: Orange / Appearance: Turbid / S.015 / pH:   Gluc:  / Ketone: Negative  / Bili: Small / Urobili: 1.0 mg/dL   Blood:  / Protein: Trace mg/dL / Nitrite: Negative   Leuk Esterase: Small / RBC:  / WBC 10-25 /HPF   Sq Epi:  / Non Sq Epi: Occasional /HPF / Bacteria: Many /HPF      Sodium, Random Urine: 20.0 mmoL/L ( @ 22:03)  Protein/Creatinine Ratio Calculation: 0.2 Ratio ( @ 22:03)  Creatinine, Random Urine: 119 mg/dL ( @ 22:03)    RADIOLOGY & ADDITIONAL STUDIES:

## 2019-06-30 NOTE — DISCHARGE NOTE NURSING/CASE MANAGEMENT/SOCIAL WORK - NSDCDPATPORTLINK_GEN_ALL_CORE
You can access the [facility] Portal, offered by [facility], by registering with the following website: [email or web address]

## 2019-06-30 NOTE — PROGRESS NOTE ADULT - SUBJECTIVE AND OBJECTIVE BOX
Nephrology progress note    Patient is seen and examined, events over the last 24 h noted .    Allergies:  No Known Allergies    Hospital Medications:   MEDICATIONS  (STANDING):  albumin human 25% IVPB 50 milliLiter(s) IV Intermittent once  albumin human 25% IVPB 50 milliLiter(s) IV Intermittent once  chlorhexidine 4% Liquid 1 Application(s) Topical <User Schedule>  docusate sodium 100 milliGRAM(s) Oral three times a day  fat emulsion (Plant Based) 20% Infusion 0.558 Gm/kG/Day (21.936 mL/Hr) IV Continuous <Continuous>  fat emulsion (Plant Based) 20% Infusion 0.558 Gm/kG/Day (21.936 mL/Hr) IV Continuous <Continuous>  furosemide   Injectable 80 milliGRAM(s) IV Push once  furosemide   Injectable 80 milliGRAM(s) IV Push once  heparin  Injectable 5000 Unit(s) SubCutaneous every 8 hours  levothyroxine 50 MICROGram(s) Oral daily  metoprolol tartrate 25 milliGRAM(s) Oral two times a day  midodrine 10 milliGRAM(s) Oral every 8 hours  nystatin Powder 1 Application(s) Topical three times a day  pantoprazole   Suspension 40 milliGRAM(s) Oral before breakfast  Parenteral Nutrition - Adult 1 Each (60 mL/Hr) TPN Continuous <Continuous>  Parenteral Nutrition - Adult 1 Each (60 mL/Hr) TPN Continuous <Continuous>  PPD  5 Tuberculin Unit(s) Injectable 5 Unit(s) IntraDermal once  senna 2 Tablet(s) Oral at bedtime        VITALS:  T(F): 97.2 (19 @ 05:29), Max: 97.2 (19 @ 05:29)  HR: 73 (19 @ 05:29)  BP: 90/52 (19 @ 05:29)  RR: 18 (19 @ 05:29)  SpO2: 95% (19 @ 21:59)  Wt(kg): --     @ 07:01  -   @ 07:00  --------------------------------------------------------  IN: 410 mL / OUT: 1200 mL / NET: -790 mL     @ 07: @ 07:00  --------------------------------------------------------  IN: 0 mL / OUT: 2725 mL / NET: -2725 mL     @ 07: @ 10:07  --------------------------------------------------------  IN: 0 mL / OUT: 300 mL / NET: -300 mL          PHYSICAL EXAM:  Constitutional: NAD  HEENT: anicteric sclera, oropharynx clear, MMM  Neck: No JVD  Respiratory: CTAB, no wheezes, rales or rhonchi  Cardiovascular: S1, S2, RRR  Gastrointestinal: BS+, soft, NT/ND  Extremities: No cyanosis or clubbing. No peripheral edema  :  No aragon.   Skin: No rashes    LABS:      134<L>  |  94<L>  |  151<HH>  ----------------------------<  112<H>  3.8   |  19  |  3.3<H>    Creatinine Trend: 3.3<--, 3.7<--, 3.7<--, 3.5<--, 3.3<--, 3.1<--    Ca    6.5<L>      2019 14:50    TPro  5.2<L>  /  Alb  2.5<L>  /  TBili  2.9<H>  /  DBili  1.4<H>  /  AST  178<H>  /  ALT  165<H>  /  AlkPhos  648<H>          Urine Studies:  Urinalysis Basic - ( 2019 22:03 )    Color: Orange / Appearance: Turbid / S.015 / pH:   Gluc:  / Ketone: Negative  / Bili: Small / Urobili: 1.0 mg/dL   Blood:  / Protein: Trace mg/dL / Nitrite: Negative   Leuk Esterase: Small / RBC:  / WBC 10-25 /HPF   Sq Epi:  / Non Sq Epi: Occasional /HPF / Bacteria: Many /HPF      Sodium, Random Urine: 20.0 mmoL/L ( @ 22:03)  Protein/Creatinine Ratio Calculation: 0.2 Ratio ( @ 22:03)  Creatinine, Random Urine: 119 mg/dL ( @ 22:03)    RADIOLOGY & ADDITIONAL STUDIES: Nephrology progress note    Patient is seen and examined, events over the last 24 h noted .  edematous, lethargic  planned for kidney bx in AM     Allergies:  No Known Allergies    Hospital Medications:   MEDICATIONS  (STANDING):  albumin human 25% IVPB 50 milliLiter(s) IV Intermittent once  albumin human 25% IVPB 50 milliLiter(s) IV Intermittent once  docusate sodium 100 milliGRAM(s) Oral three times a day  fat emulsion (Plant Based) 20% Infusion 0.558 Gm/kG/Day (21.936 mL/Hr) IV Continuous <Continuous>  fat emulsion (Plant Based) 20% Infusion 0.558 Gm/kG/Day (21.936 mL/Hr) IV Continuous <Continuous>  furosemide   Injectable 80 milliGRAM(s) IV Push once  furosemide   Injectable 80 milliGRAM(s) IV Push once  heparin  Injectable 5000 Unit(s) SubCutaneous every 8 hours  levothyroxine 50 MICROGram(s) Oral daily  metoprolol tartrate 25 milliGRAM(s) Oral two times a day  midodrine 10 milliGRAM(s) Oral every 8 hours  nystatin Powder 1 Application(s) Topical three times a day  pantoprazole   Suspension 40 milliGRAM(s) Oral before breakfast  Parenteral Nutrition - Adult 1 Each (60 mL/Hr) TPN Continuous <Continuous>  Parenteral Nutrition - Adult 1 Each (60 mL/Hr) TPN Continuous <Continuous>  PPD  5 Tuberculin Unit(s) Injectable 5 Unit(s) IntraDermal once  senna 2 Tablet(s) Oral at bedtime        VITALS:  T(F): 97.2 (19 @ 05:29), Max: 97.2 (19 @ 05:29)  HR: 73 (19 @ 05:29)  BP: 90/52 (19 @ 05:29)  RR: 18 (19 @ 05:29)  SpO2: 95% (19 @ 21:59)       @ 07:01  -   @ 07:00  --------------------------------------------------------  IN: 410 mL / OUT: 1200 mL / NET: -790 mL     @ 07: @ 07:00  --------------------------------------------------------  IN: 0 mL / OUT: 2725 mL / NET: -2725 mL     @ 07: @ 10:07  --------------------------------------------------------  IN: 0 mL / OUT: 300 mL / NET: -300 mL          PHYSICAL EXAM:  Constitutional: NAD  HEENT: anicteric sclera, oropharynx clear, MMM  Neck: No JVD  Respiratory: CTAB, no wheezes, rales or rhonchi  Cardiovascular: S1, S2, RRR  Gastrointestinal: BS+, soft, NT/ND  Extremities: No cyanosis or clubbing. edema diffuse upper and lower ext   :  No aragon.   Skin: No rashes    LABS:          134<L>  |  94<L>  |  151<HH>  ----------------------------<  112<H>  3.8   |  19  |  3.3<H>    Creatinine Trend: 3.3<--, 3.7<--, 3.7<--, 3.5<--, 3.3<--, 3.1<--    Ca    6.5<L>      2019 14:50    TPro  5.2<L>  /  Alb  2.5<L>  /  TBili  2.9<H>  /  DBili  1.4<H>  /  AST  178<H>  /  ALT  165<H>  /  AlkPhos  648<H>          Urine Studies:  Urinalysis Basic - ( 2019 22:03 )    Color: Orange / Appearance: Turbid / S.015 / pH:   Gluc:  / Ketone: Negative  / Bili: Small / Urobili: 1.0 mg/dL   Blood:  / Protein: Trace mg/dL / Nitrite: Negative   Leuk Esterase: Small / RBC:  / WBC 10-25 /HPF   Sq Epi:  / Non Sq Epi: Occasional /HPF / Bacteria: Many /HPF      Sodium, Random Urine: 20.0 mmoL/L ( @ 22:03)  Protein/Creatinine Ratio Calculation: 0.2 Ratio ( @ 22:03)  Creatinine, Random Urine: 119 mg/dL ( @ 22:03)    RADIOLOGY & ADDITIONAL STUDIES:

## 2019-06-30 NOTE — PROGRESS NOTE ADULT - SUBJECTIVE AND OBJECTIVE BOX
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  [facility] T2-3A 014 B ([facility])            Patient was evaluated and examined  by bedside, remains very edematous, c/o constipation, post lasix tx. , Intake and output not documented properly- for past 24 hours patient's intake was approximately 2500 ml( 1968ml - PPN + 100 ml - albumin + 400 ml po intake) with output of  2725 ml - which equal to negative 225 ml.        REVIEW OF SYSTEMS:  please see pertinent positives mentioned above, all other 12 ROS negative      T(C): , Max: 36.2 (06-30-19 @ 05:29)  HR: 73 (06-30-19 @ 05:29)  BP: 90/52 (06-30-19 @ 05:29)  RR: 18 (06-30-19 @ 05:29)  SpO2: 95% (06-29-19 @ 21:59)  CAPILLARY BLOOD GLUCOSE      POCT Blood Glucose.: 98 mg/dL (30 Jun 2019 07:43)  POCT Blood Glucose.: 129 mg/dL (29 Jun 2019 19:03)      PHYSICAL EXAM:  General: NAD, AAOX3, patient is laying comfortably in bed, obese  HEENT: AT, NC, Supple, NO JVD, NO CB  Lungs: CTA B/L, no wheezing, no rhonchi  CVS: normal S1, S2, RRR, NO M/G/R  Abdomen: soft, bowel sounds present, non-tender, non-distended  Extremities: patient has anasarca, extensive plus 3 pitting diffuse pitting edema of all extremities/thighs/lower abd wall, no clubbing, no cyanosis, positive peripheral pulses b/l  Neuro: no acute focal neurological deficits, profound  generalized body weakness  Skin: no rush, small ecchymosis -left upper ext.        LAB  CBC  Date: 06-28-19 @ 05:22  Mean cell Otnaakhkqf96.6  Mean cell Hemoglobin Conc35.9  Mean cell Volum 88.1  Platelet count-Automate 275  RBC Count 3.35  Red Cell Distrib Width22.4  WBC Count20.01  % Albumin, Urine--  Hematocrit 29.5  Hemoglobin 10.6  CBC  Date: 06-27-19 @ 07:43  Mean cell Cwirrkiwnc32.1  Mean cell Hemoglobin Conc35.6  Mean cell Volum 87.4  Platelet count-Automate 271  RBC Count 3.41  Red Cell Distrib Width22.3  WBC Count18.52  % Albumin, Urine--  Hematocrit 29.8  Hemoglobin 10.6  CBC  Date: 06-25-19 @ 05:34  Mean cell Yynmlcmfgo68.4  Mean cell Hemoglobin Conc34.7  Mean cell Volum 87.5  Platelet count-Automate 238  RBC Count 3.52  Red Cell Distrib Width21.2  WBC Count15.78  % Albumin, Urine--  Hematocrit 30.8  Hemoglobin 10.7  CBC  Date: 06-24-19 @ 10:26  Mean cell Teayyhdmel85.1  Mean cell Hemoglobin Conc35.7  Mean cell Volum 87.1  Platelet count-Automate 253  RBC Count 3.50  Red Cell Distrib Width20.1  WBC Count17.88  % Albumin, Urine--  Hematocrit 30.5  Hemoglobin 10.9    [facility]  06-29-19 @ 14:50  Blood Gas Arterial-Calcium,Ionized--  Blood Urea Nitrogen, Serum 151 mg/dL<HH> [10 - 20] [Critical value:]  Carbon Dioxide, Serum19 mmol/L [17 - 32]  Chloride, Serum94 mmol/L<L> [98 - 110]  Creatinie, Serum3.3 mg/dL<H> [0.7 - 1.5]  Glucose, Yfiik278 mg/dL<H> [70 - 99]  Potassium, Serum3.8 mmol/L [3.5 - 5.0]  Sodium, Serum 134 mmol/L<L> [135 - 146]  [facility]  06-28-19 @ 05:22  Blood Gas Arterial-Calcium,Ionized--  Blood Urea Nitrogen, Serum 143 mg/dL<HH> [10 - 20] [Critical value:]  Carbon Dioxide, Serum19 mmol/L [17 - 32]  Chloride, Serum91 mmol/L<L> [98 - 110]  Creatinie, Serum3.7 mg/dL<H> [0.7 - 1.5]  Glucose, Wzeki119 mg/dL<H> [70 - 99]  Potassium, Serum4.5 mmol/L [3.5 - 5.0]  Sodium, Serum 130 mmol/L<L> [135 - 146]  [facility]  06-27-19 @ 07:43  Blood Gas Arterial-Calcium,Ionized--  Blood Urea Nitrogen, Serum 131 mg/dL<HH> [10 - 20] [Critical value:]  Carbon Dioxide, Serum19 mmol/L [17 - 32]  Chloride, Serum92 mmol/L<L> [98 - 110]  Creatinie, Serum3.7 mg/dL<H> [0.7 - 1.5]  Glucose, Zupev961 mg/dL<H> [70 - 99]  Potassium, Serum4.4 mmol/L [3.5 - 5.0]  Sodium, Serum 132 mmol/L<L> [135 - 146]              Microbiology:    Culture - Blood (collected 06-28-19 @ 18:46)  Source: .Blood None  Preliminary Report (06-30-19 @ 03:01):    No growth to date.    Culture - Urine (collected 06-22-19 @ 18:04)  Source: .Urine Clean Catch (Midstream)  Final Report (06-24-19 @ 06:28):    No growth    Culture - Urine (collected 06-21-19 @ 23:54)  Source: .Urine Clean Catch (Midstream)  Final Report (06-23-19 @ 08:53):    No growth    Culture - Blood (collected 06-15-19 @ 18:01)  Source: .Blood None  Final Report (06-21-19 @ 06:00):    No growth at 5 days.          Medications:  albumin human 25% IVPB 50 milliLiter(s) IV Intermittent once  aluminum hydroxide/magnesium hydroxide/simethicone Suspension 30 milliLiter(s) Oral every 4 hours PRN  chlorhexidine 4% Liquid 1 Application(s) Topical <User Schedule>  docusate sodium 100 milliGRAM(s) Oral three times a day  fat emulsion (Plant Based) 20% Infusion 0.558 Gm/kG/Day IV Continuous <Continuous>  fat emulsion (Plant Based) 20% Infusion 0.558 Gm/kG/Day IV Continuous <Continuous>  furosemide   Injectable 80 milliGRAM(s) IV Push once  heparin  Injectable 5000 Unit(s) SubCutaneous every 8 hours  levothyroxine 50 MICROGram(s) Oral daily  metoprolol tartrate 25 milliGRAM(s) Oral two times a day  midodrine 10 milliGRAM(s) Oral every 8 hours  nystatin Powder 1 Application(s) Topical three times a day  pantoprazole   Suspension 40 milliGRAM(s) Oral before breakfast  Parenteral Nutrition - Adult 1 Each TPN Continuous <Continuous>  Parenteral Nutrition - Adult 1 Each TPN Continuous <Continuous>  PPD  5 Tuberculin Unit(s) Injectable 5 Unit(s) IntraDermal once  senna 2 Tablet(s) Oral at bedtime        Assessment and Plan:  A/P 67 yo F w/ hx of CAD s/p PCI and hypothyroidism sent by gastroenterologist for gallbladder hydrops, course complicated by ALI/ transaminitis possibly of autoimmune etiology, LUIS ALBERTO with hyperkalemia multifactorial 2/2 rhabdo (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 with resultant proteinuria and low albumin state and diffuse anasarca, raynauds phenomenon (not currently active), esophageal dysmotility and reflux, cystitis with urinary retention s/p abx, and subsequent worsening functional debility and malnutrition    #hydrops gallbladder with persistent transaminitis - LFT's trending down  trend LFT's,  -hepatitis profile negative  concomitant acute rhabdomyolysis- repeated CK level near normal    statin was d/c on admission,   liver was  biopsied- Surgical Pathology Report (06.24.19 @ 16:10)  Surgical Final Report: Final Diagnosis  Right hepatic lobe, needle biopsy:  - Liver parenchyma showing mild focal lobulitis and hepatocyte reactive changes with few glycogenated nuclei.  - Focal area of vaguely formed Francisca-Denk body also present.  - Two minute focus of macro and micro-vesicular steatosis also present, counting less than 5% of tissue volume of the biopsy.  - Portal areas showing marked chronic inflammation with focal interface activity, infiltrated with mainly small lymphocytes and  rare neutrophils, and fibrous expansion of portal area with  fibrous bridge formation, probable cirrhosis.  - Stainable iron present, not increased.  - Trichrome stain reviewed.  - PAS-D stain failed to reveal alpha-1 antitrypsin globule.    Verified by: Manoj Lindsey M.D.  (Electronic Signature)  Reported on: 06/26/19 15:42 EDT, [address] [place] [id number]  _________________________________________________________________    Comment  _Sections show findings are consistent with clinical impression of  cirrhosis.  However, the etiology is not clear.  Clinical correlation is recommended.    06/25/19 08:55 ns    LIVER FUNCTIONS - ( 28 Jun 2019 05:22 )  Alb: 1.6 g/dL / Pro: 5.3 g/dL / ALK PHOS: 1105 U/L / ALT: 337 U/L / AST: 372 U/L / GGT: x        LIVER FUNCTIONS - ( 29 Jun 2019 14:50 )  Alb: 2.5 g/dL / Pro: 5.2 g/dL / ALK PHOS: 648 U/L / ALT: 165 U/L / AST: 178 U/L / GGT: x                         clinically too weak at this moment for cholecystecomy         #worsening LUIS ALBERTO  - with worsening uremia and worsening tertiary fluid spacing with anasarca  - as per renal team recommendations - patient will need diagnostic renal biopsy- tentatively scheduled for July 1st in am  - patient has severe tertiary volume spacing, will continue  IV albumin with IV lasix tx. 80 mg IV twice daily- strict I and O chart, the goal at least to have negative 2 liters/ 24 hours  - f/up renal team if patient wound be a candidate for HD tx.  -daily BMP     # Abnormal elevated DS DNA- 111, Elevated ANKITA titers 1: 2560, Elevated P-Anca- titer 1: 1280 - ? lupus nephritis, vs Vasculitis,   - we have consulted  Rheumatology specialist - who recommended- diagnostic renal biopsy, MPO/PR3 abs,     #Persistent Leukocytosis- most likely as a result of acute inflammatory state  so far no acute infectious etiology   -f/up repeated obtain blood cxs, urine cxs  -continue to monitor CBC     # hypotension- with hypoalbuminemia, tertiary fluid spacing, LUIS ALBERTO, started on Midodrine 10 mg po three times daily      #hypoalbuminemia 2/2 acute liver disease- severe protein/calorie malnutrition  - patient was started on PPN- IV Albumin tx.      #UTI with urinary retention  has aragon, monitor u/o   received ceftriaxone/ completed,    #h/o CAD  -cont home regimen  -statin on hold 2/2 acute liver injury  -will resume  plavix post-renal  biopsy      #hypothyroidism  -tsh elevated---t3/t4 normal---will cont syntrhoid 50 mcg daily    # Constipation  -colace/dulcolax supp.    #DVT//gI ppx  FULL CODE  high risk/ guarded prognosis    #Progress Note Handoff: Pending Consults__Renal team f/up for possible initiation of HD_______,Tests__MPO/PR3 abs______,Test Results___BMP____,Other;, strict I and O monitoring with IV lasix tx, renal biopsy on july 1st  Family discussion: patient by bedside Disposition: Home__once medically stable

## 2019-06-30 NOTE — PROGRESS NOTE ADULT - ASSESSMENT
69 yo F w/ hx of CAD s/p PCI and hypothyroidism sent by gastroenterologist for gallbladder hydrops    Hepatorenal failure - positive serology for vasculitis  Anti DNA +, Antinucleolar Ab+ c3c4 wnl  pANCA positive 1:1280 (MPO vasculitis probably or PAN)  CPK mildly elevated 280    - needs a kidney bx, off ASA, scheduled for kidney bx on Monday    # strict I/O - nonoliguric with  LAsix 80 mgx1;  kidney sono neg for obstruction  # Pr/Cr ratio 0.2 g/g creat only    - on TPN  - rheum consult appreciated    # seen by GI: s/p liver biopsy, liver cirrhosis  # will follow 69 yo F w/ hx of CAD s/p PCI and hypothyroidism sent by gastroenterologist for gallbladder hydrops    Hepatorenal failure - positive serology for vasculitis  Anti DNA +, Antinucleolar Ab+ c3c4 wnl  pANCA positive 1:1280 (MPO vasculitis probably or PAN)  CPK mildly elevated 280    - needs a kidney bx, off ASA, scheduled for kidney bx in AM     # strict I/O - nonoliguric / cont lasix for now ;  kidney sono neg for obstruction  # Pr/Cr ratio 0.2 g/g creat only  # may need RRT will follow closely  - rheum consult appreciated    # seen by GI: s/p liver biopsy, liver cirrhosis  # will follow

## 2019-07-01 NOTE — PROGRESS NOTE ADULT - ATTENDING COMMENTS
I was Physically Present for the key portions of the evaluation   I agree with the above History  , Physical examination Assessment and plan   I have Reviewed , Modified or appended where appropriate.  Please check A and P as above   1- LUIS ALBERTO/ ATN vs GN work up noted/ cirrhosis  for kidney biopsy today   keep in neg balance/ lasix 40 IV q24h  depending on kidney bx results will proceed with management

## 2019-07-01 NOTE — PROGRESS NOTE ADULT - ASSESSMENT
68 Y F Hx of CAD on DAPT, for a stent >1 year ago, w abnormal LFTs  Imaging suggestive of cirrhosis (nodular contour) with no evident signs of portal HTN.  The etiology of the derangement in the liver enzymes is unclear. Possibilities include auto immune hepatitis,  or  obstructive disease.    Currently the soluble IgG is mildly elevated the ASMA and Anti LKM  are negative.  The AMA is negative ANKITA is positive. P- anca positive, anti ds positive , Liver biopsy in favor of autoimmune hepatitis.     Current serologic work highly suggestive of AIH  Awaiting confirmatory biopsy      1)Hepatitis:  PRELIM PATH SUGGESTIVE OF AIH, with evidence of underlying cirrhosis. Lfts are improving overall.  Although steroid therapy would be indicated, rheumatology suggests delaying therapy until renal biopsy.   renal biopsy is planned today, patient is a candidate for AIH treatment after biopsy.     2)Abnormal liver imaging- cirrhosis  -HCC: none on imaging  -EV: needs screening  -Ascites: non on imaging  3)PROFOUND Hypoalbuminemia  3)Kidney disease? CKD?  4)CAD on DAPT off for biopsy  5)LUIS ALBERTO. Awaiting kidney biopsy  6)GERD    Rec:  - As recommended by Rheum and in light of improvement in LFTs and no evidence of failure we would delay initiation of prednisone. once biopsy is done , patient is a candidate for AIH therapy  - Trend LFTs and INR DAILY  - Will warrant OP follow up for screening (EGD and US Q6mo) and immunization for hep B and A  - Continue PPI 68 Y F Hx of CAD on DAPT, for a stent >1 year ago, w abnormal LFTs  MRI suggestive of cirrhosis (nodular contour) with no evident signs of portal HTN. Hydropic gallbladder up to 4.7 cm  in transverse diameter with   gallstones, including probably impacted 1.6 cm stone at neck, no biliary distention, filling defect or stricture.   The etiology of the derangement in the liver enzymes is unclear. Possibilities include auto immune hepatitis.     Currently the soluble IgG is mildly elevated the ASMA and Anti LKM  are negative.  The AMA is negative,  ANKITA is positive. P- anca positive, anti ds positive , Liver biopsy in favor of autoimmune hepatitis.     Current serologic work highly suggestive of AIH  Awaiting confirmatory biopsy      1)Hepatitis:  PRELIM PATH SUGGESTIVE OF AIH, with evidence of underlying cirrhosis. Lfts are improving overall.  Although steroid therapy would be indicated, rheumatology suggests delaying therapy until renal biopsy.   renal biopsy is planned today, patient is a candidate for AIH treatment after biopsy.     2)Abnormal liver imaging- cirrhosis  -HCC: none on imaging  -EV: needs screening  -Ascites: non on imaging  3)PROFOUND Hypoalbuminemia  3)Kidney disease? CKD?  4)CAD on DAPT off for biopsy  5)LUIS ALBERTO. Awaiting kidney biopsy  6)GERD    Rec:  - As recommended by Rheum and in light of improvement in LFTs and no evidence of failure we would delay initiation of prednisone. once biopsy is done , patient is a candidate for AIH therapy  - Trend LFTs and INR DAILY  - Will warrant OP follow up for screening (EGD and US Q6mo) and immunization for hep B and A  - Continue PPI 68 Y F Hx of CAD on DAPT, for a stent >1 year ago, w abnormal LFTs  MRI suggestive of cirrhosis (nodular contour) with no evident signs of portal HTN. Hydropic gallbladder up to 4.7 cm  in transverse diameter with   gallstones, including probably impacted 1.6 cm stone at neck, no biliary distention, filling defect or stricture.   The etiology of the derangement in the liver enzymes is unclear. Possibilities include auto immune hepatitis.     Currently the soluble IgG is mildly elevated the ASMA and Anti LKM  are negative.  The AMA is negative,  ANKITA is positive. P- anca positive, anti ds positive , Liver biopsy in favor of autoimmune hepatitis.     Current serologic work highly suggestive of AIH  Awaiting confirmatory biopsy      1)Hepatitis:  PRELIM PATH SUGGESTIVE OF AIH, with evidence of underlying cirrhosis. Lfts are improving overall.  Although steroid therapy would be indicated, rheumatology suggests delaying therapy until renal biopsy.   renal biopsy is planned today, patient is a candidate for AIH treatment after biopsy.     2)Abnormal liver imaging- cirrhosis  -HCC: none on imaging  -EV: needs screening  -Ascites: non on imaging  3)PROFOUND Hypoalbuminemia  3)Kidney disease? CKD?  4)CAD on DAPT off for biopsy  5)LUIS ALBERTO. Awaiting kidney biopsy  6)GERD    Rec:  - As recommended by Rheum and in light of improvement in LFTs and no evidence of failure we would delay initiation of prednisone. once biopsy is done , patient is a candidate for AIH therapy  - Trend LFTs and INR DAILY  - Will warrant OP follow up for screening (EGD and US Q6mo) and immunization for hep B and A  - Continue PPI      #patient is not seen by attending( not available , in procedure) 68 Y F Hx of CAD on DAPT, for a stent >1 year ago, w abnormal LFTs  MRI suggestive of cirrhosis (nodular contour) with no evident signs of portal HTN. Hydropic gallbladder up to 4.7 cm  in transverse diameter with   gallstones, including probably impacted 1.6 cm stone at neck, no biliary distention, filling defect or stricture.   The etiology of the derangement in the liver enzymes is unclear. Possibilities include auto immune hepatitis.     Currently the soluble IgG is mildly elevated the ASMA and Anti LKM  are negative.  The AMA is negative,  ANKITA is positive. P- anca positive, anti ds positive , Liver biopsy in favor of autoimmune hepatitis.     Current serologic work highly suggestive of AIH  Awaiting confirmatory biopsy      1)Hepatitis:  PRELIM PATH SUGGESTIVE OF AIH, with evidence of underlying cirrhosis. Lfts are improving overall.  Although steroid therapy would be indicated, rheumatology suggests delaying therapy until renal biopsy.   renal biopsy is planned today, patient is a candidate for AIH treatment after biopsy.     2)Abnormal liver imaging- cirrhosis  -HCC: none on imaging  -EV: needs screening  -Ascites: non on imaging  3)PROFOUND Hypoalbuminemia  3)Kidney disease? CKD?  4)CAD on DAPT off for biopsy  5)LUIS ALBERTO. Awaiting kidney biopsy  6)GERD    Rec:  - As recommended by Rheum and in light of improvement in LFTs and no evidence of failure we would delay initiation of prednisone. once biopsy is done , patient is a candidate for AIH therapy  - Trend LFTs and INR DAILY  - Will warrant OP follow up for screening (EGD and US Q6mo) and immunization for hep B and A  - Continue PPI      #patient is not seen by attending( not available , in procedure)   final recommendations to follow

## 2019-07-01 NOTE — PROGRESS NOTE ADULT - SUBJECTIVE AND OBJECTIVE BOX
INTERVENTIONAL RADIOLOGY BRIEF-OPERATIVE NOTE    Procedure:  Percutaneous, CT-directed core needle biopsy of right kidney with intravenous conscious sedation and gelfoam embolization of biopsy needle tract    Pre-Op Diagnosis:  ATN/LUIS ALBERTO/GN/MPGN    Post-Op Diagnosis:  Same    Attending:   Dr. Baker  Resident:   None    Anesthesia (type):  [ ] General Anesthesia  [X] Sedation-- Versed, 1mg and Fentanyl, 25mcg iv  [ ] Spinal Anesthesia  [X] Local/Regional-- 1% Lidocaine SQ, 5cc, right flank    Total Face-to-Face Sedation Time:  25 minutes    Contrast:   None    Estimated Blood Loss:  3cc    Condition:   [ ] Critical  [ ] Serious  [ ] Fair   [X] Good    Findings/Follow up Plan of Care:  Lower pole, right kidney biopsied using 17/18G, 9.4/15cm CorVocet coaxial needle system under CT-guidance.  Two 18G, 2.5cm tissue cores obtained.  Pathologist, Dr. Em, deemed submitted specimens to be adequate.  2cc of gelfoam/saline slurry used to embolize biopsy needle tract.  Post-biopsy CT scan shows minimal jennifer-nephric blood; VSS and the patient has no complaints.  Pressure dressing placed at right flank dermatotomy.  Can be D/C'd in 4-6 hours.  Findings d/w patient and her , as well as renal fellow, immediately post-procedure.    Specimens Removed:  Two 18G tissue cores from lower pole of right kidney    Implants:  Gelfoam/saline slurry, 2cc SQ right flank dermatotomy    Complications:  None immediate.    Disposition:  f/u pathology      Please call Interventional Radiology [phone] with any questions, concerns, or issues.

## 2019-07-01 NOTE — PROGRESS NOTE ADULT - SUBJECTIVE AND OBJECTIVE BOX
SUBJECTIVE:    Patient is a 68y old Female who presents with a chief complaint of gallbladder hydrops (01 Jul 2019 15:32)    Currently admitted to medicine with the primary diagnosis of Gallbladder hydrops     Today is hospital day 17d. This morning she reports feeling better after having a bowel movement and is anticipating her renal biopsy.    INTERVAL EVENTS: The patient has been taken for her renal biopsy 07/01. She is still edematous but appears more relaxed.    PAST MEDICAL & SURGICAL HISTORY  Hypothyroidism  CAD S/P percutaneous coronary angioplasty  No significant past surgical history      ALLERGIES:  No Known Allergies    MEDICATIONS:  STANDING MEDICATIONS  albumin human 25% IVPB 50 milliLiter(s) IV Intermittent once  calcium carbonate    500 mG (Tums) Chewable 1 Tablet(s) Chew every 12 hours  chlorhexidine 4% Liquid 1 Application(s) Topical <User Schedule>  docusate sodium 100 milliGRAM(s) Oral three times a day  fat emulsion (Plant Based) 20% Infusion 0.558 Gm/kG/Day IV Continuous <Continuous>  furosemide   Injectable 80 milliGRAM(s) IV Push once  levothyroxine 50 MICROGram(s) Oral daily  metoprolol tartrate 25 milliGRAM(s) Oral two times a day  midodrine 10 milliGRAM(s) Oral every 8 hours  nystatin Powder 1 Application(s) Topical three times a day  pantoprazole   Suspension 40 milliGRAM(s) Oral before breakfast  Parenteral Nutrition - Adult 1 Each TPN Continuous <Continuous>  Parenteral Nutrition - Adult 1 Each TPN Continuous <Continuous>  potassium chloride   Powder 40 milliEquivalent(s) Oral once  PPD  5 Tuberculin Unit(s) Injectable 5 Unit(s) IntraDermal once  senna 2 Tablet(s) Oral at bedtime    PRN MEDICATIONS  aluminum hydroxide/magnesium hydroxide/simethicone Suspension 30 milliLiter(s) Oral every 4 hours PRN  bisacodyl Suppository 10 milliGRAM(s) Rectal daily PRN    VITALS:   T(F): 96.8  HR: 69  BP: 102/53  RR: 12  SpO2: 97%    LABS:                        9.2    15.99 )-----------( 190      ( 01 Jul 2019 11:48 )             26.3     07-01    136  |  93<L>  |  138<HH>  ----------------------------<  112<H>  3.1<L>   |  21  |  2.2<H>    Ca    7.5<L>      01 Jul 2019 11:48  Phos  5.5     07-01  Mg     2.2     07-01    TPro  5.6<L>  /  Alb  3.2<L>  /  TBili  2.9<H>  /  DBili  1.4<H>  /  AST  138<H>  /  ALT  118<H>  /  AlkPhos  555<H>  07-01    PT/INR - ( 01 Jul 2019 11:48 )   PT: 17.20 sec;   INR: 1.50 ratio         PTT - ( 01 Jul 2019 11:48 )  PTT:41.3 sec          Culture - Blood (collected 28 Jun 2019 18:46)  Source: .Blood None  Preliminary Report (30 Jun 2019 03:01):    No growth to date.      CARDIAC MARKERS ( 30 Jun 2019 11:48 )  x     / x     / 163 U/L / x     / x          RADIOLOGY:  >>LIVER BIOPSY  Final Diagnosis  Right hepatic lobe, needle biopsy:  - Liver parenchyma showing mild focal lobulitis and hepatocyte  reactive changes with few glycogenated nuclei.  - Focal area of vaguely formed Francisca-Denk body also present.  - Two minute focus of macro and micro-vesicular steatosis also  present, counting less than 5% of tissue volume of the biopsy.  - Portal areas showing marked chronic inflammation with focal  interface activity, infiltrated with mainly small lymphocytes and  rare neutrophils, and fibrous expansion of portal area with  fibrous bridge formation, probable cirrhosis.  - Stainable iron present, not increased.  - Trichrome stain reviewed.  - PAS-D stain failed to reveal alpha-1 antitrypsin globule.    Comment  _Sections show findings are consistent with clinical impression  of  cirrhosis.  However, the etiology is not clear.  Clinical  correlation is recommended.  PHYSICAL EXAM:  GEN: No acute distress  PULM/CHEST: Clear B/L, equal air entry bilaterally  CVS: RRR, S1+S2 + mild systolic murmur  ABD: Soft, non-tender, non-distended, ecchymosis around the needle prick sites +BS,  EXT: Pedal edema  NEURO: AAOx3    Smith Catheter:   Connect To:  Straight Drainage/[employer]  Indication:  Urinary Retention / Obstruction

## 2019-07-01 NOTE — PROGRESS NOTE ADULT - SUBJECTIVE AND OBJECTIVE BOX
GI HPI Today:  Patient is a 68y old  Female who presents with a chief complaint of gallbladder hydrops (01 Jul 2019 14:23), transaminitis.  patient is seen , denies any abdominal pain, liver function tests are trending down, patient is planned for Kidney biopsy today.         Hospital course:  67 yo F w/ hx of CAD s/p PCI and hypothyroidism sent by gastroenterologist for gallbladder hydrops. Pt found to have elevated liver enzymes by PMD in Apr 2019, referred to GI for w/u. MRCP 2 days ago significant for gallbladder hydrops 4.7cm with probable impacted stone at neck, also liver cirrhosis w/ mild ascites. Admits to social etoh, denies binge drinking, denies abdominal pain, N/V. Admits to decreased appetite in past 2 weeks, pt tried to maintain low salt diet. Reports BLE edema developed in past 2 days as well as weakness. Denies fever, chills, abdominal pain, SOB, dysuria.    In ED, evaluated by surgery -> no interventions at this time (15 Shashank 2019 01:53)      PAST MEDICAL & SURGICAL HISTORY  Hypothyroidism  CAD S/P percutaneous coronary angioplasty  No significant past surgical history      ALLERGIES:  No Known Allergies      MEDICATIONS:  MEDICATIONS  (STANDING):  chlorhexidine 4% Liquid 1 Application(s) Topical <User Schedule>  docusate sodium 100 milliGRAM(s) Oral three times a day  fat emulsion (Plant Based) 20% Infusion 0.558 Gm/kG/Day (21.936 mL/Hr) IV Continuous <Continuous>  levothyroxine 50 MICROGram(s) Oral daily  metoprolol tartrate 25 milliGRAM(s) Oral two times a day  midodrine 10 milliGRAM(s) Oral every 8 hours  nystatin Powder 1 Application(s) Topical three times a day  pantoprazole   Suspension 40 milliGRAM(s) Oral before breakfast  Parenteral Nutrition - Adult 1 Each (60 mL/Hr) TPN Continuous <Continuous>  Parenteral Nutrition - Adult 1 Each (60 mL/Hr) TPN Continuous <Continuous>  potassium chloride   Powder 40 milliEquivalent(s) Oral once  PPD  5 Tuberculin Unit(s) Injectable 5 Unit(s) IntraDermal once  senna 2 Tablet(s) Oral at bedtime    MEDICATIONS  (PRN):  aluminum hydroxide/magnesium hydroxide/simethicone Suspension 30 milliLiter(s) Oral every 4 hours PRN Dyspepsia  bisacodyl Suppository 10 milliGRAM(s) Rectal daily PRN Constipation      REVIEW OF SYSTEMS  General:  No fevers  Eyes:  No reported pain   ENT:  No sore throat   NECK: No stiffness   CV:  No chest pain   Resp:  No shortness of breath  GI:  See HPI  :  No dysuria  Muscle:  No weakness  Neuro:  No tingling  Endocrine:  No polyuria  Heme:  No ecchymosis        VITALS:   T(F): 96.8 (07-01 @ 06:54), Max: 98.1 (06-30 @ 12:30)  HR: 69 (07-01 @ 12:45) (68 - 95)  BP: 102/53 (07-01 @ 12:45) (90/52 - 143/79)  BP(mean): --  RR: 12 (07-01 @ 06:54) (12 - 18)  SpO2: 97% (07-01 @ 06:54) (95% - 97%)        PHYSICAL EXAM:  EYES: No scleral icterus   LUNG: Clear to auscultation bilaterally; No rales, rhonchi, wheezing, or rubs  HEART: RRR; No murmurs  ABDOMEN: Soft, +BS, Abdominal Tenderness, No guarding, No Garcia Sign        Blood Work :                        9.2    15.99 )-----------( 190      ( 01 Jul 2019 11:48 )             26.3     PT/INR - ( 01 Jul 2019 11:48 )  INR: 1.50          PTT - ( 01 Jul 2019 11:48 )  PTT:41.3<H>  07-01    136  |  93<L>  |  138<HH>  ----------------------------<  112<H>  3.1<L>   |  21  |  2.2<H>    Ca    7.5<L>      01 Jul 2019 11:48  Phos  5.5     07-01  Mg     2.2     07-01      CBC -  ( 01 Jul 2019 11:48 )  Hemoglobin : 9.2    CBC -  ( 30 Jun 2019 11:48 )  Hemoglobin : 8.9    CBC -  ( 28 Jun 2019 05:22 )  Hemoglobin : 10.6      LIVER FUNCTIONS - ( 01 Jul 2019 11:48 )  Alb: 3.2 [3.5 - 5.2] / Pro: 5.6 [6.0 - 8.0] / ALK PHOS: 555 [30 - 115] / ALT: 118 [0 - 41] / AST: 138 [0 - 41] / GGT: x     LIVER FUNCTIONS - ( 30 Jun 2019 11:48 )  Alb: 2.5 [3.5 - 5.2] / Pro: 5.1 [6.0 - 8.0] / ALK PHOS: 594 [30 - 115] / ALT: 143 [0 - 41] / AST: 155 [0 - 41] / GGT: x     LIVER FUNCTIONS - ( 29 Jun 2019 14:50 )  Alb: 2.5 [3.5 - 5.2] / Pro: 5.2 [6.0 - 8.0] / ALK PHOS: 648 [30 - 115] / ALT: 165 [0 - 41] / AST: 178 [0 - 41] / GGT: x     LIVER FUNCTIONS - ( 28 Jun 2019 05:22 )  Alb: 1.6 [3.5 - 5.2] / Pro: 5.3 [6.0 - 8.0] / ALK PHOS: 1105 [30 - 115] / ALT: 337 [0 - 41] / AST: 372 [0 - 41] / GGT: x     LIVER FUNCTIONS - ( 27 Jun 2019 07:43 )  Alb: 1.6 [3.5 - 5.2] / Pro: 5.3 [6.0 - 8.0] / ALK PHOS: 1189 [30 - 115] / ALT: 379 [0 - 41] / AST: 432 [0 - 41] / GGT: x     LIVER FUNCTIONS - ( 25 Jun 2019 05:34 )  Alb: 1.5 [3.5 - 5.2] / Pro: 5.2 [6.0 - 8.0] / ALK PHOS: 1151 [30 - 115] / ALT: 415 [0 - 41] / AST: 546 [0 - 41] / GGT: x GI HPI Today:  Patient is a 68y old  Female who presents with a chief complaint of gallbladder hydrops (01 Jul 2019 14:23), transaminitis.  patient is seen , denies any abdominal pain, liver function tests are trending down, patient is planned for Kidney biopsy today.         Hospital course:  67 yo F w/ hx of CAD s/p PCI and hypothyroidism sent by gastroenterologist for gallbladder hydrops. Pt found to have elevated liver enzymes by PMD in Apr 2019, referred to GI for w/u. MRCP 2 days ago significant for gallbladder hydrops 4.7cm with probable impacted stone at neck, also liver cirrhosis w/ mild ascites. Admits to social etoh, denies binge drinking, denies abdominal pain, N/V. Admits to decreased appetite in past 2 weeks, pt tried to maintain low salt diet. Reports BLE edema developed in past 2 days as well as weakness. Denies fever, chills, abdominal pain, SOB, dysuria.    In ED, evaluated by surgery -> no interventions at this time (15 Shashank 2019 01:53)      PAST MEDICAL & SURGICAL HISTORY  Hypothyroidism  CAD S/P percutaneous coronary angioplasty  No significant past surgical history      ALLERGIES:  No Known Allergies      MEDICATIONS:  MEDICATIONS  (STANDING):  chlorhexidine 4% Liquid 1 Application(s) Topical <User Schedule>  docusate sodium 100 milliGRAM(s) Oral three times a day  fat emulsion (Plant Based) 20% Infusion 0.558 Gm/kG/Day (21.936 mL/Hr) IV Continuous <Continuous>  levothyroxine 50 MICROGram(s) Oral daily  metoprolol tartrate 25 milliGRAM(s) Oral two times a day  midodrine 10 milliGRAM(s) Oral every 8 hours  nystatin Powder 1 Application(s) Topical three times a day  pantoprazole   Suspension 40 milliGRAM(s) Oral before breakfast  Parenteral Nutrition - Adult 1 Each (60 mL/Hr) TPN Continuous <Continuous>  Parenteral Nutrition - Adult 1 Each (60 mL/Hr) TPN Continuous <Continuous>  potassium chloride   Powder 40 milliEquivalent(s) Oral once  PPD  5 Tuberculin Unit(s) Injectable 5 Unit(s) IntraDermal once  senna 2 Tablet(s) Oral at bedtime    MEDICATIONS  (PRN):  aluminum hydroxide/magnesium hydroxide/simethicone Suspension 30 milliLiter(s) Oral every 4 hours PRN Dyspepsia  bisacodyl Suppository 10 milliGRAM(s) Rectal daily PRN Constipation      REVIEW OF SYSTEMS  General:  No fevers  Eyes:  No reported pain   ENT:  No sore throat   NECK: No stiffness   CV:  No chest pain   Resp:  No shortness of breath  GI:  See HPI  :  No dysuria  Muscle:  No weakness  Neuro:  No tingling  Endocrine:  No polyuria  Heme:  No ecchymosis        VITALS:   T(F): 96.8 (07-01 @ 06:54), Max: 98.1 (06-30 @ 12:30)  HR: 69 (07-01 @ 12:45) (68 - 95)  BP: 102/53 (07-01 @ 12:45) (90/52 - 143/79)  BP(mean): --  RR: 12 (07-01 @ 06:54) (12 - 18)  SpO2: 97% (07-01 @ 06:54) (95% - 97%)        PHYSICAL EXAM:  EYES: No scleral icterus   LUNG: Clear to auscultation bilaterally; No rales, rhonchi, wheezing, or rubs  HEART: RRR; No murmurs  ABDOMEN: Soft, +BS, Abdominal Tenderness, No guarding, No Garcia Sign        Blood Work :                        9.2    15.99 )-----------( 190      ( 01 Jul 2019 11:48 )             26.3     PT/INR - ( 01 Jul 2019 11:48 )  INR: 1.50          PTT - ( 01 Jul 2019 11:48 )  PTT:41.3<H>  07-01    136  |  93<L>  |  138<HH>  ----------------------------<  112<H>  3.1<L>   |  21  |  2.2<H>    Ca    7.5<L>      01 Jul 2019 11:48  Phos  5.5     07-01  Mg     2.2     07-01      CBC -  ( 01 Jul 2019 11:48 )  Hemoglobin : 9.2    CBC -  ( 30 Jun 2019 11:48 )  Hemoglobin : 8.9    CBC -  ( 28 Jun 2019 05:22 )  Hemoglobin : 10.6      LIVER FUNCTIONS - ( 01 Jul 2019 11:48 )  Alb: 3.2 [3.5 - 5.2] / Pro: 5.6 [6.0 - 8.0] / ALK PHOS: 555 [30 - 115] / ALT: 118 [0 - 41] / AST: 138 [0 - 41] / GGT: x     LIVER FUNCTIONS - ( 30 Jun 2019 11:48 )  Alb: 2.5 [3.5 - 5.2] / Pro: 5.1 [6.0 - 8.0] / ALK PHOS: 594 [30 - 115] / ALT: 143 [0 - 41] / AST: 155 [0 - 41] / GGT: x     LIVER FUNCTIONS - ( 29 Jun 2019 14:50 )  Alb: 2.5 [3.5 - 5.2] / Pro: 5.2 [6.0 - 8.0] / ALK PHOS: 648 [30 - 115] / ALT: 165 [0 - 41] / AST: 178 [0 - 41] / GGT: x     LIVER FUNCTIONS - ( 28 Jun 2019 05:22 )  Alb: 1.6 [3.5 - 5.2] / Pro: 5.3 [6.0 - 8.0] / ALK PHOS: 1105 [30 - 115] / ALT: 337 [0 - 41] / AST: 372 [0 - 41] / GGT: x     LIVER FUNCTIONS - ( 27 Jun 2019 07:43 )  Alb: 1.6 [3.5 - 5.2] / Pro: 5.3 [6.0 - 8.0] / ALK PHOS: 1189 [30 - 115] / ALT: 379 [0 - 41] / AST: 432 [0 - 41] / GGT: x     LIVER FUNCTIONS - ( 25 Jun 2019 05:34 )  Alb: 1.5 [3.5 - 5.2] / Pro: 5.2 [6.0 - 8.0] / ALK PHOS: 1151 [30 - 115] / ALT: 415 [0 - 41] / AST: 546 [0 - 41] / GGT: x       Radiology:   < from: MR Abdomen No Cont (06.13.19 @ 16:15) >  Impression:  1. Hydropic gallbladder, up to 4.7 cm in transverse diameter with   gallstones, including probably impacted 1.6 cm stone at neck.  2. Liver cirrhosis with mild abdominal ascites.  3. No biliary distention, filling defect, or stricture.    < end of copied text >

## 2019-07-01 NOTE — PROGRESS NOTE ADULT - ASSESSMENT
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    # seen by GI: s/p liver biopsy, liver cirrhosis  # will follow

## 2019-07-01 NOTE — PROGRESS NOTE ADULT - ASSESSMENT
ASSESSMENT  - GB hydrops  - LUIS ALBERTO  - hyperkalemia  - hypothyroidism  - hiatal hernia  - esophageal reflux, dysmotility    PLAN  - cont PPN via midline catheter while w/u in progress  - monitor bmp, ip, mg  - check calorie counts X 3 days  - rec place NGT for feeding and start with Osmolite 1.5 240ml pc & hs X 4 feeds/day. add Beneprotein 1 pack with each feed  - cont PO diet as tolerated but doubt intake will improve much especially with hiatal hernia and esophageal dysmotility.

## 2019-07-01 NOTE — PROGRESS NOTE ADULT - SUBJECTIVE AND OBJECTIVE BOX
Nephrology progress note    Patient is seen and examined, events over the last 24 h noted .  no new complaints.    Allergies:  No Known Allergies    Hospital Medications:   MEDICATIONS  (STANDING):  albumin human 25% IVPB 50 milliLiter(s) IV Intermittent once  chlorhexidine 4% Liquid 1 Application(s) Topical <User Schedule>  docusate sodium 100 milliGRAM(s) Oral three times a day  fat emulsion (Plant Based) 20% Infusion 0.558 Gm/kG/Day (21.936 mL/Hr) IV Continuous <Continuous>  levothyroxine 50 MICROGram(s) Oral daily  metoprolol tartrate 25 milliGRAM(s) Oral two times a day  midodrine 10 milliGRAM(s) Oral every 8 hours  nystatin Powder 1 Application(s) Topical three times a day  pantoprazole   Suspension 40 milliGRAM(s) Oral before breakfast  Parenteral Nutrition - Adult 1 Each (60 mL/Hr) TPN Continuous <Continuous>  PPD  5 Tuberculin Unit(s) Injectable 5 Unit(s) IntraDermal once  senna 2 Tablet(s) Oral at bedtime        VITALS:  T(F): 97.3 (19 @ 04:58), Max: 98.1 (19 @ 12:30)  HR: 89 (19 @ 06:54)  BP: 133/59 (19 @ 06:54)  RR: 12 (19 @ 06:54)  SpO2: 95% (19 @ 11:13)       @ 07:  -   @ 07:00  --------------------------------------------------------  IN: 0 mL / OUT: 2725 mL / NET: -2725 mL     @ 07:01  -   @ 07:00  --------------------------------------------------------  IN: 0 mL / OUT: 3100 mL / NET: -3100 mL      Height (cm): 165.1 ( @ 06:54)  Weight (kg): 125.8 ( @ 06:54)  BMI (kg/m2): 46.2 ( @ 06:54)  BSA (m2): 2.27 ( @ 06:54)    PHYSICAL EXAM:  Constitutional: NAD  Respiratory: CTAB, no wheezes, rales or rhonchi  Cardiovascular: S1, S2, RRR  Gastrointestinal: BS+, soft, NT/ND  Extremities: diffuse edema/ ansarca  :  +aragon.       LABS:      133<L>  |  94<L>  |  151<HH>  ----------------------------<  109<H>  3.1<L>   |  20  |  2.8<H>    Ca    6.8<L>      2019 11:48  Mg     2.3         TPro  5.1<L>  /  Alb  2.5<L>  /  TBili  2.6<H>  /  DBili  1.5<H>  /  AST  155<H>  /  ALT  143<H>  /  AlkPhos  594<H>                            8.9    13.25 )-----------( 178      ( 2019 11:48 )             25.7       Urine Studies:  Urinalysis Basic - ( 2019 22:03 )    Color: Orange / Appearance: Turbid / S.015 / pH:   Gluc:  / Ketone: Negative  / Bili: Small / Urobili: 1.0 mg/dL   Blood:  / Protein: Trace mg/dL / Nitrite: Negative   Leuk Esterase: Small / RBC:  / WBC 10-25 /HPF   Sq Epi:  / Non Sq Epi: Occasional /HPF / Bacteria: Many /HPF      Sodium, Random Urine: 20.0 mmoL/L ( @ 22:03)  Protein/Creatinine Ratio Calculation: 0.2 Ratio ( @ 22:03)  Creatinine, Random Urine: 119 mg/dL ( @ 22:03)    RADIOLOGY & ADDITIONAL STUDIES: Nephrology progress note    Patient is seen and examined, events over the last 24 h noted .  no new complaints.    Allergies:  No Known Allergies    Hospital Medications:   MEDICATIONS  (STANDING):  albumin human 25% IVPB 50 milliLiter(s) IV Intermittent once  chlorhexidine 4% Liquid 1 Application(s) Topical <User Schedule>  docusate sodium 100 milliGRAM(s) Oral three times a day  fat emulsion (Plant Based) 20% Infusion 0.558 Gm/kG/Day (21.936 mL/Hr) IV Continuous <Continuous>  levothyroxine 50 MICROGram(s) Oral daily  metoprolol tartrate 25 milliGRAM(s) Oral two times a day  midodrine 10 milliGRAM(s) Oral every 8 hours  nystatin Powder 1 Application(s) Topical three times a day  pantoprazole   Suspension 40 milliGRAM(s) Oral before breakfast  Parenteral Nutrition - Adult 1 Each (60 mL/Hr) TPN Continuous <Continuous>  PPD  5 Tuberculin Unit(s) Injectable 5 Unit(s) IntraDermal once  senna 2 Tablet(s) Oral at bedtime        VITALS:  T(F): 97.3 (19 @ 04:58), Max: 98.1 (19 @ 12:30)  HR: 89 (19 @ 06:54)  BP: 133/59 (19 @ 06:54)  RR: 12 (19 @ 06:54)  SpO2: 95% (19 @ 11:13)       @ 07:  -   @ 07:00  --------------------------------------------------------  IN: 0 mL / OUT: 2725 mL / NET: -2725 mL     @ 07:01  -   @ 07:00  --------------------------------------------------------  IN: 0 mL / OUT: 3100 mL / NET: -3100 mL      Height (cm): 165.1 ( @ 06:54)  Weight (kg): 125.8 ( @ 06:54)  BMI (kg/m2): 46.2 ( @ 06:54)  BSA (m2): 2.27 ( @ 06:54)    PHYSICAL EXAM:  Constitutional: NAD  Respiratory: CTAB, no wheezes, rales or rhonchi  Cardiovascular: S1, S2, RRR  Gastrointestinal: BS+, soft, NT/ND  Extremities: diffuse edema/ ansarca  :  +aragon.       LABS:      133<L>  |  94<L>  |  151<HH>  ----------------------------<  109<H>  3.1<L>   |  20  |  2.8<H>  Creatinine Trend: 2.8<--, 3.3<--, 3.7<--, 3.7<--, 3.5<--, 3.3<--  Ca    6.8<L>      2019 11:48  Mg     2.3         TPro  5.1<L>  /  Alb  2.5<L>  /  TBili  2.6<H>  /  DBili  1.5<H>  /  AST  155<H>  /  ALT  143<H>  /  AlkPhos  594<H>                            8.9    13.25 )-----------( 178      ( 2019 11:48 )             25.7       Urine Studies:  Urinalysis Basic - ( 2019 22:03 )    Color: Orange / Appearance: Turbid / S.015 / pH:   Gluc:  / Ketone: Negative  / Bili: Small / Urobili: 1.0 mg/dL   Blood:  / Protein: Trace mg/dL / Nitrite: Negative   Leuk Esterase: Small / RBC:  / WBC 10-25 /HPF   Sq Epi:  / Non Sq Epi: Occasional /HPF / Bacteria: Many /HPF      Sodium, Random Urine: 20.0 mmoL/L ( @ 22:03)  Protein/Creatinine Ratio Calculation: 0.2 Ratio ( @ 22:03)  Creatinine, Random Urine: 119 mg/dL ( @ 22:03)    RADIOLOGY & ADDITIONAL STUDIES:

## 2019-07-01 NOTE — PROGRESS NOTE ADULT - SUBJECTIVE AND OBJECTIVE BOX
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  [facility] T2-3A 014 B ([facility] T2-3A)            Patient was evaluated and examined  by bedside, reports having good BM today, remains in good negative balance post diuresis, still very edematous, no dyspnea at rest.  NPO for renal biopsy today        REVIEW OF SYSTEMS:  please see pertinent positives mentioned above, all other 12 ROS negative      T(C): , Max: 36.3 (06-30-19 @ 20:52)  HR: 89 (07-01-19 @ 06:54)  BP: 133/59 (07-01-19 @ 06:54)  RR: 12 (07-01-19 @ 06:54)  SpO2: --  CAPILLARY BLOOD GLUCOSE      POCT Blood Glucose.: 109 mg/dL (01 Jul 2019 11:54)  POCT Blood Glucose.: 106 mg/dL (01 Jul 2019 07:41)  POCT Blood Glucose.: 105 mg/dL (30 Jun 2019 21:28)  POCT Blood Glucose.: 113 mg/dL (30 Jun 2019 16:44)      PHYSICAL EXAM:  General: NAD, AAOX3, patient is laying comfortably in bed, obese  HEENT: AT, NC, Supple, NO JVD, NO CB  Lungs: CTA B/L, no wheezing, no rhonchi  CVS: normal S1, S2, RRR, NO M/G/R  Abdomen: soft, bowel sounds present, non-tender, non-distended  Extremities: patient has anasarca, extensive plus 3 pitting diffuse pitting edema of all extremities/thighs/lower abd wall, no clubbing, no cyanosis, positive peripheral pulses b/l  Neuro: no acute focal neurological deficits, profound  generalized body weakness  Skin: no rush, small ecchymosis -left upper ext.        LAB  CBC  Date: 07-01-19 @ 11:48  Mean cell Fgnshovkcx92.4  Mean cell Hemoglobin Conc35.0  Mean cell Volum 89.8  Platelet count-Automate 190  RBC Count 2.93  Red Cell Distrib Width22.7  WBC Count15.99  % Albumin, Urine--  Hematocrit 26.3  Hemoglobin 9.2  CBC  Date: 06-30-19 @ 11:48  Mean cell Quvqohtssi52.1  Mean cell Hemoglobin Conc34.6  Mean cell Volum 89.9  Platelet count-Automate 178  RBC Count 2.86  Red Cell Distrib Width22.0  WBC Count13.25  % Albumin, Urine--  Hematocrit 25.7  Hemoglobin 8.9  CBC  Date: 06-28-19 @ 05:22  Mean cell Tklxwwkczr46.6  Mean cell Hemoglobin Conc35.9  Mean cell Volum 88.1  Platelet count-Automate 275  RBC Count 3.35  Red Cell Distrib Width22.4  WBC Count20.01  % Albumin, Urine--  Hematocrit 29.5  Hemoglobin 10.6  CBC  Date: 06-27-19 @ 07:43  Mean cell Hndcwkkvyf46.1  Mean cell Hemoglobin Conc35.6  Mean cell Volum 87.4  Platelet count-Automate 271  RBC Count 3.41  Red Cell Distrib Width22.3  WBC Count18.52  % Albumin, Urine--  Hematocrit 29.8  Hemoglobin 10.6  CBC  Date: 06-25-19 @ 05:34  Mean cell Wperhbhjoo30.4  Mean cell Hemoglobin Conc34.7  Mean cell Volum 87.5  Platelet count-Automate 238  RBC Count 3.52  Red Cell Distrib Width21.2  WBC Count15.78  % Albumin, Urine--  Hematocrit 30.8  Hemoglobin 10.7    [facility]  06-30-19 @ 11:48  Blood Gas Arterial-Calcium,Ionized--  Blood Urea Nitrogen, Serum 151 mg/dL<HH> [10 - 20] [Critical value:]  Carbon Dioxide, Serum20 mmol/L [17 - 32]  Chloride, Serum94 mmol/L<L> [98 - 110]  Creatinie, Serum2.8 mg/dL<H> [0.7 - 1.5]  Glucose, Avhbz297 mg/dL<H> [70 - 99]  Potassium, Serum3.1 mmol/L<L> [3.5 - 5.0]  Sodium, Serum 133 mmol/L<L> [135 - 146]  [facility]  06-29-19 @ 14:50  Blood Gas Arterial-Calcium,Ionized--  Blood Urea Nitrogen, Serum 151 mg/dL<HH> [10 - 20] [Critical value:]  Carbon Dioxide, Serum19 mmol/L [17 - 32]  Chloride, Serum94 mmol/L<L> [98 - 110]  Creatinie, Serum3.3 mg/dL<H> [0.7 - 1.5]  Glucose, Wznzw478 mg/dL<H> [70 - 99]  Potassium, Serum3.8 mmol/L [3.5 - 5.0]  Sodium, Serum 134 mmol/L<L> [135 - 146]  [facility]  06-28-19 @ 05:22  Blood Gas Arterial-Calcium,Ionized--  Blood Urea Nitrogen, Serum 143 mg/dL<HH> [10 - 20] [Critical value:]  Carbon Dioxide, Serum19 mmol/L [17 - 32]  Chloride, Serum91 mmol/L<L> [98 - 110]  Creatinie, Serum3.7 mg/dL<H> [0.7 - 1.5]  Glucose, Yiyie509 mg/dL<H> [70 - 99]  Potassium, Serum4.5 mmol/L [3.5 - 5.0]  Sodium, Serum 130 mmol/L<L> [135 - 146]  BMP  06-27-19 @ 07:43  Blood Gas Arterial-Calcium,Ionized--  Blood Urea Nitrogen, Serum 131 mg/dL<HH> [10 - 20] [Critical value:]  Carbon Dioxide, Serum19 mmol/L [17 - 32]  Chloride, Serum92 mmol/L<L> [98 - 110]  Creatinie, Serum3.7 mg/dL<H> [0.7 - 1.5]  Glucose, Ibnis466 mg/dL<H> [70 - 99]  Potassium, Serum4.4 mmol/L [3.5 - 5.0]  Sodium, Serum 132 mmol/L<L> [135 - 146]        PT/INR - ( 01 Jul 2019 11:48 )   PT: 17.20 sec;   INR: 1.50 ratio         PTT - ( 01 Jul 2019 11:48 )  PTT:41.3 sec      Microbiology:    Culture - Blood (collected 06-28-19 @ 18:46)  Source: .Blood None  Preliminary Report (06-30-19 @ 03:01):    No growth to date.    Culture - Urine (collected 06-22-19 @ 18:04)  Source: .Urine Clean Catch (Midstream)  Final Report (06-24-19 @ 06:28):    No growth    Culture - Urine (collected 06-21-19 @ 23:54)  Source: .Urine Clean Catch (Midstream)  Final Report (06-23-19 @ 08:53):    No growth    Culture - Blood (collected 06-15-19 @ 18:01)  Source: .Blood None  Final Report (06-21-19 @ 06:00):    No growth at 5 days.      Medications:  albumin human 25% IVPB 50 milliLiter(s) IV Intermittent once  aluminum hydroxide/magnesium hydroxide/simethicone Suspension 30 milliLiter(s) Oral every 4 hours PRN  bisacodyl Suppository 10 milliGRAM(s) Rectal daily PRN  chlorhexidine 4% Liquid 1 Application(s) Topical <User Schedule>  docusate sodium 100 milliGRAM(s) Oral three times a day  furosemide   Injectable 80 milliGRAM(s) IV Push once  levothyroxine 50 MICROGram(s) Oral daily  metoprolol tartrate 25 milliGRAM(s) Oral two times a day  midodrine 10 milliGRAM(s) Oral every 8 hours  nystatin Powder 1 Application(s) Topical three times a day  pantoprazole   Suspension 40 milliGRAM(s) Oral before breakfast  Parenteral Nutrition - Adult 1 Each TPN Continuous <Continuous>  Parenteral Nutrition - Adult 1 Each TPN Continuous <Continuous>  PPD  5 Tuberculin Unit(s) Injectable 5 Unit(s) IntraDermal once  senna 2 Tablet(s) Oral at bedtime        Assessment and Plan:  A/P 67 yo F w/ hx of CAD s/p PCI and hypothyroidism sent by gastroenterologist for gallbladder hydrops, course complicated by ALI/ transaminitis possibly of autoimmune etiology, LUIS ALBERTO with hyperkalemia multifactorial 2/2 rhabdo (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 with resultant proteinuria and low albumin state and diffuse anasarca, raynauds phenomenon (not currently active), esophageal dysmotility and reflux, cystitis with urinary retention s/p abx, and subsequent worsening functional debility and malnutrition    #hydrops gallbladder with persistent transaminitis - LFT's trending down  trend LFT's,  -hepatitis profile negative  concomitant acute rhabdomyolysis- repeated CK level near normal    statin was d/c on admission,   liver was  biopsied- Surgical Pathology Report (06.24.19 @ 16:10)  Surgical Final Report: Final Diagnosis  Right hepatic lobe, needle biopsy:  - Liver parenchyma showing mild focal lobulitis and hepatocyte reactive changes with few glycogenated nuclei.  - Focal area of vaguely formed Francisca-Denk body also present.  - Two minute focus of macro and micro-vesicular steatosis also present, counting less than 5% of tissue volume of the biopsy.  - Portal areas showing marked chronic inflammation with focal interface activity, infiltrated with mainly small lymphocytes and  rare neutrophils, and fibrous expansion of portal area with  fibrous bridge formation, probable cirrhosis.  - Stainable iron present, not increased.  - Trichrome stain reviewed.  - PAS-D stain failed to reveal alpha-1 antitrypsin globule.    Verified by: Manoj Lindsey M.D.  (Electronic Signature)  Reported on: 06/26/19 15:42 EDT, [facility], [place] [address]  _________________________________________________________________    Comment  _Sections show findings are consistent with clinical impression of  cirrhosis.  However, the etiology is not clear.  Clinical correlation is recommended.    06/25/19 08:55 ns    LIVER FUNCTIONS - ( 30 Jun 2019 11:48 )  Alb: 2.5 g/dL / Pro: 5.1 g/dL / ALK PHOS: 594 U/L / ALT: 143 U/L / AST: 155 U/L / GGT: x                           clinically too weak at this moment for cholecystecomy         #worsening LUIS ALBERTO  - with worsening uremia and severe tertiary fluid spacing with anasarca  - as per renal team recommendations - patient will need diagnostic renal biopsy- scheduled for today  - patient has severe tertiary volume spacing, will continue  IV albumin with high doses of IV lasix tx. 80 mg IV twice daily- strict I and O chart, the goal at least to have negative 2 to 3 liters/ 24 hours  - f/up renal team if patient wound be a candidate for HD tx.  -daily BMP     # Abnormal elevated DS DNA- 111, Elevated ANKITA titers 1: 2560, Elevated P-Anca- titer 1: 1280 - ? lupus nephritis, vs Vasculitis,   - we have consulted  Rheumatology specialist - who recommended- diagnostic renal biopsy, MPO/PR3 abs,   -will start on steroids trial post completion of renal biopsy today    #Persistent Leukocytosis- most likely as a result of acute inflammatory state  so far no acute infectious etiology   -repeated  blood cxs- no bacterial growth  -continue to monitor CBC     # hypotension- b/p improved post initiation of midodrine tx.  with hypoalbuminemia, tertiary fluid spacing, LUIS ALBERTO, started on Midodrine 10 mg po three times daily      #hypoalbuminemia 2/2 acute liver disease- severe protein/calorie malnutrition  - patient was started on PPN- IV Albumin tx.      #UTI with urinary retention  has aragon, monitor u/o   received ceftriaxone/ completed,    #h/o CAD  -cont home regimen  -statin on hold 2/2 acute liver injury  -will resume  plavix post-renal  biopsy      #hypothyroidism  -tsh elevated---t3/t4 normal---will cont syntrhoid 50 mcg daily    # Constipation  -colace/dulcolax supp.    #DVT//gI ppx  FULL CODE  high risk/ guarded prognosis    #Progress Note Handoff: Pending Consults__Renal team f/up for possible initiation of HD due to patient's severe anasarca_______,Tests__MPO/PR3 abs______,Test Results___daily BMP____,Other;, strict I and O monitoring with IV lasix tx, renal biopsy today  Family discussion: patient by bedside Disposition: Home__once medically stable

## 2019-07-01 NOTE — PROGRESS NOTE ADULT - SUBJECTIVE AND OBJECTIVE BOX
Patient Age:  68y    Patient Gender:  Female    Procedure (including site / side if known):  Percutaneous, CT-directed core neelde biopsy of left or right kidney with intravenous conscious sedation and possible gelfoam embolization of biopsy needle tract.    Diagnosis / Indication:  LUIS ALBERTO/ATN/GN    Interventional Radiology Attending Physician:  Dr. Baker    Ordering Attending Physician:  Dr. Trae Brewer    Pertinent Medical History:  Hypothyroid, hepatorenal failure with evidence of vasculitis; ascites, profound hypoalbuminemia    PAST MEDICAL & SURGICAL HISTORY:  Hypothyroidism  CAD S/P percutaneous coronary angioplasty  No significant past surgical history      Allergies:  No Known Allergies      Pertinent Labs:                        9.2    15.99 )-----------( 190      ( 01 Jul 2019 11:48 )             26.3       07-01    136  |  93<L>  |  138<HH>  ----------------------------<  112<H>  3.1<L>   |  21  |  2.2<H>    Ca    7.5<L>      01 Jul 2019 11:48  Phos  5.5     07-01  Mg     2.2     07-01    TPro  5.6<L>  /  Alb  3.2<L>  /  TBili  2.9<H>  /  DBili  1.4<H>  /  AST  138<H>  /  ALT  118<H>  /  AlkPhos  555<H>  07-01      PT/INR - ( 01 Jul 2019 11:48 )   PT: 17.20 sec;   INR: 1.50 ratio         PTT - ( 01 Jul 2019 11:48 )  PTT  :41.3 sec    Consentable:   Yes    NPO:  Yes    Code Status:      Patient and Family aware:   Yes      Risks, benefits, and alternatives to treatment discussed. All questions answered with understanding.    Please call [phone] with any further questions.

## 2019-07-01 NOTE — PROGRESS NOTE ADULT - ASSESSMENT
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 with resultant proteinuria and low albumin state and diffuse anasarca, raynauds phenomenon (not currently active), esophageal dysmotility and reflux, cystitis with urinary retention s/p abx, and subsequent worsening functional debility and malnutrition.    Patient is weaker, edematous. She had rings in her fingers and were removed by the senior resident to avoid ischemia in finger 06/29. Labs performed after blood drawn via US on 07/01. Patient had a bowel movement today 07/01 and feels better after that. 40 meq k was given due to low K levels 3.1 07/01. Calcium carbonate 500x2 added on Nephrology recommendations 07/01.    #hydrops gallbladder/acute severe transaminitis  -LFT's downtrending   -CPK downtrending [id number]   -Liver biopsy done shows Cirrhosis but of no clear etiology   -Clinically too weak at this moment for cholecystectomy  -Midodrine 5mg every 8h for portal HTN    #LUIS ALBERTO  -Cr downtrending 3.7>3.3>2.8>2.2   -BUN levels down to 138 from 151  -As per renal team recommendations - patient most likely has hepatorenal syndrome  -Albumin challenge test with 100g albumin performed 06/27  -Continue IV Albumin 25% 50ml with 80mg Lasix twice a day  -Strict Input and Output measures  -Renal biopsy done today 07/01     # Abnormal DS DNA, Elevated ANKITA titers   -Rheum recommended to hold prednisone use until w/u is completed  -Solumedrol 80mg once if renal biopsy completed  -if renal bx is inconclusive emg and sural nerve biopsy recommended  -ESR, CRP, ANCA repeated    #hypoalbuminemia  -Malnutrition vs Liver Cirrhosis  -Patient was started on TPN    #distal digit blue discoloration  -Raynaud's (primary vs secondary)  -r/o etiology---paraneoplastic, vasculitis, vs rheum   -transient=---resolved within 1 hour  -gloves/warmth to affected fingertips  -CCB on hold for hypotension  -esophagram consistent with dysmotility    #UTI with urinary retention  -has aragon,monitor u/o   -received ceftriaxone/ completed  -Urinalysis order on recommendation of Nephro  -WBCs still rising 15>17>15>18>20    #h/o CAD  -cont home regimen  -statin on hold 2/2 acute liver injury  -Continue to hold Plavix for Renal biopsy  -Hold ASA for renal biopsy    #hypothyroidism  -TSH elevated---t3/t4 normal---will cont synthroid 50 mcg daily    #DVT + GI ppx  FULL CODE  high risk/ guarded prognosis · Assessment		  A/P 67 yo F w/ hx of CAD s/p PCI and hypothyroidism sent by gastroenterologist for gallbladder hydrops, course complicated by ALI/ transaminitis possibly of autoimmune etiology, LUIS ALBERTO with hyperkalemia multifactorial 2/2 rhabdo (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 with resultant proteinuria and low albumin state and diffuse anasarca, raynauds phenomenon (not currently active), esophageal dysmotility and reflux, cystitis with urinary retention s/p abx, and subsequent worsening functional debility and malnutrition.    Patient is weaker, edematous. She had rings in her fingers and were removed by the senior resident to avoid ischemia in finger 06/29. Labs performed after blood drawn via US on 07/01. Patient had a bowel movement today 07/01 and feels better after that. 40 meq k was given due to low K levels 3.1 07/01. Calcium carbonate 500x2 added on Nephrology recommendations 07/01.    #hydrops gallbladder/acute severe transaminitis  -LFT's downtrending   -CPK downtrending [id number]   -Liver biopsy done shows Cirrhosis but of no clear etiology   -Clinically too weak at this moment for cholecystectomy  -Midodrine 5mg every 8h for portal HTN  -GI recommended to trend INR and LFTs daily 07/01    #LUIS ALBERTO  -Cr downtrending 3.7>3.3>2.8>2.2   -BUN levels down to 138 from 151  -As per renal team recommendations - patient most likely has hepatorenal syndrome  -Albumin challenge test with 100g albumin performed 06/27  -Continue IV Albumin 25% 50ml with 80mg Lasix twice a day  -Strict Input and Output measures  -Renal biopsy done today 07/01  -Given 40 meq of K once only  -Started Ca carbonate 500 twice daily   -Nephrology Consult 07/01 want to wait for biopsy results before proceeding for any management     # Abnormal DS DNA, Elevated ANKITA titers   -Rheum recommended to hold prednisone use until w/u is completed  -Solumedrol 80mg once if renal biopsy completed  -if renal bx is inconclusive emg and sural nerve biopsy recommended  -ESR, CRP, ANCA repeated    #hypoalbuminemia  -Malnutrition vs Liver Cirrhosis  -Patient was started on TPN    #distal digit blue discoloration  -Raynaud's (primary vs secondary)  -r/o etiology---paraneoplastic, vasculitis, vs rheum   -transient=---resolved within 1 hour  -gloves/warmth to affected fingertips  -CCB on hold for hypotension  -esophagram consistent with dysmotility    #UTI with urinary retention  -has Smith, monitor u/o   -received ceftriaxone/ completed  -Urinalysis order on recommendation of Nephro  -WBCs 15>17>15>18>20>13>16    #h/o CAD  -cont home regimen  -statin on hold 2/2 acute liver injury  -Continue to hold Plavix for Renal biopsy  -Hold ASA for renal biopsy    #hypothyroidism  -TSH elevated---t3/t4 normal---will cont synthroid 50 mcg daily    #DVT + GI ppx  FULL CODE  high risk/ guarded prognosis

## 2019-07-02 NOTE — PROGRESS NOTE ADULT - ASSESSMENT
69 yo F w/ hx of CAD s/p PCI and hypothyroidism sent by gastroenterologist for gallbladder hydrops    Hepatorenal failure - positive serology for vasculitis  Anti DNA +, Antinucleolar Ab+ c3c4 wnl  pANCA positive 1:1280 (MPO vasculitis probably or PAN)      - s/p kidney biopsy 7/1, off ASA  - creat improving, polyuric with IV LAsix - reduce LAsix to 40 mg qd,   - possible resolving ATN?  # strict I/O - nonoliguric / s/p lasix and albumin infusions;  kidney sono neg for obstruction  # Pr/Cr ratio 0.2 g/g creat  - rheum consult appreciated  # corrected Ca ~ 9.0 noted, on CaCO3 500 mg q 12 hr.    # seen by GI: s/p liver biopsy, liver cirrhosis  # still on PPN   # will follow

## 2019-07-02 NOTE — PROGRESS NOTE ADULT - SUBJECTIVE AND OBJECTIVE BOX
Nephrology progress note    Patient is seen and examined, events over the last 24 h noted .  S/p Rt kidney bx yesterday, no complaints. Polyuria on IV LASix and Albumin.. Still on PPN  Allergies:  No Known Allergies    Hospital Medications:   MEDICATIONS  (STANDING):  calcium carbonate    500 mG (Tums) Chewable 1 Tablet(s) Chew every 12 hours  chlorhexidine 4% Liquid 1 Application(s) Topical <User Schedule>  docusate sodium 100 milliGRAM(s) Oral three times a day  fat emulsion (Plant Based) 20% Infusion 0.558 Gm/kG/Day (21.936 mL/Hr) IV Continuous <Continuous>  levothyroxine 50 MICROGram(s) Oral daily  metoprolol tartrate 25 milliGRAM(s) Oral two times a day  midodrine 10 milliGRAM(s) Oral every 8 hours  nystatin Powder 1 Application(s) Topical three times a day  pantoprazole   Suspension 40 milliGRAM(s) Oral before breakfast  Parenteral Nutrition - Adult 1 Each (60 mL/Hr) TPN Continuous <Continuous>  PPD  5 Tuberculin Unit(s) Injectable 5 Unit(s) IntraDermal once  senna 2 Tablet(s) Oral at bedtime        VITALS:  T(F): 97.3 (19 @ 04:49), Max: 97.3 (19 @ 04:49)  HR: 80 (19 @ 04:49)  BP: 154/67 (19 @ 04:49)  RR: 18 (19 @ 04:49)  SpO2: 95% (19 @ 20:00)  Wt(kg): --     @ :  -   @ 07:00  --------------------------------------------------------  IN: 0 mL / OUT: 3100 mL / NET: -3100 mL     @ 07:01  -   @ 07:00  --------------------------------------------------------  IN: 844 mL / OUT: 4200 mL / NET: -3356 mL          PHYSICAL EXAM:  Constitutional: NAD, obese  HEENT: anicteric sclera, MMM  Neck: No JVD  Respiratory: CTAB, no wheezes, rales or rhonchi  Cardiovascular: S1, S2, RRR  Gastrointestinal: BS+, soft, NT/ND  Extremities: 2+ peripheral edema  Neurological: A/O x 3, no focal deficits  : No CVA tenderness.  aragon+.   Skin: No rashes  Vascular Access:    LABS:      138  |  96<L>  |  139<HH>  ----------------------------<  149<H>  3.8   |  22  |  2.0<H>  Creatinine Trend: 2.0<--, 2.2<--, 2.8<--, 3.3<--, 3.7<--, 3.7<--    Ca    7.8<L>      2019 05:22  Phos  5.6       Mg     2.2         TPro  5.6<L>  /  Alb  2.7<L>  /  TBili  2.8<H>  /  DBili  1.5<H>  /  AST  121<H>  /  ALT  112<H>  /  AlkPhos  553<H>                            9.3    12.58 )-----------( 179      ( 2019 05:22 )             27.2       Urine Studies:  Urinalysis Basic - ( 2019 22:03 )    Color: Orange / Appearance: Turbid / S.015 / pH:   Gluc:  / Ketone: Negative  / Bili: Small / Urobili: 1.0 mg/dL   Blood:  / Protein: Trace mg/dL / Nitrite: Negative   Leuk Esterase: Small / RBC:  / WBC 10-25 /HPF   Sq Epi:  / Non Sq Epi: Occasional /HPF / Bacteria: Many /HPF      Sodium, Random Urine: 20.0 mmoL/L ( @ 22:03)  Protein/Creatinine Ratio Calculation: 0.2 Ratio ( @ 22:03)  Creatinine, Random Urine: 119 mg/dL ( @ 22:03)    RADIOLOGY & ADDITIONAL STUDIES:

## 2019-07-02 NOTE — PROGRESS NOTE ADULT - ASSESSMENT
ASSESSMENT/PLAN  - GB hydrops  - LUIS ALBERTO  - hyperkalemia  - hypothyroidism  - hiatal hernia  - esophageal reflux, dysmotility    PLAN  - check calorie counts X 3 days  - rec place NGT for feeding and start with Osmolite 1.5 240ml pc & hs X 4 feeds/day. add Beneprotein 1 pack with each feed  - cont PO diet as tolerated but doubt intake will improve much especially with hiatal hernia and esophageal dysmotility.

## 2019-07-02 NOTE — PROGRESS NOTE ADULT - SUBJECTIVE AND OBJECTIVE BOX
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  [facility] T2-3A 014 B ([facility] T2-3A)            Patient was evaluated and examined  by bedside, reports having small BM yesterday, remains in good negative balance, with slight decrease of anasarca. no fever, s/p renal biopsy yesterday. patient remains very weak and deconditioned. We will d/c PPN since patient is  tolerating diet well.      REVIEW OF SYSTEMS:  please see pertinent positives mentioned above, all other 12 ROS negative      T(C): , Max: 36.3 (07-02-19 @ 04:49)  HR: 98 (07-02-19 @ 08:00)  BP: 115/68 (07-02-19 @ 08:00)  RR: 18 (07-02-19 @ 04:49)  SpO2: 97% (07-02-19 @ 08:00)  CAPILLARY BLOOD GLUCOSE      POCT Blood Glucose.: 154 mg/dL (02 Jul 2019 07:27)  POCT Blood Glucose.: 102 mg/dL (01 Jul 2019 21:33)  POCT Blood Glucose.: 98 mg/dL (01 Jul 2019 17:53)  POCT Blood Glucose.: 109 mg/dL (01 Jul 2019 11:54)      PHYSICAL EXAM:  General: NAD, AAOX3, patient is laying comfortably in bed, obese  HEENT: AT, NC, Supple, NO JVD, NO CB  Lungs: CTA B/L, no wheezing, no rhonchi  CVS: normal S1, S2, RRR, NO M/G/R  Abdomen: soft, bowel sounds present, non-tender, non-distended  Extremities: patient has anasarca, extensive plus 3 pitting diffuse pitting edema of all extremities/thighs/lower abd wall, no clubbing, no cyanosis, positive peripheral pulses b/l  Neuro: no acute focal neurological deficits, profound  generalized body weakness  Skin: no rush, small ecchymosis -left upper ext.        LAB  CBC  Date: 07-02-19 @ 05:22  Mean cell Gkhxntykpq67.1  Mean cell Hemoglobin Conc34.2  Mean cell Volum 91.0  Platelet count-Automate 179  RBC Count 2.99  Red Cell Distrib Width23.2  WBC Count12.58  % Albumin, Urine--  Hematocrit 27.2  Hemoglobin 9.3  CBC  Date: 07-01-19 @ 11:48  Mean cell Nsjerrvhbj21.4  Mean cell Hemoglobin Conc35.0  Mean cell Volum 89.8  Platelet count-Automate 190  RBC Count 2.93  Red Cell Distrib Width22.7  WBC Count15.99  % Albumin, Urine--  Hematocrit 26.3  Hemoglobin 9.2  CBC  Date: 06-30-19 @ 11:48  Mean cell Eiasyrbvmi41.1  Mean cell Hemoglobin Conc34.6  Mean cell Volum 89.9  Platelet count-Automate 178  RBC Count 2.86  Red Cell Distrib Width22.0  WBC Count13.25  % Albumin, Urine--  Hematocrit 25.7  Hemoglobin 8.9  CBC  Date: 06-28-19 @ 05:22  Mean cell Wwuybtzjaa61.6  Mean cell Hemoglobin Conc35.9  Mean cell Volum 88.1  Platelet count-Automate 275  RBC Count 3.35  Red Cell Distrib Width22.4  WBC Count20.01  % Albumin, Urine--  Hematocrit 29.5  Hemoglobin 10.6  CBC  Date: 06-27-19 @ 07:43  Mean cell Imrnmrsvwt32.1  Mean cell Hemoglobin Conc35.6  Mean cell Volum 87.4  Platelet count-Automate 271  RBC Count 3.41  Red Cell Distrib Width22.3  WBC Count18.52  % Albumin, Urine--  Hematocrit 29.8  Hemoglobin 10.6    [facility]  07-02-19 @ 05:22  Blood Gas Arterial-Calcium,Ionized--  Blood Urea Nitrogen, Serum 139 mg/dL<HH> [10 - 20] [Critical value:]  Carbon Dioxide, Serum22 mmol/L [17 - 32]  Chloride, Serum96 mmol/L<L> [98 - 110]  Creatinie, Serum2.0 mg/dL<H> [0.7 - 1.5]  Glucose, Lenpj444 mg/dL<H> [70 - 99]  Potassium, Serum3.8 mmol/L [3.5 - 5.0]  Sodium, Serum 138 mmol/L [135 - 146]  [facility]  07-01-19 @ 11:48  Blood Gas Arterial-Calcium,Ionized--  Blood Urea Nitrogen, Serum 138 mg/dL<HH> [10 - 20] [Critical value:]  Carbon Dioxide, Serum21 mmol/L [17 - 32]  Chloride, Serum93 mmol/L<L> [98 - 110]  Creatinie, Serum2.2 mg/dL<H> [0.7 - 1.5]  Glucose, Bmkrm939 mg/dL<H> [70 - 99]  Potassium, Serum3.1 mmol/L<L> [3.5 - 5.0]  Sodium, Serum 136 mmol/L [135 - 146]  [facility]  06-30-19 @ 11:48  Blood Gas Arterial-Calcium,Ionized--  Blood Urea Nitrogen, Serum 151 mg/dL<HH> [10 - 20] [Critical value:]  Carbon Dioxide, Serum20 mmol/L [17 - 32]  Chloride, Serum94 mmol/L<L> [98 - 110]  Creatinie, Serum2.8 mg/dL<H> [0.7 - 1.5]  Glucose, Hbjrz606 mg/dL<H> [70 - 99]  Potassium, Serum3.1 mmol/L<L> [3.5 - 5.0]  Sodium, Serum 133 mmol/L<L> [135 - 146]  [facility]  06-29-19 @ 14:50  Blood Gas Arterial-Calcium,Ionized--  Blood Urea Nitrogen, Serum 151 mg/dL<HH> [10 - 20] [Critical value:]  Carbon Dioxide, Serum19 mmol/L [17 - 32]  Chloride, Serum94 mmol/L<L> [98 - 110]  Creatinie, Serum3.3 mg/dL<H> [0.7 - 1.5]  Glucose, Phfcc411 mg/dL<H> [70 - 99]  Potassium, Serum3.8 mmol/L [3.5 - 5.0]  Sodium, Serum 134 mmol/L<L> [135 - 146]  [facility]  06-28-19 @ 05:22  Blood Gas Arterial-Calcium,Ionized--  Blood Urea Nitrogen, Serum 143 mg/dL<HH> [10 - 20] [Critical value:]  Carbon Dioxide, Serum19 mmol/L [17 - 32]  Chloride, Serum91 mmol/L<L> [98 - 110]  Creatinie, Serum3.7 mg/dL<H> [0.7 - 1.5]  Glucose, Jgqzy938 mg/dL<H> [70 - 99]  Potassium, Serum4.5 mmol/L [3.5 - 5.0]  Sodium, Serum 130 mmol/L<L> [135 - 146]        PT/INR - ( 02 Jul 2019 05:22 )   PT: 15.40 sec;   INR: 1.34 ratio         PTT - ( 02 Jul 2019 05:22 )  PTT:35.3 sec      Microbiology:    Culture - Blood (collected 06-28-19 @ 18:46)  Source: .Blood None  Preliminary Report (06-30-19 @ 03:01):    No growth to date.    Culture - Urine (collected 06-22-19 @ 18:04)  Source: .Urine Clean Catch (Midstream)  Final Report (06-24-19 @ 06:28):    No growth    Culture - Urine (collected 06-21-19 @ 23:54)  Source: .Urine Clean Catch (Midstream)  Final Report (06-23-19 @ 08:53):    No growth    Culture - Blood (collected 06-15-19 @ 18:01)  Source: .Blood None  Final Report (06-21-19 @ 06:00):    No growth at 5 days.        Medications:  albumin human 25% IVPB 50 milliLiter(s) IV Intermittent once  albumin human 25% IVPB 50 milliLiter(s) IV Intermittent once  aluminum hydroxide/magnesium hydroxide/simethicone Suspension 30 milliLiter(s) Oral every 4 hours PRN  bisacodyl Suppository 10 milliGRAM(s) Rectal daily PRN  calcium carbonate    500 mG (Tums) Chewable 1 Tablet(s) Chew every 12 hours  chlorhexidine 4% Liquid 1 Application(s) Topical <User Schedule>  docusate sodium 100 milliGRAM(s) Oral three times a day  fat emulsion (Plant Based) 20% Infusion 0.558 Gm/kG/Day IV Continuous <Continuous>  fat emulsion (Plant Based) 20% Infusion 0.558 Gm/kG/Day IV Continuous <Continuous>  furosemide   Injectable 80 milliGRAM(s) IV Push once  furosemide   Injectable 80 milliGRAM(s) IV Push once  levothyroxine 50 MICROGram(s) Oral daily  methylPREDNISolone sodium succinate Injectable 80 milliGRAM(s) IV Push once  metoprolol tartrate 25 milliGRAM(s) Oral two times a day  midodrine 10 milliGRAM(s) Oral every 8 hours  nystatin Powder 1 Application(s) Topical three times a day  pantoprazole   Suspension 40 milliGRAM(s) Oral before breakfast  Parenteral Nutrition - Adult 1 Each TPN Continuous <Continuous>  Parenteral Nutrition - Adult 1 Each TPN Continuous <Continuous>  PPD  5 Tuberculin Unit(s) Injectable 5 Unit(s) IntraDermal once  senna 2 Tablet(s) Oral at bedtime        Assessment and Plan:  A/P 67 yo F w/ hx of CAD s/p PCI and hypothyroidism sent by gastroenterologist for gallbladder hydrops, course complicated by ALI/ transaminitis possibly of autoimmune etiology, LUIS ALBERTO with hyperkalemia multifactorial 2/2 rhabdo (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 with resultant proteinuria and low albumin state and diffuse anasarca, raynauds phenomenon (not currently active), esophageal dysmotility and reflux, cystitis with urinary retention s/p abx, and subsequent worsening functional debility and malnutrition    #hydrops gallbladder with  transaminitis - LFT's trending down  -patient tolerating diet well, d/c PPN  -hepatitis profile negative  concomitant acute rhabdomyolysis- resolved, repeated CK level  normal    statin was d/c on admission,   liver was  biopsied- Surgical Pathology Report (06.24.19 @ 16:10)  Surgical Final Report: Final Diagnosis  Right hepatic lobe, needle biopsy:  - Liver parenchyma showing mild focal lobulitis and hepatocyte reactive changes with few glycogenated nuclei.  - Focal area of vaguely formed Francisca-Denk body also present.  - Two minute focus of macro and micro-vesicular steatosis also present, counting less than 5% of tissue volume of the biopsy.  - Portal areas showing marked chronic inflammation with focal interface activity, infiltrated with mainly small lymphocytes and  rare neutrophils, and fibrous expansion of portal area with  fibrous bridge formation, probable cirrhosis.  - Stainable iron present, not increased.  - Trichrome stain reviewed.  - PAS-D stain failed to reveal alpha-1 antitrypsin globule.    Verified by: Manoj Lindsey M.D.  (Electronic Signature)  Reported on: 06/26/19 15:42 EDT, [address] [place] [address]  _________________________________________________________________    Comment  _Sections show findings are consistent with clinical impression of  cirrhosis.  However, the etiology is not clear.  Clinical correlation is recommended.    06/25/19 08:55 ns    LIVER FUNCTIONS - ( 30 Jun 2019 11:48 )  Alb: 2.5 g/dL / Pro: 5.1 g/dL / ALK PHOS: 594 U/L / ALT: 143 U/L / AST: 155 U/L    LIVER FUNCTIONS - ( 02 Jul 2019 05:22 )  Alb: 2.7 g/dL / Pro: 5.6 g/dL / ALK PHOS: 553 U/L / ALT: 112 U/L / AST: 121 U/L                        clinically too weak at this moment for cholecystecomy         # LUIS ALBERTO  - with  uremia and severe tertiary fluid spacing with anasarca- over past 3 days, patient is responding very well to IV Lasix tx., remains in negative balance, and anasarca slightly decreased.  - as per renal team recommendations - patient had diagnostic renal biopsy on 7/1/19- f/up results  -  will continue  IV albumin with high doses of IV lasix tx. 80 mg IV twice daily- strict I and O chart, the goal at least to have negative 2 to 3 liters/ 24 hours  --daily BMP     # Abnormal elevated DS DNA- 111, Elevated ANKITA titers 1: 2560, Elevated P-Anca- titer 1: 1280 - ? lupus nephritis, vs Vasculitis,   - we have consulted  Rheumatology specialist - who recommended- diagnostic renal biopsy-completed, MPO/PR3 abs,   -I have  started patient  on steroids trial (day 2 of Solumedrol 80 mg IV push)     #Persistent Leukocytosis- most likely as a result of acute inflammatory state  so far no acute infectious etiology   -repeated  blood cxs- no bacterial growth  -continue to monitor CBC     # hypotension- b/p improved post initiation of midodrine tx.  with hypoalbuminemia, tertiary fluid spacing, LUIS ALBERTO, started on Midodrine 10 mg po three times daily      #hypoalbuminemia 2/2 acute liver disease- severe protein/calorie malnutrition  - patient was started on PPN- IV Albumin tx.    #h/o CAD  -cont home regimen  -statin on hold 2/2 acute liver injury  -will resume  plavix from tomorrow      #hypothyroidism  -tsh elevated---t3/t4 normal---will cont syntrhoid 50 mcg daily    # Constipation  -colace/dulcolax/senna    #DVT//gI ppx  FULL CODE  high risk/ guarded prognosis    #Progress Note Handoff: Pending Consults__none______,Tests__MPO/PR3 abs______,Test Results___f/up renal biopsy results, daily BMP____,Other;, strict I and O monitoring with IV lasix tx,   Family discussion: patient by bedside Disposition: Home__once medically stable

## 2019-07-02 NOTE — PROGRESS NOTE ADULT - SUBJECTIVE AND OBJECTIVE BOX
Patient is a 68y old  Female who presents with a chief complaint of gallbladder hydrops (02 Jul 2019 10:54)  event noted ppn d/c by medica team + anasarca  po intake is minimal  esophagram:< from: Xray Esophagram (06.25.19 @ 10:36) >  Impression:  Moderate-sized hiatal hernia.  There is no evidence of obstruction, however there is retained contrast   within the mid to lower esophagus consistent with dysmotility as well as   esophageal reflux.       Vital Signs Last 24 Hrs  T(C): 36.3 (02 Jul 2019 04:49), Max: 36.3 (02 Jul 2019 04:49)  T(F): 97.3 (02 Jul 2019 04:49), Max: 97.3 (02 Jul 2019 04:49)  HR: 98 (02 Jul 2019 08:00) (67 - 98)  BP: 115/68 (02 Jul 2019 08:00) (100/49 - 154/67)  RR: 18 (02 Jul 2019 04:49) (18 - 18)  SpO2: 97% (02 Jul 2019 08:00) (95% - 97%)      Drug Dosing Weight  Height (cm): 165.1 (01 Jul 2019 06:54)  Weight (kg): 125.8 (01 Jul 2019 06:54)  BMI (kg/m2): 46.2 (01 Jul 2019 06:54)  BSA (m2): 2.27 (01 Jul 2019 06:54)    I&O's Detail    01 Jul 2019 07:01  -  02 Jul 2019 07:00  --------------------------------------------------------  IN:    fat emulsion (Plant Based) 20% Infusion: 84 mL    IV PiggyBack: 100 mL    PPN (Peripheral Parenteral Nutrition): 660 mL  Total IN: 844 mL    OUT:    Indwelling Catheter - Urethral: 4200 mL  Total OUT: 4200 mL    Total NET: -3356 mL      02 Jul 2019 07:01  -  02 Jul 2019 12:21  --------------------------------------------------------  IN:  Total IN: 0 mL    OUT:    Indwelling Catheter - Urethral: 800 mL  Total OUT: 800 mL    Total NET: -800 mL     PHYSICAL EXAM:  Constitutional:A+ox3  Gastrointestinal: obese   Extremities + edema  IV access: right midline  MEDICATIONS  (STANDING):  albumin human 25% IVPB 50 milliLiter(s) IV Intermittent once  calcium carbonate    500 mG (Tums) Chewable 1 Tablet(s) Chew every 12 hours  chlorhexidine 4% Liquid 1 Application(s) Topical <User Schedule>  docusate sodium 100 milliGRAM(s) Oral three times a day  furosemide   Injectable 80 milliGRAM(s) IV Push once  furosemide   Injectable 80 milliGRAM(s) IV Push once  heparin  Injectable 5000 Unit(s) SubCutaneous every 8 hours  levothyroxine 50 MICROGram(s) Oral daily  metoprolol tartrate 25 milliGRAM(s) Oral two times a day  midodrine 10 milliGRAM(s) Oral every 8 hours  nystatin Powder 1 Application(s) Topical three times a day  pantoprazole   Suspension 40 milliGRAM(s) Oral before breakfast  PPD  5 Tuberculin Unit(s) Injectable 5 Unit(s) IntraDermal once  senna 2 Tablet(s) Oral at bedtime      Diet, Renal Restrictions:   For patients receiving Renal Replacement - No Protein Restr, No Conc K, No Conc Phos, Low Sodium (07-02-19 @ 11:01)      LABS  07-02    138  |  96<L>  |  139<HH>  ----------------------------<  149<H>  3.8   |  22  |  2.0<H>    Ca    7.8<L>      02 Jul 2019 05:22  Phos  5.6     07-02  Mg     2.2     07-02    TPro  5.6<L>  /  Alb  2.7<L>  /  TBili  2.8<H>  /  DBili  1.5<H>  /  AST  121<H>  /  ALT  112<H>  /  AlkPhos  553<H>  07-02                          9.3    12.58 )-----------( 179      ( 02 Jul 2019 05:22 )             27.2     CAPILLARY BLOOD GLUCOSE  POCT Blood Glucose.: 167 mg/dL (02 Jul 2019 11:48)  POCT Blood Glucose.: 154 mg/dL (02 Jul 2019 07:27)

## 2019-07-02 NOTE — PROGRESS NOTE ADULT - ASSESSMENT
· Assessment      A/P 67 yo F w/ hx of CAD s/p PCI and hypothyroidism sent by gastroenterologist for gallbladder hydrops, course complicated by ALI/ transaminitis possibly of autoimmune etiology, LUIS ALBERTO with hyperkalemia multifactorial 2/2 rhabdo (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 with resultant proteinuria and low albumin state and diffuse anasarca, raynauds phenomenon (not currently active), esophageal dysmotility and reflux, cystitis with urinary retention s/p abx, and subsequent worsening functional debility and malnutrition.    Patient is weaker, edematous. She had rings in her fingers and were removed by the senior resident to avoid ischemia in finger 06/29. Labs performed after blood drawn via US on 07/01. Patient had a bowel movement today 07/01 and feels better after that. 40 meq k was given due to low K levels 3.1 07/01. Calcium carbonate 500x2 added on Nephrology recommendations 07/01.    #hydrops gallbladder/acute severe transaminitis  -LFT's downtrending   -CPK downtrending [id number]   -Liver biopsy done shows Cirrhosis but of no clear etiology   -Clinically too weak at this moment for cholecystectomy  -Midodrine 5mg every 8h for portal HTN  -GI recommended to trend INR and LFTs daily 07/01    #LUIS ALBERTO  -Cr downtrending 3.7>3.3>2.8>2.2   -BUN levels down to 138 from 151  -As per renal team recommendations - patient most likely has hepatorenal syndrome  -Albumin challenge test with 100g albumin performed 06/27  -Continue IV Albumin 25% 50ml with 80mg Lasix twice a day  -Strict Input and Output measures  -Renal biopsy done today 07/01  -Given 40 meq of K once only  -Started Ca carbonate 500 twice daily   -Nephrology Consult 07/01 want to wait for biopsy results before proceeding for any management     # Abnormal DS DNA, Elevated ANKITA titers   -Rheum recommended to hold prednisone use until w/u is completed  -Solumedrol 80mg once if renal biopsy completed  -if renal bx is inconclusive emg and sural nerve biopsy recommended  -ESR, CRP, ANCA repeated    #hypoalbuminemia  -Malnutrition vs Liver Cirrhosis  -Patient was started on TPN    #distal digit blue discoloration  -Raynaud's (primary vs secondary)  -r/o etiology---paraneoplastic, vasculitis, vs rheum   -transient=---resolved within 1 hour  -gloves/warmth to affected fingertips  -CCB on hold for hypotension  -esophagram consistent with dysmotility    #UTI with urinary retention  -has Smith, monitor u/o   -received ceftriaxone/ completed  -Urinalysis order on recommendation of Nephro  -WBCs 15>17>15>18>20>13>16    #h/o CAD  -cont home regimen  -statin on hold 2/2 acute liver injury  -Continue to hold Plavix for Renal biopsy  -Hold ASA for renal biopsy    #hypothyroidism  -TSH elevated---t3/t4 normal---will cont synthroid 50 mcg daily    #DVT + GI ppx  FULL CODE  high risk/ guarded prognosis · Assessment		  A/P 67 yo F w/ hx of CAD s/p PCI and hypothyroidism sent by gastroenterologist for gallbladder hydrops, course complicated by ALI/ transaminitis possibly of autoimmune etiology, LUIS ALBERTO with hyperkalemia multifactorial 2/2 rhabdo (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 with resultant proteinuria and low albumin state and diffuse anasarca, raynauds phenomenon (not currently active), esophageal dysmotility and reflux, cystitis with urinary retention s/p abx, and subsequent worsening functional debility and malnutrition.    Patient is weaker, edematous. She had rings in her fingers and were removed by the senior resident to avoid ischemia in finger 06/29. Labs performed after blood drawn via US on 07/01. Patient had a bowel movement today 07/01 and feels better after that. 40 meq k was given due to low K levels 3.1 07/01. Calcium carbonate 500x2 added on Nephrology recommendations 07/01.    Patient was continued on 07/02 on Solu-medrol 80mg once daily and albumin followed by Lasix. Patients Cr is downtrending and treatment will be tailored after renal biopsy results.    #hydrops gallbladder/acute severe transaminitis  -LFT's downtrending   -CPK downtrending [id number]   -Liver biopsy done shows Cirrhosis but of no clear etiology   -Clinically too weak at this moment for cholecystectomy  -Midodrine 5mg every 8h for portal HTN  -GI recommended to trend INR and LFTs daily 07/01    #LUIS ALBERTO  -Cr downtrending 3.7>3.3>2.8>2.2>2.0  -BUN levels are down to 139<138<151  -As per renal team recommendations - patient most likely has hepatorenal syndrome  -Albumin challenge test with 100g albumin performed 06/27  -Continue IV Albumin 25% 50ml with 80mg Lasix twice a day  -Strict Input and Output measures  -Renal biopsy done yesterday 07/01  -Given 40 meq of K once only on 07/01  -Started Ca carbonate 500 twice daily   -Nephrology Consult 07/01 want to wait for biopsy results before proceeding for any management     # Abnormal DS DNA, Elevated ANKITA titers   -Rheum recommended to hold prednisone use until w/u is completed  -Solumedrol 80mg once given on 07/01 and 07/02  -if renal bx is inconclusive emg and sural nerve biopsy recommended  -ESR, CRP, ANCA repeated    #hypoalbuminemia  -Malnutrition vs Liver Cirrhosis  -Patient was started on TPN    #distal digit blue discoloration  -Raynaud's (primary vs secondary)  -r/o etiology---paraneoplastic, vasculitis, vs rheum   -transient=---resolved within 1 hour  -gloves/warmth to affected fingertips  -CCB on hold for hypotension  -esophagram consistent with dysmotility    #UTI with urinary retention  -has Smith, monitor u/o   -received ceftriaxone/ completed  -Urinalysis order on recommendation of Nephro  -WBCs 15>17>15>18>20>13>16    #h/o CAD  -cont home regimen  -statin on hold 2/2 acute liver injury  -Plavix held for Renal biopsy  -ASA held for renal biopsy    #hypothyroidism  -TSH elevated---t3/t4 normal---will cont synthroid 50 mcg daily    #DVT + GI ppx  FULL CODE  high risk/ guarded prognosis

## 2019-07-02 NOTE — PROGRESS NOTE ADULT - ASSESSMENT
68 Y F Hx of CAD on DAPT, for a stent >1 year ago, w abnormal LFTs  MRI suggestive of cirrhosis (nodular contour) with no evident signs of portal HTN. Hydropic gallbladder up to 4.7 cm  in transverse diameter with   gallstones, including probably impacted 1.6 cm stone at neck, no biliary distention, filling defect or stricture.   The etiology of the derangement in the liver enzymes is unclear. Possibilities include auto immune hepatitis.     Currently the soluble IgG is mildly elevated the ASMA and Anti LKM  are negative, viral hepatitis workup is negative, normal ceruloplasmin alpha one antitrypsin normal , alpha fetoprotein normal.    The AMA is negative,  ANKITA is positive. P- anca positive, anti ds positive , Liver biopsy in favor of autoimmune hepatitis.   Current serologic work highly suggestive of AIH  Awaiting confirmatory biopsy      1)Hepatitis:  PRELIM PATH SUGGESTIVE OF AIH, with evidence of underlying cirrhosis. Lfts are improving overall.  patient started on solumedrol trial per primary team     2)Abnormal liver imaging- cirrhosis  -HCC: none on imaging  -EV: needs screening  -Ascites: non on imaging    3)PROFOUND Hypoalbuminemia    3)Kidney disease? CKD?  pending biopsy results     6)GERD 68 Y F Hx of CAD on DAPT, for a stent >1 year ago, w abnormal LFTs  MRI suggestive of cirrhosis (nodular contour) with no evident signs of portal HTN. Hydropic gallbladder up to 4.7 cm  in transverse diameter with   gallstones, including probably impacted 1.6 cm stone at neck, no biliary distention, filling defect or stricture.   The etiology of the derangement in the liver enzymes is unclear. Possibilities include auto immune hepatitis versus overlap syndrome     Currently;   the soluble IgG is mildly elevated the ASMA and Anti LKM  are negative, viral hepatitis workup is negative, normal ceruloplasmin alpha one antitrypsin normal , alpha fetoprotein normal.    The AMA is negative,  ANKITA is positive. P- anca positive, anti ds positive , Liver biopsy in favor of autoimmune hepatitis , awaiting second opinion pathology read by MT. Lacy.           1)Hepatitis:  PRELIM PATH SUGGESTIVE OF AIH,  with evidence of underlying cirrhosis, awaiting confirmatory read, . Lfts are improving overall.  patient started on solumedrol trial per primary team     2)Abnormal liver imaging- cirrhosis  -HCC: none on imaging  -EV: needs screening  -Ascites: non on imaging    3)PROFOUND Hypoalbuminemia  Kidney disease? CKD?  pending biopsy results     4)GERD 68 Y F Hx of CAD on DAPT, for a stent >1 year ago, w abnormal LFTs  MRI suggestive of cirrhosis (nodular contour) with no evident signs of portal HTN. Hydropic gallbladder up to 4.7 cm  in transverse diameter with   gallstones, including probably impacted 1.6 cm stone at neck, no biliary distention, filling defect or stricture.   The etiology of the derangement in the liver enzymes is unclear.    Currently;   the soluble IgG is mildly elevated the ASMA and Anti LKM  are negative, viral hepatitis workup is negative, normal ceruloplasmin alpha one antitrypsin normal , alpha fetoprotein normal.    The AMA is negative,  ANKITA is positive. P- anca positive, anti ds positive , Liver biopsy showed cirrhosis of unclear etiology  , second opinion pathology read by [facility] in favor of drug induced liver injury.           1)Hepatitis:  mostly drug induced according to [facility] [place] read.  Lfts are improving overall.  trend lfts , avoid hepatotoxic drugs  no hepatic encephalopathy  no asterixes   no coagulopathy     2)Abnormal liver imaging- cirrhosis  -HCC: none on imaging  -EV: needs screening endoscopy as outpatient   -Ascites: non on imaging  - needs HCC screening every 6 month: ritesh fetoprotein and liver ultrasound     3)PROFOUND Hypoalbuminemia  Kidney disease? CKD?  pending biopsy results     4)GERD 68 Y F Hx of CAD on DAPT, for a stent >1 year ago, w abnormal LFTs  MRI suggestive of cirrhosis (nodular contour) with no evident signs of portal HTN. Hydropic gallbladder up to 4.7 cm  in transverse diameter with   gallstones, including probably impacted 1.6 cm stone at neck, no biliary distention, filling defect or stricture.   The etiology of the derangement in the liver enzymes is unclear.    Currently;   the soluble IgG is mildly elevated the ASMA and Anti LKM  are negative, viral hepatitis workup is negative, normal ceruloplasmin alpha one antitrypsin normal , alpha fetoprotein normal.    The AMA is negative,  ANKITA is positive. P- anca positive, anti ds positive , Liver biopsy showed cirrhosis of unclear etiology  , second opinion pathology read by [facility] in favor of drug induced liver injury.           1)Hepatitis:  subacute hepatitis with multiacinar / bridging necrosis. There is no evidence of autoimmune hepatitis or cirrhosis. More in favor of  drug induced according to [facility] read.  Lfts are improving overall.  trend lfts , avoid hepatotoxic drugs  no hepatic encephalopathy  no asterixes   no coagulopathy       2)PROFOUND Hypoalbuminemia  Kidney disease? CKD?  pending biopsy results     3)GERD: on Protonix    4) Symptoms of raynaud / muscle weakness/ multiple positive autoimmune markers f/u with rheumatology

## 2019-07-02 NOTE — PROGRESS NOTE ADULT - SUBJECTIVE AND OBJECTIVE BOX
SUBJECTIVE:    Patient is a 68y old Female who presents with a chief complaint of gallbladder hydrops (02 Jul 2019 13:21)    Currently admitted to medicine with the primary diagnosis of Gallbladder hydrops     Today is hospital day 18d. This morning she reports feeling better after having a bowel movement yesterday and started feeding today orally.     INTERVAL EVENTS: The patient had her renal biopsy on 07/01 and we are waiting on results.  Patient in much better spirits today.    PAST MEDICAL & SURGICAL HISTORY  Hypothyroidism  CAD S/P percutaneous coronary angioplasty  No significant past surgical history      ALLERGIES:  No Known Allergies    MEDICATIONS:  STANDING MEDICATIONS  calcium carbonate    500 mG (Tums) Chewable 1 Tablet(s) Chew every 12 hours  chlorhexidine 4% Liquid 1 Application(s) Topical <User Schedule>  docusate sodium 100 milliGRAM(s) Oral three times a day  furosemide   Injectable 80 milliGRAM(s) IV Push once  heparin  Injectable 5000 Unit(s) SubCutaneous every 8 hours  levothyroxine 50 MICROGram(s) Oral daily  metoprolol tartrate 25 milliGRAM(s) Oral two times a day  midodrine 10 milliGRAM(s) Oral every 8 hours  nystatin Powder 1 Application(s) Topical three times a day  pantoprazole   Suspension 40 milliGRAM(s) Oral before breakfast  PPD  5 Tuberculin Unit(s) Injectable 5 Unit(s) IntraDermal once  senna 2 Tablet(s) Oral at bedtime    PRN MEDICATIONS  aluminum hydroxide/magnesium hydroxide/simethicone Suspension 30 milliLiter(s) Oral every 4 hours PRN  bisacodyl Suppository 10 milliGRAM(s) Rectal daily PRN    VITALS:   T(F): 97.9  HR: 77  BP: 125/58  RR: 18  SpO2: 97%    LABS:                        9.3    12.58 )-----------( 179      ( 02 Jul 2019 05:22 )             27.2     07-02    138  |  96<L>  |  139<HH>  ----------------------------<  149<H>  3.8   |  22  |  2.0<H>    Ca    7.8<L>      02 Jul 2019 05:22  Phos  5.6     07-02  Mg     2.2     07-02    TPro  5.6<L>  /  Alb  2.7<L>  /  TBili  2.8<H>  /  DBili  1.5<H>  /  AST  121<H>  /  ALT  112<H>  /  AlkPhos  553<H>  07-02    PT/INR - ( 02 Jul 2019 05:22 )   PT: 15.40 sec;   INR: 1.34 ratio         PTT - ( 02 Jul 2019 05:22 )  PTT:35.3 sec      RADIOLOGY:  >>LIVER BIOPSY  Final Diagnosis  Right hepatic lobe, needle biopsy:  - Liver parenchyma showing mild focal lobulitis and hepatocyte  reactive changes with few glycogenated nuclei.  - Focal area of vaguely formed Francisca-Denk body also present.  - Two minute focus of macro and micro-vesicular steatosis also  present, counting less than 5% of tissue volume of the biopsy.  - Portal areas showing marked chronic inflammation with focal  interface activity, infiltrated with mainly small lymphocytes and  rare neutrophils, and fibrous expansion of portal area with  fibrous bridge formation, probable cirrhosis.  - Stainable iron present, not increased.  - Trichrome stain reviewed.  - PAS-D stain failed to reveal alpha-1 antitrypsin globule.    Comment  _Sections show findings are consistent with clinical impression  of  cirrhosis.  However, the etiology is not clear.  Clinical  correlation is recommended.    PHYSICAL EXAM:  GEN: No acute distress  PULM/CHEST: Clear B/L, equal air entry bilaterally  CVS: RRR, S1+S2 + mild systolic murmur  ABD: Soft, non-tender, non-distended, ecchymosis around the needle prick sites +BS,  EXT: Pedal edema  NEURO: AAOx3    Smith Catheter:   Connect To:  Straight Drainage/[place]  Indication:  Urinary Retention / Obstruction

## 2019-07-02 NOTE — PROGRESS NOTE ADULT - SUBJECTIVE AND OBJECTIVE BOX
GI HPI Today:  Patient is a 68y old  Female who presents with a chief complaint of gallbladder hydrops (02 Jul 2019 12:21)  . Patient seen for transaminitis , patient had kidney biopsy 7/1/2019  result pending.   patient denies any GI symptoms today. Patient started on solumedrol trial 7/1( today is day 2)       Hospital course:  69 yo F w/ hx of CAD s/p PCI and hypothyroidism sent by gastroenterologist for gallbladder hydrops. Pt found to have elevated liver enzymes by PMD in Apr 2019, referred to GI for w/u. MRCP 2 days ago significant for gallbladder hydrops 4.7cm with probable impacted stone at neck, also liver cirrhosis w/ mild ascites. Admits to social etoh, denies binge drinking, denies abdominal pain, N/V. Admits to decreased appetite in past 2 weeks, pt tried to maintain low salt diet. Reports BLE edema developed in past 2 days as well as weakness. Denies fever, chills, abdominal pain, SOB, dysuria.    In ED, evaluated by surgery -> no interventions at this time (15 Shashank 2019 01:53)      PAST MEDICAL & SURGICAL HISTORY  Hypothyroidism  CAD S/P percutaneous coronary angioplasty  No significant past surgical history      ALLERGIES:  No Known Allergies      MEDICATIONS:  MEDICATIONS  (STANDING):  albumin human 25% IVPB 50 milliLiter(s) IV Intermittent once  calcium carbonate    500 mG (Tums) Chewable 1 Tablet(s) Chew every 12 hours  chlorhexidine 4% Liquid 1 Application(s) Topical <User Schedule>  docusate sodium 100 milliGRAM(s) Oral three times a day  furosemide   Injectable 80 milliGRAM(s) IV Push once  furosemide   Injectable 80 milliGRAM(s) IV Push once  heparin  Injectable 5000 Unit(s) SubCutaneous every 8 hours  levothyroxine 50 MICROGram(s) Oral daily  metoprolol tartrate 25 milliGRAM(s) Oral two times a day  midodrine 10 milliGRAM(s) Oral every 8 hours  nystatin Powder 1 Application(s) Topical three times a day  pantoprazole   Suspension 40 milliGRAM(s) Oral before breakfast  PPD  5 Tuberculin Unit(s) Injectable 5 Unit(s) IntraDermal once  senna 2 Tablet(s) Oral at bedtime    MEDICATIONS  (PRN):  aluminum hydroxide/magnesium hydroxide/simethicone Suspension 30 milliLiter(s) Oral every 4 hours PRN Dyspepsia  bisacodyl Suppository 10 milliGRAM(s) Rectal daily PRN Constipation    REVIEW OF SYSTEMS:    CONSTITUTIONAL: +generalized  weakness, no  fevers or chills  EYES/ENT: No visual changes;  No vertigo or throat pain   NECK: No pain or stiffness  RESPIRATORY: No cough, wheezing, hemoptysis; No shortness of breath  CARDIOVASCULAR: No chest pain or palpitations  GASTROINTESTINAL: No abdominal or epigastric pain. No nausea, vomiting, or hematemesis; + constipation. No melena or hematochezia.  GENITOURINARY: No dysuria, frequency or hematuria  SKIN: No itching, burning, rashes, or lesions   All other review of systems is negative unless indicated above.          VITALS:   T(F): 97.6 (07-02 @ 11:45), Max: 98.1 (06-30 @ 12:30)  HR: 98 (07-02 @ 11:45) (67 - 98)  BP: 94/55 (07-02 @ 11:45) (90/52 - 154/67)  BP(mean): --  RR: 18 (07-02 @ 11:45) (12 - 18)  SpO2: 97% (07-02 @ 08:00) (95% - 97%)        PHYSICAL EXAM:  EYES: No scleral icterus   LUNG: Clear to auscultation bilaterally; No rales, rhonchi, wheezing, or rubs  HEART: RRR; No murmurs  ABDOMEN: Soft, +BS, Abdominal Tenderness, No guarding, No Garcia Sign        Blood Work :                        9.3    12.58 )-----------( 179      ( 02 Jul 2019 05:22 )             27.2     PT/INR - ( 02 Jul 2019 05:22 )  INR: 1.34          PTT - ( 02 Jul 2019 05:22 )  PTT:35.3   07-02    138  |  96<L>  |  139<HH>  ----------------------------<  149<H>  3.8   |  22  |  2.0<H>    Ca    7.8<L>      02 Jul 2019 05:22  Phos  5.6     07-02  Mg     2.2     07-02      CBC -  ( 02 Jul 2019 05:22 )  Hemoglobin : 9.3    CBC -  ( 01 Jul 2019 11:48 )  Hemoglobin : 9.2    CBC -  ( 30 Jun 2019 11:48 )  Hemoglobin : 8.9      LIVER FUNCTIONS - ( 02 Jul 2019 05:22 )  Alb: 2.7 [3.5 - 5.2] / Pro: 5.6 [6.0 - 8.0] / ALK PHOS: 553 [30 - 115] / ALT: 112 [0 - 41] / AST: 121 [0 - 41] / GGT: x     LIVER FUNCTIONS - ( 01 Jul 2019 11:48 )  Alb: 3.2 [3.5 - 5.2] / Pro: 5.6 [6.0 - 8.0] / ALK PHOS: 555 [30 - 115] / ALT: 118 [0 - 41] / AST: 138 [0 - 41] / GGT: x     LIVER FUNCTIONS - ( 30 Jun 2019 11:48 )  Alb: 2.5 [3.5 - 5.2] / Pro: 5.1 [6.0 - 8.0] / ALK PHOS: 594 [30 - 115] / ALT: 143 [0 - 41] / AST: 155 [0 - 41] / GGT: x     LIVER FUNCTIONS - ( 29 Jun 2019 14:50 )  Alb: 2.5 [3.5 - 5.2] / Pro: 5.2 [6.0 - 8.0] / ALK PHOS: 648 [30 - 115] / ALT: 165 [0 - 41] / AST: 178 [0 - 41] / GGT: x     LIVER FUNCTIONS - ( 28 Jun 2019 05:22 )  Alb: 1.6 [3.5 - 5.2] / Pro: 5.3 [6.0 - 8.0] / ALK PHOS: 1105 [30 - 115] / ALT: 337 [0 - 41] / AST: 372 [0 - 41] / GGT: x     LIVER FUNCTIONS - ( 27 Jun 2019 07:43 )  Alb: 1.6 [3.5 - 5.2] / Pro: 5.3 [6.0 - 8.0] / ALK PHOS: 1189 [30 - 115] / ALT: 379 [0 - 41] / AST: 432 [0 - 41] / GGT: x         RADIOLOGY:  < from: MR Abdomen No Cont (06.13.19 @ 16:15) >  1. Hydropic gallbladder, up to 4.7 cm in transverse diameter with   gallstones, including probably impacted 1.6 cm stone at neck.  2. Liver cirrhosis with mild abdominal ascites.  3. No biliary distention, filling defect, or stricture.      Surgical Pathology Report (06.24.19 @ 16:10)    Surgical Pathology Report:   ACCESSION No:  [id number]    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                    2        Addendum Report          Addendum  As per the physicians request, the case was sent to [facility] for expert opinion to rule out possible  autoimmune hepatitis. Addendum to follow.    Case discussed with Dr. ELISE Jaramillo on 6/27/2019.    Verified by: Manoj Lindsey M.D.  (Electronic Signature)  Reported on: 07/01/19 11:21 EDT, [address] [place] [address]  _________________________________________________________________      Surgical Final Report          Final Diagnosis  Right hepatic lobe, needle biopsy:  - Liver parenchyma showing mild focal lobulitis and hepatocyte  reactive changes with few glycogenated nuclei.  - Focal area of vaguely formed Francisca-Denk body also present.  - Two minute focus of macro and micro-vesicular steatosis also  present, counting less than 5% of tissue volume of the biopsy.  - Portal areas showing marked chronic inflammation with focal  interface activity, infiltrated with mainly small lymphocytes and  rare neutrophils, and fibrous expansion of portal area with  fibrous bridge formation, probable cirrhosis.  - Stainable iron present, not increased.  - Trichrome stain reviewed.  - PAS-D stain failed to reveal alpha-1 antitrypsin globule.    Verified by: Manoj Lindsey M.D.  (Electronic Signature)  Reported on: 06/26/19 15:42 EDT, [address] [place] [address]  _________________________________________________________________    Comment  _Sections show findings are consistent with clinical impression  of  cirrhosis.  However, the etiology is not clear.  Clinical  correlation is recommended.    Clinical History  "illegible" liver biopsy              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       2        Surgical Final Report          Cirrhosis with increased LFT's. No known liver disease. Right  hepatic lobe x 2    Specimen(s) Submitted  Right hepatic lobe x2    Gross Description  The specimen is received in formalin, labeled "right hepatic  lobe" and consists of two cylindrical fragments of tan soft  tissue measuring 1.4 and 1.5 cm in length x less than 0.1 cm in  greatest diameter.  The specimen is submitted entirely.  (2  blocks)    Specimen was received and underwent gross examination at [facility], [address] [place] [address].    06/25/19 08:55 ns    Perioperative Diagnosis  Evaluation for liver disease    Postoperative Diagnosis  "illegible' GI HPI Today:  Patient is a 68y old  Female who presents with a chief complaint of gallbladder hydrops (02 Jul 2019 12:21)  . Patient seen for transaminitis , patient had kidney biopsy 7/1/2019  result pending.   patient denies any GI symptoms today. Patient started on solumedrol trial 7/1( today is day 2)       Hospital course:  67 yo F w/ hx of CAD s/p PCI and hypothyroidism sent by gastroenterologist for gallbladder hydrops. Pt found to have elevated liver enzymes by PMD in Apr 2019, referred to GI for w/u. MRCP 2 days ago significant for gallbladder hydrops 4.7cm with probable impacted stone at neck, also liver cirrhosis w/ mild ascites. Admits to social etoh, denies binge drinking, denies abdominal pain, N/V. Admits to decreased appetite in past 2 weeks, pt tried to maintain low salt diet. Reports BLE edema developed in past 2 days as well as weakness. Denies fever, chills, abdominal pain, SOB, dysuria. Patient has social alcohol intake.     In ED, evaluated by surgery -> no interventions at this time (15 Shashank 2019 01:53)      PAST MEDICAL & SURGICAL HISTORY  Hypothyroidism  CAD S/P percutaneous coronary angioplasty  No significant past surgical history      ALLERGIES:  No Known AllergieY      MEDICATIONS:  MEDICATIONS  (STANDING):  albumin human 25% IVPB 50 milliLiter(s) IV Intermittent once  calcium carbonate    500 mG (Tums) Chewable 1 Tablet(s) Chew every 12 hours  chlorhexidine 4% Liquid 1 Application(s) Topical <User Schedule>  docusate sodium 100 milliGRAM(s) Oral three times a day  furosemide   Injectable 80 milliGRAM(s) IV Push once  furosemide   Injectable 80 milliGRAM(s) IV Push once  heparin  Injectable 5000 Unit(s) SubCutaneous every 8 hours  levothyroxine 50 MICROGram(s) Oral daily  metoprolol tartrate 25 milliGRAM(s) Oral two times a day  midodrine 10 milliGRAM(s) Oral every 8 hours  nystatin Powder 1 Application(s) Topical three times a day  pantoprazole   Suspension 40 milliGRAM(s) Oral before breakfast  PPD  5 Tuberculin Unit(s) Injectable 5 Unit(s) IntraDermal once  senna 2 Tablet(s) Oral at bedtime    MEDICATIONS  (PRN):  aluminum hydroxide/magnesium hydroxide/simethicone Suspension 30 milliLiter(s) Oral every 4 hours PRN Dyspepsia  bisacodyl Suppository 10 milliGRAM(s) Rectal daily PRN Constipation    REVIEW OF SYSTEMS:    CONSTITUTIONAL: +generalized  weakness, no  fevers or chills  EYES/ENT: No visual changes;  No vertigo or throat pain   NECK: No pain or stiffness  RESPIRATORY: No cough, wheezing, hemoptysis; No shortness of breath  CARDIOVASCULAR: No chest pain or palpitations  GASTROINTESTINAL: No abdominal or epigastric pain. No nausea, vomiting, or hematemesis; + constipation. No melena or hematochezia.  GENITOURINARY: No dysuria, frequency or hematuria  SKIN: No itching, burning, rashes, or lesions   All other review of systems is negative unless indicated above.          VITALS:   T(F): 97.6 (07-02 @ 11:45), Max: 98.1 (06-30 @ 12:30)  HR: 98 (07-02 @ 11:45) (67 - 98)  BP: 94/55 (07-02 @ 11:45) (90/52 - 154/67)  BP(mean): --  RR: 18 (07-02 @ 11:45) (12 - 18)  SpO2: 97% (07-02 @ 08:00) (95% - 97%)        PHYSICAL EXAM:  EYES: No scleral icterus   LUNG: Clear to auscultation bilaterally; No rales, rhonchi, wheezing, or rubs  HEART: RRR; No murmurs  ABDOMEN: Soft, +BS, Abdominal Tenderness, No guarding, No Garcia Sign        Blood Work :                        9.3    12.58 )-----------( 179      ( 02 Jul 2019 05:22 )             27.2     PT/INR - ( 02 Jul 2019 05:22 )  INR: 1.34          PTT - ( 02 Jul 2019 05:22 )  PTT:35.3   07-02    138  |  96<L>  |  139<HH>  ----------------------------<  149<H>  3.8   |  22  |  2.0<H>    Ca    7.8<L>      02 Jul 2019 05:22  Phos  5.6     07-02  Mg     2.2     07-02      CBC -  ( 02 Jul 2019 05:22 )  Hemoglobin : 9.3    CBC -  ( 01 Jul 2019 11:48 )  Hemoglobin : 9.2    CBC -  ( 30 Jun 2019 11:48 )  Hemoglobin : 8.9      LIVER FUNCTIONS - ( 02 Jul 2019 05:22 )  Alb: 2.7 [3.5 - 5.2] / Pro: 5.6 [6.0 - 8.0] / ALK PHOS: 553 [30 - 115] / ALT: 112 [0 - 41] / AST: 121 [0 - 41] / GGT: x     LIVER FUNCTIONS - ( 01 Jul 2019 11:48 )  Alb: 3.2 [3.5 - 5.2] / Pro: 5.6 [6.0 - 8.0] / ALK PHOS: 555 [30 - 115] / ALT: 118 [0 - 41] / AST: 138 [0 - 41] / GGT: x     LIVER FUNCTIONS - ( 30 Jun 2019 11:48 )  Alb: 2.5 [3.5 - 5.2] / Pro: 5.1 [6.0 - 8.0] / ALK PHOS: 594 [30 - 115] / ALT: 143 [0 - 41] / AST: 155 [0 - 41] / GGT: x     LIVER FUNCTIONS - ( 29 Jun 2019 14:50 )  Alb: 2.5 [3.5 - 5.2] / Pro: 5.2 [6.0 - 8.0] / ALK PHOS: 648 [30 - 115] / ALT: 165 [0 - 41] / AST: 178 [0 - 41] / GGT: x     LIVER FUNCTIONS - ( 28 Jun 2019 05:22 )  Alb: 1.6 [3.5 - 5.2] / Pro: 5.3 [6.0 - 8.0] / ALK PHOS: 1105 [30 - 115] / ALT: 337 [0 - 41] / AST: 372 [0 - 41] / GGT: x     LIVER FUNCTIONS - ( 27 Jun 2019 07:43 )  Alb: 1.6 [3.5 - 5.2] / Pro: 5.3 [6.0 - 8.0] / ALK PHOS: 1189 [30 - 115] / ALT: 379 [0 - 41] / AST: 432 [0 - 41] / GGT: x         RADIOLOGY:  < from: MR Abdomen No Cont (06.13.19 @ 16:15) >  1. Hydropic gallbladder, up to 4.7 cm in transverse diameter with   gallstones, including probably impacted 1.6 cm stone at neck.  2. Liver cirrhosis with mild abdominal ascites.  3. No biliary distention, filling defect, or stricture.      Surgical Pathology Report (06.24.19 @ 16:10)    Surgical Pathology Report:   ACCESSION No:  [id number]    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                    2        Addendum Report          Addendum  As per the physicians request, the case was sent to [facility] for expert opinion to rule out possible  autoimmune hepatitis. Addendum to follow.    Case discussed with Dr. ELISE Jaramillo on 6/27/2019.    Verified by: Manoj Lindsey M.D.  (Electronic Signature)  Reported on: 07/01/19 11:21 EDT, [address] [place] [address]  _________________________________________________________________      Surgical Final Report          Final Diagnosis  Right hepatic lobe, needle biopsy:  - Liver parenchyma showing mild focal lobulitis and hepatocyte  reactive changes with few glycogenated nuclei.  - Focal area of vaguely formed Francisca-Denk body also present.  - Two minute focus of macro and micro-vesicular steatosis also  present, counting less than 5% of tissue volume of the biopsy.  - Portal areas showing marked chronic inflammation with focal  interface activity, infiltrated with mainly small lymphocytes and  rare neutrophils, and fibrous expansion of portal area with  fibrous bridge formation, probable cirrhosis.  - Stainable iron present, not increased.  - Trichrome stain reviewed.  - PAS-D stain failed to reveal alpha-1 antitrypsin globule.    Verified by: Manoj Lindsey M.D.  (Electronic Signature)  Reported on: 06/26/19 15:42 EDT, [address] [place] [address]  _________________________________________________________________    Comment  _Sections show findings are consistent with clinical impression  of  cirrhosis.  However, the etiology is not clear.  Clinical  correlation is recommended.    Clinical History  "illegible" liver biopsy              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       2        Surgical Final Report          Cirrhosis with increased LFT's. No known liver disease. Right  hepatic lobe x 2    Specimen(s) Submitted  Right hepatic lobe x2    Gross Description  The specimen is received in formalin, labeled "right hepatic  lobe" and consists of two cylindrical fragments of tan soft  tissue measuring 1.4 and 1.5 cm in length x less than 0.1 cm in  greatest diameter.  The specimen is submitted entirely.  (2  blocks)    Specimen was received and underwent gross examination at [facility], [address] [place] [address].    06/25/19 08:55 ns    Perioperative Diagnosis  Evaluation for liver disease    Postoperative Diagnosis  "illegible' GI HPI Today:  Patient is a 68y old  Female who presents with a chief complaint of gallbladder hydrops (02 Jul 2019 12:21)  . Patient seen for transaminitis , patient had kidney biopsy 7/1/2019  result pending.   patient denies any GI symptoms today. Patient started on solumedrol trial 7/1( today is day 2)       Hospital course:  69 yo F w/ hx of CAD s/p PCI and hypothyroidism sent by gastroenterologist for gallbladder hydrops. Pt found to have elevated liver enzymes by PMD in Apr 2019, referred to GI for w/u. MRCP 2 days ago significant for gallbladder hydrops 4.7cm with probable impacted stone at neck, also liver cirrhosis w/ mild ascites. Admits to social etoh, denies binge drinking, denies abdominal pain, N/V. Admits to decreased appetite in past 2 weeks, pt tried to maintain low salt diet. Reports BLE edema developed in past 2 days as well as weakness. Denies fever, chills, abdominal pain, SOB, dysuria. Patient has social alcohol intake.     In ED, evaluated by surgery -> no interventions at this time (15 Shashank 2019 01:53)      PAST MEDICAL & SURGICAL HISTORY  Hypothyroidism  CAD S/P percutaneous coronary angioplasty  No significant past surgical history      ALLERGIES:  No Known AllergieY      MEDICATIONS:  MEDICATIONS  (STANDING):  albumin human 25% IVPB 50 milliLiter(s) IV Intermittent once  calcium carbonate    500 mG (Tums) Chewable 1 Tablet(s) Chew every 12 hours  chlorhexidine 4% Liquid 1 Application(s) Topical <User Schedule>  docusate sodium 100 milliGRAM(s) Oral three times a day  furosemide   Injectable 80 milliGRAM(s) IV Push once  furosemide   Injectable 80 milliGRAM(s) IV Push once  heparin  Injectable 5000 Unit(s) SubCutaneous every 8 hours  levothyroxine 50 MICROGram(s) Oral daily  metoprolol tartrate 25 milliGRAM(s) Oral two times a day  midodrine 10 milliGRAM(s) Oral every 8 hours  nystatin Powder 1 Application(s) Topical three times a day  pantoprazole   Suspension 40 milliGRAM(s) Oral before breakfast  PPD  5 Tuberculin Unit(s) Injectable 5 Unit(s) IntraDermal once  senna 2 Tablet(s) Oral at bedtime    MEDICATIONS  (PRN):  aluminum hydroxide/magnesium hydroxide/simethicone Suspension 30 milliLiter(s) Oral every 4 hours PRN Dyspepsia  bisacodyl Suppository 10 milliGRAM(s) Rectal daily PRN Constipation    REVIEW OF SYSTEMS:    CONSTITUTIONAL: +generalized  weakness, no  fevers or chills  EYES/ENT: No visual changes;  No vertigo or throat pain   NECK: No pain or stiffness  RESPIRATORY: No cough, wheezing, hemoptysis; No shortness of breath  CARDIOVASCULAR: No chest pain or palpitations  GASTROINTESTINAL: No abdominal or epigastric pain. No nausea, vomiting, or hematemesis; + constipation. No melena or hematochezia.  GENITOURINARY: No dysuria, frequency or hematuria  SKIN: No itching, burning, rashes, or lesions   All other review of systems is negative unless indicated above.          VITALS:   T(F): 97.6 (07-02 @ 11:45), Max: 98.1 (06-30 @ 12:30)  HR: 98 (07-02 @ 11:45) (67 - 98)  BP: 94/55 (07-02 @ 11:45) (90/52 - 154/67)  BP(mean): --  RR: 18 (07-02 @ 11:45) (12 - 18)  SpO2: 97% (07-02 @ 08:00) (95% - 97%)        PHYSICAL EXAM:  EYES: No scleral icterus   LUNG: Clear to auscultation bilaterally; No rales, rhonchi, wheezing, or rubs  HEART: RRR; No murmurs  ABDOMEN: Soft, +BS, Abdominal Tenderness, No guarding, No Garcia Sign        Blood Work :                        9.3    12.58 )-----------( 179      ( 02 Jul 2019 05:22 )             27.2     PT/INR - ( 02 Jul 2019 05:22 )  INR: 1.34          PTT - ( 02 Jul 2019 05:22 )  PTT:35.3   07-02    138  |  96<L>  |  139<HH>  ----------------------------<  149<H>  3.8   |  22  |  2.0<H>    Ca    7.8<L>      02 Jul 2019 05:22  Phos  5.6     07-02  Mg     2.2     07-02      CBC -  ( 02 Jul 2019 05:22 )  Hemoglobin : 9.3    CBC -  ( 01 Jul 2019 11:48 )  Hemoglobin : 9.2    CBC -  ( 30 Jun 2019 11:48 )  Hemoglobin : 8.9      LIVER FUNCTIONS - ( 02 Jul 2019 05:22 )  Alb: 2.7 [3.5 - 5.2] / Pro: 5.6 [6.0 - 8.0] / ALK PHOS: 553 [30 - 115] / ALT: 112 [0 - 41] / AST: 121 [0 - 41] / GGT: x     LIVER FUNCTIONS - ( 01 Jul 2019 11:48 )  Alb: 3.2 [3.5 - 5.2] / Pro: 5.6 [6.0 - 8.0] / ALK PHOS: 555 [30 - 115] / ALT: 118 [0 - 41] / AST: 138 [0 - 41] / GGT: x     LIVER FUNCTIONS - ( 30 Jun 2019 11:48 )  Alb: 2.5 [3.5 - 5.2] / Pro: 5.1 [6.0 - 8.0] / ALK PHOS: 594 [30 - 115] / ALT: 143 [0 - 41] / AST: 155 [0 - 41] / GGT: x     LIVER FUNCTIONS - ( 29 Jun 2019 14:50 )  Alb: 2.5 [3.5 - 5.2] / Pro: 5.2 [6.0 - 8.0] / ALK PHOS: 648 [30 - 115] / ALT: 165 [0 - 41] / AST: 178 [0 - 41] / GGT: x     LIVER FUNCTIONS - ( 28 Jun 2019 05:22 )  Alb: 1.6 [3.5 - 5.2] / Pro: 5.3 [6.0 - 8.0] / ALK PHOS: 1105 [30 - 115] / ALT: 337 [0 - 41] / AST: 372 [0 - 41] / GGT: x     LIVER FUNCTIONS - ( 27 Jun 2019 07:43 )  Alb: 1.6 [3.5 - 5.2] / Pro: 5.3 [6.0 - 8.0] / ALK PHOS: 1189 [30 - 115] / ALT: 379 [0 - 41] / AST: 432 [0 - 41] / GGT: x         RADIOLOGY:  < from: MR Abdomen No Cont (06.13.19 @ 16:15) >  1. Hydropic gallbladder, up to 4.7 cm in transverse diameter with   gallstones, including probably impacted 1.6 cm stone at neck.  2. Liver cirrhosis with mild abdominal ascites.  3. No biliary distention, filling defect, or stricture.      Surgical Pathology Report:   ACCESSION No:  [id number]    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                    3        Addendum Report          Addendum  Report [id number] received from [place], [address]  [facility] [address] [facility] [address], [phone] by Franky Florez M.D. on 7/1/2019 with the following diagnosis:    Liver, needle biopsy specimen shows focal perivenular  necroinflammation with mild lymphocytic  infiltration and ceroid  containing macrophages, and minimal steatosis. Glycogenated  nuclei and mild cholestatis are seen. Areas of multiacinar  collapse and bridging necroses containing ductular reaction  accompanied by neutrophils, and mild lymphocytic infiltration are  present.    Trichrome stain highlights the areas of collapse, replaced by  young collagen. PAS-D reaction highlights abundant ceroid  containing macrophages.    - Findings are consistent with subacute hepatitis with  multiacinar / bridging necrosis. There is no evidence of  autoimmune hepatitis or cirrhosis.    - Recommend clinical correlation to determine that cause.    Complete report is available in the electronic medical record as  a PATHOLOGY CONSULT REPORT    Verified by: Manoj Lindsey M.D.  (Electronic Signature)  Reported on: 07/02/19 16:12 EDT, [address] [place] [address]  _________________________________________________________________          Addendum  As per the physicians request, the case was sent to [facility] for expert opinion to rule out possible  autoimmune hepatitis. Addendum to follow.    Case discussed with Dr. ELISE Jaramillo on 6/27/2019.    Verified by: Manoj Lindsey M.D.  (Electronic Signature)  Reported on: 07/01/19 11:21 EDT, [address] [place] [address]  _________________________________________________________________              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                    3        Surgical Final Report          Final Diagnosis  Right hepatic lobe, needle biopsy:  - Liver parenchyma showing mild focal lobulitis and hepatocyte  reactive changes with few glycogenated nuclei.  - Focal area of vaguely formed Francisca-Denk body also present.  - Two minute focus of macro and micro-vesicular steatosis also  present, counting less than 5% of tissue volume of the biopsy.  - Portal areas showing marked chronic inflammation with focal  interface activity, infiltrated with mainly small lymphocytes and  rare neutrophils, and fibrous expansion of portal area with  fibrous bridge formation, probable cirrhosis.  - Stainable iron present, not increased.  - Trichrome stain reviewed.  - PAS-D stain failed to reveal alpha-1 antitrypsin globule.    Verified by: Manoj Lindsey M.D.  (Electronic Signature)  Reported on: 06/26/19 15:42 EDT, [address] [place] [id number]  _________________________________________________________________    Comment  _Sections show findings are consistent with clinical impression  of  cirrhosis.  However, the etiology is not clear.  Clinical  correlation is recommended.    Clinical History  "illegible" liver biopsy  Cirrhosis with increased LFT's. No known liver disease. Right  hepatic lobe x 2    Specimen(s) Submitted  Right hepatic lobe x2    Gross Description  The specimen is received in formalin, labeled "right hepatic  lobe" and consists of two cylindrical fragments of tan soft  tissue measuring 1.4 and 1.5 cm in length x less than 0.1 cm in  greatest diameter.  The specimen is submitted entirely.  (2  blocks)    Specimen was received and underwent gross examination at [facility], [address] [place] [id number].    06/25/19 08:55 HOME Ngo                    3        Surgical Final Report          Perioperative Diagnosis  Evaluation for liver disease    Postoperative Diagnosis  "illegible' (06.24.19 @ 16:10) GI HPI Today:  Patient is a 68y old  Female who presents with a chief complaint of gallbladder hydrops (02 Jul 2019 12:21)  . Patient seen for transaminitis , patient had kidney biopsy 7/1/2019  result pending.   patient denies any GI symptoms today. Patient started on solumedrol trial 7/1( today is day 2)       Hospital course:  67 yo F w/ hx of CAD s/p PCI and hypothyroidism sent by gastroenterologist for gallbladder hydrops. Pt found to have elevated liver enzymes by PMD in Apr 2019, referred to GI for w/u. MRCP 2 days ago significant for gallbladder hydrops 4.7cm with probable impacted stone at neck, also liver cirrhosis w/ mild ascites. Admits to social etoh, denies binge drinking, denies abdominal pain, N/V. Admits to decreased appetite in past 2 weeks, pt tried to maintain low salt diet. Reports BLE edema developed in past 2 days as well as weakness. Denies fever, chills, abdominal pain, SOB, dysuria. Patient has social alcohol intake.     In ED, evaluated by surgery -> no interventions at this time (15 Shashank 2019 01:53)      PAST MEDICAL & SURGICAL HISTORY  Hypothyroidism  CAD S/P percutaneous coronary angioplasty  No significant past surgical history      ALLERGIES:  No Known AllergieY      MEDICATIONS:  MEDICATIONS  (STANDING):  albumin human 25% IVPB 50 milliLiter(s) IV Intermittent once  calcium carbonate    500 mG (Tums) Chewable 1 Tablet(s) Chew every 12 hours  chlorhexidine 4% Liquid 1 Application(s) Topical <User Schedule>  docusate sodium 100 milliGRAM(s) Oral three times a day  furosemide   Injectable 80 milliGRAM(s) IV Push once  furosemide   Injectable 80 milliGRAM(s) IV Push once  heparin  Injectable 5000 Unit(s) SubCutaneous every 8 hours  levothyroxine 50 MICROGram(s) Oral daily  metoprolol tartrate 25 milliGRAM(s) Oral two times a day  midodrine 10 milliGRAM(s) Oral every 8 hours  nystatin Powder 1 Application(s) Topical three times a day  pantoprazole   Suspension 40 milliGRAM(s) Oral before breakfast  PPD  5 Tuberculin Unit(s) Injectable 5 Unit(s) IntraDermal once  senna 2 Tablet(s) Oral at bedtime    MEDICATIONS  (PRN):  aluminum hydroxide/magnesium hydroxide/simethicone Suspension 30 milliLiter(s) Oral every 4 hours PRN Dyspepsia  bisacodyl Suppository 10 milliGRAM(s) Rectal daily PRN Constipation    REVIEW OF SYSTEMS:    CONSTITUTIONAL: +generalized  weakness, no  fevers or chills  EYES/ENT: No visual changes;  No vertigo or throat pain   NECK: No pain or stiffness  RESPIRATORY:  No shortness of breath  CARDIOVASCULAR: No chest pain or palpitations  GASTROINTESTINAL: No abdominal or epigastric pain. No nausea, vomiting, or hematemesis; + constipation. No melena or hematochezia.  GENITOURINARY: No dysuria, frequency or hematuria  All other review of systems is negative unless indicated above.          VITALS:   T(F): 97.6 (07-02 @ 11:45), Max: 98.1 (06-30 @ 12:30)  HR: 98 (07-02 @ 11:45) (67 - 98)  BP: 94/55 (07-02 @ 11:45) (90/52 - 154/67)  BP(mean): --  RR: 18 (07-02 @ 11:45) (12 - 18)  SpO2: 97% (07-02 @ 08:00) (95% - 97%)        PHYSICAL EXAM:  EYES: No scleral icterus   LUNG: Clear to auscultation bilaterally; No rales, rhonchi, wheezing, or rubs  HEART: RRR; No murmurs  ABDOMEN: Soft, +BS,  No Abdominal Tenderness, No guarding, No Garcia Sign        Blood Work :                        9.3    12.58 )-----------( 179      ( 02 Jul 2019 05:22 )             27.2     PT/INR - ( 02 Jul 2019 05:22 )  INR: 1.34          PTT - ( 02 Jul 2019 05:22 )  PTT:35.3   07-02    138  |  96<L>  |  139<HH>  ----------------------------<  149<H>  3.8   |  22  |  2.0<H>    Ca    7.8<L>      02 Jul 2019 05:22  Phos  5.6     07-02  Mg     2.2     07-02      CBC -  ( 02 Jul 2019 05:22 )  Hemoglobin : 9.3    CBC -  ( 01 Jul 2019 11:48 )  Hemoglobin : 9.2    CBC -  ( 30 Jun 2019 11:48 )  Hemoglobin : 8.9      LIVER FUNCTIONS - ( 02 Jul 2019 05:22 )  Alb: 2.7 [3.5 - 5.2] / Pro: 5.6 [6.0 - 8.0] / ALK PHOS: 553 [30 - 115] / ALT: 112 [0 - 41] / AST: 121 [0 - 41] / GGT: x     LIVER FUNCTIONS - ( 01 Jul 2019 11:48 )  Alb: 3.2 [3.5 - 5.2] / Pro: 5.6 [6.0 - 8.0] / ALK PHOS: 555 [30 - 115] / ALT: 118 [0 - 41] / AST: 138 [0 - 41] / GGT: x     LIVER FUNCTIONS - ( 30 Jun 2019 11:48 )  Alb: 2.5 [3.5 - 5.2] / Pro: 5.1 [6.0 - 8.0] / ALK PHOS: 594 [30 - 115] / ALT: 143 [0 - 41] / AST: 155 [0 - 41] / GGT: x     LIVER FUNCTIONS - ( 29 Jun 2019 14:50 )  Alb: 2.5 [3.5 - 5.2] / Pro: 5.2 [6.0 - 8.0] / ALK PHOS: 648 [30 - 115] / ALT: 165 [0 - 41] / AST: 178 [0 - 41] / GGT: x     LIVER FUNCTIONS - ( 28 Jun 2019 05:22 )  Alb: 1.6 [3.5 - 5.2] / Pro: 5.3 [6.0 - 8.0] / ALK PHOS: 1105 [30 - 115] / ALT: 337 [0 - 41] / AST: 372 [0 - 41] / GGT: x     LIVER FUNCTIONS - ( 27 Jun 2019 07:43 )  Alb: 1.6 [3.5 - 5.2] / Pro: 5.3 [6.0 - 8.0] / ALK PHOS: 1189 [30 - 115] / ALT: 379 [0 - 41] / AST: 432 [0 - 41] / GGT: x         RADIOLOGY:  < from: MR Abdomen No Cont (06.13.19 @ 16:15) >  1. Hydropic gallbladder, up to 4.7 cm in transverse diameter with   gallstones, including probably impacted 1.6 cm stone at neck.  2. Liver cirrhosis with mild abdominal ascites.  3. No biliary distention, filling defect, or stricture.      Surgical Pathology Report:   ACCESSION No:  [id number]    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                    3        Addendum Report          Addendum  Report [id number] received from [place], [address], [facility] [address] [facility] [address], [phone] by Franky Florez M.D. on 7/1/2019 with the following diagnosis:    Liver, needle biopsy specimen shows focal perivenular  necroinflammation with mild lymphocytic  infiltration and ceroid  containing macrophages, and minimal steatosis. Glycogenated  nuclei and mild cholestatis are seen. Areas of multiacinar  collapse and bridging necroses containing ductular reaction  accompanied by neutrophils, and mild lymphocytic infiltration are  present.    Trichrome stain highlights the areas of collapse, replaced by  young collagen. PAS-D reaction highlights abundant ceroid  containing macrophages.    - Findings are consistent with subacute hepatitis with  multiacinar / bridging necrosis. There is no evidence of  autoimmune hepatitis or cirrhosis.    - Recommend clinical correlation to determine that cause.    Complete report is available in the electronic medical record as  a PATHOLOGY CONSULT REPORT    Verified by: Manoj Lindsey M.D.  (Electronic Signature)  Reported on: 07/02/19 16:12 EDT, [address] [place] [address]  _________________________________________________________________          Addendum  As per the physicians request, the case was sent to [facility] for expert opinion to rule out possible  autoimmune hepatitis. Addendum to follow.    Case discussed with Dr. ELISE Jaramillo on 6/27/2019.    Verified by: Manoj Lindsey M.D.  (Electronic Signature)  Reported on: 07/01/19 11:21 EDT, [address] [place] [address]  _________________________________________________________________              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                    3        Surgical Final Report          Final Diagnosis  Right hepatic lobe, needle biopsy:  - Liver parenchyma showing mild focal lobulitis and hepatocyte  reactive changes with few glycogenated nuclei.  - Focal area of vaguely formed Francisca-Denk body also present.  - Two minute focus of macro and micro-vesicular steatosis also  present, counting less than 5% of tissue volume of the biopsy.  - Portal areas showing marked chronic inflammation with focal  interface activity, infiltrated with mainly small lymphocytes and  rare neutrophils, and fibrous expansion of portal area with  fibrous bridge formation, probable cirrhosis.  - Stainable iron present, not increased.  - Trichrome stain reviewed.  - PAS-D stain failed to reveal alpha-1 antitrypsin globule.    Verified by: Manoj Lindsey M.D.  (Electronic Signature)  Reported on: 06/26/19 15:42 EDT, [address] [place] [id number]  _________________________________________________________________    Comment  _Sections show findings are consistent with clinical impression  of  cirrhosis.  However, the etiology is not clear.  Clinical  correlation is recommended.    Clinical History  "illegible" liver biopsy  Cirrhosis with increased LFT's. No known liver disease. Right  hepatic lobe x 2    Specimen(s) Submitted  Right hepatic lobe x2    Gross Description  The specimen is received in formalin, labeled "right hepatic  lobe" and consists of two cylindrical fragments of tan soft  tissue measuring 1.4 and 1.5 cm in length x less than 0.1 cm in  greatest diameter.  The specimen is submitted entirely.  (2  blocks)    Specimen was received and underwent gross examination at [facility], [address] [place] [id number].    06/25/19 08:55 HOME Ngo        Surgical Final Report          Perioperative Diagnosis  Evaluation for liver disease    Postoperative Diagnosis  "illegible' (06.24.19 @ 16:10)

## 2019-07-03 NOTE — PROGRESS NOTE ADULT - SUBJECTIVE AND OBJECTIVE BOX
Patient is a 68y old  Female who presents with a chief complaint of gallbladder hydrops (18 Jun 2019 13:54)    HPI:  69 yo F w/ hx of CAD s/p PCI and hypothyroidism sent by gastroenterologist for gallbladder hydrops. Pt found to have elevated liver enzymes by PMD in Apr 2019, referred to GI for w/u. MRCP 2 days ago significant for gallbladder hydrops 4.7cm with probable impacted stone at neck, also liver cirrhosis w/ mild ascites. Admits to social etoh, denies binge drinking, denies abdominal pain, N/V. Admits to decreased appetite in past 2 weeks, pt tried to maintain low salt diet. Reports BLE edema developed in past 2 days as well as weakness. Denies fever, chills, abdominal pain, SOB, dysuria.    In ED, evaluated by surgery -> no interventions at this time (15 Shashank 2019 01:53)    PAST MEDICAL & SURGICAL HISTORY:  Hypothyroidism  CAD S/P percutaneous coronary angioplasty  No significant past surgical history    patient seen and examined independently on morning rounds, chart reviewed and discussed with the medicine resident.    feeling better today---edema improving- working with PT- tolerating oral intake and ppn stopped yesterday             PE:  GEN-NAD, AAOx3,  PULM- Clear to auscultation bilaterally, fair air entry  CVS- +s1/s2 RRR no murmurs  GI- soft NT obese ND +bs, no rebound, no guarding, +echhymosis  EXT- 3+ LE edema        Labs:                        9.9    11.16 )-----------( 204      ( 03 Jul 2019 06:20 )             28.7     CBC Full  -  ( 03 Jul 2019 06:20 )  WBC Count : 11.16 K/uL  RBC Count : 3.15 M/uL  Hemoglobin : 9.9 g/dL  Hematocrit : 28.7 %  Platelet Count - Automated : 204 K/uL  Mean Cell Volume : 91.1 fL  Mean Cell Hemoglobin : 31.4 pg  Mean Cell Hemoglobin Concentration : 34.5 g/dL  Auto Neutrophil # : 9.42 K/uL  Auto Lymphocyte # : 0.42 K/uL  Auto Monocyte # : 1.23 K/uL  Auto Eosinophil # : 0.00 K/uL  Auto Basophil # : 0.01 K/uL  Auto Neutrophil % : 84.4 %  Auto Lymphocyte % : 3.8 %  Auto Monocyte % : 11.0 %  Auto Eosinophil % : 0.0 %  Auto Basophil % : 0.1 %      07-03    142  |  98  |  129<HH>  ----------------------------<  129<H>  3.1<L>   |  26  |  1.5    Ca    8.4<L>      03 Jul 2019 06:20  Phos  5.3     07-03  Mg     2.1     07-03    TPro  6.1  /  Alb  3.2<L>  /  TBili  2.9<H>  /  DBili  1.3<H>  /  AST  90<H>  /  ALT  92<H>  /  AlkPhos  513<H>  07-03      Microbiology:      Vital Signs Last 24 Hrs  T(C): 35.3 (03 Jul 2019 13:22), Max: 36.6 (02 Jul 2019 21:43)  T(F): 95.6 (03 Jul 2019 13:22), Max: 97.8 (02 Jul 2019 21:43)  HR: 64 (03 Jul 2019 13:22) (64 - 79)  BP: 115/56 (03 Jul 2019 13:22) (107/52 - 127/60)  BP(mean): --  RR: 18 (03 Jul 2019 13:22) (18 - 20)  SpO2: --    I&O's Summary    02 Jul 2019 07:01  -  03 Jul 2019 07:00  --------------------------------------------------------  IN: 900 mL / OUT: 4730 mL / NET: -3830 mL

## 2019-07-03 NOTE — PROGRESS NOTE ADULT - ASSESSMENT
68 Y F Hx of CAD on DAPT, for a stent >1 year ago, w abnormal LFTs  MRI suggestive of cirrhosis (nodular contour) with no evident signs of portal HTN. Hydropic gallbladder up to 4.7 cm  in transverse diameter with   gallstones, including probably impacted 1.6 cm stone at neck, no biliary distention, filling defect or stricture.   The etiology of the derangement in the liver enzymes is unclear.    Currently;   the soluble IgG is mildly elevated the ASMA and Anti LKM  are negative, viral hepatitis workup is negative, normal ceruloplasmin alpha one antitrypsin normal , alpha fetoprotein normal.    The AMA is negative,  ANKITA is positive. P- anca positive, anti ds positive , Liver biopsy showed cirrhosis of unclear etiology  , second opinion pathology read by [facility] in favor of drug induced liver injury.     1)transaminitis:  subacute hepatitis with multiacinar / bridging necrosis. There is no evidence of autoimmune hepatitis or cirrhosis. More in favor of  drug induced according to [facility] [place] read.    Lfts are trending down.  trend lfts , avoid hepatotoxic drugs  no hepatic encephalopathy  no asterixes   no coagulopathy       2)PROFOUND Hypoalbuminemia  Kidney disease? CKD?  pending biopsy results     3)GERD: on Protonix    4) Symptoms of raynaud / muscle weakness/ multiple positive autoimmune markers f/u with rheumatology 68 Y F Hx of CAD on DAPT, for a stent >1 year ago, w abnormal LFTs  MRI suggestive of cirrhosis (nodular contour) with no evident signs of portal HTN. Hydropic gallbladder up to 4.7 cm  in transverse diameter with   gallstones, including probably impacted 1.6 cm stone at neck, no biliary distention, filling defect or stricture.   The etiology of the derangement in the liver enzymes is unclear.    Currently;   the soluble IgG is mildly elevated the ASMA and Anti LKM  are negative, viral hepatitis workup is negative, normal ceruloplasmin alpha one antitrypsin normal , alpha fetoprotein normal.    The AMA is negative,  ANKITA is positive. P- anca positive, anti ds positive , Liver biopsy showed cirrhosis of unclear etiology  , second opinion pathology read by [facility] in favor of drug induced liver injury.     1)transaminitis:  subacute hepatitis with multiacinar / bridging necrosis. There is no evidence of autoimmune hepatitis or cirrhosis. More in favor of  drug induced according to [place]. Home read.    Lfts are trending down.  trend lfts , avoid hepatotoxic drugs  no hepatic encephalopathy  no asterixes   no coagulopathy       2)Abnormal liver imaging- cirrhosis  -HCC: none on imaging , repeat US abdomen and AFP q 6 month  -EV: needs screening EGD as outpatient  -Ascites: mild on MR abdomen  - no portal hypertension on MR abdomen    3)PROFOUND Hypoalbuminemia  Kidney disease? CKD?  pending biopsy results     4)GERD: on Protonix    5) Symptoms of raynaud / muscle weakness/ multiple positive autoimmune markers f/u with rheumatology

## 2019-07-03 NOTE — PROGRESS NOTE ADULT - SUBJECTIVE AND OBJECTIVE BOX
GI HPI Today:  Patient is a 68y old  Female who presents with a chief complaint of gallbladder hydrops (03 Jul 2019 08:52)  . Patient denies any abdominal pain, no nausea or vomiting, has constipation.       Hospital course:  67 yo F w/ hx of CAD s/p PCI and hypothyroidism sent by gastroenterologist for gallbladder hydrops. Pt found to have elevated liver enzymes by PMD in Apr 2019, referred to GI for w/u. MRCP 2 days ago significant for gallbladder hydrops 4.7cm with probable impacted stone at neck, also liver cirrhosis w/ mild ascites. Admits to social etoh, denies binge drinking, denies abdominal pain, N/V. Admits to decreased appetite in past 2 weeks, pt tried to maintain low salt diet. Reports BLE edema developed in past 2 days as well as weakness. Denies fever, chills, abdominal pain, SOB, dysuria.    In ED, evaluated by surgery -> no interventions at this time (15 Shashank 2019 01:53)      PAST MEDICAL & SURGICAL HISTORY  Hypothyroidism  CAD S/P percutaneous coronary angioplasty  No significant past surgical history      ALLERGIES:  No Known Allergies      MEDICATIONS:  MEDICATIONS  (STANDING):  calcium carbonate    500 mG (Tums) Chewable 1 Tablet(s) Chew every 12 hours  chlorhexidine 4% Liquid 1 Application(s) Topical <User Schedule>  docusate sodium 100 milliGRAM(s) Oral three times a day  heparin  Injectable 5000 Unit(s) SubCutaneous every 8 hours  levothyroxine 50 MICROGram(s) Oral daily  metoprolol tartrate 25 milliGRAM(s) Oral two times a day  midodrine 10 milliGRAM(s) Oral every 8 hours  nystatin Powder 1 Application(s) Topical three times a day  pantoprazole   Suspension 40 milliGRAM(s) Oral before breakfast  PPD  5 Tuberculin Unit(s) Injectable 5 Unit(s) IntraDermal once  senna 2 Tablet(s) Oral at bedtime    MEDICATIONS  (PRN):  aluminum hydroxide/magnesium hydroxide/simethicone Suspension 30 milliLiter(s) Oral every 4 hours PRN Dyspepsia  bisacodyl Suppository 10 milliGRAM(s) Rectal daily PRN Constipation      REVIEW OF SYSTEMS  General:  No fevers  Eyes:  No reported pain   ENT:  No sore throat   NECK: No stiffness   CV:  No chest pain   Resp:  No shortness of breath  GI:  See HPI  :  No dysuria  Muscle:  lower extremity weakness   Endocrine:  No polyuria  Heme:  No ecchymosis        VITALS:   T(F): 97.1 (07-03 @ 06:00), Max: 98.1 (06-30 @ 12:30)  HR: 78 (07-03 @ 06:00) (67 - 98)  BP: 112/54 (07-03 @ 06:00) (90/52 - 154/67)  BP(mean): --  RR: 18 (07-03 @ 06:00) (12 - 20)  SpO2: 97% (07-02 @ 08:00) (95% - 97%)        PHYSICAL EXAM:  EYES: No scleral icterus   LUNG: Clear to auscultation bilaterally; No rales, rhonchi, wheezing, or rubs  HEART: RRR; No murmurs  ABDOMEN: Soft, +BS, Abdominal Tenderness, No guarding, No Garcia Sign        Blood Work :                        9.9    11.16 )-----------( 204      ( 03 Jul 2019 06:20 )             28.7     PT/INR - ( 02 Jul 2019 05:22 )  INR: 1.34          PTT - ( 02 Jul 2019 05:22 )  PTT:35.3   07-03    142  |  98  |  129<HH>  ----------------------------<  129<H>  3.1<L>   |  26  |  1.5    Ca    8.4<L>      03 Jul 2019 06:20  Phos  5.3     07-03  Mg     2.1     07-03      CBC -  ( 03 Jul 2019 06:20 )  Hemoglobin : 9.9    CBC -  ( 02 Jul 2019 05:22 )  Hemoglobin : 9.3    CBC -  ( 01 Jul 2019 11:48 )  Hemoglobin : 9.2    CBC -  ( 30 Jun 2019 11:48 )  Hemoglobin : 8.9      LIVER FUNCTIONS - ( 03 Jul 2019 06:20 )  Alb: 3.2 [3.5 - 5.2] / Pro: 6.1 [6.0 - 8.0] / ALK PHOS: 513 [30 - 115] / ALT: 92 [0 - 41] / AST: 90 [0 - 41] / GGT: x     LIVER FUNCTIONS - ( 02 Jul 2019 05:22 )  Alb: 2.7 [3.5 - 5.2] / Pro: 5.6 [6.0 - 8.0] / ALK PHOS: 553 [30 - 115] / ALT: 112 [0 - 41] / AST: 121 [0 - 41] / GGT: x     LIVER FUNCTIONS - ( 01 Jul 2019 11:48 )  Alb: 3.2 [3.5 - 5.2] / Pro: 5.6 [6.0 - 8.0] / ALK PHOS: 555 [30 - 115] / ALT: 118 [0 - 41] / AST: 138 [0 - 41] / GGT: x     LIVER FUNCTIONS - ( 30 Jun 2019 11:48 )  Alb: 2.5 [3.5 - 5.2] / Pro: 5.1 [6.0 - 8.0] / ALK PHOS: 594 [30 - 115] / ALT: 143 [0 - 41] / AST: 155 [0 - 41] / GGT: x     LIVER FUNCTIONS - ( 29 Jun 2019 14:50 )  Alb: 2.5 [3.5 - 5.2] / Pro: 5.2 [6.0 - 8.0] / ALK PHOS: 648 [30 - 115] / ALT: 165 [0 - 41] / AST: 178 [0 - 41] / GGT: x     LIVER FUNCTIONS - ( 28 Jun 2019 05:22 )  Alb: 1.6 [3.5 - 5.2] / Pro: 5.3 [6.0 - 8.0] / ALK PHOS: 1105 [30 - 115] / ALT: 337 [0 - 41] / AST: 372 [0 - 41] / GGT: x     LIVER FUNCTIONS - ( 27 Jun 2019 07:43 )  Alb: 1.6 [3.5 - 5.2] / Pro: 5.3 [6.0 - 8.0] / ALK PHOS: 1189 [30 - 115] / ALT: 379 [0 - 41] / AST: 432 [0 - 41] / GGT: x       Surgical Pathology Report:     Addendum  Report [id number] received from [place], [facility] [place]  [facility] [address] [facility] [address], [phone] by Franky Florez M.D. on 7/1/2019 with the following diagnosis:    Liver, needle biopsy specimen shows focal perivenular  necroinflammation with mild lymphocytic  infiltration and ceroid  containing macrophages, and minimal steatosis. Glycogenated  nuclei and mild cholestatis are seen. Areas of multiacinar  collapse and bridging necroses containing ductular reaction  accompanied by neutrophils, and mild lymphocytic infiltration are  present.    Trichrome stain highlights the areas of collapse, replaced by  young collagen. PAS-D reaction highlights abundant ceroid  containing macrophages.    - Findings are consistent with subacute hepatitis with  multiacinar / bridging necrosis. There is no evidence of  autoimmune hepatitis or cirrhosis.    - Recommend clinical correlation to determine that cause.    Complete report is available in the electronic medical record as  a PATHOLOGY CONSULT REPORT    Verified by: Manoj Lindsey M.D.  (Electronic Signature)  Reported on: 07/02/19 16:12 EDT, [address] [place] [id number]  _________________________________________________________________ GI HPI Today:  Patient is a 68y old  Female who presents with a chief complaint of gallbladder hydrops (03 Jul 2019 08:52)  . Patient denies any abdominal pain, no nausea or vomiting, has constipation.       Hospital course:  67 yo F w/ hx of CAD s/p PCI and hypothyroidism sent by gastroenterologist for gallbladder hydrops. Pt found to have elevated liver enzymes by PMD in Apr 2019, referred to GI for w/u. MRCP 2 days ago significant for gallbladder hydrops 4.7cm with probable impacted stone at neck, also liver cirrhosis w/ mild ascites. Admits to social etoh, denies binge drinking, denies abdominal pain, N/V. Admits to decreased appetite in past 2 weeks, pt tried to maintain low salt diet. Reports BLE edema developed in past 2 days as well as weakness. Denies fever, chills, abdominal pain, SOB, dysuria.    In ED, evaluated by surgery -> no interventions at this time (15 Shashank 2019 01:53)      PAST MEDICAL & SURGICAL HISTORY  Hypothyroidism  CAD S/P percutaneous coronary angioplasty  No significant past surgical history      ALLERGIES:  No Known Allergies      MEDICATIONS:  MEDICATIONS  (STANDING):  calcium carbonate    500 mG (Tums) Chewable 1 Tablet(s) Chew every 12 hours  chlorhexidine 4% Liquid 1 Application(s) Topical <User Schedule>  docusate sodium 100 milliGRAM(s) Oral three times a day  heparin  Injectable 5000 Unit(s) SubCutaneous every 8 hours  levothyroxine 50 MICROGram(s) Oral daily  metoprolol tartrate 25 milliGRAM(s) Oral two times a day  midodrine 10 milliGRAM(s) Oral every 8 hours  nystatin Powder 1 Application(s) Topical three times a day  pantoprazole   Suspension 40 milliGRAM(s) Oral before breakfast  PPD  5 Tuberculin Unit(s) Injectable 5 Unit(s) IntraDermal once  senna 2 Tablet(s) Oral at bedtime    MEDICATIONS  (PRN):  aluminum hydroxide/magnesium hydroxide/simethicone Suspension 30 milliLiter(s) Oral every 4 hours PRN Dyspepsia  bisacodyl Suppository 10 milliGRAM(s) Rectal daily PRN Constipation      REVIEW OF SYSTEMS  General:  No fevers  Eyes:  No reported pain   ENT:  No sore throat   NECK: No stiffness   CV:  No chest pain   Resp:  No shortness of breath  GI:  See HPI  :  No dysuria  Muscle:  lower extremity weakness   Endocrine:  No polyuria  Heme:  No ecchymosis        VITALS:   T(F): 97.1 (07-03 @ 06:00), Max: 98.1 (06-30 @ 12:30)  HR: 78 (07-03 @ 06:00) (67 - 98)  BP: 112/54 (07-03 @ 06:00) (90/52 - 154/67)  BP(mean): --  RR: 18 (07-03 @ 06:00) (12 - 20)  SpO2: 97% (07-02 @ 08:00) (95% - 97%)        PHYSICAL EXAM:  EYES: No scleral icterus   LUNG: Clear to auscultation bilaterally; No rales, rhonchi, wheezing, or rubs  HEART: RRR; No murmurs  ABDOMEN: Soft, +BS, No Abdominal Tenderness, No guarding, No Garcia Sign        Blood Work :                        9.9    11.16 )-----------( 204      ( 03 Jul 2019 06:20 )             28.7     PT/INR - ( 02 Jul 2019 05:22 )  INR: 1.34          PTT - ( 02 Jul 2019 05:22 )  PTT:35.3   07-03    142  |  98  |  129<HH>  ----------------------------<  129<H>  3.1<L>   |  26  |  1.5    Ca    8.4<L>      03 Jul 2019 06:20  Phos  5.3     07-03  Mg     2.1     07-03      CBC -  ( 03 Jul 2019 06:20 )  Hemoglobin : 9.9    CBC -  ( 02 Jul 2019 05:22 )  Hemoglobin : 9.3    CBC -  ( 01 Jul 2019 11:48 )  Hemoglobin : 9.2    CBC -  ( 30 Jun 2019 11:48 )  Hemoglobin : 8.9      LIVER FUNCTIONS - ( 03 Jul 2019 06:20 )  Alb: 3.2 [3.5 - 5.2] / Pro: 6.1 [6.0 - 8.0] / ALK PHOS: 513 [30 - 115] / ALT: 92 [0 - 41] / AST: 90 [0 - 41] / GGT: x     LIVER FUNCTIONS - ( 02 Jul 2019 05:22 )  Alb: 2.7 [3.5 - 5.2] / Pro: 5.6 [6.0 - 8.0] / ALK PHOS: 553 [30 - 115] / ALT: 112 [0 - 41] / AST: 121 [0 - 41] / GGT: x     LIVER FUNCTIONS - ( 01 Jul 2019 11:48 )  Alb: 3.2 [3.5 - 5.2] / Pro: 5.6 [6.0 - 8.0] / ALK PHOS: 555 [30 - 115] / ALT: 118 [0 - 41] / AST: 138 [0 - 41] / GGT: x     LIVER FUNCTIONS - ( 30 Jun 2019 11:48 )  Alb: 2.5 [3.5 - 5.2] / Pro: 5.1 [6.0 - 8.0] / ALK PHOS: 594 [30 - 115] / ALT: 143 [0 - 41] / AST: 155 [0 - 41] / GGT: x     LIVER FUNCTIONS - ( 29 Jun 2019 14:50 )  Alb: 2.5 [3.5 - 5.2] / Pro: 5.2 [6.0 - 8.0] / ALK PHOS: 648 [30 - 115] / ALT: 165 [0 - 41] / AST: 178 [0 - 41] / GGT: x     LIVER FUNCTIONS - ( 28 Jun 2019 05:22 )  Alb: 1.6 [3.5 - 5.2] / Pro: 5.3 [6.0 - 8.0] / ALK PHOS: 1105 [30 - 115] / ALT: 337 [0 - 41] / AST: 372 [0 - 41] / GGT: x     LIVER FUNCTIONS - ( 27 Jun 2019 07:43 )  Alb: 1.6 [3.5 - 5.2] / Pro: 5.3 [6.0 - 8.0] / ALK PHOS: 1189 [30 - 115] / ALT: 379 [0 - 41] / AST: 432 [0 - 41] / GGT: x       Surgical Pathology Report:     Addendum  Report [id number] received from [place], [facility] [place]  [facility] [address] [facility] [address], [phone] by Franky Florez M.D. on 7/1/2019 with the following diagnosis:    Liver, needle biopsy specimen shows focal perivenular  necroinflammation with mild lymphocytic  infiltration and ceroid  containing macrophages, and minimal steatosis. Glycogenated  nuclei and mild cholestatis are seen. Areas of multiacinar  collapse and bridging necroses containing ductular reaction  accompanied by neutrophils, and mild lymphocytic infiltration are  present.    Trichrome stain highlights the areas of collapse, replaced by  young collagen. PAS-D reaction highlights abundant ceroid  containing macrophages.    - Findings are consistent with subacute hepatitis with  multiacinar / bridging necrosis. There is no evidence of  autoimmune hepatitis or cirrhosis.    - Recommend clinical correlation to determine that cause.    Complete report is available in the electronic medical record as  a PATHOLOGY CONSULT REPORT    Verified by: Manoj Lindsey M.D.  (Electronic Signature)  Reported on: 07/02/19 16:12 EDT, [address] [place] [address]  _________________________________________________________________ GI HPI Today:  Patient is a 68y old  Female who presents with a chief complaint of gallbladder hydrops (03 Jul 2019 08:52)  . Patient denies any abdominal pain, no nausea or vomiting, has constipation.       Hospital course:  67 yo F w/ hx of CAD s/p PCI and hypothyroidism sent by gastroenterologist for gallbladder hydrops. Pt found to have elevated liver enzymes by PMD in Apr 2019, referred to GI for w/u. MRCP 2 days ago significant for gallbladder hydrops 4.7cm with probable impacted stone at neck, also liver cirrhosis w/ mild ascites. Admits to social etoh, denies binge drinking, denies abdominal pain, N/V. Admits to decreased appetite in past 2 weeks, pt tried to maintain low salt diet. Reports BLE edema developed in past 2 days as well as weakness. Denies fever, chills, abdominal pain, SOB, dysuria.    In ED, evaluated by surgery -> no interventions at this time (15 Shashank 2019 01:53)      PAST MEDICAL & SURGICAL HISTORY  Hypothyroidism  CAD S/P percutaneous coronary angioplasty  No significant past surgical history      ALLERGIES:  No Known Allergies      MEDICATIONS:  MEDICATIONS  (STANDING):  calcium carbonate    500 mG (Tums) Chewable 1 Tablet(s) Chew every 12 hours  chlorhexidine 4% Liquid 1 Application(s) Topical <User Schedule>  docusate sodium 100 milliGRAM(s) Oral three times a day  heparin  Injectable 5000 Unit(s) SubCutaneous every 8 hours  levothyroxine 50 MICROGram(s) Oral daily  metoprolol tartrate 25 milliGRAM(s) Oral two times a day  midodrine 10 milliGRAM(s) Oral every 8 hours  nystatin Powder 1 Application(s) Topical three times a day  pantoprazole   Suspension 40 milliGRAM(s) Oral before breakfast  PPD  5 Tuberculin Unit(s) Injectable 5 Unit(s) IntraDermal once  senna 2 Tablet(s) Oral at bedtime    MEDICATIONS  (PRN):  aluminum hydroxide/magnesium hydroxide/simethicone Suspension 30 milliLiter(s) Oral every 4 hours PRN Dyspepsia  bisacodyl Suppository 10 milliGRAM(s) Rectal daily PRN Constipation      REVIEW OF SYSTEMS  General:  No fevers  Eyes:  No reported pain   ENT:  No sore throat   NECK: No stiffness   CV:  No chest pain   Resp:  No shortness of breath  GI:  See HPI  :  No dysuria  Muscle:  lower extremity weakness   Endocrine:  No polyuria  Heme:  No ecchymosis        VITALS:   T(F): 97.1 (07-03 @ 06:00), Max: 98.1 (06-30 @ 12:30)  HR: 78 (07-03 @ 06:00) (67 - 98)  BP: 112/54 (07-03 @ 06:00) (90/52 - 154/67)  BP(mean): --  RR: 18 (07-03 @ 06:00) (12 - 20)  SpO2: 97% (07-02 @ 08:00) (95% - 97%)        PHYSICAL EXAM:  General: looks comfortable  EYES: No scleral icterus   LUNG: Clear to auscultation bilaterally  HEART: RRR; No murmurs  ABDOMEN: Soft, +BS, No Abdominal Tenderness, No guarding, No Garcia Sign  Skin: no rash  Lower extremity: + edema        Blood Work :                        9.9    11.16 )-----------( 204      ( 03 Jul 2019 06:20 )             28.7     PT/INR - ( 02 Jul 2019 05:22 )  INR: 1.34          PTT - ( 02 Jul 2019 05:22 )  PTT:35.3   07-03    142  |  98  |  129<HH>  ----------------------------<  129<H>  3.1<L>   |  26  |  1.5    Ca    8.4<L>      03 Jul 2019 06:20  Phos  5.3     07-03  Mg     2.1     07-03      CBC -  ( 03 Jul 2019 06:20 )  Hemoglobin : 9.9    CBC -  ( 02 Jul 2019 05:22 )  Hemoglobin : 9.3    CBC -  ( 01 Jul 2019 11:48 )  Hemoglobin : 9.2    CBC -  ( 30 Jun 2019 11:48 )  Hemoglobin : 8.9      LIVER FUNCTIONS - ( 03 Jul 2019 06:20 )  Alb: 3.2 [3.5 - 5.2] / Pro: 6.1 [6.0 - 8.0] / ALK PHOS: 513 [30 - 115] / ALT: 92 [0 - 41] / AST: 90 [0 - 41] / GGT: x     LIVER FUNCTIONS - ( 02 Jul 2019 05:22 )  Alb: 2.7 [3.5 - 5.2] / Pro: 5.6 [6.0 - 8.0] / ALK PHOS: 553 [30 - 115] / ALT: 112 [0 - 41] / AST: 121 [0 - 41] / GGT: x     LIVER FUNCTIONS - ( 01 Jul 2019 11:48 )  Alb: 3.2 [3.5 - 5.2] / Pro: 5.6 [6.0 - 8.0] / ALK PHOS: 555 [30 - 115] / ALT: 118 [0 - 41] / AST: 138 [0 - 41] / GGT: x     LIVER FUNCTIONS - ( 30 Jun 2019 11:48 )  Alb: 2.5 [3.5 - 5.2] / Pro: 5.1 [6.0 - 8.0] / ALK PHOS: 594 [30 - 115] / ALT: 143 [0 - 41] / AST: 155 [0 - 41] / GGT: x     LIVER FUNCTIONS - ( 29 Jun 2019 14:50 )  Alb: 2.5 [3.5 - 5.2] / Pro: 5.2 [6.0 - 8.0] / ALK PHOS: 648 [30 - 115] / ALT: 165 [0 - 41] / AST: 178 [0 - 41] / GGT: x     LIVER FUNCTIONS - ( 28 Jun 2019 05:22 )  Alb: 1.6 [3.5 - 5.2] / Pro: 5.3 [6.0 - 8.0] / ALK PHOS: 1105 [30 - 115] / ALT: 337 [0 - 41] / AST: 372 [0 - 41] / GGT: x     LIVER FUNCTIONS - ( 27 Jun 2019 07:43 )  Alb: 1.6 [3.5 - 5.2] / Pro: 5.3 [6.0 - 8.0] / ALK PHOS: 1189 [30 - 115] / ALT: 379 [0 - 41] / AST: 432 [0 - 41] / GGT: x       Surgical Pathology Report:     Addendum  Report [id number] received from [place], [facility] [place]  [facility] [address] [facility] [address], [phone] by Franky Florez M.D. on 7/1/2019 with the following diagnosis:    Liver, needle biopsy specimen shows focal perivenular  necroinflammation with mild lymphocytic  infiltration and ceroid  containing macrophages, and minimal steatosis. Glycogenated  nuclei and mild cholestatis are seen. Areas of multiacinar  collapse and bridging necroses containing ductular reaction  accompanied by neutrophils, and mild lymphocytic infiltration are  present.    Trichrome stain highlights the areas of collapse, replaced by  young collagen. PAS-D reaction highlights abundant ceroid  containing macrophages.    - Findings are consistent with subacute hepatitis with  multiacinar / bridging necrosis. There is no evidence of  autoimmune hepatitis or cirrhosis.    - Recommend clinical correlation to determine that cause.    Complete report is available in the electronic medical record as  a PATHOLOGY CONSULT REPORT    Verified by: Manoj Lindsey M.D.  (Electronic Signature)  Reported on: 07/02/19 16:12 EDT, [address] [place] [id number]  _________________________________________________________________ GI HPI Today:  Patient is a 68y old  Female who presents with a chief complaint of gallbladder hydrops (03 Jul 2019 08:52)  . Patient denies any abdominal pain, no nausea or vomiting, has constipation.       Hospital course:  67 yo F w/ hx of CAD s/p PCI and hypothyroidism sent by gastroenterologist for gallbladder hydrops. Pt found to have elevated liver enzymes by PMD in Apr 2019, referred to GI for w/u. MRCP 2 days ago significant for gallbladder hydrops 4.7cm with probable impacted stone at neck, also liver cirrhosis w/ mild ascites. Admits to social etoh, denies binge drinking, denies abdominal pain, N/V. Admits to decreased appetite in past 2 weeks, pt tried to maintain low salt diet. Reports BLE edema developed in past 2 days as well as weakness. Denies fever, chills, abdominal pain, SOB, dysuria.    In ED, evaluated by surgery -> no interventions at this time (15 Shashank 2019 01:53)      PAST MEDICAL & SURGICAL HISTORY  Hypothyroidism  CAD S/P percutaneous coronary angioplasty  No significant past surgical history      ALLERGIES:  No Known Allergies      MEDICATIONS:  MEDICATIONS  (STANDING):  calcium carbonate    500 mG (Tums) Chewable 1 Tablet(s) Chew every 12 hours  chlorhexidine 4% Liquid 1 Application(s) Topical <User Schedule>  docusate sodium 100 milliGRAM(s) Oral three times a day  heparin  Injectable 5000 Unit(s) SubCutaneous every 8 hours  levothyroxine 50 MICROGram(s) Oral daily  metoprolol tartrate 25 milliGRAM(s) Oral two times a day  midodrine 10 milliGRAM(s) Oral every 8 hours  nystatin Powder 1 Application(s) Topical three times a day  pantoprazole   Suspension 40 milliGRAM(s) Oral before breakfast  PPD  5 Tuberculin Unit(s) Injectable 5 Unit(s) IntraDermal once  senna 2 Tablet(s) Oral at bedtime    MEDICATIONS  (PRN):  aluminum hydroxide/magnesium hydroxide/simethicone Suspension 30 milliLiter(s) Oral every 4 hours PRN Dyspepsia  bisacodyl Suppository 10 milliGRAM(s) Rectal daily PRN Constipation      REVIEW OF SYSTEMS  General:  No fevers  Eyes:  No reported pain   ENT:  No sore throat   NECK: No stiffness   CV:  No chest pain   Resp:  No shortness of breath  GI:  See HPI  :  No dysuria  Muscle:  lower extremity weakness   Endocrine:  No polyuria  Heme:  No ecchymosis        VITALS:   T(F): 97.1 (07-03 @ 06:00), Max: 98.1 (06-30 @ 12:30)  HR: 78 (07-03 @ 06:00) (67 - 98)  BP: 112/54 (07-03 @ 06:00) (90/52 - 154/67)  BP(mean): --  RR: 18 (07-03 @ 06:00) (12 - 20)  SpO2: 97% (07-02 @ 08:00) (95% - 97%)        PHYSICAL EXAM:  General: looks comfortable  Neck no thyromegaly  EYES: No scleral icterus   LUNG: Clear to auscultation bilaterally  HEART: RRR; No murmurs  ABDOMEN: Soft, +BS, No Abdominal Tenderness, No guarding, No Garcia Sign  Skin: no rash  Lower extremity: + edema        Blood Work :                        9.9    11.16 )-----------( 204      ( 03 Jul 2019 06:20 )             28.7     PT/INR - ( 02 Jul 2019 05:22 )  INR: 1.34          PTT - ( 02 Jul 2019 05:22 )  PTT:35.3   07-03    142  |  98  |  129<HH>  ----------------------------<  129<H>  3.1<L>   |  26  |  1.5    Ca    8.4<L>      03 Jul 2019 06:20  Phos  5.3     07-03  Mg     2.1     07-03      CBC -  ( 03 Jul 2019 06:20 )  Hemoglobin : 9.9    CBC -  ( 02 Jul 2019 05:22 )  Hemoglobin : 9.3    CBC -  ( 01 Jul 2019 11:48 )  Hemoglobin : 9.2    CBC -  ( 30 Jun 2019 11:48 )  Hemoglobin : 8.9      LIVER FUNCTIONS - ( 03 Jul 2019 06:20 )  Alb: 3.2 [3.5 - 5.2] / Pro: 6.1 [6.0 - 8.0] / ALK PHOS: 513 [30 - 115] / ALT: 92 [0 - 41] / AST: 90 [0 - 41] / GGT: x     LIVER FUNCTIONS - ( 02 Jul 2019 05:22 )  Alb: 2.7 [3.5 - 5.2] / Pro: 5.6 [6.0 - 8.0] / ALK PHOS: 553 [30 - 115] / ALT: 112 [0 - 41] / AST: 121 [0 - 41] / GGT: x     LIVER FUNCTIONS - ( 01 Jul 2019 11:48 )  Alb: 3.2 [3.5 - 5.2] / Pro: 5.6 [6.0 - 8.0] / ALK PHOS: 555 [30 - 115] / ALT: 118 [0 - 41] / AST: 138 [0 - 41] / GGT: x     LIVER FUNCTIONS - ( 30 Jun 2019 11:48 )  Alb: 2.5 [3.5 - 5.2] / Pro: 5.1 [6.0 - 8.0] / ALK PHOS: 594 [30 - 115] / ALT: 143 [0 - 41] / AST: 155 [0 - 41] / GGT: x     LIVER FUNCTIONS - ( 29 Jun 2019 14:50 )  Alb: 2.5 [3.5 - 5.2] / Pro: 5.2 [6.0 - 8.0] / ALK PHOS: 648 [30 - 115] / ALT: 165 [0 - 41] / AST: 178 [0 - 41] / GGT: x     LIVER FUNCTIONS - ( 28 Jun 2019 05:22 )  Alb: 1.6 [3.5 - 5.2] / Pro: 5.3 [6.0 - 8.0] / ALK PHOS: 1105 [30 - 115] / ALT: 337 [0 - 41] / AST: 372 [0 - 41] / GGT: x     LIVER FUNCTIONS - ( 27 Jun 2019 07:43 )  Alb: 1.6 [3.5 - 5.2] / Pro: 5.3 [6.0 - 8.0] / ALK PHOS: 1189 [30 - 115] / ALT: 379 [0 - 41] / AST: 432 [0 - 41] / GGT: x       Surgical Pathology Report:     Addendum  Report [id number] received from [place], [facility] [place]  [facility] [address] [facility] [address], [phone] by Franky Florez M.D. on 7/1/2019 with the following diagnosis:    Liver, needle biopsy specimen shows focal perivenular  necroinflammation with mild lymphocytic  infiltration and ceroid  containing macrophages, and minimal steatosis. Glycogenated  nuclei and mild cholestatis are seen. Areas of multiacinar  collapse and bridging necroses containing ductular reaction  accompanied by neutrophils, and mild lymphocytic infiltration are  present.    Trichrome stain highlights the areas of collapse, replaced by  young collagen. PAS-D reaction highlights abundant ceroid  containing macrophages.    - Findings are consistent with subacute hepatitis with  multiacinar / bridging necrosis. There is no evidence of  autoimmune hepatitis or cirrhosis.    - Recommend clinical correlation to determine that cause.    Complete report is available in the electronic medical record as  a PATHOLOGY CONSULT REPORT    Verified by: Manoj Lindsey M.D.  (Electronic Signature)  Reported on: 07/02/19 16:12 EDT, [address] [place] [address]  _________________________________________________________________

## 2019-07-03 NOTE — PROGRESS NOTE ADULT - SUBJECTIVE AND OBJECTIVE BOX
Nephrology progress note    Patient is seen and examined, events over the last 24 h noted .    Allergies:  No Known Allergies    Hospital Medications:   MEDICATIONS  (STANDING):  calcium carbonate    500 mG (Tums) Chewable 1 Tablet(s) Chew every 12 hours  chlorhexidine 4% Liquid 1 Application(s) Topical <User Schedule>  docusate sodium 100 milliGRAM(s) Oral three times a day  heparin  Injectable 5000 Unit(s) SubCutaneous every 8 hours  levothyroxine 50 MICROGram(s) Oral daily  metoprolol tartrate 25 milliGRAM(s) Oral two times a day  midodrine 10 milliGRAM(s) Oral every 8 hours  nystatin Powder 1 Application(s) Topical three times a day  pantoprazole   Suspension 40 milliGRAM(s) Oral before breakfast  PPD  5 Tuberculin Unit(s) Injectable 5 Unit(s) IntraDermal once  senna 2 Tablet(s) Oral at bedtime        VITALS:  T(F): 97.1 (07-03-19 @ 06:00), Max: 97.9 (07-02-19 @ 13:31)  HR: 78 (07-03-19 @ 06:00)  BP: 112/54 (07-03-19 @ 06:00)  RR: 18 (07-03-19 @ 06:00)  SpO2: --  Wt(kg): --    07-01 @ 07:01  -  07-02 @ 07:00  --------------------------------------------------------  IN: 844 mL / OUT: 4200 mL / NET: -3356 mL    07-02 @ 07:01  -  07-03 @ 07:00  --------------------------------------------------------  IN: 900 mL / OUT: 4730 mL / NET: -3830 mL          PHYSICAL EXAM:  Constitutional: NAD  HEENT: anicteric sclera, oropharynx clear, MMM  Neck: No JVD  Respiratory: CTAB, no wheezes, rales or rhonchi  Cardiovascular: S1, S2, RRR  Gastrointestinal: BS+, soft, NT/ND  Extremities: No cyanosis or clubbing. No peripheral edema  :  No aragon.   Skin: No rashes    LABS:  07-03    142  |  98  |  129<HH>  ----------------------------<  129<H>  3.1<L>   |  26  |  1.5    Ca    8.4<L>      03 Jul 2019 06:20  Phos  5.3     07-03  Mg     2.1     07-03    TPro  6.1  /  Alb  3.2<L>  /  TBili  2.9<H>  /  DBili  1.3<H>  /  AST  90<H>  /  ALT  92<H>  /  AlkPhos  513<H>  07-03                          9.9    11.16 )-----------( 204      ( 03 Jul 2019 06:20 )             28.7       Urine Studies:      RADIOLOGY & ADDITIONAL STUDIES: Nephrology progress note    Patient is seen and examined, events over the last 24 h noted .  denied any complaints  still has aragon non oliguric     Allergies:  No Known Allergies    Hospital Medications:   MEDICATIONS  (STANDING):  calcium carbonate    500 mG (Tums) Chewable 1 Tablet(s) Chew every 12 hours  docusate sodium 100 milliGRAM(s) Oral three times a day  heparin  Injectable 5000 Unit(s) SubCutaneous every 8 hours  levothyroxine 50 MICROGram(s) Oral daily  metoprolol tartrate 25 milliGRAM(s) Oral two times a day  midodrine 10 milliGRAM(s) Oral every 8 hours  nystatin Powder 1 Application(s) Topical three times a day  pantoprazole   Suspension 40 milliGRAM(s) Oral before breakfast  PPD  5 Tuberculin Unit(s) Injectable 5 Unit(s) IntraDermal once  senna 2 Tablet(s) Oral at bedtime        VITALS:  T(F): 97.1 (07-03-19 @ 06:00), Max: 97.9 (07-02-19 @ 13:31)  HR: 78 (07-03-19 @ 06:00)  BP: 112/54 (07-03-19 @ 06:00)  RR: 18 (07-03-19 @ 06:00)      07-01 @ 07:01  -  07-02 @ 07:00  --------------------------------------------------------  IN: 844 mL / OUT: 4200 mL / NET: -3356 mL    07-02 @ 07:01  -  07-03 @ 07:00  --------------------------------------------------------  IN: 900 mL / OUT: 4730 mL / NET: -3830 mL          PHYSICAL EXAM:  Constitutional: NAD  HEENT: anicteric sclera, oropharynx clear, MMM  Neck: No JVD  Respiratory: CTAB, no wheezes, rales or rhonchi  Cardiovascular: S1, S2, RRR  Gastrointestinal: BS+, soft, NT/ND  Extremities: No cyanosis or clubbing. plus one edema   :  No aragon.   Skin: No rashes    LABS:  07-03    142  |  98  |  129<HH>  ----------------------------<  129<H>  3.1<L>   |  26  |  1.5  Creatinine Trend: 1.5<--, 2.0<--, 2.2<--, 2.8<--, 3.3<--, 3.7<--  Ca    8.4<L>      03 Jul 2019 06:20  Phos  5.3     07-03  Mg     2.1     07-03    TPro  6.1  /  Alb  3.2<L>  /  TBili  2.9<H>  /  DBili  1.3<H>  /  AST  90<H>  /  ALT  92<H>  /  AlkPhos  513<H>  07-03                          9.9    11.16 )-----------( 204      ( 03 Jul 2019 06:20 )             28.7       Urine Studies:      RADIOLOGY & ADDITIONAL STUDIES:

## 2019-07-03 NOTE — PROGRESS NOTE ADULT - ASSESSMENT
67 yo F w/ hx of CAD s/p PCI and hypothyroidism sent by gastroenterologist for gallbladder hydrops    Hepatorenal failure - positive serology for vasculitis    - s/p kidney biopsy 7/1, off ASA biopsy results c/w ATN no evidence of deposition disease   - off lasix   - creat almost at normal levels   - appreciate GI work up and follow up   - kidney sono neg for obstruction  # Pr/Cr ratio 0.2 g/g creat  - rheum consult appreciated  # corrected Ca ~ 9.0 noted, on CaCO3 500 mg q 12 hr. continue     # seen by GI: s/p liver biopsy, liver cirrhosis  # will sign off follow UO and BPM / voiding trial   # recall if any questions arise

## 2019-07-03 NOTE — PROGRESS NOTE ADULT - SUBJECTIVE AND OBJECTIVE BOX
SUBJECTIVE:    Patient is a 68y old Female who presents with a chief complaint of gallbladder hydrops (03 Jul 2019 13:45)    Currently admitted to medicine with the primary diagnosis of Gallbladder hydrops     Today is hospital day 19d. This morning she reports feeling better and her edema has decreased considerably.     INTERVAL EVENTS: The patient had her renal biopsy on 07/01 and we are waiting on results.    PAST MEDICAL & SURGICAL HISTORY  Hypothyroidism  CAD S/P percutaneous coronary angioplasty  No significant past surgical history      ALLERGIES:  No Known Allergies    MEDICATIONS:  STANDING MEDICATIONS  albumin human 25% IVPB 50 milliLiter(s) IV Intermittent once  albumin human 25% IVPB 50 milliLiter(s) IV Intermittent once  calcium carbonate    500 mG (Tums) Chewable 1 Tablet(s) Chew every 12 hours  chlorhexidine 4% Liquid 1 Application(s) Topical <User Schedule>  docusate sodium 100 milliGRAM(s) Oral three times a day  furosemide   Injectable 80 milliGRAM(s) IV Push once  heparin  Injectable 5000 Unit(s) SubCutaneous every 8 hours  levothyroxine 50 MICROGram(s) Oral daily  metoprolol tartrate 25 milliGRAM(s) Oral two times a day  midodrine 10 milliGRAM(s) Oral every 8 hours  nystatin Powder 1 Application(s) Topical three times a day  pantoprazole   Suspension 40 milliGRAM(s) Oral before breakfast  PPD  5 Tuberculin Unit(s) Injectable 5 Unit(s) IntraDermal once  senna 2 Tablet(s) Oral at bedtime    PRN MEDICATIONS  aluminum hydroxide/magnesium hydroxide/simethicone Suspension 30 milliLiter(s) Oral every 4 hours PRN  bisacodyl Suppository 10 milliGRAM(s) Rectal daily PRN    VITALS:   T(F): 95.6  HR: 64  BP: 115/56  RR: 18  SpO2: --    LABS:                        9.9    11.16 )-----------( 204      ( 03 Jul 2019 06:20 )             28.7     07-03    142  |  98  |  129<HH>  ----------------------------<  129<H>  3.1<L>   |  26  |  1.5    Ca    8.4<L>      03 Jul 2019 06:20  Phos  5.3     07-03  Mg     2.1     07-03    TPro  6.1  /  Alb  3.2<L>  /  TBili  2.9<H>  /  DBili  1.3<H>  /  AST  90<H>  /  ALT  92<H>  /  AlkPhos  513<H>  07-03    PT/INR - ( 02 Jul 2019 05:22 )   PT: 15.40 sec;   INR: 1.34 ratio    RADIOLOGY:  >>LIVER BIOPSY  Final Diagnosis  Right hepatic lobe, needle biopsy:  - Liver parenchyma showing mild focal lobulitis and hepatocyte  reactive changes with few glycogenated nuclei.  - Focal area of vaguely formed Francisca-Denk body also present.  - Two minute focus of macro and micro-vesicular steatosis also  present, counting less than 5% of tissue volume of the biopsy.  - Portal areas showing marked chronic inflammation with focal  interface activity, infiltrated with mainly small lymphocytes and  rare neutrophils, and fibrous expansion of portal area with  fibrous bridge formation, probable cirrhosis.  - Stainable iron present, not increased.  - Trichrome stain reviewed.  - PAS-D stain failed to reveal alpha-1 antitrypsin globule.    Comment  _Sections show findings are consistent with clinical impression  of  cirrhosis.  However, the etiology is not clear.  Clinical  correlation is recommended.    PHYSICAL EXAM:  GEN: No acute distress  PULM/CHEST: Clear B/L, equal air entry bilaterally  CVS: RRR, S1+S2 + mild systolic murmur  ABD: Soft, non-tender, non-distended, ecchymosis around the needle prick sites +BS,  EXT: Pedal edema  NEURO: AAOx3    Smith Catheter:   Connect To:  Straight Drainage/[place]  Indication:  Urinary Retention / Obstruction SUBJECTIVE:    Patient is a 68y old Female who presents with a chief complaint of gallbladder hydrops (03 Jul 2019 13:45)    Currently admitted to medicine with the primary diagnosis of Gallbladder hydrops     Today is hospital day 19d. This morning she reports feeling better and her edema has decreased considerably.     INTERVAL EVENTS: The patient had her renal biopsy on 07/01 and we are waiting on results. Patient sitting on a chair from her bed and feels better.    PAST MEDICAL & SURGICAL HISTORY  Hypothyroidism  CAD S/P percutaneous coronary angioplasty  No significant past surgical history      ALLERGIES:  No Known Allergies    MEDICATIONS:  STANDING MEDICATIONS  albumin human 25% IVPB 50 milliLiter(s) IV Intermittent once  albumin human 25% IVPB 50 milliLiter(s) IV Intermittent once  calcium carbonate    500 mG (Tums) Chewable 1 Tablet(s) Chew every 12 hours  chlorhexidine 4% Liquid 1 Application(s) Topical <User Schedule>  docusate sodium 100 milliGRAM(s) Oral three times a day  furosemide   Injectable 80 milliGRAM(s) IV Push once  heparin  Injectable 5000 Unit(s) SubCutaneous every 8 hours  levothyroxine 50 MICROGram(s) Oral daily  metoprolol tartrate 25 milliGRAM(s) Oral two times a day  midodrine 10 milliGRAM(s) Oral every 8 hours  nystatin Powder 1 Application(s) Topical three times a day  pantoprazole   Suspension 40 milliGRAM(s) Oral before breakfast  PPD  5 Tuberculin Unit(s) Injectable 5 Unit(s) IntraDermal once  senna 2 Tablet(s) Oral at bedtime    PRN MEDICATIONS  aluminum hydroxide/magnesium hydroxide/simethicone Suspension 30 milliLiter(s) Oral every 4 hours PRN  bisacodyl Suppository 10 milliGRAM(s) Rectal daily PRN    VITALS:   T(F): 95.6  HR: 64  BP: 115/56  RR: 18  SpO2: --    LABS:                        9.9    11.16 )-----------( 204      ( 03 Jul 2019 06:20 )             28.7     07-03    142  |  98  |  129<HH>  ----------------------------<  129<H>  3.1<L>   |  26  |  1.5    Ca    8.4<L>      03 Jul 2019 06:20  Phos  5.3     07-03  Mg     2.1     07-03    TPro  6.1  /  Alb  3.2<L>  /  TBili  2.9<H>  /  DBili  1.3<H>  /  AST  90<H>  /  ALT  92<H>  /  AlkPhos  513<H>  07-03    PT/INR - ( 02 Jul 2019 05:22 )   PT: 15.40 sec;   INR: 1.34 ratio    RADIOLOGY:  >>LIVER BIOPSY  Final Diagnosis  Right hepatic lobe, needle biopsy:  - Liver parenchyma showing mild focal lobulitis and hepatocyte  reactive changes with few glycogenated nuclei.  - Focal area of vaguely formed Francisca-Denk body also present.  - Two minute focus of macro and micro-vesicular steatosis also  present, counting less than 5% of tissue volume of the biopsy.  - Portal areas showing marked chronic inflammation with focal  interface activity, infiltrated with mainly small lymphocytes and  rare neutrophils, and fibrous expansion of portal area with  fibrous bridge formation, probable cirrhosis.  - Stainable iron present, not increased.  - Trichrome stain reviewed.  - PAS-D stain failed to reveal alpha-1 antitrypsin globule.    Comment  _Sections show findings are consistent with clinical impression  of  cirrhosis.  However, the etiology is not clear.  Clinical  correlation is recommended.    PHYSICAL EXAM:  GEN: No acute distress  PULM/CHEST: Clear B/L, equal air entry bilaterally  CVS: RRR, S1+S2 + mild systolic murmur  ABD: Soft, non-tender, non-distended, ecchymosis around the needle prick sites +BS,  EXT: Pedal edema  NEURO: AAOx3    Smith Catheter:   Connect To:  Straight Drainage/[place]  Indication:  Urinary Retention / Obstruction

## 2019-07-03 NOTE — PROGRESS NOTE ADULT - ASSESSMENT
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 with resultant proteinuria and low albumin state and diffuse anasarca, raynauds phenomenon (not currently active), esophageal dysmotility and reflux, cystitis with urinary retention s/p abx, and subsequent worsening functional debility and malnutrition.    Patient is weaker, edematous. She had rings in her fingers and were removed by the senior resident to avoid ischemia in finger 06/29. Labs performed after blood drawn via US on 07/01. Patient had a bowel movement today 07/01 and feels better after that. 40 meq k was given due to low K levels 3.1 07/01. Calcium carbonate 500x2 added on Nephrology recommendations 07/01.    Patient was continued on 07/03 on Solu-medrol 80mg once daily and 50 ml of 25% albumin followed by Lasix. Patients Cr is downtrending and treatment will be tailored after renal biopsy results.    #hydrops gallbladder/acute severe transaminitis  -LFT's downtrending   -CPK downtrending [id number]   -Liver biopsy done shows Cirrhosis but of no clear etiology   -Clinically too weak at this moment for cholecystectomy  -Midodrine 5mg every 8h for portal HTN  -GI recommended to trend INR and LFTs daily 07/01    #LUIS ALBERTO  -Cr downtrending 3.7>3.3>2.8>2.2>2.0  -BUN levels are down to 139<138<151  -As per renal team recommendations - patient most likely has hepatorenal syndrome  -Albumin challenge test with 100g albumin performed 06/27  -Continue IV Albumin 25% 50ml with 80mg Lasix twice a day  -Strict Input and Output measures  -Renal biopsy done yesterday 07/01  -Given 40 meq of K once only on 07/01  -Started Ca carbonate 500 twice daily   -Nephrology Consult 07/01 want to wait for biopsy results before proceeding for any management     # Abnormal DS DNA, Elevated ANKITA titers   -Rheum recommended to hold prednisone use until w/u is completed  -Solumedrol 80mg once given on 07/01 and 07/02  -if renal bx is inconclusive emg and sural nerve biopsy recommended  -ESR, CRP, ANCA repeated    #hypoalbuminemia  -Malnutrition vs Liver Cirrhosis  -Patient was started on TPN    #distal digit blue discoloration  -Raynaud's (primary vs secondary)  -r/o etiology---paraneoplastic, vasculitis, vs rheum   -transient=---resolved within 1 hour  -gloves/warmth to affected fingertips  -CCB on hold for hypotension  -esophagram consistent with dysmotility    #UTI with urinary retention  -has Smith, monitor u/o   -received ceftriaxone/ completed  -Urinalysis order on recommendation of Nephro  -WBCs 15>17>15>18>20>13>16    #h/o CAD  -cont home regimen  -statin on hold 2/2 acute liver injury  -Plavix held for Renal biopsy  -ASA held for renal biopsy    #hypothyroidism  -TSH elevated---t3/t4 normal---will cont synthroid 50 mcg daily    #DVT + GI ppx  FULL CODE  high risk/ guarded prognosis · Assessment		  A/P 69 yo F w/ hx of CAD s/p PCI and hypothyroidism sent by gastroenterologist for gallbladder hydrops, course complicated by ALI/ transaminitis possibly of autoimmune etiology, LUIS ALBERTO with hyperkalemia multifactorial 2/2 rhabdo (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 with resultant proteinuria and low albumin state and diffuse anasarca, raynauds phenomenon (not currently active), esophageal dysmotility and reflux, cystitis with urinary retention s/p abx, and subsequent worsening functional debility and malnutrition.    Patient is weaker, edematous. She had rings in her fingers and were removed by the senior resident to avoid ischemia in finger 06/29. Labs performed after blood drawn via US on 07/01. Patient had a bowel movement today 07/01 and feels better after that. 40 meq k was given due to low K levels 3.1 07/01. Calcium carbonate 500x2 added on Nephrology recommendations 07/01.    Patient was continued on 07/03 on Solu-medrol 80mg once daily and 50 ml of 25% albumin followed by Lasix. Patients Cr is downtrending and treatment will be tailored after renal biopsy results.    #hydrops gallbladder/acute severe transaminitis  -LFT's downtrending   -CPK downtrending [id number]   -Liver biopsy done shows Cirrhosis but of no clear etiology   -Clinically too weak at this moment for cholecystectomy  -Midodrine 5mg every 8h for portal HTN  -GI recommended to trend INR and LFTs daily 07/01    #LUIS ALBERTO  -Cr downtrending 3.7>3.3>2.8>2.2>2.0>1.5  -BUN levels are down to 129<139<138<151  -As per renal team recommendations - patient most likely has hepatorenal syndrome  -Albumin challenge test with 100g albumin performed 06/27  -Continue IV Albumin 25% 50ml with 80mg Lasix twice a day  -Strict Input and Output measures  -Renal biopsy done yesterday 07/01  -Given 40 meq of K once only on 07/01 and 07/03  -Started Ca carbonate 500 twice daily   -Nephrology Consult 07/01 want to wait for biopsy results before proceeding for any specific management     # Abnormal DS DNA, Elevated ANKITA titers   -Rheum recommended to hold prednisone use until w/u is completed  -Solumedrol 80mg once given on 07/01 and 07/02  -if renal bx is inconclusive emg and sural nerve biopsy recommended  -ESR, CRP, ANCA repeated    #hypoalbuminemia  -Malnutrition vs Liver Cirrhosis  -Patient was started on TPN    #distal digit blue discoloration  -Raynaud's (primary vs secondary)  -r/o etiology---paraneoplastic, vasculitis, vs rheum   -transient=---resolved within 1 hour  -gloves/warmth to affected fingertips  -CCB on hold for hypotension  -esophagram consistent with dysmotility    #UTI with urinary retention  -has Smith, monitor u/o   -received ceftriaxone/ completed  -Urinalysis order on recommendation of Nephro  -WBCs 15>17>15>18>20>13>16    #h/o CAD  -cont home regimen  -statin on hold 2/2 acute liver injury  -Plavix held for Renal biopsy  -ASA held for renal biopsy    #hypothyroidism  -TSH elevated---t3/t4 normal---will cont synthroid 50 mcg daily    #DVT + GI ppx  FULL CODE  high risk/ guarded prognosis

## 2019-07-03 NOTE — PROGRESS NOTE ADULT - ASSESSMENT
a/p:  #hydrops gallbladder with obstructing gs -acute severe transaminiitis/cirrhosis-acute hepatorenal syndrome  -lft trending down  -s/p liver biopsy- + cirrhosis (nonspecific---?Drug induced)  -concominant rhabdomyolysis--->resolved with normal repeat ck  -f/u with GI  -surgery f/u for eventual cholecystectomy  -outpaitent GI is Dr. 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 lupus nephritis, vs Vasculitis,   - infectious etiology negative (hiv, ebv, hep serology, cmv negative)]  -f/u with rheum recomm--may need EMG as outaptient  -cont iv solumedrol (today  day#3 of 80 mg iv solumedrol)--tomorrow start low dose oral prednisone 40 mg daily to continue until f/u outpatient rheum    #h/o CAD  -cont home regimen  -statin on hold 2/2 acute liver injury  -cont plavix    #hypothyroidism  -tsh elevated---t3/t4 normal---will cont syntrhoid 50 mcg daily      #hypoalbuminemia- severe protein/calorie malnutrition  -stable---improving albumin and encourage oral intake  -off ppn now--s/p albumin iv      #DVT//gI ppx  FULL CODE    #Progress Note Handoff    Family discussion: d/w patient and  bedside  Disposition: unknown at this time a/p:  #hydrops gallbladder with obstructing gs -acute severe transaminiitis/cirrhosis-acute hepatorenal syndrome  -lft trending down  -s/p liver biopsy- + cirrhosis (nonspecific---?Drug induced)  -concominant rhabdomyolysis--->resolved with normal repeat ck  -f/u with GI  -surgery f/u for eventual cholecystectomy  -outpaitent GI is Dr. 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 lupus nephritis, vs Vasculitis,   - infectious etiology negative (hiv, ebv, hep serology, cmv negative)]  -f/u with rheum recomm--may need EMG as outaptient  -cont iv solumedrol (today  day#3 of 80 mg iv solumedrol)--tomorrow start low dose oral prednisone 40 mg daily to continue until f/u outpatient rheum    #h/o CAD  -cont home regimen  -statin on hold 2/2 acute liver injury  -cont plavix    #hypothyroidism  -tsh elevated---t3/t4 normal---will cont syntrhoid 50 mcg daily      #hypoalbuminemia- severe protein/calorie malnutrition  -stable---improving albumin and encourage oral intake  -off ppn now--s/p albumin iv      #DVT//gI ppx  FULL CODE    #Progress Note Handoff    Family discussion: d/w patient and  bedside  Disposition: unknown at this time

## 2019-07-04 NOTE — PROGRESS NOTE ADULT - SUBJECTIVE AND OBJECTIVE BOX
Patient is a 68y old  Female who presents with a chief complaint of gallbladder hydrops (18 Jun 2019 13:54)    HPI:  69 yo F w/ hx of CAD s/p PCI and hypothyroidism sent by gastroenterologist for gallbladder hydrops. Pt found to have elevated liver enzymes by PMD in Apr 2019, referred to GI for w/u. MRCP 2 days ago significant for gallbladder hydrops 4.7cm with probable impacted stone at neck, also liver cirrhosis w/ mild ascites. Admits to social etoh, denies binge drinking, denies abdominal pain, N/V. Admits to decreased appetite in past 2 weeks, pt tried to maintain low salt diet. Reports BLE edema developed in past 2 days as well as weakness. Denies fever, chills, abdominal pain, SOB, dysuria.    In ED, evaluated by surgery -> no interventions at this time (15 Shashank 2019 01:53)    PAST MEDICAL & SURGICAL HISTORY:  Hypothyroidism  CAD S/P percutaneous coronary angioplasty  No significant past surgical history    patient seen and examined independently on morning rounds, chart reviewed and discussed with the medicine resident on call    no overnight events--was oob yesterday with jaylin lift/PT-   f--edema improving- working with PT-    PE:  GEN-NAD, AAOx3,  PULM- Clear to auscultation bilaterally, fair air entry  CVS- +s1/s2 RRR no murmurs  GI- soft NT obese ND +bs, no rebound, no guarding, +echhymosis  EXT- 3+ LE edema        Labs:                        9.2    14.91 )-----------( 183      ( 04 Jul 2019 05:51 )             27.5     CBC Full  -  ( 04 Jul 2019 05:51 )  WBC Count : 14.91 K/uL  RBC Count : 2.94 M/uL  Hemoglobin : 9.2 g/dL  Hematocrit : 27.5 %  Platelet Count - Automated : 183 K/uL  Mean Cell Volume : 93.5 fL  Mean Cell Hemoglobin : 31.3 pg  Mean Cell Hemoglobin Concentration : 33.5 g/dL  Auto Neutrophil # : 13.10 K/uL  Auto Lymphocyte # : 0.54 K/uL  Auto Monocyte # : 1.16 K/uL  Auto Eosinophil # : 0.00 K/uL  Auto Basophil # : 0.01 K/uL  Auto Neutrophil % : 87.8 %  Auto Lymphocyte % : 3.6 %  Auto Monocyte % : 7.8 %  Auto Eosinophil % : 0.0 %  Auto Basophil % : 0.1 %      07-04    143  |  99  |  122<HH>  ----------------------------<  179<H>  3.3<L>   |  26  |  1.4    Ca    8.0<L>      04 Jul 2019 05:51  Phos  5.3     07-03  Mg     2.1     07-03    TPro  5.5<L>  /  Alb  3.0<L>  /  TBili  2.6<H>  /  DBili  1.2<H>  /  AST  76<H>  /  ALT  84<H>  /  AlkPhos  435<H>  07-04      Microbiology:      Vital Signs Last 24 Hrs  T(C): 36.1 (04 Jul 2019 13:20), Max: 36.1 (04 Jul 2019 13:20)  T(F): 96.9 (04 Jul 2019 13:20), Max: 96.9 (04 Jul 2019 13:20)  HR: 82 (04 Jul 2019 13:20) (65 - 82)  BP: 111/56 (04 Jul 2019 13:20) (109/47 - 131/58)  BP(mean): --  RR: 18 (04 Jul 2019 13:20) (18 - 18)  SpO2: --    I&O's Summary    03 Jul 2019 07:01  -  04 Jul 2019 07:00  --------------------------------------------------------  IN: 180 mL / OUT: 3800 mL / NET: -3620 mL

## 2019-07-04 NOTE — PROGRESS NOTE ADULT - ASSESSMENT
a/p:  #hydrops gallbladder with obstructing gs -acute severe transaminiitis/cirrhosis-acute hepatorenal syndrome  -lft trending down  -s/p liver biopsy- + cirrhosis (nonspecific---?Drug induced)  -concominant rhabdomyolysis--->resolved with normal repeat ck  -f/u with GI  -surgery f/u for eventual cholecystectomy  -outpaitent GI is Dr. 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 lupus nephritis, vs Vasculitis,   - infectious etiology negative (hiv, ebv, hep serology, cmv negative)]  -f/u with rheum recomm--may need EMG as outaptient  -transitioned to low dose oral prednisone 40 mg daily today    #h/o CAD  -cont home regimen  -statin on hold 2/2 acute liver injury  -cont plavix    #hypothyroidism  -tsh elevated---t3/t4 normal---will cont syntrhoid 50 mcg daily      #hypoalbuminemia- severe protein/calorie malnutrition  -stable---improving albumin and encourage oral intake  -off ppn now--s/p albumin iv      #DVT//gI ppx  FULL CODE    #Progress Note Handoff    Family discussion: d/w patient  Disposition: unknown at this time

## 2019-07-04 NOTE — PROGRESS NOTE ADULT - SUBJECTIVE AND OBJECTIVE BOX
Nephrology progress note    Patient is seen and examined, events over the last 24 h noted .    Allergies:  No Known Allergies    Hospital Medications:   MEDICATIONS  (STANDING):  albumin human 25% IVPB 50 milliLiter(s) IV Intermittent once  calcium carbonate    500 mG (Tums) Chewable 1 Tablet(s) Chew every 12 hours  chlorhexidine 4% Liquid 1 Application(s) Topical <User Schedule>  docusate sodium 100 milliGRAM(s) Oral three times a day  furosemide   Injectable 80 milliGRAM(s) IV Push once  heparin  Injectable 5000 Unit(s) SubCutaneous every 8 hours  levothyroxine 50 MICROGram(s) Oral daily  metoprolol tartrate 25 milliGRAM(s) Oral two times a day  midodrine 10 milliGRAM(s) Oral every 8 hours  nystatin Powder 1 Application(s) Topical three times a day  pantoprazole   Suspension 40 milliGRAM(s) Oral before breakfast  potassium chloride    Tablet ER 40 milliEquivalent(s) Oral once  PPD  5 Tuberculin Unit(s) Injectable 5 Unit(s) IntraDermal once  predniSONE   Tablet 40 milliGRAM(s) Oral daily  senna 2 Tablet(s) Oral at bedtime        VITALS:  T(F): 96.9 (07-04-19 @ 13:20), Max: 96.9 (07-04-19 @ 13:20)  HR: 82 (07-04-19 @ 13:20)  BP: 111/56 (07-04-19 @ 13:20)  RR: 18 (07-04-19 @ 13:20)  SpO2: --  Wt(kg): --    07-02 @ 07:01  -  07-03 @ 07:00  --------------------------------------------------------  IN: 900 mL / OUT: 4730 mL / NET: -3830 mL    07-03 @ 07:01  -  07-04 @ 07:00  --------------------------------------------------------  IN: 180 mL / OUT: 3800 mL / NET: -3620 mL          PHYSICAL EXAM:  Constitutional: NAD  HEENT: anicteric sclera, oropharynx clear, MMM  Neck: No JVD  Respiratory: CTAB, no wheezes, rales or rhonchi  Cardiovascular: S1, S2, RRR  Gastrointestinal: BS+, soft, NT/ND  Extremities: No cyanosis or clubbing. No peripheral edema  :  No aragon.   Skin: No rashes    LABS:  07-04    143  |  99  |  122<HH>  ----------------------------<  179<H>  3.3<L>   |  26  |  1.4    Ca    8.0<L>      04 Jul 2019 05:51  Phos  5.3     07-03  Mg     2.1     07-03    TPro  5.5<L>  /  Alb  3.0<L>  /  TBili  2.6<H>  /  DBili  1.2<H>  /  AST  76<H>  /  ALT  84<H>  /  AlkPhos  435<H>  07-04                          9.2    14.91 )-----------( 183      ( 04 Jul 2019 05:51 )             27.5       Urine Studies:      RADIOLOGY & ADDITIONAL STUDIES:

## 2019-07-04 NOTE — PROGRESS NOTE ADULT - ASSESSMENT
admitted for cold viral disease, CAD. renal biopsy showing ATN. creatinine improved, towards baseline. continue to follow.

## 2019-07-05 NOTE — PROGRESS NOTE ADULT - SUBJECTIVE AND OBJECTIVE BOX
SUBJECTIVE:    Patient is a 68y old Female who presents with a chief complaint of gallbladder hydrops (05 Jul 2019 13:16)    Currently admitted to medicine with the primary diagnosis of Gallbladder hydrops     Today is hospital day 21d. This morning she reports feeling better and her edema has decreased considerably. Patient is more relaxed and willing to attempt to rehab herself    INTERVAL EVENTS: The patient had her renal biopsy on 07/01 and showed LUIS ALBERTO changes. Patient see walking with PT and also attempting to exercise in her bed.    PAST MEDICAL & SURGICAL HISTORY  Hypothyroidism  CAD S/P percutaneous coronary angioplasty  No significant past surgical history      ALLERGIES:  No Known Allergies    MEDICATIONS:  STANDING MEDICATIONS  albumin human 25% IVPB 50 milliLiter(s) IV Intermittent once  calcium carbonate    500 mG (Tums) Chewable 1 Tablet(s) Chew every 12 hours  chlorhexidine 4% Liquid 1 Application(s) Topical <User Schedule>  docusate sodium 100 milliGRAM(s) Oral three times a day  furosemide   Injectable 80 milliGRAM(s) IV Push once  heparin  Injectable 5000 Unit(s) SubCutaneous every 8 hours  levothyroxine 50 MICROGram(s) Oral daily  metoprolol tartrate 25 milliGRAM(s) Oral two times a day  midodrine 10 milliGRAM(s) Oral every 8 hours  nystatin Powder 1 Application(s) Topical three times a day  pantoprazole   Suspension 40 milliGRAM(s) Oral before breakfast  potassium chloride   Powder 40 milliEquivalent(s) Oral daily  PPD  5 Tuberculin Unit(s) Injectable 5 Unit(s) IntraDermal once  predniSONE   Tablet 40 milliGRAM(s) Oral daily  senna 2 Tablet(s) Oral at bedtime    PRN MEDICATIONS  aluminum hydroxide/magnesium hydroxide/simethicone Suspension 30 milliLiter(s) Oral every 4 hours PRN  bisacodyl Suppository 10 milliGRAM(s) Rectal daily PRN    VITALS:   T(F): 98.7  HR: 65  BP: 123/46  RR: 18  SpO2: 96%    LABS:                        8.8    16.06 )-----------( 173      ( 05 Jul 2019 05:18 )             26.2     07-05    146  |  102  |  109<HH>  ----------------------------<  125<H>  3.4<L>   |  29  |  1.1    Ca    8.2<L>      05 Jul 2019 05:18    TPro  5.3<L>  /  Alb  2.9<L>  /  TBili  2.7<H>  /  DBili  x   /  AST  86<H>  /  ALT  82<H>  /  AlkPhos  430<H>  07-05                  RADIOLOGY:  Surgical Pathology Report (07.01.19 @ 20:14)    Surgical Pathology Report:   ACCESSION No:  [id number]    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      Surgical Final Report      Final Diagnosis  Kidney, needle core biopsy    Acute tubular injury with focal tubular necrosis (see comment)    No evidence of crescentic glomerulonephritis, immune complex-  mediated disease, or acute interstitial nephritis is seen in this  sample    Summary of chronic changes:  - Global glomerulosclerosis (4% of glomeruli)  - Tubular atrophy and interstitial fibrosis (less than 5% of  cortex)  - Mild arterial and arteriolar sclerosis    Verified by: Kisha Morris MD  (Electronic Signature)  Reported on: 07/03/19 15:20 EDT, [address]  _________________________________________________________________    Comment  The preliminary findings were discussed with Dr. Elizabeth on  7/2/2019 at 11:15AM.    This biopsy reveals acute tubular necrosis and no significant  glomerular disease, in this patient with multiple antibodies and  a complex medical history. Etiologies of acute tubular necrosis  include ischemia potentially aggravated by medications (anti-  fungals, antibiotics, anti-retrovirals, acetaminophen, non-  steroidal anti-inflammatory drugs), drugs with vasoactive effect,  heavy metals (cadmium, mercury), herbalremedies, organic  solvents, herbicides, and radiocontrast solutions.    Microscopic Description    1. Light Microscopy:  Sections of formalin-fixed, paraffin embedded tissue were  evaluated using H&E, PAS, JMS, and trichrome stains. An H&E-  stainedfrozen section taken from the tissue allocated for  immunofluorescence microscopy and semi-thin toluidine blue-  stained epoxy sections of the tissue processed for electron  microscopy were also evaluated using light microscopy.    The sample submitted for light microscopy consists of renal  cortex and medulla, with up to 10 glomeruli. The entire biopsy  contains 27 glomeruli (LM-10; IF-9; EM-8), of which 1 is globally  sclerosed. The non-sclerosed glomeruli are              LANE, HOME                3        Surgical Final Report          of normal size, architecture and cellularity. The glomerular  capillary loops are of normal thickness and texture. Significant  endocapillary proliferation is not present and cellular crescents  are not seen in glomeruli. The mesangium reveals no significant  expansion or hypercellularity. Tubules reveal focal degenerative  changes and flattening of the epithelium. Several tubules contain  necrotic cellular debris. The interstitium contains no  significant inflammatory infiltrates. Approximately 5% of the  renal cortex shows tubular atrophy and interstitial fibrosis.  Arteries and arterioles show mild sclerosis.    2. Immunofluorescence Microscopy:  The sections of the sample submitted for immunofluorescence  studies were incubated with antibodies specific for the heavy  chains of IgG, IgA, and IgM, for kappa and lambda light chains,  fibrin, albumin, and complement components C3 and C1q. The  intensity of immunofluorescence reactivity is expressed on a  scale 1-  -4+.    The sample contains 9 glomeruli, of which 1 is globally  sclerosed. No significant immune deposits are seen in glomeruli.  There is finely granular reactivity for IgM (trace) and C3  (trace) in the mesangium. Tubules contain few intraluminal casts  reactive for polyclonal IgA. The interstitium reveals scattered  fibrin deposits. There is no difference in reactivity between  kappa and lambda light chains in the glomeruli, tubular casts, or  in the background of the tissue.    3. Electron Microscopy:  The sample submitted for electron microscopy examination contains  8 glomeruli; 2 glomeruli are examined ultrastructurally. The  glomerular visceral epithelial cells reveal mild segmental  effacement of theirfoot processes. The glomerular basement  membranes are of normal thickness and texture. Morphometric  analysis was performed on 18 sites, using orthogonal intercept  method. The harmonic mean of the glomerular basement membrane  thickness is 275 nm. The endothelial cells show focal swelling.  The mesangium reveals normal cellular elements and a normal  amount of matrix. No electron dense deposits are present in the  mesangium or along the capillary walls.      PHYSICAL EXAM:  GEN: No acute distress  PULM/CHEST: Clear to auscultation bilaterally, no rales, rhonchi or wheezes   CVS: Regular rate and rhythm, S1-S2, no murmurs  ABD: Soft, non-tender, non-distended, +BS  EXT: No edema  NEURO: AAOx3    Smith Catheter:   Indwelling Urethral Catheter:     Connect To:  Straight Drainage/[place]    Indication:  Urinary Retention / Obstruction (07-05-19 @ 04:08) (not performed) [active]

## 2019-07-05 NOTE — PROGRESS NOTE ADULT - SUBJECTIVE AND OBJECTIVE BOX
Patient is a 68y old  Female who presents with a chief complaint of gallbladder hydrops (18 Jun 2019 13:54)    HPI:  67 yo F w/ hx of CAD s/p PCI and hypothyroidism sent by gastroenterologist for gallbladder hydrops. Pt found to have elevated liver enzymes by PMD in Apr 2019, referred to GI for w/u. MRCP 2 days ago significant for gallbladder hydrops 4.7cm with probable impacted stone at neck, also liver cirrhosis w/ mild ascites. Admits to social etoh, denies binge drinking, denies abdominal pain, N/V. Admits to decreased appetite in past 2 weeks, pt tried to maintain low salt diet. Reports BLE edema developed in past 2 days as well as weakness. Denies fever, chills, abdominal pain, SOB, dysuria.    In ED, evaluated by surgery -> no interventions at this time (15 Shashank 2019 01:53)    PAST MEDICAL & SURGICAL HISTORY:  Hypothyroidism  CAD S/P percutaneous coronary angioplasty  No significant past surgical history    patient seen and examined independently on morning rounds, chart reviewed and discussed with the medicine resident.    no overnight events--was with patient while working with PT---able to stand up with 2 person assist and stand with walker- no chest pain or sob overnight    PE:  GEN-NAD, AAOx3,  PULM- Clear to auscultation bilaterally, fair air entry  CVS- +s1/s2 RRR no murmurs  GI- soft NT obese ND +bs, no rebound, no guarding, +echhymosis  EXT- 1+ LE edema              Labs:                        8.8    16.06 )-----------( 173      ( 05 Jul 2019 05:18 )             26.2     CBC Full  -  ( 05 Jul 2019 05:18 )  WBC Count : 16.06 K/uL  RBC Count : 2.82 M/uL  Hemoglobin : 8.8 g/dL  Hematocrit : 26.2 %  Platelet Count - Automated : 173 K/uL  Mean Cell Volume : 92.9 fL  Mean Cell Hemoglobin : 31.2 pg  Mean Cell Hemoglobin Concentration : 33.6 g/dL  Auto Neutrophil # : 13.31 K/uL  Auto Lymphocyte # : 0.88 K/uL  Auto Monocyte # : 1.62 K/uL  Auto Eosinophil # : 0.13 K/uL  Auto Basophil # : 0.01 K/uL  Auto Neutrophil % : 82.8 %  Auto Lymphocyte % : 5.5 %  Auto Monocyte % : 10.1 %  Auto Eosinophil % : 0.8 %  Auto Basophil % : 0.1 %      07-05    146  |  102  |  109<HH>  ----------------------------<  125<H>  3.4<L>   |  29  |  1.1    Ca    8.2<L>      05 Jul 2019 05:18    TPro  5.3<L>  /  Alb  2.9<L>  /  TBili  2.7<H>  /  DBili  x   /  AST  86<H>  /  ALT  82<H>  /  AlkPhos  430<H>  07-05      Microbiology:      Vital Signs Last 24 Hrs  T(C): 37.1 (05 Jul 2019 12:30), Max: 37.1 (05 Jul 2019 12:30)  T(F): 98.7 (05 Jul 2019 12:30), Max: 98.7 (05 Jul 2019 12:30)  HR: 65 (05 Jul 2019 12:30) (65 - 82)  BP: 103/47 (05 Jul 2019 12:30) (103/47 - 125/60)  BP(mean): --  RR: 18 (05 Jul 2019 12:30) (18 - 18)  SpO2: 96% (05 Jul 2019 12:33) (96% - 96%)    I&O's Summary    04 Jul 2019 07:01  -  05 Jul 2019 07:00  --------------------------------------------------------  IN: 0 mL / OUT: 4025 mL / NET: -4025 mL

## 2019-07-05 NOTE — PROGRESS NOTE ADULT - ASSESSMENT
· Assessment		  A/P 69 yo F w/ hx of CAD s/p PCI and hypothyroidism sent by gastroenterologist for gallbladder hydrops, course complicated by ALI/ transaminitis possibly of autoimmune etiology, LUIS ALBERTO with hyperkalemia multifactorial 2/2 rhabdo (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 with resultant proteinuria and low albumin state and diffuse anasarca, raynauds phenomenon (not currently active), esophageal dysmotility and reflux, cystitis with urinary retention s/p abx, and subsequent worsening functional debility and malnutrition.    Patient is weaker, edematous. She had rings in her fingers and were removed by the senior resident to avoid ischemia in finger 06/29. Labs performed after blood drawn via US on 07/01. Patient had a bowel movement today 07/01 and feels better after that. 40 meq k was given due to low K levels 3.1 07/01. Calcium carbonate 500x2 added on Nephrology recommendations 07/01.    Patient was continued on 07/03 on Solu-medrol 80mg once daily and 50 ml of 25% albumin followed by Lasix. Patients Cr is downtrending and will follow with Neuro for EMG study as indicated by Rheumatology.    #hydrops gallbladder/acute severe transaminitis  -LFT's downtrending   -CPK downtrending [id number]   -Liver biopsy done shows Cirrhosis but of no clear etiology   -Clinically too weak at this moment for cholecystectomy  -Midodrine 5mg every 8h for portal HTN  -GI recommended to trend INR and LFTs daily 07/01    #LUIS ALBERTO  -Cr downtrending 3.7>3.3>2.8>2.2>2.0>1.5>1.1  -BUN levels are down to 109<129<139<138<151  -As per renal team recommendations - patient most likely has hepatorenal syndrome  -Albumin challenge test with 100g albumin performed 06/27  -Continue IV Albumin 25% 50ml with 80mg Lasix twice a day  -Strict Input and Output measures  -Renal biopsy done yesterday 07/01  -Given 40 meq of K once only on 07/01 and 07/03  -Started Ca carbonate 500 twice daily   -Renal Biopsy shows LUIS ALBERTO changes     # Abnormal DS DNA, Elevated ANKITA titers   -Rheum recommended to hold prednisone use until w/u is completed  -Solumedrol 80mg once given on 07/01 and 07/02  -Started oral Prednisone 40mg 07/03  -Renal bx is inconclusive and EMG and sural nerve biopsy recommended  -ESR, CRP, ANCA repeated    #hypoalbuminemia  -Malnutrition vs Liver Cirrhosis  -Patient was started on TPN    #distal digit blue discoloration  -Raynaud's (primary vs secondary)  -r/o etiology---paraneoplastic, vasculitis, vs rheum   -transient=---resolved within 1 hour  -gloves/warmth to affected fingertips  -CCB on hold for hypotension  -esophagram consistent with dysmotility    #UTI with urinary retention  -has Smith, monitor u/o   -received ceftriaxone/ completed  -Urinalysis order on recommendation of Nephro  -WBCs 15>17>15>18>20>13>16    #h/o CAD  -cont home regimen  -statin on hold 2/2 acute liver injury  -ASA and Plavix held    #hypothyroidism  -TSH elevated---t3/t4 normal---will cont synthroid 50 mcg daily    #DVT + GI ppx  FULL CODE  high risk/ guarded prognosis

## 2019-07-05 NOTE — PROGRESS NOTE ADULT - ASSESSMENT
a/p:  #hydrops gallbladder with obstructing gs -acute severe transaminiitis/cirrhosis-acute hepatorenal syndrome  -lft trending down  -s/p liver biopsy- + cirrhosis (nonspecific---?Drug induced)  -concominant rhabdomyolysis--->resolved with normal repeat ck  -f/u with GI--will need repeat afp and imaging as outpatient  -surgery f/u for eventual cholecystectomy  -outpaitent GI is Dr. 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 lupus nephritis, vs Vasculitis,   - infectious etiology negative (hiv, ebv, hep serology, cmv negative)]  -f/u with rheum recomm  -will order EMG  -possible sural nerve biopsy   -transitioned to low dose oral prednisone 40 mg daily today    #h/o CAD  -cont home regimen  -statin on hold 2/2 acute liver injury  -cont plavix    #hypothyroidism  -tsh elevated---t3/t4 normal---will cont syntrhoid 50 mcg daily      #hypoalbuminemia- severe protein/calorie malnutrition  -stable---improving albumin and encourage oral intake  -off ppn now--s/p albumin iv      #DVT//gI ppx  FULL CODE    #Progress Note Handoff    Family discussion: d/w patient  Disposition: unknown at this time--will likely need STR

## 2019-07-05 NOTE — PROGRESS NOTE ADULT - SUBJECTIVE AND OBJECTIVE BOX
Patient is a 68y old  Female who presents with a chief complaint of gallbladder hydrops (05 Jul 2019 09:17)  pt seen and evaluated resting comfortably  the swelling is less  family at bedside  ICU Vital Signs Last 24 Hrs  T(C): 36.1 (05 Jul 2019 05:41), Max: 36.1 (04 Jul 2019 13:20)  T(F): 96.9 (05 Jul 2019 05:41), Max: 96.9 (04 Jul 2019 13:20)  HR: 76 (05 Jul 2019 05:41) (66 - 82)  BP: 105/48 (05 Jul 2019 05:41) (105/48 - 125/60)  RR: 18 (05 Jul 2019 05:41) (18 - 18)    Drug Dosing Weight  Height (cm): 165.1 (01 Jul 2019 06:54)  Weight (kg): 125.8 (01 Jul 2019 06:54)  BMI (kg/m2): 46.2 (01 Jul 2019 06:54)  BSA (m2): 2.27 (01 Jul 2019 06:54)    I&O's Detail    04 Jul 2019 07:01  -  05 Jul 2019 07:00  --------------------------------------------------------  IN:  Total IN: 0 mL    OUT:    Indwelling Catheter - Urethral: 4025 mL  Total OUT: 4025 mL    Total NET: -4025 mL     PHYSICAL EXAM:  Constitutional: alert and oriented x3 talkative  Gastrointestinal: abdomen is soft, obese  Extremities decrease in the swelling of the extremities  MEDICATIONS  (STANDING):  albumin human 25% IVPB 50 milliLiter(s) IV Intermittent once  albumin human 25% IVPB 50 milliLiter(s) IV Intermittent once  calcium carbonate    500 mG (Tums) Chewable 1 Tablet(s) Chew every 12 hours  chlorhexidine 4% Liquid 1 Application(s) Topical <User Schedule>  docusate sodium 100 milliGRAM(s) Oral three times a day  furosemide   Injectable 80 milliGRAM(s) IV Push once  furosemide   Injectable 80 milliGRAM(s) IV Push once  heparin  Injectable 5000 Unit(s) SubCutaneous every 8 hours  levothyroxine 50 MICROGram(s) Oral daily  metoprolol tartrate 25 milliGRAM(s) Oral two times a day  midodrine 10 milliGRAM(s) Oral every 8 hours  nystatin Powder 1 Application(s) Topical three times a day  pantoprazole   Suspension 40 milliGRAM(s) Oral before breakfast  potassium chloride   Powder 40 milliEquivalent(s) Oral daily  PPD  5 Tuberculin Unit(s) Injectable 5 Unit(s) IntraDermal once  predniSONE   Tablet 40 milliGRAM(s) Oral daily  senna 2 Tablet(s) Oral at bedtime      Diet, Renal Restrictions:   For patients receiving Renal Replacement - No Protein Restr, No Conc K, No Conc Phos, Low Sodium (07-02-19 @ 11:01)      LABS  07-05    146  |  102  |  109<HH>  ----------------------------<  125<H>  3.4<L>   |  29  |  1.1    Ca    8.2<L>      05 Jul 2019 05:18    TPro  5.3<L>  /  Alb  2.9<L>  /  TBili  2.7<H>  /  DBili  x   /  AST  86<H>  /  ALT  82<H>  /  AlkPhos  430<H>  07-05                          8.8    16.06 )-----------( 173      ( 05 Jul 2019 05:18 )             26.2     CAPILLARY BLOOD GLUCOSE  POCT Blood Glucose.: 146 mg/dL (05 Jul 2019 11:39)  POCT Blood Glucose.: 111 mg/dL (05 Jul 2019 07:44)

## 2019-07-05 NOTE — PROGRESS NOTE ADULT - SUBJECTIVE AND OBJECTIVE BOX
GI HPI Today:  Patient is a 68y old  Female who presents with a chief complaint of gallbladder hydrops (04 Jul 2019 14:58)  GI following patient for transaminitis.   lfts improving.   patient denies any abdominal pain, no nausea or vomiting.           PAST MEDICAL & SURGICAL HISTORY  Hypothyroidism  CAD S/P percutaneous coronary angioplasty  No significant past surgical history      ALLERGIES:  No Known Allergies      MEDICATIONS:  MEDICATIONS  (STANDING):  calcium carbonate    500 mG (Tums) Chewable 1 Tablet(s) Chew every 12 hours  chlorhexidine 4% Liquid 1 Application(s) Topical <User Schedule>  docusate sodium 100 milliGRAM(s) Oral three times a day  heparin  Injectable 5000 Unit(s) SubCutaneous every 8 hours  levothyroxine 50 MICROGram(s) Oral daily  metoprolol tartrate 25 milliGRAM(s) Oral two times a day  midodrine 10 milliGRAM(s) Oral every 8 hours  nystatin Powder 1 Application(s) Topical three times a day  pantoprazole   Suspension 40 milliGRAM(s) Oral before breakfast  potassium chloride   Powder 40 milliEquivalent(s) Oral daily  PPD  5 Tuberculin Unit(s) Injectable 5 Unit(s) IntraDermal once  predniSONE   Tablet 40 milliGRAM(s) Oral daily  senna 2 Tablet(s) Oral at bedtime    MEDICATIONS  (PRN):  aluminum hydroxide/magnesium hydroxide/simethicone Suspension 30 milliLiter(s) Oral every 4 hours PRN Dyspepsia  bisacodyl Suppository 10 milliGRAM(s) Rectal daily PRN Constipation      REVIEW OF SYSTEMS  General:  No fevers  Eyes:  No reported pain   ENT:  No sore throat   NECK: No stiffness   CV:  No chest pain   Resp:  No shortness of breath  GI:  See HPI  :  No dysuria  Neuro:  No tingling  Endocrine:  No polyuria  Heme:  No ecchymosis          T(C): 36.1 (07-05-19 @ 05:41), Max: 36.1 (07-04-19 @ 13:20)  HR: 76 (07-05-19 @ 05:41) (66 - 82)  BP: 105/48 (07-05-19 @ 05:41) (105/48 - 125/60)  RR: 18 (07-05-19 @ 05:41) (18 - 18)  SpO2: --    PHYSICAL EXAM:  GENERAL: NAD, well-developed  HEAD:  Atraumatic, Normocephalic  EYES: conjunctiva and sclera clear  NECK: Supple, No JVD  CHEST/LUNG: Clear to auscultation bilaterally.  HEART: Regular rate and rhythm.   ABDOMEN: Soft, Nontender, Nondistended; Bowel sounds present  PSYCH: AAOx3  SKIN: No rashes or lesions        Blood Work :                        8.8    16.06 )-----------( 173      ( 05 Jul 2019 05:18 )             26.2     PT/INR - ( 02 Jul 2019 05:22 )  INR: 1.34          PTT - ( 02 Jul 2019 05:22 )  PTT:35.3   07-05    146  |  102  |  109<HH>  ----------------------------<  125<H>  3.4<L>   |  29  |  1.1    Ca    8.2<L>      05 Jul 2019 05:18  Phos  5.3     07-03  Mg     2.1     07-03      CBC -  ( 05 Jul 2019 05:18 )  Hemoglobin : 8.8    CBC -  ( 04 Jul 2019 05:51 )  Hemoglobin : 9.2    CBC -  ( 03 Jul 2019 06:20 )  Hemoglobin : 9.9    CBC -  ( 02 Jul 2019 05:22 )  Hemoglobin : 9.3    CBC -  ( 01 Jul 2019 11:48 )  Hemoglobin : 9.2      LIVER FUNCTIONS - ( 05 Jul 2019 05:18 )  Alb: 2.9 [3.5 - 5.2] / Pro: 5.3 [6.0 - 8.0] / ALK PHOS: 430 [30 - 115] / ALT: 82 [0 - 41] / AST: 86 [0 - 41] / GGT: x     LIVER FUNCTIONS - ( 04 Jul 2019 05:51 )  Alb: 3.0 [3.5 - 5.2] / Pro: 5.5 [6.0 - 8.0] / ALK PHOS: 435 [30 - 115] / ALT: 84 [0 - 41] / AST: 76 [0 - 41] / GGT: x     LIVER FUNCTIONS - ( 03 Jul 2019 06:20 )  Alb: 3.2 [3.5 - 5.2] / Pro: 6.1 [6.0 - 8.0] / ALK PHOS: 513 [30 - 115] / ALT: 92 [0 - 41] / AST: 90 [0 - 41] / GGT: x     LIVER FUNCTIONS - ( 02 Jul 2019 05:22 )  Alb: 2.7 [3.5 - 5.2] / Pro: 5.6 [6.0 - 8.0] / ALK PHOS: 553 [30 - 115] / ALT: 112 [0 - 41] / AST: 121 [0 - 41] / GGT: x     LIVER FUNCTIONS - ( 01 Jul 2019 11:48 )  Alb: 3.2 [3.5 - 5.2] / Pro: 5.6 [6.0 - 8.0] / ALK PHOS: 555 [30 - 115] / ALT: 118 [0 - 41] / AST: 138 [0 - 41] / GGT: x     LIVER FUNCTIONS - ( 30 Jun 2019 11:48 )  Alb: 2.5 [3.5 - 5.2] / Pro: 5.1 [6.0 - 8.0] / ALK PHOS: 594 [30 - 115] / ALT: 143 [0 - 41] / AST: 155 [0 - 41] / GGT: x     LIVER FUNCTIONS - ( 29 Jun 2019 14:50 )  Alb: 2.5 [3.5 - 5.2] / Pro: 5.2 [6.0 - 8.0] / ALK PHOS: 648 [30 - 115] / ALT: 165 [0 - 41] / AST: 178 [0 - 41] / GGT: x       < from: MR Abdomen No Cont (06.13.19 @ 16:15) >  1. Hydropic gallbladder, up to 4.7 cm in transverse diameter with   gallstones, including probably impacted 1.6 cm stone at neck.  2. Liver cirrhosis with mild abdominal ascites.  3. No biliary distention, filling defect, or stricture.    < end of copied text >    Surgical Pathology Report:   ACCESSION No:  [id number]    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                    3        Addendum Report          Addendum  Report [id number] received from [place], [facility] [address] [facility] [address], [phone] by Franky Florez M.D. on 7/1/2019 with the following diagnosis:    Liver, needle biopsy specimen shows focal perivenular  necroinflammation with mild lymphocytic  infiltration and ceroid  containing macrophages, and minimal steatosis. Glycogenated  nuclei and mild cholestatis are seen. Areas of multiacinar  collapse and bridging necroses containing ductular reaction  accompanied by neutrophils, and mild lymphocytic infiltration are  present.    Trichrome stain highlights the areas of collapse, replaced by  young collagen. PAS-D reaction highlights abundant ceroid  containing macrophages.    - Findings are consistent with subacute hepatitis with  multiacinar / bridging necrosis. There is no evidence of  autoimmune hepatitis or cirrhosis.    - Recommend clinical correlation to determine that cause.    Complete report is available in the electronic medical record as  a PATHOLOGY CONSULT REPORT    Verified by: Manoj Lindsey M.D.  (Electronic Signature)  Reported on: 07/02/19 16:12 EDT, [address] [place] [id number]  _________________________________________________________________ GI HPI Today:  Patient is a 68y old  Female who presents with a chief complaint of gallbladder hydrops (04 Jul 2019 14:58)  GI following patient for transaminitis.   lfts improving.   patient denies any abdominal pain, no nausea or vomiting.           PAST MEDICAL & SURGICAL HISTORY  Hypothyroidism  CAD S/P percutaneous coronary angioplasty  No significant past surgical history      ALLERGIES:  No Known Allergies      MEDICATIONS:  MEDICATIONS  (STANDING):  calcium carbonate    500 mG (Tums) Chewable 1 Tablet(s) Chew every 12 hours  chlorhexidine 4% Liquid 1 Application(s) Topical <User Schedule>  docusate sodium 100 milliGRAM(s) Oral three times a day  heparin  Injectable 5000 Unit(s) SubCutaneous every 8 hours  levothyroxine 50 MICROGram(s) Oral daily  metoprolol tartrate 25 milliGRAM(s) Oral two times a day  midodrine 10 milliGRAM(s) Oral every 8 hours  nystatin Powder 1 Application(s) Topical three times a day  pantoprazole   Suspension 40 milliGRAM(s) Oral before breakfast  potassium chloride   Powder 40 milliEquivalent(s) Oral daily  PPD  5 Tuberculin Unit(s) Injectable 5 Unit(s) IntraDermal once  predniSONE   Tablet 40 milliGRAM(s) Oral daily  senna 2 Tablet(s) Oral at bedtime    MEDICATIONS  (PRN):  aluminum hydroxide/magnesium hydroxide/simethicone Suspension 30 milliLiter(s) Oral every 4 hours PRN Dyspepsia  bisacodyl Suppository 10 milliGRAM(s) Rectal daily PRN Constipation      REVIEW OF SYSTEMS;   General:  No fevers  Eyes:  No reported pain   ENT:  No sore throat   NECK: No stiffness   CV:  No chest pain   Resp:  No shortness of breath  GI:  See HPI  :  No dysuria  Neuro:  No tingling  Endocrine:  No polyuria  Heme:  No ecchymosis          T(C): 36.1 (07-05-19 @ 05:41), Max: 36.1 (07-04-19 @ 13:20)  HR: 76 (07-05-19 @ 05:41) (66 - 82)  BP: 105/48 (07-05-19 @ 05:41) (105/48 - 125/60)  RR: 18 (07-05-19 @ 05:41) (18 - 18)  SpO2: --    PHYSICAL EXAM:  GENERAL: NAD, well-developed  HEAD:  Atraumatic, Normocephalic  EYES: conjunctiva and sclera clear  NECK: Supple, No JVD  CHEST/LUNG: Clear to auscultation bilaterally.  HEART: Regular rate and rhythm.   ABDOMEN: Soft, Nontender, Nondistended; Bowel sounds present  PSYCH: AAOx3  SKIN: No rashes or lesions        Blood Work :                        8.8    16.06 )-----------( 173      ( 05 Jul 2019 05:18 )             26.2     PT/INR - ( 02 Jul 2019 05:22 )  INR: 1.34          PTT - ( 02 Jul 2019 05:22 )  PTT:35.3   07-05    146  |  102  |  109<HH>  ----------------------------<  125<H>  3.4<L>   |  29  |  1.1    Ca    8.2<L>      05 Jul 2019 05:18  Phos  5.3     07-03  Mg     2.1     07-03      CBC -  ( 05 Jul 2019 05:18 )  Hemoglobin : 8.8    CBC -  ( 04 Jul 2019 05:51 )  Hemoglobin : 9.2    CBC -  ( 03 Jul 2019 06:20 )  Hemoglobin : 9.9    CBC -  ( 02 Jul 2019 05:22 )  Hemoglobin : 9.3    CBC -  ( 01 Jul 2019 11:48 )  Hemoglobin : 9.2      LIVER FUNCTIONS - ( 05 Jul 2019 05:18 )  Alb: 2.9 [3.5 - 5.2] / Pro: 5.3 [6.0 - 8.0] / ALK PHOS: 430 [30 - 115] / ALT: 82 [0 - 41] / AST: 86 [0 - 41] / GGT: x     LIVER FUNCTIONS - ( 04 Jul 2019 05:51 )  Alb: 3.0 [3.5 - 5.2] / Pro: 5.5 [6.0 - 8.0] / ALK PHOS: 435 [30 - 115] / ALT: 84 [0 - 41] / AST: 76 [0 - 41] / GGT: x     LIVER FUNCTIONS - ( 03 Jul 2019 06:20 )  Alb: 3.2 [3.5 - 5.2] / Pro: 6.1 [6.0 - 8.0] / ALK PHOS: 513 [30 - 115] / ALT: 92 [0 - 41] / AST: 90 [0 - 41] / GGT: x     LIVER FUNCTIONS - ( 02 Jul 2019 05:22 )  Alb: 2.7 [3.5 - 5.2] / Pro: 5.6 [6.0 - 8.0] / ALK PHOS: 553 [30 - 115] / ALT: 112 [0 - 41] / AST: 121 [0 - 41] / GGT: x     LIVER FUNCTIONS - ( 01 Jul 2019 11:48 )  Alb: 3.2 [3.5 - 5.2] / Pro: 5.6 [6.0 - 8.0] / ALK PHOS: 555 [30 - 115] / ALT: 118 [0 - 41] / AST: 138 [0 - 41] / GGT: x     LIVER FUNCTIONS - ( 30 Jun 2019 11:48 )  Alb: 2.5 [3.5 - 5.2] / Pro: 5.1 [6.0 - 8.0] / ALK PHOS: 594 [30 - 115] / ALT: 143 [0 - 41] / AST: 155 [0 - 41] / GGT: x     LIVER FUNCTIONS - ( 29 Jun 2019 14:50 )  Alb: 2.5 [3.5 - 5.2] / Pro: 5.2 [6.0 - 8.0] / ALK PHOS: 648 [30 - 115] / ALT: 165 [0 - 41] / AST: 178 [0 - 41] / GGT: x       < from: MR Abdomen No Cont (06.13.19 @ 16:15) >  1. Hydropic gallbladder, up to 4.7 cm in transverse diameter with   gallstones, including probably impacted 1.6 cm stone at neck.  2. Liver cirrhosis with mild abdominal ascites.  3. No biliary distention, filling defect, or stricture.    < end of copied text >      Surgical Pathology Report:     Addendum  Report [id number] received from [place], [facility] [place]  [facility] [address] [facility] [address], [phone] by Franky Florez M.D. on 7/1/2019 with the following diagnosis:    Liver, needle biopsy specimen shows focal perivenular  necroinflammation with mild lymphocytic  infiltration and ceroid  containing macrophages, and minimal steatosis. Glycogenated  nuclei and mild cholestatis are seen. Areas of multiacinar  collapse and bridging necroses containing ductular reaction  accompanied by neutrophils, and mild lymphocytic infiltration are  present.    Trichrome stain highlights the areas of collapse, replaced by  young collagen. PAS-D reaction highlights abundant ceroid  containing macrophages.    - Findings are consistent with subacute hepatitis with  multiacinar / bridging necrosis. There is no evidence of  autoimmune hepatitis or cirrhosis.    - Recommend clinical correlation to determine that cause.    Complete report is available in the electronic medical record as  a PATHOLOGY CONSULT REPORT    Verified by: Manoj Lindsey M.D.  (Electronic Signature)  Reported on: 07/02/19 16:12 EDT, [address] [place] [id number]                _________________________________________________________________

## 2019-07-05 NOTE — PROGRESS NOTE ADULT - ASSESSMENT
ASSESSMENT/PLAN  - GB hydrops  - LUIS ALBERTO  - hyperkalemia  - hypothyroidism  - hiatal hernia  - esophageal reflux, dysmotility  hypokalemia  elevated WBC    PLAN  - check calorie counts X 3 days  - cont PO diet as tolerated but doubt intake will improve much especially with hiatal hernia and esophageal dysmotility.   If p.o intake inadequate consider  placing an NGT for feeding   - start with Osmolite 1.5 240ml pc & hs X 4 feeds/day. add Beneprotein 1 pack with each feed  check bmp/phos/mg and correct lytes

## 2019-07-05 NOTE — PROGRESS NOTE ADULT - ASSESSMENT
68 Y F Hx of CAD on DAPT, for a stent >1 year ago, w abnormal LFTs  MRI suggestive of cirrhosis (nodular contour) with no evident signs of portal HTN. Hydropic gallbladder up to 4.7 cm  in transverse diameter with   gallstones, including probably impacted 1.6 cm stone at neck, no biliary distention, filling defect or stricture.   The etiology of the derangement in the liver enzymes is unclear.    Currently;   the soluble IgG is mildly elevated the ASMA and Anti LKM  are negative, viral hepatitis workup is negative, normal ceruloplasmin alpha one antitrypsin normal , alpha fetoprotein normal.    The AMA is negative,  ANKITA is positive. P- anca positive, anti ds positive , Liver biopsy showed cirrhosis of unclear etiology  , second opinion pathology read by [facility] in favor of drug induced liver injury.     1)transaminitis:  subacute hepatitis with multiacinar / bridging necrosis. There is no evidence of autoimmune hepatitis or cirrhosis. More in favor of  drug induced according to [facility] read.    Lfts are trending down.  trend lfts , avoid hepatotoxic drugs  no hepatic encephalopathy  no asterixes   no coagulopathy       2)PROFOUND Hypoalbuminemia  Kidney disease? CKD?  liver biopsy in favor of  ATN     3)GERD: on Protonix    4) Symptoms of raynaud / muscle weakness/ multiple positive autoimmune markers f/u with rheumatology 68 Y F Hx of CAD on DAPT, for a stent >1 year ago, w abnormal LFTs  MRI suggestive of cirrhosis (nodular contour) with no evident signs of portal HTN. Hydropic gallbladder up to 4.7 cm  in transverse diameter with   gallstones, including probably impacted 1.6 cm stone at neck, no biliary distention, filling defect or stricture.   The etiology of the derangement in the liver enzymes is unclear.    Currently;   the soluble IgG is mildly elevated the ASMA and Anti LKM  are negative, viral hepatitis workup is negative, normal ceruloplasmin alpha one antitrypsin normal , alpha fetoprotein normal.    The AMA is negative,  ANKITA is positive. P- anca positive, anti ds positive , Liver biopsy read by [facility] showed cirrhosis of unclear etiology  , second opinion pathology read by [facility]. [facility] in favor of drug induced liver injury.     1)transaminitis:  Liver biopsy showed subacute hepatitis with multiacinar / bridging necrosis. There is no evidence of autoimmune hepatitis or cirrhosis. More in favor of  drug induced according to [facility]. [facility] read.    Lfts are trending down.  trend lfts , avoid hepatotoxic drugs  no hepatic encephalopathy  no asterixes   no coagulopathy     2)Abnormal liver imaging- cirrhosis  -HCC: none on imaging , repeat US abdomen and AFP q 6 month  -EV: needs screening  -Ascites: mild on MR abdomen  - no portal hypertension on MR abdomen    3)PROFOUND Hypoalbuminemia  Kidney disease? CKD?  liver biopsy in favor of  ATN     4)GERD: on Protonix    5) Symptoms of raynaud / muscle weakness/ multiple positive autoimmune markers f/u with rheumatology

## 2019-07-06 NOTE — PROGRESS NOTE ADULT - ASSESSMENT
· Assessment		  A/P 69 yo F w/ hx of CAD s/p PCI and hypothyroidism sent by gastroenterologist for gallbladder hydrops, course complicated by ALI/ transaminitis possibly of autoimmune etiology, LUIS ALBERTO with hyperkalemia multifactorial 2/2 rhabdo (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 with resultant proteinuria and low albumin state and diffuse anasarca, raynauds phenomenon (not currently active), esophageal dysmotility and reflux, cystitis with urinary retention s/p abx, and subsequent worsening functional debility and malnutrition.    Patient is weaker, edematous. She had rings in her fingers and were removed by the senior resident to avoid ischemia in finger 06/29. Labs performed after blood drawn via US on 07/01. Patient had a bowel movement today 07/01 and feels better after that. 40 meq k was given due to low K levels 3.1 07/01. Calcium carbonate 500x2 added on Nephrology recommendations 07/01.    Patient was continued on 07/06 on Solu-medrol 80mg once daily and 50 ml of 25% albumin followed by Lasix. Patients Cr is downtrending and will follow with Neuro for EMG study as indicated by Rheumatology.    #hydrops gallbladder/acute severe transaminitis  -LFT's downtrending  -CPK downtrending [id number]   -Liver biopsy done shows Cirrhosis but of no clear etiology   -Clinically too weak at this moment for cholecystectomy  -Midodrine 5mg every 8h for portal HTN  -GI recommended to trend INR and LFTs daily 07/01    #LUIS ALBERTO  -Cr downtrending 2.0>1.5>1.1>0.9  -BUN levels are down to 95<109<129<139<138<151  -As per renal team recommendations - patient most likely has hepatorenal syndrome  -Albumin challenge test with 100g albumin performed 06/27  -Continue IV Albumin 25% 50ml with 80mg Lasix twice a day  -Strict Input and Output measures  -Renal biopsy done yesterday 07/01  -Given 40 meq of K once only on 07/01 and 07/03  -Continue 40 meq of K daily  -Started Ca carbonate 500 twice daily  -Renal Biopsy shows LUIS ALBERTO changes     # Abnormal DS DNA, Elevated ANKITA titers   -Rheum recommended to hold prednisone use until w/u is completed  -Solumedrol 80mg once given on 07/01 and 07/02  -Started oral Prednisone 40mg 07/03  -Renal bx is inconclusive and EMG and sural nerve biopsy recommended by Derrick. Will discuss on 07/08  -ESR, CRP, ANCA repeated    #hypoalbuminemia  -Malnutrition vs Liver Cirrhosis  -Patient was started on TPN    #distal digit blue discoloration  -Raynaud's (primary vs secondary)  -r/o etiology---paraneoplastic, vasculitis, vs rheum   -transient=---resolved within 1 hour  -gloves/warmth to affected fingertips  -CCB on hold for hypotension  -esophagram consistent with dysmotility    #UTI with urinary retention  -has Smith, monitor u/o   -received ceftriaxone/ completed  -Urinalysis order on recommendation of Nephro  -WBCs 15>17>15>18>20>13>16    #h/o CAD  -cont home regimen  -statin on hold 2/2 acute liver injury  -ASA and Plavix held    #hypothyroidism  -TSH elevated---t3/t4 normal---will cont synthroid 50 mcg daily    #DVT + GI ppx  FULL CODE  high risk/ guarded prognosis

## 2019-07-06 NOTE — PROGRESS NOTE ADULT - SUBJECTIVE AND OBJECTIVE BOX
SUBJECTIVE:    Patient is a 68y old Female who presents with a chief complaint of gallbladder hydrops (06 Jul 2019 10:17)    Currently admitted to medicine with the primary diagnosis of Gallbladder hydrops     Today is hospital day 22d. This morning she is resting comfortably in bed and eating her breakfast. She reports increase in appetite and will to ambulate with PT.    INTERVAL EVENTS: Patient is continued and counselled on the current treatment plan.    PAST MEDICAL & SURGICAL HISTORY  Hypothyroidism  CAD S/P percutaneous coronary angioplasty  No significant past surgical history      ALLERGIES:  No Known Allergies    MEDICATIONS:  STANDING MEDICATIONS  albumin human 25% IVPB 50 milliLiter(s) IV Intermittent once  calcium carbonate    500 mG (Tums) Chewable 1 Tablet(s) Chew every 12 hours  chlorhexidine 4% Liquid 1 Application(s) Topical <User Schedule>  docusate sodium 100 milliGRAM(s) Oral three times a day  furosemide   Injectable 80 milliGRAM(s) IV Push once  heparin  Injectable 5000 Unit(s) SubCutaneous every 8 hours  levothyroxine 50 MICROGram(s) Oral daily  metoprolol tartrate 25 milliGRAM(s) Oral two times a day  midodrine 10 milliGRAM(s) Oral every 8 hours  nystatin Powder 1 Application(s) Topical three times a day  pantoprazole   Suspension 40 milliGRAM(s) Oral before breakfast  potassium chloride   Powder 40 milliEquivalent(s) Oral daily  PPD  5 Tuberculin Unit(s) Injectable 5 Unit(s) IntraDermal once  predniSONE   Tablet 40 milliGRAM(s) Oral daily  senna 2 Tablet(s) Oral at bedtime    PRN MEDICATIONS  aluminum hydroxide/magnesium hydroxide/simethicone Suspension 30 milliLiter(s) Oral every 4 hours PRN  bisacodyl Suppository 10 milliGRAM(s) Rectal daily PRN    VITALS:   T(F): 96.7  HR: 69  BP: 120/58  RR: 18  SpO2: 95%    LABS:                        8.9    14.95 )-----------( 171      ( 06 Jul 2019 06:43 )             27.2     07-06    145  |  101  |  95<HH>  ----------------------------<  155<H>  3.4<L>   |  31  |  0.9    Ca    8.7      06 Jul 2019 06:43    TPro  5.7<L>  /  Alb  3.2<L>  /  TBili  3.2<H>  /  DBili  1.3<H>  /  AST  86<H>  /  ALT  86<H>  /  AlkPhos  454<H>  07-06      Creatine Kinase, Serum: 35 U/L (07-06-19 @ 06:43)      CARDIAC MARKERS ( 06 Jul 2019 06:43 )  x     / x     / 35 U/L / x     / x          RADIOLOGY:  Surgical Pathology Report (07.01.19 @ 20:14)    Surgical Pathology Report:   ACCESSION No:  [id number]    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                    3      Surgical Final Report      Final Diagnosis  Kidney, needle core biopsy    Acute tubular injury with focal tubular necrosis (see comment)    No evidence of crescentic glomerulonephritis, immune complex-  mediated disease, or acute interstitial nephritis is seen in this  sample    Summary of chronic changes:  - Global glomerulosclerosis (4% of glomeruli)  - Tubular atrophy and interstitial fibrosis (less than 5% of  cortex)  - Mild arterial and arteriolar sclerosis    Verified by: Kisha Morris MD  (Electronic Signature)  Reported on: 07/03/19 15:20 EDT, [address]  _________________________________________________________________    Comment  The preliminary findings were discussed with Dr. Elizabeth on  7/2/2019 at 11:15AM.    This biopsy reveals acute tubular necrosis and no significant  glomerular disease, in this patient with multiple antibodies and  a complex medical history. Etiologies of acute tubular necrosis  include ischemia potentially aggravated by medications (anti-  fungals, antibiotics, anti-retrovirals, acetaminophen, non-  steroidal anti-inflammatory drugs), drugs with vasoactive effect,  heavy metals (cadmium, mercury), herbalremedies, organic  solvents, herbicides, and radiocontrast solutions.    Microscopic Description    1. Light Microscopy:  Sections of formalin-fixed, paraffin embedded tissue were  evaluated using H&E, PAS, JMS, and trichrome stains. An H&E-  stainedfrozen section taken from the tissue allocated for  immunofluorescence microscopy and semi-thin toluidine blue-  stained epoxy sections of the tissue processed for electron  microscopy were also evaluated using light microscopy.    The sample submitted for light microscopy consists of renal  cortex and medulla, with up to 10 glomeruli. The entire biopsy  contains 27 glomeruli (LM-10; IF-9; EM-8), of which 1 is globally  sclerosed. The non-sclerosed glomeruli are              RENARD SHERMANE                3        Surgical Final Report          of normal size, architecture and cellularity. The glomerular  capillary loops are of normal thickness and texture. Significant  endocapillary proliferation is not present and cellular crescents  are not seen in glomeruli. The mesangium reveals no significant  expansion or hypercellularity. Tubules reveal focal degenerative  changes and flattening of the epithelium. Several tubules contain  necrotic cellular debris. The interstitium contains no  significant inflammatory infiltrates. Approximately 5% of the  renal cortex shows tubular atrophy and interstitial fibrosis.  Arteries and arterioles show mild sclerosis.    2. Immunofluorescence Microscopy:  The sections of the sample submitted for immunofluorescence  studies were incubated with antibodies specific for the heavy  chains of IgG, IgA, and IgM, for kappa and lambda light chains,  fibrin, albumin, and complement components C3 and C1q. The  intensity of immunofluorescence reactivity is expressed on a  scale 1-  -4+.    The sample contains 9 glomeruli, of which 1 is globally  sclerosed. No significant immune deposits are seen in glomeruli.  There is finely granular reactivity for IgM (trace) and C3  (trace) in the mesangium. Tubules contain few intraluminal casts  reactive for polyclonal IgA. The interstitium reveals scattered  fibrin deposits. There is no difference in reactivity between  kappa and lambda light chains in the glomeruli, tubular casts, or  in the background of the tissue.    3. Electron Microscopy:  The sample submitted for electron microscopy examination contains  8 glomeruli; 2 glomeruli are examined ultrastructurally. The  glomerular visceral epithelial cells reveal mild segmental  effacement of theirfoot processes. The glomerular basement  membranes are of normal thickness and texture. Morphometric  analysis was performed on 18 sites, using orthogonal intercept  method. The harmonic mean of the glomerular basement membrane  thickness is 275 nm. The endothelial cells show focal swelling.  The mesangium reveals normal cellular elements and a normal  amount of matrix. No electron dense deposits are present in the  mesangium or along the capillary walls.    PHYSICAL EXAM:  GEN: No acute distress  PULM/CHEST: Clear to auscultation bilaterally, no rales, rhonchi or wheezes   CVS: Regular rate and rhythm, S1-S2, no murmurs  ABD: Soft, non-tender, non-distended, +BS  EXT: No edema  NEURO: AAOx3    Smith Catheter:   Indwelling Urethral Catheter:     Connect To:  Straight Drainage/[place]    Indication:  Urinary Retention / Obstruction (07-05-19 @ 04:08) (not performed) [active]

## 2019-07-06 NOTE — PROGRESS NOTE ADULT - ASSESSMENT
a/p:  #hydrops gallbladder with obstructing gs -acute severe transaminiitis/cirrhosis-acute hepatorenal syndrome  -lft trending down  -s/p liver biopsy- + cirrhosis (nonspecific---?Drug induced)  -concominant rhabdomyolysis--->resolved with normal repeat ck  -f/u with GI--will need repeat afp and imaging as outpatient  -surgery f/u for eventual cholecystectomy  -outpaitent GI is Dr. 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 lupus nephritis, vs Vasculitis,   - infectious etiology negative (hiv, ebv, hep serology, cmv negative)]  -f/u with rheum recomm  -will order EMG  -possible sural nerve biopsy   - low dose oral prednisone 40 mg daily     #h/o CAD  -cont home regimen  -statin on hold 2/2 acute liver injury    #hypothyroidism  -tsh elevated---t3/t4 normal---will cont syntrhoid 50 mcg daily      #hypoalbuminemia- severe protein/calorie malnutrition  -stable---improving albumin (today 3.2)  - encourage oral intake  -off ppn with much better oral intake    #LUE edema-? infiltrated pvl  -pvl removed  -arm elevation/compresses   -check doppler if edema continues (patient is on hep sq dvt ppx)    #DVT//gI ppx  FULL CODE  guarded prognosis    #Progress Note Handoff  Family discussion: d/w patient and  minerva bedside   Disposition: unknown at this time--will need STR --advised patient  to continue working with PT

## 2019-07-07 NOTE — PROGRESS NOTE ADULT - SUBJECTIVE AND OBJECTIVE BOX
Patient is a 68y old  Female who presents with a chief complaint of gallbladder hydrops (18 Jun 2019 13:54)    HPI:  69 yo F w/ hx of CAD s/p PCI and hypothyroidism sent by gastroenterologist for gallbladder hydrops. Pt found to have elevated liver enzymes by PMD in Apr 2019, referred to GI for w/u. MRCP 2 days ago significant for gallbladder hydrops 4.7cm with probable impacted stone at neck, also liver cirrhosis w/ mild ascites. Admits to social etoh, denies binge drinking, denies abdominal pain, N/V. Admits to decreased appetite in past 2 weeks, pt tried to maintain low salt diet. Reports BLE edema developed in past 2 days as well as weakness. Denies fever, chills, abdominal pain, SOB, dysuria.    In ED, evaluated by surgery -> no interventions at this time (15 Shashank 2019 01:53)    PAST MEDICAL & SURGICAL HISTORY:  Hypothyroidism  CAD S/P percutaneous coronary angioplasty  No significant past surgical history    patient seen and examined independently on morning rounds, chart reviewed and discussed with the medicine resident.    no overnight events--  bedside- LUE edema slightly better    PE:  GEN-NAD, AAOx3,  PULM- Clear to auscultation bilaterally, fair air entry  CVS- +s1/s2 RRR no murmurs  GI- soft NT obese ND +bs, no rebound, no guarding, +echhymosis  EXT- 1+ bilateral LE edema, 1+ LUE edema         Labs:                        8.7    17.57 )-----------( 159      ( 07 Jul 2019 05:52 )             26.2     CBC Full  -  ( 07 Jul 2019 05:52 )  WBC Count : 17.57 K/uL  RBC Count : 2.73 M/uL  Hemoglobin : 8.7 g/dL  Hematocrit : 26.2 %  Platelet Count - Automated : 159 K/uL  Mean Cell Volume : 96.0 fL  Mean Cell Hemoglobin : 31.9 pg  Mean Cell Hemoglobin Concentration : 33.2 g/dL  Auto Neutrophil # : x  Auto Lymphocyte # : x  Auto Monocyte # : x  Auto Eosinophil # : x  Auto Basophil # : x  Auto Neutrophil % : x  Auto Lymphocyte % : x  Auto Monocyte % : x  Auto Eosinophil % : x  Auto Basophil % : x      07-07    142  |  101  |  79<HH>  ----------------------------<  104<H>  3.5   |  29  |  1.0    Ca    8.6      07 Jul 2019 05:52    TPro  5.7<L>  /  Alb  3.2<L>  /  TBili  3.2<H>  /  DBili  1.3<H>  /  AST  86<H>  /  ALT  86<H>  /  AlkPhos  454<H>  07-06      Microbiology:      Vital Signs Last 24 Hrs  T(C): 36.2 (07 Jul 2019 05:12), Max: 36.4 (06 Jul 2019 19:54)  T(F): 97.1 (07 Jul 2019 05:12), Max: 97.5 (06 Jul 2019 19:54)  HR: 69 (07 Jul 2019 05:12) (66 - 69)  BP: 104/48 (07 Jul 2019 05:12) (104/48 - 120/58)  BP(mean): --  RR: 18 (07 Jul 2019 05:12) (18 - 18)  SpO2: --    I&O's Summary    06 Jul 2019 07:01  -  07 Jul 2019 07:00  --------------------------------------------------------  IN: 330 mL / OUT: 2150 mL / NET: -1820 mL    07 Jul 2019 07:01  -  07 Jul 2019 11:19  --------------------------------------------------------  IN: 200 mL / OUT: 0 mL / NET: 200 mL

## 2019-07-07 NOTE — PROGRESS NOTE ADULT - ASSESSMENT
a/p:  #hydrops gallbladder with obstructing gs -acute severe transaminiitis/cirrhosis-acute hepatorenal syndrome  -lft trending down  -s/p liver biopsy- + cirrhosis (nonspecific---?Drug induced)  -concominant rhabdomyolysis--->resolved with normal repeat ck  -f/u with GI--will need repeat afp and imaging as outpatient  -surgery f/u for eventual cholecystectomy  -outpaitent GI is Dr. 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 lupus nephritis, vs Vasculitis,   - infectious etiology negative (hiv, ebv, hep serology, cmv negative)]  -f/u with rheum recomm  -will order EMG  -possible sural nerve biopsy   - low dose oral prednisone 40 mg daily     #h/o CAD  -cont home regimen  -statin on hold 2/2 acute liver injury    #hypothyroidism  -tsh elevated---t3/t4 normal---will cont syntrhoid 50 mcg daily      #hypoalbuminemia- severe protein/calorie malnutrition  -stable---improving albumin (today 3.2)  - encourage oral intake  -off ppn with much better oral intake    #LUE edema-? infiltrated pvl  -pvl removed  -arm elevation/compresses   -check doppler if edema continues (patient is on hep sq dvt ppx)    #DVT//gI ppx  FULL CODE  guarded prognosis    #Progress Note Handoff  Family discussion: d/w patient and  minerva bedside   Disposition: unknown at this time--will need STR --advised patient  to continue working with PT a/p:  #hydrops gallbladder with obstructing gs -acute severe transaminiitis/cirrhosis-acute hepatorenal syndrome  -lft trending down  -s/p liver biopsy- + cirrhosis (nonspecific---?Drug induced)  -concominant rhabdomyolysis--->resolved with normal repeat ck  -f/u with GI--will need repeat afp and imaging as outpatient  -surgery f/u for eventual cholecystectomy  -outpaitent GI is Dr. 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 lupus nephritis, vs Vasculitis,   - infectious etiology negative (hiv, ebv, hep serology, cmv negative)]  -f/u with rheum recomm  -pending EMG  -consider sural nerve biopsy   - low dose oral prednisone 40 mg daily     #h/o CAD  -cont home regimen  -statin on hold 2/2 acute liver injury    #hypothyroidism  -tsh elevated---t3/t4 normal---will cont syntrhoid 50 mcg daily      #hypoalbuminemia- severe protein/calorie malnutrition  -stable---albumin (today 3.2)  - encourage oral intake  -off ppn with much better oral intake    #LUE edema- improved- likely 2/2 infiltrated pvl  -pvl removed  -arm elevation/compresses     #DVT//gI ppx  FULL CODE  guarded prognosis    #Progress Note Handoff  Family discussion: d/w patient and  minerva bedside   Disposition: unknown at this time--will need STR --advised patient  to continue working with PT--stood up friday with 2 person assist a/p:  #hydrops gallbladder with obstructing gs -acute severe transaminiitis/cirrhosis-acute hepatorenal syndrome  -lft trending down  -s/p liver biopsy- + cirrhosis (nonspecific---?Drug induced)  -concominant rhabdomyolysis--->resolved with normal repeat ck  -f/u with GI--will need repeat afp and imaging as outpatient  -surgery f/u for eventual cholecystectomy  -outpaitent GI is Dr. 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 lupus nephritis, vs Vasculitis,   - infectious etiology negative (hiv, ebv, hep serology, cmv negative)]  -f/u with rheum recomm  -pending EMG  -consider sural nerve biopsy   - low dose oral prednisone 40 mg daily     #h/o CAD  -cont home regimen  -statin on hold 2/2 acute liver injury    #hypothyroidism  -tsh elevated---t3/t4 normal---will cont syntrhoid 50 mcg daily      #hypoalbuminemia- severe protein/calorie malnutrition  -stable---albumin (today 3.2)  - encourage oral intake  -off ppn with much better oral intake    #LUE edema- improved- likely 2/2 infiltrated pvl  -pvl removed  -arm elevation/compresses     #DVT//gI ppx  FULL CODE  guarded prognosis    #Progress Note Handoff  Family discussion: d/w patient and  minerva bedside   Disposition: unknown at this time--will need STR --advised patient  to continue working with PT--stood up friday with 2 person assist

## 2019-07-08 NOTE — PROGRESS NOTE ADULT - ATTENDING COMMENTS
69 yo F w/ hx of CAD s/p PCI and hypothyroidism sent by gastroenterologist for gallbladder hydrops, course complicated by ALI/ transaminitis possibly of autoimmune etiology, LUIS ALBERTO with hyperkalemia multifactorial 2/2 ?rhabdo (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 with resultant proteinuria and low albumin state and diffuse anasarca, raynauds phenomenon (not currently active), esophageal dysmotility and reflux, cystitis with urinary retention s/p abx, and subsequent worsening functional debility and malnutrition.    #hydrops gallbladder/acute severe transaminitis  -ALT/AST down to 54/64   -CPK downtrending [id number]   -Liver biopsy done shows Cirrhosis but of no clear etiology   -Clinically too weak at this moment for cholecystectomy. But will eventually need to get it done.  -Midodrine 5mg every 8h for cirrhosis related low BP.    #LUIS ALBERTO  -Cr downtrending 1.5>1.1>0.9>1.0. Now resolved.  -BUN levels are down to 79>64  -patient most likely had hepatorenal syndrome +/- ATN (as evidenced on Kidney biopsy) vs maybe Vasculitis related.    #Anasarca:  -Continue IV Albumin 25% 50ml with 80mg Lasix twice a day  -Strict Input and Output measures    #Electrolytes:  -Continue 40mg K daily  -Continue Ca carbonate 500 twice daily. SErum calcium is normal though. MAY DISCONTINUE PO CALCIUM.    #Suspected Vasculitis :   abnormal DS DNA, Elevated ANKITA titers   -Solumedrol 80mg once given on 07/01 and 07/02  -Started oral Prednisone 40mg 07/03  -Since Renal bx is inconclusive for Vasculitis, EMG and sural nerve biopsy recommended by Rheum. f/u surgery for the same.    -ESR, CRP, ANCA repeated    #hypoalbuminemia  -Malnutrition vs Liver Cirrhosis  -Patient was started on TPN    #distal digit blue discoloration  -Raynaud's (primary vs secondary)  -r/o etiology---paraneoplastic, vasculitis, vs rheum   -transient=---resolved within 1 hour  -gloves/warmth to affected fingertips  -CCB on hold for hypotension  -esophagram consistent with dysmotility    #UTI with urinary retention  -Smith D/C  -Trial of void on 07/08 succesful.  -received ceftriaxone/ completed  -Urinalysis order on recommendation of Nephro    #h/o CAD  -cont home regimen  -statin on hold 2/2 acute liver injury  -ASA restarted 07/08    #hypothyroidism  -TSH elevated---t3/t4 normal---will cont synthroid 50 mcg daily    #DVT + GI ppx  FULL CODE  high risk/ guarded prognosis    #HANDOFF: f/u surgery for sural nerve biopsy.

## 2019-07-08 NOTE — CONSULT NOTE ADULT - SUBJECTIVE AND OBJECTIVE BOX
HPI:  69 yo F w/ hx of CAD s/p PCI and hypothyroidism sent by gastroenterologist for gallbladder hydrops. Pt found to have elevated liver enzymes by PMD in Apr 2019, referred to GI for w/u. MRCP 2 days ago significant for gallbladder hydrops 4.7cm with probable impacted stone at neck, also liver cirrhosis w/ mild ascites. Admits to social etoh, denies binge drinking, denies abdominal pain, N/V. Admits to decreased appetite in past 2 weeks, pt tried to maintain low salt diet. Reports BLE edema developed in past 2 days as well as weakness. Denies fever, chills, abdominal pain, SOB, dysuria.    In ED, evaluated by surgery -> no interventions at this time (15 Shashank 2019 01:53)            PAST MEDICAL & SURGICAL HISTORY:  Hypothyroidism  CAD S/P percutaneous coronary angioplasty  No significant past surgical history      Home Medications:  levothyroxine 50 mcg (0.05 mg) oral tablet: 1 tab(s) orally once a day (15 Shashank 2019 02:02)      Allergies    No Known Allergies    Intolerances        MEDICATIONS  (STANDING):  albumin human 25% IVPB 50 milliLiter(s) IV Intermittent once  aspirin  chewable 81 milliGRAM(s) Oral daily  calcium carbonate    500 mG (Tums) Chewable 1 Tablet(s) Chew every 12 hours  chlorhexidine 4% Liquid 1 Application(s) Topical <User Schedule>  docusate sodium 100 milliGRAM(s) Oral three times a day  furosemide   Injectable 80 milliGRAM(s) IV Push once  heparin  Injectable 5000 Unit(s) SubCutaneous every 8 hours  levothyroxine 50 MICROGram(s) Oral daily  metoprolol tartrate 25 milliGRAM(s) Oral two times a day  midodrine 10 milliGRAM(s) Oral every 8 hours  nystatin Powder 1 Application(s) Topical three times a day  pantoprazole   Suspension 40 milliGRAM(s) Oral before breakfast  potassium chloride   Powder 40 milliEquivalent(s) Oral daily  PPD  5 Tuberculin Unit(s) Injectable 5 Unit(s) IntraDermal once  predniSONE   Tablet 40 milliGRAM(s) Oral daily  senna 2 Tablet(s) Oral at bedtime    MEDICATIONS  (PRN):  aluminum hydroxide/magnesium hydroxide/simethicone Suspension 30 milliLiter(s) Oral every 4 hours PRN Dyspepsia  bisacodyl Suppository 10 milliGRAM(s) Rectal daily PRN Constipation      ICU Vital Signs Last 24 Hrs  T(C): 36.1 (08 Jul 2019 13:00), Max: 36.8 (07 Jul 2019 21:36)  T(F): 97 (08 Jul 2019 13:00), Max: 98.2 (07 Jul 2019 21:36)  HR: 66 (08 Jul 2019 17:35) (60 - 71)  BP: 141/60 (08 Jul 2019 17:35) (117/66 - 141/60)  BP(mean): --  ABP: --  ABP(mean): --  RR: 18 (08 Jul 2019 13:00) (18 - 18)  SpO2: 95% (08 Jul 2019 11:38) (95% - 95%)      I&O's Detail    07 Jul 2019 07:01  -  08 Jul 2019 07:00  --------------------------------------------------------  IN:    Oral Fluid: 250 mL  Total IN: 250 mL    OUT:    Indwelling Catheter - Urethral: 4000 mL  Total OUT: 4000 mL    Total NET: -3750 mL      08 Jul 2019 07:01  -  08 Jul 2019 18:48  --------------------------------------------------------  IN:    Oral Fluid: 510 mL  Total IN: 510 mL    OUT:    Indwelling Catheter - Urethral: 1200 mL  Total OUT: 1200 mL    Total NET: -690 mL          CBC Full  -  ( 08 Jul 2019 05:34 )  WBC Count : 17.10 K/uL  RBC Count : 2.68 M/uL  Hemoglobin : 8.5 g/dL  Hematocrit : 26.4 %  Platelet Count - Automated : 155 K/uL  Mean Cell Volume : 98.5 fL  Mean Cell Hemoglobin : 31.7 pg  Mean Cell Hemoglobin Concentration : 32.2 g/dL    07-08    147<H>  |  102  |  64<HH>  ----------------------------<  106<H>  3.8   |  32  |  0.9    Ca    8.6      08 Jul 2019 05:34    TPro  5.5<L>  /  Alb  3.2<L>  /  TBili  2.8<H>  /  DBili  1.1<H>  /  AST  54<H>  /  ALT  64<H>  /  AlkPhos  365<H>  07-08          Neuro exam:  AAOX3. Verbal function intact  tongue midline, facial motions symmetric  PERRL  Motor: MAEx4, b/l lower extremities weaker than upper extremities  Sensation: intact to touch in all extremities        Assessment/Plan  neurology for EMG  needs medical clearance for sural; nerve bx  will attempt to place on the schedule this week, possibly thursday  d/w attending

## 2019-07-08 NOTE — PROGRESS NOTE ADULT - ASSESSMENT
· Assessment		  A/P 69 yo F w/ hx of CAD s/p PCI and hypothyroidism sent by gastroenterologist for gallbladder hydrops, course complicated by ALI/ transaminitis possibly of autoimmune etiology, LUIS ALBERTO with hyperkalemia multifactorial 2/2 rhabdo (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 with resultant proteinuria and low albumin state and diffuse anasarca, raynauds phenomenon (not currently active), esophageal dysmotility and reflux, cystitis with urinary retention s/p abx, and subsequent worsening functional debility and malnutrition.    Patient is weaker, edematous. She had rings in her fingers and were removed by the senior resident to avoid ischemia in finger 06/29. Labs performed after blood drawn via US on 07/01. Patient had a bowel movement today 07/01 and feels better after that. 40 meq k was given due to low K levels 3.1 07/01. Calcium carbonate 500x2 added on Nephrology recommendations 07/01.    Patient was continued on 07/03 on Solu-medrol 80mg once daily and 50 ml of 25% albumin followed by Lasix. Trial of void done, Rheumatology consulted to discuss treatment plan. Aspirin restarted. Paitne to   #hydrops gallbladder/acute severe transaminitis  -LFT's downtrending   -CPK downtrending [id number]   -Liver biopsy done shows Cirrhosis but of no clear etiology   -Clinically too weak at this moment for cholecystectomy  -Midodrine 5mg every 8h for portal HTN  -GI recommended to trend INR and LFTs daily 07/01    #LUIS ALBERTO  -Cr downtrending 1.5>1.1>0.9>1.0  -BUN levels are down to 79>64  -As per renal team recommendations - patient most likely has hepatorenal syndrome  -Albumin challenge test with 100g albumin performed 06/27  -Continue IV Albumin 25% 50ml with 80mg Lasix twice a day  -Strict Input and Output measures  -Renal biopsy done 07/01, Renal Biopsy shows LUIS ALBERTO changes  -Given 40 meq of K once only on 07/01 and 07/03  -Continue 40mg K daily  -Continue Ca carbonate 500 twice daily        # Abnormal DS DNA, Elevated ANKITA titers   -Rheum recommended to hold prednisone use until w/u is completed  -Solumedrol 80mg once given on 07/01 and 07/02  -Started oral Prednisone 40mg 07/03  -Renal bx is inconclusive and EMG and sural nerve biopsy recommended  -ESR, CRP, ANCA repeated    #hypoalbuminemia  -Malnutrition vs Liver Cirrhosis  -Patient was started on TPN    #distal digit blue discoloration  -Raynaud's (primary vs secondary)  -r/o etiology---paraneoplastic, vasculitis, vs rheum   -transient=---resolved within 1 hour  -gloves/warmth to affected fingertips  -CCB on hold for hypotension  -esophagram consistent with dysmotility    #UTI with urinary retention  -Smith D/C  -Trial of void on 07/08  -received ceftriaxone/ completed  -Urinalysis order on recommendation of Nephro  -WBCs 17k today 07/08 probable due to Prednisone treatment    #h/o CAD  -cont home regimen  -statin on hold 2/2 acute liver injury  -ASA restarted 07/08    #hypothyroidism  -TSH elevated---t3/t4 normal---will cont synthroid 50 mcg daily    #DVT + GI ppx  FULL CODE  high risk/ guarded prognosis · Assessment		  A/P 69 yo F w/ hx of CAD s/p PCI and hypothyroidism sent by gastroenterologist for gallbladder hydrops, course complicated by ALI/ transaminitis possibly of autoimmune etiology, LUIS ALBERTO with hyperkalemia multifactorial 2/2 rhabdo (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 with resultant proteinuria and low albumin state and diffuse anasarca, raynauds phenomenon (not currently active), esophageal dysmotility and reflux, cystitis with urinary retention s/p abx, and subsequent worsening functional debility and malnutrition.    Patient is weaker, edematous. She had rings in her fingers and were removed by the senior resident to avoid ischemia in finger 06/29. Labs performed after blood drawn via US on 07/01. Patient had a bowel movement today 07/01 and feels better after that. 40 meq k was given due to low K levels 3.1 07/01. Calcium carbonate 500x2 added on Nephrology recommendations 07/01.    Patient was continued on 07/03 on Solu-medrol 80mg once daily and 50 ml of 25% albumin followed by Lasix. Trial of void done, Rheumatology consulted to discuss treatment plan. Aspirin restarted. Patient to work with PT for her Rehab work.    #hydrops gallbladder/acute severe transaminitis  -ALT/AST down to 54/64   -CPK downtrending [id number]   -Liver biopsy done shows Cirrhosis but of no clear etiology   -Clinically too weak at this moment for cholecystectomy  -Midodrine 5mg every 8h for portal HTN    #LUIS ALBERTO  -Cr downtrending 1.5>1.1>0.9>1.0  -BUN levels are down to 79>64  -As per renal team recommendations - patient most likely has hepatorenal syndrome  -Albumin challenge test with 100g albumin performed 06/27  -Continue IV Albumin 25% 50ml with 80mg Lasix twice a day  -Strict Input and Output measures  -Renal biopsy done 07/01, Renal Biopsy shows LUIS ALBERTO changes  -Given 40 meq of K once only on 07/01 and 07/03  -Continue 40mg K daily  -Continue Ca carbonate 500 twice daily        # Abnormal DS DNA, Elevated ANKITA titers   -Rheum recommended to hold prednisone use until w/u is completed  -Solumedrol 80mg once given on 07/01 and 07/02  -Started oral Prednisone 40mg 07/03  -If Renal bx is inconclusive and EMG and sural nerve biopsy recommended  -Rheumatology consulted to discuss further plan  -ESR, CRP, ANCA repeated    #hypoalbuminemia  -Malnutrition vs Liver Cirrhosis  -Patient was started on TPN    #distal digit blue discoloration  -Raynaud's (primary vs secondary)  -r/o etiology---paraneoplastic, vasculitis, vs rheum   -transient=---resolved within 1 hour  -gloves/warmth to affected fingertips  -CCB on hold for hypotension  -esophagram consistent with dysmotility    #UTI with urinary retention  -Smith D/C  -Trial of void on 07/08  -received ceftriaxone/ completed  -Urinalysis order on recommendation of Nephro  -WBCs 17k today 07/08 probable due to Prednisone treatment    #h/o CAD  -cont home regimen  -statin on hold 2/2 acute liver injury  -ASA restarted 07/08    #hypothyroidism  -TSH elevated---t3/t4 normal---will cont synthroid 50 mcg daily    #DVT + GI ppx  FULL CODE  high risk/ guarded prognosis

## 2019-07-08 NOTE — PROGRESS NOTE ADULT - SUBJECTIVE AND OBJECTIVE BOX
SUBJECTIVE:    Patient is a 68y old Female who presents with a chief complaint of gallbladder hydrops (07 Jul 2019 11:17)    Currently admitted to medicine with the primary diagnosis of Gallbladder hydrops     Today is hospital day 24d. This morning she is resting comfortably in bed and says she is doing well. She is determined to get mobile again    INTERVAL EVENTS: Patient is restarted on aspirin and a trial of void is done.    PAST MEDICAL & SURGICAL HISTORY  Hypothyroidism  CAD S/P percutaneous coronary angioplasty  No significant past surgical history      ALLERGIES:  No Known Allergies    MEDICATIONS:  STANDING MEDICATIONS  albumin human 25% IVPB 50 milliLiter(s) IV Intermittent once  albumin human 25% IVPB 50 milliLiter(s) IV Intermittent once  aspirin  chewable 81 milliGRAM(s) Oral daily  calcium carbonate    500 mG (Tums) Chewable 1 Tablet(s) Chew every 12 hours  chlorhexidine 4% Liquid 1 Application(s) Topical <User Schedule>  docusate sodium 100 milliGRAM(s) Oral three times a day  furosemide   Injectable 80 milliGRAM(s) IV Push once  furosemide   Injectable 80 milliGRAM(s) IV Push once  heparin  Injectable 5000 Unit(s) SubCutaneous every 8 hours  levothyroxine 50 MICROGram(s) Oral daily  metoprolol tartrate 25 milliGRAM(s) Oral two times a day  midodrine 10 milliGRAM(s) Oral every 8 hours  nystatin Powder 1 Application(s) Topical three times a day  pantoprazole   Suspension 40 milliGRAM(s) Oral before breakfast  potassium chloride   Powder 40 milliEquivalent(s) Oral daily  PPD  5 Tuberculin Unit(s) Injectable 5 Unit(s) IntraDermal once  predniSONE   Tablet 40 milliGRAM(s) Oral daily  senna 2 Tablet(s) Oral at bedtime    PRN MEDICATIONS  aluminum hydroxide/magnesium hydroxide/simethicone Suspension 30 milliLiter(s) Oral every 4 hours PRN  bisacodyl Suppository 10 milliGRAM(s) Rectal daily PRN    VITALS:   T(F): 96.6  HR: 71  BP: 126/58  RR: 18  SpO2: --    LABS:                        8.5    17.10 )-----------( 155      ( 08 Jul 2019 05:34 )             26.4     07-08    147<H>  |  102  |  64<HH>  ----------------------------<  106<H>  3.8   |  32  |  0.9    Ca    8.6      08 Jul 2019 05:34    TPro  5.5<L>  /  Alb  3.2<L>  /  TBili  2.8<H>  /  DBili  1.1<H>  /  AST  54<H>  /  ALT  64<H>  /  AlkPhos  365<H>  07-08                  RADIOLOGY:    PHYSICAL EXAM:  Surgical Pathology Report (07.01.19 @ 20:14)    Surgical Pathology Report:   ACCESSION No:  [id number]    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      Surgical Final Report      Final Diagnosis  Kidney, needle core biopsy    Acute tubular injury with focal tubular necrosis (see comment)    No evidence of crescentic glomerulonephritis, immune complex-  mediated disease, or acute interstitial nephritis is seen in this  sample    Summary of chronic changes:  - Global glomerulosclerosis (4% of glomeruli)  - Tubular atrophy and interstitial fibrosis (less than 5% of  cortex)  - Mild arterial and arteriolar sclerosis    Verified by: Kisha Morris MD  (Electronic Signature)  Reported on: 07/03/19 15:20 EDT, [address]  _________________________________________________________________    Comment  The preliminary findings were discussed with Dr. Elizabeth on  7/2/2019 at 11:15AM.    This biopsy reveals acute tubular necrosis and no significant  glomerular disease, in this patient with multiple antibodies and  a complex medical history. Etiologies of acute tubular necrosis  include ischemia potentially aggravated by medications (anti-  fungals, antibiotics, anti-retrovirals, acetaminophen, non-  steroidal anti-inflammatory drugs), drugs with vasoactive effect,  heavy metals (cadmium, mercury), herbalremedies, organic  solvents, herbicides, and radiocontrast solutions.    Microscopic Description    1. Light Microscopy:  Sections of formalin-fixed, paraffin embedded tissue were  evaluated using H&E, PAS, JMS, and trichrome stains. An H&E-  stainedfrozen section taken from the tissue allocated for  immunofluorescence microscopy and semi-thin toluidine blue-  stained epoxy sections of the tissue processed for electron  microscopy were also evaluated using light microscopy.    The sample submitted for light microscopy consists of renal  cortex and medulla, with up to 10 glomeruli. The entire biopsy  contains 27 glomeruli (LM-10; IF-9; EM-8), of which 1 is globally  sclerosed. The non-sclerosed glomeruli are              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                3        Surgical Final Report          of normal size, architecture and cellularity. The glomerular  capillary loops are of normal thickness and texture. Significant  endocapillary proliferation is not present and cellular crescents  are not seen in glomeruli. The mesangium reveals no significant  expansion or hypercellularity. Tubules reveal focal degenerative  changes and flattening of the epithelium. Several tubules contain  necrotic cellular debris. The interstitium contains no  significant inflammatory infiltrates. Approximately 5% of the  renal cortex shows tubular atrophy and interstitial fibrosis.  Arteries and arterioles show mild sclerosis.    2. Immunofluorescence Microscopy:  The sections of the sample submitted for immunofluorescence  studies were incubated with antibodies specific for the heavy  chains of IgG, IgA, and IgM, for kappa and lambda light chains,  fibrin, albumin, and complement components C3 and C1q. The  intensity of immunofluorescence reactivity is expressed on a  scale 1-  -4+.    The sample contains 9 glomeruli, of which 1 is globally  sclerosed. No significant immune deposits are seen in glomeruli.  There is finely granular reactivity for IgM (trace) and C3  (trace) in the mesangium. Tubules contain few intraluminal casts  reactive for polyclonal IgA. The interstitium reveals scattered  fibrin deposits. There is no difference in reactivity between  kappa and lambda light chains in the glomeruli, tubular casts, or  in the background of the tissue.    3. Electron Microscopy:  The sample submitted for electron microscopy examination contains  8 glomeruli; 2 glomeruli are examined ultrastructurally. The  glomerular visceral epithelial cells reveal mild segmental  effacement of theirfoot processes. The glomerular basement  membranes are of normal thickness and texture. Morphometric  analysis was performed on 18 sites, using orthogonal intercept  method. The harmonic mean of the glomerular basement membrane  thickness is 275 nm. The endothelial cells show focal swelling.  The mesangium reveals normal cellular elements and a normal  amount of matrix. No electron dense deposits are present in the  mesangium or along the capillary walls.    PHYSICAL EXAM:  GEN: No acute distress  PULM/CHEST: Clear to auscultation bilaterally, no rales, rhonchi or wheezes   CVS: Regular rate and rhythm, S1-S2, no murmurs  ABD: Soft, non-tender, non-distended, +BS  EXT: No edema  NEURO: AAOx3 SUBJECTIVE:    Patient is a 68y old Female who presents with a chief complaint of gallbladder hydrops (07 Jul 2019 11:17)    Currently admitted to medicine with the primary diagnosis of Gallbladder hydrops     Today is hospital day 24d. This morning she is resting comfortably in bed and says she is doing well. She is determined to get mobile again    INTERVAL EVENTS: Patient is restarted on aspirin and a trial of void is done. Aspirin restarted    PAST MEDICAL & SURGICAL HISTORY  Hypothyroidism  CAD S/P percutaneous coronary angioplasty  No significant past surgical history      ALLERGIES:  No Known Allergies    MEDICATIONS:  STANDING MEDICATIONS  albumin human 25% IVPB 50 milliLiter(s) IV Intermittent once  albumin human 25% IVPB 50 milliLiter(s) IV Intermittent once  aspirin  chewable 81 milliGRAM(s) Oral daily  calcium carbonate    500 mG (Tums) Chewable 1 Tablet(s) Chew every 12 hours  chlorhexidine 4% Liquid 1 Application(s) Topical <User Schedule>  docusate sodium 100 milliGRAM(s) Oral three times a day  furosemide   Injectable 80 milliGRAM(s) IV Push once  furosemide   Injectable 80 milliGRAM(s) IV Push once  heparin  Injectable 5000 Unit(s) SubCutaneous every 8 hours  levothyroxine 50 MICROGram(s) Oral daily  metoprolol tartrate 25 milliGRAM(s) Oral two times a day  midodrine 10 milliGRAM(s) Oral every 8 hours  nystatin Powder 1 Application(s) Topical three times a day  pantoprazole   Suspension 40 milliGRAM(s) Oral before breakfast  potassium chloride   Powder 40 milliEquivalent(s) Oral daily  PPD  5 Tuberculin Unit(s) Injectable 5 Unit(s) IntraDermal once  predniSONE   Tablet 40 milliGRAM(s) Oral daily  senna 2 Tablet(s) Oral at bedtime    PRN MEDICATIONS  aluminum hydroxide/magnesium hydroxide/simethicone Suspension 30 milliLiter(s) Oral every 4 hours PRN  bisacodyl Suppository 10 milliGRAM(s) Rectal daily PRN    VITALS:   T(F): 96.6  HR: 71  BP: 126/58  RR: 18  SpO2: --    LABS:                        8.5    17.10 )-----------( 155      ( 08 Jul 2019 05:34 )             26.4     07-08    147<H>  |  102  |  64<HH>  ----------------------------<  106<H>  3.8   |  32  |  0.9    Ca    8.6      08 Jul 2019 05:34    TPro  5.5<L>  /  Alb  3.2<L>  /  TBili  2.8<H>  /  DBili  1.1<H>  /  AST  54<H>  /  ALT  64<H>  /  AlkPhos  365<H>  07-08                  RADIOLOGY:    PHYSICAL EXAM:  Surgical Pathology Report (07.01.19 @ 20:14)    Surgical Pathology Report:   ACCESSION No:  10 [id number]    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      Surgical Final Report      Final Diagnosis  Kidney, needle core biopsy    Acute tubular injury with focal tubular necrosis (see comment)    No evidence of crescentic glomerulonephritis, immune complex-  mediated disease, or acute interstitial nephritis is seen in this  sample    Summary of chronic changes:  - Global glomerulosclerosis (4% of glomeruli)  - Tubular atrophy and interstitial fibrosis (less than 5% of  cortex)  - Mild arterial and arteriolar sclerosis    Verified by: Kisha Morris MD  (Electronic Signature)  Reported on: 07/03/19 15:20 EDT, [address]  _________________________________________________________________    Comment  The preliminary findings were discussed with Dr. Elizabeth on  7/2/2019 at 11:15AM.    This biopsy reveals acute tubular necrosis and no significant  glomerular disease, in this patient with multiple antibodies and  a complex medical history. Etiologies of acute tubular necrosis  include ischemia potentially aggravated by medications (anti-  fungals, antibiotics, anti-retrovirals, acetaminophen, non-  steroidal anti-inflammatory drugs), drugs with vasoactive effect,  heavy metals (cadmium, mercury), herbalremedies, organic  solvents, herbicides, and radiocontrast solutions.    Microscopic Description    1. Light Microscopy:  Sections of formalin-fixed, paraffin embedded tissue were  evaluated using H&E, PAS, JMS, and trichrome stains. An H&E-  stainedfrozen section taken from the tissue allocated for  immunofluorescence microscopy and semi-thin toluidine blue-  stained epoxy sections of the tissue processed for electron  microscopy were also evaluated using light microscopy.    The sample submitted for light microscopy consists of renal  cortex and medulla, with up to 10 glomeruli. The entire biopsy  contains 27 glomeruli (LM-10; IF-9; EM-8), of which 1 is globally  sclerosed. The non-sclerosed glomeruli are              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                3        Surgical Final Report          of normal size, architecture and cellularity. The glomerular  capillary loops are of normal thickness and texture. Significant  endocapillary proliferation is not present and cellular crescents  are not seen in glomeruli. The mesangium reveals no significant  expansion or hypercellularity. Tubules reveal focal degenerative  changes and flattening of the epithelium. Several tubules contain  necrotic cellular debris. The interstitium contains no  significant inflammatory infiltrates. Approximately 5% of the  renal cortex shows tubular atrophy and interstitial fibrosis.  Arteries and arterioles show mild sclerosis.    2. Immunofluorescence Microscopy:  The sections of the sample submitted for immunofluorescence  studies were incubated with antibodies specific for the heavy  chains of IgG, IgA, and IgM, for kappa and lambda light chains,  fibrin, albumin, and complement components C3 and C1q. The  intensity of immunofluorescence reactivity is expressed on a  scale 1-  -4+.    The sample contains 9 glomeruli, of which 1 is globally  sclerosed. No significant immune deposits are seen in glomeruli.  There is finely granular reactivity for IgM (trace) and C3  (trace) in the mesangium. Tubules contain few intraluminal casts  reactive for polyclonal IgA. The interstitium reveals scattered  fibrin deposits. There is no difference in reactivity between  kappa and lambda light chains in the glomeruli, tubular casts, or  in the background of the tissue.    3. Electron Microscopy:  The sample submitted for electron microscopy examination contains  8 glomeruli; 2 glomeruli are examined ultrastructurally. The  glomerular visceral epithelial cells reveal mild segmental  effacement of theirfoot processes. The glomerular basement  membranes are of normal thickness and texture. Morphometric  analysis was performed on 18 sites, using orthogonal intercept  method. The harmonic mean of the glomerular basement membrane  thickness is 275 nm. The endothelial cells show focal swelling.  The mesangium reveals normal cellular elements and a normal  amount of matrix. No electron dense deposits are present in the  mesangium or along the capillary walls.    PHYSICAL EXAM:  GEN: No acute distress  PULM/CHEST: Clear to auscultation bilaterally, no rales, rhonchi or wheezes   CVS: RRR S1+S2 + Systolic murmur  ABD: Soft, non-tender, non-distended, +BS, + eccymosis  EXT: Edema in the lower limbs, Pulses intact  NEURO: AAOx3

## 2019-07-09 NOTE — PROGRESS NOTE ADULT - SUBJECTIVE AND OBJECTIVE BOX
SUBJECTIVE:    Patient is a 68y old Female who presents with a chief complaint of gallbladder hydrops (08 Jul 2019 18:45)    Currently admitted to medicine with the primary diagnosis of Gallbladder hydrops     Today is hospital day 25d. This morning she is resting comfortably in bed and reports she feels better. Her affect is a bit low. She was seen yesterday struggling to get into bed from chair with help of 3 people.    INTERVAL EVENTS:     PAST MEDICAL & SURGICAL HISTORY  Hypothyroidism  CAD S/P percutaneous coronary angioplasty  No significant past surgical history      ALLERGIES:  No Known Allergies    MEDICATIONS:  STANDING MEDICATIONS  albumin human 25% IVPB 50 milliLiter(s) IV Intermittent once  albumin human 25% IVPB 50 milliLiter(s) IV Intermittent once  aspirin  chewable 81 milliGRAM(s) Oral daily  calcium carbonate    500 mG (Tums) Chewable 1 Tablet(s) Chew every 12 hours  chlorhexidine 4% Liquid 1 Application(s) Topical <User Schedule>  docusate sodium 100 milliGRAM(s) Oral three times a day  furosemide   Injectable 80 milliGRAM(s) IV Push once  furosemide   Injectable 80 milliGRAM(s) IV Push once  heparin  Injectable 5000 Unit(s) SubCutaneous every 8 hours  levothyroxine 50 MICROGram(s) Oral daily  metoprolol tartrate 25 milliGRAM(s) Oral two times a day  midodrine 10 milliGRAM(s) Oral every 8 hours  nystatin Powder 1 Application(s) Topical three times a day  pantoprazole   Suspension 40 milliGRAM(s) Oral before breakfast  potassium chloride   Powder 40 milliEquivalent(s) Oral daily  PPD  5 Tuberculin Unit(s) Injectable 5 Unit(s) IntraDermal once  predniSONE   Tablet 40 milliGRAM(s) Oral daily    PRN MEDICATIONS  aluminum hydroxide/magnesium hydroxide/simethicone Suspension 30 milliLiter(s) Oral every 4 hours PRN  bisacodyl Suppository 10 milliGRAM(s) Rectal daily PRN    VITALS:   T(F): 97.3  HR: 61  BP: 114/55  RR: 18  SpO2: 93%    LABS:                        9.2    19.15 )-----------( 173      ( 09 Jul 2019 06:09 )             28.4     07-09    146  |  102  |  51<H>  ----------------------------<  95  3.6   |  33<H>  |  0.8    Ca    9.0      09 Jul 2019 06:09    TPro  5.8<L>  /  Alb  3.2<L>  /  TBili  2.6<H>  /  DBili  1.1<H>  /  AST  51<H>  /  ALT  58<H>  /  AlkPhos  390<H>  07-09    PT/INR - ( 09 Jul 2019 06:09 )   PT: 14.50 sec;   INR: 1.26 ratio         PTT - ( 09 Jul 2019 06:09 )  PTT:65.0 sec      Sedimentation Rate, Erythrocyte: 21 mm/Hr <H> (07-09-19 @ 06:09)      RADIOLOGY:    PHYSICAL EXAM:  GEN: No acute distress  PULM/CHEST: Clear to auscultation bilaterally, no rales, rhonchi or wheezes   CVS: Regular rate and rhythm, S1-S2, no murmurs  ABD: Soft, non-tender, non-distended, +BS  EXT: No edema  NEURO: AAOx3    Smith Catheter:   Indwelling Urethral Catheter:     Connect To:  Straight Drainage/Fred    Indication:  Urinary Retention / Obstruction (07-05-19 @ 04:08) (not performed) [active] SUBJECTIVE:    Patient is a 68y old Female who presents with a chief complaint of gallbladder hydrops (08 Jul 2019 18:45)    Currently admitted to medicine with the primary diagnosis of Gallbladder hydrops     Today is hospital day 25d. This morning she is resting comfortably in bed and reports she feels better. Her affect is a bit low. She was seen yesterday struggling to get into bed from chair with help of 3 people.    INTERVAL EVENTS: Neurosurgery saw her and want to plan the surgery on thursday pending medical clearance. Continued the regular management    PAST MEDICAL & SURGICAL HISTORY  Hypothyroidism  CAD S/P percutaneous coronary angioplasty  No significant past surgical history      ALLERGIES:  No Known Allergies    MEDICATIONS:  STANDING MEDICATIONS  albumin human 25% IVPB 50 milliLiter(s) IV Intermittent once  albumin human 25% IVPB 50 milliLiter(s) IV Intermittent once  aspirin  chewable 81 milliGRAM(s) Oral daily  calcium carbonate    500 mG (Tums) Chewable 1 Tablet(s) Chew every 12 hours  chlorhexidine 4% Liquid 1 Application(s) Topical <User Schedule>  docusate sodium 100 milliGRAM(s) Oral three times a day  furosemide   Injectable 80 milliGRAM(s) IV Push once  furosemide   Injectable 80 milliGRAM(s) IV Push once  heparin  Injectable 5000 Unit(s) SubCutaneous every 8 hours  levothyroxine 50 MICROGram(s) Oral daily  metoprolol tartrate 25 milliGRAM(s) Oral two times a day  midodrine 10 milliGRAM(s) Oral every 8 hours  nystatin Powder 1 Application(s) Topical three times a day  pantoprazole   Suspension 40 milliGRAM(s) Oral before breakfast  potassium chloride   Powder 40 milliEquivalent(s) Oral daily  PPD  5 Tuberculin Unit(s) Injectable 5 Unit(s) IntraDermal once  predniSONE   Tablet 40 milliGRAM(s) Oral daily    PRN MEDICATIONS  aluminum hydroxide/magnesium hydroxide/simethicone Suspension 30 milliLiter(s) Oral every 4 hours PRN  bisacodyl Suppository 10 milliGRAM(s) Rectal daily PRN    VITALS:   T(F): 97.3  HR: 61  BP: 114/55  RR: 18  SpO2: 93%    LABS:                        9.2    19.15 )-----------( 173      ( 09 Jul 2019 06:09 )             28.4     07-09    146  |  102  |  51<H>  ----------------------------<  95  3.6   |  33<H>  |  0.8    Ca    9.0      09 Jul 2019 06:09    TPro  5.8<L>  /  Alb  3.2<L>  /  TBili  2.6<H>  /  DBili  1.1<H>  /  AST  51<H>  /  ALT  58<H>  /  AlkPhos  390<H>  07-09    PT/INR - ( 09 Jul 2019 06:09 )   PT: 14.50 sec;   INR: 1.26 ratio         PTT - ( 09 Jul 2019 06:09 )  PTT:65.0 sec      Sedimentation Rate, Erythrocyte: 21 mm/Hr <H> (07-09-19 @ 06:09)      RADIOLOGY:    PHYSICAL EXAM:  GEN: No acute distress  PULM/CHEST: Clear to auscultation bilaterally, no rales, rhonchi or wheezes   CVS: RRR S1+S2 + Systolic murmur  ABD: Soft, non-tender, non-distended, +BS, + eccymosis  EXT: Edema in the lower limbs, Pulses intact  NEURO: AAOx3

## 2019-07-09 NOTE — PROGRESS NOTE ADULT - ASSESSMENT
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 with resultant proteinuria and low albumin state and diffuse anasarca, raynauds phenomenon (not currently active), esophageal dysmotility and reflux, cystitis with urinary retention s/p abx, and subsequent worsening functional debility and malnutrition.    Patient is weaker, edematous. She had rings in her fingers and were removed by the senior resident to avoid ischemia in finger 06/29. Labs performed after blood drawn via US on 07/01. Patient had a bowel movement today 07/01 and feels better after that. 40 meq k was given due to low K levels 3.1 07/01. Calcium carbonate 500x2 added on Nephrology recommendations 07/01.    Patient continued on Prednisone, Albumin, Lasix for now. Possible sural nerve biopsy on 07/11    #hydrops gallbladder/acute severe transaminitis  -ALT/AST down to 51/58  -CPK downtrending [id number]   -Liver biopsy done shows Cirrhosis but of no clear etiology   -Clinically too weak at this moment for cholecystectomy  -Midodrine 5mg every 8h for portal HTN    #LUIS ALBERTO  -Cr downtrending 1.5>1.1>0.9>1.0  -BUN levels are down to 79>64  -As per renal team recommendations - patient most likely has hepatorenal syndrome  -Albumin challenge test with 100g albumin performed 06/27  -Continue IV Albumin 25% 50ml with 80mg Lasix twice a day  -Strict Input and Output measures  -Renal biopsy done 07/01, Renal Biopsy shows LUIS ALBERTO changes  -Given 40 meq of K once only on 07/01 and 07/03  -Continue 40mg K daily  -Continue Ca carbonate 500 twice daily       # Abnormal DS DNA, Elevated ANKITA titers   -Rheum recommended to hold prednisone use until w/u is completed  -Solumedrol 80mg once given on 07/01 and 07/02  -Started oral Prednisone 40mg 07/03  -If Renal bx is inconclusive and EMG and sural nerve biopsy recommended  -Rheumatology consulted to discuss further plan, they recommend sural nerve biopsy  -ESR, CRP, ANCA repeated  -ESR is 21 on 07/09    #hypoalbuminemia  -Malnutrition vs Liver Cirrhosis  -TPN D/C  -Eating normally  -Getting Albumin 25% 50ml twice daily    #distal digit blue discoloration  -Raynaud's (primary vs secondary)  -r/o etiology---paraneoplastic, vasculitis, vs rheum   -transient=---resolved within 1 hour  -gloves/warmth to affected fingertips  -CCB on hold for hypotension  -esophagram consistent with dysmotility    #UTI with urinary retention  -Smith D/C  -Trial of void on 07/08 and was successful  -received ceftriaxone/ completed  -Urinalysis order on recommendation of Nephro  -WBCs 19k today 07/08 probable due to Prednisone treatment    #h/o CAD  -cont home regimen  -statin on hold 2/2 acute liver injury  -ASA restarted 07/08    #hypothyroidism  -TSH elevated---t3/t4 normal---will cont synthroid 50 mcg daily    #DVT + GI ppx  FULL CODE · Assessment		  A/P 67 yo F w/ hx of CAD s/p PCI and hypothyroidism sent by gastroenterologist for gallbladder hydrops, course complicated by ALI/ transaminitis possibly of autoimmune etiology, LUIS ALBERTO with hyperkalemia multifactorial 2/2 rhabdo (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 with resultant proteinuria and low albumin state and diffuse anasarca, raynauds phenomenon (not currently active), esophageal dysmotility and reflux, cystitis with urinary retention s/p abx, and subsequent worsening functional debility and malnutrition.    Patient is weaker, edematous. She had rings in her fingers and were removed by the senior resident to avoid ischemia in finger 06/29. Labs performed after blood drawn via US on 07/01. Patient had a bowel movement today 07/01 and feels better after that. 40 meq k was given due to low K levels 3.1 07/01. Calcium carbonate 500x2 added on Nephrology recommendations 07/01.    Patient continued on Prednisone, Albumin, Lasix for now. Possible sural nerve biopsy on 07/11    #hydrops gallbladder/acute severe transaminitis  -ALT/AST down to 51/58  -CPK downtrending [id number]   -Liver biopsy done shows Cirrhosis but of no clear etiology   -Clinically too weak at this moment for cholecystectomy  -Midodrine 5mg every 8h for portal HTN    #LUIS ALBERTO  -Cr downtrending 1.5>1.1>0.9>1.0  -BUN levels are down to 79>64  -As per renal team recommendations - patient most likely has hepatorenal syndrome  -Albumin challenge test with 100g albumin performed 06/27  -Continue IV Albumin 25% 50ml with 80mg Lasix twice a day  -Strict Input and Output measures  -Renal biopsy done 07/01, Renal Biopsy shows LUIS ALBERTO changes  -Sural Biopsy on thursday possibly pending medical clearance  -Given 40 meq of K once only on 07/01 and 07/03  -Continue 40mg K daily  -Continue Ca carbonate 500 twice daily       # Abnormal DS DNA, Elevated ANKITA titers   -Rheum recommended to hold prednisone use until w/u is completed  -Solumedrol 80mg once given on 07/01 and 07/02  -Started oral Prednisone 40mg 07/03  -If Renal bx is inconclusive and EMG and sural nerve biopsy recommended  -Rheumatology consulted to discuss further plan, they recommend sural nerve biopsy  -ESR, CRP, ANCA repeated  -ESR is 21 on 07/09    #hypoalbuminemia  -Malnutrition vs Liver Cirrhosis  -TPN D/C  -Eating normally  -Getting Albumin 25% 50ml twice daily    #distal digit blue discoloration  -Raynaud's (primary vs secondary)  -r/o etiology---paraneoplastic, vasculitis, vs rheum   -transient=---resolved within 1 hour  -gloves/warmth to affected fingertips  -CCB on hold for hypotension  -esophagram consistent with dysmotility    #UTI with urinary retention  -Smith D/C  -Trial of void on 07/08 and was successful  -received ceftriaxone/ completed  -Urinalysis order on recommendation of Nephro  -WBCs 19k today 07/09 probable due to Prednisone treatment    #h/o CAD  -cont home regimen  -statin on hold 2/2 acute liver injury  -ASA restarted 07/08    #hypothyroidism  -TSH elevated---t3/t4 normal---will cont synthroid 50 mcg daily    #DVT + GI ppx  FULL CODE

## 2019-07-09 NOTE — PROGRESS NOTE ADULT - SUBJECTIVE AND OBJECTIVE BOX
Would like to do sural nerve biopsy on weds am. Please leave medical clearnace note. Pt should be NPO after 12. New type and screen ordered d/w attending

## 2019-07-09 NOTE — PROGRESS NOTE ADULT - ATTENDING COMMENTS
67 yo F w/ hx of CAD s/p PCI and hypothyroidism sent by gastroenterologist for gallbladder hydrops, course complicated by ALI/ transaminitis possibly of autoimmune etiology, LUIS ALBERTO with hyperkalemia multifactorial 2/2 ?rhabdo (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 with resultant proteinuria and low albumin state and diffuse anasarca, raynauds phenomenon (not currently active), esophageal dysmotility and reflux, cystitis with urinary retention s/p abx, and subsequent worsening functional debility and malnutrition.    #acute severe transaminitis    -Liver biopsy done shows Cirrhosis but of no clear etiology,. 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 There is no evidence of autoimmune hepatitis or cirrhosis. More in favor of  drug induced.   Lfts are trending down.  trend lfts , avoid hepatotoxic drugs  no hepatic encephalopathy  no asterixes   no coagulopathy     #Abnormal liver imaging- cirrhosis (nodular liver on MRI)  -HCC: none on imaging , repeat US abdomen and AFP q 6 month  -EV: needs screening  -Ascites: mild on MR abdomen  - no portal hypertension on MR abdomen  -Midodrine 5mg every 8h for cirrhosis related low BP.  Calculated MELD score for now is 13.      #GAll bladder hydrops:  Distended gallbladder with multiple stones, 1 impacted stone in GB neck.  -Clinically too weak at this moment for cholecystectomy. But will eventually need to get it done.          #LUIS ALBERTO  -Cr downtrending 1.5>1.1>0.9>1.0. Now resolved.  -BUN levels are down to 79>64  -patient most likely had hepatorenal syndrome +/- ATN (as evidenced on Kidney biopsy) vs maybe Vasculitis related.    #Anasarca:  -Continue IV Albumin 25% 50ml with 80mg Lasix twice a day  -Strict Input and Output measures    #Electrolytes:  -Continue 40mg K daily  -Continue Ca carbonate 500 twice daily. SErum calcium is normal though. MAY DISCONTINUE PO CALCIUM.    #Suspected Vasculitis :   abnormal DS DNA, Elevated ANKITA titers   -Solumedrol 80mg once given on 07/01 and 07/02  -Started oral Prednisone 40mg 07/03  -Since Renal bx is inconclusive for Vasculitis, EMG and sural nerve biopsy recommended by Rheum.   Neurosurgery planning to do the biopsy on 7/10.    -ESR, CRP, ANCA repeated    #PRE-OPERATIVE OPTIMIZATION:   Patient has 1 of 6 RCRI Risk factors (CAD with h/o PCI). No active cardiac complaints. Cannot perform 4 METs, but low risk surgery.  Patient is low risk for low risk surgery. MAY PROCEED WITH ACCEPTABLE RISKS. PATIENT IS MEDICALLY CLEARED.  Patient does have subacute hepatitis with multiacinar / bridging necrosis. But it is getting overall better. Since surgery will be under local anesthesia, this should not be an issue.      #hypoalbuminemia  -Malnutrition vs Liver Cirrhosis  -Patient was started on TPN    #distal digit blue discoloration  -Raynaud's (primary vs secondary)  -r/o etiology---paraneoplastic, vasculitis, vs rheum   -transient=---resolved within 1 hour  -gloves/warmth to affected fingertips  -CCB on hold for hypotension  -esophagram consistent with dysmotility    #UTI with urinary retention  -Smith D/C  -Trial of void on 07/08 succesful.  -received ceftriaxone/ completed  -Urinalysis order on recommendation of Nephro    #h/o CAD  -cont home regimen  -statin on hold 2/2 acute liver injury  -ASA restarted 07/08    #hypothyroidism  -TSH elevated---t3/t4 normal---will cont synthroid 50 mcg daily    #DVT + GI ppx  FULL CODE  high risk/ guarded prognosis    #HANDOFF: f/u surgery for sural nerve biopsy.  NPO Aftermidnight.

## 2019-07-10 NOTE — CHART NOTE - NSCHARTNOTEFT_GEN_A_CORE
NPO earlier this am for sural nerve bx today  Regular diet resumed, ? bx postponed  Pt reports tolerating PO diet well with much improved intake  Anasarca persists  +hypokalemia, hypomagnesemia    T(F): 96.2 (07-10-19 @ 05:39), Max: 97.4 (07-09-19 @ 16:45)  HR: 81 (07-10-19 @ 05:39) (62 - 81)  BP: 91/43 (07-10-19 @ 05:39) (91/43 - 130/55)  RR: 18 (07-10-19 @ 05:39) (18 - 18)  SpO2: 99% (07-10-19 @ 08:00) (99% - 99%)    07-10    144  |  100  |  45<H>  ----------------------------<  106<H>  3.3<L>   |  32  |  0.8    Ca    8.8      10 Jul 2019 04:59  Phos  2.8     07-10  Mg     1.7     07-10    TPro  5.2<L>  /  Alb  2.9<L>  /  TBili  2.3<H>  /  DBili  1.0<H>  /  AST  39  /  ALT  49<H>  /  AlkPhos  325<H>  07-10                        8.4    18.24 )-----------( 135      ( 10 Jul 2019 04:59 )             25.9     CAPILLARY BLOOD GLUCOSE  POCT Blood Glucose.: 137 mg/dL (10 Jul 2019 11:32)  POCT Blood Glucose.: 94 mg/dL (10 Jul 2019 07:55)  POCT Blood Glucose.: 109 mg/dL (09 Jul 2019 21:20)  POCT Blood Glucose.: 151 mg/dL (09 Jul 2019 16:35)     Diet, Regular (07-10-19 @ 10:04)    ASSESSMENT/PLAN  - GB hydrops  - LUIS ALBERTO  - hyperkalemia  - hypothyroidism  - hiatal hernia  - esophageal reflux, dysmotility  - hypokalemia  - elevated WBC    PLAN  - cont PO diet as tolerated   - check calorie counts X 3 days

## 2019-07-10 NOTE — PROGRESS NOTE ADULT - SUBJECTIVE AND OBJECTIVE BOX
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            Patient was evaluated and examined  by bedside, had an episode of near chocking sensation yesterday night, all symptoms resolved, patient remains edematous diffusely, c/o generalized body weakness        REVIEW OF SYSTEMS:  please see pertinent positives mentioned above, all other 12 ROS negative      T(C): , Max: 36.3 (07-09-19 @ 13:00)  HR: 81 (07-10-19 @ 05:39)  BP: 91/43 (07-10-19 @ 05:39)  RR: 18 (07-10-19 @ 05:39)  SpO2: 99% (07-10-19 @ 08:00)  CAPILLARY BLOOD GLUCOSE      POCT Blood Glucose.: 94 mg/dL (10 Jul 2019 07:55)  POCT Blood Glucose.: 109 mg/dL (09 Jul 2019 21:20)  POCT Blood Glucose.: 151 mg/dL (09 Jul 2019 16:35)  POCT Blood Glucose.: 169 mg/dL (09 Jul 2019 11:28)      PHYSICAL EXAM:  General: NAD, AAOX3, patient is laying comfortably in bed, obese  HEENT: AT, NC, Supple, NO JVD, NO CB  Lungs: CTA B/L, no wheezing, no rhonchi  CVS: normal S1, S2, RRR, NO M/G/R  Abdomen: soft, bowel sounds present, non-tender, non-distended  Extremities: anasarca with b/l pitting legs edema extending to thigh area, no clubbing, no cyanosis, positive peripheral pulses b/l  Neuro: no acute focal neurological deficits, generalized body weakness, gait not tested  Skin: no rush, no ecchymosis      LAB  CBC  Date: 07-10-19 @ 04:59  Mean cell Mqcalosoep64.8  Mean cell Hemoglobin Conc32.4  Mean cell Volum 98.1  Platelet count-Automate 135  RBC Count 2.64  Red Cell Distrib Width24.2  WBC Count18.24  % Albumin, Urine--  Hematocrit 25.9  Hemoglobin 8.4  CBC  Date: 07-09-19 @ 06:09  Mean cell Xueixfgmgw14.8  Mean cell Hemoglobin Conc32.4  Mean cell Volum 98.3  Platelet count-Automate 173  RBC Count 2.89  Red Cell Distrib Width24.9  WBC Count19.15  % Albumin, Urine--  Hematocrit 28.4  Hemoglobin 9.2  CBC  Date: 07-08-19 @ 05:34  Mean cell Aokerslziz32.7  Mean cell Hemoglobin Conc32.2  Mean cell Volum 98.5  Platelet count-Automate 155  RBC Count 2.68  Red Cell Distrib Width24.8  WBC Count17.10  % Albumin, Urine--  Hematocrit 26.4  Hemoglobin 8.5  CBC  Date: 07-07-19 @ 05:52  Mean cell Ghgiflpxlf00.9  Mean cell Hemoglobin Conc33.2  Mean cell Volum 96.0  Platelet count-Automate 159  RBC Count 2.73  Red Cell Distrib Width24.8  WBC Count17.57  % Albumin, Urine--  Hematocrit 26.2  Hemoglobin 8.7  CBC  Date: 07-06-19 @ 06:43  Mean cell Dfqxplvehn67.3  Mean cell Hemoglobin Conc32.7  Mean cell Volum 95.8  Platelet count-Automate 171  RBC Count 2.84  Red Cell Distrib Width24.4  WBC Count14.95  % Albumin, Urine--  Hematocrit 27.2  Hemoglobin 8.9  CBC  Date: 07-05-19 @ 05:18  Mean cell Oyzjwijspk19.2  Mean cell Hemoglobin Conc33.6  Mean cell Volum 92.9  Platelet count-Automate 173  RBC Count 2.82  Red Cell Distrib Width24.1  WBC Count16.06  % Albumin, Urine--  Hematocrit 26.2  Hemoglobin 8.8  CBC  Date: 07-04-19 @ 05:51  Mean cell Nuqnafaujr01.3  Mean cell Hemoglobin Conc33.5  Mean cell Volum 93.5  Platelet count-Automate 183  RBC Count 2.94  Red Cell Distrib Width24.0  WBC Count14.91  % Albumin, Urine--  Hematocrit 27.5  Hemoglobin 9.2    [facility] [place] @ 04:59  Blood Gas Arterial-Calcium,Ionized--  Blood Urea Nitrogen, Serum 45 mg/dL<H> [10 - 20]  Carbon Dioxide, Serum32 mmol/L [17 - 32]  Chloride, Kpvsp044 mmol/L [98 - 110]  Creatinie, Serum0.8 mg/dL [0.7 - 1.5]  Glucose, Dypjo085 mg/dL<H> [70 - 99]  Potassium, Serum3.3 mmol/L<L> [3.5 - 5.0]  Sodium, Serum 144 mmol/L [135 - 146]  [facility] [place] @ 06:09  Blood Gas Arterial-Calcium,Ionized--  Blood Urea Nitrogen, Serum 51 mg/dL<H> [10 - 20]  Carbon Dioxide, Serum33 mmol/L<H> [17 - 32]  Chloride, Oyfhh368 mmol/L [98 - 110]  Creatinie, Serum0.8 mg/dL [0.7 - 1.5]  Glucose, Serum95 mg/dL [70 - 99]  Potassium, Serum3.6 mmol/L [3.5 - 5.0]  Sodium, Serum 146 mmol/L [135 - 146]  [facility] [place] @ 05:34  Blood Gas Arterial-Calcium,Ionized--  Blood Urea Nitrogen, Serum 64 mg/dL<HH> [10 - 20] [Critical value:]  Carbon Dioxide, Serum32 mmol/L [17 - 32]  Chloride, Crjom900 mmol/L [98 - 110]  Creatinie, Serum0.9 mg/dL [0.7 - 1.5]  Glucose, Wflxv377 mg/dL<H> [70 - 99]  Potassium, Serum3.8 mmol/L [3.5 - 5.0]  Sodium, Serum 147 mmol/L<H> [135 - 146]  [facility] [place] @ 05:52  Blood Gas Arterial-Calcium,Ionized--  Blood Urea Nitrogen, Serum 79 mg/dL<HH> [10 - 20] [Critical value:]  Carbon Dioxide, Serum29 mmol/L [17 - 32]  Chloride, Nagpq672 mmol/L [98 - 110]  Creatinie, Serum1.0 mg/dL [0.7 - 1.5]  Glucose, Doclp283 mg/dL<H> [70 - 99]  Potassium, Serum3.5 mmol/L [3.5 - 5.0]  Sodium, Serum 142 mmol/L [135 - 146]  [facility] [place] @ 06:43  Blood Gas Arterial-Calcium,Ionized--  Blood Urea Nitrogen, Serum 95 mg/dL<HH> [10 - 20] [Critical value:]  Carbon Dioxide, Serum31 mmol/L [17 - 32]  Chloride, Vxnfn272 mmol/L [98 - 110]  Creatinie, Serum0.9 mg/dL [0.7 - 1.5]  Glucose, Uvmqk938 mg/dL<H> [70 - 99]  Potassium, Serum3.4 mmol/L<L> [3.5 - 5.0]  Sodium, Serum 145 mmol/L [135 - 146]        PT/INR - ( 09 Jul 2019 06:09 )   PT: 14.50 sec;   INR: 1.26 ratio         PTT - ( 09 Jul 2019 06:09 )  PTT:65.0 sec      Microbiology:    Culture - Blood (collected 06-28-19 @ 18:46)  Source: .Blood None  Final Report (07-04-19 @ 03:01):    No growth at 5 days.    Culture - Urine (collected 06-22-19 @ 18:04)  Source: .Urine Clean Catch (Midstream)  Final Report (06-24-19 @ 06:28):    No growth    Culture - Urine (collected 06-21-19 @ 23:54)  Source: .Urine Clean Catch (Midstream)  Final Report (06-23-19 @ 08:53):    No growth    Culture - Blood (collected 06-15-19 @ 18:01)  Source: .Blood None  Final Report (06-21-19 @ 06:00):    No growth at 5 days.        Medications:  albumin human 25% IVPB 50 milliLiter(s) IV Intermittent once  ALBUTerol    0.083% 2.5 milliGRAM(s) Nebulizer every 6 hours  ALBUTerol    90 MICROgram(s) HFA Inhaler 1 Puff(s) Inhalation every 4 hours  aluminum hydroxide/magnesium hydroxide/simethicone Suspension 30 milliLiter(s) Oral every 4 hours PRN  aspirin  chewable 81 milliGRAM(s) Oral daily  bisacodyl Suppository 10 milliGRAM(s) Rectal daily PRN  chlorhexidine 4% Liquid 1 Application(s) Topical <User Schedule>  cholecalciferol 2000 Unit(s) Oral daily  docusate sodium 100 milliGRAM(s) Oral three times a day  furosemide   Injectable 40 milliGRAM(s) IV Push once  furosemide   Injectable 40 milliGRAM(s) IV Push two times a day  heparin  Injectable 5000 Unit(s) SubCutaneous every 8 hours  levothyroxine 50 MICROGram(s) Oral daily  magnesium sulfate  IVPB 2 Gram(s) IV Intermittent once  metoprolol tartrate 25 milliGRAM(s) Oral two times a day  midodrine 10 milliGRAM(s) Oral every 8 hours  nystatin Powder 1 Application(s) Topical three times a day  pantoprazole   Suspension 40 milliGRAM(s) Oral before breakfast  potassium chloride    Tablet ER 40 milliEquivalent(s) Oral every 4 hours  potassium chloride   Powder 40 milliEquivalent(s) Oral daily  PPD  5 Tuberculin Unit(s) Injectable 5 Unit(s) IntraDermal once  predniSONE   Tablet 40 milliGRAM(s) Oral daily        Assessment and Plan:  A/P 67 yo F w/ hx of CAD s/p PCI and hypothyroidism sent by gastroenterologist for gallbladder hydrops, course complicated by ALI/ transaminitis possibly of autoimmune etiology, LUIS ALBERTO with hyperkalemia multifactorial 2/2 rhabdo (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 with resultant proteinuria and low albumin state and diffuse anasarca, raynauds phenomenon (not currently active), esophageal dysmotility and reflux, cystitis with urinary retention s/p abx, and subsequent worsening functional debility and malnutrition    #hydrops gallbladder with  transaminitis - LFT's near normal levels  -patient tolerating diet well  -hepatitis profile negative  concomitant acute rhabdomyolysis- resolved, repeated CK level  normal    statin was d/c on admission,   liver was  biopsied- Surgical Pathology Report (06.24.19 @ 16:10)  Surgical Final Report: Final Diagnosis  Right hepatic lobe, needle biopsy:  - Liver parenchyma showing mild focal lobulitis and hepatocyte reactive changes with few glycogenated nuclei.  - Focal area of vaguely formed Francisca-Denk body also present.  - Two minute focus of macro and micro-vesicular steatosis also present, counting less than 5% of tissue volume of the biopsy.  - Portal areas showing marked chronic inflammation with focal interface activity, infiltrated with mainly small lymphocytes and  rare neutrophils, and fibrous expansion of portal area with  fibrous bridge formation, probable cirrhosis.  - Stainable iron present, not increased.  - Trichrome stain reviewed.  - PAS-D stain failed to reveal alpha-1 antitrypsin globule.    Verified by: Manoj Lindsey M.D.  (Electronic Signature)  Reported on: 06/26/19 15:42 EDT, [address] [place] [id number]  _________________________________________________________________    Comment  _Sections show findings are consistent with clinical impression of  cirrhosis.  However, the etiology is not clear.  Clinical correlation is recommended.    06/25/19 08:55 ns    LIVER FUNCTIONS - ( 10 Jul 2019 04:59 )  Alb: 2.9 g/dL / Pro: 5.2 g/dL / ALK PHOS: 325 U/L / ALT: 49 U/L / AST: 39 U/L / GGT: x                                clinically too weak at this moment for cholecystecomy         # LUIS ALBERTO  - due to suspected ATN- renal function normalized now  post renal biopsy- ATN    # Abnormal elevated DS DNA- 111, Elevated ANKITA titers 1: 2560, Elevated P-Anca- titer 1: 1280 - ? lupus nephritis, vs Vasculitis, - repeated Panca titer remains high.  - we have consulted  Rheumatology specialist - who recommended- diagnostic renal biopsy-completed  - MPO/PR3 abs- negative   -continued on prednisone 40 mg po once daily  -f/up EMG  -? myositis- elevated aldolase with rhabdomyolysis on presentation.       #Persistent Leukocytosis- most likely as a result of acute inflammatory state  so far no acute infectious etiology   -repeated  blood cxs- no bacterial growth  -continue to monitor CBC     # hypotension- b/p improved post initiation of midodrine tx. , ? mild adrenal insufficiency, patient now on prednisone tx.  with hypoalbuminemia, tertiary fluid spacing, LUIS ALBERTO, started on Midodrine 10 mg po three times daily  -obtain renin/aldosterone levels      #hypoalbuminemia 2/2 acute liver disease- severe protein/calorie malnutrition  - patient was started - IV Albumin tx.    #h/o CAD  -cont home regimen  -statin on hold 2/2 acute liver injury  -will resume  plavix from tomorrow      #hypothyroidism  -tsh elevated---t3/t4 normal---will cont syntrhoid 50 mcg daily    # Constipation  -colace/dulcolax/senna    #Anasarca- fluid overload status- continue IV diuresis - Lasix 40 mg IV twice daily tx.    # Vitamin D insufficiency- started on vitamin D tx.    # Macrocytic anemia- obtain vitamin B 12, folate levels.    # Hypokalemia, hypomagnesemia supplemented    # Physical deconditioning- PT/OT tx. daily as tolerated    #DVT//gI ppx  FULL CODE  high risk/ guarded prognosis    #Progress Note Handoff: Pending Consults__none______,Tests__EMG______,Test Results___daily BMP____,Other;, strict I and O monitoring with IV lasix tx,   Family discussion: patient by bedside Disposition: Home_vs STR_once medically stable

## 2019-07-10 NOTE — PROGRESS NOTE ADULT - SUBJECTIVE AND OBJECTIVE BOX
SUBJECTIVE:    Patient is a 68y old Female who presents with a chief complaint of gallbladder hydrops (10 Jul 2019 14:20)    Currently admitted to medicine with the primary diagnosis of Gallbladder hydrops     Today is hospital day 26d. This morning she is resting in bed with a nasal canula. Patient had an attack of SOB overnight and was nebulized and put on oxygen    INTERVAL EVENTS: Patient is saturating well on room air and her supplemental oxygen and albuterol is D/C. Her Sural nerve biopsy is delayed till Friday. Patient is seen working with the PT. Patient reported tremors and her albuterol was discontinued.    PAST MEDICAL & SURGICAL HISTORY  Hypothyroidism  CAD S/P percutaneous coronary angioplasty  No significant past surgical history      ALLERGIES:  No Known Allergies    MEDICATIONS:  STANDING MEDICATIONS  ALBUTerol    90 MICROgram(s) HFA Inhaler 1 Puff(s) Inhalation every 4 hours  aspirin  chewable 81 milliGRAM(s) Oral daily  chlorhexidine 4% Liquid 1 Application(s) Topical <User Schedule>  cholecalciferol 2000 Unit(s) Oral daily  docusate sodium 100 milliGRAM(s) Oral three times a day  furosemide   Injectable 40 milliGRAM(s) IV Push two times a day  heparin  Injectable 5000 Unit(s) SubCutaneous every 8 hours  levothyroxine 50 MICROGram(s) Oral daily  metoprolol tartrate 25 milliGRAM(s) Oral two times a day  midodrine 10 milliGRAM(s) Oral every 8 hours  nystatin Powder 1 Application(s) Topical three times a day  pantoprazole   Suspension 40 milliGRAM(s) Oral before breakfast  potassium chloride   Powder 40 milliEquivalent(s) Oral daily  PPD  5 Tuberculin Unit(s) Injectable 5 Unit(s) IntraDermal once  predniSONE   Tablet 40 milliGRAM(s) Oral daily    PRN MEDICATIONS  aluminum hydroxide/magnesium hydroxide/simethicone Suspension 30 milliLiter(s) Oral every 4 hours PRN  bisacodyl Suppository 10 milliGRAM(s) Rectal daily PRN    VITALS:   T(F): 96.2  HR: 99  BP: 98/52  RR: 18  SpO2: 99%    LABS:                        8.4    18.24 )-----------( 135      ( 10 Jul 2019 04:59 )             25.9     07-10    144  |  100  |  45<H>  ----------------------------<  106<H>  3.3<L>   |  32  |  0.8    Ca    8.8      10 Jul 2019 04:59  Phos  2.8     07-10  Mg     1.7     07-10    TPro  5.2<L>  /  Alb  2.9<L>  /  TBili  2.3<H>  /  DBili  1.0<H>  /  AST  39  /  ALT  49<H>  /  AlkPhos  325<H>  07-10    PT/INR - ( 09 Jul 2019 06:09 )   PT: 14.50 sec;   INR: 1.26 ratio         PTT - ( 09 Jul 2019 06:09 )  PTT:65.0 sec              RADIOLOGY:    PHYSICAL EXAM:  GEN: Patient on supplemental oxygen, tremors noted  PULM/CHEST: Clear to auscultation bilaterally, no rales, rhonchi or wheezes   CVS: RRR S1+S2 + Systolic murmur  ABD: Soft, non-tender, non-distended, +BS, + eccymosis  EXT: Edema in the lower limbs, Pulses intact  NEURO: AAOx3

## 2019-07-10 NOTE — PROGRESS NOTE ADULT - ASSESSMENT
· Assessment		  A/P 69 yo F w/ hx of CAD s/p PCI and hypothyroidism sent by gastroenterologist for gallbladder hydrops, course complicated by ALI/ transaminitis possibly of autoimmune etiology, LUIS ALBERTO with hyperkalemia multifactorial 2/2 rhabdo (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 with resultant proteinuria and low albumin state and diffuse anasarca, raynauds phenomenon (not currently active), esophageal dysmotility and reflux, cystitis with urinary retention s/p abx, and subsequent worsening functional debility and malnutrition.    Patient is weaker, edematous. She had rings in her fingers and were removed by the senior resident to avoid ischemia in finger 06/29. Labs performed after blood drawn via US on 07/01. Patient had a bowel movement today 07/01 and feels better after that. 40 meq k was given due to low K levels 3.1 07/01. Calcium carbonate 500x2 added on Nephrology recommendations 07/01.    Sural nerve biopsy was planned today, now will take place on Friday Patient continued on Prednisone, Albumin was given only once today 07/10 followed by Lasix. Her SOB episode overnight is well controlled and her supplemental oxygen is D/C    #hydrops gallbladder/acute severe transaminitis  -ALT/AST down to 39/49 on 07/10  -CPK downtrending [id number]   -Liver biopsy done shows Cirrhosis but of no clear etiology   -Clinically too weak at this moment for cholecystectomy  -Midodrine 5mg every 8h for portal HTN    #LUIS ALBERTO  -Cr downtrending 1.5>1.1>0.9>1.0  -BUN levels are down to 79>64  -As per renal team recommendations - patient most likely has hepatorenal syndrome  -Albumin challenge test with 100g albumin performed 06/27  -Continue IV Albumin 25% 50ml with 80mg Lasix given once 07/10  -Strict Input and Output measures  -Renal biopsy done 07/01, Renal Biopsy shows LUIS ALBERTO changes  -Sural Biopsy on Friday possibly pending medical clearance  -Given 40 meq of K once only on 07/01 and 07/03  -Continue 40mg K daily  -D/C Ca carbonate 500 on 07/09     # Abnormal DS DNA, Elevated ANKITA titers   -Rheum recommended to hold prednisone use until w/u is completed  -Solumedrol 80mg once given on 07/01 and 07/02  -Started oral Prednisone 40mg 07/03  -If Renal bx is inconclusive and EMG and sural nerve biopsy recommended  -Rheumatology consulted to discuss further plan, they recommend sural nerve biopsy  -ESR, CRP, ANCA repeated  -ESR is 21 on 07/09    #hypoalbuminemia  -Malnutrition vs Liver Cirrhosis  -TPN D/C  -Eating normally  -Getting Albumin 25% 50ml twice daily    #distal digit blue discoloration  -Raynaud's (primary vs secondary)  -r/o etiology---paraneoplastic, vasculitis, vs rheum   -transient=---resolved within 1 hour  -gloves/warmth to affected fingertips  -CCB on hold for hypotension  -esophagram consistent with dysmotility    #UTI with urinary retention  -Smith D/C  -Trial of void on 07/08 and was successful  -received ceftriaxone/ completed  -Urinalysis order on recommendation of Nephro  -WBCs 19k today 07/09 probable due to Prednisone treatment    #h/o CAD  -cont home regimen  -statin on hold 2/2 acute liver injury  -ASA restarted 07/08    #hypothyroidism  -TSH elevated---t3/t4 normal---will cont synthroid 50 mcg daily    #DVT + GI ppx  FULL CODE

## 2019-07-11 NOTE — PROGRESS NOTE ADULT - ASSESSMENT
· Assessment		  A/P 69 yo F w/ hx of CAD s/p PCI and hypothyroidism sent by gastroenterologist for gallbladder hydrops, course complicated by ALI/ transaminitis possibly of autoimmune etiology, LUIS ALBERTO with hyperkalemia multifactorial 2/2 rhabdo (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 with resultant proteinuria and low albumin state and diffuse anasarca, raynauds phenomenon (not currently active), esophageal dysmotility and reflux, cystitis with urinary retention s/p abx, and subsequent worsening functional debility and malnutrition.    Patient is weaker, edematous. She had rings in her fingers and were removed by the senior resident to avoid ischemia in finger 06/29. Labs performed after blood drawn via US on 07/01. Patient had a bowel movement today 07/01 and feels better after that. 40 meq k was given due to low K levels 3.1 07/01. Calcium carbonate 500x2 added on Nephrology recommendations 07/01.    Sural nerve biopsy was planned today, now will take place on Friday Patient continued on Prednisone, Albumin was given only once today 07/10 followed by Lasix. Her SOB episode overnight is well controlled and her supplemental oxygen is D/C    #hydrops gallbladder/acute severe transaminitis  -ALT/AST down to 39/49 on 07/10  -CPK downtrending [id number]   -Liver biopsy done shows Cirrhosis but of no clear etiology   -Clinically too weak at this moment for cholecystectomy  -Midodrine 5mg every 8h for portal HTN    #LUIS ALBERTO  -Cr downtrending 1.5>1.1>0.9>1.0  -BUN levels are down to 79>64  -As per renal team recommendations - patient most likely has hepatorenal syndrome  -Albumin challenge test with 100g albumin performed 06/27  -Continue IV Albumin 25% 50ml with 80mg Lasix given once 07/10  -Strict Input and Output measures  -Renal biopsy done 07/01, Renal Biopsy shows LUIS ALBERTO changes  -Sural Biopsy on Friday possibly pending medical clearance  -Given 40 meq of K once only on 07/01 and 07/03  -Continue 40mg K daily  -D/C Ca carbonate 500 on 07/09     # Abnormal DS DNA, Elevated ANKITA titers   -Rheum recommended to hold prednisone use until w/u is completed  -Solumedrol 80mg once given on 07/01 and 07/02  -Started oral Prednisone 40mg 07/03  -If Renal bx is inconclusive and EMG and sural nerve biopsy recommended  -Rheumatology consulted to discuss further plan, they recommend sural nerve biopsy  -Left Sural Nerve biopsy on 07/12  -ESR, CRP, ANCA repeated, ANKITA value down to   -ESR is 21 on 07/09    #hypoalbuminemia  -Malnutrition vs Liver Cirrhosis  -TPN D/C  -Eating normally  -Albumin and Lasix held for 07/11    #distal digit blue discoloration  -Raynaud's (primary vs secondary)  -r/o etiology---paraneoplastic, vasculitis, vs rheum   -transient=---resolved within 1 hour  -gloves/warmth to affected fingertips  -CCB on hold for hypotension  -esophagram consistent with dysmotility    #UTI with urinary retention  -Smith D/C  -Trial of void on 07/08 and was successful  -received ceftriaxone/ completed  -Urinalysis order on recommendation of Nephro  -WBCs 23k today 07/09 probable due to Prednisone treatment. Prednisone 20mg from 07/12    #h/o CAD  -cont home regimen  -statin on hold 2/2 acute liver injury  -ASA restarted 07/08    #hypothyroidism  -TSH elevated---t3/t4 normal---will cont synthroid 50 mcg daily    #DVT + GI ppx  FULL CODE · Assessment		  A/P 69 yo F w/ hx of CAD s/p PCI and hypothyroidism sent by gastroenterologist for gallbladder hydrops, course complicated by ALI/ transaminitis possibly of autoimmune etiology, LUIS ALBERTO with hyperkalemia multifactorial 2/2 rhabdo (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 with resultant proteinuria and low albumin state and diffuse anasarca, raynauds phenomenon (not currently active), esophageal dysmotility and reflux, cystitis with urinary retention s/p abx, and subsequent worsening functional debility and malnutrition.    Patient is weaker, edematous. She had rings in her fingers and were removed by the senior resident to avoid ischemia in finger 06/29. Labs performed after blood drawn via US on 07/01. Patient had a bowel movement today 07/01 and feels better after that. 40 meq k was given due to low K levels 3.1 07/01. Calcium carbonate 500x2 added on Nephrology recommendations 07/01.    Sural nerve biopsy was planned today, now will take place on Friday Patient continued on Prednisone, Albumin was given only once today 07/10 followed by Lasix. Her SOB episode overnight is well controlled and her supplemental oxygen is D/C    #hydrops gallbladder/acute severe transaminitis  -ALT/AST down to 39/49 on 07/10  -CPK downtrending [id number]   -Liver biopsy done shows Cirrhosis but of no clear etiology   -Clinically too weak at this moment for cholecystectomy  -Midodrine 5mg every 8h for portal HTN    #LUIS ALBERTO  -Cr downtrending 1.5>1.1>0.9>1.0  -BUN levels are down to 79>64  -As per renal team recommendations - patient most likely has hepatorenal syndrome  -Albumin challenge test with 100g albumin performed 06/27  -Continue IV Albumin 25% 50ml with 80mg Lasix given once 07/10  -Strict Input and Output measures  -Renal biopsy done 07/01, Renal Biopsy shows LUIS ALBERTO changes  -Sural Biopsy on Friday possibly  -Given 40 meq of K once only on 07/01 and 07/03  -D/C 40mg K daily on 07/11  -D/C Ca carbonate 500 on 07/09     # Abnormal DS DNA, Elevated ANKITA titers   -Rheum recommended to hold prednisone use until w/u is completed  -Solumedrol 80mg once given on 07/01 and 07/02  -Started oral Prednisone 40mg 07/03  -If Renal bx is inconclusive and EMG and sural nerve biopsy recommended  -Rheumatology consulted to discuss further plan, they recommend sural nerve biopsy  -Left Sural Nerve biopsy on 07/12  -ESR, CRP, ANCA repeated, ANKITA value down to   -ESR is 21 on 07/09    #hypoalbuminemia  -Malnutrition vs Liver Cirrhosis  -TPN D/C  -Eating normally  -Albumin and Lasix held for 07/11    #distal digit blue discoloration  -Raynaud's (primary vs secondary)  -r/o etiology---paraneoplastic, vasculitis, vs rheum   -transient=---resolved within 1 hour  -gloves/warmth to affected fingertips  -CCB on hold for hypotension  -esophagram consistent with dysmotility    #UTI with urinary retention  -Smith D/C  -Trial of void on 07/08 and was successful  -received ceftriaxone/ completed  -Urinalysis order on recommendation of Nephro  -WBCs 23k today 07/11 probable due to Prednisone treatment. Prednisone 20mg from 07/12    #h/o CAD  -cont home regimen  -statin on hold 2/2 acute liver injury  -ASA restarted 07/08    #hypothyroidism  -TSH elevated---t3/t4 normal---will cont synthroid 50 mcg daily    #DVT + GI ppx  FULL CODE

## 2019-07-11 NOTE — PROGRESS NOTE ADULT - SUBJECTIVE AND OBJECTIVE BOX
SUBJECTIVE:    Patient is a 68y old Female who presents with a chief complaint of gallbladder hydrops (2019 13:05)    Currently admitted to medicine with the primary diagnosis of Gallbladder hydrops     Today is hospital day 27d. This morning she is resting and reports no overnight issues.    INTERVAL EVENTS: Patient reports dizziness when standing while working with PT. She reports mild constipation. Her Prednisone is decreased to 20mg from     PAST MEDICAL & SURGICAL HISTORY  Hypothyroidism  CAD S/P percutaneous coronary angioplasty  No significant past surgical history      ALLERGIES:  No Known Allergies    MEDICATIONS:  STANDING MEDICATIONS  aspirin  chewable 81 milliGRAM(s) Oral daily  chlorhexidine 4% Liquid 1 Application(s) Topical <User Schedule>  cholecalciferol 2000 Unit(s) Oral daily  docusate sodium 100 milliGRAM(s) Oral three times a day  furosemide   Injectable 40 milliGRAM(s) IV Push two times a day  heparin  Injectable 5000 Unit(s) SubCutaneous every 8 hours  levothyroxine 50 MICROGram(s) Oral daily  metoprolol tartrate 25 milliGRAM(s) Oral two times a day  midodrine 10 milliGRAM(s) Oral every 8 hours  multivitamin 1 Tablet(s) Oral daily  nystatin Powder 1 Application(s) Topical three times a day  pantoprazole   Suspension 40 milliGRAM(s) Oral before breakfast  PPD  5 Tuberculin Unit(s) Injectable 5 Unit(s) IntraDermal once    PRN MEDICATIONS  acetaminophen   Tablet .. 325 milliGRAM(s) Oral every 4 hours PRN  aluminum hydroxide/magnesium hydroxide/simethicone Suspension 30 milliLiter(s) Oral every 4 hours PRN  bisacodyl Suppository 10 milliGRAM(s) Rectal daily PRN    VITALS:   T(F): 98.7  HR: 91  BP: 106/56  RR: 17  SpO2: 95%    LABS:                        8.2    22.26 )-----------( 131      ( 2019 06:25 )             25.5     07-11    140  |  97<L>  |  44<H>  ----------------------------<  97  3.7   |  32  |  0.9    Ca    9.0      2019 06:25  Phos  2.7     07-11  Mg     1.9     07-11    TPro  5.2<L>  /  Alb  2.9<L>  /  TBili  2.3<H>  /  DBili  1.0<H>  /  AST  39  /  ALT  49<H>  /  AlkPhos  325<H>  07-10      Urinalysis Basic - ( 10 Jul 2019 19:01 )    Color: Yellow / Appearance: Turbid / S.010 / pH: x  Gluc: x / Ketone: Negative  / Bili: Negative / Urobili: 0.2   Blood: x / Protein: 30 / Nitrite: Negative   Leuk Esterase: Large / RBC: >50 /HPF / WBC >50 /HPF   Sq Epi: x / Non Sq Epi: Occasional / Bacteria: Moderate                RADIOLOGY:    PHYSICAL EXAM:  GEN: Patient on supplemental oxygen, tremors noted  PULM/CHEST: Clear to auscultation bilaterally, no rales, rhonchi or wheezes   CVS: RRR S1+S2 + Systolic murmur  ABD: Soft, non-tender, non-distended, +BS, + eccymosis  EXT: Pitting Edema in the lower limbs, Pulses intact  NEURO: AAOx3

## 2019-07-11 NOTE — PROGRESS NOTE ADULT - SUBJECTIVE AND OBJECTIVE BOX
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 ([facility]            Patient was evaluated and examined  by bedside, c/o legs pain, no fever in am, no dyspnea, no cough, remains edematous, with generalized body weakness, inability to abulate          REVIEW OF SYSTEMS:  please see pertinent positives mentioned above, all other 12 ROS negative      T(C): , Max: 37.8 (07-10-19 @ 20:26)  HR: 91 (07-11-19 @ 05:48)  BP: 106/56 (07-11-19 @ 05:48)  RR: 17 (07-11-19 @ 05:48)  SpO2: 95% (07-11-19 @ 08:31)  CAPILLARY BLOOD GLUCOSE      POCT Blood Glucose.: 112 mg/dL (11 Jul 2019 11:16)  POCT Blood Glucose.: 211 mg/dL (10 Jul 2019 16:48)      PHYSICAL EXAM:  General: NAD, AAOX3, patient is laying comfortably in bed, obese  HEENT: AT, NC, Supple, NO JVD, NO CB  Lungs: CTA B/L, no wheezing, no rhonchi  CVS: normal S1, S2, RRR, NO M/G/R  Abdomen: soft, bowel sounds present, non-tender, non-distended  Extremities: diffuse plus 3 pitting b/l lower  edema/anasarca, no clubbing, no cyanosis, positive peripheral pulses b/l  Neuro: generalized body weakness, lower ext motor weakness due to severe legs edema, unable to ambulate, sensory intact throughout  Skin: no rush, no ecchymosis      LAB  CBC  Date: 07-11-19 @ 06:25  Mean cell Qktwqmkwre24.2  Mean cell Hemoglobin Conc32.2  Mean cell Volum 100.0  Platelet count-Automate 131  RBC Count 2.55  Red Cell Distrib Width24.7  WBC Count22.26  % Albumin, Urine--  Hematocrit 25.5  Hemoglobin 8.2  CBC  Date: 07-10-19 @ 04:59  Mean cell Hkoncabvzq45.8  Mean cell Hemoglobin Conc32.4  Mean cell Volum 98.1  Platelet count-Automate 135  RBC Count 2.64  Red Cell Distrib Width24.2  WBC Count18.24  % Albumin, Urine--  Hematocrit 25.9  Hemoglobin 8.4  CBC  Date: 07-09-19 @ 06:09  Mean cell Gnkaweottj07.8  Mean cell Hemoglobin Conc32.4  Mean cell Volum 98.3  Platelet count-Automate 173  RBC Count 2.89  Red Cell Distrib Width24.9  WBC Count19.15  % Albumin, Urine--  Hematocrit 28.4  Hemoglobin 9.2  CBC  Date: 07-08-19 @ 05:34  Mean cell Hnmclfclhm71.7  Mean cell Hemoglobin Conc32.2  Mean cell Volum 98.5  Platelet count-Automate 155  RBC Count 2.68  Red Cell Distrib Width24.8  WBC Count17.10  % Albumin, Urine--  Hematocrit 26.4  Hemoglobin 8.5  CBC  Date: 07-07-19 @ 05:52  Mean cell Giyjvneezl56.9  Mean cell Hemoglobin Conc33.2  Mean cell Volum 96.0  Platelet count-Automate 159  RBC Count 2.73  Red Cell Distrib Width24.8  WBC Count17.57  % Albumin, Urine--  Hematocrit 26.2  Hemoglobin 8.7  CBC  Date: 07-06-19 @ 06:43  Mean cell Tqvknwpsoi79.3  Mean cell Hemoglobin Conc32.7  Mean cell Volum 95.8  Platelet count-Automate 171  RBC Count 2.84  Red Cell Distrib Width24.4  WBC Count14.95  % Albumin, Urine--  Hematocrit 27.2  Hemoglobin 8.9  CBC  Date: 07-05-19 @ 05:18  Mean cell Ycskhrnhve89.2  Mean cell Hemoglobin Conc33.6  Mean cell Volum 92.9  Platelet count-Automate 173  RBC Count 2.82  Red Cell Distrib Width24.1  WBC Count16.06  % Albumin, Urine--  Hematocrit 26.2  Hemoglobin 8.8    [facility]  07-11-19 @ 06:25  Blood Gas Arterial-Calcium,Ionized--  Blood Urea Nitrogen, Serum 44 mg/dL<H> [10 - 20]  Carbon Dioxide, Serum32 mmol/L [17 - 32]  Chloride, Serum97 mmol/L<L> [98 - 110]  Creatinie, Serum0.9 mg/dL [0.7 - 1.5]  Glucose, Serum97 mg/dL [70 - 99]  Potassium, Serum3.7 mmol/L [3.5 - 5.0]  Sodium, Serum 140 mmol/L [135 - 146]  [facility]  07-10-19 @ 04:59  Blood Gas Arterial-Calcium,Ionized--  Blood Urea Nitrogen, Serum 45 mg/dL<H> [10 - 20]  Carbon Dioxide, Serum32 mmol/L [17 - 32]  Chloride, [id number] mmol/L [98 - 110]  Creatinie, Serum0.8 mg/dL [0.7 - 1.5]  Glucose, Gjwpo506 mg/dL<H> [70 - 99]  Potassium, Serum3.3 mmol/L<L> [3.5 - 5.0]  Sodium, Serum 144 mmol/L [135 - 146]  [facility]  07-09-19 @ 06:09  Blood Gas Arterial-Calcium,Ionized--  Blood Urea Nitrogen, Serum 51 mg/dL<H> [10 - 20]  Carbon Dioxide, Serum33 mmol/L<H> [17 - 32]  Chloride, Qxjxj782 mmol/L [98 - 110]  Creatinie, Serum0.8 mg/dL [0.7 - 1.5]  Glucose, Serum95 mg/dL [70 - 99]  Potassium, Serum3.6 mmol/L [3.5 - 5.0]  Sodium, Serum 146 mmol/L [135 - 146]  [facility]  07-08-19 @ 05:34  Blood Gas Arterial-Calcium,Ionized--  Blood Urea Nitrogen, Serum 64 mg/dL<HH> [10 - 20] [Critical value:]  Carbon Dioxide, Serum32 mmol/L [17 - 32]  Chloride, Vbfgr024 mmol/L [98 - 110]  Creatinie, Serum0.9 mg/dL [0.7 - 1.5]  Glucose, Vgbpz651 mg/dL<H> [70 - 99]  Potassium, Serum3.8 mmol/L [3.5 - 5.0]  Sodium, Serum 147 mmol/L<H> [135 - 146]  [facility]  07-07-19 @ 05:52  Blood Gas Arterial-Calcium,Ionized--  Blood Urea Nitrogen, Serum 79 mg/dL<HH> [10 - 20] [Critical value:]  Carbon Dioxide, Serum29 mmol/L [17 - 32]  Chloride, Nwuzp750 mmol/L [98 - 110]  Creatinie, Serum1.0 mg/dL [0.7 - 1.5]  Glucose, Tcosq517 mg/dL<H> [70 - 99]  Potassium, Serum3.5 mmol/L [3.5 - 5.0]  Sodium, Serum 142 mmol/L [135 - 146]              Microbiology:    Culture - Blood (collected 06-28-19 @ 18:46)  Source: .Blood None  Final Report (07-04-19 @ 03:01):    No growth at 5 days.    Culture - Urine (collected 06-22-19 @ 18:04)  Source: .Urine Clean Catch (Midstream)  Final Report (06-24-19 @ 06:28):    No growth    Culture - Urine (collected 06-21-19 @ 23:54)  Source: .Urine Clean Catch (Midstream)  Final Report (06-23-19 @ 08:53):    No growth    Culture - Blood (collected 06-15-19 @ 18:01)  Source: .Blood None  Final Report (06-21-19 @ 06:00):    No growth at 5 days.        Medications:  acetaminophen   Tablet .. 325 milliGRAM(s) Oral every 4 hours PRN  aluminum hydroxide/magnesium hydroxide/simethicone Suspension 30 milliLiter(s) Oral every 4 hours PRN  aspirin  chewable 81 milliGRAM(s) Oral daily  bisacodyl Suppository 10 milliGRAM(s) Rectal daily PRN  chlorhexidine 4% Liquid 1 Application(s) Topical <User Schedule>  cholecalciferol 2000 Unit(s) Oral daily  docusate sodium 100 milliGRAM(s) Oral three times a day  furosemide   Injectable 40 milliGRAM(s) IV Push two times a day  heparin  Injectable 5000 Unit(s) SubCutaneous every 8 hours  levothyroxine 50 MICROGram(s) Oral daily  metoprolol tartrate 25 milliGRAM(s) Oral two times a day  midodrine 10 milliGRAM(s) Oral every 8 hours  multivitamin 1 Tablet(s) Oral daily  nystatin Powder 1 Application(s) Topical three times a day  pantoprazole   Suspension 40 milliGRAM(s) Oral before breakfast  potassium chloride   Powder 40 milliEquivalent(s) Oral daily  PPD  5 Tuberculin Unit(s) Injectable 5 Unit(s) IntraDermal once  predniSONE   Tablet 40 milliGRAM(s) Oral daily        Assessment and Plan:  A/P 67 yo F w/ hx of CAD s/p PCI and hypothyroidism sent by gastroenterologist for gallbladder hydrops, course complicated by ALI/ transaminitis possibly of autoimmune etiology, LUIS ALBERTO with hyperkalemia multifactorial 2/2 rhabdo (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 with resultant proteinuria and low albumin state and diffuse anasarca, raynauds phenomenon (not currently active), esophageal dysmotility and reflux, cystitis with urinary retention s/p abx, and subsequent worsening functional debility and malnutrition    #hydrops gallbladder with  transaminitis - as per GI recommendations- pt. most likely had drug induced transaminitis, no evidence of autoimmune hepatitis  - LFT's near normal levels  -patient tolerating diet well  -hepatitis profile negative  concomitant acute rhabdomyolysis- resolved, repeated CK level  normal    statin was d/c on admission,   liver was  biopsied- Surgical Pathology Report (06.24.19 @ 16:10)  Surgical Final Report: Final Diagnosis  Right hepatic lobe, needle biopsy:  - Liver parenchyma showing mild focal lobulitis and hepatocyte reactive changes with few glycogenated nuclei.  - Focal area of vaguely formed Francisca-Denk body also present.  - Two minute focus of macro and micro-vesicular steatosis also present, counting less than 5% of tissue volume of the biopsy.  - Portal areas showing marked chronic inflammation with focal interface activity, infiltrated with mainly small lymphocytes and  rare neutrophils, and fibrous expansion of portal area with  fibrous bridge formation, probable cirrhosis.  - Stainable iron present, not increased.  - Trichrome stain reviewed.  - PAS-D stain failed to reveal alpha-1 antitrypsin globule.    Verified by: Manoj Lindsey M.D.  (Electronic Signature)  Reported on: 06/26/19 15:42 EDT, [facility] [place] [address]  _________________________________________________________________    Comment  _Sections show findings are consistent with clinical impression of  cirrhosis.  However, the etiology is not clear.  Clinical correlation is recommended.    06/25/19 08:55 ns    LIVER FUNCTIONS - ( 10 Jul 2019 04:59 )  Alb: 2.9 g/dL / Pro: 5.2 g/dL / ALK PHOS: 325 U/L / ALT: 49 U/L / AST: 39 U/L / GGT: x                               patient was instructed to have outpatient elective cholecystectomy with surgical specialist f/up         # LUIS ALBERTO  - due to suspected ATN- renal function normalized now  post renal biopsy- ATN    # Abnormal elevated DS DNA- 111, Elevated ANKITA titers 1: 2560- repeated ANKITA titers decreased to 1 : 320, Elevated P-Anca- titer 1: 1280 - ? lupus nephritis, vs Vasculitis, - repeated Panca titer remains high.  - we have consulted  Rheumatology specialist - who recommended- diagnostic renal biopsy-completed  - MPO/PR3 abs- negative   -continued on prednisone 40 mg po once daily- will taper to 20 mg po once daily from tomorrow  -f/up EMG, sural nerve biopsy  -? myositis- elevated aldolase with rhabdomyolysis on presentation.       #Persistent Leukocytosis- most likely as a result of acute inflammatory state  so far no acute infectious etiology   -repeated  blood cxs- no bacterial growth  -will consult Hematology today to help us figure out the etiology for persistent leukocytosis.    # hypotension- b/p improved post initiation of midodrine tx. , ? mild adrenal insufficiency, patient now on prednisone tx.  with hypoalbuminemia, tertiary fluid spacing, LUIS ALBERTO, started on Midodrine 10 mg po three times daily  -obtain renin/aldosterone levels      #hypoalbuminemia 2/2 acute liver disease- severe protein/calorie malnutrition  - patient received - IV Albumin tx.    #h/o CAD  -cont home regimen  -statin on hold 2/2 acute liver injury  -will resume  plavix from tomorrow      #hypothyroidism  -tsh elevated---t3/t4 normal---will cont syntrhoid 50 mcg daily  -repeat thyroid panel    # Constipation  -colace/dulcolax/senna    #Anasarca- fluid overload status- continue IV diuresis - Lasix 40 mg IV twice daily tx.    # Vitamin D insufficiency- started on vitamin D tx.    # Macrocytic anemia- obtain vitamin B 12- normal, folate levels- normal.  -started on MVI tx. daily    # Hypokalemia, hypomagnesemia supplemented    # Physical deconditioning- PT/OT tx. daily as tolerated    #DVT//gI ppx  FULL CODE  high risk/ guarded prognosis    #Progress Note Handoff: Pending Consults__none______,Tests__EMG, sural nerve biopsy______,Test Results___daily BMP____,Other;, strict I and O monitoring with IV lasix tx,   Family discussion: patient by bedside Disposition: Home_vs STR_once medically stable

## 2019-07-11 NOTE — CONSULT NOTE ADULT - ASSESSMENT
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 with resultant proteinuria and low albumin state and diffuse anasarca, raynauds phenomenon (not currently active), esophageal dysmotility and reflux, cystitis with urinary retention s/p abx, and subsequent worsening functional debility and malnutrition.     Hematology asked to comment on elevated WBC. Her CBC was relatively within normal limits prior to hospitalization     1) Leukocytosis - could be 2/2 to recent infection (UTI) and prednisone use  This patient has had complicated hospital stay  I do not suspect any myelogenous etiology at this time  Also her WBC count was WNL prior to admission   Recommend to get her CBC results from outpt (from PM[facility]/from [facility]) to compare- Resident aware     2) Anemia probably secondary to recent illness as well  Her baseline Hb is around 11  Iron studies- no iron deficiency  B12 and folate was also normal  Monitor Hb.     f/u with DR Alston in outpt office in 6 weeks after DC  Discussed with pt at bedside

## 2019-07-11 NOTE — CONSULT NOTE ADULT - SUBJECTIVE AND OBJECTIVE BOX
Patient is a 68y old  Female who presents with a chief complaint of gallbladder hydrops (11 Jul 2019 14:23)      HPI:  69 yo F w/ hx of CAD s/p PCI and hypothyroidism sent by gastroenterologist for gallbladder hydrops. Pt found to have elevated liver enzymes by PMD in Apr 2019, referred to GI for w/u. MRCP 2 days ago significant for gallbladder hydrops 4.7cm with probable impacted stone at neck, also liver cirrhosis w/ mild ascites. Admits to social etoh, denies binge drinking, denies abdominal pain, N/V. Admits to decreased appetite in past 2 weeks, pt tried to maintain low salt diet. Reports BLE edema developed in past 2 days as well as weakness. Denies fever, chills, abdominal pain, SOB, dysuria. Hospital course complicated by  ALI/ transaminitis possibly of autoimmune etiology, LUIS ALBERTO with hyperkalemia multifactorial 2/2 rhabdo (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 with resultant proteinuria and low albumin state and diffuse anasarca, raynauds phenomenon (not currently active), esophageal dysmotility and reflux, cystitis with urinary retention s/p abx, and subsequent worsening functional debility and malnutrition    Hematology was consulted to comment on pt's elevated WBC count.         ROS:  Negative except as above    PAST MEDICAL & SURGICAL HISTORY:  Hypothyroidism  CAD S/P percutaneous coronary angioplasty  No significant past surgical history      SOCIAL HISTORY: Not an active smoker. Occasional alcohol use     FAMILY HISTORY:  No pertinent family history in first degree relatives      MEDICATIONS  (STANDING):  aspirin  chewable 81 milliGRAM(s) Oral daily  chlorhexidine 4% Liquid 1 Application(s) Topical <User Schedule>  cholecalciferol 2000 Unit(s) Oral daily  docusate sodium 100 milliGRAM(s) Oral three times a day  furosemide   Injectable 40 milliGRAM(s) IV Push two times a day  heparin  Injectable 5000 Unit(s) SubCutaneous every 8 hours  levothyroxine 50 MICROGram(s) Oral daily  metoprolol tartrate 25 milliGRAM(s) Oral two times a day  midodrine 10 milliGRAM(s) Oral every 8 hours  multivitamin 1 Tablet(s) Oral daily  nystatin Powder 1 Application(s) Topical three times a day  pantoprazole   Suspension 40 milliGRAM(s) Oral before breakfast  PPD  5 Tuberculin Unit(s) Injectable 5 Unit(s) IntraDermal once    MEDICATIONS  (PRN):  acetaminophen   Tablet .. 325 milliGRAM(s) Oral every 4 hours PRN Mild Pain (1 - 3)  aluminum hydroxide/magnesium hydroxide/simethicone Suspension 30 milliLiter(s) Oral every 4 hours PRN Dyspepsia  bisacodyl Suppository 10 milliGRAM(s) Rectal daily PRN Constipation      Allergies    No Known Allergies    Intolerances        Vital Signs Last 24 Hrs  T(C): 37.1 (11 Jul 2019 14:36), Max: 37.8 (10 Jul 2019 20:26)  T(F): 98.8 (11 Jul 2019 14:36), Max: 100 (10 Jul 2019 20:26)  HR: 77 (11 Jul 2019 17:13) (77 - 108)  BP: 96/46 (11 Jul 2019 17:13) (94/49 - 126/66)  BP(mean): --  RR: 18 (11 Jul 2019 14:36) (16 - 18)  SpO2: 95% (11 Jul 2019 08:31) (95% - 95%)    PHYSICAL EXAM  General: adult in NAD, Obese  Neck: supple  CV: normal S1/S2   Lungs: positive air movement b/l   Abdomen: soft non-tender  Ext: Diffuse b.l LE edema  Neuro: alert and oriented X 4      LABS:                          8.2    22.26 )-----------( 131      ( 11 Jul 2019 06:25 )             25.5         Mean Cell Volume : 100.0 fL  Mean Cell Hemoglobin : 32.2 pg  Mean Cell Hemoglobin Concentration : 32.2 g/dL  Auto Neutrophil # : x  Auto Lymphocyte # : x  Auto Monocyte # : x  Auto Eosinophil # : x  Auto Basophil # : x  Auto Neutrophil % : x  Auto Lymphocyte % : x  Auto Monocyte % : x  Auto Eosinophil % : x  Auto Basophil % : x      Serial CBC's  07-11 @ 06:25  Hct-25.5 / Hgb-8.2 / Plat-131 / RBC-2.55 / WBC-22.26  Serial CBC's  07-10 @ 04:59  Hct-25.9 / Hgb-8.4 / Plat-135 / RBC-2.64 / WBC-18.24  Serial CBC's  07-09 @ 06:09  Hct-28.4 / Hgb-9.2 / Plat-173 / RBC-2.89 / WBC-19.15  Serial CBC's  07-08 @ 05:34  Hct-26.4 / Hgb-8.5 / Plat-155 / RBC-2.68 / WBC-17.10      07-11    140  |  97<L>  |  44<H>  ----------------------------<  97  3.7   |  32  |  0.9    Ca    9.0      11 Jul 2019 06:25  Phos  2.7     07-11  Mg     1.9     07-11    TPro  5.2<L>  /  Alb  2.9<L>  /  TBili  2.3<H>  /  DBili  1.0<H>  /  AST  39  /  ALT  49<H>  /  AlkPhos  325<H>  07-10          Folate, Serum: 8.1 ng/mL (07-10 @ 16:56)  Vitamin B12, Serum: 1468 pg/mL (07-10 @ 16:56)              BLOOD SMEAR INTERPRETATION:       RADIOLOGY & ADDITIONAL STUDIES:

## 2019-07-12 NOTE — PRE-ANESTHESIA EVALUATION ADULT - NSANTHOSAYNRD_GEN_A_CORE
No. MUSTAPHA screening performed.  STOP BANG Legend: 0-2 = LOW Risk; 3-4 = INTERMEDIATE Risk; 5-8 = HIGH Risk

## 2019-07-12 NOTE — PROGRESS NOTE ADULT - SUBJECTIVE AND OBJECTIVE BOX
SUBJECTIVE:    Patient is a 68y old Female who presents with a chief complaint of gallbladder hydrops (2019 13:06)    Currently admitted to medicine with the primary diagnosis of Gallbladder hydrops     Today is hospital day 28d. This morning she is resting comfortably after under going sural nerve biopsy.    INTERVAL EVENTS: Sural nerve biopsy today. Hypotensive     PAST MEDICAL & SURGICAL HISTORY  Hypothyroidism  CAD S/P percutaneous coronary angioplasty  No significant past surgical history      ALLERGIES:  No Known Allergies    MEDICATIONS:  STANDING MEDICATIONS  aspirin  chewable 81 milliGRAM(s) Oral daily  chlorhexidine 4% Liquid 1 Application(s) Topical <User Schedule>  cholecalciferol 2000 Unit(s) Oral daily  docusate sodium 100 milliGRAM(s) Oral three times a day  heparin  Injectable 5000 Unit(s) SubCutaneous every 8 hours  levothyroxine 50 MICROGram(s) Oral daily  metoprolol tartrate 25 milliGRAM(s) Oral two times a day  midodrine 10 milliGRAM(s) Oral every 8 hours  multivitamin 1 Tablet(s) Oral daily  nystatin Powder 1 Application(s) Topical three times a day  pantoprazole   Suspension 40 milliGRAM(s) Oral before breakfast  predniSONE   Tablet 20 milliGRAM(s) Oral daily    PRN MEDICATIONS  acetaminophen   Tablet .. 325 milliGRAM(s) Oral every 4 hours PRN  aluminum hydroxide/magnesium hydroxide/simethicone Suspension 30 milliLiter(s) Oral every 4 hours PRN  bisacodyl Suppository 10 milliGRAM(s) Rectal daily PRN    VITALS:   T(F): 96.8  HR: 100  BP: 68/39  RR: 18  SpO2: 99%    LABS:                        8.4    25.24 )-----------( 136      ( 2019 05:47 )             25.4     07-12    140  |  96<L>  |  55<H>  ----------------------------<  82  3.8   |  28  |  1.1    Ca    9.1      2019 05:47  Phos  2.9     07-12  Mg     1.9         TPro  5.4<L>  /  Alb  2.8<L>  /  TBili  2.5<H>  /  DBili  1.2<H>  /  AST  50<H>  /  ALT  54<H>  /  AlkPhos  418<H>        Urinalysis Basic - ( 10 Jul 2019 19:01 )    Color: Yellow / Appearance: Turbid / S.010 / pH: x  Gluc: x / Ketone: Negative  / Bili: Negative / Urobili: 0.2   Blood: x / Protein: 30 / Nitrite: Negative   Leuk Esterase: Large / RBC: >50 /HPF / WBC >50 /HPF   Sq Epi: x / Non Sq Epi: Occasional / Bacteria: Moderate                RADIOLOGY:    PHYSICAL EXAM:  GEN: No acute distress  PULM/CHEST: Clear to auscultation bilaterally, no rales, rhonchi or wheezes   CVS: Regular rate and rhythm, S1-S2, no murmurs  ABD: Soft, non-tender, non-distended, +BS  EXT: No edema  NEURO: AAOx3    Smith Catheter: SUBJECTIVE:    Patient is a 68y old Female who presents with a chief complaint of gallbladder hydrops (2019 13:06)    Currently admitted to medicine with the primary diagnosis of Gallbladder hydrops     Today is hospital day 28d. This morning she is resting comfortably after under going sural nerve biopsy.    INTERVAL EVENTS: Sural nerve biopsy today. Hypotensive throughout the day. Methylcortisone added. Heme/onc saw the patient and retrieved previous CBC records. Lasix held for the day. Albumin 50ml given.    PAST MEDICAL & SURGICAL HISTORY  Hypothyroidism  CAD S/P percutaneous coronary angioplasty  No significant past surgical history      ALLERGIES:  No Known Allergies    MEDICATIONS:  STANDING MEDICATIONS  aspirin  chewable 81 milliGRAM(s) Oral daily  chlorhexidine 4% Liquid 1 Application(s) Topical <User Schedule>  cholecalciferol 2000 Unit(s) Oral daily  docusate sodium 100 milliGRAM(s) Oral three times a day  heparin  Injectable 5000 Unit(s) SubCutaneous every 8 hours  levothyroxine 50 MICROGram(s) Oral daily  metoprolol tartrate 25 milliGRAM(s) Oral two times a day  midodrine 10 milliGRAM(s) Oral every 8 hours  multivitamin 1 Tablet(s) Oral daily  nystatin Powder 1 Application(s) Topical three times a day  pantoprazole   Suspension 40 milliGRAM(s) Oral before breakfast  predniSONE   Tablet 20 milliGRAM(s) Oral daily    PRN MEDICATIONS  acetaminophen   Tablet .. 325 milliGRAM(s) Oral every 4 hours PRN  aluminum hydroxide/magnesium hydroxide/simethicone Suspension 30 milliLiter(s) Oral every 4 hours PRN  bisacodyl Suppository 10 milliGRAM(s) Rectal daily PRN    VITALS:   T(F): 96.8  HR: 100  BP: 68/39  RR: 18  SpO2: 99%    LABS:                        8.4    25.24 )-----------( 136      ( 2019 05:47 )             25.4     07-12    140  |  96<L>  |  55<H>  ----------------------------<  82  3.8   |  28  |  1.1    Ca    9.1      2019 05:47  Phos  2.9     07-12  Mg     1.9     07-12    TPro  5.4<L>  /  Alb  2.8<L>  /  TBili  2.5<H>  /  DBili  1.2<H>  /  AST  50<H>  /  ALT  54<H>  /  AlkPhos  418<H>  07-12      Urinalysis Basic - ( 10 Jul 2019 19:01 )    Color: Yellow / Appearance: Turbid / S.010 / pH: x  Gluc: x / Ketone: Negative  / Bili: Negative / Urobili: 0.2   Blood: x / Protein: 30 / Nitrite: Negative   Leuk Esterase: Large / RBC: >50 /HPF / WBC >50 /HPF   Sq Epi: x / Non Sq Epi: Occasional / Bacteria: Moderate                RADIOLOGY:    PHYSICAL EXAM:  GEN: Patient in no acute distress  PULM/CHEST: Clear to auscultation bilaterally, no rales, rhonchi or wheezes   CVS: RRR S1+S2 + Systolic murmur  ABD: Soft, non-tender, non-distended, +BS, + eccymosis  EXT: Pitting Edema in the lower limbs, Pulses intact  NEURO: AAOx3

## 2019-07-12 NOTE — CHART NOTE - NSCHARTNOTEFT_GEN_A_CORE
PACU ANESTHESIA ADMISSION NOTE      Procedure: Sural nerve biopsy    Post op diagnosis:  Renal failure      ____  Intubated  TV:______       Rate: ______      FiO2: ______    __x__  Patent Airway    ___x_  Full return of protective reflexes    ___x_  Full recovery from anesthesia / back to baseline     Vitals:   T: 99          R: 14                 BP:  70/30                Sat:  98                 P: 95      Mental Status:  __x__ Awake   _x____ Alert   _____ Drowsy   _____ Sedated    Nausea/Vomiting:  _x___ NO  ______Yes,   See Post - Op Orders          Pain Scale (0-10):  __0___    Treatment: ____ None    _x___ See Post - Op/PCA Orders    Post - Operative Fluids:   ____ Oral   _x___ See Post - Op Orders    Plan: Discharge:   ____Home       x_____Floor     _____Critical Care    _____  Other:___alert and awake no complications______________    Comments:

## 2019-07-12 NOTE — PROGRESS NOTE ADULT - SUBJECTIVE AND OBJECTIVE BOX
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            Patient was evaluated and examined  by bedside, developed hypotension, no dizziness, no dyspnea, remains very edematous          REVIEW OF SYSTEMS:  please see pertinent positives mentioned above, all other 12 ROS negative      T(C): , Max: 37.6 (07-12-19 @ 05:20)  HR: 108 (07-12-19 @ 11:44)  BP: 82/46 (07-12-19 @ 11:44)  RR: 14 (07-12-19 @ 10:45)  SpO2: 99% (07-12-19 @ 10:45)  CAPILLARY BLOOD GLUCOSE      POCT Blood Glucose.: 89 mg/dL (12 Jul 2019 11:19)  POCT Blood Glucose.: 86 mg/dL (12 Jul 2019 05:14)  POCT Blood Glucose.: 129 mg/dL (11 Jul 2019 21:43)  POCT Blood Glucose.: 134 mg/dL (11 Jul 2019 17:00)      PHYSICAL EXAM:  General: NAD, AAOX3, patient is laying comfortably in bed, obese  HEENT: AT, NC, Supple, NO JVD, NO CB  Lungs: CTA B/L, no wheezing, no rhonchi  CVS: normal S1, S2, RRR, NO M/G/R  Abdomen: soft, bowel sounds present, non-tender, non-distended  Extremities: diffuse plus 3 pitting b/l lower  edema/anasarca, no clubbing, no cyanosis, positive peripheral pulses b/l  Neuro: generalized body weakness, lower ext motor weakness due to severe legs edema, unable to ambulate, sensory intact throughout  Skin: no rush, no ecchymosis        LAB  CBC  Date: 07-12-19 @ 05:47  Mean cell Vphuxnxaqw09.2  Mean cell Hemoglobin Conc33.1  Mean cell Volum 97.3  Platelet count-Automate 136  RBC Count 2.61  Red Cell Distrib Width24.8  WBC Count25.24  % Albumin, Urine--  Hematocrit 25.4  Hemoglobin 8.4  CBC  Date: 07-11-19 @ 06:25  Mean cell Kgoaqoszxg40.2  Mean cell Hemoglobin Conc32.2  Mean cell Volum 100.0  Platelet count-Automate 131  RBC Count 2.55  Red Cell Distrib Width24.7  WBC Count22.26  % Albumin, Urine--  Hematocrit 25.5  Hemoglobin 8.2  CBC  Date: 07-10-19 @ 04:59  Mean cell Slaapyzuan34.8  Mean cell Hemoglobin Conc32.4  Mean cell Volum 98.1  Platelet count-Automate 135  RBC Count 2.64  Red Cell Distrib Width24.2  WBC Count18.24  % Albumin, Urine--  Hematocrit 25.9  Hemoglobin 8.4  CBC  Date: 07-09-19 @ 06:09  Mean cell Qvozajhcer04.8  Mean cell Hemoglobin Conc32.4  Mean cell Volum 98.3  Platelet count-Automate 173  RBC Count 2.89  Red Cell Distrib Width24.9  WBC Count19.15  % Albumin, Urine--  Hematocrit 28.4  Hemoglobin 9.2  CBC  Date: 07-08-19 @ 05:34  Mean cell Otlzqtspvf94.7  Mean cell Hemoglobin Conc32.2  Mean cell Volum 98.5  Platelet count-Automate 155  RBC Count 2.68  Red Cell Distrib Width24.8  WBC Count17.10  % Albumin, Urine--  Hematocrit 26.4  Hemoglobin 8.5  CBC  Date: 07-07-19 @ 05:52  Mean cell Uovjbggjfy98.9  Mean cell Hemoglobin Conc33.2  Mean cell Volum 96.0  Platelet count-Automate 159  RBC Count 2.73  Red Cell Distrib Width24.8  WBC Count17.57  % Albumin, Urine--  Hematocrit 26.2  Hemoglobin 8.7  CBC  Date: 07-06-19 @ 06:43  Mean cell Prrkhvqvqu42.3  Mean cell Hemoglobin Conc32.7  Mean cell Volum 95.8  Platelet count-Automate 171  RBC Count 2.84  Red Cell Distrib Width24.4  WBC Count14.95  % Albumin, Urine--  Hematocrit 27.2  Hemoglobin 8.9    [facility]  07-12-19 @ 05:47  Blood Gas Arterial-Calcium,Ionized--  Blood Urea Nitrogen, Serum 55 mg/dL<H> [10 - 20]  Carbon Dioxide, Serum28 mmol/L [17 - 32]  Chloride, Serum96 mmol/L<L> [98 - 110]  Creatinie, Serum1.1 mg/dL [0.7 - 1.5]  Glucose, Serum82 mg/dL [70 - 99]  Potassium, Serum3.8 mmol/L [3.5 - 5.0]  Sodium, Serum 140 mmol/L [135 - 146]  [facility]  07-11-19 @ 06:25  Blood Gas Arterial-Calcium,Ionized--  Blood Urea Nitrogen, Serum 44 mg/dL<H> [10 - 20]  Carbon Dioxide, Serum32 mmol/L [17 - 32]  Chloride, Serum97 mmol/L<L> [98 - 110]  Creatinie, Serum0.9 mg/dL [0.7 - 1.5]  Glucose, Serum97 mg/dL [70 - 99]  Potassium, Serum3.7 mmol/L [3.5 - 5.0]  Sodium, Serum 140 mmol/L [135 - 146]  [facility]  07-10-19 @ 04:59  Blood Gas Arterial-Calcium,Ionized--  Blood Urea Nitrogen, Serum 45 mg/dL<H> [10 - 20]  Carbon Dioxide, Serum32 mmol/L [17 - 32]  Chloride, Szufr763 mmol/L [98 - 110]  Creatinie, Serum0.8 mg/dL [0.7 - 1.5]  Glucose, Hlhtz370 mg/dL<H> [70 - 99]  Potassium, Serum3.3 mmol/L<L> [3.5 - 5.0]  Sodium, Serum 144 mmol/L [135 - 146]  [facility]  07-09-19 @ 06:09  Blood Gas Arterial-Calcium,Ionized--  Blood Urea Nitrogen, Serum 51 mg/dL<H> [10 - 20]  Carbon Dioxide, Serum33 mmol/L<H> [17 - 32]  Chloride, Fstyp652 mmol/L [98 - 110]  Creatinie, Serum0.8 mg/dL [0.7 - 1.5]  Glucose, Serum95 mg/dL [70 - 99]  Potassium, Serum3.6 mmol/L [3.5 - 5.0]  Sodium, Serum 146 mmol/L [135 - 146]  [facility]  07-08-19 @ 05:34  Blood Gas Arterial-Calcium,Ionized--  Blood Urea Nitrogen, Serum 64 mg/dL<HH> [10 - 20] [Critical value:]  Carbon Dioxide, Serum32 mmol/L [17 - 32]  Chloride, Euzkh564 mmol/L [98 - 110]  Creatinie, Serum0.9 mg/dL [0.7 - 1.5]  Glucose, Wldoi462 mg/dL<H> [70 - 99]  Potassium, Serum3.8 mmol/L [3.5 - 5.0]  Sodium, Serum 147 mmol/L<H> [135 - 146]              Microbiology:    Culture - Blood (collected 06-28-19 @ 18:46)  Source: .Blood None  Final Report (07-04-19 @ 03:01):    No growth at 5 days.    Culture - Urine (collected 06-22-19 @ 18:04)  Source: .Urine Clean Catch (Midstream)  Final Report (06-24-19 @ 06:28):    No growth    Culture - Urine (collected 06-21-19 @ 23:54)  Source: .Urine Clean Catch (Midstream)  Final Report (06-23-19 @ 08:53):    No growth    Culture - Blood (collected 06-15-19 @ 18:01)  Source: .Blood None  Final Report (06-21-19 @ 06:00):    No growth at 5 days.        Medications:  acetaminophen   Tablet .. 325 milliGRAM(s) Oral every 4 hours PRN  aluminum hydroxide/magnesium hydroxide/simethicone Suspension 30 milliLiter(s) Oral every 4 hours PRN  aspirin  chewable 81 milliGRAM(s) Oral daily  bisacodyl Suppository 10 milliGRAM(s) Rectal daily PRN  chlorhexidine 4% Liquid 1 Application(s) Topical <User Schedule>  cholecalciferol 2000 Unit(s) Oral daily  docusate sodium 100 milliGRAM(s) Oral three times a day  heparin  Injectable 5000 Unit(s) SubCutaneous every 8 hours  levothyroxine 50 MICROGram(s) Oral daily  metoprolol tartrate 25 milliGRAM(s) Oral two times a day  midodrine 10 milliGRAM(s) Oral every 8 hours  multivitamin 1 Tablet(s) Oral daily  nystatin Powder 1 Application(s) Topical three times a day  pantoprazole   Suspension 40 milliGRAM(s) Oral before breakfast  predniSONE   Tablet 20 milliGRAM(s) Oral daily        Assessment and Plan:  A/P 67 yo F w/ hx of CAD s/p PCI and hypothyroidism sent by gastroenterologist for gallbladder hydrops, course complicated by ALI/ transaminitis,  LUIS ALBERTO with hyperkalemia multifactorial 2/2 rhabdo (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 with resultant proteinuria and low albumin state and diffuse anasarca, raynauds phenomenon (not currently active), esophageal dysmotility and reflux, cystitis with urinary retention s/p abx, and subsequent worsening functional debility and malnutrition      # Worsening Hypotension today  - multifactorial in etiology including : post diuresis/hypoalbuminemia/NPO status in am today,    of midodrine tx. ,   ? mild adrenal insufficiency, patient now on prednisone tx.  -will d/c prednisone and start on stress dose of Hydrocortisone 50 mg IV tx.  with hypoalbuminemia, tertiary fluid spacing, LUIS ALBERTO, continued on Midodrine 10 mg po three times daily  -obtain renin/aldosterone levels( 7)         #hydrops gallbladder with  transaminitis - as per GI recommendations- pt. most likely had drug induced transaminitis, no evidence of autoimmune hepatitis  - LFT's near normal levels  -patient tolerating diet well  -hepatitis profile negative  concomitant acute rhabdomyolysis- resolved, repeated CK level  normal    statin was d/c on admission,   liver was  biopsied- Surgical Pathology Report (06.24.19 @ 16:10)  Surgical Final Report: Final Diagnosis  Right hepatic lobe, needle biopsy:  - Liver parenchyma showing mild focal lobulitis and hepatocyte reactive changes with few glycogenated nuclei.  - Focal area of vaguely formed Francisca-Denk body also present.  - Two minute focus of macro and micro-vesicular steatosis also present, counting less than 5% of tissue volume of the biopsy.  - Portal areas showing marked chronic inflammation with focal interface activity, infiltrated with mainly small lymphocytes and  rare neutrophils, and fibrous expansion of portal area with  fibrous bridge formation, probable cirrhosis.  - Stainable iron present, not increased.  - Trichrome stain reviewed.  - PAS-D stain failed to reveal alpha-1 antitrypsin globule.    Verified by: Manoj Lindsey M.D.  (Electronic Signature)  Reported on: 06/26/19 15:42 EDT, [facility] [place] [address]  _________________________________________________________________    Comment  _Sections show findings are consistent with clinical impression of  cirrhosis.  However, the etiology is not clear.  Clinical correlation is recommended.    06/25/19 08:55 ns    LIVER FUNCTIONS - ( 10 Jul 2019 04:59 )  Alb: 2.9 g/dL / Pro: 5.2 g/dL / ALK PHOS: 325 U/L / ALT: 49 U/L / AST: 39 U/L / GGT: x                               patient was instructed to have outpatient elective cholecystectomy with surgical specialist f/up         # LUIS ALBERTO  - due to suspected ATN- renal function normalized now  post renal biopsy- ATN    # Abnormal elevated DS DNA- 111, Elevated ANKITA titers 1: 2560- repeated ANKITA titers decreased to 1 : 320, Elevated P-Anca- titer 1: 1280 - ? lupus nephritis, vs Vasculitis, - repeated Panca titer remains high.  - we have consulted  Rheumatology specialist - who recommended- diagnostic renal biopsy-completed  - MPO/PR3 abs- negative   -continued on prednisone 40 mg po once daily- will taper to 20 mg po once daily from tomorrow  -f/up EMG, sural nerve biopsy  -? myositis- elevated aldolase with rhabdomyolysis on presentation.       #Persistent Leukocytosis- most likely as a result of acute inflammatory state  so far no acute infectious etiology   -repeated  blood cxs- no bacterial growth  -consulted Hematology  for persistent leukocytosis- not due to myeloproliferative dz. recommended outpatient f/up   -will resend urine cxs-         #hypoalbuminemia 2/2 acute liver disease- severe protein/calorie malnutrition  - patient received - IV Albumin tx.    #h/o CAD  -cont home regimen  -statin on hold 2/2 acute liver injury  -will resume  plavix from tomorrow      #hypothyroidism  -tsh elevated---t3/t4 normal---will cont syntrhoid 50 mcg daily  -repeat thyroid panel    # Constipation  -colace/dulcolax/senna    #Anasarca- fluid overload status- hold diuresis due to worsening  hypotension     # Vitamin D insufficiency- started on vitamin D tx.    # Macrocytic anemia- obtain vitamin B 12- normal, folate levels- normal.  -started on MVI tx. daily    # Hypokalemia, hypomagnesemia supplemented    # Physical deconditioning- PT/OT tx. daily as tolerated    #DVT//gI ppx  FULL CODE  high risk/ guarded prognosis    #Progress Note Handoff: Pending Consults__none______,Tests__EMG, ______,Test Results___daily BMP____,Other;, stabilization of hypotension   Family discussion: patient by bedside Disposition: Home_vs STR_once medically stable

## 2019-07-12 NOTE — PROGRESS NOTE ADULT - ASSESSMENT
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 with resultant proteinuria and low albumin state and diffuse anasarca, raynauds phenomenon (not currently active), esophageal dysmotility and reflux, cystitis with urinary retention s/p abx, and subsequent worsening functional debility and malnutrition.     Hematology asked to comment on elevated WBC. Her CBC was relatively within normal limits prior to hospitalization     1) Leukocytosis - could be 2/2 to recent infection (UTI) and prednisone use  This patient has had complicated hospital stay  I do not suspect any myelogenous etiology at this time  Also her WBC count was WNL prior to admission   Recommend to get her CBC results from outpt (from PMD/from [facility]) to compare- Resident aware     2) Anemia probably secondary to recent illness as well  Her baseline Hb is around 11  Iron studies- no iron deficiency  B12 and folate was also normal  Monitor Hb.     f/u with DR Alston in outpt office in 6 weeks after DC  Discussed with pt at bedside A/P 67 yo F w/ hx of CAD s/p PCI and hypothyroidism sent by gastroenterologist for gallbladder hydrops, course complicated by ALI/ transaminitis possibly of autoimmune etiology, LUIS ALBERTO with hyperkalemia multifactorial 2/2 rhabdo (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 with resultant proteinuria and low albumin state and diffuse anasarca, raynauds phenomenon (not currently active), esophageal dysmotility and reflux, cystitis with urinary retention s/p abx, and subsequent worsening functional debility and malnutrition.     Hematology asked to comment on elevated WBC. Her CBC was relatively within normal limits prior to hospitalization     1) Leukocytosis - could be 2/2 to recent infection (UTI) and prednisone use/inflammation  This patient has had complicated hospital stay  I do not suspect any myelogenous etiology at this time  Also her WBC count was WNL prior to admission . Her outpt CBC reviewed= WNL between 6-8   No need for any other work up  Monitor CBC    2) Anemia probably secondary to recent illness as well  Her baseline Hb is around 11  Iron studies- no iron deficiency  B12 and folate was also normal  Monitor Hb.     f/u with DR Alston in outpt office in 6 weeks after DC  Discussed with pt at bedside  Will sign off

## 2019-07-12 NOTE — PROGRESS NOTE ADULT - ASSESSMENT
· Assessment		  A/P 69 yo F w/ hx of CAD s/p PCI and hypothyroidism sent by gastroenterologist for gallbladder hydrops, course complicated by ALI/ transaminitis possibly of autoimmune etiology, LUIS ALBERTO with hyperkalemia multifactorial 2/2 rhabdo (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 with resultant proteinuria and low albumin state and diffuse anasarca, raynauds phenomenon (not currently active), esophageal dysmotility and reflux, cystitis with urinary retention s/p abx, and subsequent worsening functional debility and malnutrition.  Patient is weaker, edematous. She had rings in her fingers and were removed by the senior resident to avoid ischemia in finger 06/29. Labs performed after blood drawn via US on 07/01. Patient had a bowel movement today 07/01 and feels better after that. 40 meq k was given due to low K levels 3.1 07/01. Calcium carbonate 500x2 added on Nephrology recommendations 07/01.    Sural nerve biopsy done today 07/12. Patient continued on Prednisone, Albumin was given only once today 07/12. Lasix Held. Hypotensive throughout the day. Given hydrocortisone 500 in addition to regular midodrine 10mg q8    #hydrops gallbladder/acute severe transaminitis  -ALT/AST down to 39/49 on 07/10  -CPK downtrending [id number]   -Liver biopsy done shows Cirrhosis but of no clear etiology   -Clinically too weak at this moment for cholecystectomy  -Midodrine 5mg every 8h for portal HTN    #LUIS ALBERTO  -Cr downtrending 1.5>1.1>0.9>1.0  -BUN levels are down to 79>64  -As per renal team recommendations - patient most likely has hepatorenal syndrome  -Albumin challenge test with 100g albumin performed 06/27  -Continue IV Albumin 25% 50ml with 80mg Lasix given once 07/10  -Strict Input and Output measures  -Renal biopsy done 07/01, Renal Biopsy shows LUIS ALBERTO changes  -Sural Biopsy on Friday possibly  -Given 40 meq of K once only on 07/01 and 07/03  -D/C 40mg K daily on 07/11  -D/C Ca carbonate 500 on 07/09     # Abnormal DS DNA, Elevated ANKITA titers   -Rheum recommended to hold prednisone use until w/u is completed  -Solumedrol 80mg once given on 07/01 and 07/02  -Started oral Prednisone 40mg 07/03  -Rheumatology consulted to discuss further plan, they recommend sural nerve biopsy  -Left Sural Nerve biopsy on 07/12, waiting for results  -ESR, CRP, ANCA repeated, ANKITA value trending down  -ESR is 21 on 07/09    #hypoalbuminemia  -Malnutrition vs Liver Cirrhosis  -TPN D/C  -Eating normally  -Albumin and Lasix held for 07/11    #distal digit blue discoloration  -Raynaud's (primary vs secondary)  -r/o etiology---paraneoplastic, vasculitis, vs rheum   -transient=---resolved within 1 hour  -gloves/warmth to affected fingertips  -CCB on hold for hypotension  -esophagram consistent with dysmotility    #UTI with urinary retention  -Smith D/C  -Trial of void on 07/08 and was successful  -received ceftriaxone/ completed  -Urinalysis order on recommendation of Nephro  -WBCs 23k today 07/11 probable due to Prednisone treatment. Prednisone 20mg from 07/12    #h/o CAD  -cont home regimen  -statin on hold 2/2 acute liver injury  -ASA restarted 07/08    #hypothyroidism  -TSH elevated---t3/t4 normal---will cont synthroid 50 mcg daily    #Hypotension  -Aldosterone Normal  -Persistent hypotension  -Continue Midodrine 10mg q8  -Given hydrocortisone 500 once only on 07/12  -Monitor vitals  -Leg Compression added    #DVT + GI ppx  FULL CODE

## 2019-07-12 NOTE — PRE-ANESTHESIA EVALUATION ADULT - MALLAMPATI CLASS
Class III - visualization of the soft palate and the base of the uvula

## 2019-07-12 NOTE — PROGRESS NOTE ADULT - SUBJECTIVE AND OBJECTIVE BOX
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  MRN-[id number]      POD# 0  S/P  left sural nerve bx      Seen patient at bedside with Dr. Luis on post op rounds.  doing well  minimal pain .        HPI:  69 yo F w/ hx of CAD s/p PCI and hypothyroidism sent by gastroenterologist for gallbladder hydrops. Pt found to have elevated liver enzymes by PMD in Apr 2019, referred to GI for w/u. MRCP 2 days ago significant for gallbladder hydrops 4.7cm with probable impacted stone at neck, also liver cirrhosis w/ mild ascites. Admits to social etoh, denies binge drinking, denies abdominal pain, N/V. Admits to decreased appetite in past 2 weeks, pt tried to maintain low salt diet. Reports BLE edema developed in past 2 days as well as weakness. Denies fever, chills, abdominal pain, SOB, dysuria.    In ED, evaluated by surgery -> no interventions at this time (15 Shashank 2019 01:53)      Allergies    No Known Allergies    Intolerances      MEDICATIONS  (STANDING):  aspirin  chewable 81 milliGRAM(s) Oral daily  chlorhexidine 4% Liquid 1 Application(s) Topical <User Schedule>  cholecalciferol 2000 Unit(s) Oral daily  docusate sodium 100 milliGRAM(s) Oral three times a day  heparin  Injectable 5000 Unit(s) SubCutaneous every 8 hours  levothyroxine 50 MICROGram(s) Oral daily  metoprolol tartrate 25 milliGRAM(s) Oral two times a day  midodrine 10 milliGRAM(s) Oral every 8 hours  multivitamin 1 Tablet(s) Oral daily  nystatin Powder 1 Application(s) Topical three times a day  pantoprazole   Suspension 40 milliGRAM(s) Oral before breakfast  predniSONE   Tablet 20 milliGRAM(s) Oral daily    MEDICATIONS  (PRN):  acetaminophen   Tablet .. 325 milliGRAM(s) Oral every 4 hours PRN Mild Pain (1 - 3)  aluminum hydroxide/magnesium hydroxide/simethicone Suspension 30 milliLiter(s) Oral every 4 hours PRN Dyspepsia  bisacodyl Suppository 10 milliGRAM(s) Rectal daily PRN Constipation    Vital Signs Last 24 Hrs  T(C): 36 (12 Jul 2019 13:00), Max: 37.6 (12 Jul 2019 05:20)  T(F): 96.8 (12 Jul 2019 13:00), Max: 99.7 (12 Jul 2019 05:20)  HR: 92 (12 Jul 2019 17:12) (76 - 129)  BP: 108/49 (12 Jul 2019 17:12) (54/26 - 108/49)  BP(mean): --  RR: 18 (12 Jul 2019 13:00) (11 - 18)  SpO2: 99% (12 Jul 2019 10:45) (91% - 99%)    I&O's Detail    12 Jul 2019 07:01  -  12 Jul 2019 19:00  --------------------------------------------------------  IN:    IV PiggyBack: 50 mL    lactated ringers.: 50 mL    Oral Fluid: 200 mL  Total IN: 300 mL    OUT:  Total OUT: 0 mL    Total NET: 300 mL        07-12    140  |  96<L>  |  55<H>  ----------------------------<  82  3.8   |  28  |  1.1    Ca    9.1      12 Jul 2019 05:47  Phos  2.9     07-12  Mg     1.9     07-12    TPro  5.4<L>  /  Alb  2.8<L>  /  TBili  2.5<H>  /  DBili  1.2<H>  /  AST  50<H>  /  ALT  54<H>  /  AlkPhos  418<H>  07-12                          8.4    25.24 )-----------( 136      ( 12 Jul 2019 05:47 )             25.4         Exam:  dressing dry  foot/toes mobile  good cap refill , warm extremity      Plan:  continue as per medical management  can continue with OOB activity as tolerated  no further neurosurgical intervention.    dressing can be removed in 2 days  may get wet in 5 days  sutures to be removed in 10-14 days.                Home Medications:  levothyroxine 50 mcg (0.05 mg) oral tablet: 1 tab(s) orally once a day (15 Shashank 2019 02:02)    PAST MEDICAL & SURGICAL HISTORY:  Hypothyroidism  CAD S/P percutaneous coronary angioplasty  No significant past surgical history

## 2019-07-12 NOTE — PROGRESS NOTE ADULT - SUBJECTIVE AND OBJECTIVE BOX
Patient is a 68y old  Female who presents with a chief complaint of gallbladder hydrops (2019 16:34)      Subjective: Pt is s/p sural nerve biopsy. Doing well. She has no complaints       Vital Signs Last 24 Hrs  T(C): 36 (2019 13:00), Max: 37.6 (2019 05:20)  T(F): 96.8 (2019 13:00), Max: 99.7 (2019 05:20)  HR: 100 (2019 13:00) (76 - 129)  BP: 68/39 (2019 13:00) (54/26 - 106/50)  BP(mean): --  RR: 18 (2019 13:00) (11 - 18)  SpO2: 99% (2019 10:45) (91% - 99%)    PHYSICAL EXAM  General: adult in NAD  Neck: supple  CV: normal S1/S2   Lungs: positive air movement b/l ant lungs,  Abdomen: soft non-tender   Neuro: alert and oriented X 4,   MEDICATIONS  (STANDING):  aspirin  chewable 81 milliGRAM(s) Oral daily  chlorhexidine 4% Liquid 1 Application(s) Topical <User Schedule>  cholecalciferol 2000 Unit(s) Oral daily  docusate sodium 100 milliGRAM(s) Oral three times a day  heparin  Injectable 5000 Unit(s) SubCutaneous every 8 hours  levothyroxine 50 MICROGram(s) Oral daily  metoprolol tartrate 25 milliGRAM(s) Oral two times a day  midodrine 10 milliGRAM(s) Oral every 8 hours  multivitamin 1 Tablet(s) Oral daily  nystatin Powder 1 Application(s) Topical three times a day  pantoprazole   Suspension 40 milliGRAM(s) Oral before breakfast  predniSONE   Tablet 20 milliGRAM(s) Oral daily    MEDICATIONS  (PRN):  acetaminophen   Tablet .. 325 milliGRAM(s) Oral every 4 hours PRN Mild Pain (1 - 3)  aluminum hydroxide/magnesium hydroxide/simethicone Suspension 30 milliLiter(s) Oral every 4 hours PRN Dyspepsia  bisacodyl Suppository 10 milliGRAM(s) Rectal daily PRN Constipation      LABS:                          8.4    25.24 )-----------( 136      ( 2019 05:47 )             25.4         Mean Cell Volume : 97.3 fL  Mean Cell Hemoglobin : 32.2 pg  Mean Cell Hemoglobin Concentration : 33.1 g/dL  Auto Neutrophil # : 24.17 K/uL  Auto Lymphocyte # : 0.35 K/uL  Auto Monocyte # : 0.50 K/uL  Auto Eosinophil # : 0.00 K/uL  Auto Basophil # : 0.04 K/uL  Auto Neutrophil % : 95.7 %  Auto Lymphocyte % : 1.4 %  Auto Monocyte % : 2.0 %  Auto Eosinophil % : 0.0 %  Auto Basophil % : 0.2 %      Serial CBC's   @ 05:47  Hct-25.4 / Hgb-8.4 / Plat-136 / RBC-2.61 / WBC-25.24  Serial CBC's   @ 06:25  Hct-25.5 / Hgb-8.2 / Plat-131 / RBC-2.55 / WBC-22.26  Serial CBC's  07-10 @ 04:59  Hct-25.9 / Hgb-8.4 / Plat-135 / RBC-2.64 / WBC-18.24  Serial CBC's   @ 06:09  Hct-28.4 / Hgb-9.2 / Plat-173 / RBC-2.89 / WBC-19.15          140  |  96<L>  |  55<H>  ----------------------------<  82  3.8   |  28  |  1.1    Ca    9.1      2019 05:47  Phos  2.9     -12  Mg     1.9         TPro  5.4<L>  /  Alb  2.8<L>  /  TBili  2.5<H>  /  DBili  1.2<H>  /  AST  50<H>  /  ALT  54<H>  /  AlkPhos  418<H>            Folate, Serum: 8.1 ng/mL (07-10 @ 16:56)  Vitamin B12, Serum: 1468 pg/mL (07-10 @ 16:56)              Urinalysis Basic - ( 10 Jul 2019 19:01 )    Color: Yellow / Appearance: Turbid / S.010 / pH: x  Gluc: x / Ketone: Negative  / Bili: Negative / Urobili: 0.2   Blood: x / Protein: 30 / Nitrite: Negative   Leuk Esterase: Large / RBC: >50 /HPF / WBC >50 /HPF   Sq Epi: x / Non Sq Epi: Occasional / Bacteria: Moderate                BLOOD SMEAR INTERPRETATION:       RADIOLOGY & ADDITIONAL STUDIES:

## 2019-07-12 NOTE — PRE-OP CHECKLIST - SELECT TESTS ORDERED
Type and Screen/BMP/CBC/PT/PTT/INR/EKG/POCT Blood Glucose/Results in MD note/CMP/Type and Cross/CXR/Urinalysis

## 2019-07-13 NOTE — PROGRESS NOTE ADULT - ASSESSMENT
· Assessment		  A/P 69 yo F w/ hx of CAD s/p PCI and hypothyroidism sent by gastroenterologist for gallbladder hydrops, course complicated by ALI/ transaminitis possibly of autoimmune etiology, LUIS ALBERTO with hyperkalemia multifactorial 2/2 rhabdo (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 with resultant proteinuria and low albumin state and diffuse anasarca, raynauds phenomenon (not currently active), esophageal dysmotility and reflux, cystitis with urinary retention s/p abx, and subsequent worsening functional debility and malnutrition.  Patient is weaker, edematous. She had rings in her fingers and were removed by the senior resident to avoid ischemia in finger 06/29. Labs performed after blood drawn via US on 07/01. Patient had a bowel movement today 07/01 and feels better after that. 40 meq k was given due to low K levels 3.1 07/01. Calcium carbonate 500x2 added on Nephrology recommendations 07/01.    Sural nerve biopsy done today 07/12. Waiting for the results. Blood pressure better today. Lasix being held because of the low BP    #hydrops gallbladder/acute severe transaminitis  -ALT/AST down to 39/49 on 07/10  -CPK downtrending [id number]   -Liver biopsy done shows Cirrhosis but of no clear etiology   -Clinically too weak at this moment for cholecystectomy      #LUIS ALBERTO  -Cr downtrending 1.5>1.1>0.9>1.0  -BUN levels are down to 79>64  -Albumin challenge test with 100g albumin performed 06/27  -Strict Input and Output measures  -Renal biopsy done 07/01, Renal Biopsy shows LUIS ALBERTO changes  -Sural Biopsy on Friday possibly  -D/C 40mg K daily on 07/11  -D/C Ca carbonate 500 on 07/09     # Abnormal DS DNA, Elevated ANKITA titers   -Solumedrol 80mg once given on 07/01 and 07/02  -Started oral Prednisone 40mg 07/03  -Continue 10mg of Prednisone  -Rheumatology consulted to discuss further plan, they recommend sural nerve biopsy  -Left Sural Nerve biopsy on 07/12, waiting for results  -ESR, CRP, ANCA repeated, ANKITA value trending down  -ESR is 21 on 07/09    #hypoalbuminemia  -Malnutrition vs Liver Cirrhosis  -TPN D/C  -Eating normally  -Albumin and Lasix held for 07/11    #distal digit blue discoloration  -Raynaud's (primary vs secondary)  -r/o etiology---paraneoplastic, vasculitis, vs rheum   -transient=---resolved within 1 hour  -gloves/warmth to affected fingertips  -CCB on hold for hypotension  -esophagram consistent with dysmotility    #UTI with urinary retention  -Smiht D/C  -Trial of void on 07/08 and was successful  -received ceftriaxone/ completed  -Urinalysis order on recommendation of Nephro  -WBCs 23k today 07/11 probable due to Prednisone treatment. Prednisone 20mg from 07/12    #h/o CAD  -cont home regimen  -statin on hold 2/2 acute liver injury  -ASA restarted 07/08    #hypothyroidism  -TSH elevated---t3/t4 normal---will cont synthroid 50 mcg daily    #Hypotension  -Aldosterone Normal  -Persistent hypotension  -Continue Midodrine 10mg q8  -Given hydrocortisone 500 once only on 07/12  -Monitor vitals  -Leg Compression added    #DVT + GI ppx  FULL CODE · Assessment		  A/P 67 yo F w/ hx of CAD s/p PCI and hypothyroidism sent by gastroenterologist for gallbladder hydrops, course complicated by ALI/ transaminitis possibly of autoimmune etiology, LUIS ALBERTO with hyperkalemia multifactorial 2/2 rhabdo (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 with resultant proteinuria and low albumin state and diffuse anasarca, raynauds phenomenon (not currently active), esophageal dysmotility and reflux, cystitis with urinary retention s/p abx, and subsequent worsening functional debility and malnutrition.  Patient is weaker, edematous. She had rings in her fingers and were removed by the senior resident to avoid ischemia in finger 06/29. Labs performed after blood drawn via US on 07/01. Patient had a bowel movement today 07/01 and feels better after that. 40 meq k was given due to low K levels 3.1 07/01. Calcium carbonate 500x2 added on Nephrology recommendations 07/01.    Sural nerve biopsy done today 07/12. Waiting for the results. Blood pressure better today. Lasix being held because of the low BP    #hydrops gallbladder/acute severe transaminitis  -ALT/AST down to 39/49 on 07/10  -CPK downtrending [id number]   -Liver biopsy done shows Cirrhosis but of no clear etiology   -Clinically too weak at this moment for cholecystectomy      #LUIS ALBERTO  -Cr downtrending 1.5>1.1>0.9>1.0  -BUN levels are down to 79>64  -Albumin challenge test with 100g albumin performed 06/27  -Strict Input and Output measures  -Renal biopsy done 07/01, Renal Biopsy shows LUIS ALBERTO changes  -Sural Biopsy on Friday possibly  -D/C 40mg K daily on 07/11  -D/C Ca carbonate 500 on 07/09     # Abnormal DS DNA, Elevated ANKITA titers   -Solumedrol 80mg once given on 07/01 and 07/02  -Started oral Prednisone 40mg 07/03  -Continue 10mg of Prednisone  -Rheumatology consulted to discuss further plan, they recommend sural nerve biopsy  -Left Sural Nerve biopsy on 07/12, waiting for results  -ESR, CRP, ANCA repeated, ANKITA value trending down  -ESR is 21 on 07/09    #hypoalbuminemia  -Malnutrition vs Liver Cirrhosis  -TPN D/C  -Eating normally  -Albumin and Lasix held for 07/11    #distal digit blue discoloration  -Raynaud's (primary vs secondary)  -r/o etiology---paraneoplastic, vasculitis, vs rheum   -transient=---resolved within 1 hour  -gloves/warmth to affected fingertips  -CCB on hold for hypotension  -esophagram consistent with dysmotility    #UTI with urinary retention  -Smith D/C  -Trial of void on 07/08 and was successful  -received ceftriaxone/ completed  -Urinalysis order on recommendation of Nephro      #h/o CAD  -cont home regimen  -statin on hold 2/2 acute liver injury  -ASA restarted 07/08    #hypothyroidism  -TSH elevated---t3/t4 normal---will cont synthroid 50 mcg daily    #Hypotension  -Aldosterone Normal  -Persistent hypotension  -Continue Midodrine 10mg q8  -Given hydrocortisone 500 once only on 07/12  -Monitor vitals  -Leg Compression added    #DVT + GI ppx  FULL CODE

## 2019-07-13 NOTE — PROGRESS NOTE ADULT - SUBJECTIVE AND OBJECTIVE BOX
POD # 1    S/P left Sural nerve biopsy     pt seen and examined pt without any complaints at this time         Vital Signs Last 24 Hrs  T(C): 35.7 (13 Jul 2019 14:31), Max: 35.7 (13 Jul 2019 14:31)  T(F): 96.2 (13 Jul 2019 14:31), Max: 96.2 (13 Jul 2019 14:31)  HR: 91 (13 Jul 2019 15:18) (90 - 95)  BP: 114/47 (13 Jul 2019 15:18) (84/48 - 114/47)  BP(mean): --  RR: 18 (13 Jul 2019 14:31) (18 - 20)  SpO2: 99% (13 Jul 2019 07:47) (99% - 99%)    PHYSICAL EXAM:    alert,   MAEX  MS equal bilateral UE"s         equal bilateral LE"s   incision clean dry intact       MEDICATIONS:  Antibiotics:    Neuro:  acetaminophen   Tablet .. 325 milliGRAM(s) Oral every 4 hours PRN    Anticoagulation:  aspirin  chewable 81 milliGRAM(s) Oral daily  heparin  Injectable 5000 Unit(s) SubCutaneous every 8 hours    OTHER:  aluminum hydroxide/magnesium hydroxide/simethicone Suspension 30 milliLiter(s) Oral every 4 hours PRN  bisacodyl Suppository 10 milliGRAM(s) Rectal daily PRN  chlorhexidine 4% Liquid 1 Application(s) Topical <User Schedule>  docusate sodium 100 milliGRAM(s) Oral three times a day  levothyroxine 50 MICROGram(s) Oral daily  midodrine 10 milliGRAM(s) Oral every 8 hours  nystatin Powder 1 Application(s) Topical three times a day  pantoprazole   Suspension 40 milliGRAM(s) Oral before breakfast    IVF:  cholecalciferol 2000 Unit(s) Oral daily  folic acid 1 milliGRAM(s) Oral daily  multivitamin 1 Tablet(s) Oral daily        LABS:                        6.7    22.78 )-----------( 93       ( 13 Jul 2019 06:43 )             20.3     07-13    137  |  96<L>  |  75<HH>  ----------------------------<  153<H>  4.0   |  29  |  1.3    Ca    8.5      13 Jul 2019 06:43  Phos  2.9     07-12  Mg     2.0     07-13    TPro  4.8<L>  /  Alb  2.5<L>  /  TBili  2.0<H>  /  DBili  1.1<H>  /  AST  47<H>  /  ALT  35  /  AlkPhos  406<H>  07-13        A/P         S/P Left Sural nerve biopsy               no further neurosurgical intervention needed at this time

## 2019-07-13 NOTE — PROGRESS NOTE ADULT - SUBJECTIVE AND OBJECTIVE BOX
SUBJECTIVE:    Patient is a 68y old Female who presents with a chief complaint of gallbladder hydrops (13 Jul 2019 10:56)    Currently admitted to medicine with the primary diagnosis of Gallbladder hydrops     Today is hospital day 29d. This morning she is resting comfortably in bed and reports no new issues or overnight events.     INTERVAL EVENTS: BP measure manually myself and it was 100/50. Patient waiting on sural nerve biopsy results.    PAST MEDICAL & SURGICAL HISTORY  Hypothyroidism  CAD S/P percutaneous coronary angioplasty  No significant past surgical history      ALLERGIES:  No Known Allergies    MEDICATIONS:  STANDING MEDICATIONS  aspirin  chewable 81 milliGRAM(s) Oral daily  chlorhexidine 4% Liquid 1 Application(s) Topical <User Schedule>  cholecalciferol 2000 Unit(s) Oral daily  docusate sodium 100 milliGRAM(s) Oral three times a day  folic acid 1 milliGRAM(s) Oral daily  heparin  Injectable 5000 Unit(s) SubCutaneous every 8 hours  levothyroxine 50 MICROGram(s) Oral daily  midodrine 10 milliGRAM(s) Oral every 8 hours  multivitamin 1 Tablet(s) Oral daily  nystatin Powder 1 Application(s) Topical three times a day  pantoprazole   Suspension 40 milliGRAM(s) Oral before breakfast    PRN MEDICATIONS  acetaminophen   Tablet .. 325 milliGRAM(s) Oral every 4 hours PRN  aluminum hydroxide/magnesium hydroxide/simethicone Suspension 30 milliLiter(s) Oral every 4 hours PRN  bisacodyl Suppository 10 milliGRAM(s) Rectal daily PRN    VITALS:   T(F): 96.1  HR: 90  BP: 107/48  RR: 18  SpO2: 99%    LABS:                        8.4    25.24 )-----------( 136      ( 12 Jul 2019 05:47 )             25.4     07-12    140  |  96<L>  |  55<H>  ----------------------------<  82  3.8   |  28  |  1.1    Ca    9.1      12 Jul 2019 05:47  Phos  2.9     07-12  Mg     1.9     07-12    TPro  5.4<L>  /  Alb  2.8<L>  /  TBili  2.5<H>  /  DBili  1.2<H>  /  AST  50<H>  /  ALT  54<H>  /  AlkPhos  418<H>  07-12                  RADIOLOGY:    PHYSICAL EXAM:  GEN: Patient in no acute distress  PULM/CHEST: Clear to auscultation bilaterally, no rales, rhonchi or wheezes   CVS: RRR S1+S2 + Systolic murmur  ABD: Soft, non-tender, non-distended, +BS, + eccymosis  EXT: Pitting Edema in the lower limbs, Pulses intact  NEURO: AAOx3

## 2019-07-13 NOTE — CHART NOTE - NSCHARTNOTEFT_GEN_A_CORE
Contacted by laboratory. Reported a 6.7 hemoglobin, 20.3 hematocrit, 22.7 WBC, and 92 platelet. Last T&S was 7/9/19. Ordered T&S, and contacted the resident taking care of the patient for providing 1 unit of pRBC.

## 2019-07-13 NOTE — PROGRESS NOTE ADULT - SUBJECTIVE AND OBJECTIVE BOX
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  [facility] T2-3A 014 B ([facility] T2-3A)            Patient was evaluated and examined  by bedside, b/p slightly improved, patient tolerating diet, still has diffuse body edema, with decreased ability to ambulate due to extensive edema.         REVIEW OF SYSTEMS:  please see pertinent positives mentioned above, all other 12 ROS negative      T(C): , Max: 36 (07-12-19 @ 13:00)  HR: 90 (07-13-19 @ 07:47)  BP: 107/48 (07-13-19 @ 07:47)  RR: 18 (07-13-19 @ 07:47)  SpO2: 99% (07-13-19 @ 07:47)  CAPILLARY BLOOD GLUCOSE      POCT Blood Glucose.: 145 mg/dL (13 Jul 2019 07:53)  POCT Blood Glucose.: 89 mg/dL (12 Jul 2019 11:19)      PHYSICAL EXAM:  General: NAD, AAOX3, patient is laying comfortably in bed, obese  HEENT: AT, NC, Supple, NO JVD, NO CB  Lungs: CTA B/L, no wheezing, no rhonchi  CVS: normal S1, S2, RRR, NO M/G/R  Abdomen: soft, bowel sounds present, non-tender, non-distended  Extremities: diffuse plus 3 pitting b/l lower  edema/anasarca, Upper extremities proximal plus 2 pitting edema present,  no clubbing, no cyanosis, positive peripheral pulses b/l  Neuro: generalized body weakness, lower ext motor weakness due to severe legs edema, unable to ambulate, sensory intact throughout  Skin: no rash, no ecchymosis        LAB  CBC  Date: 07-12-19 @ 05:47  Mean cell Mtciemdcsn15.2  Mean cell Hemoglobin Conc33.1  Mean cell Volum 97.3  Platelet count-Automate 136  RBC Count 2.61  Red Cell Distrib Width24.8  WBC Count25.24  % Albumin, Urine--  Hematocrit 25.4  Hemoglobin 8.4  CBC  Date: 07-11-19 @ 06:25  Mean cell Cprzyqgojy51.2  Mean cell Hemoglobin Conc32.2  Mean cell Volum 100.0  Platelet count-Automate 131  RBC Count 2.55  Red Cell Distrib Width24.7  WBC Count22.26  % Albumin, Urine--  Hematocrit 25.5  Hemoglobin 8.2  CBC  Date: 07-10-19 @ 04:59  Mean cell Ksopgdpsqy67.8  Mean cell Hemoglobin Conc32.4  Mean cell Volum 98.1  Platelet count-Automate 135  RBC Count 2.64  Red Cell Distrib Width24.2  WBC Count18.24  % Albumin, Urine--  Hematocrit 25.9  Hemoglobin 8.4  CBC  Date: 07-09-19 @ 06:09  Mean cell Uiuahbbkml00.8  Mean cell Hemoglobin Conc32.4  Mean cell Volum 98.3  Platelet count-Automate 173  RBC Count 2.89  Red Cell Distrib Width24.9  WBC Count19.15  % Albumin, Urine--  Hematocrit 28.4  Hemoglobin 9.2  CBC  Date: 07-08-19 @ 05:34  Mean cell Xevcdwhbcc18.7  Mean cell Hemoglobin Conc32.2  Mean cell Volum 98.5  Platelet count-Automate 155  RBC Count 2.68  Red Cell Distrib Width24.8  WBC Count17.10  % Albumin, Urine--  Hematocrit 26.4  Hemoglobin 8.5  CBC  Date: 07-07-19 @ 05:52  Mean cell Pwuwqdpyid78.9  Mean cell Hemoglobin Conc33.2  Mean cell Volum 96.0  Platelet count-Automate 159  RBC Count 2.73  Red Cell Distrib Width24.8  WBC Count17.57  % Albumin, Urine--  Hematocrit 26.2  Hemoglobin 8.7    BMP  07-12-19 @ 05:47  Blood Gas Arterial-Calcium,Ionized--  Blood Urea Nitrogen, Serum 55 mg/dL<H> [10 - 20]  Carbon Dioxide, Serum28 mmol/L [17 - 32]  Chloride, Serum96 mmol/L<L> [98 - 110]  Creatinie, Serum1.1 mg/dL [0.7 - 1.5]  Glucose, Serum82 mg/dL [70 - 99]  Potassium, Serum3.8 mmol/L [3.5 - 5.0]  Sodium, Serum 140 mmol/L [135 - 146]  [facility]  07-11-19 @ 06:25  Blood Gas Arterial-Calcium,Ionized--  Blood Urea Nitrogen, Serum 44 mg/dL<H> [10 - 20]  Carbon Dioxide, Serum32 mmol/L [17 - 32]  Chloride, Serum97 mmol/L<L> [98 - 110]  Creatinie, Serum0.9 mg/dL [0.7 - 1.5]  Glucose, Serum97 mg/dL [70 - 99]  Potassium, Serum3.7 mmol/L [3.5 - 5.0]  Sodium, Serum 140 mmol/L [135 - 146]  [facility]  07-10-19 @ 04:59  Blood Gas Arterial-Calcium,Ionized--  Blood Urea Nitrogen, Serum 45 mg/dL<H> [10 - 20]  Carbon Dioxide, Serum32 mmol/L [17 - 32]  Chloride, Iwylb831 mmol/L [98 - 110]  Creatinie, Serum0.8 mg/dL [0.7 - 1.5]  Glucose, Wkdbo318 mg/dL<H> [70 - 99]  Potassium, Serum3.3 mmol/L<L> [3.5 - 5.0]  Sodium, Serum 144 mmol/L [135 - 146]  [facility]  07-09-19 @ 06:09  Blood Gas Arterial-Calcium,Ionized--  Blood Urea Nitrogen, Serum 51 mg/dL<H> [10 - 20]  Carbon Dioxide, Serum33 mmol/L<H> [17 - 32]  Chloride, Kamtv856 mmol/L [98 - 110]  Creatinie, Serum0.8 mg/dL [0.7 - 1.5]  Glucose, Serum95 mg/dL [70 - 99]  Potassium, Serum3.6 mmol/L [3.5 - 5.0]  Sodium, Serum 146 mmol/L [135 - 146]              Microbiology:    Culture - Blood (collected 06-28-19 @ 18:46)  Source: .Blood None  Final Report (07-04-19 @ 03:01):    No growth at 5 days.    Culture - Urine (collected 06-22-19 @ 18:04)  Source: .Urine Clean Catch (Midstream)  Final Report (06-24-19 @ 06:28):    No growth    Culture - Urine (collected 06-21-19 @ 23:54)  Source: .Urine Clean Catch (Midstream)  Final Report (06-23-19 @ 08:53):    No growth    Culture - Blood (collected 06-15-19 @ 18:01)  Source: .Blood None  Final Report (06-21-19 @ 06:00):    No growth at 5 days.          Medications:  acetaminophen   Tablet .. 325 milliGRAM(s) Oral every 4 hours PRN  aluminum hydroxide/magnesium hydroxide/simethicone Suspension 30 milliLiter(s) Oral every 4 hours PRN  aspirin  chewable 81 milliGRAM(s) Oral daily  bisacodyl Suppository 10 milliGRAM(s) Rectal daily PRN  chlorhexidine 4% Liquid 1 Application(s) Topical <User Schedule>  cholecalciferol 2000 Unit(s) Oral daily  docusate sodium 100 milliGRAM(s) Oral three times a day  folic acid 1 milliGRAM(s) Oral daily  heparin  Injectable 5000 Unit(s) SubCutaneous every 8 hours  levothyroxine 50 MICROGram(s) Oral daily  midodrine 10 milliGRAM(s) Oral every 8 hours  multivitamin 1 Tablet(s) Oral daily  nystatin Powder 1 Application(s) Topical three times a day  pantoprazole   Suspension 40 milliGRAM(s) Oral before breakfast        Assessment and Plan:  A/P 69 yo F w/ hx of CAD s/p PCI and hypothyroidism sent by gastroenterologist for gallbladder hydrops, course complicated by ALI/ transaminitis,  LUIS ALBERTO with hyperkalemia multifactorial 2/2 rhabdo (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 with resultant proteinuria and low albumin state and diffuse anasarca, raynauds phenomenon (not currently active), esophageal dysmotility and reflux, cystitis with urinary retention s/p abx, and subsequent worsening functional debility and malnutrition      # Persistent Hypotension - patient was not receiving Metoprolol for multiple days, will d/c Metoprolol  - multifactorial in etiology including : post diuresis/hypoalbuminemia   ? mild adrenal insufficiency, patient now on prednisone tx.  with hypoalbuminemia, tertiary fluid spacing, LUIS ALBERTO, continued on Midodrine 10 mg po three times daily  -obtain renin/aldosterone levels( 7)   -add protein supplements to daily dietary intake        #hydrops gallbladder with  transaminitis - as per GI recommendations- pt. most likely had drug induced transaminitis, no evidence of autoimmune hepatitis  - LFT's near normal levels  -patient tolerating diet well  -hepatitis profile negative  concomitant acute rhabdomyolysis- resolved, repeated CK level  normal    statin was d/c on admission,   liver was  biopsied- Surgical Pathology Report (06.24.19 @ 16:10)  Surgical Final Report: Final Diagnosis  Right hepatic lobe, needle biopsy:  - Liver parenchyma showing mild focal lobulitis and hepatocyte reactive changes with few glycogenated nuclei.  - Focal area of vaguely formed Francisca-Denk body also present.  - Two minute focus of macro and micro-vesicular steatosis also present, counting less than 5% of tissue volume of the biopsy.  - Portal areas showing marked chronic inflammation with focal interface activity, infiltrated with mainly small lymphocytes and  rare neutrophils, and fibrous expansion of portal area with  fibrous bridge formation, probable cirrhosis.  - Stainable iron present, not increased.  - Trichrome stain reviewed.  - PAS-D stain failed to reveal alpha-1 antitrypsin globule.    Verified by: Manoj Lindsey M.D.  (Electronic Signature)  Reported on: 06/26/19 15:42 EDT, [address] [place] [address]  _________________________________________________________________    Comment  _Sections show findings are consistent with clinical impression of  cirrhosis.  However, the etiology is not clear.  Clinical correlation is recommended.    06/25/19 08:55 ns    LIVER FUNCTIONS - ( 10 Jul 2019 04:59 )  Alb: 2.9 g/dL / Pro: 5.2 g/dL / ALK PHOS: 325 U/L / ALT: 49 U/L / AST: 39 U/L / GGT: x                               patient was instructed to have outpatient elective cholecystectomy with surgical specialist f/up         # LUIS ALBERTO  - due to suspected ATN- renal function normalized now  post renal biopsy- ATN    # Abnormal elevated DS DNA- 111, Elevated ANKITA titers 1: 2560- repeated ANKITA titers decreased to 1 : 320, Elevated P-Anca- titer 1: 1280 - ? lupus nephritis, vs Vasculitis, - repeated Panca titer remains high.  - we have consulted  Rheumatology specialist - who recommended- diagnostic renal biopsy-completed  - MPO/PR3 abs- negative   -decrease prednisone to 10 mg po once daily from tomorrow  -f/up EMG, sural nerve biopsy  -? myositis- elevated aldolase with rhabdomyolysis on presentation.       #Persistent Leukocytosis- most likely as a result of acute inflammatory state  so far no acute infectious etiology   -repeated  blood cxs- no bacterial growth  -consulted Hematology  for persistent leukocytosis- not due to myeloproliferative dz. recommended outpatient f/up   -will resend urine cxs-         #hypoalbuminemia 2/2 acute liver disease- severe protein/calorie malnutrition  - patient received - IV Albumin tx.    #h/o CAD  -cont home regimen  -statin was d/c   -resume plavix tx.       #hypothyroidism  -tsh elevated---t3/t4 normal---will cont syntrhoid 50 mcg daily  -repeat thyroid panel    # Constipation  -colace/dulcolax/senna    #Anasarca- fluid overload status- hold diuresis due to worsening  hypotension     # Vitamin D insufficiency- started on vitamin D tx.    # Macrocytic anemia- vitamin B 12- normal, folate levels- normal.  -started on MVI tx. daily    # Hypokalemia, hypomagnesemia supplemented    # Physical deconditioning- PT/OT tx. daily as tolerated    #DVT//gI ppx  FULL CODE  high risk/ guarded prognosis    #Progress Note Handoff: Pending Consults__none______,Tests__EMG, ______,Test Results___daily BMP, repeat thyroid profile, nerve biopsy results____,Other;, stabilization of hypotension   Family discussion: patient by bedside Disposition: Home_vs STR_once medically stable

## 2019-07-14 NOTE — PROGRESS NOTE ADULT - SUBJECTIVE AND OBJECTIVE BOX
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            Patient was evaluated and examined  by bedside, developed confusion state over night, mild hypoxia noted, patient was placed on NC, no fever, b/p stable, no tachycardia, patient remains edematous with anasarca          REVIEW OF SYSTEMS:  unable to obtain due to patient's confusion state    T(C): , Max: 36.4 (07-13-19 @ 22:21)  HR: 65 (07-14-19 @ 05:21)  BP: 130/62 (07-14-19 @ 05:21)  RR: 17 (07-14-19 @ 05:21)  SpO2: --  CAPILLARY BLOOD GLUCOSE      POCT Blood Glucose.: 129 mg/dL (14 Jul 2019 07:53)  POCT Blood Glucose.: 137 mg/dL (13 Jul 2019 17:13)  POCT Blood Glucose.: 131 mg/dL (13 Jul 2019 13:27)      PHYSICAL EXAM:  General: NAD, sleepy, confused, patient is laying comfortably in bed  HEENT: AT, NC, Supple, NO JVD, NO CB  Lungs: CTA B/L, no wheezing, no rhonchi  CVS: normal S1, S2, RRR, NO M/G/R  Abdomen: soft, bowel sounds present, non-tender, non-distended, abdominal wall mild small ecchymosis at sites of heparin adminstration  Extremities: diffuse extensive body pitting edema, no clubbing, no cyanosis, positive peripheral pulses b/l  Neuro: persistent confused state, patient is not following verbal commands  Skin: no rash, small areas of  ecchymosis- abd. wall, arms( at sites where heparin was injected)      LAB  CBC  Date: 07-14-19 @ 06:55  Mean cell Zgpzuniror98.3  Mean cell Hemoglobin Conc33.0  Mean cell Volum 97.9  Platelet count-Automate 73  RBC Count 1.95  Red Cell Distrib Width#SN  WBC Count26.02  % Albumin, Urine--  Hematocrit 19.1  Hemoglobin 6.3  CBC  Date: 07-14-19 @ 00:50  Mean cell Prnohldzvc08.2  Mean cell Hemoglobin Conc32.6  Mean cell Volum 98.7  Platelet count-Automate 85  RBC Count 2.27  Red Cell Distrib Width25.3  WBC Count28.84  % Albumin, Urine--  Hematocrit 22.4  Hemoglobin 7.3  CBC  Date: 07-13-19 @ 21:52  Mean cell Mqjmghqyih88.6  Mean cell Hemoglobin Conc33.3  Mean cell Volum 97.8  Platelet count-Automate 104  RBC Count 2.30  Red Cell Distrib Width25.2  WBC Count26.05  % Albumin, Urine--  Hematocrit 22.5  Hemoglobin 7.5  CBC  Date: 07-13-19 @ 16:00  Mean cell Eqbiqguxwp70.3  Mean cell Hemoglobin Conc33.0  Mean cell Volum 97.8  Platelet count-Automate 98  RBC Count 2.29  Red Cell Distrib Width25.0  WBC Count25.95  % Albumin, Urine--  Hematocrit 22.4  Hemoglobin 7.4  CBC  Date: 07-13-19 @ 06:43  Mean cell Gnytugcrzk23.5  Mean cell Hemoglobin Conc33.0  Mean cell Volum 98.5  Platelet count-Automate 93  RBC Count 2.06  Red Cell Distrib Width24.8  WBC Count22.78  % Albumin, Urine--  Hematocrit 20.3  Hemoglobin 6.7  CBC  Date: 07-12-19 @ 05:47  Mean cell Wzrxdbacdi85.2  Mean cell Hemoglobin Conc33.1  Mean cell Volum 97.3  Platelet count-Automate 136  RBC Count 2.61  Red Cell Distrib Width24.8  WBC Count25.24  % Albumin, Urine--  Hematocrit 25.4  Hemoglobin 8.4  CBC  Date: 07-11-19 @ 06:25  Mean cell Zksxtjqhhz61.2  Mean cell Hemoglobin Conc32.2  Mean cell Volum 100.0  Platelet count-Automate 131  RBC Count 2.55  Red Cell Distrib Width24.7  WBC Count22.26  % Albumin, Urine--  Hematocrit 25.5  Hemoglobin 8.2  CBC  Date: 07-10-19 @ 04:59  Mean cell Djctmicvon36.8  Mean cell Hemoglobin Conc32.4  Mean cell Volum 98.1  Platelet count-Automate 135  RBC Count 2.64  Red Cell Distrib Width24.2  WBC Count18.24  % Albumin, Urine--  Hematocrit 25.9  Hemoglobin 8.4  CBC  Date: 07-09-19 @ 06:09  Mean cell Qikxjmqzyo33.8  Mean cell Hemoglobin Conc32.4  Mean cell Volum 98.3  Platelet count-Automate 173  RBC Count 2.89  Red Cell Distrib Width24.9  WBC Count19.15  % Albumin, Urine--  Hematocrit 28.4  Hemoglobin 9.2  CBC  Date: 07-08-19 @ 05:34  Mean cell Zcwwoxukzt47.7  Mean cell Hemoglobin Conc32.2  Mean cell Volum 98.5  Platelet count-Automate 155  RBC Count 2.68  Red Cell Distrib Width24.8  WBC Count17.10  % Albumin, Urine--  Hematocrit 26.4  Hemoglobin 8.5    [facility]  07-14-19 @ 06:55  Blood Gas Arterial-Calcium,Ionized--  Blood Urea Nitrogen, Serum 91 mg/dL<HH> [10 - 20] [Critical value:]  Carbon Dioxide, Serum27 mmol/L [17 - 32]  Chloride, Serum94 mmol/L<L> [98 - 110]  Creatinie, Serum1.6 mg/dL<H> [0.7 - 1.5]  Glucose, Tmooz923 mg/dL<H> [70 - 99]  Potassium, Serum3.9 mmol/L [3.5 - 5.0]  Sodium, Serum 137 mmol/L [135 - 146]  [facility]  07-13-19 @ 06:43  Blood Gas Arterial-Calcium,Ionized--  Blood Urea Nitrogen, Serum 75 mg/dL<HH> [10 - 20] [Critical value:]  Carbon Dioxide, Serum29 mmol/L [17 - 32]  Chloride, Serum96 mmol/L<L> [98 - 110]  Creatinie, Serum1.3 mg/dL [0.7 - 1.5]  Glucose, Daxzf130 mg/dL<H> [70 - 99]  Potassium, Serum4.0 mmol/L [3.5 - 5.0]  Sodium, Serum 137 mmol/L [135 - 146]  [facility]  07-12-19 @ 05:47  Blood Gas Arterial-Calcium,Ionized--  Blood Urea Nitrogen, Serum 55 mg/dL<H> [10 - 20]  Carbon Dioxide, Serum28 mmol/L [17 - 32]  Chloride, Serum96 mmol/L<L> [98 - 110]  Creatinie, Serum1.1 mg/dL [0.7 - 1.5]  Glucose, Serum82 mg/dL [70 - 99]  Potassium, Serum3.8 mmol/L [3.5 - 5.0]  Sodium, Serum 140 mmol/L [135 - 146]  [facility]  07-11-19 @ 06:25  Blood Gas Arterial-Calcium,Ionized--  Blood Urea Nitrogen, Serum 44 mg/dL<H> [10 - 20]  Carbon Dioxide, Serum32 mmol/L [17 - 32]  Chloride, Serum97 mmol/L<L> [98 - 110]  Creatinie, Serum0.9 mg/dL [0.7 - 1.5]  Glucose, Serum97 mg/dL [70 - 99]  Potassium, Serum3.7 mmol/L [3.5 - 5.0]  Sodium, Serum 140 mmol/L [135 - 146]  BMP  07-10-19 @ 04:59  Blood Gas Arterial-Calcium,Ionized--  Blood Urea Nitrogen, Serum 45 mg/dL<H> [10 - 20]  Carbon Dioxide, Serum32 mmol/L [17 - 32]  Chloride, Xrmwx665 mmol/L [98 - 110]  Creatinie, Serum0.8 mg/dL [0.7 - 1.5]  Glucose, Ixbjv777 mg/dL<H> [70 - 99]  Potassium, Serum3.3 mmol/L<L> [3.5 - 5.0]  Sodium, Serum 144 mmol/L [135 - 146]        PT/INR - ( 14 Jul 2019 08:28 )   PT: 22.90 sec;   INR: 2.01 ratio         PTT - ( 14 Jul 2019 08:28 )  PTT:57.4 sec      Microbiology:    Culture - Blood (collected 06-28-19 @ 18:46)  Source: .Blood None  Final Report (07-04-19 @ 03:01):    No growth at 5 days.    Culture - Urine (collected 06-22-19 @ 18:04)  Source: .Urine Clean Catch (Midstream)  Final Report (06-24-19 @ 06:28):    No growth    Culture - Urine (collected 06-21-19 @ 23:54)  Source: .Urine Clean Catch (Midstream)  Final Report (06-23-19 @ 08:53):    No growth    Culture - Blood (collected 06-15-19 @ 18:01)  Source: .Blood None  Final Report (06-21-19 @ 06:00):    No growth at 5 days.            Medications:  acetaminophen   Tablet .. 325 milliGRAM(s) Oral every 4 hours PRN  aluminum hydroxide/magnesium hydroxide/simethicone Suspension 30 milliLiter(s) Oral every 4 hours PRN  aspirin  chewable 81 milliGRAM(s) Oral daily  bisacodyl Suppository 10 milliGRAM(s) Rectal daily PRN  chlorhexidine 4% Liquid 1 Application(s) Topical <User Schedule>  cholecalciferol 2000 Unit(s) Oral daily  docusate sodium 100 milliGRAM(s) Oral three times a day  folic acid 1 milliGRAM(s) Oral daily  levothyroxine 50 MICROGram(s) Oral daily  meropenem  IVPB 1000 milliGRAM(s) IV Intermittent every 8 hours  midodrine 10 milliGRAM(s) Oral every 8 hours  multivitamin 1 Tablet(s) Oral daily  nystatin Powder 1 Application(s) Topical three times a day  pantoprazole   Suspension 40 milliGRAM(s) Oral before breakfast  predniSONE   Tablet 10 milliGRAM(s) Oral daily  sodium chloride 0.9% Bolus 1000 milliLiter(s) IV Bolus once  vancomycin  IVPB 1250 milliGRAM(s) IV Intermittent once        Assessment and Plan:  A/P 67 yo F w/ hx of CAD s/p PCI and hypothyroidism sent by gastroenterologist for gallbladder hydrops, course complicated by ALI/ transaminitis,  LUIS ALBERTO with hyperkalemia multifactorial 2/2 rhabdo (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 with resultant proteinuria and low albumin state and diffuse anasarca, raynauds phenomenon (not currently active), esophageal dysmotility and reflux, cystitis with urinary retention s/p abx, and subsequent worsening functional debility and malnutrition    patient developed confusion state over night time    # Acute Encephalopathy- ? metabolic with worsening renal function with suspected Sepsis   -lactic acidosis, hypoxia, worsening leukocytosis  - urine cxs was sent on 7/13, blood cxs- today, most recent CXR- clear lungs  -patient started on IV Vanco, IV Meropenem  - IVF 1 liter bolus  -monitor lactic acid levels  - obtain urgent CT head w/o contrast.  -stat 12 lead EKG- NSR- no acute st-t wave changes    # New onset coagulopathy  -INR 2  -elevated PT/PTT  ? sepsis   -reconsult Hematology specialist    # Thrombocytopenia- ? sepsis related vs. HIT  -d/c heparin   -send HIT ab's   -f/up Hematology specialist          # Hypotension -b/p improved today, IVF bolus today  - multifactorial in etiology including : post diuresis/hypoalbuminemia   ? mild adrenal insufficiency, patient now on prednisone tx.  with hypoalbuminemia, tertiary fluid spacing, LUIS ALBERTO, continued on Midodrine 10 mg po three times daily  - renin(6.74-elevated)/aldosterone levels( 7)   -add protein supplements to daily dietary intake    # LUIS ALBERTO  - now recurrent most likely  due to suspected ATN- IVF  -ur cxs, repeated u/a microhematuria  - monitor renal function daily  - I and O strict  post recent  renal biopsy- ATN    # Macrocytic anemia- significant h/h drop, no gross bleeding event, will transfuse 1 unit PRBC stat, next h/h 1800 hours today.  vitamin B 12- normal, folate levels- normal.  -started on MVI tx. daily    #hydrops gallbladder with  transaminitis - as per GI recommendations- pt. most likely had drug induced transaminitis, no evidence of autoimmune hepatitis  - LFT's near normal levels  -patient tolerating diet well  -hepatitis profile negative  concomitant acute rhabdomyolysis- resolved, repeated CK level  normal    statin was d/c on admission,   liver was  biopsied- Surgical Pathology Report (06.24.19 @ 16:10)  Surgical Final Report: Final Diagnosis  Right hepatic lobe, needle biopsy:  - Liver parenchyma showing mild focal lobulitis and hepatocyte reactive changes with few glycogenated nuclei.  - Focal area of vaguely formed Francisca-Denk body also present.  - Two minute focus of macro and micro-vesicular steatosis also present, counting less than 5% of tissue volume of the biopsy.  - Portal areas showing marked chronic inflammation with focal interface activity, infiltrated with mainly small lymphocytes and  rare neutrophils, and fibrous expansion of portal area with  fibrous bridge formation, probable cirrhosis.  - Stainable iron present, not increased.  - Trichrome stain reviewed.  - PAS-D stain failed to reveal alpha-1 antitrypsin globule.    Verified by: Manoj Lindsey M.D.  (Electronic Signature)  Reported on: 06/26/19 15:42 EDT, [address], [place] [address]  _________________________________________________________________    Comment  _Sections show findings are consistent with clinical impression of  cirrhosis.  However, the etiology is not clear.  Clinical correlation is recommended.    06/25/19 08:55 ns    LIVER FUNCTIONS - ( 10 Jul 2019 04:59 )  Alb: 2.9 g/dL / Pro: 5.2 g/dL / ALK PHOS: 325 U/L / ALT: 49 U/L / AST: 39 U/L / GGT: x                               patient was instructed to have outpatient elective cholecystectomy with surgical specialist f/up             # Abnormal elevated DS DNA- 111, Elevated ANKITA titers 1: 2560- repeated ANKITA titers decreased to 1 : 320, Elevated P-Anca- titer 1: 1280 - ? lupus nephritis, vs Vasculitis, - repeated Panca titer remains high.  - we have consulted  Rheumatology specialist - who recommended- diagnostic renal biopsy-completed  - MPO/PR3 abs- negative   -decrease prednisone to 10 mg po once daily from tomorrow  -f/up EMG, sural nerve biopsy  -? myositis- elevated aldolase with rhabdomyolysis on presentation.       #Persistent Leukocytosis- in view of change of patient' mentation, with lactic acidosis  -I suspect that patient developing sepsis  - f/up urine cxs, blood cxs  -recent CXR- no infiltrates  -start IV Vanco/IV Meropenem  -CBC in am          #hypoalbuminemia 2/2 acute liver disease- severe protein/calorie malnutrition  - patient received - IV Albumin tx.    #h/o CAD  -cont home regimen  -statin was d/c   -hold plavix tx. due to thrombocytopenia      #hypothyroidism  -tsh elevated---t3/t4 normal---will cont syntrhoid 50 mcg daily  -repeat thyroid panel    # Constipation  -colace/dulcolax/senna    #Anasarca- fluid overload status- hold diuresis due to sepsis    # Vitamin D insufficiency- started on vitamin D tx.        # Hypokalemia, hypomagnesemia supplemented    # Physical deconditioning- PT/OT tx. daily as tolerated    #DVT//gI ppx  FULL CODE  high risk/ guarded prognosis    #Progress Note Handoff: Patient's condition worsened today, now with septic work up , f/up CT head, repeat h/h post PRBC BT at 1800, neuro assessments every shift, reconsult Hemeonc for coagulopathy and new thrombocytopenia eval   Family discussion: patient's   by bedside Disposition:  STR_once medically stable

## 2019-07-14 NOTE — CHART NOTE - NSCHARTNOTEFT_GEN_A_CORE
Received signout this AM for Hgb 7.5 and to follow-up AM lab. Was given 1x dose Rocephin overnight for new AMS and positive UA. Urine cultures were ordered yesterday. This AM, received call that patient has continued AMS. D/w Dr. Cevallos (on call attending). Ordered and hilton stat labs: lactic acid, procalcitonin, ammonia, BCx2, PT/PTT, T&S. ABG performed. Urgent CT and Stat EKG ordered. EKG normal and unchanged from prior EKG. Will f/u CT scan and labs. Vitals stable at this time. AM Hgb low at 6.3. Blood transfusion ordered by Dr. Cevallos. Will place PM labs and f/u

## 2019-07-15 NOTE — PROGRESS NOTE ADULT - SUBJECTIVE AND OBJECTIVE BOX
Patient is a 68y old  Female who presents with a chief complaint of gallbladder hydrops (15 Jul 2019 13:06)      Subjective: Over the weekend, pt found to have low plt, bacteremia, elevated coags  Today pt looks icteric, she c/o feeling tired and fatigued      Vital Signs Last 24 Hrs  T(C): 36.3 (15 Jul 2019 05:44), Max: 36.3 (2019 20:53)  T(F): 97.4 (15 Jul 2019 05:44), Max: 97.4 (2019 20:53)  HR: 63 (15 Jul 2019 10:20) (63 - 101)  BP: 131/63 (15 Jul 2019 10:20) (97/50 - 135/80)  BP(mean): --  RR: 17 (15 Jul 2019 05:44) (17 - 17)  SpO2: 98% (2019 19:01) (97% - 98%)    PHYSICAL EXAM  General: No distress, lethargic, Icteric +  Neck: supple  CV: normal S1/S2   Lungs: positive air movement b/l   Abdomen: soft non-tender   Neuro: awake and alert, but lethargic    MEDICATIONS  (STANDING):  aspirin  chewable 81 milliGRAM(s) Oral daily  chlorhexidine 4% Liquid 1 Application(s) Topical <User Schedule>  cholecalciferol 2000 Unit(s) Oral daily  docusate sodium 100 milliGRAM(s) Oral three times a day  folic acid 1 milliGRAM(s) Oral daily  levothyroxine 50 MICROGram(s) Oral daily  meropenem  IVPB 1000 milliGRAM(s) IV Intermittent every 8 hours  midodrine 10 milliGRAM(s) Oral every 8 hours  multivitamin 1 Tablet(s) Oral daily  nystatin Powder 1 Application(s) Topical three times a day  pantoprazole    Tablet 40 milliGRAM(s) Oral before breakfast  predniSONE   Tablet 10 milliGRAM(s) Oral daily  vancomycin  IVPB 1500 milliGRAM(s) IV Intermittent daily    MEDICATIONS  (PRN):  acetaminophen   Tablet .. 325 milliGRAM(s) Oral every 4 hours PRN Mild Pain (1 - 3)  aluminum hydroxide/magnesium hydroxide/simethicone Suspension 30 milliLiter(s) Oral every 4 hours PRN Dyspepsia  bisacodyl Suppository 10 milliGRAM(s) Rectal daily PRN Constipation      LABS:                          8.4    22.00 )-----------( 43       ( 15 Jul 2019 05:57 )             24.8         Mean Cell Volume : 94.3 fL  Mean Cell Hemoglobin : 31.9 pg  Mean Cell Hemoglobin Concentration : 33.9 g/dL  Auto Neutrophil # : 18.72 K/uL  Auto Lymphocyte # : 1.31 K/uL  Auto Monocyte # : 1.37 K/uL  Auto Eosinophil # : 0.02 K/uL  Auto Basophil # : 0.04 K/uL  Auto Neutrophil % : 85.0 %  Auto Lymphocyte % : 6.0 %  Auto Monocyte % : 6.2 %  Auto Eosinophil % : 0.1 %  Auto Basophil % : 0.2 %      Serial CBC's  07-15 @ 05:57  Hct-24.8 / Hgb-8.4 / Plat-43 / RBC-2.63 / WBC-22.00  Serial CBC's  07-15 @ 00:44  Hct-24.9 / Hgb-8.4 / Plat-48 / RBC-2.65 / WBC-21.25  Serial CBC's   @ 21:10  Hct-25.0 / Hgb-8.5 / Plat-48 / RBC-2.65 / WBC-19.76  Serial CBC's   @ 06:55  Hct-19.1 / Hgb-6.3 / Plat-73 / RBC-1.95 / WBC-26.02  Serial CBC's   @ 00:50  Hct-22.4 / Hgb-7.3 / Plat-85 / RBC-2.27 / WBC-28.84  Serial CBC's   @ 21:52  Hct-22.5 / Hgb-7.5 / Plat-104 / RBC-2.30 / WBC-26.05  Serial CBC's   @ 16:00  Hct-22.4 / Hgb-7.4 / Plat-98 / RBC-2.29 / WBC-25.95  Serial CBC's   @ 06:43  Hct-20.3 / Hgb-6.7 / Plat-93 / RBC-2.06 / WBC-22.78  Serial CBC's   @ 05:47  Hct-25.4 / Hgb-8.4 / Plat-136 / RBC-2.61 / WBC-25.24      07-15    134<L>  |  94<L>  |  100<HH>  ----------------------------<  130<H>  4.0   |  27  |  1.7<H>    Ca    8.1<L>      15 Jul 2019 05:57    TPro  4.9<L>  /  Alb  2.2<L>  /  TBili  3.6<H>  /  DBili  x   /  AST  69<H>  /  ALT  32  /  AlkPhos  561<H>  07-15      PT/INR - ( 15 Jul 2019 05:57 )   PT: 20.40 sec;   INR: 1.78 ratio         PTT - ( 15 Jul 2019 05:57 )  PTT:45.6 sec    Iron - Total Binding Capacity.: 97 ug/dL ( @ 06:55)  Folate, Serum: 8.1 ng/mL (07-10 @ 16:56)  Vitamin B12, Serum: 1468 pg/mL (07-10 @ 16:56)              Urinalysis Basic - ( 2019 23:00 )    Color: Dark Yellow / Appearance: Turbid / S.015 / pH: x  Gluc: x / Ketone: Trace  / Bili: Negative / Urobili: 0.2   Blood: x / Protein: 100 / Nitrite: Negative   Leuk Esterase: Large / RBC: 25 /HPF / WBC >50 /HPF   Sq Epi: x / Non Sq Epi: occasional /HPF / Bacteria: Moderate          Culture - Blood (collected 2019 12:53)  Source: .Blood Blood  Gram Stain (15 Jul 2019 06:12):    Growth in aerobic bottle: Gram Positive Cocci in Pairs and Chains    Growth in anaerobic bottle: Gram Positive Cocci in Pairs and Chains  Preliminary Report (15 Jul 2019 06:12):    Growth in anaerobic bottle: Gram Positive Cocci in Pairs and Chains    Growth in aerobic bottle: Gram Positive Cocci in Pairs and Chains    "Due to technical problems, Proteus sp. will Not be reported as part of    the BCID panel until further notice"    ***Blood Panel PCR results on this specimen are available    approximately 3 hours after the Gram stain result.***    Gram stain, PCR, and/or culture results may not always    correspond due to difference in methodologies.    ************************************************************    This PCR assay was performed using [employer].    The following targets are tested for: Enterococcus,    vancomycin resistant enterococci, Listeria monocytogenes,    coagulase negative staphylococci, S. aureus,    methicillin resistant S. aureus, Streptococcus agalactiae    (Group B), S. pneumoniae, S. pyogenes (Group A),    Acinetobacter baumannii, Enterobacter cloacae, E. coli,    Klebsiella oxytoca, K. pneumoniae, Proteus sp.,    Serratia marcescens, Haemophilus influenzae,    Neisseria meningitidis, Pseudomonas aeruginosa, Candida    albicans, C. glabrata, C krusei, C parapsilosis,    C. tropicalis and the KPC resistance gene.  Organism: Blood Culture PCR (15 Jul 2019 07:10)  Organism: Blood Culture PCR (15 Jul 2019 07:10)    Culture - Urine (collected 2019 23:00)  Source: .Urine Clean Catch (Midstream)  Preliminary Report (15 Jul 2019 08:46):    >100,000 CFU/ml Gram Negative Rods            BLOOD SMEAR INTERPRETATION:       RADIOLOGY & ADDITIONAL STUDIES:

## 2019-07-15 NOTE — PROGRESS NOTE ADULT - SUBJECTIVE AND OBJECTIVE BOX
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            Patient was evaluated and examined  by bedside, remains very lethargic, but less confused today, no fever          REVIEW OF SYSTEMS:  please see pertinent positives mentioned above, all other 12 ROS negative      T(C): , Max: 36.3 (07-14-19 @ 20:53)  HR: 63 (07-15-19 @ 10:20)  BP: 131/63 (07-15-19 @ 10:20)  RR: 17 (07-15-19 @ 05:44)  SpO2: 98% (07-14-19 @ 19:01)  CAPILLARY BLOOD GLUCOSE      POCT Blood Glucose.: 124 mg/dL (15 Jul 2019 07:37)  POCT Blood Glucose.: 136 mg/dL (14 Jul 2019 21:55)  POCT Blood Glucose.: 135 mg/dL (14 Jul 2019 17:27)      PHYSICAL EXAM:  General: NAD, sleepy, AAOx3,  patient is laying comfortably in bed  HEENT: AT, NC, Supple, NO JVD, NO CB  Lungs: CTA B/L, no wheezing, no rhonchi  CVS: normal S1, S2, RRR, NO M/G/R  Abdomen: soft, bowel sounds present, non-tender, non-distended, abdominal wall mild small ecchymosis at sites of heparin administration  Extremities: diffuse extensive body pitting edema, no clubbing, no cyanosis, positive peripheral pulses b/l  Neuro: no confusion today, diffuse generalized body weakness, patient unable to ambulate  Skin: no rash, small areas of  ecchymosis- abd. wall, arms( at sites where heparin was injected)          LAB  CBC  Date: 07-15-19 @ 05:57  Mean cell Kcbfamliuo62.9  Mean cell Hemoglobin Conc33.9  Mean cell Volum 94.3  Platelet count-Automate 43  RBC Count 2.63  Red Cell Distrib Width23.9  WBC Count22.00  % Albumin, Urine--  Hematocrit 24.8  Hemoglobin 8.4  CBC  Date: 07-15-19 @ 00:44  Mean cell Weqqtfzaoo22.7  Mean cell Hemoglobin Conc33.7  Mean cell Volum 94.0  Platelet count-Automate 48  RBC Count 2.65  Red Cell Distrib Width23.8  WBC Count21.25  % Albumin, Urine--  Hematocrit 24.9  Hemoglobin 8.4  CBC  Date: 07-14-19 @ 21:10  Mean cell Dzdvfanueu28.1  Mean cell Hemoglobin Conc34.0  Mean cell Volum 94.3  Platelet count-Automate 48  RBC Count 2.65  Red Cell Distrib Width23.8  WBC Count19.76  % Albumin, Urine--  Hematocrit 25.0  Hemoglobin 8.5  CBC  Date: 07-14-19 @ 06:55  Mean cell Bujtzgftwk30.3  Mean cell Hemoglobin Conc33.0  Mean cell Volum 97.9  Platelet count-Automate 73  RBC Count 1.95  Red Cell Distrib Width#SN  WBC Count26.02  % Albumin, Urine--  Hematocrit 19.1  Hemoglobin 6.3  CBC  Date: 07-14-19 @ 00:50  Mean cell Vadrewthcs53.2  Mean cell Hemoglobin Conc32.6  Mean cell Volum 98.7  Platelet count-Automate 85  RBC Count 2.27  Red Cell Distrib Width25.3  WBC Count28.84  % Albumin, Urine--  Hematocrit 22.4  Hemoglobin 7.3  CBC  Date: 07-13-19 @ 21:52  Mean cell Cacpkzhpfz01.6  Mean cell Hemoglobin Conc33.3  Mean cell Volum 97.8  Platelet count-Automate 104  RBC Count 2.30  Red Cell Distrib Width25.2  WBC Count26.05  % Albumin, Urine--  Hematocrit 22.5  Hemoglobin 7.5  CBC  Date: 07-13-19 @ 16:00  Mean cell Vjolmnzted00.3  Mean cell Hemoglobin Conc33.0  Mean cell Volum 97.8  Platelet count-Automate 98  RBC Count 2.29  Red Cell Distrib Width25.0  WBC Count25.95  % Albumin, Urine--  Hematocrit 22.4  Hemoglobin 7.4  CBC  Date: 07-13-19 @ 06:43  Mean cell Sgmcyiwsla65.5  Mean cell Hemoglobin Conc33.0  Mean cell Volum 98.5  Platelet count-Automate 93  RBC Count 2.06  Red Cell Distrib Width24.8  WBC Count22.78  % Albumin, Urine--  Hematocrit 20.3  Hemoglobin 6.7  CBC  Date: 07-12-19 @ 05:47  Mean cell Aakisemrmj68.2  Mean cell Hemoglobin Conc33.1  Mean cell Volum 97.3  Platelet count-Automate 136  RBC Count 2.61  Red Cell Distrib Width24.8  WBC Count25.24  % Albumin, Urine--  Hematocrit 25.4  Hemoglobin 8.4  CBC  Date: 07-11-19 @ 06:25  Mean cell Hyqiydegao58.2  Mean cell Hemoglobin Conc32.2  Mean cell Volum 100.0  Platelet count-Automate 131  RBC Count 2.55  Red Cell Distrib Width24.7  WBC Count22.26  % Albumin, Urine--  Hematocrit 25.5  Hemoglobin 8.2  CBC  Date: 07-10-19 @ 04:59  Mean cell Vaczclphwd24.8  Mean cell Hemoglobin Conc32.4  Mean cell Volum 98.1  Platelet count-Automate 135  RBC Count 2.64  Red Cell Distrib Width24.2  WBC Count18.24  % Albumin, Urine--  Hematocrit 25.9  Hemoglobin 8.4  CBC  Date: 07-09-19 @ 06:09  Mean cell Hipzjjtbrb89.8  Mean cell Hemoglobin Conc32.4  Mean cell Volum 98.3  Platelet count-Automate 173  RBC Count 2.89  Red Cell Distrib Width24.9  WBC Count19.15  % Albumin, Urine--  Hematocrit 28.4  Hemoglobin 9.2    [facility]  07-15-19 @ 05:57  Blood Gas Arterial-Calcium,Ionized--  Blood Urea Nitrogen, Serum 100 mg/dL<HH> [10 - 20] [Critical value:]  Carbon Dioxide, Serum27 mmol/L [17 - 32]  Chloride, Serum94 mmol/L<L> [98 - 110]  Creatinie, Serum1.7 mg/dL<H> [0.7 - 1.5]  Glucose, Zxzoe621 mg/dL<H> [70 - 99]  Potassium, Serum4.0 mmol/L [3.5 - 5.0]  Sodium, Serum 134 mmol/L<L> [135 - 146]  [facility]  07-14-19 @ 06:55  Blood Gas Arterial-Calcium,Ionized--  Blood Urea Nitrogen, Serum 91 mg/dL<HH> [10 - 20] [Critical value:]  Carbon Dioxide, Serum27 mmol/L [17 - 32]  Chloride, Serum94 mmol/L<L> [98 - 110]  Creatinie, Serum1.6 mg/dL<H> [0.7 - 1.5]  Glucose, Rvmoh765 mg/dL<H> [70 - 99]  Potassium, Serum3.9 mmol/L [3.5 - 5.0]  Sodium, Serum 137 mmol/L [135 - 146]  [facility]  07-13-19 @ 06:43  Blood Gas Arterial-Calcium,Ionized--  Blood Urea Nitrogen, Serum 75 mg/dL<HH> [10 - 20] [Critical value:]  Carbon Dioxide, Serum29 mmol/L [17 - 32]  Chloride, Serum96 mmol/L<L> [98 - 110]  Creatinie, Serum1.3 mg/dL [0.7 - 1.5]  Glucose, Xbkfn570 mg/dL<H> [70 - 99]  Potassium, Serum4.0 mmol/L [3.5 - 5.0]  Sodium, Serum 137 mmol/L [135 - 146]  BMP  07-12-19 @ 05:47  Blood Gas Arterial-Calcium,Ionized--  Blood Urea Nitrogen, Serum 55 mg/dL<H> [10 - 20]  Carbon Dioxide, Serum28 mmol/L [17 - 32]  Chloride, Serum96 mmol/L<L> [98 - 110]  Creatinie, Serum1.1 mg/dL [0.7 - 1.5]  Glucose, Serum82 mg/dL [70 - 99]  Potassium, Serum3.8 mmol/L [3.5 - 5.0]  Sodium, Serum 140 mmol/L [135 - 146]  BMP  07-11-19 @ 06:25  Blood Gas Arterial-Calcium,Ionized--  Blood Urea Nitrogen, Serum 44 mg/dL<H> [10 - 20]  Carbon Dioxide, Serum32 mmol/L [17 - 32]  Chloride, Serum97 mmol/L<L> [98 - 110]  Creatinie, Serum0.9 mg/dL [0.7 - 1.5]  Glucose, Serum97 mg/dL [70 - 99]  Potassium, Serum3.7 mmol/L [3.5 - 5.0]  Sodium, Serum 140 mmol/L [135 - 146]        PT/INR - ( 15 Jul 2019 05:57 )   PT: 20.40 sec;   INR: 1.78 ratio         PTT - ( 15 Jul 2019 05:57 )  PTT:45.6 sec      Microbiology:    Culture - Blood (collected 07-14-19 @ 12:53)  Source: .Blood Blood  Gram Stain (07-15-19 @ 06:12):    Growth in aerobic bottle: Gram Positive Cocci in Pairs and Chains    Growth in anaerobic bottle: Gram Positive Cocci in Pairs and Chains  Preliminary Report (07-15-19 @ 06:12):    Growth in anaerobic bottle: Gram Positive Cocci in Pairs and Chains    Growth in aerobic bottle: Gram Positive Cocci in Pairs and Chains    "Due to technical problems, Proteus sp. will Not be reported as part of    the BCID panel until further notice"    ***Blood Panel PCR results on this specimen are available    approximately 3 hours after the Gram stain result.***    Gram stain, PCR, and/or culture results may not always    correspond due to difference in methodologies.    ************************************************************    This PCR assay was performed using [employer].    The following targets are tested for: Enterococcus,    vancomycin resistant enterococci, Listeria monocytogenes,    coagulase negative staphylococci, S. aureus,    methicillin resistant S. aureus, Streptococcus agalactiae    (Group B), S. pneumoniae, S. pyogenes (Group A),    Acinetobacter baumannii, Enterobacter cloacae, E. coli,    Klebsiella oxytoca, K. pneumoniae, Proteus sp.,    Serratia marcescens, Haemophilus influenzae,    Neisseria meningitidis, Pseudomonas aeruginosa, Candida    albicans, C. glabrata, C krusei, C parapsilosis,    C. tropicalis and the KPC resistance gene.  Organism: Blood Culture PCR (07-15-19 @ 07:10)  Organism: Blood Culture PCR (07-15-19 @ 07:10)      -  Enterococcus species: Detec      Method Type: PCR    Culture - Urine (collected 07-13-19 @ 23:00)  Source: .Urine Clean Catch (Midstream)  Preliminary Report (07-15-19 @ 08:46):    >100,000 CFU/ml Gram Negative Rods    Culture - Blood (collected 06-28-19 @ 18:46)  Source: .Blood None  Final Report (07-04-19 @ 03:01):    No growth at 5 days.    Culture - Urine (collected 06-22-19 @ 18:04)  Source: .Urine Clean Catch (Midstream)  Final Report (06-24-19 @ 06:28):    No growth    Culture - Urine (collected 06-21-19 @ 23:54)  Source: .Urine Clean Catch (Midstream)  Final Report (06-23-19 @ 08:53):    No growth    Culture - Blood (collected 06-15-19 @ 18:01)  Source: .Blood None  Final Report (06-21-19 @ 06:00):    No growth at 5 days.        Medications:  acetaminophen   Tablet .. 325 milliGRAM(s) Oral every 4 hours PRN  albumin human 25% IVPB 50 milliLiter(s) IV Intermittent once  aluminum hydroxide/magnesium hydroxide/simethicone Suspension 30 milliLiter(s) Oral every 4 hours PRN  bisacodyl Suppository 10 milliGRAM(s) Rectal daily PRN  chlorhexidine 4% Liquid 1 Application(s) Topical <User Schedule>  cholecalciferol 2000 Unit(s) Oral daily  docusate sodium 100 milliGRAM(s) Oral three times a day  folic acid 1 milliGRAM(s) Oral daily  levothyroxine 50 MICROGram(s) Oral daily  meropenem  IVPB 1000 milliGRAM(s) IV Intermittent every 8 hours  midodrine 10 milliGRAM(s) Oral every 8 hours  multivitamin 1 Tablet(s) Oral daily  nystatin Powder 1 Application(s) Topical three times a day  pantoprazole    Tablet 40 milliGRAM(s) Oral before breakfast  predniSONE   Tablet 10 milliGRAM(s) Oral daily  sodium chloride 0.9%. 1000 milliLiter(s) IV Continuous <Continuous>  vancomycin  IVPB 1500 milliGRAM(s) IV Intermittent daily        Assessment and Plan:  A/P 67 yo F w/ hx of CAD s/p PCI and hypothyroidism sent by gastroenterologist for gallbladder hydrops, course complicated by ALI/ transaminitis,  LUIS ALBERTO with hyperkalemia multifactorial 2/2 rhabdo (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 with resultant proteinuria and low albumin state and diffuse anasarca, raynauds phenomenon (not currently active), esophageal dysmotility and reflux, cystitis with urinary retention s/p abx, and subsequent worsening functional debility and malnutrition        # Acute Encephalopathy- most likely as a result of Sepsis  -lactic acidosis, hypoxia, leukocytosis  - urine cxs was sent on 7/13- gram negative rods  , blood cxs- 7/14- enterococcus - f/up sensitivity, repeat  blood cxs today  -consult ID - f/up recommendations   most recent CXR- clear lungs  -patient started on IV Vanco, IV Meropenem( day 2 )   -monitor lactic acid levels  - s/p  urgent CT head w/o contrast- no CVA  -stat 12 lead EKG- NSR- no acute st-t wave changes    # Sepsis with bacteremia - ? source, multiple abn cxs.  - gram negative in urine  - enterococcus in blood   -obtain Echo   -continue Vanco/Meropenem  -f/up ID    # New onset coagulopathy  -INR 2  -elevated PT/PTT  ? sepsis   -reconsulted Hematology specialist- recommended DIC work up  -I have ordered fibrinogen split products yesterday- no result - f/up today  -monitor coagulation profile daily    # Thrombocytopenia- ? sepsis related vs. HIT  -d/c heparin   -send HIT ab's   -continue close platelets monitoring  -hold asa, plavix tx.     # Hypotension -post IV bolus yesterday, due to sepsis - continue IVF for next 24 hours  - multifactorial in etiology including : post diuresis/hypoalbuminemia   ? mild adrenal insufficiency, patient now on prednisone tx.  with hypoalbuminemia, tertiary fluid spacing, LUIS ALBERTO, continued on Midodrine 10 mg po three times daily  - renin(6.74-elevated)/aldosterone levels( 7)   -add protein supplements to daily dietary intake    # LUIS ALBERTO  - now recurrent most likely  due to suspected ATN- IVF  -ur cxs, repeated u/a microhematuria  - monitor renal function daily  - I and O strict  post recent  renal biopsy- ATN  -reconsult Nephrology specialist    # Macrocytic anemia- significant h/h drop, no gross bleeding event  -s/p  1 unit PRBC on 7/14- repeated h/h stable, ? sepsis related mild hemolysis- f/up LDH, haptoglobin level  vitamin B 12- normal, folate levels- normal.  -started on MVI tx. daily    #hydrops gallbladder with  transaminitis - as per GI recommendations- pt. most likely had drug induced transaminitis, no evidence of autoimmune hepatitis  - LFT's near normal levels  -patient tolerating diet well  -hepatitis profile negative  concomitant acute rhabdomyolysis- resolved, repeated CK level  normal    statin was d/c on admission,   liver was  biopsied- Surgical Pathology Report (06.24.19 @ 16:10)  Surgical Final Report: Final Diagnosis  Right hepatic lobe, needle biopsy:  - Liver parenchyma showing mild focal lobulitis and hepatocyte reactive changes with few glycogenated nuclei.  - Focal area of vaguely formed Francisca-Denk body also present.  - Two minute focus of macro and micro-vesicular steatosis also present, counting less than 5% of tissue volume of the biopsy.  - Portal areas showing marked chronic inflammation with focal interface activity, infiltrated with mainly small lymphocytes and  rare neutrophils, and fibrous expansion of portal area with  fibrous bridge formation, probable cirrhosis.  - Stainable iron present, not increased.  - Trichrome stain reviewed.  - PAS-D stain failed to reveal alpha-1 antitrypsin globule.    Verified by: Manoj Lindsey M.D.  (Electronic Signature)  Reported on: 06/26/19 15:42 EDT, [address] [place] [address]  _________________________________________________________________    Comment  _Sections show findings are consistent with clinical impression of  cirrhosis.  However, the etiology is not clear.  Clinical correlation is recommended.    06/25/19 08:55 ns    LIVER FUNCTIONS - ( 10 Jul 2019 04:59 )  Alb: 2.9 g/dL / Pro: 5.2 g/dL / ALK PHOS: 325 U/L / ALT: 49 U/L / AST: 39 U/L / GGT: x                               patient was instructed to have outpatient elective cholecystectomy with surgical specialist f/up             # Abnormal elevated DS DNA- 111, Elevated ANKITA titers 1: 2560- repeated ANKITA titers decreased to 1 : 320, Elevated P-Anca- titer 1: 1280 - ? lupus nephritis, vs Vasculitis, - repeated Panca titer remains high.  - we have consulted  Rheumatology specialist - who recommended- diagnostic renal biopsy-completed  - MPO/PR3 abs- negative   -decrease prednisone to 10 mg po once daily from tomorrow  -f/up EMG, sural nerve biopsy  -? myositis- elevated aldolase with rhabdomyolysis on presentation.       #Persistent Leukocytosis- sepsis  -tx. as mentioned above    #hypoalbuminemia 2/2 acute liver disease- severe protein/calorie malnutrition  - patient received - IV Albumin tx.    #h/o CAD  -cont home regimen  -statin was d/c   -hold asa, plavix tx. due to thrombocytopenia      #hypothyroidism  -tsh elevated---t3/t4 normal---will cont syntrhoid 50 mcg daily  -repeat thyroid panel    # Constipation  -colace/dulcolax/senna    #Anasarca- fluid overload status- hold diuresis due to sepsis    # Vitamin D insufficiency- started on vitamin D tx.      # Physical deconditioning- PT/OT tx. daily as tolerated once sepsis resolves    #DVT//gI ppx  FULL CODE  high risk/ guarded prognosis    #Progress Note Handoff: repeat blood cxs today, obtain LDH, haptoglobin level, fibrinogen split products, f/up ID     Family discussion:  patient and patient's   by bedside Disposition:  STR_once medically stable

## 2019-07-15 NOTE — PROGRESS NOTE ADULT - SUBJECTIVE AND OBJECTIVE BOX
SUBJECTIVE:    Patient is a 68y old Female who presents with a chief complaint of gallbladder hydrops (2019 10:21)    Currently admitted to medicine with the primary diagnosis of Gallbladder hydrops     Today is hospital day 31d. This morning she is resting comfortably in bed and reports no new issues or overnight events.         PAST MEDICAL & SURGICAL HISTORY  Hypothyroidism  CAD S/P percutaneous coronary angioplasty  No significant past surgical history    SOCIAL HISTORY:  Negative for smoking/alcohol/drug use.     ALLERGIES:  No Known Allergies    MEDICATIONS:  STANDING MEDICATIONS  aspirin  chewable 81 milliGRAM(s) Oral daily  chlorhexidine 4% Liquid 1 Application(s) Topical <User Schedule>  cholecalciferol 2000 Unit(s) Oral daily  docusate sodium 100 milliGRAM(s) Oral three times a day  folic acid 1 milliGRAM(s) Oral daily  levothyroxine 50 MICROGram(s) Oral daily  meropenem  IVPB 1000 milliGRAM(s) IV Intermittent every 8 hours  midodrine 10 milliGRAM(s) Oral every 8 hours  multivitamin 1 Tablet(s) Oral daily  nystatin Powder 1 Application(s) Topical three times a day  pantoprazole    Tablet 40 milliGRAM(s) Oral before breakfast  predniSONE   Tablet 10 milliGRAM(s) Oral daily  vancomycin  IVPB 1500 milliGRAM(s) IV Intermittent daily    PRN MEDICATIONS  acetaminophen   Tablet .. 325 milliGRAM(s) Oral every 4 hours PRN  aluminum hydroxide/magnesium hydroxide/simethicone Suspension 30 milliLiter(s) Oral every 4 hours PRN  bisacodyl Suppository 10 milliGRAM(s) Rectal daily PRN    VITALS:   T(F): 97.4  HR: 63  BP: 131/63  RR: 17  SpO2: 98%    LABS:                        8.4    22.00 )-----------( 43       ( 15 Jul 2019 05:57 )             24.8     07-15    134<L>  |  94<L>  |  100<HH>  ----------------------------<  130<H>  4.0   |  27  |  1.7<H>    Ca    8.1<L>      15 Jul 2019 05:57    TPro  4.9<L>  /  Alb  2.2<L>  /  TBili  3.6<H>  /  DBili  x   /  AST  69<H>  /  ALT  32  /  AlkPhos  561<H>  07-15    PT/INR - ( 15 Jul 2019 05:57 )   PT: 20.40 sec;   INR: 1.78 ratio         PTT - ( 15 Jul 2019 05:57 )  PTT:45.6 sec  Urinalysis Basic - ( 2019 23:00 )    Color: Dark Yellow / Appearance: Turbid / S.015 / pH: x  Gluc: x / Ketone: Trace  / Bili: Negative / Urobili: 0.2   Blood: x / Protein: 100 / Nitrite: Negative   Leuk Esterase: Large / RBC: 25 /HPF / WBC >50 /HPF   Sq Epi: x / Non Sq Epi: occasional /HPF / Bacteria: Moderate      ABG - ( 2019 08:38 )  pH, Arterial: 7.56  pH, Blood: x     /  pCO2: 34    /  pO2: 63    / HCO3: 30    / Base Excess: 7.4   /  SaO2: 95                Lactate, Blood: 2.7 mmol/L <H> (07-15-19 @ 05:57)  Lactate, Blood: 2.4 mmol/L <H> (07-15-19 @ 00:44)      Culture - Blood (collected 2019 12:53)  Source: .Blood Blood  Gram Stain (15 Jul 2019 06:12):    Growth in aerobic bottle: Gram Positive Cocci in Pairs and Chains    Growth in anaerobic bottle: Gram Positive Cocci in Pairs and Chains  Preliminary Report (15 Jul 2019 06:12):    Growth in anaerobic bottle: Gram Positive Cocci in Pairs and Chains        Culture - Urine (collected 2019 23:00)  Source: .Urine Clean Catch (Midstream)  Preliminary Report (15 Jul 2019 08:46):    >100,000 CFU/ml Gram Negative Rods          RADIOLOGY:  < from: CT Head No Cont (19 @ 09:33) >    IMPRESSION:    Mild atrophicchanges.     < end of copied text >    PHYSICAL EXAM:  GEN: No acute distress  LUNGS: + wheezing b/l  HEART: S1/S2 present. RRR.   ABD: Soft, non-tender, non-distended. Bowel sounds present  EXT: + b/l LE pitting edema; NC/NC/NE/2+PP/RAMIREZ  NEURO: AAOX1    Assessment and Plan:  A/P 67 yo F w/ hx of CAD s/p PCI and hypothyroidism sent by gastroenterologist for gallbladder hydrops, course complicated by ALI/ transaminitis,  LUIS ALBERTO with hyperkalemia multifactorial 2/2 rhabdo (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 with resultant proteinuria and low albumin state and diffuse anasarca, raynauds phenomenon (not currently active), esophageal dysmotility and reflux, cystitis with urinary retention s/p abx, and subsequent worsening functional debility and malnutrition    patient developed confusion state over night time    # Acute Encephalopathy- ? metabolic with worsening renal function with suspected Sepsis   - lactic acidosis, hypoxia, worsening leukocytosis  - urine cxs:   >100,000 CFU/ml Gram Negative Rods  - blood cxs-Growth in anaerobic bottle: Gram Positive Cocci in Pairs and Chains  - most recent CXR- clear lungs  -patient started on IV Vanco, IV Meropenem  -monitor lactic acid levels  - monitor mental status    # B/L Wheezing and b/l peripheral pitting edema  - 20 mg IV Lasix given 1 time    # New onset coagulopathy  -INR 2  -elevated PT/PTT  ? sepsis   -Hematology specialist:  no myelogenous, anemia likely 2/2 to recent illness    # Thrombocytopenia- ? sepsis related vs. HIT  -d/c heparin   -send HIT ab's   -Hematology specialist: no comment    # Hypotension  - multifactorial in etiology including : post diuresis/hypoalbuminemia   -? mild adrenal insufficiency, patient now on prednisone tx.  with hypoalbuminemia, tertiary fluid spacing, LUIS ALBERTO, continued on Midodrine 10 mg po three times daily  - renin(6.74-elevated)/aldosterone levels( 7)   -add protein supplements to daily dietary intake    # LUIS ALBERTO  - now recurrent most likely  due to suspected ATN- IVF  - monitor renal function daily  - I and O strict  - post recent  renal biopsy- ATN    # Macrocytic anemia- significant h/h drop, no gross bleeding event, will transfuse 1 unit PRBC stat, next h/h 1800 hours today.  vitamin B 12- normal, folate levels- normal.  -started on MVI tx. daily    #hydrops gallbladder with  transaminitis - as per GI recommendations- pt. most likely had drug induced transaminitis, no evidence of autoimmune hepatitis  - LFT's near normal levels  - patient tolerating diet well  -hepatitis profile negative  - concomitant acute rhabdomyolysis- resolved, repeated CK level  normal   -statin was d/c on admission,   liver was  biopsied- Surgical Pathology Report (19 @ 16:10)  Surgical Final Report: Final Diagnosis  Right hepatic lobe, needle biopsy:  - Liver parenchyma showing mild focal lobulitis and hepatocyte reactive changes with few glycogenated nuclei.  - Focal area of vaguely formed Francisca-Denk body also present.  - Two minute focus of macro and micro-vesicular steatosis also present, counting less than 5% of tissue volume of the biopsy.  - Portal areas showing marked chronic inflammation with focal interface activity, infiltrated with mainly small lymphocytes and  rare neutrophils, and fibrous expansion of portal area with  fibrous bridge formation, probable cirrhosis.  - Stainable iron present, not increased.  - Trichrome stain reviewed.  - PAS-D stain failed to reveal alpha-1 antitrypsin globule. SUBJECTIVE:    Patient is a 68y old Female who presents with a chief complaint of gallbladder hydrops (2019 10:21)    Currently admitted to medicine with the primary diagnosis of Gallbladder hydrops     Today is hospital day 31d. This morning she is resting comfortably in bed and reports no new issues or overnight events.         PAST MEDICAL & SURGICAL HISTORY  Hypothyroidism  CAD S/P percutaneous coronary angioplasty  No significant past surgical history    SOCIAL HISTORY:  Negative for smoking/alcohol/drug use.     ALLERGIES:  No Known Allergies    MEDICATIONS:  STANDING MEDICATIONS  aspirin  chewable 81 milliGRAM(s) Oral daily  chlorhexidine 4% Liquid 1 Application(s) Topical <User Schedule>  cholecalciferol 2000 Unit(s) Oral daily  docusate sodium 100 milliGRAM(s) Oral three times a day  folic acid 1 milliGRAM(s) Oral daily  levothyroxine 50 MICROGram(s) Oral daily  meropenem  IVPB 1000 milliGRAM(s) IV Intermittent every 8 hours  midodrine 10 milliGRAM(s) Oral every 8 hours  multivitamin 1 Tablet(s) Oral daily  nystatin Powder 1 Application(s) Topical three times a day  pantoprazole    Tablet 40 milliGRAM(s) Oral before breakfast  predniSONE   Tablet 10 milliGRAM(s) Oral daily  vancomycin  IVPB 1500 milliGRAM(s) IV Intermittent daily    PRN MEDICATIONS  acetaminophen   Tablet .. 325 milliGRAM(s) Oral every 4 hours PRN  aluminum hydroxide/magnesium hydroxide/simethicone Suspension 30 milliLiter(s) Oral every 4 hours PRN  bisacodyl Suppository 10 milliGRAM(s) Rectal daily PRN    VITALS:   T(F): 97.4  HR: 63  BP: 131/63  RR: 17  SpO2: 98%    LABS:                        8.4    22.00 )-----------( 43       ( 15 Jul 2019 05:57 )             24.8     07-15    134<L>  |  94<L>  |  100<HH>  ----------------------------<  130<H>  4.0   |  27  |  1.7<H>    Ca    8.1<L>      15 Jul 2019 05:57    TPro  4.9<L>  /  Alb  2.2<L>  /  TBili  3.6<H>  /  DBili  x   /  AST  69<H>  /  ALT  32  /  AlkPhos  561<H>  07-15    PT/INR - ( 15 Jul 2019 05:57 )   PT: 20.40 sec;   INR: 1.78 ratio         PTT - ( 15 Jul 2019 05:57 )  PTT:45.6 sec  Urinalysis Basic - ( 2019 23:00 )    Color: Dark Yellow / Appearance: Turbid / S.015 / pH: x  Gluc: x / Ketone: Trace  / Bili: Negative / Urobili: 0.2   Blood: x / Protein: 100 / Nitrite: Negative   Leuk Esterase: Large / RBC: 25 /HPF / WBC >50 /HPF   Sq Epi: x / Non Sq Epi: occasional /HPF / Bacteria: Moderate      ABG - ( 2019 08:38 )  pH, Arterial: 7.56  pH, Blood: x     /  pCO2: 34    /  pO2: 63    / HCO3: 30    / Base Excess: 7.4   /  SaO2: 95                Lactate, Blood: 2.7 mmol/L <H> (07-15-19 @ 05:57)  Lactate, Blood: 2.4 mmol/L <H> (07-15-19 @ 00:44)      Culture - Blood (collected 2019 12:53)  Source: .Blood Blood  Gram Stain (15 Jul 2019 06:12):    Growth in aerobic bottle: Gram Positive Cocci in Pairs and Chains    Growth in anaerobic bottle: Gram Positive Cocci in Pairs and Chains  Preliminary Report (15 Jul 2019 06:12):    Growth in anaerobic bottle: Gram Positive Cocci in Pairs and Chains        Culture - Urine (collected 2019 23:00)  Source: .Urine Clean Catch (Midstream)  Preliminary Report (15 Jul 2019 08:46):    >100,000 CFU/ml Gram Negative Rods          RADIOLOGY:  < from: CT Head No Cont (19 @ 09:33) >    IMPRESSION:    Mild atrophicchanges.     < end of copied text >    PHYSICAL EXAM:  GEN: No acute distress  LUNGS: + wheezing b/l  HEART: S1/S2 present. RRR.   ABD: Soft, non-tender, non-distended. Bowel sounds present  EXT: + b/l LE pitting edema; NC/NC/NE/2+PP/RAMIREZ  NEURO: AAOX1    Assessment and Plan:  A/P 69 yo F w/ hx of CAD s/p PCI and hypothyroidism sent by gastroenterologist for gallbladder hydrops, course complicated by ALI/ transaminitis,  LUIS ALBERTO with hyperkalemia multifactorial 2/2 rhabdo (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 with resultant proteinuria and low albumin state and diffuse anasarca, raynauds phenomenon (not currently active), esophageal dysmotility and reflux, cystitis with urinary retention s/p abx, and subsequent worsening functional debility and malnutrition    patient developed confusion state over night time    # Acute Encephalopathy- ? metabolic with worsening renal function with suspected Sepsis   - lactic acidosis, hypoxia, worsening leukocytosis  - urine cxs:   >100,000 CFU/ml Gram Negative Rods  - blood cxs-Growth in anaerobic bottle: Gram Positive Cocci in Pairs and Chains  - most recent CXR- clear lungs  -patient started on IV Vanco, IV Meropenem  -monitor lactic acid levels  - monitor mental status    # B/L Wheezing and b/l peripheral pitting edema  - 20 mg IV Lasix given 1 time    # New onset coagulopathy  -INR 2  -elevated PT/PTT  ? sepsis   -Hematology specialist:  no myelogenous, anemia likely 2/2 to recent illness    # Thrombocytopenia- ? sepsis related vs. HIT  -d/c heparin   -send HIT ab's   -Hematology specialist: likely 2/2 to sepsis    # Hypotension  - multifactorial in etiology including : post diuresis/hypoalbuminemia   -? mild adrenal insufficiency, patient now on prednisone tx.  with hypoalbuminemia, tertiary fluid spacing, LUIS ALBERTO, continued on Midodrine 10 mg po three times daily  - renin(6.74-elevated)/aldosterone levels( 7)   -add protein supplements to daily dietary intake    # LUIS ALBERTO  - now recurrent most likely  due to suspected ATN- IVF  - monitor renal function daily  - I and O strict  - post recent  renal biopsy- ATN    # Macrocytic anemia- significant h/h drop, no gross bleeding event, will transfuse 1 unit PRBC stat, next h/h 1800 hours today.  vitamin B 12- normal, folate levels- normal.  -started on MVI tx. daily    #hydrops gallbladder with  transaminitis - as per GI recommendations- pt. most likely had drug induced transaminitis, no evidence of autoimmune hepatitis  - LFT's near normal levels  - patient tolerating diet well  -hepatitis profile negative  - concomitant acute rhabdomyolysis- resolved, repeated CK level  normal   -statin was d/c on admission,   liver was  biopsied- Surgical Pathology Report (06.24.19 @ 16:10)  Surgical Final Report: Final Diagnosis  Right hepatic lobe, needle biopsy:  - Liver parenchyma showing mild focal lobulitis and hepatocyte reactive changes with few glycogenated nuclei.  - Focal area of vaguely formed Francisca-Denk body also present.  - Two minute focus of macro and micro-vesicular steatosis also present, counting less than 5% of tissue volume of the biopsy.  - Portal areas showing marked chronic inflammation with focal interface activity, infiltrated with mainly small lymphocytes and  rare neutrophils, and fibrous expansion of portal area with  fibrous bridge formation, probable cirrhosis.  - Stainable iron present, not increased.  - Trichrome stain reviewed.  - PAS-D stain failed to reveal alpha-1 antitrypsin globule. SUBJECTIVE:    Patient is a 68y old Female who presents with a chief complaint of gallbladder hydrops (2019 10:21)    Currently admitted to medicine with the primary diagnosis of Gallbladder hydrops     Today is hospital day 31d. This morning she is resting comfortably in bed and reports no new issues or overnight events.         PAST MEDICAL & SURGICAL HISTORY  Hypothyroidism  CAD S/P percutaneous coronary angioplasty  No significant past surgical history    SOCIAL HISTORY:  Negative for smoking/alcohol/drug use.     ALLERGIES:  No Known Allergies    MEDICATIONS:  STANDING MEDICATIONS  aspirin  chewable 81 milliGRAM(s) Oral daily  chlorhexidine 4% Liquid 1 Application(s) Topical <User Schedule>  cholecalciferol 2000 Unit(s) Oral daily  docusate sodium 100 milliGRAM(s) Oral three times a day  folic acid 1 milliGRAM(s) Oral daily  levothyroxine 50 MICROGram(s) Oral daily  meropenem  IVPB 1000 milliGRAM(s) IV Intermittent every 8 hours  midodrine 10 milliGRAM(s) Oral every 8 hours  multivitamin 1 Tablet(s) Oral daily  nystatin Powder 1 Application(s) Topical three times a day  pantoprazole    Tablet 40 milliGRAM(s) Oral before breakfast  predniSONE   Tablet 10 milliGRAM(s) Oral daily  vancomycin  IVPB 1500 milliGRAM(s) IV Intermittent daily    PRN MEDICATIONS  acetaminophen   Tablet .. 325 milliGRAM(s) Oral every 4 hours PRN  aluminum hydroxide/magnesium hydroxide/simethicone Suspension 30 milliLiter(s) Oral every 4 hours PRN  bisacodyl Suppository 10 milliGRAM(s) Rectal daily PRN    VITALS:   T(F): 97.4  HR: 63  BP: 131/63  RR: 17  SpO2: 98%    LABS:                        8.4    22.00 )-----------( 43       ( 15 Jul 2019 05:57 )             24.8     07-15    134<L>  |  94<L>  |  100<HH>  ----------------------------<  130<H>  4.0   |  27  |  1.7<H>    Ca    8.1<L>      15 Jul 2019 05:57    TPro  4.9<L>  /  Alb  2.2<L>  /  TBili  3.6<H>  /  DBili  x   /  AST  69<H>  /  ALT  32  /  AlkPhos  561<H>  07-15    PT/INR - ( 15 Jul 2019 05:57 )   PT: 20.40 sec;   INR: 1.78 ratio         PTT - ( 15 Jul 2019 05:57 )  PTT:45.6 sec  Urinalysis Basic - ( 2019 23:00 )    Color: Dark Yellow / Appearance: Turbid / S.015 / pH: x  Gluc: x / Ketone: Trace  / Bili: Negative / Urobili: 0.2   Blood: x / Protein: 100 / Nitrite: Negative   Leuk Esterase: Large / RBC: 25 /HPF / WBC >50 /HPF   Sq Epi: x / Non Sq Epi: occasional /HPF / Bacteria: Moderate      ABG - ( 2019 08:38 )  pH, Arterial: 7.56  pH, Blood: x     /  pCO2: 34    /  pO2: 63    / HCO3: 30    / Base Excess: 7.4   /  SaO2: 95                Lactate, Blood: 2.7 mmol/L <H> (07-15-19 @ 05:57)  Lactate, Blood: 2.4 mmol/L <H> (07-15-19 @ 00:44)      Culture - Blood (collected 2019 12:53)  Source: .Blood Blood  Gram Stain (15 Jul 2019 06:12):    Growth in aerobic bottle: Gram Positive Cocci in Pairs and Chains    Growth in anaerobic bottle: Gram Positive Cocci in Pairs and Chains  Preliminary Report (15 Jul 2019 06:12):    Growth in anaerobic bottle: Gram Positive Cocci in Pairs and Chains        Culture - Urine (collected 2019 23:00)  Source: .Urine Clean Catch (Midstream)  Preliminary Report (15 Jul 2019 08:46):    >100,000 CFU/ml Gram Negative Rods          RADIOLOGY:  < from: CT Head No Cont (19 @ 09:33) >    IMPRESSION:    Mild atrophicchanges.     < end of copied text >    PHYSICAL EXAM:  GEN: No acute distress  LUNGS: + wheezing b/l  HEART: S1/S2 present. RRR.   ABD: Soft, non-tender, non-distended. Bowel sounds present  EXT: + b/l LE pitting edema; NC/NC/NE/2+PP/RAMIREZ  NEURO: AAOX1    Assessment and Plan:  A/P 69 yo F w/ hx of CAD s/p PCI and hypothyroidism sent by gastroenterologist for gallbladder hydrops, course complicated by ALI/ transaminitis,  LUIS ALBERTO with hyperkalemia multifactorial 2/2 rhabdo (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 with resultant proteinuria and low albumin state and diffuse anasarca, raynauds phenomenon (not currently active), esophageal dysmotility and reflux, cystitis with urinary retention s/p abx, and subsequent worsening functional debility and malnutrition    patient developed confusion state over night time    # Acute Encephalopathy- ? metabolic with worsening renal function with suspected Sepsis   - lactic acidosis, hypoxia, worsening leukocytosis  - urine cxs:   >100,000 CFU/ml Gram Negative Rods  - blood cxs-Growth in anaerobic bottle: Gram Positive Cocci in Pairs and Chains  - most recent CXR- clear lungs  -patient started on IV Vanco, IV Meropenem  -monitor lactic acid levels  - monitor mental status    # B/L Wheezing and b/l peripheral pitting edema  - 20 mg IV Lasix given 1 time    # New onset coagulopathy  -INR 2  -elevated PT/PTT  ? sepsis   -Hematology specialist:  no myelogenous, anemia likely 2/2 to recent illness    # Thrombocytopenia- ? sepsis related vs. HIT  -d/c heparin   -send HIT ab's   -Hematology specialist: likely 2/2 to sepsis    # Hypotension  - multifactorial in etiology including : post diuresis/hypoalbuminemia   -? mild adrenal insufficiency, patient now on prednisone tx.  with hypoalbuminemia, tertiary fluid spacing, LUIS ALBERTO, continued on Midodrine 10 mg po three times daily  - renin(6.74-elevated)/aldosterone levels( 7)   -add protein supplements to daily dietary intake    # LUIS ALBERTO  - now recurrent most likely  due to suspected ATN- IVF  - monitor renal function daily  - I and O strict  - post recent  renal biopsy- ATN    # Macrocytic anemia- significant h/h drop, no gross bleeding event, will transfuse 1 unit PRBC stat, next h/h 1800 hours today.  vitamin B 12- normal, folate levels- normal.  -started on MVI tx. daily    #hydrops gallbladder with  transaminitis - as per GI recommendations- pt. most likely had drug induced transaminitis, no evidence of autoimmune hepatitis  - LFT's near normal levels  - patient tolerating diet well  -hepatitis profile negative  - concomitant acute rhabdomyolysis- resolved, repeated CK level  normal   -statin was d/c on admission,   liver was  biopsied- Surgical Pathology Report (06.24.19 @ 16:10)  Surgical Final Report: Final Diagnosis  Right hepatic lobe, needle biopsy:  - Liver parenchyma showing mild focal lobulitis and hepatocyte reactive changes with few glycogenated nuclei.  - Focal area of vaguely formed Francisca-Denk body also present.  - Two minute focus of macro and micro-vesicular steatosis also present, counting less than 5% of tissue volume of the biopsy.  - Portal areas showing marked chronic inflammation with focal interface activity, infiltrated with mainly small lymphocytes and  rare neutrophils, and fibrous expansion of portal area with  fibrous bridge formation, probable cirrhosis.  - Stainable iron present, not increased.  - Trichrome stain reviewed.  - PAS-D stain failed to reveal alpha-1 antitrypsin globule.      #Vit D deficiency SUBJECTIVE:    Patient is a 68y old Female who presents with a chief complaint of gallbladder hydrops (2019 10:21)    Currently admitted to medicine with the primary diagnosis of Gallbladder hydrops     Today is hospital day 31d. This morning she is resting comfortably in bed and reports no new issues or overnight events.         PAST MEDICAL & SURGICAL HISTORY  Hypothyroidism  CAD S/P percutaneous coronary angioplasty  No significant past surgical history    SOCIAL HISTORY:  Negative for smoking/alcohol/drug use.     ALLERGIES:  No Known Allergies    MEDICATIONS:  STANDING MEDICATIONS  aspirin  chewable 81 milliGRAM(s) Oral daily  chlorhexidine 4% Liquid 1 Application(s) Topical <User Schedule>  cholecalciferol 2000 Unit(s) Oral daily  docusate sodium 100 milliGRAM(s) Oral three times a day  folic acid 1 milliGRAM(s) Oral daily  levothyroxine 50 MICROGram(s) Oral daily  meropenem  IVPB 1000 milliGRAM(s) IV Intermittent every 8 hours  midodrine 10 milliGRAM(s) Oral every 8 hours  multivitamin 1 Tablet(s) Oral daily  nystatin Powder 1 Application(s) Topical three times a day  pantoprazole    Tablet 40 milliGRAM(s) Oral before breakfast  predniSONE   Tablet 10 milliGRAM(s) Oral daily  vancomycin  IVPB 1500 milliGRAM(s) IV Intermittent daily    PRN MEDICATIONS  acetaminophen   Tablet .. 325 milliGRAM(s) Oral every 4 hours PRN  aluminum hydroxide/magnesium hydroxide/simethicone Suspension 30 milliLiter(s) Oral every 4 hours PRN  bisacodyl Suppository 10 milliGRAM(s) Rectal daily PRN    VITALS:   T(F): 97.4  HR: 63  BP: 131/63  RR: 17  SpO2: 98%    LABS:                        8.4    22.00 )-----------( 43       ( 15 Jul 2019 05:57 )             24.8     07-15    134<L>  |  94<L>  |  100<HH>  ----------------------------<  130<H>  4.0   |  27  |  1.7<H>    Ca    8.1<L>      15 Jul 2019 05:57    TPro  4.9<L>  /  Alb  2.2<L>  /  TBili  3.6<H>  /  DBili  x   /  AST  69<H>  /  ALT  32  /  AlkPhos  561<H>  07-15    PT/INR - ( 15 Jul 2019 05:57 )   PT: 20.40 sec;   INR: 1.78 ratio         PTT - ( 15 Jul 2019 05:57 )  PTT:45.6 sec  Urinalysis Basic - ( 2019 23:00 )    Color: Dark Yellow / Appearance: Turbid / S.015 / pH: x  Gluc: x / Ketone: Trace  / Bili: Negative / Urobili: 0.2   Blood: x / Protein: 100 / Nitrite: Negative   Leuk Esterase: Large / RBC: 25 /HPF / WBC >50 /HPF   Sq Epi: x / Non Sq Epi: occasional /HPF / Bacteria: Moderate      ABG - ( 2019 08:38 )  pH, Arterial: 7.56  pH, Blood: x     /  pCO2: 34    /  pO2: 63    / HCO3: 30    / Base Excess: 7.4   /  SaO2: 95                Lactate, Blood: 2.7 mmol/L <H> (07-15-19 @ 05:57)  Lactate, Blood: 2.4 mmol/L <H> (07-15-19 @ 00:44)      Culture - Blood (collected 2019 12:53)  Source: .Blood Blood  Gram Stain (15 Jul 2019 06:12):    Growth in aerobic bottle: Gram Positive Cocci in Pairs and Chains    Growth in anaerobic bottle: Gram Positive Cocci in Pairs and Chains  Preliminary Report (15 Jul 2019 06:12):    Growth in anaerobic bottle: Gram Positive Cocci in Pairs and Chains        Culture - Urine (collected 2019 23:00)  Source: .Urine Clean Catch (Midstream)  Preliminary Report (15 Jul 2019 08:46):    >100,000 CFU/ml Gram Negative Rods          RADIOLOGY:  < from: CT Head No Cont (19 @ 09:33) >    IMPRESSION:    Mild atrophicchanges.     < end of copied text >    PHYSICAL EXAM:  GEN: No acute distress  LUNGS: + wheezing b/l  HEART: S1/S2 present. RRR.   ABD: Soft, non-tender, non-distended. Bowel sounds present  EXT: + b/l LE pitting edema; NC/NC/NE/2+PP/RAMIREZ  NEURO: AAOX1    Assessment and Plan:  A/P 69 yo F w/ hx of CAD s/p PCI and hypothyroidism sent by gastroenterologist for gallbladder hydrops, course complicated by ALI/ transaminitis,  LUIS ALBERTO with hyperkalemia multifactorial 2/2 rhabdo (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 with resultant proteinuria and low albumin state and diffuse anasarca, raynauds phenomenon (not currently active), esophageal dysmotility and reflux, cystitis with urinary retention s/p abx, and subsequent worsening functional debility and malnutrition    patient developed confusion state over night time    # Acute Encephalopathy likely 2/2 sepsis  - lactic acidosis, hypoxia, worsening leukocytosis  - urine cxs:   >100,000 CFU/ml Gram Negative Rods  - blood cxs-Growth in anaerobic bottle: Gram Positive Cocci in Pairs and Chains  - most recent CXR- clear lungs  -patient started on IV Vanco, IV Meropenem  -monitor lactic acid levels  -monitor mental status  - repeat Blood cultures  - ELIJAH  - ID consult  - U/S abdomen    # B/L Wheezing and b/l peripheral pitting edema  - 20 mg IV Lasix given 1 time    # New onset coagulopathy  -INR 2  -elevated PT/PTT  -Heme/Onc:  no myelogenous, anemia likely 2/2 to recent illness  - ? possible DIC (f/u fibrinogen, LDH, haptoglobin)    # Hypotension  - multifactorial in etiology including : post diuresis/hypoalbuminemia   - IV bolus 1d ago (2019)  -? mild adrenal insufficiency, patient now on prednisone tx.  with hypoalbuminemia, tertiary fluid spacing, LUIS ALBERTO, continued on Midodrine 10 mg po three times daily  -renin(6.74-elevated)/aldosterone levels( 7)   -add protein supplements to daily dietary intake    #Hypoalbuminemia  - likely 2/2 liver disease  - patient received IV Albumin treatment    # LUIS ALBERTO   - monitor renal function daily  - I & O strict  -renal biopsy- ATN    # Macrocytic anemia  - on 2019: significant h/h drop, no gross bleeding event; received 1 unit PRBC on 2019   -vitamin B 12 and folate levels wnl  -started on MVI tx. daily    #hydrops gallbladder with  transaminitis - as per GI recommendations- pt. most likely had drug induced transaminitis, no evidence of autoimmune hepatitis  - LFT's near normal levels  - patient tolerating diet well  -hepatitis profile negative  - concomitant acute rhabdomyolysis- resolved, repeated CK level  normal   -statin was d/c on admission  - liver was  biopsied- Surgical Pathology Report (19 @ 16:10)  Surgical Final Report: Final Diagnosis  Right hepatic lobe, needle biopsy:  - Liver parenchyma showing mild focal lobulitis and hepatocyte reactive changes with few glycogenated nuclei.  - Focal area of vaguely formed Francisca-Denk body also present.  - Two minute focus of macro and micro-vesicular steatosis also present, counting less than 5% of tissue volume of the biopsy.  - Portal areas showing marked chronic inflammation with focal interface activity, infiltrated with mainly small lymphocytes and  rare neutrophils, and fibrous expansion of portal area with  fibrous bridge formation, probable cirrhosis.  - Stainable iron present, not increased.  - Trichrome stain reviewed.  - PAS-D stain failed to reveal alpha-1 antitrypsin globule.    # Anasarca  - b/l wheezing and b/l LE edema   - cannot diuresis - patient has sepsis    # CAD  - hold heparin and plavix due to liver disease  - d/c'ed statin at admission    #Hypothyroidism  -c/w synthroid     #Vit D deficiency  - vitamin D     #DVT/GI PPX          FULL CODE

## 2019-07-15 NOTE — PROGRESS NOTE ADULT - ASSESSMENT
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 with resultant proteinuria and low albumin state and diffuse anasarca, raynauds phenomenon (not currently active), esophageal dysmotility and reflux, cystitis with urinary retention s/p abx, and subsequent worsening functional debility and malnutrition.     Hematology reconsulted for elevated coags, thrombocytopenia. Over the weekend, pt had low BP, elevated lactate, bacteremic+    1) Leukocytosis/ thrombocytopenia, elevated coags - likely 2/2 to infection/sepsis/bacteremia. Pt might be in DIC  Check fibrinogen level, Will look at peripheral smear  Please treat underlying infection  May be get ICU eval  No indication to transfuse platelets/cryo at this time unless pt is bleeding. Transfuse Plt if <[id number]  Do daily, CBC, CMP and coags    2) Anemia probably secondary to recent illness as well  Her baseline Hb is around 11  Iron studies- no iron deficiency  B12 and folate was also normal  Monitor Hb.     I discussed the case with Dr Murphy and recommendations as above  Recommendations discussed with medical resident as well

## 2019-07-15 NOTE — PROGRESS NOTE ADULT - ATTENDING COMMENTS
seen/examined; note reviewed; agree with plan  DIC  Treat infeciton  check pt/ptt/ fibrinogen, cbc daily  transfuse blood products if bleeing

## 2019-07-16 NOTE — CONSULT NOTE ADULT - SUBJECTIVE AND OBJECTIVE BOX
INTERVENTIONAL RADIOLOGY CONSULT:     Procedure Requested:     HPI:  67 yo F w/ hx of CAD s/p PCI and hypothyroidism sent by gastroenterologist for gallbladder hydrops. Pt found to have elevated liver enzymes by PMD in Apr 2019, referred to GI for w/u. MRCP 2 days ago significant for gallbladder hydrops 4.7cm with probable impacted stone at neck, also liver cirrhosis w/ mild ascites. Admits to social etoh, denies binge drinking, denies abdominal pain, N/V. Admits to decreased appetite in past 2 weeks, pt tried to maintain low salt diet. Reports BLE edema developed in past 2 days as well as weakness. Denies fever, chills, abdominal pain, SOB, dysuria.    In ED, evaluated by surgery -> no interventions at this time (15 Shashank 2019 01:53)      PAST MEDICAL & SURGICAL HISTORY:  Hypothyroidism  CAD S/P percutaneous coronary angioplasty  No significant past surgical history      MEDICATIONS  (STANDING):  chlorhexidine 4% Liquid 1 Application(s) Topical <User Schedule>  cholecalciferol 2000 Unit(s) Oral daily  docusate sodium 100 milliGRAM(s) Oral three times a day  folic acid 1 milliGRAM(s) Oral daily  levothyroxine 50 MICROGram(s) Oral daily  meropenem  IVPB 1000 milliGRAM(s) IV Intermittent every 8 hours  midodrine 10 milliGRAM(s) Oral every 8 hours  multivitamin 1 Tablet(s) Oral daily  nystatin Powder 1 Application(s) Topical three times a day  pantoprazole    Tablet 40 milliGRAM(s) Oral before breakfast  predniSONE   Tablet 10 milliGRAM(s) Oral daily  sodium chloride 0.9%. 1000 milliLiter(s) (75 mL/Hr) IV Continuous <Continuous>  vancomycin  IVPB 1500 milliGRAM(s) IV Intermittent daily    MEDICATIONS  (PRN):  acetaminophen   Tablet .. 325 milliGRAM(s) Oral every 4 hours PRN Mild Pain (1 - 3)  aluminum hydroxide/magnesium hydroxide/simethicone Suspension 30 milliLiter(s) Oral every 4 hours PRN Dyspepsia  bisacodyl Suppository 10 milliGRAM(s) Rectal daily PRN Constipation      Allergies    No Known Allergies    Intolerances        Social History:   Smoking: Yes [ ]  No [ ]   ______pk yrs  ETOH  Yes [ ]  No [ ]  Social [ ]  DRUGS:  Yes [ ]  No [ ]  if so what______________    FAMILY HISTORY:  No pertinent family history in first degree relatives      Physical Exam:   Vital Signs Last 24 Hrs  T(C): 36.3 (16 Jul 2019 08:33), Max: 36.3 (16 Jul 2019 08:33)  T(F): 97.4 (16 Jul 2019 08:33), Max: 97.4 (16 Jul 2019 08:33)  HR: 95 (16 Jul 2019 08:33) (63 - 101)  BP: 122/52 (16 Jul 2019 08:33) (116/61 - 131/63)  BP(mean): --  RR: 18 (16 Jul 2019 08:33) (18 - 20)  SpO2: 95% (16 Jul 2019 08:33) (95% - 95%)    General:     Lungs:    Cardiovascular:     Abdomen:    Extremities:    Musculoskeletal:    Neuro/Psych:        Labs:                         7.8    18.98 )-----------( 51       ( 16 Jul 2019 04:50 )             22.8     07-16    135  |  96<L>  |  104<HH>  ----------------------------<  162<H>  4.0   |  26  |  1.7<H>    Ca    8.0<L>      16 Jul 2019 04:50    TPro  4.9<L>  /  Alb  2.2<L>  /  TBili  3.6<H>  /  DBili  x   /  AST  69<H>  /  ALT  32  /  AlkPhos  561<H>  07-15    PT/INR - ( 16 Jul 2019 04:50 )   PT: 18.70 sec;   INR: 1.63 ratio         PTT - ( 16 Jul 2019 04:50 )  PTT:32.9 sec    Pertinent labs:                      7.8    18.98 )-----------( 51       ( 16 Jul 2019 04:50 )             22.8       07-16    135  |  96<L>  |  104<HH>  ----------------------------<  162<H>  4.0   |  26  |  1.7<H>    Ca    8.0<L>      16 Jul 2019 04:50    TPro  4.9<L>  /  Alb  2.2<L>  /  TBili  3.6<H>  /  DBili  x   /  AST  69<H>  /  ALT  32  /  AlkPhos  561<H>  07-15      PT/INR - ( 16 Jul 2019 04:50 )   PT: 18.70 sec;   INR: 1.63 ratio         PTT - ( 16 Jul 2019 04:50 )  PTT:32.9 sec    Radiology & Additional Studies:     Radiology imaging reviewed.       ASSESSMENT/ PLAN:     67 yo F sent in by GI  for gallbladder hydrops   - now with  abdominal pain, ascites and bacteremia  - suspecting SBP  - will plan for diagnostic paracentesis tomorrow 7/16/19  - NPO  at midnight   - if not enough fluid, then will send pt back up      Risks, benefits, and alternatives to treatment discussed. All questions answered with understanding.    Thank you for the courtesy of this consult, please call [phone] with any further questions.

## 2019-07-16 NOTE — PROGRESS NOTE ADULT - SUBJECTIVE AND OBJECTIVE BOX
Patient is a 68y old  Female who presents with a chief complaint of gallbladder hydrops (16 Jul 2019 11:37)      Subjective:  Overnight pt had rectal bleed and bleeding from vein puncture site s/p transfusion with 1unit platelet and 1 unite FFp. She has no more episode of bleeding since this morning    Vital Signs Last 24 Hrs  T(C): 36.3 (16 Jul 2019 08:33), Max: 36.3 (16 Jul 2019 08:33)  T(F): 97.4 (16 Jul 2019 08:33), Max: 97.4 (16 Jul 2019 08:33)  HR: 95 (16 Jul 2019 08:33) (91 - 101)  BP: 122/52 (16 Jul 2019 08:33) (116/61 - 127/77)  BP(mean): --  RR: 18 (16 Jul 2019 08:33) (18 - 20)  SpO2: 95% (16 Jul 2019 08:33) (95% - 95%)    PHYSICAL EXAM  Gen: NAD  HEENT: Normocephalic, atraumatic  Neck: supple  CV: Regular rate & regular rhythm  Lungs: decreased BS at bases  Abdomen: Soft, BS present. mild tenderness LLQ  Ext: Warm, well perfused  Neuro: Awake and alert *4          MEDICATIONS  (STANDING):  chlorhexidine 4% Liquid 1 Application(s) Topical <User Schedule>  cholecalciferol 2000 Unit(s) Oral daily  docusate sodium 100 milliGRAM(s) Oral three times a day  folic acid 1 milliGRAM(s) Oral daily  levothyroxine 50 MICROGram(s) Oral daily  linezolid  IVPB 600 milliGRAM(s) IV Intermittent every 12 hours  meropenem  IVPB 500 milliGRAM(s) IV Intermittent every 8 hours  midodrine 10 milliGRAM(s) Oral every 8 hours  multivitamin 1 Tablet(s) Oral daily  nystatin Powder 1 Application(s) Topical three times a day  pantoprazole    Tablet 40 milliGRAM(s) Oral before breakfast  predniSONE   Tablet 10 milliGRAM(s) Oral daily  sodium chloride 0.9%. 1000 milliLiter(s) (75 mL/Hr) IV Continuous <Continuous>    MEDICATIONS  (PRN):  acetaminophen   Tablet .. 325 milliGRAM(s) Oral every 4 hours PRN Mild Pain (1 - 3)  aluminum hydroxide/magnesium hydroxide/simethicone Suspension 30 milliLiter(s) Oral every 4 hours PRN Dyspepsia  bisacodyl Suppository 10 milliGRAM(s) Rectal daily PRN Constipation      LABS:                          7.8    18.98 )-----------( 51       ( 16 Jul 2019 04:50 )             22.8         Mean Cell Volume : 93.4 fL  Mean Cell Hemoglobin : 32.0 pg  Mean Cell Hemoglobin Concentration : 34.2 g/dL  Auto Neutrophil # : x  Auto Lymphocyte # : x  Auto Monocyte # : x  Auto Eosinophil # : x  Auto Basophil # : x  Auto Neutrophil % : x  Auto Lymphocyte % : x  Auto Monocyte % : x  Auto Eosinophil % : x  Auto Basophil % : x      Serial CBC's  07-16 @ 04:50  Hct-22.8 / Hgb-7.8 / Plat-51 / RBC-2.44 / WBC-18.98  Serial CBC's  07-15 @ 05:57  Hct-24.8 / Hgb-8.4 / Plat-43 / RBC-2.63 / WBC-22.00  Serial CBC's  07-15 @ 00:44  Hct-24.9 / Hgb-8.4 / Plat-48 / RBC-2.65 / WBC-21.25  Serial CBC's  07-14 @ 21:10  Hct-25.0 / Hgb-8.5 / Plat-48 / RBC-2.65 / WBC-19.76  Serial CBC's  07-14 @ 06:55  Hct-19.1 / Hgb-6.3 / Plat-73 / RBC-1.95 / WBC-26.02  Serial CBC's  07-14 @ 00:50  Hct-22.4 / Hgb-7.3 / Plat-85 / RBC-2.27 / WBC-28.84  Serial CBC's  07-13 @ 21:52  Hct-22.5 / Hgb-7.5 / Plat-104 / RBC-2.30 / WBC-26.05  Serial CBC's  07-13 @ 16:00  Hct-22.4 / Hgb-7.4 / Plat-98 / RBC-2.29 / WBC-25.95  Serial CBC's  07-13 @ 06:43  Hct-20.3 / Hgb-6.7 / Plat-93 / RBC-2.06 / WBC-22.78      07-16    135  |  96<L>  |  104<HH>  ----------------------------<  162<H>  4.0   |  26  |  1.7<H>    Ca    8.0<L>      16 Jul 2019 04:50    TPro  4.9<L>  /  Alb  2.2<L>  /  TBili  3.6<H>  /  DBili  x   /  AST  69<H>  /  ALT  32  /  AlkPhos  561<H>  07-15      PT/INR - ( 16 Jul 2019 04:50 )   PT: 18.70 sec;   INR: 1.63 ratio         PTT - ( 16 Jul 2019 04:50 )  PTT:32.9 sec    Iron - Total Binding Capacity.: 97 ug/dL (07-14 @ 06:55)  Ferritin, Serum: 226 ng/mL (07-14 @ 06:55)  Folate, Serum: 8.1 ng/mL (07-10 @ 16:56)  Vitamin B12, Serum: 1468 pg/mL (07-10 @ 16:56)    Culture - Blood (collected 14 Jul 2019 12:53)  Source: .Blood Blood  Gram Stain (15 Jul 2019 06:12):    Growth in aerobic bottle: Gram Positive Cocci in Pairs and Chains    Growth in anaerobic bottle: Gram Positive Cocci in Pairs and Chains  Preliminary Report (15 Jul 2019 06:12):    Growth in anaerobic bottle: Gram Positive Cocci in Pairs and Chains    Growth in aerobic bottle: Gram Positive Cocci in Pairs and Chains    "Due to technical problems, Proteus sp. will Not be reported as part of    the BCID panel until further notice"    ***Blood Panel PCR results on this specimen are available    approximately 3 hours after the Gram stain result.***    Gram stain, PCR, and/or culture results may not always    correspond due to difference in methodologies.    ************************************************************    This PCR assay was performed using [employer].    The following targets are tested for: Enterococcus,    vancomycin resistant enterococci, Listeria monocytogenes,    coagulase negative staphylococci, S. aureus,    methicillin resistant S. aureus, Streptococcus agalactiae    (Group B), S. pneumoniae, S. pyogenes (Group A),    Acinetobacter baumannii, Enterobacter cloacae, E. coli,    Klebsiella oxytoca, K. pneumoniae, Proteus sp.,    Serratia marcescens, Haemophilus influenzae,    Neisseria meningitidis, Pseudomonas aeruginosa, Candida    albicans, C. glabrata, C krusei, C parapsilosis,    C. tropicalis and the KPC resistance gene.  Organism: Blood Culture PCR (15 Jul 2019 07:10)  Organism: Blood Culture PCR (15 Jul 2019 07:10)    Culture - Urine (collected 13 Jul 2019 23:00)  Source: .Urine Clean Catch (Midstream)  Final Report (16 Jul 2019 08:59):    >100,000 CFU/ml Citrobacter braakii  Organism: Citrobacter braakii (16 Jul 2019 08:59)  Organism: Citrobacter braakii (16 Jul 2019 08:59)            BLOOD SMEAR INTERPRETATION:       RADIOLOGY & ADDITIONAL STUDIES:

## 2019-07-16 NOTE — CONSULT NOTE ADULT - SUBJECTIVE AND OBJECTIVE BOX
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 Pt found to have elevated liver enzymes by PMD in Apr 2019, referred to GI for w/u. MRCP 2 days ago significant for gallbladder hydrops 4.7cm with probable impacted stone at neck, also liver cirrhosis w/ mild ascites. Admits to social etoh, denies binge drinking, denies abdominal pain, N/V. Admits to decreased appetite in past 2 weeks, pt tried to maintain low salt diet. Reports BLE edema developed in past 2 days as well as weakness. Denies fever, chills, abdominal pain, SOB, dysuria.    In ED, evaluated by surgery -> no interventions at this time (15 Shashank 2019 01:53)    ID called for positive BCx    FAMILY HISTORY:  No pertinent family history in first degree relatives    PAST MEDICAL & SURGICAL HISTORY:  Hypothyroidism  CAD S/P percutaneous coronary angioplasty  No significant past surgical history        VITALS:  T(F): 97.4, Max: 97.4 (07-16-19 @ 08:33)  HR: 95  BP: 122/52  RR: 18Vital Signs Last 24 Hrs  T(C): 36.3 (16 Jul 2019 08:33), Max: 36.3 (16 Jul 2019 08:33)  T(F): 97.4 (16 Jul 2019 08:33), Max: 97.4 (16 Jul 2019 08:33)  HR: 95 (16 Jul 2019 08:33) (63 - 101)  BP: 122/52 (16 Jul 2019 08:33) (116/61 - 131/63)  BP(mean): --  RR: 18 (16 Jul 2019 08:33) (18 - 20)  SpO2: 95% (16 Jul 2019 08:33) (95% - 95%)    TESTS & MEASUREMENTS:                        7.8    18.98 )-----------( 51       ( 16 Jul 2019 04:50 )             22.8     07-16    135  |  96<L>  |  104<HH>  ----------------------------<  162<H>  4.0   |  26  |  1.7<H>    Ca    8.0<L>      16 Jul 2019 04:50    TPro  4.9<L>  /  Alb  2.2<L>  /  TBili  3.6<H>  /  DBili  x   /  AST  69<H>  /  ALT  32  /  AlkPhos  561<H>  07-15    LIVER FUNCTIONS - ( 15 Jul 2019 05:57 )  Alb: 2.2 g/dL / Pro: 4.9 g/dL / ALK PHOS: 561 U/L / ALT: 32 U/L / AST: 69 U/L / GGT: x             Culture - Blood (collected 07-14-19 @ 12:53)  Source: .Blood Blood  Gram Stain (07-15-19 @ 06:12):    Growth in aerobic bottle: Gram Positive Cocci in Pairs and Chains    Growth in anaerobic bottle: Gram Positive Cocci in Pairs and Chains  Preliminary Report (07-15-19 @ 06:12):    Growth in anaerobic bottle: Gram Positive Cocci in Pairs and Chains    Growth in aerobic bottle: Gram Positive Cocci in Pairs and Chains    "Due to technical problems, Proteus sp. will Not be reported as part of    the BCID panel until further notice"    ***Blood Panel PCR results on this specimen are available    approximately 3 hours after the Gram stain result.***    Gram stain, PCR, and/or culture results may not always    correspond due to difference in methodologies.    ************************************************************    This PCR assay was performed using [employer].    The following targets are tested for: Enterococcus,    vancomycin resistant enterococci, Listeria monocytogenes,    coagulase negative staphylococci, S. aureus,    methicillin resistant S. aureus, Streptococcus agalactiae    (Group B), S. pneumoniae, S. pyogenes (Group A),    Acinetobacter baumannii, Enterobacter cloacae, E. coli,    Klebsiella oxytoca, K. pneumoniae, Proteus sp.,    Serratia marcescens, Haemophilus influenzae,    Neisseria meningitidis, Pseudomonas aeruginosa, Candida    albicans, C. glabrata, C krusei, C parapsilosis,    C. tropicalis and the KPC resistance gene.  Organism: Blood Culture PCR (07-15-19 @ 07:10)  Organism: Blood Culture PCR (07-15-19 @ 07:10)      -  Enterococcus species: Detec      Method Type: PCR    Culture - Urine (collected 07-13-19 @ 23:00)  Source: .Urine Clean Catch (Midstream)  Final Report (07-16-19 @ 08:59):    >100,000 CFU/ml Citrobacter braakii  Organism: Citrobacter braakii (07-16-19 @ 08:59)  Organism: Citrobacter braakii (07-16-19 @ 08:59)      -  Amikacin: S <=16      -  Ampicillin: R >16 These ampicillin results predict results for amoxicillin      -  Ampicillin/Sulbactam: R >16/8 Enterobacter, Citrobacter, and Serratia may develop resistance during prolonged therapy (3-4 days)      -  Aztreonam: I 16      -  Cefazolin: R >16      -  Cefepime: S <=4      -  Cefoxitin: R >16      -  Ceftriaxone: R 32 Enterobacter, Citrobacter, and Serratia may develop resistance during prolonged therapy      -  Ciprofloxacin: S <=1      -  Ertapenem: S <=1      -  Gentamicin: S <=4      -  Imipenem: S <=1      -  Levofloxacin: S <=2      -  Meropenem: S <=1      -  Nitrofurantoin: S <=32 Should not be used to treat pyelonephritis      -  Piperacillin/Tazobactam: S <=16      -  Tigecycline: S <=2      -  Tobramycin: S <=4      -  Trimethoprim/Sulfamethoxazole: S <=2/38      Method Type: DILLON            RADIOLOGY & ADDITIONAL TESTS:    ANTIBIOTICS:  meropenem  IVPB 1000 milliGRAM(s) IV Intermittent every 8 hours  vancomycin  IVPB 1500 milliGRAM(s) IV Intermittent daily

## 2019-07-16 NOTE — CONSULT NOTE ADULT - ASSESSMENT
68 Y F Hx of CAD on DAPT, for a stent >1 y , Hospitalization was complicated with Enterococcal bacteremia , rhabdomyolysis , LUIS ALBERTO , sepsis , with possible DIC and hemolytic anemia. Patient was seen previously for transaminitis, where extensive workup done (listed below) , now GI are consulted for fresh blood per rectum. History of hematochezia started yesterday night , with streaks of blood when patient had a small bowel movement , the amount of blood was minimal.     #fresh blood per rectum : minimal amount in context of coagulapathy and possible DIC   patient is hemodynamically stable   rectal exam showed , dark red blood with large hard stool burden  r/o stercoral ulcer , versus malignancy hemorrhoids diverticulosis( less likely)  ,or AVM   recommend disimpaction  Miralax , lactulose and senna for constipation  can use water soap enema after disimpaction  correct underlying coagulopathy   patient will benefit from EGD/ colonoscopy as part of evaluation of anemia and rectal bleeding after resolution of acute illness ( bacteremia/sepsis/DIC) , or earlier if significant GI bleed with hemodynamic instability.            #transaminitis: mostly drug induce , improved since admission   Liver biopsy showed subacute hepatitis with multiacinar / bridging necrosis. There is no evidence of autoimmune hepatitis or cirrhosis. More in favor of  drug induced according to [facility] read.    the soluble IgG is mildly elevated the ASMA and Anti LKM  are negative, viral hepatitis workup is negative, normal ceruloplasmin alpha one antitrypsin normal , alpha fetoprotein normal.    The AMA is negative,  ANKITA is positive. P- anca positive, anti ds positive , Liver biopsy read by [facility] showed cirrhosis of unclear etiology  , [facility] in favor of drug induced liver injury.   MRI suggestive of cirrhosis (nodular contour) with no evident signs of portal HTN. Hydropic gallbladder up to 4.7 cm  in transverse diameter with   gallstones, including probably impacted 1.6 cm stone at neck, no biliary distention, filling defect or stricture.   trend lfts , avoid hepatotoxic drugs  no asterixes       #Abnormal liver imaging- cirrhosis  -HCC: none on imaging , repeat US abdomen and AFP q 6 month  -EV: needs screening  -Ascites: mild on MR abdomen  - no portal hypertension on MR abdomen      #GERD: on Protonix    # Symptoms of raynaud / muscle weakness/ multiple positive autoimmune markers f/u with rheumatology

## 2019-07-16 NOTE — PROGRESS NOTE ADULT - SUBJECTIVE AND OBJECTIVE BOX
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  [facility]            Patient was evaluated and examined  by bedside, remains afebrile, diffuse body anasarca, patient had episode of oozing blood from IV stick site , stopped, no further bleeding in am. patient remains lethargic no confusion.       REVIEW OF SYSTEMS:  please see pertinent positives mentioned above, all other 12 ROS negative      T(C): , Max: 36.3 (07-16-19 @ 08:33)  HR: 95 (07-16-19 @ 08:33)  BP: 122/52 (07-16-19 @ 08:33)  RR: 18 (07-16-19 @ 08:33)  SpO2: 95% (07-16-19 @ 08:33)  CAPILLARY BLOOD GLUCOSE      POCT Blood Glucose.: 194 mg/dL (16 Jul 2019 11:44)  POCT Blood Glucose.: 149 mg/dL (16 Jul 2019 07:57)  POCT Blood Glucose.: 238 mg/dL (15 Jul 2019 20:55)      PHYSICAL EXAM:  General: NAD, lethargic, AAOx3,  patient is laying comfortably in bed  HEENT: AT, NC, Supple, NO JVD, NO CB  Lungs: CTA B/L, no wheezing, no rhonchi  CVS: normal S1, S2, RRR, NO M/G/R  Abdomen: soft, bowel sounds present, non-tender, non-distended, abdominal wall mild small ecchymosis at sites of heparin administration  Extremities: diffuse extensive body pitting edema, no clubbing, no cyanosis, positive peripheral pulses b/l  Neuro: no confusion today, diffuse generalized body weakness, patient unable to ambulate  Skin: no rash, small areas of  ecchymosis- abd. wall, arms( at sites where heparin was injected)          LAB  CBC  Date: 07-16-19 @ 11:28  Mean cell Mdnlwcslxl40.0  Mean cell Hemoglobin Conc34.1  Mean cell Volum 93.9  Platelet count-Automate 95  RBC Count 2.28  Red Cell Distrib Width23.1  WBC Count17.82  % Albumin, Urine--  Hematocrit 21.4  Hemoglobin 7.3  CBC  Date: 07-16-19 @ 04:50  Mean cell Ynjkvpqaej14.0  Mean cell Hemoglobin Conc34.2  Mean cell Volum 93.4  Platelet count-Automate 51  RBC Count 2.44  Red Cell Distrib Width22.7  WBC Count18.98  % Albumin, Urine--  Hematocrit 22.8  Hemoglobin 7.8  CBC  Date: 07-15-19 @ 05:57  Mean cell Zdkxuqycgr93.9  Mean cell Hemoglobin Conc33.9  Mean cell Volum 94.3  Platelet count-Automate 43  RBC Count 2.63  Red Cell Distrib Width23.9  WBC Count22.00  % Albumin, Urine--  Hematocrit 24.8  Hemoglobin 8.4  CBC  Date: 07-15-19 @ 00:44  Mean cell Yoqvtxcpcy69.7  Mean cell Hemoglobin Conc33.7  Mean cell Volum 94.0  Platelet count-Automate 48  RBC Count 2.65  Red Cell Distrib Width23.8  WBC Count21.25  % Albumin, Urine--  Hematocrit 24.9  Hemoglobin 8.4  CBC  Date: 07-14-19 @ 21:10  Mean cell Frxchwqlar38.1  Mean cell Hemoglobin Conc34.0  Mean cell Volum 94.3  Platelet count-Automate 48  RBC Count 2.65  Red Cell Distrib Width23.8  WBC Count19.76  % Albumin, Urine--  Hematocrit 25.0  Hemoglobin 8.5  CBC  Date: 07-14-19 @ 06:55  Mean cell Hxiinrhnqr14.3  Mean cell Hemoglobin Conc33.0  Mean cell Volum 97.9  Platelet count-Automate 73  RBC Count 1.95  Red Cell Distrib Width#SN  WBC Count26.02  % Albumin, Urine--  Hematocrit 19.1  Hemoglobin 6.3  CBC  Date: 07-14-19 @ 00:50  Mean cell Bjhdafvivu78.2  Mean cell Hemoglobin Conc32.6  Mean cell Volum 98.7  Platelet count-Automate 85  RBC Count 2.27  Red Cell Distrib Width25.3  WBC Count28.84  % Albumin, Urine--  Hematocrit 22.4  Hemoglobin 7.3  CBC  Date: 07-13-19 @ 21:52  Mean cell Dmzxbqspie66.6  Mean cell Hemoglobin Conc33.3  Mean cell Volum 97.8  Platelet count-Automate 104  RBC Count 2.30  Red Cell Distrib Width25.2  WBC Count26.05  % Albumin, Urine--  Hematocrit 22.5  Hemoglobin 7.5  CBC  Date: 07-13-19 @ 16:00  Mean cell Ykqcdhyalm40.3  Mean cell Hemoglobin Conc33.0  Mean cell Volum 97.8  Platelet count-Automate 98  RBC Count 2.29  Red Cell Distrib Width25.0  WBC Count25.95  % Albumin, Urine--  Hematocrit 22.4  Hemoglobin 7.4  CBC  Date: 07-13-19 @ 06:43  Mean cell Kikmkbprvx98.5  Mean cell Hemoglobin Conc33.0  Mean cell Volum 98.5  Platelet count-Automate 93  RBC Count 2.06  Red Cell Distrib Width24.8  WBC Count22.78  % Albumin, Urine--  Hematocrit 20.3  Hemoglobin 6.7  CBC  Date: 07-12-19 @ 05:47  Mean cell Nqkbossbkc83.2  Mean cell Hemoglobin Conc33.1  Mean cell Volum 97.3  Platelet count-Automate 136  RBC Count 2.61  Red Cell Distrib Width24.8  WBC Count25.24  % Albumin, Urine--  Hematocrit 25.4  Hemoglobin 8.4  CBC  Date: 07-11-19 @ 06:25  Mean cell Ezsnrzgtld08.2  Mean cell Hemoglobin Conc32.2  Mean cell Volum 100.0  Platelet count-Automate 131  RBC Count 2.55  Red Cell Distrib Width24.7  WBC Count22.26  % Albumin, Urine--  Hematocrit 25.5  Hemoglobin 8.2  CBC  Date: 07-10-19 @ 04:59  Mean cell Xgwptdmqhi64.8  Mean cell Hemoglobin Conc32.4  Mean cell Volum 98.1  Platelet count-Automate 135  RBC Count 2.64  Red Cell Distrib Width24.2  WBC Count18.24  % Albumin, Urine--  Hematocrit 25.9  Hemoglobin 8.4    BMP  07-16-19 @ 04:50  Blood Gas Arterial-Calcium,Ionized--  Blood Urea Nitrogen, Serum 104 mg/dL<HH> [10 - 20] [Critical value:]  Carbon Dioxide, Serum26 mmol/L [17 - 32]  Chloride, Serum96 mmol/L<L> [98 - 110]  Creatinie, Serum1.7 mg/dL<H> [0.7 - 1.5]  Glucose, Ngyge862 mg/dL<H> [70 - 99]  Potassium, Serum4.0 mmol/L [3.5 - 5.0]  Sodium, Serum 135 mmol/L [135 - 146]  [facility]  07-15-19 @ 05:57  Blood Gas Arterial-Calcium,Ionized--  Blood Urea Nitrogen, Serum 100 mg/dL<HH> [10 - 20] [Critical value:]  Carbon Dioxide, Serum27 mmol/L [17 - 32]  Chloride, Serum94 mmol/L<L> [98 - 110]  Creatinie, Serum1.7 mg/dL<H> [0.7 - 1.5]  Glucose, Ntvcu720 mg/dL<H> [70 - 99]  Potassium, Serum4.0 mmol/L [3.5 - 5.0]  Sodium, Serum 134 mmol/L<L> [135 - 146]  [facility]  07-14-19 @ 06:55  Blood Gas Arterial-Calcium,Ionized--  Blood Urea Nitrogen, Serum 91 mg/dL<HH> [10 - 20] [Critical value:]  Carbon Dioxide, Serum27 mmol/L [17 - 32]  Chloride, Serum94 mmol/L<L> [98 - 110]  Creatinie, Serum1.6 mg/dL<H> [0.7 - 1.5]  Glucose, Gcqpg923 mg/dL<H> [70 - 99]  Potassium, Serum3.9 mmol/L [3.5 - 5.0]  Sodium, Serum 137 mmol/L [135 - 146]  [facility]  07-13-19 @ 06:43  Blood Gas Arterial-Calcium,Ionized--  Blood Urea Nitrogen, Serum 75 mg/dL<HH> [10 - 20] [Critical value:]  Carbon Dioxide, Serum29 mmol/L [17 - 32]  Chloride, Serum96 mmol/L<L> [98 - 110]  Creatinie, Serum1.3 mg/dL [0.7 - 1.5]  Glucose, Fnczw445 mg/dL<H> [70 - 99]  Potassium, Serum4.0 mmol/L [3.5 - 5.0]  Sodium, Serum 137 mmol/L [135 - 146]  [facility]  07-12-19 @ 05:47  Blood Gas Arterial-Calcium,Ionized--  Blood Urea Nitrogen, Serum 55 mg/dL<H> [10 - 20]  Carbon Dioxide, Serum28 mmol/L [17 - 32]  Chloride, Serum96 mmol/L<L> [98 - 110]  Creatinie, Serum1.1 mg/dL [0.7 - 1.5]  Glucose, Serum82 mg/dL [70 - 99]  Potassium, Serum3.8 mmol/L [3.5 - 5.0]  Sodium, Serum 140 mmol/L [135 - 146]        PT/INR - ( 16 Jul 2019 04:50 )   PT: 18.70 sec;   INR: 1.63 ratio         PTT - ( 16 Jul 2019 04:50 )  PTT:32.9 sec      Microbiology:    Culture - Blood (collected 07-14-19 @ 12:53)  Source: .Blood Blood  Gram Stain (07-15-19 @ 06:12):    Growth in aerobic bottle: Gram Positive Cocci in Pairs and Chains    Growth in anaerobic bottle: Gram Positive Cocci in Pairs and Chains  Preliminary Report (07-15-19 @ 06:12):    Growth in anaerobic bottle: Gram Positive Cocci in Pairs and Chains    Growth in aerobic bottle: Gram Positive Cocci in Pairs and Chains    "Due to technical problems, Proteus sp. will Not be reported as part of    the BCID panel until further notice"      Culture - Urine (collected 07-13-19 @ 23:00)  Source: .Urine Clean Catch (Midstream)  Final Report (07-16-19 @ 08:59):    >100,000 CFU/ml Citrobacter braakii  Organism: Citrobacter braakii (07-16-19 @ 08:59)  Organism: Citrobacter braakii (07-16-19 @ 08:59)      -  Amikacin: S <=16      -  Ampicillin: R >16 These ampicillin results predict results for amoxicillin      -  Ampicillin/Sulbactam: R >16/8 Enterobacter, Citrobacter, and Serratia may develop resistance during prolonged therapy (3-4 days)      -  Aztreonam: I 16      -  Cefazolin: R >16      -  Cefepime: S <=4      -  Cefoxitin: R >16      -  Ceftriaxone: R 32 Enterobacter, Citrobacter, and Serratia may develop resistance during prolonged therapy      -  Ciprofloxacin: S <=1      -  Ertapenem: S <=1      -  Gentamicin: S <=4      -  Imipenem: S <=1      -  Levofloxacin: S <=2      -  Meropenem: S <=1      -  Nitrofurantoin: S <=32 Should not be used to treat pyelonephritis      -  Piperacillin/Tazobactam: S <=16      -  Tigecycline: S <=2      -  Tobramycin: S <=4      -  Trimethoprim/Sulfamethoxazole: S <=2/38      Method Type: DILLON            Medications:  acetaminophen   Tablet .. 325 milliGRAM(s) Oral every 4 hours PRN  aluminum hydroxide/magnesium hydroxide/simethicone Suspension 30 milliLiter(s) Oral every 4 hours PRN  bisacodyl Suppository 10 milliGRAM(s) Rectal daily PRN  chlorhexidine 4% Liquid 1 Application(s) Topical <User Schedule>  cholecalciferol 2000 Unit(s) Oral daily  docusate sodium 100 milliGRAM(s) Oral three times a day  folic acid 1 milliGRAM(s) Oral daily  levothyroxine 75 MICROGram(s) Oral daily  linezolid  IVPB 600 milliGRAM(s) IV Intermittent every 12 hours  meropenem  IVPB 500 milliGRAM(s) IV Intermittent every 8 hours  midodrine 10 milliGRAM(s) Oral every 8 hours  multivitamin 1 Tablet(s) Oral daily  nystatin Powder 1 Application(s) Topical three times a day  pantoprazole    Tablet 40 milliGRAM(s) Oral before breakfast  predniSONE   Tablet 10 milliGRAM(s) Oral daily  sodium chloride 0.9%. 1000 milliLiter(s) IV Continuous <Continuous>        Assessment and Plan:  A/P 69 yo F w/ hx of CAD s/p PCI and hypothyroidism sent by gastroenterologist for gallbladder hydrops, course complicated by ALI/ transaminitis,  LUIS ALBERTO with hyperkalemia multifactorial 2/2 rhabdo (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 with resultant proteinuria and low albumin state and diffuse anasarca, raynauds phenomenon (not currently active), esophageal dysmotility and reflux, cystitis with urinary retention s/p abx, and subsequent worsening functional debility and malnutrition        # Acute Encephalopathy- most likely as a result of Sepsis  -lactic acidosis, hypoxia, leukocytosis  - urine cxs was sent on 7/13- citrobacter- sensitive to zosyn/meropenem  , blood cxs- 7/14- enterococcus - f/up sensitivity, repeat  blood cxs from 7/15  -consult ID - f/up recommendations - to change to IV Zosyn today   most recent CXR- clear lungs  -patient started on IV Vanco, IV Meropenem( day 3 )   -monitor lactic acid levels  - s/p  urgent CT head w/o contrast- no CVA  -stat 12 lead EKG- NSR- no acute st-t wave changes    # Sepsis with bacteremia - ? source, multiple abn cxs.  - citrobacter in urine  - enterococcus in blood   -obtain Echo   -continue Vanco/Meropenem- vs. IV Zosyn  - DIC - monitor daily fibrinogen split products, coagulation profile, CBC, transfuse FFP prn.   -as per ID - obtain diagn. abd. paracentesis  -abd US- no acute abn to suggest the cause of infection    # New onset coagulopathy  -INR decreased to 1.63 post FFP transfusion  -elevated PT/PTT  - due to DIC/ sepsis   -reconsulted Hematology specialist- all rec. followed  -monitor coagulation profile daily    # Thrombocytopenia- most likely  sepsis related, less likely HIT  -d/c heparin   -send HIT ab's   -continue close platelets monitoring  -hold asa, plavix tx.     # Hypotension - continue IVF for next 24 hours  - multifactorial in etiology including : post diuresis/hypoalbuminemia   ? mild adrenal insufficiency, patient now on prednisone tx.  with hypoalbuminemia, tertiary fluid spacing, LUIS ALBERTO, continued on Midodrine 10 mg po three times daily  - renin(6.74-elevated)/aldosterone levels( 7)   -add protein supplements to daily dietary intake    # LUIS ALBERTO  - now recurrent most likely  due to suspected ATN- IVF  - monitor renal function daily  - I and O strict  post recent  renal biopsy- ATN  -reconsulted Nephrology specialist    # Macrocytic anemia- significant h/h drop, no gross bleeding event  -s/p  1 unit PRBC on 7/14- repeated h/h stable,   most likely sepsis related mild hemolysis-  elevated LDH, low  haptoglobin level  vitamin B 12- normal, folate levels- normal.  - on MVI tx. daily  -transfuse PRN    #hydrops gallbladder with  transaminitis - as per GI recommendations- pt. most likely had drug induced transaminitis, no evidence of autoimmune hepatitis  - LFT's near normal levels  -patient tolerating diet well  -hepatitis profile negative  concomitant acute rhabdomyolysis- resolved, repeated CK level  normal    statin was d/c on admission,   liver was  biopsied- Surgical Pathology Report (06.24.19 @ 16:10)  Surgical Final Report: Final Diagnosis  Right hepatic lobe, needle biopsy:  - Liver parenchyma showing mild focal lobulitis and hepatocyte reactive changes with few glycogenated nuclei.  - Focal area of vaguely formed Francisca-Denk body also present.  - Two minute focus of macro and micro-vesicular steatosis also present, counting less than 5% of tissue volume of the biopsy.  - Portal areas showing marked chronic inflammation with focal interface activity, infiltrated with mainly small lymphocytes and  rare neutrophils, and fibrous expansion of portal area with  fibrous bridge formation, probable cirrhosis.  - Stainable iron present, not increased.  - Trichrome stain reviewed.  - PAS-D stain failed to reveal alpha-1 antitrypsin globule.    Verified by: Manoj Lindsey M.D.  (Electronic Signature)  Reported on: 06/26/19 15:42 EDT, [address] [place] [address]  _________________________________________________________________    Comment  _Sections show findings are consistent with clinical impression of  cirrhosis.  However, the etiology is not clear.  Clinical correlation is recommended.    LIVER FUNCTIONS - ( 15 Jul 2019 05:57 )  Alb: 2.2 g/dL / Pro: 4.9 g/dL / ALK PHOS: 561 U/L / ALT: 32 U/L / AST: 69 U/L / GGT: x                             patient was instructed to have outpatient elective cholecystectomy with surgical specialist f/up       # Abnormal elevated DS DNA- 111, Elevated ANKITA titers 1: 2560- repeated ANKITA titers decreased to 1 : 320, Elevated P-Anca- titer 1: 1280 - ? lupus nephritis, vs Vasculitis, - repeated Panca titer remains high.  - we have consulted  Rheumatology specialist - who recommended- diagnostic renal biopsy-completed  - MPO/PR3 abs- negative   -decrease prednisone to 10 mg po once daily   -f/up EMG, sural nerve biopsy  -? myositis- elevated aldolase with rhabdomyolysis on presentation.       #Persistent Leukocytosis- sepsis  -tx. as mentioned above    #hypoalbuminemia 2/2 acute liver disease- severe protein/calorie malnutrition  - patient received - IV Albumin tx.    #h/o CAD  -cont home regimen  -statin was d/c   -hold asa, plavix tx. due to thrombocytopenia      #hypothyroidism  -repeated thyroid panel- low Free T 4, elevated TSH- increased Synthroid dose from 50 to 75 MCG po once daily    # Constipation  -colace/dulcolax/senna    #Anasarca- fluid overload status- hold diuresis due to sepsis    # Vitamin D insufficiency- started on vitamin D tx.      # Physical deconditioning- PT/OT tx. daily as tolerated once sepsis resolves  high risk/ guarded prognosis    #Progress Note Handoff: septic work up f/up, abd. diag. paracentesis    Family discussion:  patient and patient's   by bedside Disposition:  STR_once medically stable

## 2019-07-16 NOTE — CHART NOTE - NSCHARTNOTEFT_GEN_A_CORE
Called by nurse TK at 3:45 AM for BRBPR, went to bedside, observed BRBPR small volume. Patient denied pain, nurse denied preceding BM. /57 HR 98, 94% ORA, 96.4 F, A&Ox2. Prior labs showed Hb of 8.4, Plt 43.   -Went to bedside to draw STAT CBC, placed tourniquet and observed oozing from prior venipuncture site, removed tourniquet.  -Called senior Dr. Nix, ordered 2u platelets and 1u FFP stat. Tried multiple times to obtain venous blood, unsuccessful, hilton an A-stick and sent CBC, coags, BMP. Hemostasis was achieved at all puncture sites.  -STAT CBC with Hb 7.8, Plt 51, canceled the 2nd unit platelets.  -Pt currently HDS, will continue to monitor closely and follow serial CBC and coags. Called by nurse TK at 3:45 AM for BRBPR, went to bedside, observed BRBPR small volume. Patient denied pain, nurse denied preceding BM. /57 HR 98, 94% ORA, 96.4 F, A&Ox2. Prior labs showed Hb of 8.4, Plt 43.   -Went to bedside to draw STAT CBC, placed tourniquet and observed oozing from prior venipuncture sites, removed tourniquet.  -Called senior Dr. Nix, ordered 2u platelets and 1u FFP stat. Tried multiple times to obtain venous blood, unsuccessful, hilton an A-stick and sent CBC, coags, BMP. Hemostasis was achieved at all puncture sites.  -STAT CBC with Hb 7.8, Plt 51, canceled the 2nd unit platelets.  -Pt currently HDS, will continue to monitor closely and follow serial CBC and coags.

## 2019-07-16 NOTE — CONSULT NOTE ADULT - SUBJECTIVE AND OBJECTIVE BOX
Hospital Day # 32d     HPI:   67 yo F w/ hx of CAD s/p PCI and hypothyroidism sent by gastroenterologist for gallbladder hydrops. Pt found to have elevated liver enzymes by PMD in Apr 2019, referred to GI for w/u. MRCP 2 days ago significant for gallbladder hydrops 4.7cm with probable impacted stone at neck, also liver cirrhosis w/ mild ascites. Admits to social etoh, denies binge drinking, denies abdominal pain, N/V. Admits to decreased appetite in past 2 weeks, pt tried to maintain low salt diet. Reports BLE edema developed in past 2 days as well as weakness. Denies fever, chills, abdominal pain, SOB, dysuria.    In ED, evaluated by surgery -> no interventions at this time (15 Shashank 2019 01:53)      PAST MEDICAL & SURGICAL HISTORY  Hypothyroidism  CAD S/P percutaneous coronary angioplasty  No significant past surgical history      FAMILY HISTORY:  FAMILY HISTORY:  No pertinent family history in first degree relatives      SOCIAL HISTORY:  smoker: Denies  Alcohol: Denies  Drug: Denies    ALLERGIES:  No Known Allergies      MEDICATIONS:  MEDICATIONS  (STANDING):  chlorhexidine 4% Liquid 1 Application(s) Topical <User Schedule>  cholecalciferol 2000 Unit(s) Oral daily  docusate sodium 100 milliGRAM(s) Oral three times a day  docusate sodium Liquid 100 milliGRAM(s) Oral three times a day  folic acid 1 milliGRAM(s) Oral daily  levothyroxine 75 MICROGram(s) Oral daily  midodrine 10 milliGRAM(s) Oral every 8 hours  multivitamin 1 Tablet(s) Oral daily  nystatin Powder 1 Application(s) Topical three times a day  pantoprazole    Tablet 40 milliGRAM(s) Oral before breakfast  piperacillin/tazobactam IVPB.. 2.25 Gram(s) IV Intermittent every 6 hours  polyethylene glycol 3350 17 Gram(s) Oral every 12 hours  predniSONE   Tablet 10 milliGRAM(s) Oral daily  sodium chloride 0.9%. 1000 milliLiter(s) (75 mL/Hr) IV Continuous <Continuous>    MEDICATIONS  (PRN):  acetaminophen   Tablet .. 325 milliGRAM(s) Oral every 4 hours PRN Mild Pain (1 - 3)  aluminum hydroxide/magnesium hydroxide/simethicone Suspension 30 milliLiter(s) Oral every 4 hours PRN Dyspepsia  bisacodyl Suppository 10 milliGRAM(s) Rectal daily PRN Constipation      HOME MEDICATIONS:  Home Medications:  levothyroxine 50 mcg (0.05 mg) oral tablet: 1 tab(s) orally once a day (15 Shashank 2019 02:02)      ROS:     REVIEW OF SYSTEMS:    CONSTITUTIONAL: No fever  EYES/ENT: No scleral icterus  RESPIRATORY: No shortness of breath  CARDIOVASCULAR: No chest pain   GASTROINTESTINAL: as listed above.   GENITOURINARY: No dysuria  NEUROLOGICAL: No dizziness  SKIN: No cyanosis      VITALS:   T(F): 96.1 (07-16 @ 13:42), Max: 97.6 (07-13 @ 22:21)  HR: 94 (07-16 @ 13:42) (63 - 101)  BP: 130/63 (07-16 @ 13:42) (97/50 - 135/80)  BP(mean): --  RR: 18 (07-16 @ 13:42) (17 - 20)  SpO2: 95% (07-16 @ 08:33) (89% - 99%)    I&O's Summary    15 Jul 2019 07:01  -  16 Jul 2019 07:00  --------------------------------------------------------  IN: 1573 mL / OUT: 0 mL / NET: 1573 mL    16 Jul 2019 07:01  -  16 Jul 2019 16:28  --------------------------------------------------------  IN: 150 mL / OUT: 400 mL / NET: -250 mL        PHYSICAL EXAM:  Physical Exam:  GENERAL: morbid obesity   HEAD:  Atraumatic, Normocephalic  EYES: conjunctiva and sclera clear  NECK: No thyromegaly  CHEST/LUNG: No accessory use of muscle  HEART: S1S2 Normal  ABDOMEN: Soft, Nontender, Nondistended; Bowel sounds present, no guarding or rigidity.  EXTREMITIES:  2+ Peripheral Pulses, No cyanosis  PSYCH: AAOx3  NEUROLOGY: non-focal      LABS:                        7.3    17.82 )-----------( 95       ( 16 Jul 2019 11:28 )             21.4       PT/INR - ( 16 Jul 2019 04:50 )  INR: 1.63          PTT - ( 16 Jul 2019 04:50 )  PTT:32.9   LIVER FUNCTIONS - ( 15 Jul 2019 05:57 )  Alb: 2.2 g/dL / Pro: 4.9 g/dL / ALK PHOS: 561 U/L / ALT: 32 U/L / AST: 69 U/L / GGT: x           LIVER FUNCTIONS - ( 15 Jul 2019 05:57 )  Alb: 2.2 [3.5 - 5.2] / Pro: 4.9 [6.0 - 8.0] / ALK PHOS: 561 [30 - 115] / ALT: 32 [0 - 41] / AST: 69 [0 - 41] / GGT: x     LIVER FUNCTIONS - ( 14 Jul 2019 06:55 )  Alb: 2.2 [3.5 - 5.2] / Pro: 4.6 [6.0 - 8.0] / ALK PHOS: 491 [30 - 115] / ALT: 29 [0 - 41] / AST: 63 [0 - 41] / GGT: x     LIVER FUNCTIONS - ( 13 Jul 2019 06:43 )  Alb: 2.5 [3.5 - 5.2] / Pro: 4.8 [6.0 - 8.0] / ALK PHOS: 406 [30 - 115] / ALT: 35 [0 - 41] / AST: 47 [0 - 41] / GGT: x     LIVER FUNCTIONS - ( 12 Jul 2019 05:47 )  Alb: 2.8 [3.5 - 5.2] / Pro: 5.4 [6.0 - 8.0] / ALK PHOS: 418 [30 - 115] / ALT: 54 [0 - 41] / AST: 50 [0 - 41] / GGT: x     LIVER FUNCTIONS - ( 10 Jul 2019 04:59 )  Alb: 2.9 [3.5 - 5.2] / Pro: 5.2 [6.0 - 8.0] / ALK PHOS: 325 [30 - 115] / ALT: 49 [0 - 41] / AST: 39 [0 - 41] / GGT: x       07-16    135  |  96<L>  |  104<HH>  ----------------------------<  162<H>  4.0   |  26  |  1.7<H>    Ca    8.0<L>      16 Jul 2019 04:50  Mg     2.0     07-13      06-15 Chol 240<H> LDL 18 HDL 13<L> Trig 144

## 2019-07-16 NOTE — PROGRESS NOTE ADULT - ASSESSMENT
A/P 69 yo F w/ hx of CAD s/p PCI and hypothyroidism sent by gastroenterologist for gallbladder hydrops, course complicated by ALI/ transaminitis possibly of autoimmune etiology, LUIS ALBERTO with hyperkalemia multifactorial 2/2 rhabdo (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 with resultant proteinuria and low albumin state and diffuse anasarca, raynauds phenomenon (not currently active), esophageal dysmotility and reflux, cystitis with urinary retention s/p abx, and subsequent worsening functional debility and malnutrition.     1) Leukocytosis/ thrombocytopenia, elevated coags - 2/2 to sepsis/DIC/bacteremia  Treat underlying infection- Source not known- Urinary/GI etiology   Pt has low fibrinogen, low haptoglobin, elevated LDH  s/p 1U platelets and 1 U FFP for bleeding overnight- no bleeding thereafter  Continue to monitor Platelets, CBC, fibrinogen everyday  Give platelets and FFP if any more bleeding episodes. Can give cryoprecipitate, if patient has no response to FFP transfusion     I discussed the case with Dr Murphy

## 2019-07-16 NOTE — PROGRESS NOTE ADULT - SUBJECTIVE AND OBJECTIVE BOX
SUBJECTIVE:    Patient is a 68y old Female who presents with a chief complaint of gallbladder hydrops (15 Jul 2019 13:46)    Currently admitted to medicine with the primary diagnosis of Gallbladder hydrops     Today is hospital day 32d. This morning she is resting comfortably in bed and reports no new issues or overnight events.     Overnight: The patient had blood red blood per rectum and oozing from IV sites as labs were drawn. Hemoglobin was 7.8    PAST MEDICAL & SURGICAL HISTORY  Hypothyroidism  CAD S/P percutaneous coronary angioplasty  No significant past surgical history    SOCIAL HISTORY:  Negative for smoking/alcohol/drug use.     ALLERGIES:  No Known Allergies    MEDICATIONS:  STANDING MEDICATIONS  chlorhexidine 4% Liquid 1 Application(s) Topical <User Schedule>  cholecalciferol 2000 Unit(s) Oral daily  docusate sodium 100 milliGRAM(s) Oral three times a day  folic acid 1 milliGRAM(s) Oral daily  levothyroxine 50 MICROGram(s) Oral daily  meropenem  IVPB 1000 milliGRAM(s) IV Intermittent every 8 hours  midodrine 10 milliGRAM(s) Oral every 8 hours  multivitamin 1 Tablet(s) Oral daily  nystatin Powder 1 Application(s) Topical three times a day  pantoprazole    Tablet 40 milliGRAM(s) Oral before breakfast  predniSONE   Tablet 10 milliGRAM(s) Oral daily  sodium chloride 0.9%. 1000 milliLiter(s) IV Continuous <Continuous>  vancomycin  IVPB 1500 milliGRAM(s) IV Intermittent daily    PRN MEDICATIONS  acetaminophen   Tablet .. 325 milliGRAM(s) Oral every 4 hours PRN  aluminum hydroxide/magnesium hydroxide/simethicone Suspension 30 milliLiter(s) Oral every 4 hours PRN  bisacodyl Suppository 10 milliGRAM(s) Rectal daily PRN    VITALS:   T(F): 97.4  HR: 95  BP: 122/52  RR: 18  SpO2: 95%    LABS:                        7.8    18.98 )-----------( 51       ( 16 Jul 2019 04:50 )             22.8     07-16    135  |  96<L>  |  104<HH>  ----------------------------<  162<H>  4.0   |  26  |  1.7<H>    Ca    8.0<L>      16 Jul 2019 04:50    TPro  4.9<L>  /  Alb  2.2<L>  /  TBili  3.6<H>  /  DBili  x   /  AST  69<H>  /  ALT  32  /  AlkPhos  561<H>  07-15    PT/INR - ( 16 Jul 2019 04:50 )   PT: 18.70 sec;   INR: 1.63 ratio         PTT - ( 16 Jul 2019 04:50 )  PTT:32.9 sec      Lactate, Blood: 2.7 mmol/L <H> (07-15-19 @ 22:45)      Culture - Blood (collected 14 Jul 2019 12:53)  Source: .Blood Blood  Gram Stain (15 Jul 2019 06:12):    Growth in aerobic bottle: Gram Positive Cocci in Pairs and Chains    Growth in anaerobic bottle: Gram Positive Cocci in Pairs and Chains  Preliminary Report (15 Jul 2019 06:12):    Growth in anaerobic bottle: Gram Positive Cocci in Pairs and Chains    Growth in aerobic bottle: Gram Positive Cocci in Pairs and Chains    "Due to technical problems, Proteus sp. will Not be reported as part of    the BCID panel until further notice"    ***Blood Panel PCR results on this specimen are available    approximately 3 hours after the Gram stain result.***    Gram stain, PCR, and/or culture results may not always    correspond due to difference in methodologies.    ************************************************************    This PCR assay was performed using [employer]    The following targets are tested for: Enterococcus,    vancomycin resistant enterococci, Listeria monocytogenes,    coagulase negative staphylococci, S. aureus,    methicillin resistant S. aureus, Streptococcus agalactiae    (Group B), S. pneumoniae, S. pyogenes (Group A),    Acinetobacter baumannii, Enterobacter cloacae, E. coli,    Klebsiella oxytoca, K. pneumoniae, Proteus sp.,    Serratia marcescens, Haemophilus influenzae,    Neisseria meningitidis, Pseudomonas aeruginosa, Candida    albicans, C. glabrata, C krusei, C parapsilosis,    C. tropicalis and the KPC resistance gene.  Organism: Blood Culture PCR (15 Jul 2019 07:10)  Organism: Blood Culture PCR (15 Jul 2019 07:10)    Culture - Urine (collected 13 Jul 2019 23:00)  Source: .Urine Clean Catch (Midstream)  Final Report (16 Jul 2019 08:59):    >100,000 CFU/ml Citrobacter braakii  Organism: Citrobacter braakii (16 Jul 2019 08:59)  Organism: Citrobacter braakii (16 Jul 2019 08:59)          RADIOLOGY:    PHYSICAL EXAM:  GEN: No acute distress  LUNGS: Clear to auscultation bilaterally   HEART: S1/S2 present. RRR.   ABD: Soft, non-tender, non-distended. Bowel sounds present  EXT: NC/NC/NE/2+PP/RAMIREZ  NEURO: AAOX3 SUBJECTIVE:    Patient is a 68y old Female who presents with a chief complaint of gallbladder hydrops (15 Jul 2019 13:46)    Currently admitted to medicine with the primary diagnosis of Gallbladder hydrops     Today is hospital day 32d. This morning she is resting comfortably in bed and reports no new issues or overnight events.     Overnight: The patient had blood red blood per rectum and oozing from IV sites as labs were drawn. Hemoglobin was 7.8 (down from 8.4). The patient was given 1 unit PLT and 1 unit plasma.     PAST MEDICAL & SURGICAL HISTORY  Hypothyroidism  CAD S/P percutaneous coronary angioplasty  No significant past surgical history    SOCIAL HISTORY:  Negative for smoking/alcohol/drug use.     ALLERGIES:  No Known Allergies    MEDICATIONS:  STANDING MEDICATIONS  chlorhexidine 4% Liquid 1 Application(s) Topical <User Schedule>  cholecalciferol 2000 Unit(s) Oral daily  docusate sodium 100 milliGRAM(s) Oral three times a day  folic acid 1 milliGRAM(s) Oral daily  levothyroxine 50 MICROGram(s) Oral daily  meropenem  IVPB 1000 milliGRAM(s) IV Intermittent every 8 hours  midodrine 10 milliGRAM(s) Oral every 8 hours  multivitamin 1 Tablet(s) Oral daily  nystatin Powder 1 Application(s) Topical three times a day  pantoprazole    Tablet 40 milliGRAM(s) Oral before breakfast  predniSONE   Tablet 10 milliGRAM(s) Oral daily  sodium chloride 0.9%. 1000 milliLiter(s) IV Continuous <Continuous>  vancomycin  IVPB 1500 milliGRAM(s) IV Intermittent daily    PRN MEDICATIONS  acetaminophen   Tablet .. 325 milliGRAM(s) Oral every 4 hours PRN  aluminum hydroxide/magnesium hydroxide/simethicone Suspension 30 milliLiter(s) Oral every 4 hours PRN  bisacodyl Suppository 10 milliGRAM(s) Rectal daily PRN    VITALS:   T(F): 97.4  HR: 95  BP: 122/52  RR: 18  SpO2: 95%    LABS:                        7.8    18.98 )-----------( 51       ( 16 Jul 2019 04:50 )             22.8     07-16    135  |  96<L>  |  104<HH>  ----------------------------<  162<H>  4.0   |  26  |  1.7<H>    Ca    8.0<L>      16 Jul 2019 04:50    TPro  4.9<L>  /  Alb  2.2<L>  /  TBili  3.6<H>  /  DBili  x   /  AST  69<H>  /  ALT  32  /  AlkPhos  561<H>  07-15    PT/INR - ( 16 Jul 2019 04:50 )   PT: 18.70 sec;   INR: 1.63 ratio         PTT - ( 16 Jul 2019 04:50 )  PTT:32.9 sec      Lactate, Blood: 2.7 mmol/L <H> (07-15-19 @ 22:45)      Culture - Blood (collected 14 Jul 2019 12:53)  Source: .Blood Blood  Gram Stain (15 Jul 2019 06:12):    Growth in aerobic bottle: Gram Positive Cocci in Pairs and Chains    Growth in anaerobic bottle: Gram Positive Cocci in Pairs and Chains  Preliminary Report (15 Jul 2019 06:12):    Growth in anaerobic bottle: Gram Positive Cocci in Pairs and Chains    Growth in aerobic bottle: Gram Positive Cocci in Pairs and Chains    "Due to technical problems, Proteus sp. will Not be reported as part of    the BCID panel until further notice"    ***Blood Panel PCR results on this specimen are available    approximately 3 hours after the Gram stain result.***    Gram stain, PCR, and/or culture results may not always    correspond due to difference in methodologies.    ************************************************************    This PCR assay was performed using [employer].    The following targets are tested for: Enterococcus,    vancomycin resistant enterococci, Listeria monocytogenes,    coagulase negative staphylococci, S. aureus,    methicillin resistant S. aureus, Streptococcus agalactiae    (Group B), S. pneumoniae, S. pyogenes (Group A),    Acinetobacter baumannii, Enterobacter cloacae, E. coli,    Klebsiella oxytoca, K. pneumoniae, Proteus sp.,    Serratia marcescens, Haemophilus influenzae,    Neisseria meningitidis, Pseudomonas aeruginosa, Candida    albicans, C. glabrata, C krusei, C parapsilosis,    C. tropicalis and the KPC resistance gene.  Organism: Blood Culture PCR (15 Jul 2019 07:10)  Organism: Blood Culture PCR (15 Jul 2019 07:10)    Culture - Urine (collected 13 Jul 2019 23:00)  Source: .Urine Clean Catch (Midstream)  Final Report (16 Jul 2019 08:59):    >100,000 CFU/ml Citrobacter braakii  Organism: Citrobacter braakii (16 Jul 2019 08:59)  Organism: Citrobacter braakii (16 Jul 2019 08:59)          RADIOLOGY:  < from: US Abdomen Limited (07.15.19 @ 19:07) >  IMPRESSION:    1.  Cholelithiasis and sludge, without sonographic evidence of acute   cholecystitis.    2.  Mild common bile duct dilatation. Correlate with labs for CBD   obstruction. If clinically warranted, MRCP can be obtained for further   evaluation.    3.  Hepatic cirrhosis with moderate ascites.    4.  Right pleural effusion.    < end of copied text >      * ELIJAH pending    PHYSICAL EXAM:  GEN: No acute distress  LUNGS: Clear to auscultation bilaterally   HEART: S1/S2 present. RRR.   ABD: Soft, mild diffuse tenderness, non-distended. Bowel sounds present  EXT: NC/NC/NE/2+PP/RAMIREZ  NEURO: AAOX3      Assessment and Plan:  A/P 69 yo F w/ hx of CAD s/p PCI and hypothyroidism sent by gastroenterologist for gallbladder hydrops, course complicated by ALI/ transaminitis,  LUIS ALBERTO with hyperkalemia multifactorial 2/2 rhabdo (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 with resultant proteinuria and low albumin state and diffuse anasarca, raynauds phenomenon (not currently active), esophageal dysmotility and reflux, cystitis with urinary retention s/p abx, and subsequent worsening functional debility and malnutrition    patient developed confusion state over night time    # Acute Encephalopathy likely 2/2 sepsis  - lactic acidosis, hypoxia, worsening leukocytosis  - urine cxs:   >100,000 CFU/ml Gram Negative Rods  - blood cxs-Growth in anaerobic bottle: Gram Positive Cocci in Pairs and Chains  - most recent CXR- clear lungs  -patient started on IV Vanco, IV Meropenem  -monitor lactic acid levels  -monitor mental status  - repeat Blood cultures  - ELIJAH  - ID consult  - U/S Abdomen    # DIC      #Hypoalbuminemia  - likely 2/2 liver disease  - patient received IV Albumin treatment    # LUIS ALBERTO   - monitor renal function daily  - I & O strict  -renal biopsy- ATN    # Macrocytic anemia  - on 7/14/2019: significant h/h drop, no gross bleeding event; received 1 unit PRBC on 7/14/2019   -vitamin B 12 and folate levels wnl  -started on MVI tx. daily    #hydrops gallbladder with  transaminitis - as per GI recommendations- pt. most likely had drug induced transaminitis, no evidence of autoimmune hepatitis  - LFT's near normal levels  - patient tolerating diet well  -hepatitis profile negative  - concomitant acute rhabdomyolysis- resolved, repeated CK level  normal   -statin was d/c on admission  - liver was  biopsied- Surgical Pathology Report (06.24.19 @ 16:10)  Surgical Final Report: Final Diagnosis  Right hepatic lobe, needle biopsy:  - Liver parenchyma showing mild focal lobulitis and hepatocyte reactive changes with few glycogenated nuclei.  - Focal area of vaguely formed Francisca-Denk body also present.  - Two minute focus of macro and micro-vesicular steatosis also present, counting less than 5% of tissue volume of the biopsy.  - Portal areas showing marked chronic inflammation with focal interface activity, infiltrated with mainly small lymphocytes and  rare neutrophils, and fibrous expansion of portal area with  fibrous bridge formation, probable cirrhosis.  - Stainable iron present, not increased.  - Trichrome stain reviewed.  - PAS-D stain failed to reveal alpha-1 antitrypsin globule.    # Anasarca  - b/l wheezing and b/l LE edema   - cannot diuresis - patient has sepsis    # CAD  - hold heparin and plavix due to liver disease  - d/c'ed statin at admission    #Hypothyroidism  -c/w synthroid     #Vit D deficiency  - vitamin D       #DVT/GI PPX    FULL CODE SUBJECTIVE:    Patient is a 68y old Female who presents with a chief complaint of gallbladder hydrops (15 Jul 2019 13:46)    Currently admitted to medicine with the primary diagnosis of Gallbladder hydrops     Today is hospital day 32d. This morning she is resting comfortably in bed and reports no new issues or overnight events.     Overnight: The patient had blood red blood per rectum and oozing from IV sites as labs were drawn. Hemoglobin was 7.8 (down from 8.4). The patient was given 1 unit PLT and 1 unit FFP.    PAST MEDICAL & SURGICAL HISTORY  Hypothyroidism  CAD S/P percutaneous coronary angioplasty  No significant past surgical history    SOCIAL HISTORY:  Negative for smoking/alcohol/drug use.     ALLERGIES:  No Known Allergies    MEDICATIONS:  STANDING MEDICATIONS  chlorhexidine 4% Liquid 1 Application(s) Topical <User Schedule>  cholecalciferol 2000 Unit(s) Oral daily  docusate sodium 100 milliGRAM(s) Oral three times a day  folic acid 1 milliGRAM(s) Oral daily  levothyroxine 50 MICROGram(s) Oral daily  meropenem  IVPB 1000 milliGRAM(s) IV Intermittent every 8 hours  midodrine 10 milliGRAM(s) Oral every 8 hours  multivitamin 1 Tablet(s) Oral daily  nystatin Powder 1 Application(s) Topical three times a day  pantoprazole    Tablet 40 milliGRAM(s) Oral before breakfast  predniSONE   Tablet 10 milliGRAM(s) Oral daily  sodium chloride 0.9%. 1000 milliLiter(s) IV Continuous <Continuous>  vancomycin  IVPB 1500 milliGRAM(s) IV Intermittent daily    PRN MEDICATIONS  acetaminophen   Tablet .. 325 milliGRAM(s) Oral every 4 hours PRN  aluminum hydroxide/magnesium hydroxide/simethicone Suspension 30 milliLiter(s) Oral every 4 hours PRN  bisacodyl Suppository 10 milliGRAM(s) Rectal daily PRN    VITALS:   T(F): 97.4  HR: 95  BP: 122/52  RR: 18  SpO2: 95%    LABS:                        7.8    18.98 )-----------( 51       ( 16 Jul 2019 04:50 )             22.8     07-16    135  |  96<L>  |  104<HH>  ----------------------------<  162<H>  4.0   |  26  |  1.7<H>    Ca    8.0<L>      16 Jul 2019 04:50    TPro  4.9<L>  /  Alb  2.2<L>  /  TBili  3.6<H>  /  DBili  x   /  AST  69<H>  /  ALT  32  /  AlkPhos  561<H>  07-15    PT/INR - ( 16 Jul 2019 04:50 )   PT: 18.70 sec;   INR: 1.63 ratio         PTT - ( 16 Jul 2019 04:50 )  PTT:32.9 sec      Lactate, Blood: 2.7 mmol/L <H> (07-15-19 @ 22:45)      Culture - Blood (collected 14 Jul 2019 12:53)  Source: .Blood Blood  Gram Stain (15 Jul 2019 06:12):    Growth in aerobic bottle: Gram Positive Cocci in Pairs and Chains    Growth in anaerobic bottle: Gram Positive Cocci in Pairs and Chains  Preliminary Report (15 Jul 2019 06:12):    Growth in anaerobic bottle: Gram Positive Cocci in Pairs and Chains    Growth in aerobic bottle: Gram Positive Cocci in Pairs and Chains    "Due to technical problems, Proteus sp. will Not be reported as part of    the BCID panel until further notice"    ***Blood Panel PCR results on this specimen are available    approximately 3 hours after the Gram stain result.***    Gram stain, PCR, and/or culture results may not always    correspond due to difference in methodologies.    ************************************************************    This PCR assay was performed using [employer]    The following targets are tested for: Enterococcus,    vancomycin resistant enterococci, Listeria monocytogenes,    coagulase negative staphylococci, S. aureus,    methicillin resistant S. aureus, Streptococcus agalactiae    (Group B), S. pneumoniae, S. pyogenes (Group A),    Acinetobacter baumannii, Enterobacter cloacae, E. coli,    Klebsiella oxytoca, K. pneumoniae, Proteus sp.,    Serratia marcescens, Haemophilus influenzae,    Neisseria meningitidis, Pseudomonas aeruginosa, Candida    albicans, C. glabrata, C krusei, C parapsilosis,    C. tropicalis and the KPC resistance gene.  Organism: Blood Culture PCR (15 Jul 2019 07:10)  Organism: Blood Culture PCR (15 Jul 2019 07:10)    Culture - Urine (collected 13 Jul 2019 23:00)  Source: .Urine Clean Catch (Midstream)  Final Report (16 Jul 2019 08:59):    >100,000 CFU/ml Citrobacter braakii  Organism: Citrobacter braakii (16 Jul 2019 08:59)  Organism: Citrobacter braakii (16 Jul 2019 08:59)          RADIOLOGY:  < from: US Abdomen Limited (07.15.19 @ 19:07) >  IMPRESSION:    1.  Cholelithiasis and sludge, without sonographic evidence of acute   cholecystitis.    2.  Mild common bile duct dilatation. Correlate with labs for CBD   obstruction. If clinically warranted, MRCP can be obtained for further   evaluation.    3.  Hepatic cirrhosis with moderate ascites.    4.  Right pleural effusion.    < end of copied text >      * ELIJAH pending    PHYSICAL EXAM:  GEN: No acute distress  LUNGS: Clear to auscultation bilaterally   HEART: S1/S2 present. RRR.   ABD: Soft, mild diffuse tenderness, non-distended. Bowel sounds present  EXT: NC/NC/NE/2+PP/RAMIREZ  NEURO: AAOX3      Assessment and Plan:  A/P 69 yo F w/ hx of CAD s/p PCI and hypothyroidism sent by gastroenterologist for gallbladder hydrops, course complicated by ALI/ transaminitis,  LUIS ALBERTO with hyperkalemia multifactorial 2/2 rhabdo (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 with resultant proteinuria and low albumin state and diffuse anasarca, raynauds phenomenon (not currently active), esophageal dysmotility and reflux, cystitis with urinary retention s/p abx, and subsequent worsening functional debility and malnutrition    patient developed confusion state over night time    # Acute Encephalopathy likely 2/2 sepsis ( ? source)  - lactic acidosis, hypoxia, leukocytosis (improved from yesterday)  - urine cxs: Citrobacter braakii  - blood cxs-Gram Positive Cocci in Pairs and Chains  - most recent CXR- clear lungs  - monitor lactic acid levels  - Blood cultures f/U  - ELIJAH f/U  - ID: change to Zyvox 600 mg IV q12h and Meropenem 500 mg IV q8h  - IR on board: paracenthesis tomorrow 7/17/2019 ( ?SBP)  - U/S Abdomen: cirrhosis, CBD    # DIC  - elevated PT/PTT: monitor  - low fibrinogen, low haptoglobin, high LDH: monitor  - Heme/ Onc: if patient bleeding give PLT and FFP. If no response to FFP give Cryo    #Hypoalbuminemia  - likely 2/2 liver disease  -  IV Albumin treatment    # LUIS ALBERTO   - monitor renal function daily  - I & O strict  - renal biopsy- ATN    # Macrocytic anemia  - on 7/14/2019: significant h/h drop, no gross bleeding event; received 1 unit PRBC on 7/14/2019   -vitamin B 12 and folate levels wnl  -started on MVI tx. daily    #hydrops gallbladder with  transaminitis - as per GI recommendations- pt. most likely had drug induced transaminitis, no evidence of autoimmune hepatitis  - LFT's near normal levels  - patient tolerating diet well  - hepatitis profile negative  - concomitant acute rhabdomyolysis- resolved, repeated CK level  normal   -statin was d/c on admission  - liver was  biopsied- Surgical Pathology Report (06.24.19 @ 16:10)  Surgical Final Report: Final Diagnosis  Right hepatic lobe, needle biopsy:  - Liver parenchyma showing mild focal lobulitis and hepatocyte reactive changes with few glycogenated nuclei.  - Focal area of vaguely formed Francisca-Denk body also present.  - Two minute focus of macro and micro-vesicular steatosis also present, counting less than 5% of tissue volume of the biopsy.  - Portal areas showing marked chronic inflammation with focal interface activity, infiltrated with mainly small lymphocytes and  rare neutrophils, and fibrous expansion of portal area with  fibrous bridge formation, probable cirrhosis.  - Stainable iron present, not increased.  - Trichrome stain reviewed.  - PAS-D stain failed to reveal alpha-1 antitrypsin globule.    # Anasarca  -  b/l LE edema , wheezing improved today  -  cannot diuresis - patient has sepsis ????    # CAD  - hold heparin and plavix due to liver disease  - d/c'ed statin at admission    #Hypothyroidism  -c/w synthroid     #Vit D deficiency  - vitamin D       #DVT/GI PPX    FULL CODE SUBJECTIVE:    Patient is a 68y old Female who presents with a chief complaint of gallbladder hydrops (15 Jul 2019 13:46)    Currently admitted to medicine with the primary diagnosis of Gallbladder hydrops     Today is hospital day 32d. This morning she is resting comfortably in bed and reports no new issues or overnight events.     Overnight: The patient had blood red blood per rectum and oozing from IV sites as labs were drawn. Hemoglobin was 7.8 (down from 8.4). The patient was given 1 unit PLT and 1 unit FFP.    PAST MEDICAL & SURGICAL HISTORY  Hypothyroidism  CAD S/P percutaneous coronary angioplasty  No significant past surgical history    SOCIAL HISTORY:  Negative for smoking/alcohol/drug use.     ALLERGIES:  No Known Allergies    MEDICATIONS:  STANDING MEDICATIONS  chlorhexidine 4% Liquid 1 Application(s) Topical <User Schedule>  cholecalciferol 2000 Unit(s) Oral daily  docusate sodium 100 milliGRAM(s) Oral three times a day  folic acid 1 milliGRAM(s) Oral daily  levothyroxine 50 MICROGram(s) Oral daily  meropenem  IVPB 1000 milliGRAM(s) IV Intermittent every 8 hours  midodrine 10 milliGRAM(s) Oral every 8 hours  multivitamin 1 Tablet(s) Oral daily  nystatin Powder 1 Application(s) Topical three times a day  pantoprazole    Tablet 40 milliGRAM(s) Oral before breakfast  predniSONE   Tablet 10 milliGRAM(s) Oral daily  sodium chloride 0.9%. 1000 milliLiter(s) IV Continuous <Continuous>  vancomycin  IVPB 1500 milliGRAM(s) IV Intermittent daily    PRN MEDICATIONS  acetaminophen   Tablet .. 325 milliGRAM(s) Oral every 4 hours PRN  aluminum hydroxide/magnesium hydroxide/simethicone Suspension 30 milliLiter(s) Oral every 4 hours PRN  bisacodyl Suppository 10 milliGRAM(s) Rectal daily PRN    VITALS:   T(F): 97.4  HR: 95  BP: 122/52  RR: 18  SpO2: 95%    LABS:                        7.8    18.98 )-----------( 51       ( 16 Jul 2019 04:50 )             22.8     07-16    135  |  96<L>  |  104<HH>  ----------------------------<  162<H>  4.0   |  26  |  1.7<H>    Ca    8.0<L>      16 Jul 2019 04:50    TPro  4.9<L>  /  Alb  2.2<L>  /  TBili  3.6<H>  /  DBili  x   /  AST  69<H>  /  ALT  32  /  AlkPhos  561<H>  07-15    PT/INR - ( 16 Jul 2019 04:50 )   PT: 18.70 sec;   INR: 1.63 ratio         PTT - ( 16 Jul 2019 04:50 )  PTT:32.9 sec      Lactate, Blood: 2.7 mmol/L <H> (07-15-19 @ 22:45)      Culture - Blood (collected 14 Jul 2019 12:53)  Source: .Blood Blood  Gram Stain (15 Jul 2019 06:12):    Growth in aerobic bottle: Gram Positive Cocci in Pairs and Chains    Growth in anaerobic bottle: Gram Positive Cocci in Pairs and Chains  Preliminary Report (15 Jul 2019 06:12):    Growth in anaerobic bottle: Gram Positive Cocci in Pairs and Chains    Growth in aerobic bottle: Gram Positive Cocci in Pairs and Chains    "Due to technical problems, Proteus sp. will Not be reported as part of    the BCID panel until further notice"    ***Blood Panel PCR results on this specimen are available    approximately 3 hours after the Gram stain result.***    Gram stain, PCR, and/or culture results may not always    correspond due to difference in methodologies.    ************************************************************    This PCR assay was performed using [employer].    The following targets are tested for: Enterococcus,    vancomycin resistant enterococci, Listeria monocytogenes,    coagulase negative staphylococci, S. aureus,    methicillin resistant S. aureus, Streptococcus agalactiae    (Group B), S. pneumoniae, S. pyogenes (Group A),    Acinetobacter baumannii, Enterobacter cloacae, E. coli,    Klebsiella oxytoca, K. pneumoniae, Proteus sp.,    Serratia marcescens, Haemophilus influenzae,    Neisseria meningitidis, Pseudomonas aeruginosa, Candida    albicans, C. glabrata, C krusei, C parapsilosis,    C. tropicalis and the KPC resistance gene.  Organism: Blood Culture PCR (15 Jul 2019 07:10)  Organism: Blood Culture PCR (15 Jul 2019 07:10)    Culture - Urine (collected 13 Jul 2019 23:00)  Source: .Urine Clean Catch (Midstream)  Final Report (16 Jul 2019 08:59):    >100,000 CFU/ml Citrobacter braakii  Organism: Citrobacter braakii (16 Jul 2019 08:59)  Organism: Citrobacter braakii (16 Jul 2019 08:59)          RADIOLOGY:  < from: US Abdomen Limited (07.15.19 @ 19:07) >  IMPRESSION:    1.  Cholelithiasis and sludge, without sonographic evidence of acute   cholecystitis.    2.  Mild common bile duct dilatation. Correlate with labs for CBD   obstruction. If clinically warranted, MRCP can be obtained for further   evaluation.    3.  Hepatic cirrhosis with moderate ascites.    4.  Right pleural effusion.    < end of copied text >      * ELIJAH pending    PHYSICAL EXAM:  GEN: No acute distress  LUNGS: Clear to auscultation bilaterally   HEART: S1/S2 present. RRR.   ABD: Soft, mild diffuse tenderness, non-distended. Bowel sounds present  EXT: NC/NC/NE/2+PP/RAMIREZ  NEURO: AAOX3      Assessment and Plan:  A/P 67 yo F w/ hx of CAD s/p PCI and hypothyroidism sent by gastroenterologist for gallbladder hydrops, course complicated by ALI/ transaminitis,  LUIS ALBERTO with hyperkalemia multifactorial 2/2 rhabdo (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 with resultant proteinuria and low albumin state and diffuse anasarca, raynauds phenomenon (not currently active), esophageal dysmotility and reflux, cystitis with urinary retention s/p abx, and subsequent worsening functional debility and malnutrition    patient developed confusion state over night time    # Acute Encephalopathy likely 2/2 sepsis ( ? source)  - lactic acidosis, hypoxia, leukocytosis (improved from yesterday)  - urine cxs: Citrobacter braakii  - blood cxs-Gram Positive Cocci in Pairs and Chains  - most recent CXR- clear lungs  - monitor lactic acid levels  - Blood cultures f/U  - ELIJAH f/U  - ID: change to Zosyn 2.25 q6h IV  - IR on board: paracenthesis tomorrow 7/17/2019 ( ?SBP)  - U/S Abdomen: cirrhosis, CBD    # DIC  - elevated PT/PTT: monitor  - low fibrinogen, low haptoglobin, high LDH: monitor  - Heme/ Onc: if patient bleeding give PLT and FFP. If no response to FFP give Cryo    #Hypoalbuminemia  - likely 2/2 liver disease  -  IV Albumin treatment    # LUIS ALBERTO   - monitor renal function daily  - I & O strict  - renal biopsy- ATN    # Macrocytic anemia  - on 7/14/2019: significant h/h drop, no gross bleeding event; received 1 unit PRBC on 7/14/2019   -vitamin B 12 and folate levels wnl  -started on MVI tx. daily    #hydrops gallbladder with  transaminitis - as per GI recommendations- pt. most likely had drug induced transaminitis, no evidence of autoimmune hepatitis  - LFT's near normal levels  - patient tolerating diet well  - hepatitis profile negative  - concomitant acute rhabdomyolysis- resolved, repeated CK level  normal   -statin was d/c on admission  - liver was  biopsied- Surgical Pathology Report (06.24.19 @ 16:10)  Surgical Final Report: Final Diagnosis  Right hepatic lobe, needle biopsy:  - Liver parenchyma showing mild focal lobulitis and hepatocyte reactive changes with few glycogenated nuclei.  - Focal area of vaguely formed Francisca-Denk body also present.  - Two minute focus of macro and micro-vesicular steatosis also present, counting less than 5% of tissue volume of the biopsy.  - Portal areas showing marked chronic inflammation with focal interface activity, infiltrated with mainly small lymphocytes and  rare neutrophils, and fibrous expansion of portal area with  fibrous bridge formation, probable cirrhosis.  - Stainable iron present, not increased.  - Trichrome stain reviewed.  - PAS-D stain failed to reveal alpha-1 antitrypsin globule.    # Anasarca  -  b/l LE edema , wheezing improved today  -  cannot diuresis - patient has sepsis ????    # CAD  - hold heparin and plavix due to liver disease  - d/c'ed statin at admission    #Hypothyroidism  -c/w synthroid     #Vit D deficiency  - vitamin D       #DVT/GI PPX    FULL CODE SUBJECTIVE:    Patient is a 68y old Female who presents with a chief complaint of gallbladder hydrops (15 Jul 2019 13:46)    Currently admitted to medicine with the primary diagnosis of Gallbladder hydrops     Today is hospital day 32d. This morning she is resting comfortably in bed and reports no new issues or overnight events.     Overnight: The patient had blood red blood per rectum and oozing from IV sites as labs were drawn. Hemoglobin was 7.8 (down from 8.4). The patient was given 1 unit PLT and 1 unit FFP.    PAST MEDICAL & SURGICAL HISTORY  Hypothyroidism  CAD S/P percutaneous coronary angioplasty  No significant past surgical history    SOCIAL HISTORY:  Negative for smoking/alcohol/drug use.     ALLERGIES:  No Known Allergies    MEDICATIONS:  STANDING MEDICATIONS  chlorhexidine 4% Liquid 1 Application(s) Topical <User Schedule>  cholecalciferol 2000 Unit(s) Oral daily  docusate sodium 100 milliGRAM(s) Oral three times a day  folic acid 1 milliGRAM(s) Oral daily  levothyroxine 50 MICROGram(s) Oral daily  meropenem  IVPB 1000 milliGRAM(s) IV Intermittent every 8 hours  midodrine 10 milliGRAM(s) Oral every 8 hours  multivitamin 1 Tablet(s) Oral daily  nystatin Powder 1 Application(s) Topical three times a day  pantoprazole    Tablet 40 milliGRAM(s) Oral before breakfast  predniSONE   Tablet 10 milliGRAM(s) Oral daily  sodium chloride 0.9%. 1000 milliLiter(s) IV Continuous <Continuous>  vancomycin  IVPB 1500 milliGRAM(s) IV Intermittent daily    PRN MEDICATIONS  acetaminophen   Tablet .. 325 milliGRAM(s) Oral every 4 hours PRN  aluminum hydroxide/magnesium hydroxide/simethicone Suspension 30 milliLiter(s) Oral every 4 hours PRN  bisacodyl Suppository 10 milliGRAM(s) Rectal daily PRN    VITALS:   T(F): 97.4  HR: 95  BP: 122/52  RR: 18  SpO2: 95%    LABS:                        7.8    18.98 )-----------( 51       ( 16 Jul 2019 04:50 )             22.8     07-16    135  |  96<L>  |  104<HH>  ----------------------------<  162<H>  4.0   |  26  |  1.7<H>    Ca    8.0<L>      16 Jul 2019 04:50    TPro  4.9<L>  /  Alb  2.2<L>  /  TBili  3.6<H>  /  DBili  x   /  AST  69<H>  /  ALT  32  /  AlkPhos  561<H>  07-15    PT/INR - ( 16 Jul 2019 04:50 )   PT: 18.70 sec;   INR: 1.63 ratio         PTT - ( 16 Jul 2019 04:50 )  PTT:32.9 sec      Lactate, Blood: 2.7 mmol/L <H> (07-15-19 @ 22:45)      Culture - Blood (collected 14 Jul 2019 12:53)  Source: .Blood Blood  Gram Stain (15 Jul 2019 06:12):    Growth in aerobic bottle: Gram Positive Cocci in Pairs and Chains    Growth in anaerobic bottle: Gram Positive Cocci in Pairs and Chains  Preliminary Report (15 Jul 2019 06:12):    Growth in anaerobic bottle: Gram Positive Cocci in Pairs and Chains    Growth in aerobic bottle: Gram Positive Cocci in Pairs and Chains    "Due to technical problems, Proteus sp. will Not be reported as part of    the BCID panel until further notice"    ***Blood Panel PCR results on this specimen are available    approximately 3 hours after the Gram stain result.***    Gram stain, PCR, and/or culture results may not always    correspond due to difference in methodologies.    ************************************************************    This PCR assay was performed using [employer].    The following targets are tested for: Enterococcus,    vancomycin resistant enterococci, Listeria monocytogenes,    coagulase negative staphylococci, S. aureus,    methicillin resistant S. aureus, Streptococcus agalactiae    (Group B), S. pneumoniae, S. pyogenes (Group A),    Acinetobacter baumannii, Enterobacter cloacae, E. coli,    Klebsiella oxytoca, K. pneumoniae, Proteus sp.,    Serratia marcescens, Haemophilus influenzae,    Neisseria meningitidis, Pseudomonas aeruginosa, Candida    albicans, C. glabrata, C krusei, C parapsilosis,    C. tropicalis and the KPC resistance gene.  Organism: Blood Culture PCR (15 Jul 2019 07:10)  Organism: Blood Culture PCR (15 Jul 2019 07:10)    Culture - Urine (collected 13 Jul 2019 23:00)  Source: .Urine Clean Catch (Midstream)  Final Report (16 Jul 2019 08:59):    >100,000 CFU/ml Citrobacter braakii  Organism: Citrobacter braakii (16 Jul 2019 08:59)  Organism: Citrobacter braakii (16 Jul 2019 08:59)          RADIOLOGY:  < from: US Abdomen Limited (07.15.19 @ 19:07) >  IMPRESSION:    1.  Cholelithiasis and sludge, without sonographic evidence of acute   cholecystitis.    2.  Mild common bile duct dilatation. Correlate with labs for CBD   obstruction. If clinically warranted, MRCP can be obtained for further   evaluation.    3.  Hepatic cirrhosis with moderate ascites.    4.  Right pleural effusion.    < end of copied text >      * ELIJAH pending    PHYSICAL EXAM:  GEN: No acute distress  LUNGS: Clear to auscultation bilaterally   HEART: S1/S2 present. RRR.   ABD: Soft, mild diffuse tenderness, non-distended. Bowel sounds present  EXT: NC/NC/NE/2+PP/RAMIREZ  NEURO: AAOX3      Assessment and Plan:  A/P 69 yo F w/ hx of CAD s/p PCI and hypothyroidism sent by gastroenterologist for gallbladder hydrops, course complicated by ALI/ transaminitis,  LUIS ALBERTO with hyperkalemia multifactorial 2/2 rhabdo (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 with resultant proteinuria and low albumin state and diffuse anasarca, raynauds phenomenon (not currently active), esophageal dysmotility and reflux, cystitis with urinary retention s/p abx, and subsequent worsening functional debility and malnutrition    patient developed confusion state over night time    # Acute Encephalopathy likely 2/2 sepsis ( ? source)  - lactic acidosis, hypoxia, leukocytosis (improved from yesterday)  - urine cxs: Citrobacter braakii  - blood cxs-Gram Positive Cocci in Pairs and Chains  - most recent CXR- clear lungs  - monitor lactic acid levels  - Blood cultures f/U  - ELIJAH f/U  - ID: change to Zosyn 2.25 q6h IV  - IR on board: paracenthesis tomorrow 7/17/2019 ( ?SBP)  - U/S Abdomen: cirrhosis, CBD    # Stercoral ulcer:  - constipation  - per GI might benefit from disimpaction and add Mirolax BID    # DIC  - elevated PT/PTT: monitor  - low fibrinogen, low haptoglobin, high LDH: monitor  - Heme/ Onc: if patient bleeding give PLT and FFP. If no response to FFP give Cryo    #Hypoalbuminemia  - likely 2/2 liver disease  -  IV Albumin treatment    # LUIS ALBERTO   - monitor renal function daily  - I & O strict  - renal biopsy- ATN    # Macrocytic anemia  - on 7/14/2019: significant h/h drop, no gross bleeding event; received 1 unit PRBC on 7/14/2019   -vitamin B 12 and folate levels wnl  -started on MVI tx. daily    #hydrops gallbladder with  transaminitis - as per GI recommendations- pt. most likely had drug induced transaminitis, no evidence of autoimmune hepatitis  - LFT's near normal levels  - patient tolerating diet well  - hepatitis profile negative  - concomitant acute rhabdomyolysis- resolved, repeated CK level  normal   -statin was d/c on admission  - liver was  biopsied- Surgical Pathology Report (06.24.19 @ 16:10)  Surgical Final Report: Final Diagnosis  Right hepatic lobe, needle biopsy:  - Liver parenchyma showing mild focal lobulitis and hepatocyte reactive changes with few glycogenated nuclei.  - Focal area of vaguely formed Francisca-Denk body also present.  - Two minute focus of macro and micro-vesicular steatosis also present, counting less than 5% of tissue volume of the biopsy.  - Portal areas showing marked chronic inflammation with focal interface activity, infiltrated with mainly small lymphocytes and  rare neutrophils, and fibrous expansion of portal area with  fibrous bridge formation, probable cirrhosis.  - Stainable iron present, not increased.  - Trichrome stain reviewed.  - PAS-D stain failed to reveal alpha-1 antitrypsin globule.    # Anasarca  -  b/l LE edema , wheezing improved today  -  cannot diuresis - patient has sepsis ????    # CAD  - hold heparin and plavix due to liver disease  - d/c'ed statin at admission    #Hypothyroidism  -c/w synthroid     #Vit D deficiency  - vitamin D       #DVT/GI PPX    FULL CODE SUBJECTIVE:    Patient is a 68y old Female who presents with a chief complaint of gallbladder hydrops (15 Jul 2019 13:46)    Currently admitted to medicine with the primary diagnosis of Gallbladder hydrops     Today is hospital day 32d. This morning she is resting comfortably in bed and reports no new issues or overnight events.     Overnight: The patient had blood red blood per rectum and oozing from IV sites as labs were drawn. Hemoglobin was 7.8 (down from 8.4). The patient was given 1 unit PLT and 1 unit FFP.    PAST MEDICAL & SURGICAL HISTORY  Hypothyroidism  CAD S/P percutaneous coronary angioplasty  No significant past surgical history    SOCIAL HISTORY:  Negative for smoking/alcohol/drug use.     ALLERGIES:  No Known Allergies    MEDICATIONS:  STANDING MEDICATIONS  chlorhexidine 4% Liquid 1 Application(s) Topical <User Schedule>  cholecalciferol 2000 Unit(s) Oral daily  docusate sodium 100 milliGRAM(s) Oral three times a day  folic acid 1 milliGRAM(s) Oral daily  levothyroxine 50 MICROGram(s) Oral daily  meropenem  IVPB 1000 milliGRAM(s) IV Intermittent every 8 hours  midodrine 10 milliGRAM(s) Oral every 8 hours  multivitamin 1 Tablet(s) Oral daily  nystatin Powder 1 Application(s) Topical three times a day  pantoprazole    Tablet 40 milliGRAM(s) Oral before breakfast  predniSONE   Tablet 10 milliGRAM(s) Oral daily  sodium chloride 0.9%. 1000 milliLiter(s) IV Continuous <Continuous>  vancomycin  IVPB 1500 milliGRAM(s) IV Intermittent daily    PRN MEDICATIONS  acetaminophen   Tablet .. 325 milliGRAM(s) Oral every 4 hours PRN  aluminum hydroxide/magnesium hydroxide/simethicone Suspension 30 milliLiter(s) Oral every 4 hours PRN  bisacodyl Suppository 10 milliGRAM(s) Rectal daily PRN    VITALS:   T(F): 97.4  HR: 95  BP: 122/52  RR: 18  SpO2: 95%    LABS:                        7.8    18.98 )-----------( 51       ( 16 Jul 2019 04:50 )             22.8     07-16    135  |  96<L>  |  104<HH>  ----------------------------<  162<H>  4.0   |  26  |  1.7<H>    Ca    8.0<L>      16 Jul 2019 04:50    TPro  4.9<L>  /  Alb  2.2<L>  /  TBili  3.6<H>  /  DBili  x   /  AST  69<H>  /  ALT  32  /  AlkPhos  561<H>  07-15    PT/INR - ( 16 Jul 2019 04:50 )   PT: 18.70 sec;   INR: 1.63 ratio         PTT - ( 16 Jul 2019 04:50 )  PTT:32.9 sec      Lactate, Blood: 2.7 mmol/L <H> (07-15-19 @ 22:45)      Culture - Blood (collected 14 Jul 2019 12:53)  Source: .Blood Blood  Gram Stain (15 Jul 2019 06:12):    Growth in aerobic bottle: Gram Positive Cocci in Pairs and Chains    Growth in anaerobic bottle: Gram Positive Cocci in Pairs and Chains  Preliminary Report (15 Jul 2019 06:12):    Growth in anaerobic bottle: Gram Positive Cocci in Pairs and Chains    Growth in aerobic bottle: Gram Positive Cocci in Pairs and Chains    "Due to technical problems, Proteus sp. will Not be reported as part of    the BCID panel until further notice"    ***Blood Panel PCR results on this specimen are available    approximately 3 hours after the Gram stain result.***    Gram stain, PCR, and/or culture results may not always    correspond due to difference in methodologies.    ************************************************************    This PCR assay was performed using [employer].    The following targets are tested for: Enterococcus,    vancomycin resistant enterococci, Listeria monocytogenes,    coagulase negative staphylococci, S. aureus,    methicillin resistant S. aureus, Streptococcus agalactiae    (Group B), S. pneumoniae, S. pyogenes (Group A),    Acinetobacter baumannii, Enterobacter cloacae, E. coli,    Klebsiella oxytoca, K. pneumoniae, Proteus sp.,    Serratia marcescens, Haemophilus influenzae,    Neisseria meningitidis, Pseudomonas aeruginosa, Candida    albicans, C. glabrata, C krusei, C parapsilosis,    C. tropicalis and the KPC resistance gene.  Organism: Blood Culture PCR (15 Jul 2019 07:10)  Organism: Blood Culture PCR (15 Jul 2019 07:10)    Culture - Urine (collected 13 Jul 2019 23:00)  Source: .Urine Clean Catch (Midstream)  Final Report (16 Jul 2019 08:59):    >100,000 CFU/ml Citrobacter braakii  Organism: Citrobacter braakii (16 Jul 2019 08:59)  Organism: Citrobacter braakii (16 Jul 2019 08:59)          RADIOLOGY:  < from: US Abdomen Limited (07.15.19 @ 19:07) >  IMPRESSION:    1.  Cholelithiasis and sludge, without sonographic evidence of acute   cholecystitis.    2.  Mild common bile duct dilatation. Correlate with labs for CBD   obstruction. If clinically warranted, MRCP can be obtained for further   evaluation.    3.  Hepatic cirrhosis with moderate ascites.    4.  Right pleural effusion.    < end of copied text >      * ELIJAH pending    PHYSICAL EXAM:  GEN: No acute distress  LUNGS: Clear to auscultation bilaterally   HEART: S1/S2 present. RRR.   ABD: Soft, mild diffuse tenderness, non-distended. Bowel sounds present  EXT: NC/NC/NE/2+PP/RAMIREZ  NEURO: AAOX3      Assessment and Plan:  A/P 67 yo F w/ hx of CAD s/p PCI and hypothyroidism sent by gastroenterologist for gallbladder hydrops, course complicated by ALI/ transaminitis,  LUIS ALBERTO with hyperkalemia multifactorial 2/2 rhabdo (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 with resultant proteinuria and low albumin state and diffuse anasarca, raynauds phenomenon (not currently active), esophageal dysmotility and reflux, cystitis with urinary retention s/p abx, and subsequent worsening functional debility and malnutrition    patient developed confusion state over night time    # Acute Encephalopathy likely 2/2 sepsis ( ? source)  - lactic acidosis, hypoxia, leukocytosis (improved from yesterday)  - urine cxs: Citrobacter braakii  - blood cxs-Gram Positive Cocci in Pairs and Chains  - most recent CXR- clear lungs  - monitor lactic acid levels  - Blood cultures f/U  - ELIJAH f/U  - ID: change to Zosyn 2.25 q6h IV  - IR on board: paracenthesis tomorrow 7/17/2019 ( ?SBP)  - U/S Abdomen: cirrhosis, CBD    # Stercoral ulcer:  - constipation  - per GI might benefit from disimpaction and add Mirolax BID  - patient disimpacted 7/15/2019 - dark brown stool came out, no blood appreciated    # DIC  - elevated PT/PTT: monitor  - low fibrinogen, low haptoglobin, high LDH: monitor  - Heme/ Onc: if patient bleeding give PLT and FFP. If no response to FFP give Cryo    #Hypoalbuminemia  - likely 2/2 liver disease  -  IV Albumin treatment    # LUIS ALBERTO   - monitor renal function daily  - I & O strict  - renal biopsy- ATN    # Macrocytic anemia  - on 7/14/2019: significant h/h drop, no gross bleeding event; received 1 unit PRBC on 7/14/2019   -vitamin B 12 and folate levels wnl  -started on MVI tx. daily    #hydrops gallbladder with  transaminitis - as per GI recommendations- pt. most likely had drug induced transaminitis, no evidence of autoimmune hepatitis  - LFT's near normal levels  - patient tolerating diet well  - hepatitis profile negative  - concomitant acute rhabdomyolysis- resolved, repeated CK level  normal   -statin was d/c on admission  - liver was  biopsied- Surgical Pathology Report (06.24.19 @ 16:10)  Surgical Final Report: Final Diagnosis  Right hepatic lobe, needle biopsy:  - Liver parenchyma showing mild focal lobulitis and hepatocyte reactive changes with few glycogenated nuclei.  - Focal area of vaguely formed Francisca-Denk body also present.  - Two minute focus of macro and micro-vesicular steatosis also present, counting less than 5% of tissue volume of the biopsy.  - Portal areas showing marked chronic inflammation with focal interface activity, infiltrated with mainly small lymphocytes and  rare neutrophils, and fibrous expansion of portal area with  fibrous bridge formation, probable cirrhosis.  - Stainable iron present, not increased.  - Trichrome stain reviewed.  - PAS-D stain failed to reveal alpha-1 antitrypsin globule.    # Anasarca  -  b/l LE edema , wheezing improved today  -  cannot diuresis - patient has sepsis ????    # CAD  - hold heparin and plavix due to liver disease  - d/c'ed statin at admission    #Hypothyroidism  -c/w synthroid     #Vit D deficiency  - vitamin D       #DVT/GI PPX    FULL CODE

## 2019-07-16 NOTE — CONSULT NOTE ADULT - ASSESSMENT
69 yo F w/ hx of CAD s/p PCI and hypothyroidism sent by gastroenterologist for gallbladder hydrops, course complicated by ALI/ transaminitis, LUIS ALBERTO with hyperkalemia multifactorial 2/2 rhabdo with resultant proteinuria and low albumin state and diffuse anasarca, raynauds phenomenon (not currently active), esophageal dysmotility and reflux, cystitis with urinary retention s/p abx, and subsequent worsening functional debility and malnutrition.     IMPRESSION:  Enterococcal bacteremia secondary to either pyelonephritis ( has pyuria but clinically no evidence of it ) or translocation  from biliary tree (GB hydrops but clinically no acute cholecystitis/ cholangitis . Does have GBS and a dilated CBD )  Transaminitis ( secondary to cirrhosis and not shock liver or cholangitis  )  No SBP clinically  BC 7/14 Enterococci  UCx 7/13 GNRs    RECOMMENDATIONS:  F/u sensitivities of Enterococci  F/u GNRs in urine  Zyvox 600 mg iv q12h  Meropenem 500 mg iv q8h  D/c vancomycin

## 2019-07-16 NOTE — PROGRESS NOTE ADULT - ASSESSMENT
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

## 2019-07-17 NOTE — CONSULT NOTE ADULT - ATTENDING COMMENTS
Pt seen and examined with PA. Will proceed with sural nerve biopsy per rheumatology request. The risk, benefits alternatives were discussed at length with patient and her  including but not limited to bleeding, infection, numbness, weakness, non diagnosis. They wish to proceed. Will plan for Wednesday or possibly Friday pending OR time. Please provide medical clearance. Procedure can be done under sedation.
69 yo female patient with CAD as above stated.  sent to the ED with dilated GB on imaging and multiple GS.  No masses and normal CBD on MRCP.  Cirrhotic liver and elevated LFTs.  Abdomen soft, NT, ND  US negative Garcia sign. Multiple GS and dilated GB.    a/p:  Multiple GS and dilated GB, incidental finding.  No acute cholecystitis or hydrops, clinically.  Consider autoimmune hepatitis.  GI evaluation.  No surgical intervention at this point.  Cholecystectomy an outpatient if she becomes symptomatic for her GB.  will followup closely during her hospitalization.
agree
seen/examined 7.11.19;   note reviewed; agree with plan
67 yo obese F w/ hx of CAD s/p PCI (4/2018) and hypothyroidism sent by gastroenterologist for gallbladder hydrops. Pt found to have elevated liver enzymes by PMD in Apr 2019, referred to GI for w/u. MRCP demonstrated gallbladder hydrops 4.7cm with probable impacted stone at neck, and newly diagnosed liver cirrhosis w/mild ascites and LUIS ALBERTO on admission. Patient underwent non-targeted liver biopsy w/IR demonstrating sub-acute hepatitis likely statin induced vs autoimmune w/cirrhosis and anasarca. Thereafter patient's hospital course was complicated by urosepsis, anemia, GI bleed 2/2 DIC. Patient had recurrent bacteremia now with + bcx for Enterococcus. 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 Patient and family endorse pt has become severely physically deconditioned and has not been OOB in several weeks.   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   cont with maximum medical therapy, IV abx as per ID  need to figure out the source of bacteremia (probably GI or )  further imaging pending

## 2019-07-17 NOTE — CONSULT NOTE ADULT - SUBJECTIVE AND OBJECTIVE BOX
Surgeon: /Magnus/ Chris    Consult requesting by:    HISTORY OF PRESENT ILLNESS:  68y Female  HPI:  69 yo F w/ hx of CAD s/p PCI and hypothyroidism sent by gastroenterologist for gallbladder hydrops. Pt found to have elevated liver enzymes by PMD in Apr 2019, referred to GI for w/u. MRCP 2 days ago significant for gallbladder hydrops 4.7cm with probable impacted stone at neck, also liver cirrhosis w/ mild ascites. Admits to social etoh, denies binge drinking, denies abdominal pain, N/V. Admits to decreased appetite in past 2 weeks, pt tried to maintain low salt diet. Reports BLE edema developed in past 2 days as well as weakness. Denies fever, chills, abdominal pain, SOB, dysuria.    In ED, evaluated by surgery -> no interventions at this time (15 Shashank 2019 01:53)      NYHA functional class    [ ] Class I (no limitation) [ ] Class II (slight limitation) [ ] Class III (marked limitation) [ ] Class IV (symptoms at rest)    PAST MEDICAL & SURGICAL HISTORY:  Hypothyroidism  CAD S/P percutaneous coronary angioplasty  No significant past surgical history      MEDICATIONS  (STANDING):  ampicillin  IVPB      cefTRIAXone   IVPB 2000 milliGRAM(s) IV Intermittent every 12 hours  chlorhexidine 4% Liquid 1 Application(s) Topical <User Schedule>  cholecalciferol 2000 Unit(s) Oral daily  docusate sodium 100 milliGRAM(s) Oral three times a day  docusate sodium Liquid 100 milliGRAM(s) Oral three times a day  folic acid 1 milliGRAM(s) Oral daily  lactulose Retention Enema 200 Gram(s) Rectal once  levothyroxine 75 MICROGram(s) Oral daily  midodrine 10 milliGRAM(s) Oral every 8 hours  multivitamin 1 Tablet(s) Oral daily  nystatin Powder 1 Application(s) Topical three times a day  pantoprazole    Tablet 40 milliGRAM(s) Oral before breakfast  polyethylene glycol 3350 17 Gram(s) Oral every 12 hours  predniSONE   Tablet 10 milliGRAM(s) Oral daily  sodium chloride 0.9%. 1000 milliLiter(s) (75 mL/Hr) IV Continuous <Continuous>    MEDICATIONS  (PRN):  acetaminophen   Tablet .. 325 milliGRAM(s) Oral every 4 hours PRN Mild Pain (1 - 3)  aluminum hydroxide/magnesium hydroxide/simethicone Suspension 30 milliLiter(s) Oral every 4 hours PRN Dyspepsia  bisacodyl Suppository 10 milliGRAM(s) Rectal daily PRN Constipation    Antiplatelet therapy:                           Last dose/amt:    Allergies    No Known Allergies    Intolerances        SOCIAL HISTORY:  Smoker: [ ] Yes  [ ] No        PACK YEARS:                         WHEN QUIT?  ETOH use: [ ] Yes  [ ] No              FREQUENCY / QUANTITY:  Ilicit Drug use:  [ ] Yes  [ ] No  Occupation:  Lives with:  Assisted device use:  5 meter walk test: 1____sec, 2____sec, 3___sec  FAMILY HISTORY:  No pertinent family history in first degree relatives      Review of Systems  CONSTITUTIONAL:  Fevers[ ] chills[ ] sweats[ ] fatigue[ ] weight loss[ ] weight gain [ ]                                     NEGATIVE [X ]   NEURO:  paresthesias[ ] seizures [ ]  syncope [ ]  confusion [ ]                                                                                NEGATIVE[ X]   EYES: glasses[ ]  blurry vision[ ]  discharge[ ] pain[ ] glaucoma [ ]                                                                          NEGATIVE[X ]   ENMT:  difficulty hearing [ ]  vertigo[ ]  dysphagia[ ] epistaxis[ ] recent dental work [ ]                                    NEGATIVE[ X]   CV:  chest pain[ ] palpitations[ ] SAENZ [ ] diaphoresis [ ]                                                                                           NEGATIVE[ X]   RESPIRATORY:  wheezing[ ] SOB[ ] cough [ ] sputum[ ] hemoptysis[ ]                                                                  NEGATIVE[ ]   GI:  nausea[ ]  vomiting [ ]  diarrhea[ ] constipation [ ] melena [ ]                                                                         NEGATIVE[ X]   : hematuria[ ]  dysuria[ ] urgency[ ] incontinence[ ]                                                                                            NEGATIVE[ X]   MUSKULOSKELETAL:  arthritis[ ]  joint swelling [ ] muscle weakness [ ] Hx vein stripping [ ]                             NEGATIVE[X ]   SKIN/BREAST:  rash[ ] itching [ ]  hair loss[ ] masses[ ]                                                                                              NEGATIVE[ X]   PSYCH:  dementia [ ] depression [ ] anxiety[ ]                                                                                                               NEGATIVE[X ]   HEME/LYMPH:  bruises easily[ ] enlarged lymph nodes[ ] tender lymph nodes[ ]                                               NEGATIVE[ X]   ENDOCRINE:  cold intolerance[ ] heat intolerance[ ] polydipsia[ ]                                                                          NEGATIVE[ X]     PHYSICAL EXAM  Vital Signs Last 24 Hrs  T(C): 35.7 (17 Jul 2019 05:38), Max: 35.7 (16 Jul 2019 22:10)  T(F): 96.2 (17 Jul 2019 05:38), Max: 96.2 (16 Jul 2019 22:10)  HR: 90 (17 Jul 2019 05:38) (90 - 97)  BP: 154/77 (17 Jul 2019 05:38) (130/63 - 154/77)  BP(mean): --  RR: 18 (17 Jul 2019 05:38) (18 - 18)  SpO2: 98% (17 Jul 2019 08:41) (98% - 98%)  Right arm bp:                                 Left arm bp;    CONSTITUTIONAL:  WNL[ ]   Neuro: WNL [ ] Normal exam oriented to person/place & time with no focal motor or sensory  deficits. Other                     Eyes:    WNL [ ] Normal exam of conjunctiva & lids, pupils equally reactive. Other     ENT:     WNL [ ] Normal exam of nasal/oral mucosa with absence of cyanosis. Other  Neck:   WNL [ ] Normal exam of jugular veins, trachea & thyroid. Other  Chest:  WNL [ ] Normal lung exam with good air movement absence of wheezes, rales, or rhonchi: Other                                                                                CV:  Auscultation: normal [ ] S3[ ] S4[ ] Irregular [ ] Rub[ ] Clicks[ ]    Murmurs none:[ ]systolic [ ]  diastolic [ ] holosystolic [ ]  Carotids: No Bruits[ ] Other                 Abdominal Aorta: normal [ ] nonpalpable[ ]Other                                                                                      GI:           WNL[ ] Normal exam of abdomen, liver & spleen with no noted masses or tenderness. Other                                                                                                        Extremities: WNL[ ] Normal no evidence of cyanosis or deformity Edema: none[ ]trace[ ]1+[ ]2+[ ]3+[ ]4+[ ]  Lower Extremity Pulses: Right[ ] Left[ ]Varicosities[ ]  SKIN :WNL[ ] Normal exam to inspection & palation. Other:                                                          LABS:                        7.5    18.00 )-----------( 73       ( 16 Jul 2019 22:05 )             22.2     07-16    135  |  95<L>  |  105<HH>  ----------------------------<  178<H>  4.1   |  27  |  1.6<H>    Ca    8.1<L>      16 Jul 2019 19:25  Phos  4.4     07-16    TPro  x   /  Alb  2.6<L>  /  TBili  x   /  DBili  x   /  AST  x   /  ALT  x   /  AlkPhos  x   07-16    PT/INR - ( 16 Jul 2019 04:50 )   PT: 18.70 sec;   INR: 1.63 ratio         PTT - ( 16 Jul 2019 04:50 )  PTT:32.9 sec            Cardiac Cath:    TTE / ELIJAH:    STS Score:     Impression:  CAD [ ]  Valvular  disease [ ]   Aortic Disease [ ]   LUIS ALBERTO: Yes[ ] No [ ]   CKD Stage I [ ] , Stage II [ ] , Stage III [ ], Stage IV [ ]   Anemia: Yes [ ], No [ ]  Diabetes :Yes [ ], No [ ]  Acute MI: Yes [ ], No [ ]   Heart Failure: Yes [ ] , No [ ] HFpEF [ ], HFrEF [ ]      Assessment/ Plan: 68y Female with...  -Cases and plan discussed with CT surgeon /Chris/Magnus. Initial STS risk assessed and discussed with patient. Evaluation by full heart team pending. Attending note to follow. Pre-op for:     Recommendations:  [] hold Plavix  [] hold ASA if Pre-op Cardiac Valve surgery and patient without CAD  [] hold ACEI/ARB/CCB 24 hours prior to planned procedure   [] LUE/RUE precaution for possible radial artery harvest      Labs:  [] CBC  [] CMP  [] PT/INR/PTT  [] BNP  [] HgA1c  [] Type and screen  [] Urinalysis    Diagnostic studies  [] CT HEAD Nonn-Contrast  [] CT Chest with /without contrast   [] Carotid Duplex  [] PFT: Simple PFT [ ]  Full [ ]  [] JUANJOSE/PVR    Consultations/Evaluations   [] Renal Consult  [] Pulmonary Consult  [] Vascular Consult  [] Dental Consult   [] Hem-Onc Consult   [] GI Consult   [] Other Consultations : Surgeon: /Magnus/ Chris    Consult requesting by: Dr. Wallis    HISTORY OF PRESENT ILLNESS:  69 yo F w/ hx of CAD s/p PCI and hypothyroidism sent by gastroenterologist for gallbladder hydrops. Pt found to have elevated liver enzymes by PMD in Apr 2019, referred to GI for w/u. MRCP 2 days ago significant for gallbladder hydrops 4.7cm with probable impacted stone at neck, also liver cirrhosis w/ mild ascites. Admits to social etoh, denies binge drinking, denies abdominal pain, N/V. Admits to decreased appetite in past 2 weeks, pt tried to maintain low salt diet. Reports BLE edema developed in past 2 days as well as weakness. Denies fever, chills, abdominal pain, SOB, dysuria.    In ED, evaluated by surgery -> no interventions at this time (15 Shashank 2019 01:53)      PAST MEDICAL & SURGICAL HISTORY:  Hypothyroidism  CAD S/P percutaneous coronary angioplasty  No significant past surgical history      MEDICATIONS  (STANDING):  ampicillin  IVPB      cefTRIAXone   IVPB 2000 milliGRAM(s) IV Intermittent every 12 hours  chlorhexidine 4% Liquid 1 Application(s) Topical <User Schedule>  cholecalciferol 2000 Unit(s) Oral daily  docusate sodium 100 milliGRAM(s) Oral three times a day  docusate sodium Liquid 100 milliGRAM(s) Oral three times a day  folic acid 1 milliGRAM(s) Oral daily  lactulose Retention Enema 200 Gram(s) Rectal once  levothyroxine 75 MICROGram(s) Oral daily  midodrine 10 milliGRAM(s) Oral every 8 hours  multivitamin 1 Tablet(s) Oral daily  nystatin Powder 1 Application(s) Topical three times a day  pantoprazole    Tablet 40 milliGRAM(s) Oral before breakfast  polyethylene glycol 3350 17 Gram(s) Oral every 12 hours  predniSONE   Tablet 10 milliGRAM(s) Oral daily  sodium chloride 0.9%. 1000 milliLiter(s) (75 mL/Hr) IV Continuous <Continuous>    MEDICATIONS  (PRN):  acetaminophen   Tablet .. 325 milliGRAM(s) Oral every 4 hours PRN Mild Pain (1 - 3)  aluminum hydroxide/magnesium hydroxide/simethicone Suspension 30 milliLiter(s) Oral every 4 hours PRN Dyspepsia  bisacodyl Suppository 10 milliGRAM(s) Rectal daily PRN Constipation      Allergies    No Known Allergies    Intolerances        SOCIAL HISTORY:  Smoker: [ ] Yes  [ ] No        PACK YEARS:                         WHEN QUIT?  ETOH use: [x ] Yes  [ ] No              FREQUENCY / QUANTITY: Social drinker   Ilicit Drug use:  [ ] Yes  [x ] No  Occupation:  Lives with:  Assisted device use:  5 meter walk test: 1____sec, 2____sec, 3___sec  FAMILY HISTORY:  No pertinent family history in first degree relatives      Review of Systems  CONSTITUTIONAL:  Fevers[ ] chills[ ] sweats[ ] fatigue[ ] weight loss[ ] weight gain [ ]                                     NEGATIVE [X ]   NEURO:  paresthesias[ ] seizures [ ]  syncope [ ]  confusion [ ]                                                                                NEGATIVE[ X]   EYES: glasses[ ]  blurry vision[ ]  discharge[ ] pain[ ] glaucoma [ ]                                                                          NEGATIVE[X ]   ENMT:  difficulty hearing [ ]  vertigo[ ]  dysphagia[ ] epistaxis[ ] recent dental work [ ]                                    NEGATIVE[ X]   CV:  chest pain[ ] palpitations[ ] SAENZ [ ] diaphoresis [ ]                                                                                           NEGATIVE[ X]   RESPIRATORY:  wheezing[ ] SOB[ ] cough [ ] sputum[ ] hemoptysis[ ]                                                                  NEGATIVE[ ]   GI:  nausea[ ]  vomiting [ ]  diarrhea[ ] constipation [ ] melena [ ]                                                                         NEGATIVE[ X]   : hematuria[ ]  dysuria[ ] urgency[ ] incontinence[ ]                                                                                            NEGATIVE[ X]   MUSKULOSKELETAL:  arthritis[ ]  joint swelling [ ] muscle weakness [ ] Hx vein stripping [ ]                             NEGATIVE[X ]   SKIN/BREAST:  rash[ ] itching [ ]  hair loss[ ] masses[ ]                                                                                              NEGATIVE[ X]   PSYCH:  dementia [ ] depression [ ] anxiety[ ]                                                                                                               NEGATIVE[X ]   HEME/LYMPH:  bruises easily[ ] enlarged lymph nodes[ ] tender lymph nodes[ ]                                               NEGATIVE[ X]   ENDOCRINE:  cold intolerance[ ] heat intolerance[ ] polydipsia[ ]                                                                          NEGATIVE[ X]     PHYSICAL EXAM  Vital Signs Last 24 Hrs  T(C): 35.7 (17 Jul 2019 05:38), Max: 35.7 (16 Jul 2019 22:10)  T(F): 96.2 (17 Jul 2019 05:38), Max: 96.2 (16 Jul 2019 22:10)  HR: 90 (17 Jul 2019 05:38) (90 - 97)  BP: 154/77 (17 Jul 2019 05:38) (130/63 - 154/77)  BP(mean): --  RR: 18 (17 Jul 2019 05:38) (18 - 18)  SpO2: 98% (17 Jul 2019 08:41) (98% - 98%)  Right arm bp:                                 Left arm bp;    CONSTITUTIONAL:  WNL[ ]   Neuro: WNL [x ] Normal exam oriented to person/place & time with no focal motor or sensory  deficits. Other                     Eyes:    WNL [x ] Normal exam of conjunctiva & lids, pupils equally reactive. Other     ENT:     WNL [x ] Normal exam of nasal/oral mucosa with absence of cyanosis. Other  Neck:   WNL [x ] Normal exam of jugular veins, trachea & thyroid. Other  Chest:  WNL [x ] Normal lung exam with good air movement absence of wheezes, rales, or rhonchi: Other                                                                                CV:  Auscultation: normal [x ] S3[ ] S4[ ] Irregular [ ] Rub[ ] Clicks[ ]    Murmurs none:[x ]systolic [ ]  diastolic [ ] holosystolic [ ]  Carotids: No Bruits[x ] Other                 Abdominal Aorta: normal [ ] nonpalpable[ ]Other                                                                                      GI:           WNL[ ] Normal exam of abdomen, liver & spleen with no noted masses or tenderness. Other                                                                                                        Extremities: WNL[ ] Normal no evidence of cyanosis or deformity Edema: none[ ]trace[ ]1+[x]2+[ ]3+[ ]4+[ ]  Lower Extremity Pulses: Right[x ] Left[x ]Varicosities[ ]  SKIN :WNL[ ] Normal exam to inspection & palation. Other:                                                          LABS:                        7.5    18.00 )-----------( 73       ( 16 Jul 2019 22:05 )             22.2     07-16    135  |  95<L>  |  105<HH>  ----------------------------<  178<H>  4.1   |  27  |  1.6<H>    Ca    8.1<L>      16 Jul 2019 19:25  Phos  4.4     07-16    TPro  x   /  Alb  2.6<L>  /  TBili  x   /  DBili  x   /  AST  x   /  ALT  x   /  AlkPhos  x   07-16    PT/INR - ( 16 Jul 2019 04:50 )   PT: 18.70 sec;   INR: 1.63 ratio         PTT - ( 16 Jul 2019 04:50 )  PTT:32.9 sec        TTE / ELIJAH: Summary:   1. LV Ejection Fraction by Castle's Method with a biplane EF of 62 %.   2. Spectral Doppler shows impaired relaxation pattern of left   ventricular myocardial filling (Grade I diastolic dysfunction).   3. Degenerative mitral valve.   4. Mild mitral valve regurgitation.   5. Moderate thickening of the posterior mitral valve leaflet.   6. Moderately decreased posterior mitral leaflet mobility.   7. Partially mobile large vegetation (3.8 x 2.7 cm) noted on the   posterior leaflet of the mitral valve.   8. Sclerotic aortic valve with normal opening.   9. Aortic leaflets are thickened. Small vegetation cannot be excluded.  10. Findings discussed with the primary team.      STS Score:     Impression:  CAD [ ]  Valvular  disease [x ] - Mild mitral valve regurgitation Partially mobile large vegetation (3.8 x 2.7 cm) noted on the   posterior leaflet of the mitral valve, infective endocarditis   Aortic Disease [ ]   LUIS ALBERTO: Yes[ ] No [ ]   CKD Stage I [ ] , Stage II [ ] , Stage III [ ], Stage IV [ ]   Anemia: Yes [x ]: H/H of 7.5/22.2  Diabetes :Yes [ ], No [x ]  Acute MI: Yes [ ], No [x ]   Heart Failure: Yes [ ] , No [x ] HFpEF [ ], HFrEF [ ]      Assessment/ Plan: 68y Female with...  -Cases and plan discussed with CT surgeon /Chris/Magnus. Initial STS risk assessed and discussed with patient. Evaluation by full heart team pending. Attending note to follow. Pre-op for:     Recommendations:  [] hold Plavix  [] hold ASA if Pre-op Cardiac Valve surgery and patient without CAD  [] hold ACEI/ARB/CCB 24 hours prior to planned procedure   [] LUE/RUE precaution for possible radial artery harvest      Labs:  [] CBC  [] CMP  [] PT/INR/PTT  [] BNP  [] HgA1c  [] Type and screen  [] Urinalysis    Diagnostic studies  [] CT HEAD Nonn-Contrast  [] CT Chest with /without contrast   [] Carotid Duplex  [] PFT: Simple PFT [ ]  Full [ ]  [] JUANJOSE/PVR    Consultations/Evaluations   [] Renal Consult  [] Pulmonary Consult  [] Vascular Consult  [] Dental Consult   [] Hem-Onc Consult   [] GI Consult   [] Other Consultations : Surgeon: Dr. Álvarez/Magnus/ Chris    Consult requesting by: Dr. Wallis  Cardiologist: Dr. Bauman  PMD: Dr. German  GI: Dr. Alonso    HISTORY OF PRESENT ILLNESS:  69 yo obese F w/ hx of CAD s/p PCI (4/2018) and hypothyroidism sent by gastroenterologist for gallbladder hydrops. Pt found to have elevated liver enzymes by PMD in Apr 2019, referred to GI for w/u. MRCP demonstrated gallbladder hydrops 4.7cm with probable impacted stone at neck, and newly diagnosed liver cirrhosis w/mild ascites. Patient underwent nontargeted liver biopsy w/IR demonstrating sub-acute hepatitis likely statin induced vs autoimmune w/cirrhosis and anasarca. Thereafter patient's hospital course was complicated by urosepsis and possible, anemia, GI bleed 2/2 DIC. Patient had recurrent bacteremia now with + bcx for Enterococcus. 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          PAST MEDICAL & SURGICAL HISTORY:  Hypothyroidism  CAD S/P percutaneous coronary angioplasty  No significant past surgical history      MEDICATIONS  (STANDING):  ampicillin  IVPB      cefTRIAXone   IVPB 2000 milliGRAM(s) IV Intermittent every 12 hours  chlorhexidine 4% Liquid 1 Application(s) Topical <User Schedule>  cholecalciferol 2000 Unit(s) Oral daily  docusate sodium 100 milliGRAM(s) Oral three times a day  docusate sodium Liquid 100 milliGRAM(s) Oral three times a day  folic acid 1 milliGRAM(s) Oral daily  lactulose Retention Enema 200 Gram(s) Rectal once  levothyroxine 75 MICROGram(s) Oral daily  midodrine 10 milliGRAM(s) Oral every 8 hours  multivitamin 1 Tablet(s) Oral daily  nystatin Powder 1 Application(s) Topical three times a day  pantoprazole    Tablet 40 milliGRAM(s) Oral before breakfast  polyethylene glycol 3350 17 Gram(s) Oral every 12 hours  predniSONE   Tablet 10 milliGRAM(s) Oral daily  sodium chloride 0.9%. 1000 milliLiter(s) (75 mL/Hr) IV Continuous <Continuous>    MEDICATIONS  (PRN):  acetaminophen   Tablet .. 325 milliGRAM(s) Oral every 4 hours PRN Mild Pain (1 - 3)  aluminum hydroxide/magnesium hydroxide/simethicone Suspension 30 milliLiter(s) Oral every 4 hours PRN Dyspepsia  bisacodyl Suppository 10 milliGRAM(s) Rectal daily PRN Constipation      Allergies    No Known Allergies    Intolerances        SOCIAL HISTORY:  Smoker: [ ] Yes  [ ] No        PACK YEARS:                         WHEN QUIT?  ETOH use: [x ] Yes  [ ] No              FREQUENCY / QUANTITY: Social drinker   Ilicit Drug use:  [ ] Yes  [x ] No  Occupation:  Lives with:  Assisted device use:  5 meter walk test: 1____sec, 2____sec, 3___sec  FAMILY HISTORY:  No pertinent family history in first degree relatives      Review of Systems  CONSTITUTIONAL:  Fevers[ ] chills[ ] sweats[ ] fatigue[ ] weight loss[ ] weight gain [ ]                                     NEGATIVE [X ]   NEURO:  paresthesias[ ] seizures [ ]  syncope [ ]  confusion [ ]                                                                                NEGATIVE[ X]   EYES: glasses[ ]  blurry vision[ ]  discharge[ ] pain[ ] glaucoma [ ]                                                                          NEGATIVE[X ]   ENMT:  difficulty hearing [ ]  vertigo[ ]  dysphagia[ ] epistaxis[ ] recent dental work [ ]                                    NEGATIVE[ X]   CV:  chest pain[ ] palpitations[ ] SAENZ [ ] diaphoresis [ ]                                                                                           NEGATIVE[ X]   RESPIRATORY:  wheezing[ ] SOB[ ] cough [ ] sputum[ ] hemoptysis[ ]                                                                  NEGATIVE[ ]   GI:  nausea[ ]  vomiting [ ]  diarrhea[ ] constipation [ ] melena [ ]                                                                         NEGATIVE[ X]   : hematuria[ ]  dysuria[ ] urgency[ ] incontinence[ ]                                                                                            NEGATIVE[ X]   MUSKULOSKELETAL:  arthritis[ ]  joint swelling [ ] muscle weakness [ ] Hx vein stripping [ ]                             NEGATIVE[X ]   SKIN/BREAST:  rash[ ] itching [ ]  hair loss[ ] masses[ ]                                                                                              NEGATIVE[ X]   PSYCH:  dementia [ ] depression [ ] anxiety[ ]                                                                                                               NEGATIVE[X ]   HEME/LYMPH:  bruises easily[ ] enlarged lymph nodes[ ] tender lymph nodes[ ]                                               NEGATIVE[ X]   ENDOCRINE:  cold intolerance[ ] heat intolerance[ ] polydipsia[ ]                                                                          NEGATIVE[ X]     PHYSICAL EXAM  Vital Signs Last 24 Hrs  T(C): 35.7 (17 Jul 2019 05:38), Max: 35.7 (16 Jul 2019 22:10)  T(F): 96.2 (17 Jul 2019 05:38), Max: 96.2 (16 Jul 2019 22:10)  HR: 90 (17 Jul 2019 05:38) (90 - 97)  BP: 154/77 (17 Jul 2019 05:38) (130/63 - 154/77)  BP(mean): --  RR: 18 (17 Jul 2019 05:38) (18 - 18)  SpO2: 98% (17 Jul 2019 08:41) (98% - 98%)  Right arm bp:                                 Left arm bp;    CONSTITUTIONAL:  WNL[ ]   Neuro: WNL [x ] Normal exam oriented to person/place & time with no focal motor or sensory  deficits. Other                     Eyes:    WNL [x ] Normal exam of conjunctiva & lids, pupils equally reactive. Other     ENT:     WNL [x ] Normal exam of nasal/oral mucosa with absence of cyanosis. Other  Neck:   WNL [x ] Normal exam of jugular veins, trachea & thyroid. Other  Chest:  WNL [x ] Normal lung exam with good air movement absence of wheezes, rales, or rhonchi: Other                                                                                CV:  Auscultation: normal [x ] S3[ ] S4[ ] Irregular [ ] Rub[ ] Clicks[ ]    Murmurs none:[x ]systolic [ ]  diastolic [ ] holosystolic [ ]  Carotids: No Bruits[x ] Other                 Abdominal Aorta: normal [ ] nonpalpable[ ]Other                                                                                      GI:           WNL[ ] Normal exam of abdomen, liver & spleen with no noted masses or tenderness. Other                                                                                                        Extremities: WNL[ ] Normal no evidence of cyanosis or deformity Edema: none[ ]trace[ ]1+[x]2+[ ]3+[ ]4+[ ]  Lower Extremity Pulses: Right[x ] Left[x ]Varicosities[ ]  SKIN :WNL[ ] Normal exam to inspection & palation. Other:                                                          LABS:                        7.5    18.00 )-----------( 73       ( 16 Jul 2019 22:05 )             22.2     07-16    135  |  95<L>  |  105<HH>  ----------------------------<  178<H>  4.1   |  27  |  1.6<H>    Ca    8.1<L>      16 Jul 2019 19:25  Phos  4.4     07-16    TPro  x   /  Alb  2.6<L>  /  TBili  x   /  DBili  x   /  AST  x   /  ALT  x   /  AlkPhos  x   07-16    PT/INR - ( 16 Jul 2019 04:50 )   PT: 18.70 sec;   INR: 1.63 ratio         PTT - ( 16 Jul 2019 04:50 )  PTT:32.9 sec        TTE / ELIJAH: Summary:   1. LV Ejection Fraction by Castle's Method with a biplane EF of 62 %.   2. Spectral Doppler shows impaired relaxation pattern of left   ventricular myocardial filling (Grade I diastolic dysfunction).   3. Degenerative mitral valve.   4. Mild mitral valve regurgitation.   5. Moderate thickening of the posterior mitral valve leaflet.   6. Moderately decreased posterior mitral leaflet mobility.   7. Partially mobile large vegetation (3.8 x 2.7 cm) noted on the   posterior leaflet of the mitral valve.   8. Sclerotic aortic valve with normal opening.   9. Aortic leaflets are thickened. Small vegetation cannot be excluded.  10. Findings discussed with the primary team.      STS Score:     Impression:  CAD [ ]  Valvular  disease [x ] - Mild mitral valve regurgitation Partially mobile large vegetation (3.8 x 2.7 cm) noted on the   posterior leaflet of the mitral valve, infective endocarditis   Aortic Disease [ ]   LUIS ALBERTO: Yes[ ] No [ ]   CKD Stage I [ ] , Stage II [ ] , Stage III [ ], Stage IV [ ]   Anemia: Yes [x ]: H/H of 7.5/22.2  Diabetes :Yes [ ], No [x ]  Acute MI: Yes [ ], No [x ]   Heart Failure: Yes [ ] , No [x ] HFpEF [ ], HFrEF [ ]      Assessment/ Plan: 68y Female with...  -Cases and plan discussed with CT surgeon /Chris/Magnus. Initial STS risk assessed and discussed with patient. Evaluation by full heart team pending. Attending note to follow. Pre-op for:     Recommendations:  [] hold Plavix  [] hold ASA if Pre-op Cardiac Valve surgery and patient without CAD  [] hold ACEI/ARB/CCB 24 hours prior to planned procedure   [] LUE/RUE precaution for possible radial artery harvest      Labs:  [] CBC  [] CMP  [] PT/INR/PTT  [] BNP  [] HgA1c  [] Type and screen  [] Urinalysis    Diagnostic studies  [] CT HEAD Nonn-Contrast  [] CT Chest with /without contrast   [] Carotid Duplex  [] PFT: Simple PFT [ ]  Full [ ]  [] JUANJOSE/PVR    Consultations/Evaluations   [] Renal Consult  [] Pulmonary Consult  [] Vascular Consult  [] Dental Consult   [] Hem-Onc Consult   [] GI Consult   [] Other Consultations : Surgeon: Dr. Agudelo    Consult requesting by: Dr. Wallis  Cardiologist: Dr. Bauman  PMD: Dr. German  GI: Dr. Alonso    HISTORY OF PRESENT ILLNESS:  69 yo obese F w/ hx of CAD s/p PCI (4/2018) and hypothyroidism sent by gastroenterologist for gallbladder hydrops. Pt found to have elevated liver enzymes by PMD in Apr 2019, referred to GI for w/u. MRCP demonstrated gallbladder hydrops 4.7cm with probable impacted stone at neck, and newly diagnosed liver cirrhosis w/mild ascites and LUIS ALBERTO on admission. Patient underwent non-targeted liver biopsy w/IR demonstrating sub-acute hepatitis likely statin induced vs autoimmune w/cirrhosis and anasarca. Thereafter patient's hospital course was complicated by urosepsis, anemia, GI bleed 2/2 DIC. Patient had recurrent bacteremia now with + bcx for Enterococcus. 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 Patient and family endorse pt has become severely physically deconditioned and has not been OOB in several weeks.         PAST MEDICAL & SURGICAL HISTORY:  Hypothyroidism  CAD S/P percutaneous coronary angioplasty  No significant past surgical history      MEDICATIONS  (STANDING):  ampicillin  IVPB      cefTRIAXone   IVPB 2000 milliGRAM(s) IV Intermittent every 12 hours  chlorhexidine 4% Liquid 1 Application(s) Topical <User Schedule>  cholecalciferol 2000 Unit(s) Oral daily  docusate sodium 100 milliGRAM(s) Oral three times a day  docusate sodium Liquid 100 milliGRAM(s) Oral three times a day  folic acid 1 milliGRAM(s) Oral daily  lactulose Retention Enema 200 Gram(s) Rectal once  levothyroxine 75 MICROGram(s) Oral daily  midodrine 10 milliGRAM(s) Oral every 8 hours  multivitamin 1 Tablet(s) Oral daily  nystatin Powder 1 Application(s) Topical three times a day  pantoprazole    Tablet 40 milliGRAM(s) Oral before breakfast  polyethylene glycol 3350 17 Gram(s) Oral every 12 hours  predniSONE   Tablet 10 milliGRAM(s) Oral daily  sodium chloride 0.9%. 1000 milliLiter(s) (75 mL/Hr) IV Continuous <Continuous>    MEDICATIONS  (PRN):  acetaminophen   Tablet .. 325 milliGRAM(s) Oral every 4 hours PRN Mild Pain (1 - 3)  aluminum hydroxide/magnesium hydroxide/simethicone Suspension 30 milliLiter(s) Oral every 4 hours PRN Dyspepsia  bisacodyl Suppository 10 milliGRAM(s) Rectal daily PRN Constipation      Allergies    No Known Allergies    Intolerances        PHYSICAL EXAM  Vital Signs Last 24 Hrs  T(C): 35.7 (17 Jul 2019 05:38), Max: 35.7 (16 Jul 2019 22:10)  T(F): 96.2 (17 Jul 2019 05:38), Max: 96.2 (16 Jul 2019 22:10)  HR: 90 (17 Jul 2019 05:38) (90 - 97)  BP: 154/77 (17 Jul 2019 05:38) (130/63 - 154/77)  BP(mean): --  RR: 18 (17 Jul 2019 05:38) (18 - 18)  SpO2: 98% (17 Jul 2019 08:41) (98% - 98%)      CONSTITUTIONAL:  WNL[ x]   Neuro: WNL [x ] Normal exam oriented to person/place & time with no focal motor or sensory  deficits. Other                     Eyes:    WNL [x ] Normal exam of conjunctiva & lids, pupils equally reactive. Other     ENT:     WNL [x ] Normal exam of nasal/oral mucosa with absence of cyanosis. Other  Neck:   WNL [x ] Normal exam of jugular veins, trachea & thyroid. Other  Chest:  WNL [x ] Normal lung exam with good air movement absence of wheezes, rales, or rhonchi: Other                                                                                CV:  Auscultation: normal [x ] S3[ ] S4[ ] Irregular [ ] Rub[ ] Clicks[ ]    Murmurs none:[ ] systolic [x ]  diastolic [ ] holosystolic [ ]  GI:  Abd soft/diffusely TTP w/anasarca  Extremities: +Edema: none[ ]trace[ ]1+[]2+[ x]3+[ ]4+[ ]                                                          LABS:                        7.5    18.00 )-----------( 73       ( 16 Jul 2019 22:05 )             22.2     07-16    135  |  95<L>  |  105<HH>  ----------------------------<  178<H>  4.1   |  27  |  1.6<H>    Ca    8.1<L>      16 Jul 2019 19:25  Phos  4.4     07-16    TPro  x   /  Alb  2.6<L>  /  TBili  x   /  DBili  x   /  AST  x   /  ALT  x   /  AlkPhos  x   07-16    PT/INR - ( 16 Jul 2019 04:50 )   PT: 18.70 sec;   INR: 1.63 ratio       PTT - ( 16 Jul 2019 04:50 )  PTT:32.9 sec        TTE / ELIJAH: Summary:   1. LV Ejection Fraction by Castle's Method with a biplane EF of 62 %.   2. Spectral Doppler shows impaired relaxation pattern of left   ventricular myocardial filling (Grade I diastolic dysfunction).   3. Degenerative mitral valve.   4. Mild mitral valve regurgitation.   5. Moderate thickening of the posterior mitral valve leaflet.   6. Moderately decreased posterior mitral leaflet mobility.   7. Partially mobile large vegetation (3.8 x 2.7 cm) noted on the   posterior leaflet of the mitral valve.   8. Sclerotic aortic valve with normal opening.   9. Aortic leaflets are thickened. Small vegetation cannot be excluded.  10. Findings discussed with the primary team.      Impression:  CAD [ ]  Valvular  disease [x ] - Mild mitral valve regurgitation Partially mobile large vegetation (3.8 x 2.7 cm) noted on the   posterior leaflet of the mitral valve, infective endocarditis   Aortic Disease [ ]   LUIS ALBERTO: Yes[ x] No [ ]   CKD Stage I [ ] , Stage II [ ] , Stage III [ ], Stage IV [ ]   Anemia: Yes [x ]: H/H of 7.5/22.2  Diabetes :Yes [ ], No [x ]  Acute MI: Yes [ ], No [x ]   Heart Failure: Yes [ ] , No [x ] HFpEF [ ], HFrEF [ ]      Assessment/ Plan: 69 yo severely physically deconditioned obese F w/ hx of CAD s/p PCI (4/2018) and hypothyroidism sent by gastroenterologist for gallbladder hydrops and worsening transaminemia. Pt found to have newly diagnosed liver cirrhosis w/mild ascites and LUIS ALBERTO on admission. Thereafter patient's hospital course was complicated by urosepsis, anemia, GI bleed 2/2 DIC. Now w/recurrent bacteremia for Enterococcus. TTE demonstrated large partially mobile mass on mitral valve measuring 3.8x2.7 cm w/mild MR restarted on IV abx.     -Cases and plan discussed with CT surgeon Dr. Agudelo. Initial risk assessed and discussed with patient. Evaluation by full heart team pending. Attending note to follow. Surgeon: Dr. Agudelo    Consult requesting by: Dr. Wallis  Cardiologist: Dr. Bauman  PMD: Dr. German  GI: Dr. Alonso    HISTORY OF PRESENT ILLNESS:  67 yo obese F w/ hx of CAD s/p PCI (4/2018) and hypothyroidism sent by gastroenterologist for gallbladder hydrops. Pt found to have elevated liver enzymes by PMD in Apr 2019, referred to GI for w/u. MRCP demonstrated gallbladder hydrops 4.7cm with probable impacted stone at neck, and newly diagnosed liver cirrhosis w/mild ascites and LUIS ALBERTO on admission. Patient underwent non-targeted liver biopsy w/IR demonstrating sub-acute hepatitis likely statin induced vs autoimmune w/cirrhosis and anasarca. Thereafter patient's hospital course was complicated by urosepsis, anemia, GI bleed 2/2 DIC. Patient had recurrent bacteremia now with + bcx for Enterococcus. 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 Patient and family endorse pt has become severely physically deconditioned and has not been OOB in several weeks.       PAST MEDICAL & SURGICAL HISTORY:  Hypothyroidism  CAD S/P percutaneous coronary angioplasty  No significant past surgical history    MEDICATIONS  (STANDING):  ampicillin  IVPB      cefTRIAXone   IVPB 2000 milliGRAM(s) IV Intermittent every 12 hours  chlorhexidine 4% Liquid 1 Application(s) Topical <User Schedule>  cholecalciferol 2000 Unit(s) Oral daily  docusate sodium 100 milliGRAM(s) Oral three times a day  docusate sodium Liquid 100 milliGRAM(s) Oral three times a day  folic acid 1 milliGRAM(s) Oral daily  lactulose Retention Enema 200 Gram(s) Rectal once  levothyroxine 75 MICROGram(s) Oral daily  midodrine 10 milliGRAM(s) Oral every 8 hours  multivitamin 1 Tablet(s) Oral daily  nystatin Powder 1 Application(s) Topical three times a day  pantoprazole    Tablet 40 milliGRAM(s) Oral before breakfast  polyethylene glycol 3350 17 Gram(s) Oral every 12 hours  predniSONE   Tablet 10 milliGRAM(s) Oral daily  sodium chloride 0.9%. 1000 milliLiter(s) (75 mL/Hr) IV Continuous <Continuous>    MEDICATIONS  (PRN):  acetaminophen   Tablet .. 325 milliGRAM(s) Oral every 4 hours PRN Mild Pain (1 - 3)  aluminum hydroxide/magnesium hydroxide/simethicone Suspension 30 milliLiter(s) Oral every 4 hours PRN Dyspepsia  bisacodyl Suppository 10 milliGRAM(s) Rectal daily PRN Constipation      Allergies  No Known Allergies    PHYSICAL EXAM  Vital Signs Last 24 Hrs  T(C): 35.7 (17 Jul 2019 05:38), Max: 35.7 (16 Jul 2019 22:10)  T(F): 96.2 (17 Jul 2019 05:38), Max: 96.2 (16 Jul 2019 22:10)  HR: 90 (17 Jul 2019 05:38) (90 - 97)  BP: 154/77 (17 Jul 2019 05:38) (130/63 - 154/77)  BP(mean): --  RR: 18 (17 Jul 2019 05:38) (18 - 18)  SpO2: 98% (17 Jul 2019 08:41) (98% - 98%)    CONSTITUTIONAL:  WNL[ x]   Neuro: WNL [x ] Normal exam oriented to person/place & time with no focal motor or sensory  deficits. Other                     Eyes:    WNL [x ] Normal exam of conjunctiva & lids, pupils equally reactive. Other     ENT:     WNL [x ] Normal exam of nasal/oral mucosa with absence of cyanosis. Other  Neck:   WNL [x ] Normal exam of jugular veins, trachea & thyroid. Other  Chest:  WNL [x ] Normal lung exam with good air movement absence of wheezes, rales, or rhonchi: Other                                                                                CV:  Auscultation: normal [x ] S3[ ] S4[ ] Irregular [ ] Rub[ ] Clicks[ ]    Murmurs none:[ ] systolic [x ]  diastolic [ ] holosystolic [ ]  GI:  Abd soft/diffusely TTP w/anasarca  Extremities: +Edema: none[ ]trace[ ]1+[]2+[ x]3+[ ]4+[ ]                                                          LABS:                        7.5    18.00 )-----------( 73       ( 16 Jul 2019 22:05 )             22.2     07-16    135  |  95<L>  |  105<HH>  ----------------------------<  178<H>  4.1   |  27  |  1.6<H>    Ca    8.1<L>      16 Jul 2019 19:25  Phos  4.4     07-16    TPro  x   /  Alb  2.6<L>  /  TBili  x   /  DBili  x   /  AST  x   /  ALT  x   /  AlkPhos  x   07-16    PT/INR - ( 16 Jul 2019 04:50 )   PT: 18.70 sec;   INR: 1.63 ratio       PTT - ( 16 Jul 2019 04:50 )  PTT:32.9 sec    TTE / ELIJAH: Summary:   1. LV Ejection Fraction by Castle's Method with a biplane EF of 62 %.   2. Spectral Doppler shows impaired relaxation pattern of left   ventricular myocardial filling (Grade I diastolic dysfunction).   3. Degenerative mitral valve.   4. Mild mitral valve regurgitation.   5. Moderate thickening of the posterior mitral valve leaflet.   6. Moderately decreased posterior mitral leaflet mobility.   7. Partially mobile large vegetation (3.8 x 2.7 cm) noted on the   posterior leaflet of the mitral valve.   8. Sclerotic aortic valve with normal opening.   9. Aortic leaflets are thickened. Small vegetation cannot be excluded.  10. Findings discussed with the primary team.    Assessment/ Plan: 67 yo severely physically deconditioned obese F w/ hx of CAD s/p PCI (4/2018) and hypothyroidism sent by gastroenterologist for gallbladder hydrops and worsening transaminemia. Pt found to have newly diagnosed liver cirrhosis w/mild ascites and LUIS ALBERTO on admission. Thereafter patient's hospital course was complicated by urosepsis, anemia, GI bleed 2/2 DIC. Now w/recurrent bacteremia for Enterococcus. TTE demonstrated large partially mobile mass on mitral valve measuring 3.8x2.7 cm w/mild MR restarted on IV abx.     -Cases and plan discussed with CT surgeon Dr. Agudelo. Initial risk assessed and discussed with patient. Evaluation by full heart team pending. Attending note to follow.

## 2019-07-17 NOTE — PROGRESS NOTE ADULT - SUBJECTIVE AND OBJECTIVE BOX
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, Female  Allergy: No Known Allergies      CHIEF COMPLAINT: gallbladder hydrops (17 Jul 2019 09:07)      INTERVAL EVENTS/HPI  - No acute events overnight, BCX 7/15+, 7/16+  - T(F): , Max: 96.2 (07-16-19 @ 22:10)  - Tolerating medication  - WBC Count: 18.00 K/uL (07-16-19 @ 22:05) pending repeat       ROS  General: Denies rigors, nightsweats  HEENT: Denies headache, rhinorrhea, sore throat, eye pain  CV: Denies CP, palpitations  PULM: Denies SOB, wheezing  GI: Denies abdominal pain, hematochezia/melena  : Denies dysuria, hematuria  MSK: Denies arthralgias, myalgias  SKIN: Denies rash, lesions  NEURO: Denies paresthesias, weakness  PSYCH: Denies depression, anxiety    VITALS:  T(F): 96.2, Max: 96.2 (07-16-19 @ 22:10)  HR: 90  BP: 154/77  RR: 18Vital Signs Last 24 Hrs  T(C): 35.7 (17 Jul 2019 05:38), Max: 35.7 (16 Jul 2019 22:10)  T(F): 96.2 (17 Jul 2019 05:38), Max: 96.2 (16 Jul 2019 22:10)  HR: 90 (17 Jul 2019 05:38) (90 - 97)  BP: 154/77 (17 Jul 2019 05:38) (130/63 - 154/77)  BP(mean): --  RR: 18 (17 Jul 2019 05:38) (18 - 18)  SpO2: 98% (17 Jul 2019 08:41) (98% - 98%)    PHYSICAL EXAM:  Gen: NAD, resting in bed  HEENT: Normocephalic, atraumatic  Neck: supple, no lymphadenopathy  CV: Regular rate & regular rhythm  Lungs: decreased BS at bases, no fremitus  Abdomen: Soft, BS present, TTP LLQ  Ext: Warm, well perfused, anasarca  Neuro: non focal, awake  Skin: no rash, no erythema  Lines: no phlebitis      FH: Non-contributory  Social Hx: Non-contributory    TESTS & MEASUREMENTS:                        7.5    18.00 )-----------( 73       ( 16 Jul 2019 22:05 )             22.2     07-16    135  |  95<L>  |  105<HH>  ----------------------------<  178<H>  4.1   |  27  |  1.6<H>    Ca    8.1<L>      16 Jul 2019 19:25  Phos  4.4     07-16    TPro  x   /  Alb  2.6<L>  /  TBili  x   /  DBili  x   /  AST  x   /  ALT  x   /  AlkPhos  x   07-16    eGFR if Non African American: 33 mL/min/1.73M2 (07-16-19 @ 19:25)  eGFR if : 38 mL/min/1.73M2 (07-16-19 @ 19:25)    LIVER FUNCTIONS - ( 16 Jul 2019 19:25 )  Alb: 2.6 g/dL / Pro: x     / ALK PHOS: x     / ALT: x     / AST: x     / GGT: x               Culture - Blood (collected 07-16-19 @ 05:22)  Source: .Blood None  Gram Stain (07-17-19 @ 05:10):    Growth in aerobic bottle: Gram Positive Cocci in Pairs and Chains  Preliminary Report (07-17-19 @ 05:10):    Growth in aerobic bottle: Gram Positive Cocci in Pairs and Chains    Culture - Blood (collected 07-15-19 @ 16:34)  Source: .Blood None  Gram Stain (07-16-19 @ 22:37):    Growth in anaerobic bottle: Gram Positive Cocci in Pairs and Chains    Growth in aerobic bottle: Gram Positive Cocci in Pairs and Chains  Preliminary Report (07-16-19 @ 22:38):    Growth in anaerobic bottle: Gram Positive Cocci in Pairs and Chains    Growth in aerobic bottle: Gram Positive Cocci in Pairs and Chains    Culture - Blood (collected 07-14-19 @ 12:53)  Source: .Blood Blood  Gram Stain (07-15-19 @ 06:12):    Growth in aerobic bottle: Gram Positive Cocci in Pairs and Chains    Growth in anaerobic bottle: Gram Positive Cocci in Pairs and Chains  Preliminary Report (07-16-19 @ 15:03):    Growth in aerobic and anaerobic bottles: Gram Positive Cocci in Pairs and    Chains Identification and susceptibility to follow.    "Due to technical problems, Proteus sp. will Not be reported as part of    the BCID panel until further notice"    ***Blood Panel PCR results on this specimen are available    approximately 3 hours after the Gram stain result.***    Gram stain, PCR, and/or culture results may not always    correspond due to difference in methodologies.    ************************************************************    This PCR assay was performed using [employer].    The following targets are tested for: Enterococcus,    vancomycin resistant enterococci, Listeria monocytogenes,    coagulase negative staphylococci, S. aureus,    methicillin resistant S. aureus, Streptococcus agalactiae    (Group B), S. pneumoniae, S. pyogenes (Group A),    Acinetobacter baumannii, Enterobacter cloacae, E. coli,    Klebsiella oxytoca, K. pneumoniae, Proteus sp.,    Serratia marcescens, Haemophilus influenzae,    Neisseria meningitidis, Pseudomonas aeruginosa, Candida    albicans, C. glabrata, C krusei, C parapsilosis,    C. tropicalis and the KPC resistance gene.  Organism: Blood Culture PCR (07-15-19 @ 07:10)  Organism: Blood Culture PCR (07-15-19 @ 07:10)      -  Enterococcus species: Detec      Method Type: PCR    Culture - Urine (collected 07-13-19 @ 23:00)  Source: .Urine Clean Catch (Midstream)  Final Report (07-16-19 @ 08:59):    >100,000 CFU/ml Citrobacter braakii  Organism: Citrobacter braakii (07-16-19 @ 08:59)  Organism: Citrobacter braakii (07-16-19 @ 08:59)      -  Amikacin: S <=16      -  Ampicillin: R >16 These ampicillin results predict results for amoxicillin      -  Ampicillin/Sulbactam: R >16/8 Enterobacter, Citrobacter, and Serratia may develop resistance during prolonged therapy (3-4 days)      -  Aztreonam: I 16      -  Cefazolin: R >16      -  Cefepime: S <=4      -  Cefoxitin: R >16      -  Ceftriaxone: R 32 Enterobacter, Citrobacter, and Serratia may develop resistance during prolonged therapy      -  Ciprofloxacin: S <=1      -  Ertapenem: S <=1      -  Gentamicin: S <=4      -  Imipenem: S <=1      -  Levofloxacin: S <=2      -  Meropenem: S <=1      -  Nitrofurantoin: S <=32 Should not be used to treat pyelonephritis      -  Piperacillin/Tazobactam: S <=16      -  Tigecycline: S <=2      -  Tobramycin: S <=4      -  Trimethoprim/Sulfamethoxazole: S <=2/38      Method Type: DILLON        Lactate, Blood: 2.1 mmol/L (07-16-19 @ 11:28)  Lactate, Blood: 2.7 mmol/L (07-15-19 @ 22:45)  Lactate, Blood: 2.7 mmol/L (07-15-19 @ 05:57)  Lactate, Blood: 2.4 mmol/L (07-15-19 @ 00:44)  Lactate, Blood: 2.9 mmol/L (07-14-19 @ 08:42)      INFECTIOUS DISEASES TESTING  HIV-1/2 Combo Result: Nonreact (06-16-19 @ 00:50)      RADIOLOGY & ADDITIONAL TESTS:  I have personally reviewed the last Chest xray  CXR  Xray Chest 1 View- PORTABLE-Urgent:   EXAM:  XR CHEST PORTABLE URGENT 1V            PROCEDURE DATE:  07/16/2019            INTERPRETATION:  Clinical History / Reason for exam: Sepsis    Comparison : Chest radiograph 7/10/2019.    Technique/Positioning: AP portable    Findings:    Support devices: None.    Cardiac/mediastinum/hilum: Unremarkable.    Lung parenchyma/Pleura: Low lung volumes with increasing bibasilar   opacities/atelectasis. No pneumothorax    Skeleton/soft tissues: Unremarkable.    Impression:      Low lung volumes with increasing bibasilar opacities/atelectasis                      RALPH COLEMAN M.D., ATTENDING RADIOLOGIST  This document has been electronically signed. Jul 16 2019 11:25AM             (07-16-19 @ 10:46)      CT      CARDIOLOGY TESTING  12 Lead ECG:   Ventricular Rate 97 BPM    Atrial Rate 97 BPM    P-R Interval 150 ms    QRS Duration 124 ms    Q-T Interval 382 ms    QTC Calculation(Bezet) 485 ms    P Axis 73 degrees    R Axis -32 degrees    T Axis 51 degrees    Diagnosis Line Normal sinus rhythm  Left axis deviation  Non-specific intra-ventricular conduction delay  Abnormal ECG    Confirmed by Rhett Hou ([id number]) on 7/14/2019 10:20:53 AM (07-14-19 @ 09:04)      MEDICATIONS  chlorhexidine 4% Liquid 1  cholecalciferol 2000  docusate sodium 100  docusate sodium Liquid 100  folic acid 1  levothyroxine 75  midodrine 10  multivitamin 1  nystatin Powder 1  pantoprazole    Tablet 40  piperacillin/tazobactam IVPB.. 2.25  polyethylene glycol 3350 17  predniSONE   Tablet 10  sodium chloride 0.9%. 1000  vancomycin  IVPB 1000      ANTIBIOTICS:  piperacillin/tazobactam IVPB.. 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, Female  Allergy: No Known Allergies      CHIEF COMPLAINT: gallbladder hydrops (17 Jul 2019 09:07)      INTERVAL EVENTS/HPI  - No acute events overnight, BCX 7/15+, 7/16+  - Per Cards TTE with large MV vegetation  - T(F): , Max: 96.2 (07-16-19 @ 22:10)  - Tolerating medication  - WBC Count: 18.00 K/uL (07-16-19 @ 22:05) pending repeat       ROS  General: Denies rigors, nightsweats  HEENT: Denies headache, rhinorrhea, sore throat, eye pain  CV: Denies CP, palpitations  PULM: Denies SOB, wheezing  GI: Denies abdominal pain, hematochezia/melena  : Denies dysuria, hematuria  MSK: Denies arthralgias, myalgias  SKIN: Denies rash, lesions  NEURO: Denies paresthesias, weakness  PSYCH: Denies depression, anxiety    VITALS:  T(F): 96.2, Max: 96.2 (07-16-19 @ 22:10)  HR: 90  BP: 154/77  RR: 18Vital Signs Last 24 Hrs  T(C): 35.7 (17 Jul 2019 05:38), Max: 35.7 (16 Jul 2019 22:10)  T(F): 96.2 (17 Jul 2019 05:38), Max: 96.2 (16 Jul 2019 22:10)  HR: 90 (17 Jul 2019 05:38) (90 - 97)  BP: 154/77 (17 Jul 2019 05:38) (130/63 - 154/77)  BP(mean): --  RR: 18 (17 Jul 2019 05:38) (18 - 18)  SpO2: 98% (17 Jul 2019 08:41) (98% - 98%)    PHYSICAL EXAM:  Gen: NAD, resting in bed  HEENT: Normocephalic, atraumatic  Neck: supple, no lymphadenopathy  CV: Regular rate & regular rhythm  Lungs: decreased BS at bases, no fremitus  Abdomen: Soft, BS present, TTP LLQ  Ext: Warm, well perfused, anasarca  Neuro: non focal, awake  Skin: no rash, no erythema  Lines: no phlebitis      FH: Non-contributory  Social Hx: Non-contributory    TESTS & MEASUREMENTS:                        7.5    18.00 )-----------( 73       ( 16 Jul 2019 22:05 )             22.2     07-16    135  |  95<L>  |  105<HH>  ----------------------------<  178<H>  4.1   |  27  |  1.6<H>    Ca    8.1<L>      16 Jul 2019 19:25  Phos  4.4     07-16    TPro  x   /  Alb  2.6<L>  /  TBili  x   /  DBili  x   /  AST  x   /  ALT  x   /  AlkPhos  x   07-16    eGFR if Non African American: 33 mL/min/1.73M2 (07-16-19 @ 19:25)  eGFR if : 38 mL/min/1.73M2 (07-16-19 @ 19:25)    LIVER FUNCTIONS - ( 16 Jul 2019 19:25 )  Alb: 2.6 g/dL / Pro: x     / ALK PHOS: x     / ALT: x     / AST: x     / GGT: x               Culture - Blood (collected 07-16-19 @ 05:22)  Source: .Blood None  Gram Stain (07-17-19 @ 05:10):    Growth in aerobic bottle: Gram Positive Cocci in Pairs and Chains  Preliminary Report (07-17-19 @ 05:10):    Growth in aerobic bottle: Gram Positive Cocci in Pairs and Chains    Culture - Blood (collected 07-15-19 @ 16:34)  Source: .Blood None  Gram Stain (07-16-19 @ 22:37):    Growth in anaerobic bottle: Gram Positive Cocci in Pairs and Chains    Growth in aerobic bottle: Gram Positive Cocci in Pairs and Chains  Preliminary Report (07-16-19 @ 22:38):    Growth in anaerobic bottle: Gram Positive Cocci in Pairs and Chains    Growth in aerobic bottle: Gram Positive Cocci in Pairs and Chains    Culture - Blood (collected 07-14-19 @ 12:53)  Source: .Blood Blood  Gram Stain (07-15-19 @ 06:12):    Growth in aerobic bottle: Gram Positive Cocci in Pairs and Chains    Growth in anaerobic bottle: Gram Positive Cocci in Pairs and Chains  Preliminary Report (07-16-19 @ 15:03):    Growth in aerobic and anaerobic bottles: Gram Positive Cocci in Pairs and    Chains Identification and susceptibility to follow.    "Due to technical problems, Proteus sp. will Not be reported as part of    the BCID panel until further notice"    ***Blood Panel PCR results on this specimen are available    approximately 3 hours after the Gram stain result.***    Gram stain, PCR, and/or culture results may not always    correspond due to difference in methodologies.    ************************************************************    This PCR assay was performed using [employer].    The following targets are tested for: Enterococcus,    vancomycin resistant enterococci, Listeria monocytogenes,    coagulase negative staphylococci, S. aureus,    methicillin resistant S. aureus, Streptococcus agalactiae    (Group B), S. pneumoniae, S. pyogenes (Group A),    Acinetobacter baumannii, Enterobacter cloacae, E. coli,    Klebsiella oxytoca, K. pneumoniae, Proteus sp.,    Serratia marcescens, Haemophilus influenzae,    Neisseria meningitidis, Pseudomonas aeruginosa, Candida    albicans, C. glabrata, C krusei, C parapsilosis,    C. tropicalis and the KPC resistance gene.  Organism: Blood Culture PCR (07-15-19 @ 07:10)  Organism: Blood Culture PCR (07-15-19 @ 07:10)      -  Enterococcus species: Detec      Method Type: PCR    Culture - Urine (collected 07-13-19 @ 23:00)  Source: .Urine Clean Catch (Midstream)  Final Report (07-16-19 @ 08:59):    >100,000 CFU/ml Citrobacter braakii  Organism: Citrobacter braakii (07-16-19 @ 08:59)  Organism: Citrobacter braakii (07-16-19 @ 08:59)      -  Amikacin: S <=16      -  Ampicillin: R >16 These ampicillin results predict results for amoxicillin      -  Ampicillin/Sulbactam: R >16/8 Enterobacter, Citrobacter, and Serratia may develop resistance during prolonged therapy (3-4 days)      -  Aztreonam: I 16      -  Cefazolin: R >16      -  Cefepime: S <=4      -  Cefoxitin: R >16      -  Ceftriaxone: R 32 Enterobacter, Citrobacter, and Serratia may develop resistance during prolonged therapy      -  Ciprofloxacin: S <=1      -  Ertapenem: S <=1      -  Gentamicin: S <=4      -  Imipenem: S <=1      -  Levofloxacin: S <=2      -  Meropenem: S <=1      -  Nitrofurantoin: S <=32 Should not be used to treat pyelonephritis      -  Piperacillin/Tazobactam: S <=16      -  Tigecycline: S <=2      -  Tobramycin: S <=4      -  Trimethoprim/Sulfamethoxazole: S <=2/38      Method Type: DILLON        Lactate, Blood: 2.1 mmol/L (07-16-19 @ 11:28)  Lactate, Blood: 2.7 mmol/L (07-15-19 @ 22:45)  Lactate, Blood: 2.7 mmol/L (07-15-19 @ 05:57)  Lactate, Blood: 2.4 mmol/L (07-15-19 @ 00:44)  Lactate, Blood: 2.9 mmol/L (07-14-19 @ 08:42)      INFECTIOUS DISEASES TESTING  HIV-1/2 Combo Result: Nonreact (06-16-19 @ 00:50)      RADIOLOGY & ADDITIONAL TESTS:  I have personally reviewed the last Chest xray  CXR  Xray Chest 1 View- PORTABLE-Urgent:   EXAM:  XR CHEST PORTABLE URGENT 1V            PROCEDURE DATE:  07/16/2019            INTERPRETATION:  Clinical History / Reason for exam: Sepsis    Comparison : Chest radiograph 7/10/2019.    Technique/Positioning: AP portable    Findings:    Support devices: None.    Cardiac/mediastinum/hilum: Unremarkable.    Lung parenchyma/Pleura: Low lung volumes with increasing bibasilar   opacities/atelectasis. No pneumothorax    Skeleton/soft tissues: Unremarkable.    Impression:      Low lung volumes with increasing bibasilar opacities/atelectasis                      RALPH COLEMAN M.D., ATTENDING RADIOLOGIST  This document has been electronically signed. Jul 16 2019 11:25AM             (07-16-19 @ 10:46)      CT      CARDIOLOGY TESTING  12 Lead ECG:   Ventricular Rate 97 BPM    Atrial Rate 97 BPM    P-R Interval 150 ms    QRS Duration 124 ms    Q-T Interval 382 ms    QTC Calculation(Bezet) 485 ms    P Axis 73 degrees    R Axis -32 degrees    T Axis 51 degrees    Diagnosis Line Normal sinus rhythm  Left axis deviation  Non-specific intra-ventricular conduction delay  Abnormal ECG    Confirmed by Rhett Hou ([id number]) on 7/14/2019 10:20:53 AM (07-14-19 @ 09:04)      MEDICATIONS  chlorhexidine 4% Liquid 1  cholecalciferol 2000  docusate sodium 100  docusate sodium Liquid 100  folic acid 1  levothyroxine 75  midodrine 10  multivitamin 1  nystatin Powder 1  pantoprazole    Tablet 40  piperacillin/tazobactam IVPB.. 2.25  polyethylene glycol 3350 17  predniSONE   Tablet 10  sodium chloride 0.9%. 1000  vancomycin  IVPB 1000      ANTIBIOTICS:  piperacillin/tazobactam IVPB.. 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 Vegetation could not be ruled out  - T(F): , Max: 96.2 (07-16-19 @ 22:10)  - Tolerating medication  - WBC Count: 18.00 K/uL (07-16-19 @ 22:05) pending repeat       ROS  General: Denies rigors, nightsweats  HEENT: Denies headache, rhinorrhea, sore throat, eye pain  CV: Denies CP, palpitations  PULM: Denies SOB, wheezing  GI: Denies abdominal pain, hematochezia/melena  : Denies dysuria, hematuria  MSK: Denies arthralgias, myalgias  SKIN: Denies rash, lesions  NEURO: Denies paresthesias, weakness  PSYCH: Denies depression, anxiety    VITALS:  T(F): 96.2, Max: 96.2 (07-16-19 @ 22:10)  HR: 90  BP: 154/77  RR: 18Vital Signs Last 24 Hrs  T(C): 35.7 (17 Jul 2019 05:38), Max: 35.7 (16 Jul 2019 22:10)  T(F): 96.2 (17 Jul 2019 05:38), Max: 96.2 (16 Jul 2019 22:10)  HR: 90 (17 Jul 2019 05:38) (90 - 97)  BP: 154/77 (17 Jul 2019 05:38) (130/63 - 154/77)  BP(mean): --  RR: 18 (17 Jul 2019 05:38) (18 - 18)  SpO2: 98% (17 Jul 2019 08:41) (98% - 98%)    PHYSICAL EXAM:  Gen: NAD, resting in bed  HEENT: Normocephalic, atraumatic  Neck: supple, no lymphadenopathy  CV: Regular rate & regular rhythm  Lungs: decreased BS at bases, no fremitus  Abdomen: Soft, BS present, TTP LLQ  Ext: Warm, well perfused, anasarca  Neuro: non focal, awake  Skin: no rash, no erythema  Lines: no phlebitis      FH: Non-contributory  Social Hx: Non-contributory    TESTS & MEASUREMENTS:                        7.5    18.00 )-----------( 73       ( 16 Jul 2019 22:05 )             22.2     07-16    135  |  95<L>  |  105<HH>  ----------------------------<  178<H>  4.1   |  27  |  1.6<H>    Ca    8.1<L>      16 Jul 2019 19:25  Phos  4.4     07-16    TPro  x   /  Alb  2.6<L>  /  TBili  x   /  DBili  x   /  AST  x   /  ALT  x   /  AlkPhos  x   07-16    eGFR if Non African American: 33 mL/min/1.73M2 (07-16-19 @ 19:25)  eGFR if : 38 mL/min/1.73M2 (07-16-19 @ 19:25)    LIVER FUNCTIONS - ( 16 Jul 2019 19:25 )  Alb: 2.6 g/dL / Pro: x     / ALK PHOS: x     / ALT: x     / AST: x     / GGT: x               Culture - Blood (collected 07-16-19 @ 05:22)  Source: .Blood None  Gram Stain (07-17-19 @ 05:10):    Growth in aerobic bottle: Gram Positive Cocci in Pairs and Chains  Preliminary Report (07-17-19 @ 05:10):    Growth in aerobic bottle: Gram Positive Cocci in Pairs and Chains    Culture - Blood (collected 07-15-19 @ 16:34)  Source: .Blood None  Gram Stain (07-16-19 @ 22:37):    Growth in anaerobic bottle: Gram Positive Cocci in Pairs and Chains    Growth in aerobic bottle: Gram Positive Cocci in Pairs and Chains  Preliminary Report (07-16-19 @ 22:38):    Growth in anaerobic bottle: Gram Positive Cocci in Pairs and Chains    Growth in aerobic bottle: Gram Positive Cocci in Pairs and Chains    Culture - Blood (collected 07-14-19 @ 12:53)  Source: .Blood Blood  Gram Stain (07-15-19 @ 06:12):    Growth in aerobic bottle: Gram Positive Cocci in Pairs and Chains    Growth in anaerobic bottle: Gram Positive Cocci in Pairs and Chains  Preliminary Report (07-16-19 @ 15:03):    Growth in aerobic and anaerobic bottles: Gram Positive Cocci in Pairs and    Chains Identification and susceptibility to follow.    "Due to technical problems, Proteus sp. will Not be reported as part of    the BCID panel until further notice"    ***Blood Panel PCR results on this specimen are available    approximately 3 hours after the Gram stain result.***    Gram stain, PCR, and/or culture results may not always    correspond due to difference in methodologies.    ************************************************************    This PCR assay was performed using [employer].    The following targets are tested for: Enterococcus,    vancomycin resistant enterococci, Listeria monocytogenes,    coagulase negative staphylococci, S. aureus,    methicillin resistant S. aureus, Streptococcus agalactiae    (Group B), S. pneumoniae, S. pyogenes (Group A),    Acinetobacter baumannii, Enterobacter cloacae, E. coli,    Klebsiella oxytoca, K. pneumoniae, Proteus sp.,    Serratia marcescens, Haemophilus influenzae,    Neisseria meningitidis, Pseudomonas aeruginosa, Candida    albicans, C. glabrata, C krusei, C parapsilosis,    C. tropicalis and the KPC resistance gene.  Organism: Blood Culture PCR (07-15-19 @ 07:10)  Organism: Blood Culture PCR (07-15-19 @ 07:10)      -  Enterococcus species: Detec      Method Type: PCR    Culture - Urine (collected 07-13-19 @ 23:00)  Source: .Urine Clean Catch (Midstream)  Final Report (07-16-19 @ 08:59):    >100,000 CFU/ml Citrobacter braakii  Organism: Citrobacter braakii (07-16-19 @ 08:59)  Organism: Citrobacter braakii (07-16-19 @ 08:59)      -  Amikacin: S <=16      -  Ampicillin: R >16 These ampicillin results predict results for amoxicillin      -  Ampicillin/Sulbactam: R >16/8 Enterobacter, Citrobacter, and Serratia may develop resistance during prolonged therapy (3-4 days)      -  Aztreonam: I 16      -  Cefazolin: R >16      -  Cefepime: S <=4      -  Cefoxitin: R >16      -  Ceftriaxone: R 32 Enterobacter, Citrobacter, and Serratia may develop resistance during prolonged therapy      -  Ciprofloxacin: S <=1      -  Ertapenem: S <=1      -  Gentamicin: S <=4      -  Imipenem: S <=1      -  Levofloxacin: S <=2      -  Meropenem: S <=1      -  Nitrofurantoin: S <=32 Should not be used to treat pyelonephritis      -  Piperacillin/Tazobactam: S <=16      -  Tigecycline: S <=2      -  Tobramycin: S <=4      -  Trimethoprim/Sulfamethoxazole: S <=2/38      Method Type: DILLON        Lactate, Blood: 2.1 mmol/L (07-16-19 @ 11:28)  Lactate, Blood: 2.7 mmol/L (07-15-19 @ 22:45)  Lactate, Blood: 2.7 mmol/L (07-15-19 @ 05:57)  Lactate, Blood: 2.4 mmol/L (07-15-19 @ 00:44)  Lactate, Blood: 2.9 mmol/L (07-14-19 @ 08:42)      INFECTIOUS DISEASES TESTING  HIV-1/2 Combo Result: Nonreact (06-16-19 @ 00:50)      RADIOLOGY & ADDITIONAL TESTS:  I have personally reviewed the last Chest xray  CXR  Xray Chest 1 View- PORTABLE-Urgent:   EXAM:  XR CHEST PORTABLE URGENT 1V            PROCEDURE DATE:  07/16/2019            INTERPRETATION:  Clinical History / Reason for exam: Sepsis    Comparison : Chest radiograph 7/10/2019.    Technique/Positioning: AP portable    Findings:    Support devices: None.    Cardiac/mediastinum/hilum: Unremarkable.    Lung parenchyma/Pleura: Low lung volumes with increasing bibasilar   opacities/atelectasis. No pneumothorax    Skeleton/soft tissues: Unremarkable.    Impression:      Low lung volumes with increasing bibasilar opacities/atelectasis                      RALPH COLEMAN M.D., ATTENDING RADIOLOGIST  This document has been electronically signed. Jul 16 2019 11:25AM             (07-16-19 @ 10:46)      CT      CARDIOLOGY TESTING  12 Lead ECG:   Ventricular Rate 97 BPM    Atrial Rate 97 BPM    P-R Interval 150 ms    QRS Duration 124 ms    Q-T Interval 382 ms    QTC Calculation(Bezet) 485 ms    P Axis 73 degrees    R Axis -32 degrees    T Axis 51 degrees    Diagnosis Line Normal sinus rhythm  Left axis deviation  Non-specific intra-ventricular conduction delay  Abnormal ECG    Confirmed by Rhett Hou ([id number]) on 7/14/2019 10:20:53 AM (07-14-19 @ 09:04)      MEDICATIONS  chlorhexidine 4% Liquid 1  cholecalciferol 2000  docusate sodium 100  docusate sodium Liquid 100  folic acid 1  levothyroxine 75  midodrine 10  multivitamin 1  nystatin Powder 1  pantoprazole    Tablet 40  piperacillin/tazobactam IVPB.. 2.25  polyethylene glycol 3350 17  predniSONE   Tablet 10  sodium chloride 0.9%. 1000  vancomycin  IVPB 1000      ANTIBIOTICS:  piperacillin/tazobactam IVPB.. 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  - T(F): , Max: 96.2 (07-16-19 @ 22:10)  - Tolerating medication  - WBC Count: 18.00 K/uL (07-16-19 @ 22:05) pending repeat       ROS  General: Denies rigors, nightsweats  HEENT: Denies headache, rhinorrhea, sore throat, eye pain  CV: Denies CP, palpitations  PULM: Denies SOB, wheezing  GI: Denies abdominal pain, hematochezia/melena  : Denies dysuria, hematuria  MSK: Denies arthralgias, myalgias  SKIN: Denies rash, lesions  NEURO: Denies paresthesias, weakness  PSYCH: Denies depression, anxiety    VITALS:  T(F): 96.2, Max: 96.2 (07-16-19 @ 22:10)  HR: 90  BP: 154/77  RR: 18Vital Signs Last 24 Hrs  T(C): 35.7 (17 Jul 2019 05:38), Max: 35.7 (16 Jul 2019 22:10)  T(F): 96.2 (17 Jul 2019 05:38), Max: 96.2 (16 Jul 2019 22:10)  HR: 90 (17 Jul 2019 05:38) (90 - 97)  BP: 154/77 (17 Jul 2019 05:38) (130/63 - 154/77)  BP(mean): --  RR: 18 (17 Jul 2019 05:38) (18 - 18)  SpO2: 98% (17 Jul 2019 08:41) (98% - 98%)    PHYSICAL EXAM:  Gen: NAD, resting in bed  HEENT: Normocephalic, atraumatic  Neck: supple, no lymphadenopathy  CV: Regular rate & regular rhythm  Lungs: decreased BS at bases, no fremitus  Abdomen: Soft, BS present, TTP LLQ  Ext: Warm, well perfused, anasarca  Neuro: non focal, awake  Skin: no rash, no erythema  Lines: no phlebitis      FH: Non-contributory  Social Hx: Non-contributory    TESTS & MEASUREMENTS:                        7.5    18.00 )-----------( 73       ( 16 Jul 2019 22:05 )             22.2     07-16    135  |  95<L>  |  105<HH>  ----------------------------<  178<H>  4.1   |  27  |  1.6<H>    Ca    8.1<L>      16 Jul 2019 19:25  Phos  4.4     07-16    TPro  x   /  Alb  2.6<L>  /  TBili  x   /  DBili  x   /  AST  x   /  ALT  x   /  AlkPhos  x   07-16    eGFR if Non African American: 33 mL/min/1.73M2 (07-16-19 @ 19:25)  eGFR if : 38 mL/min/1.73M2 (07-16-19 @ 19:25)    LIVER FUNCTIONS - ( 16 Jul 2019 19:25 )  Alb: 2.6 g/dL / Pro: x     / ALK PHOS: x     / ALT: x     / AST: x     / GGT: x               Culture - Blood (collected 07-16-19 @ 05:22)  Source: .Blood None  Gram Stain (07-17-19 @ 05:10):    Growth in aerobic bottle: Gram Positive Cocci in Pairs and Chains  Preliminary Report (07-17-19 @ 05:10):    Growth in aerobic bottle: Gram Positive Cocci in Pairs and Chains    Culture - Blood (collected 07-15-19 @ 16:34)  Source: .Blood None  Gram Stain (07-16-19 @ 22:37):    Growth in anaerobic bottle: Gram Positive Cocci in Pairs and Chains    Growth in aerobic bottle: Gram Positive Cocci in Pairs and Chains  Preliminary Report (07-16-19 @ 22:38):    Growth in anaerobic bottle: Gram Positive Cocci in Pairs and Chains    Growth in aerobic bottle: Gram Positive Cocci in Pairs and Chains    Culture - Blood (collected 07-14-19 @ 12:53)  Source: .Blood Blood  Gram Stain (07-15-19 @ 06:12):    Growth in aerobic bottle: Gram Positive Cocci in Pairs and Chains    Growth in anaerobic bottle: Gram Positive Cocci in Pairs and Chains  Preliminary Report (07-16-19 @ 15:03):    Growth in aerobic and anaerobic bottles: Gram Positive Cocci in Pairs and    Chains Identification and susceptibility to follow.    "Due to technical problems, Proteus sp. will Not be reported as part of    the BCID panel until further notice"    ***Blood Panel PCR results on this specimen are available    approximately 3 hours after the Gram stain result.***    Gram stain, PCR, and/or culture results may not always    correspond due to difference in methodologies.    ************************************************************    This PCR assay was performed using [employer].    The following targets are tested for: Enterococcus,    vancomycin resistant enterococci, Listeria monocytogenes,    coagulase negative staphylococci, S. aureus,    methicillin resistant S. aureus, Streptococcus agalactiae    (Group B), S. pneumoniae, S. pyogenes (Group A),    Acinetobacter baumannii, Enterobacter cloacae, E. coli,    Klebsiella oxytoca, K. pneumoniae, Proteus sp.,    Serratia marcescens, Haemophilus influenzae,    Neisseria meningitidis, Pseudomonas aeruginosa, Candida    albicans, C. glabrata, C krusei, C parapsilosis,    C. tropicalis and the KPC resistance gene.  Organism: Blood Culture PCR (07-15-19 @ 07:10)  Organism: Blood Culture PCR (07-15-19 @ 07:10)      -  Enterococcus species: Detec      Method Type: PCR    Culture - Urine (collected 07-13-19 @ 23:00)  Source: .Urine Clean Catch (Midstream)  Final Report (07-16-19 @ 08:59):    >100,000 CFU/ml Citrobacter braakii  Organism: Citrobacter braakii (07-16-19 @ 08:59)  Organism: Citrobacter braakii (07-16-19 @ 08:59)      -  Amikacin: S <=16      -  Ampicillin: R >16 These ampicillin results predict results for amoxicillin      -  Ampicillin/Sulbactam: R >16/8 Enterobacter, Citrobacter, and Serratia may develop resistance during prolonged therapy (3-4 days)      -  Aztreonam: I 16      -  Cefazolin: R >16      -  Cefepime: S <=4      -  Cefoxitin: R >16      -  Ceftriaxone: R 32 Enterobacter, Citrobacter, and Serratia may develop resistance during prolonged therapy      -  Ciprofloxacin: S <=1      -  Ertapenem: S <=1      -  Gentamicin: S <=4      -  Imipenem: S <=1      -  Levofloxacin: S <=2      -  Meropenem: S <=1      -  Nitrofurantoin: S <=32 Should not be used to treat pyelonephritis      -  Piperacillin/Tazobactam: S <=16      -  Tigecycline: S <=2      -  Tobramycin: S <=4      -  Trimethoprim/Sulfamethoxazole: S <=2/38      Method Type: DILLON        Lactate, Blood: 2.1 mmol/L (07-16-19 @ 11:28)  Lactate, Blood: 2.7 mmol/L (07-15-19 @ 22:45)  Lactate, Blood: 2.7 mmol/L (07-15-19 @ 05:57)  Lactate, Blood: 2.4 mmol/L (07-15-19 @ 00:44)  Lactate, Blood: 2.9 mmol/L (07-14-19 @ 08:42)      INFECTIOUS DISEASES TESTING  HIV-1/2 Combo Result: Nonreact (06-16-19 @ 00:50)      RADIOLOGY & ADDITIONAL TESTS:  I have personally reviewed the last Chest xray  CXR  Xray Chest 1 View- PORTABLE-Urgent:   EXAM:  XR CHEST PORTABLE URGENT 1V            PROCEDURE DATE:  07/16/2019            INTERPRETATION:  Clinical History / Reason for exam: Sepsis    Comparison : Chest radiograph 7/10/2019.    Technique/Positioning: AP portable    Findings:    Support devices: None.    Cardiac/mediastinum/hilum: Unremarkable.    Lung parenchyma/Pleura: Low lung volumes with increasing bibasilar   opacities/atelectasis. No pneumothorax    Skeleton/soft tissues: Unremarkable.    Impression:      Low lung volumes with increasing bibasilar opacities/atelectasis                      RALPH COLEMAN M.D., ATTENDING RADIOLOGIST  This document has been electronically signed. Jul 16 2019 11:25AM             (07-16-19 @ 10:46)      CT      CARDIOLOGY TESTING  12 Lead ECG:   Ventricular Rate 97 BPM    Atrial Rate 97 BPM    P-R Interval 150 ms    QRS Duration 124 ms    Q-T Interval 382 ms    QTC Calculation(Bezet) 485 ms    P Axis 73 degrees    R Axis -32 degrees    T Axis 51 degrees    Diagnosis Line Normal sinus rhythm  Left axis deviation  Non-specific intra-ventricular conduction delay  Abnormal ECG    Confirmed by Rhett Hou ([id number]) on 7/14/2019 10:20:53 AM (07-14-19 @ 09:04)      MEDICATIONS  chlorhexidine 4% Liquid 1  cholecalciferol 2000  docusate sodium 100  docusate sodium Liquid 100  folic acid 1  levothyroxine 75  midodrine 10  multivitamin 1  nystatin Powder 1  pantoprazole    Tablet 40  piperacillin/tazobactam IVPB.. 2.25  polyethylene glycol 3350 17  predniSONE   Tablet 10  sodium chloride 0.9%. 1000  vancomycin  IVPB 1000      ANTIBIOTICS:  piperacillin/tazobactam IVPB.. 2.25 Gram(s) IV Intermittent every 6 hours  vancomycin  IVPB 1000 milliGRAM(s) IV Intermittent once

## 2019-07-17 NOTE — PROGRESS NOTE ADULT - SUBJECTIVE AND OBJECTIVE BOX
Vascular & Interventional Radiology Pre-Procedure Note    Procedure Name: image guided paracentesis - diagnostic/therapeutic     HPI: HPI:  67 yo F w/ hx of CAD s/p PCI and hypothyroidism sent by gastroenterologist for gallbladder hydrops. Pt found to have elevated liver enzymes by PMD in Apr 2019, referred to GI for w/u. MRCP 2 days ago significant for gallbladder hydrops 4.7cm with probable impacted stone at neck, also liver cirrhosis w/ mild ascites. Admits to social etoh, denies binge drinking, denies abdominal pain, N/V. Admits to decreased appetite in past 2 weeks, pt tried to maintain low salt diet. Reports BLE edema developed in past 2 days as well as weakness. Denies fever, chills, abdominal pain, SOB, dysuria.    In ED, evaluated by surgery -> no interventions at this time (15 Shashank 2019 01:53)      Allergies:   Medications (Abx/Cardiac/Anticoagulation/Blood Products)    meropenem  IVPB: 100 mL/Hr IV Intermittent (07-16 @ 05:33)  midodrine: 10 milliGRAM(s) Oral (07-16 @ 22:17)  piperacillin/tazobactam IVPB..: 200 mL/Hr IV Intermittent (07-17 @ 11:23)  vancomycin  IVPB: 250 mL/Hr IV Intermittent (07-17 @ 12:05)  vancomycin  IVPB: 300 mL/Hr IV Intermittent (07-16 @ 11:19)    Data:    T(C): 35.7  HR: 90  BP: 154/77  RR: 18  SpO2: 98%    Exam  General: NAD, AAO x3, no distress  Chest: breathing comfortably on room air, CTAB  Abdomen: soft, non-tender, distended       Radiology & Additional Studies: Radiology imaging reviewed.     Labs:   -WBC 18.00 / HgB 7.5 / Hct 22.2 / Plt 73  -Na 135 / Cl 95 /  / Glucose 178  -K 4.1 / CO2 27 / Cr 1.6  -ALT -- / Alk Phos -- / T.Bili --  -INR1.63      EKG completed (date): 7/14/2019    Height: 160.02cm  Weight: 133.2kG    Consentable: [ x ] Yes   [ ] No   verbal consent obtained by patient - unable to sign   patients  signed formal consent     Plan:   -68y Female presents for image guided paracentesis today 7/17  -Risks/Benefits/alternatives explained with the patient and/or healthcare proxy and witnessed informed consent obtained.

## 2019-07-17 NOTE — PROGRESS NOTE ADULT - ASSESSMENT
69 yo F w/ hx of CAD s/p PCI and hypothyroidism sent by gastroenterologist for gallbladder hydrops.  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 with resultant proteinuria and low albumin state and diffuse anasarca, Raynaud's phenomenon (not currently active), esophageal dysmotility and reflux, cystitis with urinary retention s/p abx, and subsequent worsening functional debility and malnutrition, and Enterococcal bacteremia, previously seen by Cardiology for pre-op clearance for IR guided biopsy of GB.    A & P    CAD s/p PCI YOLANDA to OM1  HFpEF    - partially mobile large vegetation (3.8 x 2.7 cm) noted on the posterior leaflet of the mitral valve on 2d echo  - pt. has positive blood cultures with Enterococcus  - will require IV Abx treatment of infective endocarditis  - c/w Rx as per ID recommendations  - consider CTSx consultation  - ELIJAH not indicated currently, will recommend to continue IV Abx therapy  - needs to be on GDMT for CAD, and HFpEF (not on DAPT likely secondary to anemia and DIC), will defer to heme/onc for decision to safely resume therapy, accepting the risk of stent thrombosis  - case discussed with Dr. Bauman

## 2019-07-17 NOTE — PROGRESS NOTE ADULT - SUBJECTIVE AND OBJECTIVE BOX
SUBJECTIVE:    Patient is a 68y old Female who presents with a chief complaint of gallbladder hydrops (17 Jul 2019 14:43)    Currently admitted to medicine with the primary diagnosis of Gallbladder hydrops     Today is hospital day 33d. This morning she is resting comfortably in bed and reports no new issues or overnight events.     PAST MEDICAL & SURGICAL HISTORY  Hypothyroidism  CAD S/P percutaneous coronary angioplasty  No significant past surgical history    SOCIAL HISTORY:  Negative for smoking/alcohol/drug use.     ALLERGIES:  No Known Allergies    MEDICATIONS:  STANDING MEDICATIONS  ampicillin  IVPB      ampicillin  IVPB 2 Gram(s) IV Intermittent every 6 hours  cefTRIAXone   IVPB 2000 milliGRAM(s) IV Intermittent every 12 hours  chlorhexidine 4% Liquid 1 Application(s) Topical <User Schedule>  cholecalciferol 2000 Unit(s) Oral daily  docusate sodium 100 milliGRAM(s) Oral three times a day  docusate sodium Liquid 100 milliGRAM(s) Oral three times a day  folic acid 1 milliGRAM(s) Oral daily  lactulose Retention Enema 200 Gram(s) Rectal once  levothyroxine 75 MICROGram(s) Oral daily  midodrine 10 milliGRAM(s) Oral every 8 hours  multivitamin 1 Tablet(s) Oral daily  nystatin Powder 1 Application(s) Topical three times a day  pantoprazole    Tablet 40 milliGRAM(s) Oral before breakfast  polyethylene glycol 3350 17 Gram(s) Oral every 12 hours  predniSONE   Tablet 10 milliGRAM(s) Oral daily  sodium chloride 0.9%. 1000 milliLiter(s) IV Continuous <Continuous>    PRN MEDICATIONS  acetaminophen   Tablet .. 325 milliGRAM(s) Oral every 4 hours PRN  aluminum hydroxide/magnesium hydroxide/simethicone Suspension 30 milliLiter(s) Oral every 4 hours PRN  bisacodyl Suppository 10 milliGRAM(s) Rectal daily PRN    VITALS:   T(F): 96.2  HR: 90  BP: 154/77  RR: 18  SpO2: 98%    LABS:                        7.5    18.00 )-----------( 73       ( 16 Jul 2019 22:05 )             22.2     07-16    135  |  95<L>  |  105<HH>  ----------------------------<  178<H>  4.1   |  27  |  1.6<H>    Ca    8.1<L>      16 Jul 2019 19:25  Phos  4.4     07-16    TPro  x   /  Alb  2.6<L>  /  TBili  x   /  DBili  x   /  AST  x   /  ALT  x   /  AlkPhos  x   07-16    PT/INR - ( 16 Jul 2019 04:50 )   PT: 18.70 sec;   INR: 1.63 ratio         PTT - ( 16 Jul 2019 04:50 )  PTT:32.9 sec          Culture - Blood (collected 16 Jul 2019 05:22)  Source: .Blood None  Gram Stain (17 Jul 2019 05:10):    Growth in aerobic bottle: Gram Positive Cocci in Pairs and Chains  Preliminary Report (17 Jul 2019 05:10):    Growth in aerobic bottle: Gram Positive Cocci in Pairs and Chains    Culture - Blood (collected 15 Jul 2019 16:34)  Source: .Blood None  Gram Stain (16 Jul 2019 22:37):    Growth in anaerobic bottle: Gram Positive Cocci in Pairs and Chains    Growth in aerobic bottle: Gram Positive Cocci in Pairs and Chains  Preliminary Report (16 Jul 2019 22:38):    Growth in anaerobic bottle: Gram Positive Cocci in Pairs and Chains    Growth in aerobic bottle: Gram Positive Cocci in Pairs and Chains          RADIOLOGY:  < from: Transthoracic Echocardiogram (07.17.19 @ 09:28) >  Summary:   1. LV Ejection Fraction by Castle's Method with a biplane EF of 62 %.   2. Spectral Doppler shows impaired relaxation pattern of left   ventricular myocardial filling (Grade I diastolic dysfunction).   3. Degenerative mitral valve.   4. Mild mitral valve regurgitation.   5. Moderate thickening of the posterior mitral valve leaflet.   6. Moderately decreased posterior mitral leaflet mobility.   7. Partially mobile large vegetation (3.8 x 2.7 cm) noted on the   posterior leaflet of the mitral valve.   8. Sclerotic aortic valve with normal opening.   9. Aortic leaflets are thickened. Small vegetation cannot be excluded.  10. Findings discussed with the primary team.        PHYSICAL EXAM:  GEN: No acute distress  LUNGS: Clear to auscultation bilaterally   HEART: S1/S2 present. RRR.   ABD: Soft, non-tender, non-distended. Bowel sounds present  EXT: b/l LE pitting edema, not improved  NEURO: AAOX3, patient more aroused compare to yesterday    Assessment and Plan:  A/P 67 yo F w/ hx of CAD s/p PCI and hypothyroidism sent by gastroenterologist for gallbladder hydrops, course complicated by ALI/ transaminitis,  LUIS ALBERTO with hyperkalemia multifactorial 2/2 rhabdo (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 with resultant proteinuria and low albumin state and diffuse anasarca, raynauds phenomenon (not currently active), esophageal dysmotility and reflux, cystitis with urinary retention s/p abx, and subsequent worsening functional debility and malnutrition    patient developed confusion state over night time      # Enterococcal endocarditits:  - TTE 7/17/2019: mitral valve vegetations with moderate mitral regurg  - sepsis  - blood cultures positive for Enterococcus   - CT surgery on board  - ID : IV Ampicillin 2g q6h (while awaiting sensitivity) and IV Ceftriaxone 2g q12h    # Acute Encephalopathy likely 2/2 sepsis likely 2/2 endocarditis and other source   - mental status improved - patient more aroused   - lactic acidosis, hypoxia, leukocytosis   - urine cxs: Citrobacter braakii : patient asymptomatic  - blood cxs-Gram Positive Cocci in Pairs and Chains  - most recent CXR- clear lungs  - monitor lactic acid levels  - ID: change to Zosyn 2.25 q6h IV  - IR on board: s/p paracenthesis 7/17/2019  - U/S Abdomen: cirrhosis, CB      # Stercoral ulcer:  - constipation  - per GI might benefit from disimpaction and add Mirolax BID  - patient disimpacted 7/15/2019 - dark brown stool came out, no blood appreciated  - lactulose enema given once today as no bowel movement and constipation    # DIC  - elevated PT/PTT: monitor  - low fibrinogen, low haptoglobin, high LDH: monitor  - Heme/ Onc: if patient bleeding give PLT and FFP. If no response to FFP give Cryo    #Hypoalbuminemia  - likely 2/2 liver disease  -  IV Albumin treatment    # LUIS ALBERTO   - monitor renal function daily  - I & O strict  - renal biopsy- ATN    # Macrocytic anemia  - on 7/14/2019: significant h/h drop, no gross bleeding event; received 1 unit PRBC on 7/14/2019   -vitamin B 12 and folate levels wnl  -started on MVI tx. daily    #hydrops gallbladder with  transaminitis - as per GI recommendations- pt. most likely had drug induced transaminitis, no evidence of autoimmune hepatitis  - LFT's near normal levels  - patient tolerating diet well  - hepatitis profile negative  - concomitant acute rhabdomyolysis- resolved, repeated CK level  normal   -statin was d/c on admission  - liver was  biopsied- Surgical Pathology Report (06.24.19 @ 16:10)  Surgical Final Report: Final Diagnosis  Right hepatic lobe, needle biopsy:  - Liver parenchyma showing mild focal lobulitis and hepatocyte reactive changes with few glycogenated nuclei.  - Focal area of vaguely formed Francisca-Denk body also present.  - Two minute focus of macro and micro-vesicular steatosis also present, counting less than 5% of tissue volume of the biopsy.  - Portal areas showing marked chronic inflammation with focal interface activity, infiltrated with mainly small lymphocytes and  rare neutrophils, and fibrous expansion of portal area with  fibrous bridge formation, probable cirrhosis.  - Stainable iron present, not increased.  - Trichrome stain reviewed.  - PAS-D stain failed to reveal alpha-1 antitrypsin globule.    # Anasarca  -  b/l LE edema , wheezing improved today  -  cannot diuresis - patient has sepsis ????    # CAD  - hold heparin and plavix due to liver disease  - d/c'ed statin at admission    #Hypothyroidism  -c/w synthroid     #Vit D deficiency  - vitamin D       #DVT/GI PPXFULL CODE SUBJECTIVE:    Patient is a 68y old Female who presents with a chief complaint of gallbladder hydrops (17 Jul 2019 14:43)    Currently admitted to medicine with the primary diagnosis of Gallbladder hydrops     Today is hospital day 33d. This morning she is resting comfortably in bed and reports no new issues or overnight events.     PAST MEDICAL & SURGICAL HISTORY  Hypothyroidism  CAD S/P percutaneous coronary angioplasty  No significant past surgical history    SOCIAL HISTORY:  Negative for smoking/alcohol/drug use.     ALLERGIES:  No Known Allergies    MEDICATIONS:  STANDING MEDICATIONS  ampicillin  IVPB      ampicillin  IVPB 2 Gram(s) IV Intermittent every 6 hours  cefTRIAXone   IVPB 2000 milliGRAM(s) IV Intermittent every 12 hours  chlorhexidine 4% Liquid 1 Application(s) Topical <User Schedule>  cholecalciferol 2000 Unit(s) Oral daily  docusate sodium 100 milliGRAM(s) Oral three times a day  docusate sodium Liquid 100 milliGRAM(s) Oral three times a day  folic acid 1 milliGRAM(s) Oral daily  lactulose Retention Enema 200 Gram(s) Rectal once  levothyroxine 75 MICROGram(s) Oral daily  midodrine 10 milliGRAM(s) Oral every 8 hours  multivitamin 1 Tablet(s) Oral daily  nystatin Powder 1 Application(s) Topical three times a day  pantoprazole    Tablet 40 milliGRAM(s) Oral before breakfast  polyethylene glycol 3350 17 Gram(s) Oral every 12 hours  predniSONE   Tablet 10 milliGRAM(s) Oral daily  sodium chloride 0.9%. 1000 milliLiter(s) IV Continuous <Continuous>    PRN MEDICATIONS  acetaminophen   Tablet .. 325 milliGRAM(s) Oral every 4 hours PRN  aluminum hydroxide/magnesium hydroxide/simethicone Suspension 30 milliLiter(s) Oral every 4 hours PRN  bisacodyl Suppository 10 milliGRAM(s) Rectal daily PRN    VITALS:   T(F): 96.2  HR: 90  BP: 154/77  RR: 18  SpO2: 98%    LABS:                        7.5    18.00 )-----------( 73       ( 16 Jul 2019 22:05 )             22.2     07-16    135  |  95<L>  |  105<HH>  ----------------------------<  178<H>  4.1   |  27  |  1.6<H>    Ca    8.1<L>      16 Jul 2019 19:25  Phos  4.4     07-16    TPro  x   /  Alb  2.6<L>  /  TBili  x   /  DBili  x   /  AST  x   /  ALT  x   /  AlkPhos  x   07-16    PT/INR - ( 16 Jul 2019 04:50 )   PT: 18.70 sec;   INR: 1.63 ratio         PTT - ( 16 Jul 2019 04:50 )  PTT:32.9 sec          Culture - Blood (collected 16 Jul 2019 05:22)  Source: .Blood None  Gram Stain (17 Jul 2019 05:10):    Growth in aerobic bottle: Gram Positive Cocci in Pairs and Chains  Preliminary Report (17 Jul 2019 05:10):    Growth in aerobic bottle: Gram Positive Cocci in Pairs and Chains    Culture - Blood (collected 15 Jul 2019 16:34)  Source: .Blood None  Gram Stain (16 Jul 2019 22:37):    Growth in anaerobic bottle: Gram Positive Cocci in Pairs and Chains    Growth in aerobic bottle: Gram Positive Cocci in Pairs and Chains  Preliminary Report (16 Jul 2019 22:38):    Growth in anaerobic bottle: Gram Positive Cocci in Pairs and Chains    Growth in aerobic bottle: Gram Positive Cocci in Pairs and Chains          RADIOLOGY:  < from: Transthoracic Echocardiogram (07.17.19 @ 09:28) >  Summary:   1. LV Ejection Fraction by Castle's Method with a biplane EF of 62 %.   2. Spectral Doppler shows impaired relaxation pattern of left   ventricular myocardial filling (Grade I diastolic dysfunction).   3. Degenerative mitral valve.   4. Mild mitral valve regurgitation.   5. Moderate thickening of the posterior mitral valve leaflet.   6. Moderately decreased posterior mitral leaflet mobility.   7. Partially mobile large vegetation (3.8 x 2.7 cm) noted on the   posterior leaflet of the mitral valve.   8. Sclerotic aortic valve with normal opening.   9. Aortic leaflets are thickened. Small vegetation cannot be excluded.  10. Findings discussed with the primary team.        PHYSICAL EXAM:  GEN: No acute distress  LUNGS: Clear to auscultation bilaterally   HEART: S1/S2 present. RRR.   ABD: Soft, non-tender, non-distended. Bowel sounds present  EXT: b/l LE pitting edema, not improved  NEURO: AAOX3, patient more aroused compare to yesterday    Assessment and Plan:  A/P 67 yo F w/ hx of CAD s/p PCI and hypothyroidism sent by gastroenterologist for gallbladder hydrops, course complicated by ALI/ transaminitis,  LUIS ALBERTO with hyperkalemia multifactorial 2/2 rhabdo (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 with resultant proteinuria and low albumin state and diffuse anasarca, raynauds phenomenon (not currently active), esophageal dysmotility and reflux, cystitis with urinary retention s/p abx, and subsequent worsening functional debility and malnutrition    patient developed confusion state over night time      # Enterococcal endocarditits:  - TTE 7/17/2019: mitral valve vegetations with moderate mitral regurg  - sepsis  - blood cultures positive for Enterococcus   - CT surgery on board  - ID : IV Ampicillin 2g q6h (while awaiting sensitivity) and IV Ceftriaxone 2g q12h    # Acute Encephalopathy likely 2/2 sepsis likely 2/2 endocarditis and other source   - mental status improved - patient more aroused   - lactic acidosis, hypoxia, leukocytosis   - urine cxs: Citrobacter braakii : patient asymptomatic  - blood cxs-Gram Positive Cocci in Pairs and Chains  - most recent CXR- clear lungs  - monitor lactic acid levels  - ID: change to Zosyn 2.25 q6h IV  - IR on board: s/p paracenthesis 7/17/2019  - U/S Abdomen: cirrhosis, CBD dilation  - ID onboard: patient tx for endocarditis with Ampicillin and Ceftriaxone (awaiting sensitivities); received 1 g Vanc today for possible SBP      # Stercoral ulcer:  - constipation  - per GI might benefit from disimpaction and add Mirolax BID  - patient disimpacted 7/15/2019 - dark brown stool came out, no blood appreciated  - lactulose enema given once today as no bowel movement and constipation    # DIC  - elevated PT/PTT: monitor  - low fibrinogen, low haptoglobin, high LDH: monitor  - Heme/ Onc: if patient bleeding give PLT and FFP. If no response to FFP give Cryo    #Hypoalbuminemia  - likely 2/2 liver disease  -  IV Albumin treatment    # LUIS ALBERTO   - monitor renal function daily  - I & O strict  - renal biopsy- ATN    # Macrocytic anemia  - on 7/14/2019: significant h/h drop, no gross bleeding event; received 1 unit PRBC on 7/14/2019   -vitamin B 12 and folate levels wnl  -started on MVI tx. daily    #hydrops gallbladder with  transaminitis - as per GI recommendations- pt. most likely had drug induced transaminitis, no evidence of autoimmune hepatitis  - LFT's near normal levels  - patient tolerating diet well  - hepatitis profile negative  - concomitant acute rhabdomyolysis- resolved, repeated CK level  normal   -statin was d/c on admission  - liver was  biopsied- Surgical Pathology Report (06.24.19 @ 16:10)  Surgical Final Report: Final Diagnosis  Right hepatic lobe, needle biopsy:  - Liver parenchyma showing mild focal lobulitis and hepatocyte reactive changes with few glycogenated nuclei.  - Focal area of vaguely formed Francisca-Denk body also present.  - Two minute focus of macro and micro-vesicular steatosis also present, counting less than 5% of tissue volume of the biopsy.  - Portal areas showing marked chronic inflammation with focal interface activity, infiltrated with mainly small lymphocytes and  rare neutrophils, and fibrous expansion of portal area with  fibrous bridge formation, probable cirrhosis.  - Stainable iron present, not increased.  - Trichrome stain reviewed.  - PAS-D stain failed to reveal alpha-1 antitrypsin globule.    # Anasarca  -  b/l LE edema , wheezing improved today  -  cannot diuresis - patient has sepsis ????    # CAD  - hold heparin and plavix due to liver disease  - d/c'ed statin at admission    #Hypothyroidism  -c/w synthroid     #Vit D deficiency  - vitamin D       #DVT/GI PPXFULL CODE

## 2019-07-17 NOTE — PROGRESS NOTE ADULT - ATTENDING COMMENTS
Patient seen and examined independently. I personally had a face-to-face encounter with the patient, examined the patient myself and reviewed the plan of care with the housestaff. Agree with resident's note but my note supersedes that of the resident in the matters hereby listed.   #Enterococcus Bacteremia   #Infective Endocarditis (large mobile veggie on posterior mitral leaflet)  Abx per ID, f/u Enterococcus sensitivities, Vanc level in AM, f/u CT Surgery    #DIC : daily coags, ffp prn    #Subacute hepatitis +/- Liver Cirrhosis  #Anasarca / Ascites : paracentesis today    #Suspected Vasculitis : Sural nerve biopsy sample being analyzed at [place]. Steroids per Rheum    #LUIS ALBERTO : ATN +/- Hepatorenal ? Lasix on hold. If doesn't improve, re-call Nephro. Might need another albumin challenge.     Will f/u Patient seen and examined independently. I personally had a face-to-face encounter with the patient, examined the patient myself and reviewed the plan of care with the housestaff. Agree with resident's note but my note supersedes that of the resident in the matters hereby listed.   #Enterococcus Bacteremia   #Infective Endocarditis (large mobile veggie on posterior mitral leaflet)  Abx per ID, f/u Enterococcus sensitivities, Vanc level in AM, f/u CT Surgery    #DIC : daily coags, ffp prn    #Subacute hepatitis +/- Liver Cirrhosis  #Anasarca / Ascites : paracentesis today    #New CBD dilation : REpeat MRCP, trend LFTs per GI.     #Stercoral ulcer/mild BRBPR: hemodynamically stable. monitor.    #Suspected Vasculitis : Sural nerve biopsy sample being analyzed at [place]. Steroids per Rheum    #LUIS ALBERTO : ATN +/- Hepatorenal ? Lasix on hold. If doesn't improve, re-call Nephro. Might need another albumin challenge.   For today give 100ml bag of 25% Albumin i.e. 25gm albumin x 1 only.     Will f/u

## 2019-07-17 NOTE — PROGRESS NOTE ADULT - SUBJECTIVE AND OBJECTIVE BOX
Interventional Radiology Brief- Operative Note: Paracentesis    Procedure: left sided image guided paracentesis     Pre-Op Diagnosis: cirrhosis with new onset ascites     Post-Op Diagnosis: same     Provider: Nyla Rubin PA-C    Anesthesia (type):  [ ] General Anesthesia  [ ] Sedation  [ ] Spinal Anesthesia  [ x ] Local/Regional    Contrast: none    Estimated Blood Loss: <5mL    Condition:   [ ] Critical  [ ] Serious  [ ] Fair   [ x ] Good    Findings/Follow up Plan of Care: 4.12L of serous fluid removed     Specimens Removed: 120cc of fluid removed and sent to lab     Implants: none    Complications: none    Condition/Disposition: d/c to floor     Discharge instructions: resume diet and activity as tolerated, resume medications as needed, keep dressing clean and dry - remove after 2 days, follow up paracentesis as needed    Please call Interventional Radiology [phone] with any questions, concerns, or issues.

## 2019-07-17 NOTE — PROGRESS NOTE ADULT - SUBJECTIVE AND OBJECTIVE BOX
HPI:  69 yo F w/ hx of CAD s/p PCI and hypothyroidism sent by gastroenterologist for gallbladder hydrops. Pt found to have elevated liver enzymes by PMD in Apr 2019, referred to GI for w/u. MRCP 2 days ago significant for gallbladder hydrops 4.7cm with probable impacted stone at neck, also liver cirrhosis w/ mild ascites. Admits to social etoh, denies binge drinking, denies abdominal pain, N/V. Admits to decreased appetite in past 2 weeks, pt tried to maintain low salt diet. Reports BLE edema developed in past 2 days as well as weakness. Denies fever, chills, abdominal pain, SOB, dysuria.    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 with resultant proteinuria and low albumin state and diffuse anasarca, Raynaud's phenomenon (not currently active), esophageal dysmotility and reflux, cystitis with urinary retention s/p abx, and subsequent worsening functional debility and malnutrition    Subjective:  Pt. lying in bed comfortably no complaints.    PAST MEDICAL & SURGICAL HISTORY  Hypothyroidism  CAD S/P percutaneous coronary angioplasty  No significant past surgical history      FAMILY HISTORY:  FAMILY HISTORY:  No pertinent family history in first degree relatives      SOCIAL HISTORY:  []smoker  []Alcohol  []Drug    ALLERGIES:  No Known Allergies      MEDICATIONS:  MEDICATIONS  (STANDING):  ampicillin  IVPB      ampicillin  IVPB 2 Gram(s) IV Intermittent every 6 hours  cefTRIAXone   IVPB 2000 milliGRAM(s) IV Intermittent every 12 hours  chlorhexidine 4% Liquid 1 Application(s) Topical <User Schedule>  cholecalciferol 2000 Unit(s) Oral daily  docusate sodium 100 milliGRAM(s) Oral three times a day  docusate sodium Liquid 100 milliGRAM(s) Oral three times a day  folic acid 1 milliGRAM(s) Oral daily  lactulose Retention Enema 200 Gram(s) Rectal once  levothyroxine 75 MICROGram(s) Oral daily  midodrine 10 milliGRAM(s) Oral every 8 hours  multivitamin 1 Tablet(s) Oral daily  nystatin Powder 1 Application(s) Topical three times a day  pantoprazole    Tablet 40 milliGRAM(s) Oral before breakfast  polyethylene glycol 3350 17 Gram(s) Oral every 12 hours  predniSONE   Tablet 10 milliGRAM(s) Oral daily  sodium chloride 0.9%. 1000 milliLiter(s) (75 mL/Hr) IV Continuous <Continuous>    MEDICATIONS  (PRN):  acetaminophen   Tablet .. 325 milliGRAM(s) Oral every 4 hours PRN Mild Pain (1 - 3)  aluminum hydroxide/magnesium hydroxide/simethicone Suspension 30 milliLiter(s) Oral every 4 hours PRN Dyspepsia  bisacodyl Suppository 10 milliGRAM(s) Rectal daily PRN Constipation      HOME MEDICATIONS:  Home Medications:  levothyroxine 50 mcg (0.05 mg) oral tablet: 1 tab(s) orally once a day (15 Shashank 2019 02:02)      VITALS:   T(F): 97.5 (07-17 @ 16:51), Max: 97.5 (07-17 @ 16:51)  HR: 85 (07-17 @ 16:51) (63 - 101)  BP: 107/54 (07-17 @ 16:51) (97/50 - 154/77)  BP(mean): --  RR: 18 (07-17 @ 16:51) (17 - 20)  SpO2: 98% (07-17 @ 08:41) (95% - 98%)    I&O's Summary    16 Jul 2019 07:01  -  17 Jul 2019 07:00  --------------------------------------------------------  IN: 1200 mL / OUT: 400 mL / NET: 800 mL    17 Jul 2019 07:01  -  17 Jul 2019 17:49  --------------------------------------------------------  IN: 0 mL / OUT: 500 mL / NET: -500 mL        REVIEW OF SYSTEMS:  CONSTITUTIONAL: No weakness, fevers or chills  EYES/ENT: No visual changes;  No vertigo or throat pain   NECK: No pain or stiffness  RESPIRATORY: No cough, wheezing, hemoptysis; No shortness of breath  CARDIOVASCULAR: No chest pain or palpitations  GASTROINTESTINAL: No abdominal or epigastric pain. No nausea, vomiting, or hematemesis; No diarrhea or constipation. No melena or hematochezia.  GENITOURINARY: No dysuria, frequency or hematuria  NEUROLOGICAL: No numbness or weakness  SKIN: No itching, no rashes    PHYSICAL EXAM:  NEURO: patient is awake , alert and oriented  GEN: Not in acute distress  NECK: no thyroid enlargement, no JVD  LUNGS: Clear to auscultation bilaterally   CARDIOVASCULAR: S1/S2 present, RRR , no murmurs or rubs, no carotid bruits,  + PP bilaterally  ABD: Soft, non-tender, non-distended, +BS  EXT: No LUCIANA  SKIN: Intact    LABS:                        7.5    18.00 )-----------( 73       ( 16 Jul 2019 22:05 )             22.2     07-16    135  |  95<L>  |  105<HH>  ----------------------------<  178<H>  4.1   |  27  |  1.6<H>    Ca    8.1<L>      16 Jul 2019 19:25  Phos  4.4     07-16    TPro  x   /  Alb  2.6<L>  /  TBili  x   /  DBili  x   /  AST  x   /  ALT  x   /  AlkPhos  x   07-16    PT/INR - ( 16 Jul 2019 04:50 )   PT: 18.70 sec;   INR: 1.63 ratio         PTT - ( 16 Jul 2019 04:50 )  PTT:32.9 sec          Troponin trend:      06-15 Chol 240<H> LDL 18 HDL 13<L> Trig 144      RADIOLOGY:  -CXR:    -TTE: 7/17   1. LV Ejection Fraction by Castle's Method with a biplane EF of 62 %.   2. Spectral Doppler shows impaired relaxation pattern of left   ventricular myocardial filling (Grade I diastolic dysfunction).   3. Degenerative mitral valve.   4. Mild mitral valve regurgitation.   5. Moderate thickening of the posterior mitral valve leaflet.   6. Moderately decreased posterior mitral leaflet mobility.   7. Partially mobile large vegetation (3.8 x 2.7 cm) noted on the   posterior leaflet of the mitral valve.   8. Sclerotic aortic valve with normal opening.   9. Aortic leaflets are thickened. Small vegetation cannot be excluded.  10. Findings discussed with the primary team.    -CATHETERIZATION:  4/18      [facility]  Left main PATENT    LAD:  Mid 40%    Left Circumflex: OM1 large sequential 80%    Right Coronary Artery:  Patent stent  distal 40%    IMPLANTS 3.5 x 238 lois to LCX    ECG:  NSR @75bpm, LBBB

## 2019-07-17 NOTE — PROGRESS NOTE ADULT - SUBJECTIVE AND OBJECTIVE BOX
Patient is a 68y old  Female who presents with a chief complaint of gallbladder hydrops (16 Jul 2019 16:27)      Subjective:      Vital Signs Last 24 Hrs  T(C): 35.7 (17 Jul 2019 05:38), Max: 35.7 (16 Jul 2019 22:10)  T(F): 96.2 (17 Jul 2019 05:38), Max: 96.2 (16 Jul 2019 22:10)  HR: 90 (17 Jul 2019 05:38) (90 - 97)  BP: 154/77 (17 Jul 2019 05:38) (130/63 - 154/77)  BP(mean): --  RR: 18 (17 Jul 2019 05:38) (18 - 18)  SpO2: 98% (17 Jul 2019 08:41) (98% - 98%)    PHYSICAL EXAM  Gen: NAD  HEENT: Normocephalic, atraumatic  Neck: supple  CV: Regular rate & regular rhythm  Lungs: decreased BS at bases  Abdomen: Soft, BS present.   Ext: Warm, well perfused. b/l LE edema+  Neuro: Awake and alert *4    MEDICATIONS  (STANDING):  chlorhexidine 4% Liquid 1 Application(s) Topical <User Schedule>  cholecalciferol 2000 Unit(s) Oral daily  docusate sodium 100 milliGRAM(s) Oral three times a day  docusate sodium Liquid 100 milliGRAM(s) Oral three times a day  folic acid 1 milliGRAM(s) Oral daily  levothyroxine 75 MICROGram(s) Oral daily  midodrine 10 milliGRAM(s) Oral every 8 hours  multivitamin 1 Tablet(s) Oral daily  nystatin Powder 1 Application(s) Topical three times a day  pantoprazole    Tablet 40 milliGRAM(s) Oral before breakfast  piperacillin/tazobactam IVPB.. 2.25 Gram(s) IV Intermittent every 6 hours  polyethylene glycol 3350 17 Gram(s) Oral every 12 hours  predniSONE   Tablet 10 milliGRAM(s) Oral daily  sodium chloride 0.9%. 1000 milliLiter(s) (75 mL/Hr) IV Continuous <Continuous>  vancomycin  IVPB 1000 milliGRAM(s) IV Intermittent once    MEDICATIONS  (PRN):  acetaminophen   Tablet .. 325 milliGRAM(s) Oral every 4 hours PRN Mild Pain (1 - 3)  aluminum hydroxide/magnesium hydroxide/simethicone Suspension 30 milliLiter(s) Oral every 4 hours PRN Dyspepsia  bisacodyl Suppository 10 milliGRAM(s) Rectal daily PRN Constipation      LABS:                          7.5    18.00 )-----------( 73       ( 16 Jul 2019 22:05 )             22.2         Mean Cell Volume : 94.9 fL  Mean Cell Hemoglobin : 32.1 pg  Mean Cell Hemoglobin Concentration : 33.8 g/dL  Auto Neutrophil # : x  Auto Lymphocyte # : x  Auto Monocyte # : x  Auto Eosinophil # : x  Auto Basophil # : x  Auto Neutrophil % : x  Auto Lymphocyte % : x  Auto Monocyte % : x  Auto Eosinophil % : x  Auto Basophil % : x      Serial CBC's  07-16 @ 22:05  Hct-22.2 / Hgb-7.5 / Plat-73 / RBC-2.34 / WBC-18.00  Serial CBC's  07-16 @ 19:25  Hct-21.9 / Hgb-7.4 / Plat-79 / RBC-2.30 / WBC-16.89  Serial CBC's  07-16 @ 11:28  Hct-21.4 / Hgb-7.3 / Plat-95 / RBC-2.28 / WBC-17.82  Serial CBC's  07-16 @ 04:50  Hct-22.8 / Hgb-7.8 / Plat-51 / RBC-2.44 / WBC-18.98  Serial CBC's  07-15 @ 05:57  Hct-24.8 / Hgb-8.4 / Plat-43 / RBC-2.63 / WBC-22.00  Serial CBC's  07-15 @ 00:44  Hct-24.9 / Hgb-8.4 / Plat-48 / RBC-2.65 / WBC-21.25  Serial CBC's  07-14 @ 21:10  Hct-25.0 / Hgb-8.5 / Plat-48 / RBC-2.65 / WBC-19.76  Serial CBC's  07-14 @ 06:55  Hct-19.1 / Hgb-6.3 / Plat-73 / RBC-1.95 / WBC-26.02  Serial CBC's  07-14 @ 00:50  Hct-22.4 / Hgb-7.3 / Plat-85 / RBC-2.27 / WBC-28.84  Serial CBC's  07-13 @ 21:52  Hct-22.5 / Hgb-7.5 / Plat-104 / RBC-2.30 / WBC-26.05  Serial CBC's  07-13 @ 16:00  Hct-22.4 / Hgb-7.4 / Plat-98 / RBC-2.29 / WBC-25.95      07-16    135  |  95<L>  |  105<HH>  ----------------------------<  178<H>  4.1   |  27  |  1.6<H>    Ca    8.1<L>      16 Jul 2019 19:25  Phos  4.4     07-16    TPro  x   /  Alb  2.6<L>  /  TBili  x   /  DBili  x   /  AST  x   /  ALT  x   /  AlkPhos  x   07-16      PT/INR - ( 16 Jul 2019 04:50 )   PT: 18.70 sec;   INR: 1.63 ratio         PTT - ( 16 Jul 2019 04:50 )  PTT:32.9 sec    Iron - Total Binding Capacity.: 97 ug/dL (07-14 @ 06:55)  Ferritin, Serum: 226 ng/mL (07-14 @ 06:55)  Folate, Serum: 8.1 ng/mL (07-10 @ 16:56)  Vitamin B12, Serum: 1468 pg/mL (07-10 @ 16:56)                    Culture - Blood (collected 16 Jul 2019 05:22)  Source: .Blood None  Gram Stain (17 Jul 2019 05:10):    Growth in aerobic bottle: Gram Positive Cocci in Pairs and Chains  Preliminary Report (17 Jul 2019 05:10):    Growth in aerobic bottle: Gram Positive Cocci in Pairs and Chains    Culture - Blood (collected 15 Jul 2019 16:34)  Source: .Blood None  Gram Stain (16 Jul 2019 22:37):    Growth in anaerobic bottle: Gram Positive Cocci in Pairs and Chains    Growth in aerobic bottle: Gram Positive Cocci in Pairs and Chains  Preliminary Report (16 Jul 2019 22:38):    Growth in anaerobic bottle: Gram Positive Cocci in Pairs and Chains    Growth in aerobic bottle: Gram Positive Cocci in Pairs and Chains    Culture - Blood (collected 14 Jul 2019 12:53)  Source: .Blood Blood  Gram Stain (15 Jul 2019 06:12):    Growth in aerobic bottle: Gram Positive Cocci in Pairs and Chains    Growth in anaerobic bottle: Gram Positive Cocci in Pairs and Chains  Preliminary Report (16 Jul 2019 15:03):    Growth in aerobic and anaerobic bottles: Gram Positive Cocci in Pairs and    Chains Identification and susceptibility to follow.    "Due to technical problems, Proteus sp. will Not be reported as part of    the BCID panel until further notice"    ***Blood Panel PCR results on this specimen are available    approximately 3 hours after the Gram stain result.***    Gram stain, PCR, and/or culture results may not always    correspond due to difference in methodologies.    ************************************************************    This PCR assay was performed using [employer].    The following targets are tested for: Enterococcus,    vancomycin resistant enterococci, Listeria monocytogenes,    coagulase negative staphylococci, S. aureus,    methicillin resistant S. aureus, Streptococcus agalactiae    (Group B), S. pneumoniae, S. pyogenes (Group A),    Acinetobacter baumannii, Enterobacter cloacae, E. coli,    Klebsiella oxytoca, K. pneumoniae, Proteus sp.,    Serratia marcescens, Haemophilus influenzae,    Neisseria meningitidis, Pseudomonas aeruginosa, Candida    albicans, C. glabrata, C krusei, C parapsilosis,    C. tropicalis and the KPC resistance gene.  Organism: Blood Culture PCR (15 Jul 2019 07:10)  Organism: Blood Culture PCR (15 Jul 2019 07:10)            BLOOD SMEAR INTERPRETATION:       RADIOLOGY & ADDITIONAL STUDIES: Patient is a 68y old  Female who presents with a chief complaint of gallbladder hydrops (16 Jul 2019 16:27)      Subjective: No more bleeding now. Pt c/o feeling tired. She is awaiting paracentesis today       Vital Signs Last 24 Hrs  T(C): 35.7 (17 Jul 2019 05:38), Max: 35.7 (16 Jul 2019 22:10)  T(F): 96.2 (17 Jul 2019 05:38), Max: 96.2 (16 Jul 2019 22:10)  HR: 90 (17 Jul 2019 05:38) (90 - 97)  BP: 154/77 (17 Jul 2019 05:38) (130/63 - 154/77)  BP(mean): --  RR: 18 (17 Jul 2019 05:38) (18 - 18)  SpO2: 98% (17 Jul 2019 08:41) (98% - 98%)    PHYSICAL EXAM  Gen: NAD  HEENT: Normocephalic, atraumatic  Neck: supple  CV: Regular rate & regular rhythm  Lungs: decreased BS at bases  Abdomen: Soft, BS present.   Ext: Warm, well perfused. b/l LE edema+  Neuro: Awake and alert *4    MEDICATIONS  (STANDING):  chlorhexidine 4% Liquid 1 Application(s) Topical <User Schedule>  cholecalciferol 2000 Unit(s) Oral daily  docusate sodium 100 milliGRAM(s) Oral three times a day  docusate sodium Liquid 100 milliGRAM(s) Oral three times a day  folic acid 1 milliGRAM(s) Oral daily  levothyroxine 75 MICROGram(s) Oral daily  midodrine 10 milliGRAM(s) Oral every 8 hours  multivitamin 1 Tablet(s) Oral daily  nystatin Powder 1 Application(s) Topical three times a day  pantoprazole    Tablet 40 milliGRAM(s) Oral before breakfast  piperacillin/tazobactam IVPB.. 2.25 Gram(s) IV Intermittent every 6 hours  polyethylene glycol 3350 17 Gram(s) Oral every 12 hours  predniSONE   Tablet 10 milliGRAM(s) Oral daily  sodium chloride 0.9%. 1000 milliLiter(s) (75 mL/Hr) IV Continuous <Continuous>  vancomycin  IVPB 1000 milliGRAM(s) IV Intermittent once    MEDICATIONS  (PRN):  acetaminophen   Tablet .. 325 milliGRAM(s) Oral every 4 hours PRN Mild Pain (1 - 3)  aluminum hydroxide/magnesium hydroxide/simethicone Suspension 30 milliLiter(s) Oral every 4 hours PRN Dyspepsia  bisacodyl Suppository 10 milliGRAM(s) Rectal daily PRN Constipation      LABS:                          7.5    18.00 )-----------( 73       ( 16 Jul 2019 22:05 )             22.2         Mean Cell Volume : 94.9 fL  Mean Cell Hemoglobin : 32.1 pg  Mean Cell Hemoglobin Concentration : 33.8 g/dL  Auto Neutrophil # : x  Auto Lymphocyte # : x  Auto Monocyte # : x  Auto Eosinophil # : x  Auto Basophil # : x  Auto Neutrophil % : x  Auto Lymphocyte % : x  Auto Monocyte % : x  Auto Eosinophil % : x  Auto Basophil % : x      Serial CBC's  07-16 @ 22:05  Hct-22.2 / Hgb-7.5 / Plat-73 / RBC-2.34 / WBC-18.00  Serial CBC's  07-16 @ 19:25  Hct-21.9 / Hgb-7.4 / Plat-79 / RBC-2.30 / WBC-16.89  Serial CBC's  07-16 @ 11:28  Hct-21.4 / Hgb-7.3 / Plat-95 / RBC-2.28 / WBC-17.82  Serial CBC's  07-16 @ 04:50  Hct-22.8 / Hgb-7.8 / Plat-51 / RBC-2.44 / WBC-18.98  Serial CBC's  07-15 @ 05:57  Hct-24.8 / Hgb-8.4 / Plat-43 / RBC-2.63 / WBC-22.00  Serial CBC's  07-15 @ 00:44  Hct-24.9 / Hgb-8.4 / Plat-48 / RBC-2.65 / WBC-21.25  Serial CBC's  07-14 @ 21:10  Hct-25.0 / Hgb-8.5 / Plat-48 / RBC-2.65 / WBC-19.76  Serial CBC's  07-14 @ 06:55  Hct-19.1 / Hgb-6.3 / Plat-73 / RBC-1.95 / WBC-26.02  Serial CBC's  07-14 @ 00:50  Hct-22.4 / Hgb-7.3 / Plat-85 / RBC-2.27 / WBC-28.84  Serial CBC's  07-13 @ 21:52  Hct-22.5 / Hgb-7.5 / Plat-104 / RBC-2.30 / WBC-26.05  Serial CBC's  07-13 @ 16:00  Hct-22.4 / Hgb-7.4 / Plat-98 / RBC-2.29 / WBC-25.95      07-16    135  |  95<L>  |  105<HH>  ----------------------------<  178<H>  4.1   |  27  |  1.6<H>    Ca    8.1<L>      16 Jul 2019 19:25  Phos  4.4     07-16    TPro  x   /  Alb  2.6<L>  /  TBili  x   /  DBili  x   /  AST  x   /  ALT  x   /  AlkPhos  x   07-16      PT/INR - ( 16 Jul 2019 04:50 )   PT: 18.70 sec;   INR: 1.63 ratio         PTT - ( 16 Jul 2019 04:50 )  PTT:32.9 sec    Iron - Total Binding Capacity.: 97 ug/dL (07-14 @ 06:55)  Ferritin, Serum: 226 ng/mL (07-14 @ 06:55)  Folate, Serum: 8.1 ng/mL (07-10 @ 16:56)  Vitamin B12, Serum: 1468 pg/mL (07-10 @ 16:56)                    Culture - Blood (collected 16 Jul 2019 05:22)  Source: .Blood None  Gram Stain (17 Jul 2019 05:10):    Growth in aerobic bottle: Gram Positive Cocci in Pairs and Chains  Preliminary Report (17 Jul 2019 05:10):    Growth in aerobic bottle: Gram Positive Cocci in Pairs and Chains    Culture - Blood (collected 15 Jul 2019 16:34)  Source: .Blood None  Gram Stain (16 Jul 2019 22:37):    Growth in anaerobic bottle: Gram Positive Cocci in Pairs and Chains    Growth in aerobic bottle: Gram Positive Cocci in Pairs and Chains  Preliminary Report (16 Jul 2019 22:38):    Growth in anaerobic bottle: Gram Positive Cocci in Pairs and Chains    Growth in aerobic bottle: Gram Positive Cocci in Pairs and Chains    Culture - Blood (collected 14 Jul 2019 12:53)  Source: .Blood Blood  Gram Stain (15 Jul 2019 06:12):    Growth in aerobic bottle: Gram Positive Cocci in Pairs and Chains    Growth in anaerobic bottle: Gram Positive Cocci in Pairs and Chains  Preliminary Report (16 Jul 2019 15:03):    Growth in aerobic and anaerobic bottles: Gram Positive Cocci in Pairs and    Chains Identification and susceptibility to follow.    "Due to technical problems, Proteus sp. will Not be reported as part of    the BCID panel until further notice"    ***Blood Panel PCR results on this specimen are available    approximately 3 hours after the Gram stain result.***    Gram stain, PCR, and/or culture results may not always    correspond due to difference in methodologies.    ************************************************************    This PCR assay was performed using [employer].    The following targets are tested for: Enterococcus,    vancomycin resistant enterococci, Listeria monocytogenes,    coagulase negative staphylococci, S. aureus,    methicillin resistant S. aureus, Streptococcus agalactiae    (Group B), S. pneumoniae, S. pyogenes (Group A),    Acinetobacter baumannii, Enterobacter cloacae, E. coli,    Klebsiella oxytoca, K. pneumoniae, Proteus sp.,    Serratia marcescens, Haemophilus influenzae,    Neisseria meningitidis, Pseudomonas aeruginosa, Candida    albicans, C. glabrata, C krusei, C parapsilosis,    C. tropicalis and the KPC resistance gene.  Organism: Blood Culture PCR (15 Jul 2019 07:10)  Organism: Blood Culture PCR (15 Jul 2019 07:10)            BLOOD SMEAR INTERPRETATION:       RADIOLOGY & ADDITIONAL STUDIES:

## 2019-07-17 NOTE — PROGRESS NOTE ADULT - ASSESSMENT
68 Y F Hx of CAD on DAPT, for a stent >1 y , Hospitalization was complicated with Enterococcal bacteremia / endocarditis , rhabdomyolysis , LUIS ALBERTO , sepsis , with possible DIC and hemolytic anemia. Patient was seen previously for transaminitis, where extensive workup done (listed below) , now GI are consulted for fresh blood per rectum. History of hematochezia started 7/15 , with streaks of blood when patient had a small bowel movement , the amount of blood was minimal.     #fresh blood per rectum : minimal amount in context of coagulapathy and possible DIC   patient is hemodynamically stable   rectal exam showed , dark red blood with large hard stool burden  r/o stercoral ulcer , versus malignancy hemorrhoids diverticulosis( less likely)  ,or AVM   s/p  disimpaction  Miralax , lactulose and senna for constipation  can use water soap enema after disimpaction  correct underlying coagulopathy   patient will benefit from EGD/ colonoscopy as part of evaluation of anemia and rectal bleeding after resolution of acute illness ( bacteremia/sepsis/DIC/ endocarditis ) , or earlier if significant GI bleed with hemodynamic instability.            #transaminitis: mostly drug induce , improved since admission  alkaline phosphatase is still elevated / please fractionate alkaline phosphatase   Liver biopsy showed subacute hepatitis with multiacinar / bridging necrosis. There is no evidence of autoimmune hepatitis or cirrhosis. More in favor of  drug induced according to MT. Blanco read.    the soluble IgG is mildly elevated the ASMA and Anti LKM  are negative, viral hepatitis workup is negative, normal ceruloplasmin alpha one antitrypsin normal , alpha fetoprotein normal.    The AMA is negative,  ANKITA is positive. P- anca positive, anti ds positive , Liver biopsy read by [facility] showed cirrhosis of unclear etiology  , second opinion pathology read by MT. Blanco in favor of drug induced liver injury.   MRI suggestive of cirrhosis (nodular contour) with no evident signs of portal HTN. Hydropic gallbladder up to 4.7 cm  in transverse diameter with   gallstones, including probably impacted 1.6 cm stone at neck, no biliary distention, filling defect or stricture.   trend lfts , avoid hepatotoxic drugs  no asterixes     #new CBD dilation / ascites on recent ultrasound :   please repeat MRCP   repeat lfts  f/u ascitic fluid results         #Abnormal liver imaging- cirrhosis  -HCC: none on imaging , repeat US abdomen and AFP q 6 month  -EV: needs screening  -Ascites: f/u fluid analysis results   - no portal hypertension on MR abdomen      #GERD: on Protonix    # Symptoms of raynaud / muscle weakness/ multiple positive autoimmune markers f/u with rheumatology

## 2019-07-17 NOTE — PROGRESS NOTE ADULT - SUBJECTIVE AND OBJECTIVE BOX
Hospital Day # 33d    Gastroenterology team is following this patient for hematochezia , cirrhosis , and transaminitis.     Interval Events: patient got disimpacted yesterday . No further bleeding this am.     PAST MEDICAL & SURGICAL HISTORY  Hypothyroidism  CAD S/P percutaneous coronary angioplasty  No significant past surgical history      ALLERGIES:  No Known Allergies      MEDICATIONS:  MEDICATIONS  (STANDING):  ampicillin  IVPB      ampicillin  IVPB 2 Gram(s) IV Intermittent every 6 hours  cefTRIAXone   IVPB 2000 milliGRAM(s) IV Intermittent every 12 hours  chlorhexidine 4% Liquid 1 Application(s) Topical <User Schedule>  cholecalciferol 2000 Unit(s) Oral daily  docusate sodium 100 milliGRAM(s) Oral three times a day  docusate sodium Liquid 100 milliGRAM(s) Oral three times a day  folic acid 1 milliGRAM(s) Oral daily  lactulose Retention Enema 200 Gram(s) Rectal once  levothyroxine 75 MICROGram(s) Oral daily  midodrine 10 milliGRAM(s) Oral every 8 hours  multivitamin 1 Tablet(s) Oral daily  nystatin Powder 1 Application(s) Topical three times a day  pantoprazole    Tablet 40 milliGRAM(s) Oral before breakfast  polyethylene glycol 3350 17 Gram(s) Oral every 12 hours  predniSONE   Tablet 10 milliGRAM(s) Oral daily  sodium chloride 0.9%. 1000 milliLiter(s) (75 mL/Hr) IV Continuous <Continuous>    MEDICATIONS  (PRN):  acetaminophen   Tablet .. 325 milliGRAM(s) Oral every 4 hours PRN Mild Pain (1 - 3)  aluminum hydroxide/magnesium hydroxide/simethicone Suspension 30 milliLiter(s) Oral every 4 hours PRN Dyspepsia  bisacodyl Suppository 10 milliGRAM(s) Rectal daily PRN Constipation      REVIEW OF SYSTEMS:    CONSTITUTIONAL: No fever  EYES/ENT: No scleral icterus  RESPIRATORY: No shortness of breath  CARDIOVASCULAR: No chest pain   GASTROINTESTINAL: no abdominal pain , still constipated, no melena , no further hematochezia   GENITOURINARY: No dysuria  NEUROLOGICAL: No dizziness  SKIN: No cyanosis        VITALS:   T(F): 97.5 (07-17 @ 16:51), Max: 97.5 (07-17 @ 16:51)  HR: 85 (07-17 @ 16:51) (63 - 101)  BP: 107/54 (07-17 @ 16:51) (97/50 - 154/77)  BP(mean): --  RR: 18 (07-17 @ 16:51) (17 - 20)  SpO2: 98% (07-17 @ 08:41) (95% - 98%)    I&O's Summary    16 Jul 2019 07:01  -  17 Jul 2019 07:00  --------------------------------------------------------  IN: 1200 mL / OUT: 400 mL / NET: 800 mL    17 Jul 2019 07:01  -  17 Jul 2019 17:02  --------------------------------------------------------  IN: 0 mL / OUT: 500 mL / NET: -500 mL        Physical Exam:  GENERAL: NAD, well-developed  HEAD:  Atraumatic, Normocephalic  EYES: conjunctiva and sclera clear  NECK: No thyromegaly  CHEST/LUNG: No accessory use of muscle  HEART: S1S2 Normal  ABDOMEN: Soft, Nontender, Nondistended; Bowel sounds present, no guarding or rigidity.  EXTREMITIES:  2+ Peripheral Pulses, No cyanosis  PSYCH: AAOx3  NEUROLOGY: non-focal      LABS:                        7.5    18.00 )-----------( 73       ( 16 Jul 2019 22:05 )             22.2       PT/INR - ( 16 Jul 2019 04:50 )  INR: 1.63          PTT - ( 16 Jul 2019 04:50 )  PTT:32.9   LIVER FUNCTIONS - ( 16 Jul 2019 19:25 )  Alb: 2.6 g/dL / Pro: x     / ALK PHOS: x     / ALT: x     / AST: x     / GGT: x           LIVER FUNCTIONS - ( 16 Jul 2019 19:25 )  Alb: 2.6 [3.5 - 5.2] / Pro: x / ALK PHOS: x / ALT: x / AST: x / GGT: x     LIVER FUNCTIONS - ( 15 Jul 2019 05:57 )  Alb: 2.2 [3.5 - 5.2] / Pro: 4.9 [6.0 - 8.0] / ALK PHOS: 561 [30 - 115] / ALT: 32 [0 - 41] / AST: 69 [0 - 41] / GGT: x     LIVER FUNCTIONS - ( 14 Jul 2019 06:55 )  Alb: 2.2 [3.5 - 5.2] / Pro: 4.6 [6.0 - 8.0] / ALK PHOS: 491 [30 - 115] / ALT: 29 [0 - 41] / AST: 63 [0 - 41] / GGT: x     LIVER FUNCTIONS - ( 13 Jul 2019 06:43 )  Alb: 2.5 [3.5 - 5.2] / Pro: 4.8 [6.0 - 8.0] / ALK PHOS: 406 [30 - 115] / ALT: 35 [0 - 41] / AST: 47 [0 - 41] / GGT: x     LIVER FUNCTIONS - ( 12 Jul 2019 05:47 )  Alb: 2.8 [3.5 - 5.2] / Pro: 5.4 [6.0 - 8.0] / ALK PHOS: 418 [30 - 115] / ALT: 54 [0 - 41] / AST: 50 [0 - 41] / GGT: x       07-16    135  |  95<L>  |  105<HH>  ----------------------------<  178<H>  4.1   |  27  |  1.6<H>    Ca    8.1<L>      16 Jul 2019 19:25  Phos  4.4     07-16          06-15 Chol 240<H> LDL 18 HDL 13<L> Trig 144        RADIOLOGY:    < from: US Abdomen Limited (07.15.19 @ 19:07) >  1.  Cholelithiasis and sludge, without sonographic evidence of acute   cholecystitis.    2.  Mild common bile duct dilatation. Correlate with labs for CBD   obstruction. If clinically warranted, MRCP can be obtained for further   evaluation.    3.  Hepatic cirrhosis with moderate ascites.    4.  Right pleural effusion.      < end of copied text >

## 2019-07-17 NOTE — PROGRESS NOTE ADULT - ASSESSMENT
A/P 69 yo F w/ hx of CAD s/p PCI and hypothyroidism sent by gastroenterologist for gallbladder hydrops, course complicated by ALI/ transaminitis possibly of autoimmune etiology, LUIS ALBERTO with hyperkalemia multifactorial 2/2 rhabdo (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 with resultant proteinuria and low albumin state and diffuse anasarca, raynauds phenomenon (not currently active), esophageal dysmotility and reflux, cystitis with urinary retention s/p abx, and subsequent worsening functional debility and malnutrition.     1) Leukocytosis/ thrombocytopenia, elevated coags - 2/2 to sepsis/DIC/bacteremia  Treat underlying infection- Source not known- Urinary/GI etiology   Pt has low fibrinogen, low haptoglobin, elevated LDH  s/p 1U platelets and 1 U FFP for bleeding overnight- no bleeding thereafter  Continue to monitor Platelets, CBC, fibrinogen everyday  Give platelets and FFP if any more bleeding episodes. Can give cryoprecipitate, if patient has no response to FFP transfusion     I discussed the case with Dr Murphy A/P 69 yo F w/ hx of CAD s/p PCI and hypothyroidism sent by gastroenterologist for gallbladder hydrops, course complicated by ALI/ transaminitis possibly of autoimmune etiology, LUIS ALBERTO with hyperkalemia multifactorial 2/2 rhabdo (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 with resultant proteinuria and low albumin state and diffuse anasarca, raynauds phenomenon (not currently active), esophageal dysmotility and reflux, cystitis with urinary retention s/p abx, and subsequent worsening functional debility and malnutrition.     Labs pending today- will follow up    1) Leukocytosis/ thrombocytopenia, Coagulaopathy - 2/2 to sepsis/DIC/bacteremia  Treat underlying infection- Source not known- Urinary/GI etiology   Pt has low fibrinogen, low haptoglobin, elevated LDH  s/p 1U platelets and 1 U FFP on 7/16- no bleeding thereafter  Continue to monitor Platelets, CBC, fibrinogen everyday  Give platelets and FFP if any more bleeding episodes. Can give cryoprecipitate, if patient has no response to FFP transfusion     No other recommendations from hematology at this time  will follow up today's labs

## 2019-07-17 NOTE — PROGRESS NOTE ADULT - ASSESSMENT
ASSESSMENT  69 yo F w/ hx of CAD s/p PCI and hypothyroidism sent by gastroenterologist for gallbladder hydrops and cirrhosis, auto-immune hepatitis     PROBLEMS  #Persistent Enterococcal BSI +bcx 7/14, 7/15+, 7/16+unclear source, possibly 2/2 CBD obstruction as seen on US. Urine without enterococcus, cannot r/o SBP, low suspicion endocarditis    RUQ  Cholelithiasis and sludge, without sonographic evidence of acute cholecystitis.2.  Mild common bile duct dilatation. Correlate with labs for CBD   obstruction. If clinically warranted, MRCP can be obtained for further evaluation.  #Citrobacter in urine, pt denies urinary sx; asymptomatic bacteriuria   #New dx of cirrhosis, GB hydrops, no e/o infection ,path with autoimmune hepatitis possible myositis    +Anti Nuclear Factor Titer: >1:2560 (06.15.19 @ 06:09)    Infectious workup thus far negative including HIV, Hep A, Hep B, HCV, EBV, HSV, CMV PCR, VZV    MELD NA 24  #lactic acidosis  #Atelectasis   #LUIS ALBERTO  #Hyponatremia   #Morbid Obesity BMI 41    RECOMMENDATIONS  - Discuss MRCP with GI as CBD dilatation, possibly source of enterococcus   - Zosyn 2.25 q6h IV   - Vanc 1g x1 and check AM level as enterococcus sensitivities are pending (not VRE as on PCR, usually if not VRE, S to amp)  - Repeat bcx daily until clearance  - If safe pocket, recommend paracentesis to send for cell count, and G/S with culture  - If no GI source, would obtain TTE     Spectra [phone] ASSESSMENT  67 yo F w/ hx of CAD s/p PCI and hypothyroidism sent by gastroenterologist for gallbladder hydrops and cirrhosis, auto-immune hepatitis     PROBLEMS  #Persistent Enterococcal BSI 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  Mild common bile duct dilatation. Correlate with labs for CBD   obstruction. If clinically warranted, MRCP can be obtained for further evaluation.  #Citrobacter in urine, pt denies urinary sx; asymptomatic bacteriuria   #New dx of cirrhosis, GB hydrops, no e/o infection ,path with autoimmune hepatitis possible myositis    +Anti Nuclear Factor Titer: >1:2560 (06.15.19 @ 06:09)    Infectious workup thus far negative including HIV, Hep A, Hep B, HCV, EBV, HSV, CMV PCR, VZV    MELD NA 24  #lactic acidosis  #Atelectasis   #LUIS ALBERTO  #Hyponatremia   #Morbid Obesity BMI 41    RECOMMENDATIONS  - Ampicillin 2g q6h IV given CrCl <50, Ceftriaxone 2g q12h IV, s/p Vanc 1g this AM  - Check AM Vanc level tomorrow 7/18, should have enterococcal sensitivities by then  - Recommend ELIJAH and CTS eval  - Repeat bcx daily until clearance  - Discuss with GI re: need for MRCP given RUQ findings    Spectra [phone] ASSESSMENT  69 yo F w/ hx of CAD s/p PCI and hypothyroidism sent by gastroenterologist for gallbladder hydrops and cirrhosis, auto-immune hepatitis     PROBLEMS  #Persistent Enterococcal BSI  +bcx 7/14, 7/15+, 7/16    TTE Calcified mobile echodensity noted on the ventricular aspect of the anterior leaflet, most consistent with calcified cordae. Vegetation could not be ruled out    RUQ Cholelithiasis and sludge, without sonographic evidence of acute cholecystitis.2.  Mild common bile duct dilatation. Correlate with labs for CBD   obstruction. If clinically warranted, MRCP can be obtained for further evaluation.  #Citrobacter in urine, pt denies urinary sx; asymptomatic bacteriuria   #New dx of cirrhosis, GB hydrops, no e/o infection ,path with autoimmune hepatitis possible myositis    +Anti Nuclear Factor Titer: >1:2560 (06.15.19 @ 06:09)    Infectious workup thus far negative including HIV, Hep A, Hep B, HCV, EBV, HSV, CMV PCR, VZV    MELD NA 24  #lactic acidosis  #Atelectasis   #LUIS ALBERTO  #Hyponatremia   #Morbid Obesity BMI 41    RECOMMENDATIONS  - As cannot r/o endocarditis, Ampicillin 2g q6h IV given CrCl <50, Ceftriaxone 2g q12h IV, s/p Vanc 1g this AM  - Check AM Vanc level tomorrow 7/18, should have enterococcal sensitivities by then  - Recommend ELIJAH   - Would continue to pursue other sources with GI eval and paracentesis as "vegetation more consistent with calcified cordae"  - Repeat bcx daily until clearance  - Discuss with GI re: need for MRCP given RUQ findings    Spectra [phone] ASSESSMENT  67 yo F w/ hx of CAD s/p PCI and hypothyroidism sent by gastroenterologist for gallbladder hydrops and cirrhosis, auto-immune hepatitis     PROBLEMS  #Persistent Enterococcal BSI  +bcx 7/14, 7/15+, 7/16, 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    6/19  TTE Calcified mobile echodensity noted on the ventricular aspect of the anterior leaflet, most consistent with calcified cordae. Vegetation could not be ruled out    RUQ Cholelithiasis and sludge, without sonographic evidence of acute cholecystitis.2.  Mild common bile duct dilatation. Correlate with labs for CBD   obstruction. If clinically warranted, MRCP can be obtained for further evaluation.  #Citrobacter in urine, pt denies urinary sx; asymptomatic bacteriuria   #New dx of cirrhosis, GB hydrops, no e/o infection ,path with autoimmune hepatitis possible myositis    +Anti Nuclear Factor Titer: >1:2560 (06.15.19 @ 06:09)    Infectious workup thus far negative including HIV, Hep A, Hep B, HCV, EBV, HSV, CMV PCR, VZV    MELD NA 24  #lactic acidosis  #Atelectasis   #LUIS ALBERTO  #Hyponatremia   #Morbid Obesity BMI 41    RECOMMENDATIONS  - As cannot r/o endocarditis, Ampicillin 2g q6h IV given CrCl <50, Ceftriaxone 2g q12h IV, s/p Vanc 1g this AM  - Check AM Vanc level tomorrow 7/18, should have enterococcal sensitivities by then  - Recommend ELIJAH   - CTS  - Repeat bcx daily until clearance  - Discuss with GI re: need for MRCP given RUQ findings    Spectra [phone]

## 2019-07-18 NOTE — PROGRESS NOTE ADULT - ASSESSMENT
68 Y F Hx of CAD on DAPT, for a stent >1 y , Hospitalization was complicated with Enterococcal bacteremia / endocarditis , rhabdomyolysis , LUIS ALBERTO , sepsis , with possible DIC and hemolytic anemia. Patient was seen previously for transaminitis, where extensive workup done (listed below) , now GI are consulted for fresh blood per rectum. History of hematochezia started 7/15 , with streaks of blood when patient had a small bowel movement , the amount of blood was minimal.     #fresh blood per rectum : minimal amount in context of coagulapathy and possible DIC (resolved)  patient is hemodynamically stable   r/o stercoral ulcer , versus malignancy hemorrhoids diverticulosis( less likely)  ,or AVM   s/p  disimpaction  Miralax , lactulose and senna for constipation  can use water soap enema after disimpaction  correct underlying coagulopathy   patient will benefit from EGD/ colonoscopy as part of evaluation of anemia and rectal bleeding after resolution of acute illness ( bacteremia/sepsis/DIC/ endocarditis ) , or earlier if significant GI bleed with hemodynamic instability.            #transaminitis: mostly drug induce   alkaline phosphatase is still elevated / please fractionate alkaline phosphatase   Liver biopsy showed subacute hepatitis with multiacinar / bridging necrosis. There is no evidence of autoimmune hepatitis or cirrhosis. More in favor of  drug induced according to [facility] read.    the soluble IgG is mildly elevated the ASMA and Anti LKM  are negative, viral hepatitis workup is negative, normal ceruloplasmin alpha one antitrypsin normal , alpha fetoprotein normal.    The AMA is negative,  ANKITA is positive. P- anca positive, anti ds positive , Liver biopsy read by [facility] showed cirrhosis of unclear etiology  , second opinion [facility] in favor of drug induced liver injury.   MRI suggestive of cirrhosis (nodular contour) with no evident signs of portal HTN. Hydropic gallbladder up to 4.7 cm  in transverse diameter with   gallstones, including probably impacted 1.6 cm stone at neck, no biliary distention, filling defect or stricture.   trend lfts , avoid hepatotoxic drugs  no asterixes     #new CBD dilation / ascites on recent ultrasound :    MRCP still pending  repeat lfts    #SBP : patient on Rocephin , c/w antibiotics  f/u cultures   albumin 1.5 g/kg on day one , 1g/kg on day 3        #Abnormal liver imaging- cirrhosis  -HCC: none on imaging , repeat US abdomen and AFP q 6 month  -EV: needs screening  - no portal hypertension on MR abdomen      #GERD: on Protonix    # Symptoms of raynaud / muscle weakness/ multiple positive autoimmune markers f/u with rheumatology

## 2019-07-18 NOTE — PROGRESS NOTE ADULT - SUBJECTIVE AND OBJECTIVE BOX
Patient is a 68y old  Female who presents with a chief complaint of gallbladder hydrops (17 Jul 2019 17:48)      Subjective:  stated that pt is feeling down and depressed. s/p Paracentesis yesterday       Vital Signs Last 24 Hrs  T(C): 35.7 (18 Jul 2019 05:32), Max: 36.4 (17 Jul 2019 16:51)  T(F): 96.2 (18 Jul 2019 05:32), Max: 97.6 (17 Jul 2019 21:21)  HR: 89 (18 Jul 2019 06:52) (85 - 99)  BP: 95/54 (18 Jul 2019 06:52) (87/50 - 122/65)  BP(mean): --  RR: 16 (18 Jul 2019 06:52) (16 - 18)  SpO2: 96% (17 Jul 2019 19:49) (96% - 96%)    PHYSICAL EXAM  General: adult in NAD  Neck: supple  CV: normal S1/S2   Lungs: Decreased BS b.l bases+   Abdomen: soft non-tender   Ext: b/l LE edema+  Neuro: awake and alert     MEDICATIONS  (STANDING):  ampicillin  IVPB      ampicillin  IVPB 2 Gram(s) IV Intermittent every 6 hours  cefTRIAXone   IVPB 2000 milliGRAM(s) IV Intermittent every 12 hours  chlorhexidine 4% Liquid 1 Application(s) Topical <User Schedule>  cholecalciferol 2000 Unit(s) Oral daily  docusate sodium 100 milliGRAM(s) Oral three times a day  docusate sodium Liquid 100 milliGRAM(s) Oral three times a day  folic acid 1 milliGRAM(s) Oral daily  levothyroxine 75 MICROGram(s) Oral daily  midodrine 10 milliGRAM(s) Oral every 8 hours  multivitamin 1 Tablet(s) Oral daily  nystatin Powder 1 Application(s) Topical three times a day  pantoprazole    Tablet 40 milliGRAM(s) Oral before breakfast  polyethylene glycol 3350 17 Gram(s) Oral every 12 hours  predniSONE   Tablet 10 milliGRAM(s) Oral daily  sodium chloride 0.9%. 1000 milliLiter(s) (75 mL/Hr) IV Continuous <Continuous>    MEDICATIONS  (PRN):  acetaminophen   Tablet .. 325 milliGRAM(s) Oral every 4 hours PRN Mild Pain (1 - 3)  aluminum hydroxide/magnesium hydroxide/simethicone Suspension 30 milliLiter(s) Oral every 4 hours PRN Dyspepsia  bisacodyl Suppository 10 milliGRAM(s) Rectal daily PRN Constipation      LABS:                          8.0    25.78 )-----------( 83 ( 18 Jul 2019 05:23 )             24.0         Mean Cell Volume : 96.0 fL  Mean Cell Hemoglobin : 32.0 pg  Mean Cell Hemoglobin Concentration : 33.3 g/dL  Auto Neutrophil # : x  Auto Lymphocyte # : x  Auto Monocyte # : x  Auto Eosinophil # : x  Auto Basophil # : x  Auto Neutrophil % : x  Auto Lymphocyte % : x  Auto Monocyte % : x  Auto Eosinophil % : x  Auto Basophil % : x      Serial CBC's  07-18 @ 05:23  Hct-24.0 / Hgb-8.0 / Plat-83 / RBC-2.50 / WBC-25.78  Serial CBC's  07-17 @ 18:22  Hct-21.9 / Hgb-7.4 / Plat-73 / RBC-2.31 / WBC-17.13  Serial CBC's  07-16 @ 22:05  Hct-22.2 / Hgb-7.5 / Plat-73 / RBC-2.34 / WBC-18.00  Serial CBC's  07-16 @ 19:25  Hct-21.9 / Hgb-7.4 / Plat-79 / RBC-2.30 / WBC-16.89  Serial CBC's  07-16 @ 11:28  Hct-21.4 / Hgb-7.3 / Plat-95 / RBC-2.28 / WBC-17.82  Serial CBC's  07-16 @ 04:50  Hct-22.8 / Hgb-7.8 / Plat-51 / RBC-2.44 / WBC-18.98  Serial CBC's  07-15 @ 05:57  Hct-24.8 / Hgb-8.4 / Plat-43 / RBC-2.63 / WBC-22.00  Serial CBC's  07-15 @ 00:44  Hct-24.9 / Hgb-8.4 / Plat-48 / RBC-2.65 / WBC-21.25  Serial CBC's  07-14 @ 21:10  Hct-25.0 / Hgb-8.5 / Plat-48 / RBC-2.65 / WBC-19.76      07-18    135  |  94<L>  |  107<HH>  ----------------------------<  125<H>  4.3   |  26  |  1.5    Ca    7.8<L>      18 Jul 2019 05:23  Phos  5.1     07-18    TPro  x   /  Alb  2.3<L>  /  TBili  x   /  DBili  x   /  AST  x   /  ALT  x   /  AlkPhos  x   07-18      PT/INR - ( 17 Jul 2019 18:22 )   PT: 16.60 sec;   INR: 1.45 ratio         PTT - ( 17 Jul 2019 18:22 )  PTT:39.0 sec    Iron - Total Binding Capacity.: 97 ug/dL (07-14 @ 06:55)  Ferritin, Serum: 226 ng/mL (07-14 @ 06:55)                    Culture - Fungal, Body Fluid (collected 17 Jul 2019 15:20)  Source: .Body Fluid Peritoneal Fluid  Preliminary Report (18 Jul 2019 08:52):    Testing in progress    Culture - Body Fluid with Gram Stain (collected 17 Jul 2019 15:20)  Source: .Body Fluid Peritoneal Fluid  Gram Stain (18 Jul 2019 03:26):    polymorphonuclear leukocytes seen    No organisms seen    by cytocentrifuge    Culture - Blood (collected 16 Jul 2019 19:25)  Source: .Blood None  Gram Stain (18 Jul 2019 03:00):    Growth in anaerobic bottle: Gram Positive Cocci in Pairs and Chains  Preliminary Report (18 Jul 2019 03:00):    Growth in anaerobic bottle: Gram Positive Cocci in Pairs and Chains    Culture - Blood (collected 16 Jul 2019 11:28)  Source: .Blood None  Gram Stain (18 Jul 2019 01:37):    Growth in anaerobic bottle: Gram Positive Cocci in Pairs and Chains  Preliminary Report (18 Jul 2019 01:38):    Growth in anaerobic bottle: Gram Positive Cocci in Pairs and Chains    Culture - Blood (collected 16 Jul 2019 05:22)  Source: .Blood None  Gram Stain (18 Jul 2019 00:50):    Growth in aerobic bottle: Gram Positive Cocci in Pairs and Chains    Growth in anaerobic bottle: Gram Positive Cocci in Pairs and Chains  Preliminary Report (18 Jul 2019 00:51):    Growth in aerobic bottle: Gram Positive Cocci in Pairs and Chains    Growth in anaerobic bottle: Gram Positive Cocci in Pairs and Chains    Culture - Blood (collected 15 Jul 2019 16:34)  Source: .Blood None  Gram Stain (16 Jul 2019 22:37):    Growth in anaerobic bottle: Gram Positive Cocci in Pairs and Chains    Growth in aerobic bottle: Gram Positive Cocci in Pairs and Chains  Final Report (17 Jul 2019 19:05):    Growth in aerobic and anaerobic bottles: Enterococcus faecalis    See previous culture [id number]            BLOOD SMEAR INTERPRETATION:       RADIOLOGY & ADDITIONAL STUDIES:

## 2019-07-18 NOTE — PROGRESS NOTE ADULT - SUBJECTIVE AND OBJECTIVE BOX
S : No new events./complanits  Mental status gradually improving.    having BMs with enema  fatigued      All other pertinent ROS negative.      07-17-19 @ 07:01  -  07-18-19 @ 07:00  --------------------------------------------------------  IN: 0 mL / OUT: 500 mL / NET: -500 mL    07-18-19 @ 07:01  -  07-18-19 @ 20:26  --------------------------------------------------------  IN: 400 mL / OUT: 0 mL / NET: 400 mL      Vital Signs Last 24 Hrs  T(C): 35.7 (18 Jul 2019 13:03), Max: 36.4 (17 Jul 2019 21:21)  T(F): 96.3 (18 Jul 2019 13:03), Max: 97.6 (17 Jul 2019 21:21)  HR: 95 (18 Jul 2019 13:03) (89 - 99)  BP: 107/54 (18 Jul 2019 13:03) (87/50 - 122/65)  BP(mean): --  RR: 18 (18 Jul 2019 13:03) (16 - 18)  SpO2: --  PHYSICAL EXAM:    no change from prior  anasarca  ascites - better. No abdo tenderness  lungs CTAB  jaundice    MEDICATIONS:  MEDICATIONS  (STANDING):  albumin human 25% IVPB 100 milliLiter(s) IV Intermittent every 4 hours  ampicillin  IVPB      ampicillin  IVPB 2 Gram(s) IV Intermittent every 6 hours  cefTRIAXone   IVPB 2000 milliGRAM(s) IV Intermittent every 12 hours  chlorhexidine 4% Liquid 1 Application(s) Topical <User Schedule>  cholecalciferol 2000 Unit(s) Oral daily  docusate sodium 100 milliGRAM(s) Oral three times a day  docusate sodium Liquid 100 milliGRAM(s) Oral three times a day  folic acid 1 milliGRAM(s) Oral daily  levothyroxine 75 MICROGram(s) Oral daily  midodrine 10 milliGRAM(s) Oral every 8 hours  multivitamin 1 Tablet(s) Oral daily  nystatin Powder 1 Application(s) Topical three times a day  pantoprazole    Tablet 40 milliGRAM(s) Oral before breakfast  polyethylene glycol 3350 17 Gram(s) Oral every 12 hours  predniSONE   Tablet 10 milliGRAM(s) Oral daily  rifaximin 550 milliGRAM(s) Oral two times a day  sodium chloride 0.9%. 1000 milliLiter(s) (75 mL/Hr) IV Continuous <Continuous>      LABS: All Labs Reviewed:                        8.0    25.78 )-----------( 83       ( 18 Jul 2019 05:23 )             24.0     07-18    135  |  94<L>  |  107<HH>  ----------------------------<  125<H>  4.3   |  26  |  1.5    Ca    7.8<L>      18 Jul 2019 05:23  Phos  5.1     07-18    TPro  x   /  Alb  2.3<L>  /  TBili  x   /  DBili  x   /  AST  x   /  ALT  x   /  AlkPhos  x   07-18    PT/INR - ( 17 Jul 2019 18:22 )   PT: 16.60 sec;   INR: 1.45 ratio         PTT - ( 17 Jul 2019 18:22 )  PTT:39.0 sec      Blood Culture: 07-17 @ 15:20  Organism --  Gram Stain Blood -- Gram Stain   polymorphonuclear leukocytes seen  No organisms seen  by cytocentrifuge  Specimen Source .Body Fluid Peritoneal Fluid  Culture-Blood --    07-17 @ 11:18  Organism --  Gram Stain Blood -- Gram Stain   Growth in anaerobic bottle: Gram Positive Cocci in Pairs and Chains  Specimen Source .Blood None  Culture-Blood --    07-16 @ 19:25  Organism --  Gram Stain Blood -- Gram Stain   Growth in anaerobic bottle: Gram Positive Cocci in Pairs and Chains  Specimen Source .Blood None  Culture-Blood --    07-16 @ 11:28  Organism --  Gram Stain Blood -- Gram Stain   Growth in anaerobic bottle: Gram Positive Cocci in Pairs and Chains  Specimen Source .Blood None  Culture-Blood --    07-16 @ 05:22  Organism --  Gram Stain Blood -- Gram Stain   Growth in aerobic bottle: Gram Positive Cocci in Pairs and Chains  Growth in anaerobic bottle: Gram Positive Cocci in Pairs and Chains  Specimen Source .Blood None  Culture-Blood --    07-15 @ 16:34  Organism --  Gram Stain Blood -- Gram Stain   Growth in anaerobic bottle: Gram Positive Cocci in Pairs and Chains  Growth in aerobic bottle: Gram Positive Cocci in Pairs and Chains  Specimen Source .Blood None  Culture-Blood --    07-14 @ 12:53  Organism Blood Culture PCR  Gram Stain Blood -- Gram Stain   Growth in aerobic bottle: Gram Positive Cocci in Pairs and Chains  Growth in anaerobic bottle: Gram Positive Cocci in Pairs and Chains  Specimen Source .Blood Blood  Culture-Blood --    07-13 @ 23:00  Organism Citrobacter braakii  Gram Stain Blood -- Gram Stain --  Specimen Source .Urine Clean Catch (Midstream)  Culture-Blood --        Radiology: reviewed

## 2019-07-18 NOTE — PROGRESS NOTE ADULT - ATTENDING COMMENTS
seen/examined; note reviewed; agree with plan controlled: no exacerbation: cont BD and singulair: no wheezing  1/8: No wheezing: not SOB due to copd

## 2019-07-18 NOTE — PROGRESS NOTE ADULT - ASSESSMENT
Patient seen and examined independently. I personally had a face-to-face encounter with the patient, examined the patient myself and reviewed the plan of care with the housestaff. Agree with resident's note but my note supersedes that of the resident in the matters hereby listed.     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 1gm/kg on 7/20.  ctx should cover this. f/u peritoneal fluid cultures.    #New CBD dilation : REpeat MRCP, trend LFTs per GI.     #Stercoral ulcer/mild BRBPR: hemodynamically stable. monitor.    #Suspected Vasculitis : Sural nerve biopsy sample being analyzed at [place]. Steroids per Rheum - may eventually taper off ?     #LUIS ALBERTO : ATN +/- Hepatorenal ? Lasix on hold. Re-call Nephro. Might need another albumin challenge ? Giving albumin anyways for the SBP.  One 100ml bag of 25% Albumin i.e. 25gm albumin on 7/17  1.5gm/kg IBW on 7/18  NS @ 75    Will f/u . Patient seen and examined independently. I personally had a face-to-face encounter with the patient, examined the patient myself and reviewed the plan of care with the housestaff. Agree with resident's note but my note supersedes that of the resident in the matters hereby listed.     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 1gm/kg on 7/20.  ctx should cover this. f/u peritoneal fluid cultures.    #New CBD dilation : REpeat MRCP, trend LFTs per GI.     #Stercoral ulcer/mild BRBPR: hemodynamically stable. monitor.    #Suspected Vasculitis : Sural nerve biopsy sample being analyzed at [place]. Steroids per Rheum - may eventually taper off ?     #LUIS ALBERTO : ATN +/- Hepatorenal ? Lasix on hold. Re-call Nephro. Might need another albumin challenge ? Giving albumin anyways for the SBP.  One 100ml bag of 25% Albumin i.e. 25gm albumin on 7/17  1.5gm/kg IBW on 7/18  NS @ 75    #Had one episode of digital blue discoloration on 6/22: Never before. Never since. Not sure if this was raynaud's phenomenon ? f/u sural nerve biopsy for vasculitis. otherwise nothingmore to do. pANCA was positive.   Hepatitis panel (including C) was negative. Cryoglobulins was negative.   Will f/u .

## 2019-07-18 NOTE — PROGRESS NOTE ADULT - SUBJECTIVE AND OBJECTIVE BOX
SUBJECTIVE:    Patient is a 68y old Female who presents with a chief complaint of gallbladder hydrops (18 Jul 2019 19:21)    Currently admitted to medicine with the primary diagnosis of Gallbladder hydrops     Today is hospital day 34d. This morning she is resting comfortably in bed. Patient reports abdominal pain overnight and especially when she is being turned in bed when changed. She says "I can feel my muscle pulling." She notes that she is afraid to eat due to bowel movements and feeling uncomfortable when being turned.     PAST MEDICAL & SURGICAL HISTORY  Hypothyroidism  CAD S/P percutaneous coronary angioplasty  No significant past surgical history    SOCIAL HISTORY:  Negative for smoking/alcohol/drug use.     ALLERGIES:  No Known Allergies    MEDICATIONS:  STANDING MEDICATIONS  albumin human 25% IVPB 100 milliLiter(s) IV Intermittent every 4 hours  ampicillin  IVPB      ampicillin  IVPB 2 Gram(s) IV Intermittent every 6 hours  cefTRIAXone   IVPB 2000 milliGRAM(s) IV Intermittent every 12 hours  chlorhexidine 4% Liquid 1 Application(s) Topical <User Schedule>  cholecalciferol 2000 Unit(s) Oral daily  docusate sodium 100 milliGRAM(s) Oral three times a day  docusate sodium Liquid 100 milliGRAM(s) Oral three times a day  folic acid 1 milliGRAM(s) Oral daily  levothyroxine 75 MICROGram(s) Oral daily  midodrine 10 milliGRAM(s) Oral every 8 hours  multivitamin 1 Tablet(s) Oral daily  nystatin Powder 1 Application(s) Topical three times a day  pantoprazole    Tablet 40 milliGRAM(s) Oral before breakfast  polyethylene glycol 3350 17 Gram(s) Oral every 12 hours  predniSONE   Tablet 10 milliGRAM(s) Oral daily  rifaximin 550 milliGRAM(s) Oral two times a day  sodium chloride 0.9%. 1000 milliLiter(s) IV Continuous <Continuous>    PRN MEDICATIONS  acetaminophen   Tablet .. 325 milliGRAM(s) Oral every 4 hours PRN  aluminum hydroxide/magnesium hydroxide/simethicone Suspension 30 milliLiter(s) Oral every 4 hours PRN  bisacodyl Suppository 10 milliGRAM(s) Rectal daily PRN    VITALS:   T(F): 96.3  HR: 95  BP: 107/54  RR: 18  SpO2: --    LABS:                        8.0    25.78 )-----------( 83       ( 18 Jul 2019 05:23 )             24.0     07-18    135  |  94<L>  |  107<HH>  ----------------------------<  125<H>  4.3   |  26  |  1.5    Ca    7.8<L>      18 Jul 2019 05:23  Phos  5.1     07-18    TPro  x   /  Alb  2.3<L>  /  TBili  x   /  DBili  x   /  AST  x   /  ALT  x   /  AlkPhos  x   07-18    PT/INR - ( 17 Jul 2019 18:22 )   PT: 16.60 sec;   INR: 1.45 ratio         PTT - ( 17 Jul 2019 18:22 )  PTT:39.0 sec          Culture - Fungal, Body Fluid (collected 17 Jul 2019 15:20)  Source: .Body Fluid Peritoneal Fluid  Preliminary Report (18 Jul 2019 08:52):    Testing in progress    Culture - Body Fluid with Gram Stain (collected 17 Jul 2019 15:20)  Source: .Body Fluid Peritoneal Fluid  Gram Stain (18 Jul 2019 03:26):    polymorphonuclear leukocytes seen    No organisms seen    by cytocentrifuge    Culture - Blood (collected 17 Jul 2019 11:18)  Source: .Blood None  Gram Stain (18 Jul 2019 17:10):    Growth in anaerobic bottle: Gram Positive Cocci in Pairs and Chains  Preliminary Report (18 Jul 2019 17:11):    Growth in anaerobic bottle: Gram Positive Cocci in Pairs and Chains    Culture - Blood (collected 16 Jul 2019 19:25)  Source: .Blood None  Gram Stain (18 Jul 2019 03:00):    Growth in anaerobic bottle: Gram Positive Cocci in Pairs and Chains  Preliminary Report (18 Jul 2019 03:00):    Growth in anaerobic bottle: Gram Positive Cocci in Pairs and Chains    Culture - Blood (collected 16 Jul 2019 11:28)  Source: .Blood None  Gram Stain (18 Jul 2019 01:37):    Growth in anaerobic bottle: Gram Positive Cocci in Pairs and Chains  Preliminary Report (18 Jul 2019 01:38):    Growth in anaerobic bottle: Gram Positive Cocci in Pairs and Chains    Culture - Blood (collected 16 Jul 2019 05:22)  Source: .Blood None  Gram Stain (18 Jul 2019 00:50):    Growth in aerobic bottle: Gram Positive Cocci in Pairs and Chains    Growth in anaerobic bottle: Gram Positive Cocci in Pairs and Chains  Preliminary Report (18 Jul 2019 12:39):    Growth in aerobic bottle: Enterococcus faecalis See previous culture    [id number]    Growth in anaerobic bottle: Gram Positive Cocci in Pairs and Chains      RADIOLOGY:     < from: Xray Chest 1 View- PORTABLE-Urgent (07.16.19 @ 10:46) >  Impression:      Low lung volumes with increasing bibasilar opacities/atelectasis    < end of copied text >    < from: Transthoracic Echocardiogram (07.17.19 @ 09:28) >  Summary:   1. LV Ejection Fraction by Castle's Method with a biplane EF of 62 %.   2. Spectral Doppler shows impaired relaxation pattern of left   ventricular myocardial filling (Grade I diastolic dysfunction).   3. Degenerative mitral valve.   4. Mild mitral valve regurgitation.   5. Moderate thickening of the posterior mitral valve leaflet.   6. Moderately decreased posterior mitral leaflet mobility.   7. Partially mobile large vegetation (3.8 x 2.7 cm) noted on the   posterior leaflet of the mitral valve.   8. Sclerotic aortic valve with normal opening.   9. Aortic leaflets are thickened. Small vegetation cannot be excluded.  10. Findings discussed with the primary team.    < end of copied text >      PHYSICAL EXAM:  GEN: No acute distress  LUNGS: Clear to auscultation bilaterally   HEART: S1/S2 present. RRR.   ABD: Soft, + mild tenderness, non-distended. Bowel sounds present  EXT: palpable pulses, pitting peripheral edema  NEURO: AAOX3    Assessment and Plan:  A/P 67 yo F w/ hx of CAD s/p PCI and hypothyroidism sent by gastroenterologist for gallbladder hydrops, course complicated by ALI/ transaminitis,  LUIS ALBERTO with hyperkalemia multifactorial 2/2 rhabdo (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 with resultant proteinuria and low albumin state and diffuse anasarca, raynauds phenomenon (not currently active), esophageal dysmotility and reflux, cystitis with urinary retention s/p abx, and subsequent worsening functional debility and malnutrition    patient developed confusion state over night time    # Enterococcal endocarditits:  - TTE 7/17/2019: mitral valve vegetations with moderate mitral regurgitation  - blood cultures positive for Enterococcus faecalis   - CT surgery on board- not a candidate at this time  - ID : IV Ampicillin 2g q6h and IV Ceftriaxone 2g q12h  -Cardiology: ELIJAH not needed    #Spontaneous bacterial peritonitis  - s/p paracenthesis 7/17/2019 -4.12 L removed , Albumin replaced on day of procedure  - s/p 1 g Vancomycin IV  - on Ceftriaxone 2g q12  - Albumin replacement 1.5g/kg , stop after 3 doses    # Acute Encephalopathy   dx endocarditis , SBP, ?? other source  - mental status improved - patient more aroused   - lactic acidosis, hypoxia, leukocytosis   - urine cxs: Citrobacter braakii : patient asymptomatic - will follow,   - blood cxs-Eneterococcus faecalis  - most recent CXR-  increasing bibasilar opacities/atelectasis  - ID on board: patient tx for endocarditis and SBP with Ampicillin and Ceftriaxone    # DIC  - elevated PT/PTT: monitor  - low fibrinogen, low haptoglobin, high LDH: monitor  - Heme/ Onc: if patient bleeding give PLT and FFP. If no response to FFP give Cryo  - f/u Heme/Onc to restart therapy for HFpEF and CAD s/p PCI    #Hypoalbuminemia  - likely 2/2 liver disease  -  IV Albumin treatment    # LUIS ALBERTO   - monitor renal function daily  - I & O strict  - renal biopsy- ATN    # Macrocytic anemia  - on 7/14/2019: significant h/h drop, no gross bleeding event; received 1 unit PRBC on 7/14/2019   -vitamin B 12 and folate levels wnl  -started on MVI tx. daily    #hydrops gallbladder with  transaminitis -  - most likely had drug induced transaminitis- statin was d/c'ed on admission  - Surgical Pathology Report (06/24/2019): ksenia-denk bodies, chronic inflammation (mainly small lymphocytes and rare neutrophils), fibrous expansion of portal area with fibrous bridge formation, probable cirrhosis  - U/S Abdomen (7/15/2019): moderate ascites, cirrhosis, CBD dilation   - f/u MRCP on 7/18/2019  - monitor LFT's   - hepatitis profile negative  - concomitant acute rhabdomyolysis- resolved      # Anasarca  -  b/l LE edema , wheezing improved today  -  cannot diuresis - patient has sepsis ????    # CAD  - hold heparin and plavix due to liver disease  - d/c'ed statin at admission due to possible statin induced transaminitis     #Hypothyroidism  -c/w synthroid     #Vit D deficiency    #DVT  - holding heparin     GI PPX    FULL CODE

## 2019-07-18 NOTE — PROGRESS NOTE ADULT - SUBJECTIVE AND OBJECTIVE BOX
Hospital Day # 34d    Gastroenterology team is following this patient for cirrhosis, hematochezia , transaminitis    Interval Events: no acute event overnight, patient is having bowel movements , no evidence of bleeding , have mild abdominal pain , no symptoms or signs of encephalopathy.    PAST MEDICAL & SURGICAL HISTORY  Hypothyroidism  CAD S/P percutaneous coronary angioplasty  No significant past surgical history        ALLERGIES:  No Known Allergies      MEDICATIONS:  MEDICATIONS  (STANDING):  albumin human 25% IVPB 100 milliLiter(s) IV Intermittent every 4 hours  ampicillin  IVPB      ampicillin  IVPB 2 Gram(s) IV Intermittent every 6 hours  cefTRIAXone   IVPB 2000 milliGRAM(s) IV Intermittent every 12 hours  chlorhexidine 4% Liquid 1 Application(s) Topical <User Schedule>  cholecalciferol 2000 Unit(s) Oral daily  docusate sodium 100 milliGRAM(s) Oral three times a day  docusate sodium Liquid 100 milliGRAM(s) Oral three times a day  folic acid 1 milliGRAM(s) Oral daily  levothyroxine 75 MICROGram(s) Oral daily  midodrine 10 milliGRAM(s) Oral every 8 hours  multivitamin 1 Tablet(s) Oral daily  nystatin Powder 1 Application(s) Topical three times a day  pantoprazole    Tablet 40 milliGRAM(s) Oral before breakfast  polyethylene glycol 3350 17 Gram(s) Oral every 12 hours  predniSONE   Tablet 10 milliGRAM(s) Oral daily  rifaximin 550 milliGRAM(s) Oral two times a day  sodium chloride 0.9%. 1000 milliLiter(s) (75 mL/Hr) IV Continuous <Continuous>    MEDICATIONS  (PRN):  acetaminophen   Tablet .. 325 milliGRAM(s) Oral every 4 hours PRN Mild Pain (1 - 3)  aluminum hydroxide/magnesium hydroxide/simethicone Suspension 30 milliLiter(s) Oral every 4 hours PRN Dyspepsia  bisacodyl Suppository 10 milliGRAM(s) Rectal daily PRN Constipation      REVIEW OF SYSTEMS:    CONSTITUTIONAL: No fever  EYES/ENT: No scleral icterus  RESPIRATORY: No shortness of breath  CARDIOVASCULAR: No chest pain   GASTROINTESTINAL: as listed above  GENITOURINARY: No dysuria  NEUROLOGICAL: No dizziness  SKIN: No cyanosis        VITALS:   T(F): 96.3 (07-18 @ 13:03), Max: 97.6 (07-17 @ 21:21)  HR: 95 (07-18 @ 13:03) (85 - 101)  BP: 107/54 (07-18 @ 13:03) (87/50 - 154/77)  BP(mean): --  RR: 18 (07-18 @ 13:03) (16 - 20)  SpO2: 96% (07-17 @ 19:49) (95% - 98%)    I&O's Summary    17 Jul 2019 07:01  -  18 Jul 2019 07:00  --------------------------------------------------------  IN: 0 mL / OUT: 500 mL / NET: -500 mL    18 Jul 2019 07:01  -  18 Jul 2019 19:21  --------------------------------------------------------  IN: 400 mL / OUT: 0 mL / NET: 400 mL        Physical Exam:  GENERAL: NAD, well-developed  HEAD:  Atraumatic, Normocephalic  EYES: EOMI, PERRLA, conjunctiva and sclera clear  NECK: No thyromegaly  CHEST/LUNG: No accessory use of muscle  HEART: S1S2 Normal  ABDOMEN: Soft, Nontender, Nondistended; Bowel sounds present, no guarding or rigidity.  EXTREMITIES:  2+ Peripheral Pulses, No cyanosis  PSYCH: AAOx3  NEUROLOGY: non-focal      LABS:                        8.0    25.78 )-----------( 83       ( 18 Jul 2019 05:23 )             24.0       PT/INR - ( 17 Jul 2019 18:22 )  INR: 1.45          PTT - ( 17 Jul 2019 18:22 )  PTT:39.0   LIVER FUNCTIONS - ( 18 Jul 2019 05:23 )  Alb: 2.3 g/dL / Pro: x     / ALK PHOS: x     / ALT: x     / AST: x     / GGT: x           LIVER FUNCTIONS - ( 18 Jul 2019 05:23 )  Alb: 2.3 [3.5 - 5.2] / Pro: x / ALK PHOS: x / ALT: x / AST: x / GGT: x     LIVER FUNCTIONS - ( 17 Jul 2019 18:22 )  Alb: 2.2 [3.5 - 5.2] / Pro: 4.9 [6.0 - 8.0] / ALK PHOS: 429 [30 - 115] / ALT: 28 [0 - 41] / AST: 53 [0 - 41] / GGT: x     LIVER FUNCTIONS - ( 16 Jul 2019 19:25 )  Alb: 2.6 [3.5 - 5.2] / Pro: x / ALK PHOS: x / ALT: x / AST: x / GGT: x     LIVER FUNCTIONS - ( 15 Jul 2019 05:57 )  Alb: 2.2 [3.5 - 5.2] / Pro: 4.9 [6.0 - 8.0] / ALK PHOS: 561 [30 - 115] / ALT: 32 [0 - 41] / AST: 69 [0 - 41] / GGT: x     LIVER FUNCTIONS - ( 14 Jul 2019 06:55 )  Alb: 2.2 [3.5 - 5.2] / Pro: 4.6 [6.0 - 8.0] / ALK PHOS: 491 [30 - 115] / ALT: 29 [0 - 41] / AST: 63 [0 - 41] / GGT: x     LIVER FUNCTIONS - ( 13 Jul 2019 06:43 )  Alb: 2.5 [3.5 - 5.2] / Pro: 4.8 [6.0 - 8.0] / ALK PHOS: 406 [30 - 115] / ALT: 35 [0 - 41] / AST: 47 [0 - 41] / GGT: x     LIVER FUNCTIONS - ( 12 Jul 2019 05:47 )  Alb: 2.8 [3.5 - 5.2] / Pro: 5.4 [6.0 - 8.0] / ALK PHOS: 418 [30 - 115] / ALT: 54 [0 - 41] / AST: 50 [0 - 41] / GGT: x       07-18    135  |  94<L>  |  107<HH>  ----------------------------<  125<H>  4.3   |  26  |  1.5    Ca    7.8<L>      18 Jul 2019 05:23  Phos  5.1     07-18          06-15 Chol 240<H> LDL 18 HDL 13<L> Trig 144      Cell Count, Body Fluid (07.17.19 @ 19:39)    Monocyte/Macrophage Count - Body Fluid: 5% %    Fluid Segmented Granulocytes: 85% %    Fluid Type: Peritoneal fl    Body Fluid Appearance: Cloudy    BF Lymphocytes: 10%    Tube Type: Lavender    Color - Body Fluid: Yellow    Total Nucleated Cell Count, Body Fluid: 7135 /uL    Total RBC Count: 1000 /uL

## 2019-07-19 NOTE — PROGRESS NOTE ADULT - SUBJECTIVE AND OBJECTIVE BOX
SUBJECTIVE:    Patient is a 68y old Female who presents with a chief complaint of gallbladder hydrops (19 Jul 2019 11:57)    Currently admitted to medicine with the primary diagnosis of Gallbladder hydrops     Today is hospital day 35d. This morning she is resting comfortably in bed and reports no new issues or overnight events.     Patient feels abdominal pain has improved.    PAST MEDICAL & SURGICAL HISTORY  Hypothyroidism  CAD S/P percutaneous coronary angioplasty  No significant past surgical history    SOCIAL HISTORY:  Negative for smoking/alcohol/drug use.     ALLERGIES:  No Known Allergies    MEDICATIONS:  STANDING MEDICATIONS  ampicillin  IVPB      ampicillin  IVPB 2 Gram(s) IV Intermittent every 6 hours  cefTRIAXone   IVPB 2000 milliGRAM(s) IV Intermittent every 12 hours  chlorhexidine 4% Liquid 1 Application(s) Topical <User Schedule>  cholecalciferol 2000 Unit(s) Oral daily  docusate sodium 100 milliGRAM(s) Oral three times a day  docusate sodium Liquid 100 milliGRAM(s) Oral three times a day  folic acid 1 milliGRAM(s) Oral daily  levothyroxine 75 MICROGram(s) Oral daily  midodrine 10 milliGRAM(s) Oral every 8 hours  multivitamin 1 Tablet(s) Oral daily  nystatin Powder 1 Application(s) Topical three times a day  pantoprazole    Tablet 40 milliGRAM(s) Oral before breakfast  polyethylene glycol 3350 17 Gram(s) Oral every 12 hours  predniSONE   Tablet 10 milliGRAM(s) Oral daily  rifaximin 550 milliGRAM(s) Oral two times a day  sodium chloride 0.9%. 1000 milliLiter(s) IV Continuous <Continuous>    PRN MEDICATIONS  acetaminophen   Tablet .. 325 milliGRAM(s) Oral every 4 hours PRN  aluminum hydroxide/magnesium hydroxide/simethicone Suspension 30 milliLiter(s) Oral every 4 hours PRN  bisacodyl Suppository 10 milliGRAM(s) Rectal daily PRN    VITALS:   T(F): 96.3  HR: 83  BP: 117/44  RR: 15  SpO2: 99%    LABS:                        7.5    26.34 )-----------( 98       ( 19 Jul 2019 04:32 )             22.2     07-19    134<L>  |  94<L>  |  110<HH>  ----------------------------<  128<H>  4.3   |  23  |  1.7<H>    Ca    8.1<L>      19 Jul 2019 04:32  Phos  5.8     07-19    TPro  5.4<L>  /  Alb  2.7<L>  /  TBili  3.9<H>  /  DBili  2.7<H>  /  AST  77<H>  /  ALT  32  /  AlkPhos  446<H>  07-19    PT/INR - ( 19 Jul 2019 04:32 )   PT: 17.50 sec;   INR: 1.53 ratio         PTT - ( 19 Jul 2019 04:32 )  PTT:41.1 sec          Culture - Fungal, Body Fluid (collected 17 Jul 2019 15:20)  Source: .Body Fluid Peritoneal Fluid  Preliminary Report (18 Jul 2019 08:52):    Testing in progress    Culture - Body Fluid with Gram Stain (collected 17 Jul 2019 15:20)  Source: .Body Fluid Peritoneal Fluid  Gram Stain (18 Jul 2019 03:26):    polymorphonuclear leukocytes seen    No organisms seen    by cytocentrifuge  Preliminary Report (18 Jul 2019 20:17):    No growth    Culture - Blood (collected 17 Jul 2019 11:18)  Source: .Blood None  Gram Stain (18 Jul 2019 17:10):    Growth in anaerobic bottle: Gram Positive Cocci in Pairs and Chains  Preliminary Report (18 Jul 2019 17:11):    Growth in anaerobic bottle: Gram Positive Cocci in Pairs and Chains    Culture - Blood (collected 16 Jul 2019 19:25)  Source: .Blood None  Gram Stain (18 Jul 2019 03:00):    Growth in anaerobic bottle: Gram Positive Cocci in Pairs and Chains  Final Report (18 Jul 2019 21:54):    Growth in anaerobic bottle: Enterococcus faecalis    See previous culture [id number]          RADIOLOGY:  MRCP 7/18/2019- patient did not fit in the machine, will repeat     PHYSICAL EXAM:  GEN: No acute distress  LUNGS: Clear to auscultation bilaterally   HEART: S1/S2 present. RRR.   ABD: Soft, tenderness in abdomen improved, non-distended. Bowel sounds present  EXT: palpable pulses, pitting peripheral edema  NEURO: AAOX3    Assessment and Plan:  A/P 69 yo F w/ hx of CAD s/p PCI and hypothyroidism sent by gastroenterologist for gallbladder hydrops, course complicated by ALI/ transaminitis,  LUIS ALBERTO with hyperkalemia multifactorial 2/2 rhabdo (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 with resultant proteinuria and low albumin state and diffuse anasarca, raynauds phenomenon (not currently active), esophageal dysmotility and reflux, cystitis with urinary retention s/p abx, and subsequent worsening functional debility and malnutrition    patient developed confusion state over night time    # Enterococcal endocarditits:  - TTE 7/17/2019: mitral valve vegetations with moderate mitral regurgitation  - blood cultures positive for Enterococcus faecalis   - CT surgery on board- not a candidate at this time  - ID : IV Ampicillin 2g q6h and IV Ceftriaxone 2g q12h  -Cardiology: EILJAH not needed    #Spontaneous bacterial peritonitis  - s/p paracenthesis 7/17/2019 -4.12 L removed , Albumin replaced on day of procedure  - s/p 1 g Vancomycin IV  - on Ceftriaxone 2g q12  - Albumin replacement 1.5g/kg , stop after 3 doses    # Acute Encephalopathy likely 2/2 sepsis  dx endocarditis , SBP, ?? other source  - WBC count rising today 26.34  - mental status improved - patient more aroused     - lactic acidosis, hypoxia, leukocytosis   - urine cxs: Citrobacter braakii : patient asymptomatic - will follow,   - blood cxs-Eneterococcus faecalis  - most recent CXR-  increasing bibasilar opacities/atelectasis  - ID on board: patient tx for endocarditis and SBP with Ampicillin and Ceftriaxone    # DIC  - elevated PT/PTT: monitor  - low fibrinogen, low haptoglobin, high LDH: monitor  - Heme/ Onc: if patient bleeding give PLT and FFP. If no response to FFP give Cryo  - f/u Heme/Onc to restart therapy for HFpEF and CAD s/p PCI    #Hypoalbuminemia  - likely 2/2 liver disease  -  IV Albumin treatment    # LUIS ALBERTO   - monitor renal function daily  - I & O strict  - renal biopsy- ATN    # Macrocytic anemia  - on 7/14/2019: significant h/h drop, no gross bleeding event; received 1 unit PRBC on 7/14/2019   -vitamin B 12 and folate levels wnl  -started on MVI tx. daily    #hydrops gallbladder with  transaminitis -  - most likely had drug induced transaminitis- statin was d/c'ed on admission  - Surgical Pathology Report (06/24/2019): ksenia-denk bodies, chronic inflammation (mainly small lymphocytes and rare neutrophils), fibrous expansion of portal area with fibrous bridge formation, probable cirrhosis  - U/S Abdomen (7/15/2019): moderate ascites, cirrhosis, CBD dilation   - f/u MRCP   - monitor LFT's   - hepatitis profile negative  - concomitant acute rhabdomyolysis- resolved    # Anasarca  -  b/l LE edema , wheezing improved today  -  cannot diuresis - patient has sepsis ????    # CAD  - hold heparin and plavix due to liver disease  - d/c'ed statin at admission due to possible statin induced transaminitis     #Hypothyroidism  -c/w synthroid     #Vit D deficiency    #DVT  - holding heparin     GI PPX    FULL CODE

## 2019-07-19 NOTE — PROGRESS NOTE ADULT - ATTENDING COMMENTS
Patient seen and examined independently. I personally had a face-to-face encounter with the patient, examined the patient myself and reviewed the plan of care with the housestaff. Agree with resident's note but my note supersedes that of the resident in the matters hereby listed.     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   Vanc trough 30. Hold off any more vanc.     #DIC : daily coags, ffp prn    #Subacute hepatitis +/- Liver Cirrhosis  #Anasarca / Ascites : paracentesis 7/19  Rifaximin for HE ppx  Lactulost enemas to ensure 2 BMs/day    #SBP: 1.5gm/kg IBW IV albumin today. 1gm/kg on 7/20.  ctx should cover this. f/u peritoneal fluid cultures.    #New CBD dilation : REpeat MRCP, trend LFTs per GI.     #Stercoral ulcer/mild BRBPR: hemodynamically stable. monitor.    #Suspected Vasculitis : Sural nerve biopsy sample being analyzed at [facility]. Steroids per Rheum - may eventually taper off ?     #LUIS ALBERTO : ATN +/- Hepatorenal ? Lasix on hold. Re-call Nephro. Might need another albumin challenge ? Giving albumin anyways for the SBP.  One 100ml bag of 25% Albumin i.e. 25gm albumin on 7/17  1.5gm/kg IBW on 7/18  NS @ 75    #Had one episode of digital blue discoloration on 6/22: Never before. Never since. Not sure if this was raynaud's phenomenon ? f/u sural nerve biopsy for vasculitis. otherwise nothingmore to do. pANCA was positive.   Hepatitis panel (including C) was negative. Cryoglobulins was negative.   Will f/u .

## 2019-07-19 NOTE — PROGRESS NOTE ADULT - ASSESSMENT
ASSESSMENT  67 yo F w/ hx of CAD s/p PCI and hypothyroidism sent by gastroenterologist for gallbladder hydrops and cirrhosis, auto-immune hepatitis     PROBLEMS  #Persistent Enterococcal BSI (S amp) +bcx 7/14, 7/15+, 7/16, +7/17 suspected mitral valve native endocarditis given TTE findings, SBP also found on paracentesis    TTE Partially mobile large vegetation (3.8 x 2.7 cm) noted on the posterior leaflet of the mitral valve.    6/19  TTE Calcified mobile echodensity noted on the ventricular aspect of the anterior leaflet, most consistent with calcified cordae. Vegetation could not be ruled out    RUQ Cholelithiasis and sludge, without sonographic evidence of acute cholecystitis.2.  Mild common bile duct dilatation. Correlate with labs for CBD   obstruction. If clinically warranted, MRCP can be obtained for further evaluation.  #SBP >250 PMN, 7/17 body fluid cx NG  #Citrobacter in urine, pt denies urinary sx; asymptomatic bacteriuria   #New dx of cirrhosis, GB hydrops, no e/o infection ,path with autoimmune hepatitis possible myositis, possible statin induced    +Anti Nuclear Factor Titer: >1:2560 (06.15.19 @ 06:09)    Infectious workup thus far negative including HIV, Hep A, Hep B, HCV, EBV, HSV, CMV PCR, VZV    MELD NA 24  #lactic acidosis  #Atelectasis   #LUIS ALBERTO  #Hyponatremia   #Morbid Obesity BMI 41    RECOMMENDATIONS  - As cannot r/o endocarditis, Ampicillin 2g q6h IV given CrCl <50, Ceftriaxone 2g q12h IV, will cover possible IE and SBP  - Recommend ELIJAH   - Repeat paracentesis D3, repeat cell count and culture  - CTS following  - Repeat bcx daily until clearance  - Discuss with GI re: need for MRCP given RUQ findings    Spectra [id number]

## 2019-07-19 NOTE — PROGRESS NOTE ADULT - SUBJECTIVE AND OBJECTIVE BOX
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, Female  Allergy: No Known Allergies      CHIEF COMPLAINT: gallbladder hydrops (18 Jul 2019 20:25)      INTERVAL EVENTS/HPI  - No acute events overnight, bcx 17+  - T(F): , Max: 96.9 (07-19-19 @ 06:00)  - Denies any worsening symptoms  - Tolerating medication  - WBC Count: 26.34 K/uL (07-19-19 @ 04:32) downtrending       ROS  General: Denies rigors, nightsweats  HEENT: Denies headache, rhinorrhea, sore throat, eye pain  CV: Denies CP, palpitations  PULM: Denies SOB, wheezing  GI: Denies abdominal pain, hematochezia/melena  : Denies dysuria, hematuria  MSK: Denies arthralgias, myalgias  SKIN: Denies rash, lesions  NEURO: Denies paresthesias, weakness  PSYCH: Denies depression, anxiety    VITALS:  T(F): 96.9, Max: 96.9 (07-19-19 @ 06:00)  HR: 87  BP: 119/55  RR: 18Vital Signs Last 24 Hrs  T(C): 36.1 (19 Jul 2019 06:00), Max: 36.1 (19 Jul 2019 06:00)  T(F): 96.9 (19 Jul 2019 06:00), Max: 96.9 (19 Jul 2019 06:00)  HR: 87 (19 Jul 2019 06:00) (87 - 95)  BP: 119/55 (19 Jul 2019 06:00) (107/54 - 119/55)  BP(mean): --  RR: 18 (19 Jul 2019 06:00) (18 - 18)  SpO2: 99% (19 Jul 2019 08:00) (99% - 99%)    PHYSICAL EXAM:  Gen: NAD, resting in bed  HEENT: Normocephalic, atraumatic  Neck: supple, no lymphadenopathy  CV: Regular rate & regular rhythm  Lungs: decreased BS at bases, no fremitus  Abdomen: Soft, BS present, TTP LLQ  Ext: Warm, well perfused, anasarca  Neuro: non focal, awake  Skin: no rash, no erythema  Lines: no phlebitis      FH: Non-contributory  Social Hx: Non-contributory    TESTS & MEASUREMENTS:                        7.5    26.34 )-----------( 98       ( 19 Jul 2019 04:32 )             22.2     07-19    134<L>  |  94<L>  |  110<HH>  ----------------------------<  128<H>  4.3   |  23  |  1.7<H>    Ca    8.1<L>      19 Jul 2019 04:32  Phos  5.8     07-19    TPro  5.4<L>  /  Alb  2.7<L>  /  TBili  3.9<H>  /  DBili  2.7<H>  /  AST  77<H>  /  ALT  32  /  AlkPhos  446<H>  07-19    eGFR if Non African American: 31 mL/min/1.73M2 (07-19-19 @ 04:32)  eGFR if African American: 36 mL/min/1.73M2 (07-19-19 @ 04:32)    LIVER FUNCTIONS - ( 19 Jul 2019 04:32 )  Alb: 2.7 g/dL / Pro: 5.4 g/dL / ALK PHOS: 446 U/L / ALT: 32 U/L / AST: 77 U/L / GGT: x               Culture - Fungal, Body Fluid (collected 07-17-19 @ 15:20)  Source: .Body Fluid Peritoneal Fluid  Preliminary Report (07-18-19 @ 08:52):    Testing in progress    Culture - Body Fluid with Gram Stain (collected 07-17-19 @ 15:20)  Source: .Body Fluid Peritoneal Fluid  Gram Stain (07-18-19 @ 03:26):    polymorphonuclear leukocytes seen    No organisms seen    by cytocentrifuge  Preliminary Report (07-18-19 @ 20:17):    No growth    Culture - Blood (collected 07-17-19 @ 11:18)  Source: .Blood None  Gram Stain (07-18-19 @ 17:10):    Growth in anaerobic bottle: Gram Positive Cocci in Pairs and Chains  Preliminary Report (07-18-19 @ 17:11):    Growth in anaerobic bottle: Gram Positive Cocci in Pairs and Chains    Culture - Blood (collected 07-16-19 @ 19:25)  Source: .Blood None  Gram Stain (07-18-19 @ 03:00):    Growth in anaerobic bottle: Gram Positive Cocci in Pairs and Chains  Final Report (07-18-19 @ 21:54):    Growth in anaerobic bottle: Enterococcus faecalis    See previous culture [id number]    Culture - Blood (collected 07-16-19 @ 11:28)  Source: .Blood None  Gram Stain (07-18-19 @ 01:37):    Growth in anaerobic bottle: Gram Positive Cocci in Pairs and Chains  Final Report (07-18-19 @ 21:46):    Growth in anaerobic bottle: Enterococcus faecalis    See previous culture [id number]    Culture - Blood (collected 07-16-19 @ 05:22)  Source: .Blood None  Gram Stain (07-18-19 @ 00:50):    Growth in aerobic bottle: Gram Positive Cocci in Pairs and Chains    Growth in anaerobic bottle: Gram Positive Cocci in Pairs and Chains  Final Report (07-18-19 @ 22:31):    Growth in aerobic and anaerobic bottles: Enterococcus faecalis See    previous culture [address] [place] [id number]    Culture - Blood (collected 07-15-19 @ 16:34)  Source: .Blood None  Gram Stain (07-16-19 @ 22:37):    Growth in anaerobic bottle: Gram Positive Cocci in Pairs and Chains    Growth in aerobic bottle: Gram Positive Cocci in Pairs and Chains  Final Report (07-17-19 @ 19:05):    Growth in aerobic and anaerobic bottles: Enterococcus faecalis    See previous culture [address] [place], [place] [id number]    Culture - Blood (collected 07-14-19 @ 12:53)  Source: .Blood Blood  Gram Stain (07-15-19 @ 06:12):    Growth in aerobic bottle: Gram Positive Cocci in Pairs and Chains    Growth in anaerobic bottle: Gram Positive Cocci in Pairs and Chains  Final Report (07-18-19 @ 22:37):    Growth in aerobic and anaerobic bottles: Enterococcus faecalis    "Due to technical problems, Proteus sp. will Not be reported as part of    the BCID panel until further notice"    ***Blood Panel PCR results on this specimen are available    approximately 3 hours after the Gram stain result.***    Gram stain, PCR, and/or culture results may not always    correspond due to difference in methodologies.    ************************************************************    This PCR assay was performed using [employer].    The following targets are tested for: Enterococcus,    vancomycin resistant enterococci, Listeria monocytogenes,    coagulase negative staphylococci, S. aureus,    methicillin resistant S. aureus, Streptococcus agalactiae    (Group B), S. pneumoniae, S. pyogenes (Group A),    Acinetobacter baumannii, Enterobacter cloacae, E. coli,    Klebsiella oxytoca, K. pneumoniae, Proteus sp.,    Serratia marcescens, Haemophilus influenzae,    Neisseria meningitidis, Pseudomonas aeruginosa, Candida    albicans, C. glabrata, C krusei, C parapsilosis,    C. tropicalis and the KPC resistance gene.  Organism: Blood Culture PCR  Enterococcus faecalis (07-18-19 @ 22:37)  Organism: Enterococcus faecalis (07-18-19 @ 22:37)      -  Ampicillin: S <=2 Predicts results to ampicillin/sulbactam, amoxacillin-clavulanate and  piperacillin-tazobactam.      -  Gentamicin synergy: S      -  Vancomycin: S 2      Method Type: DILLON  Organism: Blood Culture PCR (07-18-19 @ 22:37)      -  Enterococcus species: Detec      Method Type: PCR    Culture - Urine (collected 07-13-19 @ 23:00)  Source: .Urine Clean Catch (Midstream)  Final Report (07-16-19 @ 08:59):    >100,000 CFU/ml Citrobacter braakii  Organism: Citrobacter braakii (07-16-19 @ 08:59)  Organism: Citrobacter braakii (07-16-19 @ 08:59)      -  Amikacin: S <=16      -  Ampicillin: R >16 These ampicillin results predict results for amoxicillin      -  Ampicillin/Sulbactam: R >16/8 Enterobacter, Citrobacter, and Serratia may develop resistance during prolonged therapy (3-4 days)      -  Aztreonam: I 16      -  Cefazolin: R >16      -  Cefepime: S <=4      -  Cefoxitin: R >16      -  Ceftriaxone: R 32 Enterobacter, Citrobacter, and Serratia may develop resistance during prolonged therapy      -  Ciprofloxacin: S <=1      -  Ertapenem: S <=1      -  Gentamicin: S <=4      -  Imipenem: S <=1      -  Levofloxacin: S <=2      -  Meropenem: S <=1      -  Nitrofurantoin: S <=32 Should not be used to treat pyelonephritis      -  Piperacillin/Tazobactam: S <=16      -  Tigecycline: S <=2      -  Tobramycin: S <=4      -  Trimethoprim/Sulfamethoxazole: S <=2/38      Method Type: DILLON        Lactate, Blood: 2.1 mmol/L (07-16-19 @ 11:28)  Lactate, Blood: 2.7 mmol/L (07-15-19 @ 22:45)  Lactate, Blood: 2.7 mmol/L (07-15-19 @ 05:57)  Lactate, Blood: 2.4 mmol/L (07-15-19 @ 00:44)      INFECTIOUS DISEASES TESTING  HIV-1/2 Combo Result: Nonreact (06-16-19 @ 00:50)      RADIOLOGY & ADDITIONAL TESTS:  I have personally reviewed the last Chest xray  CXR      CT      CARDIOLOGY TESTING  Transthoracic Echocardiogram:    EXAM:  2-D ECHO (TTE) COMPLETE        PROCEDURE DATE:  07/17/2019      INTERPRETATION:  REPORT:    TRANSTHORACIC ECHOCARDIOGRAM REPORT         Patient Name:   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 Accession #: [id number]  Medical Rec #:  [id number]         Height:      65.0 in 165.1 cm  YOB: 1950         Weight:      200.0 lb 90.72 kg  Patient Age:    68 years          BSA:         1.98 m²  Patient Gender: F                 BP:          133/60 mmHg       Date of Exam:        7/17/2019 9:28:29 AM  Referring Physician: [id number] VINCENT LANDEROS  Sonographer:         Becky Pandey  Reading Physician:   Burt Hogan M.D.    Procedure:   2D Echo/Doppler/Color Doppler Complete.  Indications: I33.0 - Infective Endocarditis  Diagnosis:   Endocarditis, valve unspecified - I38         Summary:   1. LV Ejection Fraction by Castle's Method with a biplane EF of 62 %.   2. Spectral Doppler shows impaired relaxation pattern of left   ventricular myocardial filling (Grade I diastolic dysfunction).   3. Degenerative mitral valve.   4. Mild mitral valve regurgitation.   5. Moderate thickening of the posterior mitral valve leaflet.   6. Moderately decreased posterior mitral leaflet mobility.   7. Partially mobile large vegetation (3.8 x 2.7 cm) noted on the   posterior leaflet of the mitral valve.   8. Sclerotic aortic valve with normal opening.   9. Aortic leaflets are thickened. Small vegetation cannot be excluded.  10. Findings discussed with the primary team.    PHYSICIAN INTERPRETATION:  Left Ventricle:Normal left ventricular size and wall thicknesses, with   normal systolic and diastolic function. Spectral Doppler shows impaired   relaxation pattern of left ventricular myocardial filling (Grade I   diastolic dysfunction).  Right Ventricle: Normal right ventricular size and function.  Left Atrium: Mildly enlarged left atrium.  Right Atrium: Normal right atrial size.  Pericardium: There is no evidence of pericardial effusion. There is a   small pleural effusion in the right lateral region.  Mitral Valve: The mitral valve is degenerative in appearance. Moderate   thickening of the posterior mitral valve leaflet. Mobility is moderately   decreased for the posterior leaflet. Mild mitral valve regurgitation is   seen. Partially mobile large vegetation (3.8 x 2.7 cm) noted on the   posterior leaflet of the mitral valve.  Tricuspid Valve: Structurally normal tricuspid valve, with normal leaflet   excursion. The tricuspid valve is normal in structure. Mild tricuspid   regurgitation is visualized.  Aortic Valve: The aortic valve is trileaflet. No evidence of aortic   stenosis. Sclerotic aortic valve with normal opening. No evidence of   aortic valve regurgitation is seen. Aortic leaflets are thickened. Small   vegetation cannot be excluded.  Pulmonic Valve: Structurally normal pulmonic valve, with normal leaflet   excursion. The pulmonic valve is normal. No indication of pulmonic valve   regurgitation.  Aorta: The aortic root and ascending aorta are structurally normal, with   no evidence of dilitation.  Pulmonary Artery: The main pulmonary artery is normal in size.       2D AND M-MODE MEASUREMENTS (normal ranges within parentheses):  Left                  Normal   Aorta/Left             Normal  Ventricle:                     Atrium:  IVSd (2D):  0.95 cm  (0.7-1.1) AoV Cusp       1.56  (1.5-2.6)  LVPWd       0.76 cm  (0.7-1.1) Separation:     cm  (2D):                          Left Atrium    5.15  (1.9-4.0)  LVIDd       3.25 cm  (3.4-5.7) (Mmode):        cm  (2D):         LA Volume      23.5  LVIDs       2.36 cm            Index         ml/m²  (2D):                          Right  LV FS       27.2 %    (>25%)   Ventricle:  (2D):                          RVd (2D):      2.70 cm  IVSd        0.99 cm  (0.7-1.1)  (Mmode):  LVPWd       1.26 cm  (0.7-1.1)  (Mmode):  LVIDd       4.70 cm  (3.4-5.7)  (Mmode):  LVIDs       2.55 cm  (Mmode):  LV FS       45.8 %    (>25%)  (Mmode):  Relative     0.47     (<0.42)  Wall  Thickness  Rel. Wall    0.53     (<0.42)  Thickness  Mm  LV Mass    97.4 g/m²  Index:  Mmode    SPECTRAL DOPPLER ANALYSIS:  LV DIASTOLIC FUNCTION:  MV Peak E: 0.79 m/s Decel Time: 104 msec  MV Peak A: 1.39 m/s  E/A Ratio: 0.57    Aortic Valve:  AoV VMax:    2.06 m/s  AoV Area, Vmax: 2.01 cm² Vmax Indx: 1.02 cm²/m²  AoV Pk Grad: 17.0 mmHg    LVOT Vmax: 1.54 m/s  LVOT VTI:  0.27 m  LVOT Diam: 1.85 cm    Mitral Valve:  MV P1/2 Time: 30.14 msec  MV Area, PHT: 7.30 cm²    Tricuspid Valve and PA/RV Systolic Pressure: TR Max Velocity: 2.14 m/s RA   Pressure: 5 mmHg RVSP/PASP: 23.3 mmHg       [id number] Burt Hogan M.D., Electronically signed on 7/17/2019 at 11:07:50   AM              *** Final ***                    BURT HOGAN MD  This document has been electronically signed. Jul 17 2019  9:28AM            (07-17-19 @ 09:28)  12 Lead ECG:   Ventricular Rate 97 BPM    Atrial Rate 97 BPM    P-R Interval 150 ms    QRS Duration 124 ms    Q-T Interval 382 ms    QTC Calculation(Bezet) 485 ms    P Axis 73 degrees    R Axis -32 degrees    T Axis 51 degrees    Diagnosis Line Normal sinus rhythm  Left axis deviation  Non-specific intra-ventricular conduction delay  Abnormal ECG    Confirmed by Rhett Hou ([id number]) on 7/14/2019 10:20:53 AM (07-14-19 @ 09:04)      MEDICATIONS  ampicillin  IVPB   ampicillin  IVPB 2  cefTRIAXone   IVPB 2000  chlorhexidine 4% Liquid 1  cholecalciferol 2000  docusate sodium 100  docusate sodium Liquid 100  folic acid 1  levothyroxine 75  midodrine 10  multivitamin 1  nystatin Powder 1  pantoprazole    Tablet 40  polyethylene glycol 3350 17  predniSONE   Tablet 10  rifaximin 550  sodium chloride 0.9%. 1000      ANTIBIOTICS:  ampicillin  IVPB      ampicillin  IVPB 2 Gram(s) IV Intermittent every 6 hours  cefTRIAXone   IVPB 2000 milliGRAM(s) IV Intermittent every 12 hours  rifaximin 550 milliGRAM(s) Oral two times a day

## 2019-07-19 NOTE — CHART NOTE - NSCHARTNOTEFT_GEN_A_CORE
Pt comfortable, sleeping  GI bleed appears resolved, GI appreciated  On regular diet and tolerating however intake decreased with previous GIB  Today intake improved  +anasarca  awaiting results of w/u    T(F): 96.3 (07-19-19 @ 13:29), Max: 96.9 (07-19-19 @ 06:00)  HR: 83 (07-19-19 @ 13:29) (83 - 92)  BP: 117/44 (07-19-19 @ 13:29) (113/49 - 119/55)  RR: 15 (07-19-19 @ 13:29) (15 - 18)  SpO2: 99% (07-19-19 @ 08:00) (99% - 99%)    07-19    134<L>  |  94<L>  |  110<HH>  ----------------------------<  128<H>  4.3   |  23  |  1.7<H>    Ca    8.1<L>      19 Jul 2019 04:32  Phos  5.8     07-19    TPro  5.4<L>  /  Alb  2.7<L>  /  TBili  3.9<H>  /  DBili  2.7<H>  /  AST  77<H>  /  ALT  32  /  AlkPhos  446<H>  07-19                        7.5    26.34 )-----------( 98       ( 19 Jul 2019 04:32 )             22.2     CAPILLARY BLOOD GLUCOSE  POCT Blood Glucose.: 135 mg/dL (18 Jul 2019 22:59)    Diet, Regular (07-12-19 @ 09:08)    ASSESSMENT/PLAN  - GB hydrops  - LUIS ALBERTO  - hyperkalemia  - hypothyroidism  - hiatal hernia  - esophageal reflux, dysmotility  - hypokalemia  - elevated WBC    PLAN  - cont PO diet as tolerated   - check calorie counts X 3 days

## 2019-07-20 NOTE — CONSULT NOTE ADULT - NSHPATTENDINGPLANDISCUSS_GEN_ALL_CORE
Medicine floor resident Dr. Lopez
patient ,  , house staff
icu team and house
patient and 
Dr Stephenson
care team
case d/w primary team and GI
medicine

## 2019-07-20 NOTE — CONSULT NOTE ADULT - CONSULT REASON
CPA
Elevated LFT
Mitral valve endocarditis
Need for effient hold for IR biopsy
Nontargeted Liver Biopsy
autoimmune hepatitis
eval for sural nerve biopsy
hydrops of gallbladder on MRI
new dx cirrhosis
victoria
weakness
dx paracentesis
hematochezia
renal biopsy
Leukocytosis
management of possible UTI

## 2019-07-20 NOTE — PROCEDURE NOTE - NSPROCDETAILS_GEN_ALL_CORE
hemostasis with direct pressure, dressing applied/positive blood return obtained via catheter/location identified, draped/prepped, sterile technique used, needle inserted/introduced/connected to a pressurized flush line/ultrasound guidance/sutured in place/all materials/supplies accounted for at end of procedure

## 2019-07-20 NOTE — PROGRESS NOTE ADULT - SUBJECTIVE AND OBJECTIVE BOX
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  As per her  who was with her the entire night she started with the SOB circa half an hour earlier with no prior cough/ CP. SOB has been progressive, no cough    VITALS:  T(F): 95.4, Max: 96.5 (07-19-19 @ 21:12)  HR: 110  BP: 80/60  RR: 22Vital Signs Last 24 Hrs  T(C): 35.2 (20 Jul 2019 06:40), Max: 35.8 (19 Jul 2019 21:12)  T(F): 95.4 (20 Jul 2019 06:40), Max: 96.5 (19 Jul 2019 21:12)  HR: 110 (20 Jul 2019 06:40) (83 - 110)  BP: 80/60 (20 Jul 2019 06:40) (80/60 - 140/81)  BP(mean): --  RR: 22 (20 Jul 2019 06:40) (15 - 22)  SpO2: 99% (19 Jul 2019 08:00) (99% - 99%)    TESTS & MEASUREMENTS:                        7.5    26.34 )-----------( 98       ( 19 Jul 2019 04:32 )             22.2     07-19    134<L>  |  94<L>  |  110<HH>  ----------------------------<  128<H>  4.3   |  23  |  1.7<H>    Ca    8.1<L>      19 Jul 2019 04:32  Phos  5.8     07-19    TPro  5.4<L>  /  Alb  2.7<L>  /  TBili  3.9<H>  /  DBili  2.7<H>  /  AST  77<H>  /  ALT  32  /  AlkPhos  446<H>  07-19    LIVER FUNCTIONS - ( 19 Jul 2019 04:32 )  Alb: 2.7 g/dL / Pro: 5.4 g/dL / ALK PHOS: 446 U/L / ALT: 32 U/L / AST: 77 U/L / GGT: x             Culture - Blood (collected 07-18-19 @ 05:23)  Source: .Blood None  Preliminary Report (07-19-19 @ 18:01):    No growth to date.    Culture - Acid Fast (collected 07-17-19 @ 15:20)    Culture - Fungal, Body Fluid (collected 07-17-19 @ 15:20)  Source: .Body Fluid Peritoneal Fluid  Preliminary Report (07-18-19 @ 08:52):    Testing in progress    Culture - Body Fluid with Gram Stain (collected 07-17-19 @ 15:20)  Source: .Body Fluid Peritoneal Fluid  Gram Stain (07-18-19 @ 03:26):    polymorphonuclear leukocytes seen    No organisms seen    by cytocentrifuge  Preliminary Report (07-18-19 @ 20:17):    No growth    Culture - Blood (collected 07-17-19 @ 11:18)  Source: .Blood None  Gram Stain (07-18-19 @ 17:10):    Growth in anaerobic bottle: Gram Positive Cocci in Pairs and Chains  Final Report (07-19-19 @ 18:47):    Growth in anaerobic bottle: Enterococcus faecalis    See previous culture [address] [place], [place] [id number]    Culture - Blood (collected 07-16-19 @ 19:25)  Source: .Blood None  Gram Stain (07-18-19 @ 03:00):    Growth in anaerobic bottle: Gram Positive Cocci in Pairs and Chains  Final Report (07-18-19 @ 21:54):    Growth in anaerobic bottle: Enterococcus faecalis    See previous culture [address] [place], [place] [id number]    Culture - Blood (collected 07-16-19 @ 11:28)  Source: .Blood None  Gram Stain (07-18-19 @ 01:37):    Growth in anaerobic bottle: Gram Positive Cocci in Pairs and Chains  Final Report (07-18-19 @ 21:46):    Growth in anaerobic bottle: Enterococcus faecalis    See previous culture [address] [place], [place] [phone]    Culture - Blood (collected 07-16-19 @ 05:22)  Source: .Blood None  Gram Stain (07-18-19 @ 00:50):    Growth in aerobic bottle: Gram Positive Cocci in Pairs and Chains    Growth in anaerobic bottle: Gram Positive Cocci in Pairs and Chains  Final Report (07-18-19 @ 22:31):    Growth in aerobic and anaerobic bottles: Enterococcus faecalis See    previous culture [address] [place] [id number]    Culture - Blood (collected 07-15-19 @ 16:34)  Source: .Blood None  Gram Stain (07-16-19 @ 22:37):    Growth in anaerobic bottle: Gram Positive Cocci in Pairs and Chains    Growth in aerobic bottle: Gram Positive Cocci in Pairs and Chains  Final Report (07-17-19 @ 19:05):    Growth in aerobic and anaerobic bottles: Enterococcus faecalis    See previous culture [id number]    Culture - Blood (collected 07-14-19 @ 12:53)  Source: .Blood Blood  Gram Stain (07-15-19 @ 06:12):    Growth in aerobic bottle: Gram Positive Cocci in Pairs and Chains    Growth in anaerobic bottle: Gram Positive Cocci in Pairs and Chains  Final Report (07-18-19 @ 22:37):    Growth in aerobic and anaerobic bottles: Enterococcus faecalis    "Due to technical problems, Proteus sp. will Not be reported as part of    the BCID panel until further notice"    ***Blood Panel PCR results on this specimen are available    approximately 3 hours after the Gram stain result.***    Gram stain, PCR, and/or culture results may not always    correspond due to difference in methodologies.    ************************************************************    This PCR assay was performed using [employer].    The following targets are tested for: Enterococcus,    vancomycin resistant enterococci, Listeria monocytogenes,    coagulase negative staphylococci, S. aureus,    methicillin resistant S. aureus, Streptococcus agalactiae    (Group B), S. pneumoniae, S. pyogenes (Group A),    Acinetobacter baumannii, Enterobacter cloacae, E. coli,    Klebsiella oxytoca, K. pneumoniae, Proteus sp.,    Serratia marcescens, Haemophilus influenzae,    Neisseria meningitidis, Pseudomonas aeruginosa, Candida    albicans, C. glabrata, C krusei, C parapsilosis,    C. tropicalis and the KPC resistance gene.  Organism: Blood Culture PCR  Enterococcus faecalis (07-18-19 @ 22:37)  Organism: Enterococcus faecalis (07-18-19 @ 22:37)      -  Ampicillin: S <=2 Predicts results to ampicillin/sulbactam, amoxacillin-clavulanate and  piperacillin-tazobactam.      -  Gentamicin synergy: S      -  Vancomycin: S 2      Method Type: DILLON  Organism: Blood Culture PCR (07-18-19 @ 22:37)      -  Enterococcus species: Detec      Method Type: PCR    Culture - Urine (collected 07-13-19 @ 23:00)  Source: .Urine Clean Catch (Midstream)  Final Report (07-16-19 @ 08:59):    >100,000 CFU/ml Citrobacter braakii  Organism: Citrobacter braakii (07-16-19 @ 08:59)  Organism: Citrobacter braakii (07-16-19 @ 08:59)      -  Amikacin: S <=16      -  Ampicillin: R >16 These ampicillin results predict results for amoxicillin      -  Ampicillin/Sulbactam: R >16/8 Enterobacter, Citrobacter, and Serratia may develop resistance during prolonged therapy (3-4 days)      -  Aztreonam: I 16      -  Cefazolin: R >16      -  Cefepime: S <=4      -  Cefoxitin: R >16      -  Ceftriaxone: R 32 Enterobacter, Citrobacter, and Serratia may develop resistance during prolonged therapy      -  Ciprofloxacin: S <=1      -  Ertapenem: S <=1      -  Gentamicin: S <=4      -  Imipenem: S <=1      -  Levofloxacin: S <=2      -  Meropenem: S <=1      -  Nitrofurantoin: S <=32 Should not be used to treat pyelonephritis      -  Piperacillin/Tazobactam: S <=16      -  Tigecycline: S <=2      -  Tobramycin: S <=4      -  Trimethoprim/Sulfamethoxazole: S <=2/38      Method Type: DILLON            RADIOLOGY & ADDITIONAL TESTS:    ANTIBIOTICS:  ampicillin  IVPB      ampicillin  IVPB 2 Gram(s) IV Intermittent every 6 hours  cefTRIAXone   IVPB 2000 milliGRAM(s) IV Intermittent every 12 hours  rifaximin 550 milliGRAM(s) Oral two times a day

## 2019-07-20 NOTE — PROGRESS NOTE ADULT - SUBJECTIVE AND OBJECTIVE BOX
S : Code blue x 4 today AM  ROSC achieved but patient might lose pulse again soon  pupils not reactive now. family has made her DNR now.    All other pertinent ROS negative.      07-19-19 @ 07:01  -  07-20-19 @ 07:00  --------------------------------------------------------  IN: 1275 mL / OUT: 0 mL / NET: 1275 mL      Vital Signs Last 24 Hrs  T(C): 35.8 (20 Jul 2019 12:30), Max: 35.8 (19 Jul 2019 21:12)  T(F): 96.5 (20 Jul 2019 12:30), Max: 96.5 (19 Jul 2019 21:12)  HR: 98 (20 Jul 2019 15:00) (87 - 116)  BP: 98/59 (20 Jul 2019 14:00) (80/60 - 140/81)  BP(mean): 0 (20 Jul 2019 14:00) (0 - 76)  RR: 24 (20 Jul 2019 15:00) (17 - 24)  SpO2: 87% (20 Jul 2019 15:00) (50% - 100%)  PHYSICAL EXAM:    Constitutional: critically ill. intubated  HEENT: pupils fixed.   Neck: Soft and supple, No LAD, No JVD  Respiratory: Breath sounds are junky with vent sounds.  Cardiovascular: S1 and S2, regular rate and rhythm  anasarca      MEDICATIONS:  MEDICATIONS  (STANDING):  chlorhexidine 0.12% Liquid 15 milliLiter(s) Oral Mucosa every 12 hours  chlorhexidine 4% Liquid 1 Application(s) Topical <User Schedule>  cholecalciferol 2000 Unit(s) Oral daily  dextrose 5% 1000 milliLiter(s) (125 mL/Hr) IV Continuous <Continuous>  docusate sodium 100 milliGRAM(s) Oral three times a day  docusate sodium Liquid 100 milliGRAM(s) Oral three times a day  fentaNYL   Infusion. 0.5 MICROgram(s)/kG/Hr (6.66 mL/Hr) IV Continuous <Continuous>  folic acid 1 milliGRAM(s) Oral daily  hydrocortisone sodium succinate Injectable 100 milliGRAM(s) IV Push every 8 hours  levothyroxine 75 MICROGram(s) Oral daily  meropenem  IVPB 1000 milliGRAM(s) IV Intermittent every 12 hours  meropenem  IVPB 1000 milliGRAM(s) IV Intermittent every 12 hours  midodrine 10 milliGRAM(s) Oral every 8 hours  multivitamin 1 Tablet(s) Oral daily  norepinephrine Infusion 0.1 MICROgram(s)/kG/Min (24.975 mL/Hr) IV Continuous <Continuous>  norepinephrine Infusion 1 MICROgram(s)/kG/Min (124.875 mL/Hr) IV Continuous <Continuous>  nystatin Powder 1 Application(s) Topical three times a day  octreotide  Infusion 50 MICROgram(s)/Hr (10 mL/Hr) IV Continuous <Continuous>  pantoprazole    Tablet 40 milliGRAM(s) Oral before breakfast  polyethylene glycol 3350 17 Gram(s) Oral every 12 hours  rifaximin 550 milliGRAM(s) Oral two times a day  vancomycin  IVPB 1000 milliGRAM(s) IV Intermittent once  vasopressin Infusion 0.04 Unit(s)/Min (2.4 mL/Hr) IV Continuous <Continuous>      LABS: All Labs Reviewed:                        6.0    19.91 )-----------( 64       ( 20 Jul 2019 08:50 )             18.7     07-20    139  |  93<L>  |  104<HH>  ----------------------------<  248<H>  4.4   |  23  |  2.0<H>    Ca    7.5<L>      20 Jul 2019 08:50  Phos  8.1     07-20  Mg     2.3     07-20    TPro  3.1<L>  /  Alb  1.3<L>  /  TBili  2.2<H>  /  DBili  x   /  AST  363<H>  /  ALT  89<H>  /  AlkPhos  316<H>  07-20    PT/INR - ( 20 Jul 2019 08:50 )   PT: 35.20 sec;   INR: 3.10 ratio         PTT - ( 20 Jul 2019 08:50 )  PTT:142.5 sec  CARDIAC MARKERS ( 20 Jul 2019 08:50 )  x     / 0.56 ng/mL / 93 U/L / x     / 6.3 ng/mL      Blood Culture: 07-18 @ 05:23  Organism --  Gram Stain Blood -- Gram Stain --  Specimen Source .Blood None  Culture-Blood --    07-17 @ 15:20  Organism --  Gram Stain Blood -- Gram Stain   polymorphonuclear leukocytes seen  No organisms seen  by cytocentrifuge  Specimen Source .Body Fluid Peritoneal Fluid  Culture-Blood --    07-17 @ 11:18  Organism --  Gram Stain Blood -- Gram Stain   Growth in anaerobic bottle: Gram Positive Cocci in Pairs and Chains  Specimen Source .Blood None  Culture-Blood --    07-16 @ 19:25  Organism --  Gram Stain Blood -- Gram Stain   Growth in anaerobic bottle: Gram Positive Cocci in Pairs and Chains  Specimen Source .Blood None  Culture-Blood --    07-16 @ 11:28  Organism --  Gram Stain Blood -- Gram Stain   Growth in anaerobic bottle: Gram Positive Cocci in Pairs and Chains  Specimen Source .Blood None  Culture-Blood --    07-16 @ 05:22  Organism --  Gram Stain Blood -- Gram Stain   Growth in aerobic bottle: Gram Positive Cocci in Pairs and Chains  Growth in anaerobic bottle: Gram Positive Cocci in Pairs and Chains  Specimen Source .Blood None  Culture-Blood --    07-15 @ 16:34  Organism --  Gram Stain Blood -- Gram Stain   Growth in anaerobic bottle: Gram Positive Cocci in Pairs and Chains  Growth in aerobic bottle: Gram Positive Cocci in Pairs and Chains  Specimen Source .Blood None  Culture-Blood --        Radiology: reviewed

## 2019-07-20 NOTE — CONSULT NOTE ADULT - CONSULT REQUESTED BY NAME
Dr Lopez
Dr Mercedes
Dr. Rizzo
Dr. Wallis
ED
Kj Rizzo
Marvin Lopez
Matthias
Primary team
medicine
Rui Wick
medicine
medicine
Resident
Dr Rizzo

## 2019-07-20 NOTE — CONSULT NOTE ADULT - ASSESSMENT
IMPRESSION:  CPA  Septic shock   Endocarditis with enterococcus bacteremia  SBP  Cirrhosis  DIC   Anemia possibly secondary to GI bleed     PLAN:    CNS: Fentanyl for sedation;     HEENT: Oral care     PULMONARY: HOB at 45 degrees; ABG post intubation to adjust vent settings     CARDIOVASCULAR: Keep I=O; W1pUPG2 at 100 cc/hr; Levo and vaso to keep MAP >60; TTE to evaluate for mitral valve; CE; Repeat LA    GI: GI prophylaxis protonic drip;  NPO for now     RENAL: FU lytes; Urine studies;     INFECTIOUS DISEASE: FU cultures; Vanco/ jessica for now     HEMATOLOGICAL:  DVT prophylaxis. Transfuse to keep Hb >7; FU coags; Check DIC panel     ENDOCRINE:  Follow up FS.  Insulin protocol if needed.    MUSCULOSKELETAL: Bedrest     Grave prognosis   at bedside aware of poor outcomes following multiple episodes of cardiopulmonary arrest.   Wishes for wife to be DNR; IMPRESSION:  Acute respiratory failure / CPA  Septic shock   Endocarditis with enterococcus bacteremia  SBP  Cirrhosis  DIC   Anemia possibly secondary to GI bleed     PLAN:    CNS: Fentanyl for sedation for now     HEENT: Oral care     PULMONARY: HOB at 45 degrees; ABG post intubation to adjust vent settings     CARDIOVASCULAR: Keep I=O; [id number] at 100 cc/hr; Levo and vaso to keep MAP >60; TTE to evaluate for mitral valve; CE; Repeat LA    GI: GI prophylaxis protonic drip, Octreotide drip;  NPO for now     RENAL: FU lytes; Urine studies; Renal eval.  Might need RRT if stabilized    INFECTIOUS DISEASE: FU cultures; Add Vanco/ jessica adjsut to GFR. FU with ID     HEMATOLOGICAL:  DVT prophylaxis. Transfuse to keep Hb >7; FU coags; Check DIC panel     ENDOCRINE:  Follow up FS.  Insulin protocol if needed.    MUSCULOSKELETAL: Bedrest     Grave prognosis   at bedside aware of poor outcomes following multiple episodes of cardiopulmonary arrest.   Wishes for wife to be DNR;

## 2019-07-20 NOTE — CONSULT NOTE ADULT - SUBJECTIVE AND OBJECTIVE BOX
Patient is a 68y old  Female who presents with a chief complaint of gallbladder hydrops (20 Jul 2019 07:41)      HPI:  69 yo F w/ hx of CAD s/p PCI and hypothyroidism sent by gastroenterologist for gallbladder hydrops. Pt found to have elevated liver enzymes by PMD in Apr 2019, referred to GI for w/u. MRCP 2 days ago significant for gallbladder hydrops 4.7cm with probable impacted stone at neck, also liver cirrhosis w/ mild ascites. Admits to social etoh, denies binge drinking, denies abdominal pain, N/V. Admits to decreased appetite in past 2 weeks, pt tried to maintain low salt diet. Reports BLE edema developed in past 2 days as well as weakness. Denies fever, chills, abdominal pain, SOB, dysuria.    In ED, evaluated by surgery -> no interventions at this time (15 Shashank 2019 01:53)    Patient underwent a prolonged hospital course complicated by enterococcus bacteremia, SBP and DIC. Today ICU was called after patient suffered from cardiopulmonary arrest on the medical floor. Patient was noted to have elevated LA prior to arrest. She underwent ACLS at 3 different events with PEA lasting around 7 min prior to achieving ROSC. Following multiple CPA patient was started on pressors. No BP could be obtained;     PAST MEDICAL & SURGICAL HISTORY:  Hypothyroidism  CAD S/P percutaneous coronary angioplasty  No significant past surgical history      SOCIAL HX: Never smoker     FAMILY HISTORY:  No pertinent family history in first degree relatives      Review of system:  See HPI    Allergies    No Known Allergies    Intolerances          PHYSICAL EXAM    ICU Vital Signs Last 24 Hrs  T(C): 35.2 (20 Jul 2019 06:40), Max: 35.8 (19 Jul 2019 21:12)  T(F): 95.4 (20 Jul 2019 06:40), Max: 96.5 (19 Jul 2019 21:12)  HR: 110 (20 Jul 2019 06:40) (83 - 110)  BP: 80/60 (20 Jul 2019 06:40) (80/60 - 140/81)  RR: 22 (20 Jul 2019 06:40) (15 - 22)  SpO2: --    I&O's Detail    19 Jul 2019 07:01  -  20 Jul 2019 07:00  --------------------------------------------------------  IN:    IV PiggyBack: 300 mL    Oral Fluid: 300 mL    sodium chloride 0.9%: 675 mL  Total IN: 1275 mL    OUT:  Total OUT: 0 mL    Total NET: 1275 mL    General: Intubated no response off sedation   HEENT:  ETT+; Icteric sclera             Lungs: AMADO over bases   Cardiovascular: tachycardiac rhythm, S1S2  Abdomen: Distended   Extremities: Bilateral LE edema   Neurological:  dilated pupils not reactive to light;       LABS:                          6.0    19.91 )-----------( 64       ( 20 Jul 2019 08:50 )             18.7    07-20    139  |  93<L>  |  104<HH>  ----------------------------<  248<H>  4.4   |  23  |  2.0<H>    Ca    7.5<L>      20 Jul 2019 08:50  Phos  8.1     07-20  Mg     2.3     07-20    TPro  3.1<L>  /  Alb  1.3<L>  /  TBili  2.2<H>  /  DBili  x   /  AST  363<H>  /  ALT  89<H>  /  AlkPhos  316<H>  07-20  PT/INR - ( 20 Jul 2019 08:50 )   PT: 35.20 sec;   INR: 3.10 ratio    PTT - ( 20 Jul 2019 08:50 )  PTT:142.5 sec                                           CARDIAC MARKERS ( 20 Jul 2019 08:50 )  x     / 0.56 ng/mL / 93 U/L / x     / 6.3 ng/mL    LIVER FUNCTIONS - ( 20 Jul 2019 08:50 )  Alb: 1.3 g/dL / Pro: 3.1 g/dL / ALK PHOS: 316 U/L / ALT: 89 U/L / AST: 363 U/L / GGT: x           Lactate (07-20-19 @ 08:50): 11.6<HH>  Lactate (07-16-19 @ 11:28): 2.1  Lactate (07-15-19 @ 22:45): 2.7<H>    Culture - Blood (collected 18 Jul 2019 05:23)  Source: .Blood None  Preliminary Report (19 Jul 2019 18:01):    No growth to date.    Culture - Acid Fast (collected 17 Jul 2019 15:20)    Culture - Fungal, Body Fluid (collected 17 Jul 2019 15:20)  Source: .Body Fluid Peritoneal Fluid  Preliminary Report (18 Jul 2019 08:52):    Testing in progress    Culture - Body Fluid with Gram Stain (collected 17 Jul 2019 15:20)  Source: .Body Fluid Peritoneal Fluid  Gram Stain (18 Jul 2019 03:26):    polymorphonuclear leukocytes seen    No organisms seen    by cytocentrifuge  Preliminary Report (18 Jul 2019 20:17):    No growth    Mode: AC/ CMV (Assist Control/ Continuous Mandatory Ventilation)  RR (machine): 16  TV (machine): 450  FiO2: 100  PEEP: 5  ITime: 1  MAP: 13  PIP: 34    ABG - ( 20 Jul 2019 09:48 )  pH, Arterial: 7.21  pH, Blood: x     /  pCO2: 38    /  pO2: 198   / HCO3: 15    / Base Excess: -11.8 /  SaO2: x         MEDICATIONS  (STANDING):  chlorhexidine 0.12% Liquid 15 milliLiter(s) Oral Mucosa every 12 hours  chlorhexidine 4% Liquid 1 Application(s) Topical <User Schedule>  cholecalciferol 2000 Unit(s) Oral daily  dextrose 5% 1000 milliLiter(s) (125 mL/Hr) IV Continuous <Continuous>  docusate sodium 100 milliGRAM(s) Oral three times a day  docusate sodium Liquid 100 milliGRAM(s) Oral three times a day  fentaNYL   Infusion. 0.5 MICROgram(s)/kG/Hr (6.66 mL/Hr) IV Continuous <Continuous>  folic acid 1 milliGRAM(s) Oral daily  hydrocortisone sodium succinate Injectable 100 milliGRAM(s) IV Push every 8 hours  levothyroxine 75 MICROGram(s) Oral daily  meropenem  IVPB 1000 milliGRAM(s) IV Intermittent every 12 hours  meropenem  IVPB 1000 milliGRAM(s) IV Intermittent every 12 hours  midodrine 10 milliGRAM(s) Oral every 8 hours  multivitamin 1 Tablet(s) Oral daily  norepinephrine Infusion 0.1 MICROgram(s)/kG/Min (24.975 mL/Hr) IV Continuous <Continuous>  norepinephrine Infusion 1 MICROgram(s)/kG/Min (124.875 mL/Hr) IV Continuous <Continuous>  nystatin Powder 1 Application(s) Topical three times a day  pantoprazole    Tablet 40 milliGRAM(s) Oral before breakfast  polyethylene glycol 3350 17 Gram(s) Oral every 12 hours  rifaximin 550 milliGRAM(s) Oral two times a day  vancomycin  IVPB 1000 milliGRAM(s) IV Intermittent once  vasopressin Infusion 0.04 Unit(s)/Min (2.4 mL/Hr) IV Continuous <Continuous>    MEDICATIONS  (PRN):  acetaminophen   Tablet .. 325 milliGRAM(s) Oral every 4 hours PRN Mild Pain (1 - 3)  aluminum hydroxide/magnesium hydroxide/simethicone Suspension 30 milliLiter(s) Oral every 4 hours PRN Dyspepsia  bisacodyl Suppository 10 milliGRAM(s) Rectal daily PRN Constipation  lactulose Retention Enema 200 Gram(s) Rectal daily PRN if less than 2 BM per day      Radiology:  Cxr: Low lung volumes; ETT ok;     < from: Transthoracic Echocardiogram (07.17.19 @ 09:28) >  Summary:   1. LV Ejection Fraction by Castle's Method with a biplane EF of 62 %.   2. Spectral Doppler shows impaired relaxation pattern of left   ventricular myocardial filling (Grade I diastolic dysfunction).   3. Degenerative mitral valve.   4. Mild mitral valve regurgitation.   5. Moderate thickening of the posterior mitral valve leaflet.   6. Moderately decreased posterior mitral leaflet mobility.   7. Partially mobile large vegetation (3.8 x 2.7 cm) noted on the   posterior leaflet of the mitral valve.   8. Sclerotic aortic valve with normal opening.   9. Aortic leaflets are thickened. Small vegetation cannot be excluded.  10. Findings discussed with the primary team.    < end of copied text >

## 2019-07-20 NOTE — PROGRESS NOTE ADULT - ASSESSMENT
Pt with LUIS ALBERTO, sepsis, liver cirrhosis, SBP, endocarditis, seen for worsening kidney function acidosis.    S/p code blue x4 this am  LUIS ALBERTO with severe hypotension and lactic acidosis to 11 after code blue  - likely another bout of ATN (had kidney bx proven ATN earlier on)  - cont to support BP with pressors and IVF  _ABx broad spectrum  - prognosis is very poor  -doubt RRT will change prognosis

## 2019-07-20 NOTE — CONSULT NOTE ADULT - CONSULT REQUESTED DATE/TIME
08-Jul-2019 18:48
14-Jun-2019 21:55
15-Shashank-2019 15:13
16-Jun-2019 11:28
17-Jul-2019 13:02
18-Jun-2019 13:55
19-Jun-2019 00:00
19-Jun-2019 11:13
19-Jun-2019 17:43
20-Jul-2019 12:26
27-Jun-2019 15:11
16-Jul-2019 10:07
16-Jul-2019 16:28
27-Jun-2019 09:43
11-Jul-2019 17:28
16-Jul-2019 10:04

## 2019-07-20 NOTE — CHART NOTE - NSCHARTNOTEFT_GEN_A_CORE
Code blue was called for patient in AM. Patient had been complaining of SOB and became hypoxic overnight. Overnight senior went to assess patient. During time, patient became pulseless. Code blue was called. Initial rhythm showed PEA. Patient initially had ROSC after ~10 minutes, received multiple doses of epi and bicarb, no shocks. Shortly thereafter, patient proceeded to code again. Over course of next hour patient coded 4 times, with successful ROSC every time with codes lasting ~5 min.    ICU fellow was contacted - approved patient for upgrade. Emergent femoral line was placed, patient was started on Levo.     Patient was noted to have non-reactive pupils.  decided to make patient DNR/DNI. Probability of irreversible brain damage and overall poor prognosis was discussed with . Expressed understanding. Would like to continue with current care for now. Likely will not want any aggressive measures going forward.    Patient has had complicated, extended hospital stay complicated by bacteremia from suspected endocarditis, as well as DIC, SBP, diffuse anasarca, possible GI bleed, and suspected vasculitis. Please see last resident/attending progress note for full details.    Sign out given to senior ICU resident holding [id number].

## 2019-07-20 NOTE — PROGRESS NOTE ADULT - PROVIDER SPECIALTY LIST ADULT
Cardiology
Gastroenterology
Heme/Onc
Hospitalist
Infectious Disease
Internal Medicine
Intervent Radiology
Nephrology
Neurosurgery
Nutrition Support
Nutrition Support
Intervent Radiology
Gastroenterology
Hospitalist
Internal Medicine
Heme/Onc
Hospitalist
Internal Medicine
Internal Medicine
Nephrology
Nephrology
Nutrition Support
Hospitalist
Infectious Disease
Internal Medicine

## 2019-07-20 NOTE — PROGRESS NOTE ADULT - ASSESSMENT
ASSESSMENT  69 yo F w/ hx of CAD s/p PCI and hypothyroidism sent by gastroenterologist for gallbladder hydrops and cirrhosis, auto-immune hepatitis     PROBLEMS  IMMINENT CONCERN OF ACUTE RESP DISTRESS IS MV RUPTURE WITH ACUTE PULMONARY EDEMA  #Persistent Enterococcal BSI (S amp) +bcx 7/14, 7/15+, 7/16, +7/17 suspected mitral valve native endocarditis given TTE findings, SBP also found on paracentesis but cultures negative todate    TTE Partially mobile large vegetation (3.8 x 2.7 cm) noted on the posterior leaflet of the mitral valve.    6/19  TTE Calcified mobile echodensity noted on the ventricular aspect of the anterior leaflet, most consistent with calcified cordae. Vegetation could not be ruled out    RUQ Cholelithiasis and sludge, without sonographic evidence of acute cholecystitis.2.  Mild common bile duct dilatation. Correlate with labs for CBD   obstruction. If clinically warranted, MRCP can be obtained for further evaluation.  #SBP >250 PMN, 7/17 body fluid cx NG  #Citrobacter in urine, pt denies urinary sx; asymptomatic bacteriuria   #New dx of cirrhosis, GB hydrops, no e/o infection ,path with autoimmune hepatitis possible myositis, possible statin induced    +Anti Nuclear Factor Titer: >1:2560 (06.15.19 @ 06:09)    Infectious workup thus far negative including HIV, Hep A, Hep B, HCV, EBV, HSV, CMV PCR, VZV    MELD NA 24  #lactic acidosis  #Atelectasis   #LUIS ALBERTO  #Hyponatremia   #Morbid Obesity BMI 41    RECOMMENDATIONS  INTUBATION, ICU EVALUATION, CXR, TROPS, STAT ECHO, IF VALVULAR RUPTURE WILL NEED A CTS EVALUATION, REPEAT BCX  - Ampicillin 2g q6h IV, Ceftriaxone 2g q12h IV  - Recommend ELIJAH   -daily BCx    Discussed in detail with Ketan her  at bedside

## 2019-07-20 NOTE — CONSULT NOTE ADULT - REASON FOR ADMISSION
gallbladder hydrops

## 2019-07-20 NOTE — CONSULT NOTE ADULT - PROVIDER SPECIALTY LIST ADULT
CT Surgery
Cardiology
Critical Care
Gastroenterology
Infectious Disease
Intervent Radiology
Nephrology
Neurosurgery
Physiatry
Rheumatology
Surgery
Gastroenterology
Intervent Radiology
Intervent Radiology
Heme/Onc
Infectious Disease

## 2019-07-20 NOTE — PROGRESS NOTE ADULT - NSHPATTENDINGPLANDISCUSS_GEN_ALL_CORE
above
house staff
Medicine floor resident ,GI
house staff
Dr Stephenson
resident, RN, patient
care team
dr Stephenson
care team
care team
 Ketan at bedside

## 2019-07-20 NOTE — PROGRESS NOTE ADULT - ASSESSMENT
Patient seen and examined independently. I personally had a face-to-face encounter with the patient, examined the patient myself and reviewed the plan of care with the housestaff. Agree with resident's note but my note supersedes that of the resident in the matters hereby listed.    67 yo F w/ hx of CAD s/p PCI and hypothyroidism sent by gastroenterologist for gallbladder hydrops, course complicated by ALI/ transaminitis,  LUIS ALBERTO with hyperkalemia multifactorial 2/2 rhabdo (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 with resultant proteinuria and low albumin state and diffuse anasarca, cystitis with urinary retention s/p abx, and subsequent worsening functional debility and malnutrition    #Code blue x 4 on 7/20 AM:  Cause unclear. D/d : Mitral leaflet rupture causing pulm edema vs worsening septic shock that caused worse LUIS ALBERTO & lactic acidosis  after 4 code blues, able to achieve ROSC. But pupils fixed now. family explained the poor prognosis. family has made her DNR from now on.   A line inserted. patient transferred to ICU.   Suggest broadening abx with 1 dose of Hafsa. TTE and maybe have cardiothoracic surgery take a look.    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   Vanc trough 30 on 7/18. Were Holding off any more vanc.   Abx per IcU now.    #DIC : daily coags, ffp prn    #Subacute hepatitis +/- Liver Cirrhosis  #Anasarca / Ascites : paracentesis 7/19  Rifaximin for HE ppx  Lactulost enemas to ensure 2 BMs/day    #SBP: 1.5gm/kg IBW IV albumin on 7/18. Was supposed  to get 1gm/kg on 7/20 - ICU team can decide on this.   ctx should cover this. f/u peritoneal fluid cultures.    #New CBD dilation : REpeat MRCP was planned but given current instability may defer this. trend LFTs per GI.     #Stercoral ulcer/mild BRBPR episode few days ago : Hgb came back lower today. May require transfusion PRN in ICU.     #Suspected Vasculitis : Sural nerve biopsy sample being analyzed at [facility].was getting Steroids per Rheum. Added shock dose hydrocortisone.     #LUIS ALBERTO : ATN +/- Hepatorenal ? Lasix was on hold. Now worse with septic shock. Fluids/albumin administration per ICU.     #Had one episode of digital blue discoloration on 6/22: Never before. Never since. Not sure if this was raynaud's phenomenon ? f/u sural nerve biopsy for vasculitis. otherwise nothingmore to do. pANCA was positive.   Hepatitis panel (including C) was negative. Cryoglobulins was negative.       Poor prognosis  transfer the vented patient to ICU.   DNR now.

## 2019-07-20 NOTE — PROGRESS NOTE ADULT - REASON FOR ADMISSION
gallbladder hydrops

## 2019-07-21 NOTE — DISCHARGE NOTE FOR THE EXPIRED PATIENT - HOSPITAL COURSE
Presented with Hydrops gall bladder, had persistent bacteremia/ endocarditis. Had cardiac arrest in floor on 7/20, ROSC achieved,  opted for DNR. Passed away in ICU at 1:41 am on 7/21

## 2019-07-22 LAB
CULTURE RESULTS: SIGNIFICANT CHANGE UP
SPECIMEN SOURCE: SIGNIFICANT CHANGE UP

## 2019-07-23 LAB
CULTURE RESULTS: SIGNIFICANT CHANGE UP
SPECIMEN SOURCE: SIGNIFICANT CHANGE UP

## 2019-08-01 DIAGNOSIS — D69.6 THROMBOCYTOPENIA, UNSPECIFIED: ICD-10-CM

## 2019-08-01 DIAGNOSIS — A41.9 SEPSIS, UNSPECIFIED ORGANISM: ICD-10-CM

## 2019-08-01 DIAGNOSIS — E66.01 MORBID (SEVERE) OBESITY DUE TO EXCESS CALORIES: ICD-10-CM

## 2019-08-01 DIAGNOSIS — N39.0 URINARY TRACT INFECTION, SITE NOT SPECIFIED: ICD-10-CM

## 2019-08-01 DIAGNOSIS — R74.0 NONSPECIFIC ELEVATION OF LEVELS OF TRANSAMINASE AND LACTIC ACID DEHYDROGENASE [LDH]: ICD-10-CM

## 2019-08-01 DIAGNOSIS — I77.6 ARTERITIS, UNSPECIFIED: ICD-10-CM

## 2019-08-01 DIAGNOSIS — Z95.5 PRESENCE OF CORONARY ANGIOPLASTY IMPLANT AND GRAFT: ICD-10-CM

## 2019-08-01 DIAGNOSIS — J18.9 PNEUMONIA, UNSPECIFIED ORGANISM: ICD-10-CM

## 2019-08-01 DIAGNOSIS — E87.1 HYPO-OSMOLALITY AND HYPONATREMIA: ICD-10-CM

## 2019-08-01 DIAGNOSIS — I25.10 ATHEROSCLEROTIC HEART DISEASE OF NATIVE CORONARY ARTERY WITHOUT ANGINA PECTORIS: ICD-10-CM

## 2019-08-01 DIAGNOSIS — K63.3 ULCER OF INTESTINE: ICD-10-CM

## 2019-08-01 DIAGNOSIS — K72.00 ACUTE AND SUBACUTE HEPATIC FAILURE WITHOUT COMA: ICD-10-CM

## 2019-08-01 DIAGNOSIS — K82.1 HYDROPS OF GALLBLADDER: ICD-10-CM

## 2019-08-01 DIAGNOSIS — T50.905A ADVERSE EFFECT OF UNSPECIFIED DRUGS, MEDICAMENTS AND BIOLOGICAL SUBSTANCES, INITIAL ENCOUNTER: ICD-10-CM

## 2019-08-01 DIAGNOSIS — K74.60 UNSPECIFIED CIRRHOSIS OF LIVER: ICD-10-CM

## 2019-08-01 DIAGNOSIS — K75.4 AUTOIMMUNE HEPATITIS: ICD-10-CM

## 2019-08-01 DIAGNOSIS — E03.9 HYPOTHYROIDISM, UNSPECIFIED: ICD-10-CM

## 2019-08-01 DIAGNOSIS — G93.40 ENCEPHALOPATHY, UNSPECIFIED: ICD-10-CM

## 2019-08-01 DIAGNOSIS — D68.9 COAGULATION DEFECT, UNSPECIFIED: ICD-10-CM

## 2019-08-01 DIAGNOSIS — E88.09 OTHER DISORDERS OF PLASMA-PROTEIN METABOLISM, NOT ELSEWHERE CLASSIFIED: ICD-10-CM

## 2019-08-01 DIAGNOSIS — M62.82 RHABDOMYOLYSIS: ICD-10-CM

## 2019-08-01 DIAGNOSIS — N17.0 ACUTE KIDNEY FAILURE WITH TUBULAR NECROSIS: ICD-10-CM

## 2019-08-01 DIAGNOSIS — J98.11 ATELECTASIS: ICD-10-CM

## 2019-08-01 DIAGNOSIS — E80.7 DISORDER OF BILIRUBIN METABOLISM, UNSPECIFIED: ICD-10-CM

## 2019-08-01 DIAGNOSIS — R18.8 OTHER ASCITES: ICD-10-CM

## 2019-08-01 DIAGNOSIS — I38 ENDOCARDITIS, VALVE UNSPECIFIED: ICD-10-CM

## 2019-08-01 DIAGNOSIS — I50.30 UNSPECIFIED DIASTOLIC (CONGESTIVE) HEART FAILURE: ICD-10-CM

## 2019-08-01 DIAGNOSIS — E43 UNSPECIFIED SEVERE PROTEIN-CALORIE MALNUTRITION: ICD-10-CM

## 2019-08-01 DIAGNOSIS — E87.2 ACIDOSIS: ICD-10-CM

## 2019-08-17 LAB
CULTURE RESULTS: SIGNIFICANT CHANGE UP
SPECIMEN SOURCE: SIGNIFICANT CHANGE UP

## 2021-03-25 NOTE — PROGRESS NOTE ADULT - SUBJECTIVE AND OBJECTIVE BOX
[facility]  Progress Note - Amol Eduardo 1938, 81 y o  female MRN: [id number]  Unit/Bed#: S -01 Encounter: [id number]  Primary Care Provider: Mitzy Kovacs MD   Date and time admitted to hospital: 3/24/2021 10:46 AM    Hypernatremia  Assessment & Plan  · Mild hypernatremia with elevated BUN  · Likely mild dehydration  · Diuretic medications witheld  · Monitor  · Further workup OP as indicated  Chest pain  Assessment & Plan  · Mildly elevated troponin at 0 06   · Rule out acute coronary syndrome  · NSTEMI type 1 versus NSTEMI type 2, with patient having history of palpitations; sinus bradycardia with PACs on EKG  · Not given aspirin in the emergency room due to history of intracerebral hemorrhage, status post surgery  · Cardiology consult (cardiologist informed)  · Telemetry  · Continue trending troponins  · Nitroglycerin p r n     · Oxygen p r n  Audrea Ramming · Pain control p r n  Audrea Ramming Patient denies chest pain and SOB this morning    Chronic diastolic (congestive) heart failure [facility])  Assessment & Plan  Wt Readings from Last 3 Encounters:   03/25/21 79 2 kg (174 lb 9 7 oz)   02/24/21 80 7 kg (178 lb)   12/29/20 79 4 kg (175 lb)     · Presently compensated  · Monitored input and output  · Weigh patient daily  * AMS (altered mental status)  Assessment & Plan  · Patient having confusion, disorientation, poor memory, headaches, dizziness and generalized weakness for 1 week  · CT scan of the head revealed: No acute intracranial pathology  Surgical changes of prior right-sided craniotomy and cranioplasty with bone flap replacement   Unchanged right temporal lobe encephalomalacia and gliosis  · Neuro checks  · Orthostatic vital signs  · Diuretic medications on hold due to possible dehydration  · Neurology consult (neurology advanced practitioner informed)    · PT OT evaluation (at baseline, patient uses a walker or a cane); also for OT cognitive 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, Female  Allergy: No Known Allergies      CHIEF COMPLAINT: gallbladder hydrops (16 Jul 2019 10:06)      INTERVAL EVENTS/HPI  - No acute events overnight  - T(F): , Max: 97.4 (07-16-19 @ 08:33)  - Denies any worsening symptoms  - Tolerating medication  - WBC Count: 18.98 K/uL (07-16-19 @ 04:50) uptrending       ROS  General: Denies rigors, nightsweats  HEENT: Denies headache, rhinorrhea, sore throat, eye pain  CV: Denies CP, palpitations  PULM: Denies SOB, wheezing  GI: Denies abdominal pain, hematochezia/melena  : Denies dysuria, hematuria  MSK: Denies arthralgias, myalgias  SKIN: Denies rash, lesions  NEURO: Denies paresthesias, weakness  PSYCH: Denies depression, anxiety    VITALS:  T(F): 97.4, Max: 97.4 (07-16-19 @ 08:33)  HR: 95  BP: 122/52  RR: 18Vital Signs Last 24 Hrs  T(C): 36.3 (16 Jul 2019 08:33), Max: 36.3 (16 Jul 2019 08:33)  T(F): 97.4 (16 Jul 2019 08:33), Max: 97.4 (16 Jul 2019 08:33)  HR: 95 (16 Jul 2019 08:33) (91 - 101)  BP: 122/52 (16 Jul 2019 08:33) (116/61 - 127/77)  BP(mean): --  RR: 18 (16 Jul 2019 08:33) (18 - 20)  SpO2: 95% (16 Jul 2019 08:33) (95% - 95%)    PHYSICAL EXAM:  Gen: NAD, resting in bed  HEENT: Normocephalic, atraumatic  Neck: supple, no lymphadenopathy  CV: Regular rate & regular rhythm  Lungs: decreased BS at bases, no fremitus  Abdomen: Soft, BS present, TTP LLQ  Ext: Warm, well perfused, anasarca  Neuro: non focal, awake  Skin: no rash, no erythema  Lines: no phlebitis      FH: Non-contributory  Social Hx: Non-contributory    TESTS & MEASUREMENTS:                        7.8    18.98 )-----------( 51       ( 16 Jul 2019 04:50 )             22.8     07-16    135  |  96<L>  |  104<HH>  ----------------------------<  162<H>  4.0   |  26  |  1.7<H>    Ca    8.0<L>      16 Jul 2019 04:50    TPro  4.9<L>  /  Alb  2.2<L>  /  TBili  3.6<H>  /  DBili  x   /  AST  69<H>  /  ALT  32  /  AlkPhos  561<H>  07-15    eGFR if Non African American: 31 mL/min/1.73M2 (07-16-19 @ 04:50)  eGFR if African American: 36 mL/min/1.73M2 (07-16-19 @ 04:50)    LIVER FUNCTIONS - ( 15 Jul 2019 05:57 )  Alb: 2.2 g/dL / Pro: 4.9 g/dL / ALK PHOS: 561 U/L / ALT: 32 U/L / AST: 69 U/L / GGT: x                Culture - Blood (collected 07-14-19 @ 12:53)  Source: .Blood Blood  Gram Stain (07-15-19 @ 06:12):    Growth in aerobic bottle: Gram Positive Cocci in Pairs and Chains    Growth in anaerobic bottle: Gram Positive Cocci in Pairs and Chains  Preliminary Report (07-15-19 @ 06:12):    Growth in anaerobic bottle: Gram Positive Cocci in Pairs and Chains    Growth in aerobic bottle: Gram Positive Cocci in Pairs and Chains    "Due to technical problems, Proteus sp. will Not be reported as part of    the BCID panel until further notice"    ***Blood Panel PCR results on this specimen are available    approximately 3 hours after the Gram stain result.***    Gram stain, PCR, and/or culture results may not always    correspond due to difference in methodologies.    ************************************************************    This PCR assay was performed using [employer].    The following targets are tested for: Enterococcus,    vancomycin resistant enterococci, Listeria monocytogenes,    coagulase negative staphylococci, S. aureus,    methicillin resistant S. aureus, Streptococcus agalactiae    (Group B), S. pneumoniae, S. pyogenes (Group A),    Acinetobacter baumannii, Enterobacter cloacae, E. coli,    Klebsiella oxytoca, K. pneumoniae, Proteus sp.,    Serratia marcescens, Haemophilus influenzae,    Neisseria meningitidis, Pseudomonas aeruginosa, Candida    albicans, C. glabrata, C krusei, C parapsilosis,    C. tropicalis and the KPC resistance gene.  Organism: Blood Culture PCR (07-15-19 @ 07:10)  Organism: Blood Culture PCR (07-15-19 @ 07:10)      -  Enterococcus species: Detec      Method Type: PCR    Culture - Urine (collected 07-13-19 @ 23:00)  Source: .Urine Clean Catch (Midstream)  Final Report (07-16-19 @ 08:59):    >100,000 CFU/ml Citrobacter braakii  Organism: Citrobacter braakii (07-16-19 @ 08:59)  Organism: Citrobacter braakii (07-16-19 @ 08:59)      -  Amikacin: S <=16      -  Ampicillin: R >16 These ampicillin results predict results for amoxicillin      -  Ampicillin/Sulbactam: R >16/8 Enterobacter, Citrobacter, and Serratia may develop resistance during prolonged therapy (3-4 days)      -  Aztreonam: I 16      -  Cefazolin: R >16      -  Cefepime: S <=4      -  Cefoxitin: R >16      -  Ceftriaxone: R 32 Enterobacter, Citrobacter, and Serratia may develop resistance during prolonged therapy      -  Ciprofloxacin: S <=1      -  Ertapenem: S <=1      -  Gentamicin: S <=4      -  Imipenem: S <=1      -  Levofloxacin: S <=2      -  Meropenem: S <=1      -  Nitrofurantoin: S <=32 Should not be used to treat pyelonephritis      -  Piperacillin/Tazobactam: S <=16      -  Tigecycline: S <=2      -  Tobramycin: S <=4      -  Trimethoprim/Sulfamethoxazole: S <=2/38      Method Type: DILLON        Lactate, Blood: 2.7 mmol/L (07-15-19 @ 22:45)  Lactate, Blood: 2.7 mmol/L (07-15-19 @ 05:57)  Lactate, Blood: 2.4 mmol/L (07-15-19 @ 00:44)  Lactate, Blood: 2.9 mmol/L (07-14-19 @ 08:42)      INFECTIOUS DISEASES TESTING  HIV-1/2 Combo Result: Nonreact (06-16-19 @ 00:50)      RADIOLOGY & ADDITIONAL TESTS:  I have personally reviewed the last Chest xray  CXR  Xray Chest 1 View- PORTABLE-Urgent:   EXAM:  XR CHEST PORTABLE URGENT 1V            PROCEDURE DATE:  07/16/2019            INTERPRETATION:  Clinical History / Reason for exam: Sepsis    Comparison : Chest radiograph 7/10/2019.    Technique/Positioning: AP portable    Findings:    Support devices: None.    Cardiac/mediastinum/hilum: Unremarkable.    Lung parenchyma/Pleura: Low lung volumes with increasing bibasilar   opacities/atelectasis. No pneumothorax    Skeleton/soft tissues: Unremarkable.    Impression:      Low lung volumes with increasing bibasilar opacities/atelectasis                      RALPH COLEMAN M.D., ATTENDING RADIOLOGIST  This document has been electronically signed. Jul 16 2019 11:25AM             (07-16-19 @ 10:46)      CT      CARDIOLOGY TESTING  12 Lead ECG:   Ventricular Rate 97 BPM    Atrial Rate 97 BPM    P-R Interval 150 ms    QRS Duration 124 ms    Q-T Interval 382 ms    QTC Calculation(Bezet) 485 ms    P Axis 73 degrees    R Axis -32 degrees    T Axis 51 degrees    Diagnosis Line Normal sinus rhythm  Left axis deviation  Non-specific intra-ventricular conduction delay  Abnormal ECG    Confirmed by Rhett Hou ([id number]) on 7/14/2019 10:20:53 AM (07-14-19 @ 09:04)      MEDICATIONS  chlorhexidine 4% Liquid 1  cholecalciferol 2000  docusate sodium 100  folic acid 1  levothyroxine 50  meropenem  IVPB 1000  midodrine 10  multivitamin 1  nystatin Powder 1  pantoprazole    Tablet 40  predniSONE   Tablet 10  sodium chloride 0.9%. 1000  vancomycin  IVPB 1500      ANTIBIOTICS:  meropenem  IVPB 1000 milliGRAM(s) IV Intermittent every 8 hours  vancomycin  IVPB 1500 milliGRAM(s) IV Intermittent daily evaluation  Gerontology consult outpatient        Discharging Resident Physician: Joseline Witt MD  Attending: Kia Garcia MD  PCP: Kim Mojica MD  Admission Date: 3/24/2021  Discharge Date: 03/25/21    Disposition:     Home    Reason for Admission: Confusion Chest Pain, CHF, Hypernatremia      · Consultations During Hospital Stay:   Cardilogy    Procedures Performed:     · CT Hread    Significant Findings / Test Results:     CT Head IMPRESSION:     No acute intracranial pathology      · Surgical changes of prior right-sided craniotomy and cranioplasty with bone flap replacement  Unchanged right temporal lobe encephalomalacia and gliosis  Incidental Findings:   · None    Test Results Pending at Discharge (will require follow up): · Per PCP and Cardiology     Outpatient Tests Requested:  · Gerontology Consult    Complications:  None    Hospital Course:     Sebastian Schwarz is a 81 y o  female who presented  to the emergency room [address] [place] [address] on 03/24/21 with multiple complaints but primarily headaches, poor memory/confusion for about a week  Medical history significant for history of right intracerebral hemorrhage, status post Neurosurgery, February of last year, hypertension, CHF, dyslipidemia and prediabetes  Patient has been having palpitations lately and a ZIO heart monitor was provided  According to the cardiologist medical assistant notes, the heart monitor has ended 03/24 and report summary will be done by Dr Kraig Esposito  Patient complains of headaches, generalized, for the past 1 week associated with having acute for memory and confusion  According to the patient's daughter in-law, bedside, prior to this, patient's memory is sharp/pristine    According to the patient's daughter in-law, lately, patient has been having episodes of disorientation, confusion and poor memory    To illustrate this, patient's daughter in-law told stated upon presentation, that patient forgets now were the telephone is  Patient usually walks with a cane or a walker  Patient had history of falls in the past, but none lately  Patient claimed also of generalized weakness, but no focal weakness or numbness  Patient claimed of occasional nausea, but no actual vomiting episodes, with mild abdominal pain  In the ER, patient developed chest pains, at the left lower anterior chest area, stabbing in character  Patient denied any shortness of breath  Patient admited to constipation, has had recent bowel movements  She did report periodic difficulty moving her bowels  The patient was subsequently admitted, consulted by Cardiology advised no further cardiac workup at this time  She was also evaluated by Physical therapy and Occupational therapy and Gerontology consultation been requested upon discharge  The patient states she lives in a safe environment at home involved with her doctors regularly  Condition at Discharge: good     Discharge Day Visit / Exam:     Subjective: The patient states that the bedside this morning, her chest pain had resolved  She understands that she needed to come to the hospital to have her cardiac status checked  She states she lives with family and is anxious to return home  Vitals: Blood Pressure: 127/58 (03/25/21 0731)  Pulse: 65 (03/25/21 0731)  Temperature: 98 1 °F (36 7 °C) (03/25/21 0731)  Temp Source: Oral (03/25/21 0731)  Respirations: 18 (03/25/21 0731)  Height: 5' 1" (154 9 cm) (03/25/21 1214)  Weight - Scale: 79 2 kg (174 lb 9 7 oz) (03/25/21 0600)  SpO2: 97 % (03/25/21 0731)  Exam:   Physical Exam  Constitutional:       General: She is not in acute distress  HENT:      Head: Normocephalic  Eyes:      Extraocular Movements: Extraocular movements intact  Cardiovascular:      Rate and Rhythm: Normal rate and regular rhythm  Heart sounds: Normal heart sounds  No murmur     Pulmonary:      Effort: Pulmonary effort is normal       Breath sounds: Normal breath sounds  Abdominal:      Palpations: Abdomen is soft  Tenderness: There is no abdominal tenderness  Musculoskeletal:      Right lower leg: Edema (Trace) present  Left lower leg: Edema (Trace) present  Skin:     General: Skin is warm and dry  Neurological:      General: No focal deficit present  Mental Status: She is alert  Psychiatric:         Mood and Affect: Mood normal          Thought Content: Thought content normal        Discharge instructions/Information to patient and family:   See after visit summary for information provided to patient and family  Provisions for Follow-Up Care:  See after visit summary for information related to follow-up care and any pertinent home health orders  Planned Readmission:  None     Discharge Medications:  See after visit summary for reconciled discharge medications provided to patient and family        ** Please Note: This note has been constructed using a voice recognition system **

## 2023-01-24 NOTE — H&P ADULT - HISTORY OF PRESENT ILLNESS
Hpi Title: Evaluation of Skin Lesions 67 years old female patient previously healthy is here for the above chief complaint. Patient started experiencing midsternal/epigastric pain since yesterday , intermittent, burning in type. It became constant at 2: 30 pm today. She went to urgent care , and was found to have ST elevations in inferior leads and was sent  to ED. Patient had a viral illness 2 weeks ago , and was prescribed cefuroxime for persistent cough today. 67 years old female patient previously healthy is here for the above chief complaint. Patient started experiencing midsternal/epigastric pain since yesterday , intermittent, burning in type. It became constant at 2: 30 pm today associated with diaphoresis. She went to urgent care , and was found to have ST elevations in inferior leads and was sent  to ED. Patient had a viral illness 2 weeks ago , and was prescribed cefuroxime for persistent cough today.In ED, Code STEMi called and  EKG showed ST elevation in inferior leads with  reciprocal ST depression. Patient felt better post morphine and  mg by EMS. Cath performed and showed proximal % stenosis s/p successful balloon angioplasty.

## 2024-04-10 NOTE — DISCHARGE NOTE ADULT - DISCHARGE DATE
28-Feb-2018 [Yes] : Yes [Monthly or less (1 pt)] : Monthly or less (1 point) [1 or 2 (0 pts)] : 1 or 2 (0 points) [Never (0 pts)] : Never (0 points) [No] : In the past 12 months have you used drugs other than those required for medical reasons? No [No falls in past year] : Patient reported no falls in the past year [Little interest or pleasure doing things] : 1) Little interest or pleasure doing things [Feeling down, depressed, or hopeless] : 2) Feeling down, depressed, or hopeless [0] : 2) Feeling down, depressed, or hopeless: Not at all (0) [I have developed a follow-up plan documented below in the note.] : I have developed a follow-up plan documented below in the note. [Audit-CScore] : 1 [FTP2Aqpbf] : 0 [With Patient/Caregiver] : , with patient/caregiver [Reviewed no changes] : Reviewed, no changes [Designated Healthcare Proxy] : Designated healthcare proxy [Relationship: ___] : Relationship: [unfilled] [Aggressive treatment] : aggressive treatment [I will adhere to the patient's wishes.] : I will adhere to the patient's wishes. [AdvancecareDate] : 04/24 [Never] : Never

## 2024-04-30 NOTE — PRE-ANESTHESIA EVALUATION ADULT - NSANTHSNORERD_ENT_A_CORE
No
No
" I have on & off cough for a month, fever a week ago and now my ribs hurts every time I cough."."
No

## 2024-05-18 NOTE — PROGRESS NOTE ADULT - SUBJECTIVE AND OBJECTIVE BOX
Nephrology progress note    Patient is seen and examined, events over the last 24 h noted .  Pt with sepsis, seen initially for LUIS ALBERTO which resolved, now with worsening kidney function again, s/p code x4 this am. Intubated, made DNR    Allergies:  No Known Allergies    Hospital Medications:   MEDICATIONS  (STANDING):  chlorhexidine 0.12% Liquid 15 milliLiter(s) Oral Mucosa every 12 hours  chlorhexidine 4% Liquid 1 Application(s) Topical <User Schedule>  cholecalciferol 2000 Unit(s) Oral daily  dextrose 5% 1000 milliLiter(s) (125 mL/Hr) IV Continuous <Continuous>  docusate sodium 100 milliGRAM(s) Oral three times a day  docusate sodium Liquid 100 milliGRAM(s) Oral three times a day  fentaNYL   Infusion. 0.5 MICROgram(s)/kG/Hr (6.66 mL/Hr) IV Continuous <Continuous>  folic acid 1 milliGRAM(s) Oral daily  hydrocortisone sodium succinate Injectable 100 milliGRAM(s) IV Push every 8 hours  levothyroxine 75 MICROGram(s) Oral daily  meropenem  IVPB 1000 milliGRAM(s) IV Intermittent every 12 hours  meropenem  IVPB 1000 milliGRAM(s) IV Intermittent every 12 hours  midodrine 10 milliGRAM(s) Oral every 8 hours  multivitamin 1 Tablet(s) Oral daily  norepinephrine Infusion 0.1 MICROgram(s)/kG/Min (24.975 mL/Hr) IV Continuous <Continuous>  norepinephrine Infusion 1 MICROgram(s)/kG/Min (124.875 mL/Hr) IV Continuous <Continuous>  nystatin Powder 1 Application(s) Topical three times a day  pantoprazole    Tablet 40 milliGRAM(s) Oral before breakfast  polyethylene glycol 3350 17 Gram(s) Oral every 12 hours  rifaximin 550 milliGRAM(s) Oral two times a day  vancomycin  IVPB 1000 milliGRAM(s) IV Intermittent once  vasopressin Infusion 0.04 Unit(s)/Min (2.4 mL/Hr) IV Continuous <Continuous>        VITALS:  T(F): 95.4 (19 @ 06:40), Max: 96.5 (19 @ 21:12)  HR: 110 (19 @ 06:40)  BP: 80/60 (19 @ 06:40)  RR: 22 (19 @ 06:40)  SpO2: --  Wt(kg): --     @ 07:01  -   @ 07:00  --------------------------------------------------------  IN: 400 mL / OUT: 0 mL / NET: 400 mL     @ 07:  -   @ 07:00  --------------------------------------------------------  IN: 1275 mL / OUT: 0 mL / NET: 1275 mL          PHYSICAL EXAM:  Constitutional: NAD  Respiratory: BS b/l +  Cardiovascular: S1, S2, RRR  Gastrointestinal: BS+, soft, NT/ND  Extremities: + peripheral edema  Neurological: Unresponsive  : No CVA tenderness. No aragon.   Skin: No rashes  Vascular Access:    LABS:      139  |  93<L>  |  104<HH>  ----------------------------<  248<H>  4.4   |  23  |  2.0<H>    Ca    7.5<L>      2019 08:50  Phos  8.1       Mg     2.3         TPro  3.1<L>  /  Alb  1.3<L>  /  TBili  2.2<H>  /  DBili      /  AST  363<H>  /  ALT  89<H>  /  AlkPhos  316<H>                            6.0    19.91 )-----------( 64       ( 2019 08:50 )             18.7       Urine Studies:  Urinalysis Basic - ( 2019 23:00 )    Color: Dark Yellow / Appearance: Turbid / S.015 / pH:   Gluc:  / Ketone: Trace  / Bili: Negative / Urobili: 0.2   Blood:  / Protein: 100 / Nitrite: Negative   Leuk Esterase: Large / RBC: 25 /HPF / WBC >50 /HPF   Sq Epi:  / Non Sq Epi: occasional /HPF / Bacteria: Moderate        RADIOLOGY & ADDITIONAL STUDIES: 24

## 2025-01-13 NOTE — ED PROVIDER NOTE - TOBACCO USE
Patient: Schuyler Castro    Procedure: Procedure(s):  Robotic assisted bilateral inguinal hernia repair with mesh       Anesthesia Type:  General    Note:  Disposition: Outpatient   Postop Pain Control: Uneventful            Sign Out: Well controlled pain   PONV: No   Neuro/Psych: Uneventful            Sign Out: Acceptable/Baseline neuro status   Airway/Respiratory: Uneventful            Sign Out: Acceptable/Baseline resp. status   CV/Hemodynamics: Uneventful            Sign Out: Acceptable CV status; No obvious hypovolemia; No obvious fluid overload   Other NRE:    DID A NON-ROUTINE EVENT OCCUR? No           Last vitals:  Vitals Value Taken Time   /74 01/13/25 1215   Temp 98.06  F (36.7  C) 01/13/25 1216   Pulse 85 01/13/25 1216   Resp 14 01/13/25 1216   SpO2 99 % 01/13/25 1216   Vitals shown include unfiled device data.    Electronically Signed By: Sydnie Bianchi MD  January 13, 2025  12:18 PM  
Never smoker

## 2025-03-20 NOTE — PROGRESS NOTE ADULT - ASSESSMENT
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 with resultant proteinuria and low albumin state and diffuse anasarca, raynauds phenomenon (not currently active), esophageal dysmotility and reflux, cystitis with urinary retention s/p abx, and subsequent worsening functional debility and malnutrition.     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 Can give cryoprecipitate, if patient has no response to FFP transfusion   [facility]22

## 2025-04-07 NOTE — PROGRESS NOTE ADULT - SUBJECTIVE AND OBJECTIVE BOX
JACEK Granger    Action Requested: Summary for Provider     []  Critical Lab, Recommendations Needed  [] Need Additional Advice  [x]   FYI    []   Need Orders  [] Need Medications Sent to Pharmacy  []  Other     SUMMARY: paresthesias of left arm since onset; today brief moment of lightheadedness--> advised 911 now--> agreeable. [See below for more details]     Reason for call: Paresthesias and Dizziness  Onset: 3/30/25, present now    Reason for Disposition   New neurologic deficit that is present now: * Weakness of the face, arm, or leg on one side of the body * Numbness of the face, arm, or leg on one side of the body * Loss of speech or garbled speech    Protocols used: Dizziness-A-OH      Patient called states she has had paresthesias of left arm since onset. She woke up with sensation when on vacation on 3/30/25. She states the sensation is constant. She was eating a bagel now and felt lightheaded about 10:10 am today, the lightheadedness lasted for a few seconds then subsided. Denies any shortness of breath, chest pain, and/or chest tightness, visual changes, jaw pain, arm pain, palpitations, or nausea. I asked that she place arms up in front of her without pronator drift; she is able to smile, stick out tongue, smile and raise eyebrows. Refer to system/assessment yes/no answers. Advised she call 911 now, offered to call for her and she states she will call herself. I made her aware I will call her back in a few minutes to ensure ambulance was called. Patient verbalized understanding. No further questions or concerns at this time.    I called patient back, she called 911 and ambulance is on the way. She was agreeable in staying on the line until ambulance arrived. Ambulance arrived and she was made aware I will send message to Dr. Granger making aware of above. Patient verbalized understanding. No further questions or concerns at this time.   Patient is a 68y old  Female who presents with a chief complaint of gallbladder hydrops (01 Jul 2019 12:34)  pt seen and evaluated has no complains  npo for renal BX today    ICU Vital Signs Last 24 Hrs  T(C): 36 (01 Jul 2019 06:54), Max: 36.3 (30 Jun 2019 20:52)  T(F): 96.8 (01 Jul 2019 06:54), Max: 97.3 (30 Jun 2019 20:52)  HR: 69 (01 Jul 2019 12:45) (69 - 89)  BP: 102/53 (01 Jul 2019 12:45) (102/53 - 133/59)  RR: 12 (01 Jul 2019 06:54) (12 - 18)  SpO2: 97% (01 Jul 2019 06:54) (97% - 97%)      Drug Dosing Weight  Height (cm): 165.1 (01 Jul 2019 06:54)  Weight (kg): 125.8 (01 Jul 2019 06:54)  BMI (kg/m2): 46.2 (01 Jul 2019 06:54)  BSA (m2): 2.27 (01 Jul 2019 06:54)    I&O's Detail    30 Jun 2019 07:01  -  01 Jul 2019 07:00  --------------------------------------------------------  IN:  Total IN: 0 mL    OUT:    Indwelling Catheter - Urethral: 3100 mL  Total OUT: 3100 mL    Total NET: -3100 mL      01 Jul 2019 07:01  -  01 Jul 2019 14:24  --------------------------------------------------------  IN:    fat emulsion (Plant Based) 20% Infusion: 84 mL    PPN (Peripheral Parenteral Nutrition): 420 mL  Total IN: 504 mL    OUT:    Indwelling Catheter - Urethral: 1000 mL  Total OUT: 1000 mL    Total NET: -496 mL    PHYSICAL EXAM:  Constitutional: A+ox3  Gastrointestinal: obese   Extremities + edema  IV access: right midline    MEDICATIONS  (STANDING):  chlorhexidine 4% Liquid 1 Application(s) Topical <User Schedule>  docusate sodium 100 milliGRAM(s) Oral three times a day  fat emulsion (Plant Based) 20% Infusion 0.558 Gm/kG/Day (21.936 mL/Hr) IV Continuous <Continuous>  levothyroxine 50 MICROGram(s) Oral daily  metoprolol tartrate 25 milliGRAM(s) Oral two times a day  midodrine 10 milliGRAM(s) Oral every 8 hours  nystatin Powder 1 Application(s) Topical three times a day  pantoprazole   Suspension 40 milliGRAM(s) Oral before breakfast  Parenteral Nutrition - Adult 1 Each (60 mL/Hr) TPN Continuous <Continuous>  Parenteral Nutrition - Adult 1 Each (60 mL/Hr) TPN Continuous <Continuous>  potassium chloride   Powder 40 milliEquivalent(s) Oral once  PPD  5 Tuberculin Unit(s) Injectable 5 Unit(s) IntraDermal once  senna 2 Tablet(s) Oral at bedtime      Diet, DASH/TLC:   Sodium & Cholesterol Restricted  Low Fat (LOWFAT)  Supplement Feeding Modality:  Oral  Ensure Clear Cans or Servings Per Day:  1       Frequency:  Two Times a day (06-21-19 @ 14:10)  Diet, NPO after Midnight:      NPO Start Date: 30-Jun-2019,   NPO Start Time: 23:59 (06-30-19 @ 11:56)      LABS  07-01    136  |  93<L>  |  138<HH>  ----------------------------<  112<H>  3.1<L>   |  21  |  2.2<H>    Ca    7.5<L>      01 Jul 2019 11:48  Phos  5.5     07-01  Mg     2.2     07-01    TPro  5.6<L>  /  Alb  3.2<L>  /  TBili  2.9<H>  /  DBili  1.4<H>  /  AST  138<H>  /  ALT  118<H>  /  AlkPhos  555<H>  07-01                        9.2    15.99 )-----------( 190      ( 01 Jul 2019 11:48 )             26.3     CAPILLARY BLOOD GLUCOSE  POCT Blood Glucose.: 109 mg/dL (01 Jul 2019 11:54)  POCT Blood Glucose.: 106 mg/dL (01 Jul 2019 07:41)  RADIOLOGY STUDIES